# Patient Record
Sex: MALE | Race: WHITE | NOT HISPANIC OR LATINO | Employment: OTHER | URBAN - METROPOLITAN AREA
[De-identification: names, ages, dates, MRNs, and addresses within clinical notes are randomized per-mention and may not be internally consistent; named-entity substitution may affect disease eponyms.]

---

## 2017-02-24 ENCOUNTER — GENERIC CONVERSION - ENCOUNTER (OUTPATIENT)
Dept: OTHER | Facility: OTHER | Age: 32
End: 2017-02-24

## 2017-05-16 ENCOUNTER — GENERIC CONVERSION - ENCOUNTER (OUTPATIENT)
Dept: OTHER | Facility: OTHER | Age: 32
End: 2017-05-16

## 2017-05-24 ENCOUNTER — ALLSCRIPTS OFFICE VISIT (OUTPATIENT)
Dept: OTHER | Facility: OTHER | Age: 32
End: 2017-05-24

## 2017-05-24 ENCOUNTER — GENERIC CONVERSION - ENCOUNTER (OUTPATIENT)
Dept: OTHER | Facility: OTHER | Age: 32
End: 2017-05-24

## 2017-05-26 ENCOUNTER — GENERIC CONVERSION - ENCOUNTER (OUTPATIENT)
Dept: OTHER | Facility: OTHER | Age: 32
End: 2017-05-26

## 2017-05-26 DIAGNOSIS — Z03.89 ENCOUNTER FOR OBSERVATION FOR OTHER SUSPECTED DISEASES AND CONDITIONS RULED OUT: ICD-10-CM

## 2017-06-02 ENCOUNTER — LAB CONVERSION - ENCOUNTER (OUTPATIENT)
Dept: OTHER | Facility: OTHER | Age: 32
End: 2017-06-02

## 2017-06-02 ENCOUNTER — GENERIC CONVERSION - ENCOUNTER (OUTPATIENT)
Dept: OTHER | Facility: OTHER | Age: 32
End: 2017-06-02

## 2017-06-02 LAB
HEPATITIS B SURFACE ANTIGEN (HISTORICAL): NORMAL
HIV AG/AB, 4TH GEN (HISTORICAL): NORMAL
RPR SCREEN (HISTORICAL): NORMAL

## 2017-06-04 ENCOUNTER — LAB CONVERSION - ENCOUNTER (OUTPATIENT)
Dept: OTHER | Facility: OTHER | Age: 32
End: 2017-06-04

## 2017-06-04 LAB
BACTERIA UR QL AUTO: ABNORMAL /HPF
BILIRUB UR QL STRIP: NEGATIVE
COLOR UR: YELLOW
COMMENT (HISTORICAL): CLEAR
CULTURE RESULT (HISTORICAL): ABNORMAL
CULTURE RESULT (HISTORICAL): ABNORMAL
FECAL OCCULT BLOOD DIAGNOSTIC (HISTORICAL): NEGATIVE
GLUCOSE (HISTORICAL): NEGATIVE
HYALINE CASTS #/AREA URNS LPF: ABNORMAL /LPF
KETONES UR STRIP-MCNC: NEGATIVE MG/DL
LEUKOCYTE ESTERASE UR QL STRIP: ABNORMAL
NITRITE UR QL STRIP: POSITIVE
PH UR STRIP.AUTO: 6.5 [PH] (ref 5–8)
PROT UR STRIP-MCNC: NEGATIVE MG/DL
RBC (HISTORICAL): ABNORMAL /HPF
SP GR UR STRIP.AUTO: 1.02 (ref 1–1.03)
SQUAMOUS EPITHELIAL CELLS (HISTORICAL): ABNORMAL /HPF
WBC # BLD AUTO: ABNORMAL /HPF

## 2017-06-05 ENCOUNTER — LAB CONVERSION - ENCOUNTER (OUTPATIENT)
Dept: OTHER | Facility: OTHER | Age: 32
End: 2017-06-05

## 2017-06-05 LAB — CLINICAL COMMENT (HISTORICAL): NORMAL

## 2017-07-31 DIAGNOSIS — S06.890A OTHER SPECIFIED INTRACRANIAL INJURY WITHOUT LOSS OF CONSCIOUSNESS, INITIAL ENCOUNTER (HCC): ICD-10-CM

## 2017-07-31 DIAGNOSIS — G82.50 QUADRIPLEGIA (HCC): ICD-10-CM

## 2017-07-31 DIAGNOSIS — M79.9 SOFT TISSUE DISORDER: ICD-10-CM

## 2017-08-01 ENCOUNTER — LAB CONVERSION - ENCOUNTER (OUTPATIENT)
Dept: OTHER | Facility: OTHER | Age: 32
End: 2017-08-01

## 2017-08-01 LAB
BILIRUB UR QL STRIP: NEGATIVE
COLOR UR: YELLOW
COMMENT (HISTORICAL): CLEAR
FECAL OCCULT BLOOD DIAGNOSTIC (HISTORICAL): NEGATIVE
GLUCOSE (HISTORICAL): NEGATIVE
KETONES UR STRIP-MCNC: NEGATIVE MG/DL
LEUKOCYTE ESTERASE UR QL STRIP: NEGATIVE
NITRITE UR QL STRIP: NEGATIVE
PH UR STRIP.AUTO: 6 [PH] (ref 5–8)
PROT UR STRIP-MCNC: NEGATIVE MG/DL
SP GR UR STRIP.AUTO: 1.01 (ref 1–1.03)

## 2017-08-10 ENCOUNTER — GENERIC CONVERSION - ENCOUNTER (OUTPATIENT)
Dept: OTHER | Facility: OTHER | Age: 32
End: 2017-08-10

## 2017-09-05 ENCOUNTER — ALLSCRIPTS OFFICE VISIT (OUTPATIENT)
Dept: OTHER | Facility: OTHER | Age: 32
End: 2017-09-05

## 2017-09-06 LAB
A/G RATIO (HISTORICAL): 1.1 (CALC) (ref 1–2.5)
ALBUMIN SERPL BCP-MCNC: 4.2 G/DL (ref 3.6–5.1)
ALP SERPL-CCNC: 65 U/L (ref 40–115)
ALT SERPL W P-5'-P-CCNC: 16 U/L (ref 9–46)
AST SERPL W P-5'-P-CCNC: 18 U/L (ref 10–40)
BILIRUB SERPL-MCNC: 0.5 MG/DL (ref 0.2–1.2)
BUN SERPL-MCNC: 14 MG/DL (ref 7–25)
BUN/CREA RATIO (HISTORICAL): ABNORMAL (CALC) (ref 6–22)
CALCIUM SERPL-MCNC: 9.4 MG/DL (ref 8.6–10.3)
CHLORIDE SERPL-SCNC: 103 MMOL/L (ref 98–110)
CO2 SERPL-SCNC: 30 MMOL/L (ref 20–31)
CREAT SERPL-MCNC: 0.86 MG/DL (ref 0.6–1.35)
EGFR AFRICAN AMERICAN (HISTORICAL): 132 ML/MIN/1.73M2
EGFR-AMERICAN CALC (HISTORICAL): 114 ML/MIN/1.73M2
GAMMA GLOBULIN (HISTORICAL): 3.8 G/DL (CALC) (ref 1.9–3.7)
GLUCOSE (HISTORICAL): 85 MG/DL (ref 65–99)
POTASSIUM SERPL-SCNC: 4.8 MMOL/L (ref 3.5–5.3)
SODIUM SERPL-SCNC: 141 MMOL/L (ref 135–146)
TOTAL PROTEIN (HISTORICAL): 8 G/DL (ref 6.1–8.1)

## 2017-09-07 ENCOUNTER — LAB CONVERSION - ENCOUNTER (OUTPATIENT)
Dept: OTHER | Facility: OTHER | Age: 32
End: 2017-09-07

## 2017-09-07 LAB
BASOPHILS # BLD AUTO: 0.6 %
BASOPHILS # BLD AUTO: 31 CELLS/UL (ref 0–200)
DEPRECATED RDW RBC AUTO: 12.5 % (ref 11–15)
EOSINOPHIL # BLD AUTO: 1.6 %
EOSINOPHIL # BLD AUTO: 82 CELLS/UL (ref 15–500)
HCT VFR BLD AUTO: 47.5 % (ref 38.5–50)
HGB BLD-MCNC: 15.8 G/DL (ref 13.2–17.1)
LYMPHOCYTES # BLD AUTO: 1877 CELLS/UL (ref 850–3900)
LYMPHOCYTES # BLD AUTO: 36.8 %
MCH RBC QN AUTO: 31 PG (ref 27–33)
MCHC RBC AUTO-ENTMCNC: 33.3 G/DL (ref 32–36)
MCV RBC AUTO: 93.3 FL (ref 80–100)
MONOCYTES # BLD AUTO: 357 CELLS/UL (ref 200–950)
MONOCYTES (HISTORICAL): 7 %
NEUTROPHILS # BLD AUTO: 2754 CELLS/UL (ref 1500–7800)
NEUTROPHILS # BLD AUTO: 54 %
PLATELET # BLD AUTO: 206 THOUSAND/UL (ref 140–400)
PMV BLD AUTO: 10.8 FL (ref 7.5–12.5)
RBC # BLD AUTO: 5.09 MILLION/UL (ref 4.2–5.8)
T4 FREE SERPL-MCNC: 1 NG/DL (ref 0.8–1.8)
TSH SERPL DL<=0.05 MIU/L-ACNC: 5.62 MIU/L (ref 0.4–4.5)
WBC # BLD AUTO: 5.1 THOUSAND/UL (ref 3.8–10.8)

## 2017-09-15 ENCOUNTER — HOSPITAL ENCOUNTER (OUTPATIENT)
Dept: RADIOLOGY | Age: 32
Discharge: HOME | End: 2017-09-15
Payer: COMMERCIAL

## 2017-09-15 DIAGNOSIS — G82.50 QUADRIPLEGIA (HCC): ICD-10-CM

## 2017-09-15 DIAGNOSIS — M79.9 SOFT TISSUE DISORDER: ICD-10-CM

## 2017-09-15 DIAGNOSIS — S06.890A OTHER SPECIFIED INTRACRANIAL INJURY WITHOUT LOSS OF CONSCIOUSNESS, INITIAL ENCOUNTER (HCC): ICD-10-CM

## 2017-09-15 PROCEDURE — 77080 DXA BONE DENSITY AXIAL: CPT

## 2017-09-23 ENCOUNTER — GENERIC CONVERSION - ENCOUNTER (OUTPATIENT)
Dept: OTHER | Facility: OTHER | Age: 32
End: 2017-09-23

## 2017-10-06 DIAGNOSIS — R93.7 ABNORMAL FINDINGS ON DIAGNOSTIC IMAGING OF OTHER PARTS OF MUSCULOSKELETAL SYSTEM: ICD-10-CM

## 2017-10-06 DIAGNOSIS — R94.6 ABNORMAL RESULTS OF THYROID FUNCTION STUDIES: ICD-10-CM

## 2017-10-20 ENCOUNTER — GENERIC CONVERSION - ENCOUNTER (OUTPATIENT)
Dept: OTHER | Facility: OTHER | Age: 32
End: 2017-10-20

## 2017-11-10 ENCOUNTER — LAB CONVERSION - ENCOUNTER (OUTPATIENT)
Dept: OTHER | Facility: OTHER | Age: 32
End: 2017-11-10

## 2017-11-10 LAB — 25(OH)D3 SERPL-MCNC: 55 NG/ML (ref 30–100)

## 2017-11-11 ENCOUNTER — LAB CONVERSION - ENCOUNTER (OUTPATIENT)
Dept: OTHER | Facility: OTHER | Age: 32
End: 2017-11-11

## 2017-11-11 LAB
T4 FREE SERPL-MCNC: 1.1 NG/DL (ref 0.8–1.8)
TSH SERPL DL<=0.05 MIU/L-ACNC: 5.58 MIU/L (ref 0.4–4.5)

## 2017-11-13 ENCOUNTER — GENERIC CONVERSION - ENCOUNTER (OUTPATIENT)
Dept: OTHER | Facility: OTHER | Age: 32
End: 2017-11-13

## 2017-11-28 ENCOUNTER — GENERIC CONVERSION - ENCOUNTER (OUTPATIENT)
Dept: OTHER | Facility: OTHER | Age: 32
End: 2017-11-28

## 2017-12-04 ENCOUNTER — GENERIC CONVERSION - ENCOUNTER (OUTPATIENT)
Dept: OTHER | Facility: OTHER | Age: 32
End: 2017-12-04

## 2017-12-12 ENCOUNTER — GENERIC CONVERSION - ENCOUNTER (OUTPATIENT)
Dept: OTHER | Facility: OTHER | Age: 32
End: 2017-12-12

## 2018-01-04 ENCOUNTER — GENERIC CONVERSION - ENCOUNTER (OUTPATIENT)
Dept: OTHER | Facility: OTHER | Age: 33
End: 2018-01-04

## 2018-01-09 NOTE — PROGRESS NOTES
Assessment    1  Skin irritation (709 9) (R23 8)   2  Chronic brain injury (854 00) (S06 890A)   3  Verruca warts (infectious) (078 19) (B07 9)   4  Constipation (564 00) (K59 00)    Plan  Skin irritation    · Desitin 40 % External Paste; Apply a small amount to base of penis daily    Discussion/Summary  Impression: health maintenance visit, healthy adult male  Currently, he eats a healthy diet  Madison Pemberton is doing very well! 1  Skin irritation of penis - start desitin daily to base of penis  2  Constipation - recommended high fiber, high fluid diet  3  Chronic brain injury - stable, continue therapy including home physical therapy for muscle spasticity  4  Verruca on right arm - cryotherapy applied today, repeat in one week  5  Health maintenance - up-to-date  Return to office in one week for repeat cryotherapy  Possible side effects of new medications were reviewed with the patient/guardian today  Self Referrals: No      Chief Complaint  Annual physical      History of Present Illness  HM, Adult Male: The patient is being seen for a health maintenance evaluation  The last health maintenance visit was 1 year(s) ago  General Health: The patient's health since the last visit is described as good  He has regular dental visits  Immunizations status: up to date  Lifestyle:  He consumes a diverse and healthy diet  He does not have any weight concerns  He does not use tobacco  He denies alcohol use  He denies drug use  Reproductive health:  the patient is not sexually active  Screening: cancer screening reviewed and current  metabolic screening reviewed and current  risk screening reviewed and current  HPI: 24-year-old male with a history of 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 as well as long QT syndrome presents for physical     Has had a wart on the R hand for several weeks, have been trying compound W with little relief     Requesting new home PT referral for lower and upper extremity muscle spasticity  Has had constipation issues for quite some time, with enemas for many of his BMs  Requesting letter for high fiber diet  Review of Systems    Constitutional: no fever, not feeling poorly, no chills and not feeling tired  Cardiovascular: no chest pain, no palpitations and no extremity edema  Respiratory: no shortness of breath, no cough, no wheezing and no shortness of breath during exertion  Gastrointestinal: constipation, but as noted in HPI, no abdominal pain, no vomiting, no diarrhea and no blood in stools  Genitourinary: incontinence, but no urinary hesitancy  Musculoskeletal: no arthralgias and no myalgias  Integumentary: skin lesion, but as noted in HPI and no rashes  Neurological: no headache, no confusion, no dizziness and no convulsions  Psychiatric: no emotional problems  Active Problems    1  Abdominal pain, epigastric (789 06) (R10 13)   2  Abnormal bone xray (793 7) (R93 7)   3  Abnormal finding on chest xray (793 2) (R93 8)   4  Abnormal laboratory test (796 4) (R89 9)   5  Abnormal weight loss (783 21) (R63 4)   6  Accelerated essential hypertension (401 0) (I10)   7  Acute Hypoxic Encephalopathy (348 1)   8  Acute Hypoxic Encephalopathy (348 1)   9  Allergic rhinitis (477 9) (J30 9)   10  Back pain (724 5) (M54 9)   11  Bright red blood per rectum (569 3) (K62 5)   12  Bruxism (306 8) (F45 8)   13  Cellulitis (682 9) (L03 90)   14  Cellulitis Of The Groin (682 2)   15  Cerumen impaction (380 4) (H61 20)   16  Chronic brain injury (854 00) (S06 890A)   17  Community acquired pneumonia (5) (J18 9)   25  Complication: Anoxic Brain Damage (997 01)   19  Constipation (564 00) (K59 00)   20  Cough (786 2) (R05)   21  Cranial somatic dysfunction (739 0) (M99 00)   22  Curvature of thoracic spine (737 9) (M43 9)   23  Disorder of musculoskeletal system (729 90) (M79 9)   24  Disuse Muscle Atrophy (728 2)   25   Esophageal reflux (530 81) (K21 9) 26  Fatigue (780 79) (R53 83)   27  Groin pain (789 09) (R10 30)   28  Headache (784 0) (R51)   29  Hemorrhoids (455 6) (K64 9)   30  Impaired fasting glucose (790 21) (R73 01)   31  Incoordination (781 3) (R27 9)   32  Influenza (487 1) (J11 1)   33  Lethargy (780 79) (R53 83)   34  Long QT syndrome (426 82) (I45 81)   35  Lower abdominal pain (789 09) (R10 30)   36  Lower limb region somatic dysfunction (739 6) (M99 06)   37  Lumbar region somatic dysfunction (739 3) (M99 03)   38  Neck pain (723 1) (M54 2)   39  Need for prophylactic vaccination and inoculation against influenza (V04 81) (Z23)   40  Pelvic somatic dysfunction (739 5) (M99 05)   41  Poor appetite (783 0) (R63 0)   42  Quadriplegia (344 00) (G82 50)   43  Rib cage region somatic dysfunction (739 8) (M99 08)   44  Rib pain (786 50) (R07 81)   45  Segmental and somatic dysfunction of lower extremity (739 6) (M99 06)   46  Segmental and somatic dysfunction of thoracic region (739 2) (M99 02)   47  Somatic dysfunction of cervical region (739 1) (M99 01)   48  Urinary incontinence (788 30) (R32)   49  Urinary incontinence (788 30) (R32)   50  Urinary tract infection associated with catheterization of urinary tract, initial encounter    (996 64,599 0) (T83 51XA,N39 0)   51  Verruca warts (infectious) (078 19) (B07 9)   52  Visual disturbances (368 9) (H53 9)   53  Vomiting (787 03) (R11 10)    Past Medical History    · History of Community acquired pneumonia (5) (J18 9)   · Need for prophylactic vaccination and inoculation against influenza (V04 81) (Z23)    Family History  Father    · Family history of Mitral valve replaced    Social History    · Never A Smoker    Current Meds   1  Acetaminophen 325 MG Oral Tablet; TAKE 1 TO 2 TABLETS EVERY 6 HOURS AS   NEEDED; Therapy: 50QQM4835 to (Evaluate:26Jun2014)  Requested for: 32ZDJ4306; Last   LM:66UPY9064 Ordered   2  Centrum Silver Ultra Mens Oral Tablet; Take 1 tablet daily;    Therapy: 51IJR7580 to Oumou Ibarra)  Requested for: 94Woy0209; Last   Rx:84Nou1705 Ordered   3  Cerovite Senior Oral Tablet; take 1 tablet daily by mouth; Therapy: 17JEM5494 to (Evaluate:2017)  Requested for: 2016; Last   Rx:2016 Ordered   4  CoQ10 200 MG Oral Capsule; TAKE AS DIRECTED; Therapy: 64Rvn4609 to Recorded   5  Dantrium 50 MG Oral Capsule; Take 1 tablet daily; Therapy: (Kimberlyn Stanton) to Recorded   6  Debrox 6 5 % Otic Solution; put 5 drops in each ear, alternating,  x 10 days; Therapy: 92MQG3551 to (Last Rx:61Cba1331)  Requested for: 63Cxt8591 Ordered   7  Denta 5000 Plus 1 1 % Dental Cream; USE AS DIRECTED; Therapy: 04Bqp1692 to Recorded   8  Depend Underwear Sm/Med Miscellaneous; THREE DAILY AS DIRECTED DX G82 5   R32 S6 890A M99 00; Therapy: 90Gjb8292 to (Evaluate:2017); Last Rx:2016 Ordered   9  Dronabinol 2 5 MG Oral Capsule; TAKE 1 CAPSULE 4 TIMES DAILY; Therapy: 92HGI4194 to (Evaluate:03Ckj7464)  Requested for: 84Irf3905 Recorded   10  Enemeez Plus  MG Rectal Enema; use as directed; Therapy: 2016 to (Evaluate:2017)  Requested for: 2016; Last    Rx:2016 Ordered   11  Enemeez Plus  MG Rectal Enema; Therapy: 97ZHD0967 to Recorded   12  Ginkgo Biloba 120 MG Oral Tablet; TAKE AS DIRECTED; Therapy: 42Xeu3459 to Recorded   13  Hibiclens 4 % External Liquid; USE AS DIRECTED; Therapy: 73Lzy0591 to (Last Rx:31Jvc8223)  Requested for: 16Dal8984 Ordered   14  Huggies Little Movers Size 3 Miscellaneous; CHANGE 8 TO 10 DIAPERS PER DAY AS    DIRECTED; Therapy: 15Hbp0161 to (Last Rx:2016) Ordered   15  Ibuprofen 200 MG Oral Tablet; TAKE 2 TABLET Every 6 hours PRN; Therapy: 82ZXZ5659 to (Evaluate:2016)  Requested for: 39DWQ4078; Last    I14NGA0079 Ordered   16  Ibuprofen 600 MG Oral Tablet; Take 1 tablet twice daily; Therapy: 25Zwt9248 to (Evaluate:82Mil2321)  Requested for: 2015; Last    Rx:88Mvh0763 Ordered   17  Loratadine 10 MG Oral Tablet; take 1 tablet daily as needed; Therapy: 09XMZ5494 to (Evaluate:09Aug2015)  Requested for: 78INQ9073; Last    Rx:10Jun2015 Ordered   18  Magnesium 500 MG Oral Capsule; TAKE AS DIRECTED; Therapy: 37Mjo6457 to Recorded   19  Metoprolol Succinate ER 25 MG Oral Tablet Extended Release 24 Hour; TAKE 1/2    TABLET EVERY MORNING -MAY CAUSE DROWSINESS; Therapy: 14BOV6346 to (Last Rx:09Jun2016)  Requested for: 09LQJ3797 Ordered   20  Omega 3 1200 MG Oral Capsule; TAKE AS DIRECTED; Therapy: 11Zyn8197 to Recorded   21  Pantoprazole Sodium 40 MG Oral Tablet Delayed Release; take 1 tablet daily by mouth; Therapy: 99PSV0217 to (Last Rx:60Inv2900)  Requested for: 44Rbo4246 Ordered   22  Provigil 100 MG Oral Tablet; TAKE 1 TABLET TWICE DAILY; Therapy: (Eluterio Crome) to Recorded   23  Gem Caps 1 MG Oral Capsule; TAKE (1) CAPSULE DAILY; Therapy: 98FQM3550 to (Last Rx:25Mar2016)  Requested for: 25Mar2016 Ordered   24  Sertraline HCl - 100 MG Oral Tablet; Therapy: 80FTY6830 to Recorded   25  Sertraline HCl - 25 MG Oral Tablet; TAKE 2 TABLETS EVERY EVENING; Therapy: (Eluterio Crome) to Recorded   26  Vinyl Gloves Miscellaneous; MEDIUM GLOVES 3BOXES/MONTH   3 348 1; Therapy: 80Rxd6863 to (Evaluate:20Jan2017); Last Rx:26Jan2016 Ordered   27  Vitamin C 100 MG Oral Tablet; TAKE 1 TABLET DAILY AS DIRECTED; Therapy: 07Dcw9231 to Recorded   28  Zinc Picolinate Powder; USE AS DIRECTED; Therapy: 25Urr0631 to Recorded    Allergies    1  No Known Drug Allergies    Vitals   Recorded: 00YKS1724 94:54OY   Systolic 232   Diastolic 78   Heart Rate 72   Respiration 18   Temperature 97 1 F   Height 5 ft 10 in   Weight 132 lb 4 00 oz   BMI Calculated 18 98   BSA Calculated 1 75     Physical Exam    Constitutional   General appearance: No acute distress, well appearing and well nourished      Head and Face   Head and face: Normal     Eyes   Conjunctiva and lids: No erythema, swelling or discharge  Ears, Nose, Mouth, and Throat   External inspection of ears and nose: Normal     Hearing: Normal     Neck   Neck: Supple, symmetric, trachea midline, no masses  Pulmonary   Respiratory effort: No increased work of breathing or signs of respiratory distress  Auscultation of lungs: Clear to auscultation  Cardiovascular   Auscultation of heart: Normal rate and rhythm, normal S1 and S2, no murmurs  Examination of extremities for edema and/or varicosities: Normal     Abdomen   Abdomen: Non-tender, no masses  Lymphatic   Palpation of lymph nodes in neck: No lymphadenopathy  Neurologic baseline  baseline  baseline  baseline  Signatures   Electronically signed by :  Terrell Cheadle, MD; Aug 11 2016 12:17PM EST                       (Author)

## 2018-01-10 NOTE — RESULT NOTES
Verified Results  (Q) TSH, 3RD GENERATION W/REFLEX TO FT4 47AZH3577 10:21AM Celia Jorgensen   REPORT COMMENT:  FASTING:NO     Test Name Result Flag Reference   TSH W/REFLEX TO FT4 5 58 mIU/L H 0 40-4 50   T4, FREE 1 1 ng/dL  0 8-1 8       Signatures   Electronically signed by : CHANTE Cardoso ; Nov 13 2017  1:17PM EST                       (Author)

## 2018-01-11 NOTE — MISCELLANEOUS
To Whom it May Concern,    Lavell Gramajo should be following a high fiber, high fluid diet to assist with constipation  Sincerely,                Electronically signed by: August Johns MD  Aug 11 2016 11:57AM EST Author

## 2018-01-13 VITALS
SYSTOLIC BLOOD PRESSURE: 118 MMHG | DIASTOLIC BLOOD PRESSURE: 66 MMHG | HEART RATE: 80 BPM | WEIGHT: 131.25 LBS | RESPIRATION RATE: 18 BRPM | BODY MASS INDEX: 18.83 KG/M2 | TEMPERATURE: 97.1 F
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
## 2018-01-14 VITALS
DIASTOLIC BLOOD PRESSURE: 68 MMHG | RESPIRATION RATE: 16 BRPM | BODY MASS INDEX: 18.94 KG/M2 | WEIGHT: 132 LBS | TEMPERATURE: 96.4 F | SYSTOLIC BLOOD PRESSURE: 102 MMHG | HEART RATE: 68 BPM

## 2018-01-15 NOTE — MISCELLANEOUS
August 10, 2017      [facility]    To whom it may concern,    Mercy Breaux has been under treatment for anoxic brain injury, muscle atrophy and muscle spasticity  He has limited  motor control  He is chairbound and requires total care with transferring from bed to chair as he is not able to walk or stand unassisted  Currently, James Marie does sleep in a conventional full sized adult bed  He does require a full length adult bedrail  on his bed  This is not a restraint  The purpose is to reduce the risk of falling out of bed, allow free movement while sleeping as well as providing comfort and security to him  The bed rail should be padded in order to prevent risk of strangulation  or asphyxiation  Naman's condition is life long, it is unlikely that there will be any significant change in the future  Kindly direct any questions or concerns to my attention  Sincerely,        Deronda Moritz, M D        Electronically signed June Piedra   Aug 10 2017 10:59AM EST

## 2018-01-17 NOTE — MISCELLANEOUS
May 16, 2017      To whom it may concern,    Virginia Zepeda has medical history of anoxic brain injury, long QT syndrome, lower limb region somatic dysfunction, pelvic somatic dysfunction and spasms  He has been actively  going for aquatic therapy with good results  It is my opinion that he continue this therapy indefinitely  Kindly direct any questions or concerns to my attention  Sincerely,        CHANTE Olmstead        Electronically signed Rose Marks   May 16 2017  3:39PM EST

## 2018-01-18 ENCOUNTER — ALLSCRIPTS OFFICE VISIT (OUTPATIENT)
Dept: OTHER | Facility: OTHER | Age: 33
End: 2018-01-18
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
## 2018-01-22 VITALS
RESPIRATION RATE: 14 BRPM | DIASTOLIC BLOOD PRESSURE: 80 MMHG | HEART RATE: 70 BPM | TEMPERATURE: 98.2 F | SYSTOLIC BLOOD PRESSURE: 116 MMHG

## 2018-01-22 VITALS
SYSTOLIC BLOOD PRESSURE: 110 MMHG | DIASTOLIC BLOOD PRESSURE: 70 MMHG | HEART RATE: 68 BPM | TEMPERATURE: 96.8 F | RESPIRATION RATE: 16 BRPM
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
## 2018-01-23 NOTE — MISCELLANEOUS
Message   Recorded as Task   Date: 11/30/2017 11:08 AM, Created By: Sandra Mancilla   Task Name: Call Back   Assigned To: Celia Jorgensen   Regarding Patient: Yahaira Levy, Status: In Progress   Comment:    Sandra Mancilla - 30 Nov 2017 11:08 AM     TASK CREATED  Caller: Sunil Dukes, Mother; Results Inquiry; [phone]  MOM CONCERNED ABOUT THYROID LEVELS SINCE HE HAS HAD MULTIPLE BLD WK DONE  WANTS RESULTS FROM 11/9   [facility] - 30 Nov 2017 4:19 PM     TASK REASSIGNED: [facility]   Celia Jorgensen - 01 Dec 2017 5:29 PM     TASK IN PROGRESS   Left VM she may call back if concerned but that we have no concern or need for Rx at this time        Signatures   Electronically signed by : CHANTE Ho ; Dec  4 2017  2:58PM EST                       (Author)

## 2018-01-24 VITALS
HEIGHT: 70 IN | SYSTOLIC BLOOD PRESSURE: 120 MMHG | HEART RATE: 78 BPM | TEMPERATURE: 96.8 F | BODY MASS INDEX: 19.04 KG/M2 | DIASTOLIC BLOOD PRESSURE: 80 MMHG | WEIGHT: 133 LBS | RESPIRATION RATE: 16 BRPM

## 2018-01-24 VITALS
RESPIRATION RATE: 14 BRPM | SYSTOLIC BLOOD PRESSURE: 114 MMHG | DIASTOLIC BLOOD PRESSURE: 72 MMHG | HEART RATE: 72 BPM | TEMPERATURE: 96 F

## 2018-01-27 DIAGNOSIS — K21.9 GERD WITHOUT ESOPHAGITIS: Primary | ICD-10-CM

## 2018-01-27 DIAGNOSIS — F32.A DEPRESSION, UNSPECIFIED DEPRESSION TYPE: Primary | ICD-10-CM

## 2018-01-28 RX ORDER — PANTOPRAZOLE SODIUM 40 MG/1
TABLET, DELAYED RELEASE ORAL
Qty: 30 TABLET | Refills: 0 | Status: SHIPPED | OUTPATIENT
Start: 2018-01-28 | End: 2018-01-31 | Stop reason: SDUPTHER

## 2018-01-29 DIAGNOSIS — R52 PAIN: Primary | ICD-10-CM

## 2018-01-29 RX ORDER — SERTRALINE HYDROCHLORIDE 100 MG/1
TABLET, FILM COATED ORAL
Qty: 30 TABLET | Refills: 0 | Status: SHIPPED | OUTPATIENT
Start: 2018-01-29 | End: 2018-01-31 | Stop reason: SDUPTHER

## 2018-01-29 RX ORDER — IBUPROFEN 600 MG/1
600 TABLET ORAL 2 TIMES DAILY
Qty: 30 TABLET | Refills: 0 | Status: SHIPPED | OUTPATIENT
Start: 2018-01-29 | End: 2018-06-28 | Stop reason: SDUPTHER

## 2018-01-31 ENCOUNTER — TELEPHONE (OUTPATIENT)
Dept: FAMILY MEDICINE CLINIC | Facility: CLINIC | Age: 33
End: 2018-01-31

## 2018-01-31 DIAGNOSIS — K21.9 GERD WITHOUT ESOPHAGITIS: ICD-10-CM

## 2018-01-31 DIAGNOSIS — I10 ACCELERATED ESSENTIAL HYPERTENSION: Primary | ICD-10-CM

## 2018-01-31 DIAGNOSIS — F32.A DEPRESSION, UNSPECIFIED DEPRESSION TYPE: ICD-10-CM

## 2018-01-31 PROBLEM — R79.89 ABNORMAL TSH: Status: ACTIVE | Noted: 2017-10-20

## 2018-01-31 PROBLEM — B37.2 CANDIDAL INTERTRIGO: Status: ACTIVE | Noted: 2017-12-12

## 2018-01-31 PROBLEM — R93.7 ABNORMAL BONE DENSITY SCREENING: Status: ACTIVE | Noted: 2017-10-06

## 2018-01-31 PROBLEM — L71.9 ROSACEA: Status: ACTIVE | Noted: 2018-01-18

## 2018-01-31 RX ORDER — SERTRALINE HYDROCHLORIDE 100 MG/1
100 TABLET, FILM COATED ORAL DAILY
Qty: 90 TABLET | Refills: 1 | Status: SHIPPED | OUTPATIENT
Start: 2018-01-31 | End: 2018-07-05 | Stop reason: SDUPTHER

## 2018-01-31 RX ORDER — PANTOPRAZOLE SODIUM 40 MG/1
40 TABLET, DELAYED RELEASE ORAL DAILY
Qty: 90 TABLET | Refills: 1 | Status: SHIPPED | OUTPATIENT
Start: 2018-01-31 | End: 2018-07-17 | Stop reason: ALTCHOICE

## 2018-01-31 NOTE — TELEPHONE ENCOUNTER
Voicemail:    Pharmacy requesting refill     Metoprolol Tartrate 25mg  Protonix  Sertraline 100mg      Please advise  Thank you

## 2018-02-01 ENCOUNTER — OFFICE VISIT (OUTPATIENT)
Dept: FAMILY MEDICINE CLINIC | Facility: CLINIC | Age: 33
End: 2018-02-01
Payer: COMMERCIAL

## 2018-02-01 VITALS
BODY MASS INDEX: 18.48 KG/M2 | WEIGHT: 128.8 LBS | RESPIRATION RATE: 12 BRPM | DIASTOLIC BLOOD PRESSURE: 60 MMHG | HEART RATE: 66 BPM | TEMPERATURE: 96.3 F | SYSTOLIC BLOOD PRESSURE: 108 MMHG

## 2018-02-01 DIAGNOSIS — B37.2 CANDIDAL INTERTRIGO: ICD-10-CM

## 2018-02-01 DIAGNOSIS — L71.9 ROSACEA: ICD-10-CM

## 2018-02-01 DIAGNOSIS — R05.9 COUGH: Primary | ICD-10-CM

## 2018-02-01 PROCEDURE — 99214 OFFICE O/P EST MOD 30 MIN: CPT | Performed by: FAMILY MEDICINE

## 2018-02-01 RX ORDER — FLUCONAZOLE 100 MG/1
100 TABLET ORAL DAILY
Qty: 7 TABLET | Refills: 0 | Status: SHIPPED | OUTPATIENT
Start: 2018-02-01 | End: 2018-02-01 | Stop reason: ALTCHOICE

## 2018-02-01 NOTE — PATIENT INSTRUCTIONS
Discontinue ketoconazole  1 week of fluconazole pills prescribed  CXR ordered for cough  Continune nystatin for 1 more week

## 2018-02-01 NOTE — ASSESSMENT & PLAN NOTE
Unresolved after ketoconazole, prescribed nystatin at last visit  1 week of fluconazole oral prescribed

## 2018-02-01 NOTE — PROGRESS NOTES
Assessment/Plan:       Problem List Items Addressed This Visit        Musculoskeletal and Integument    Candidal intertrigo     Unresolved after ketoconazole, prescribed nystatin at last visit  1 week of fluconazole oral prescribed  Relevant Medications    fluconazole (DIFLUCAN) 100 mg tablet    Rosacea     Prescribed metrogel at last visit  Resolved  Other    Cough - Primary     A/W fatigue, in setting of previous history of atypical pneumonia, will check CXR  Relevant Orders    XR chest pa & lateral        RTC 1 month for re-eval    Subjective:      Patient ID: Josefa Lundy is a 27 y o  male  HPI    47-year-old male seen in follow-up of rosacea and candidal intertrigo  Two weeks ago, the patient was prescribed Metrogel for rosacea and nystatin for Candidal infection  Facial rash has resolved  Intertriginous rash waxes and wanes, with worst current location L armpit  New issue: more fatigue, some increased cough and drooling, no phlegm  Mom concerned with previous hx of "walking pneumonia "    The following portions of the patient's history were reviewed and updated as appropriate: allergies, current medications, past family history, past medical history, past social history, past surgical history and problem list     Review of Systems   Constitutional: Negative for fatigue and fever  HENT: Negative for congestion, rhinorrhea and sore throat  Eyes: Negative for pain  Respiratory: Positive for cough  Negative for shortness of breath  Gastrointestinal: Negative for diarrhea and vomiting  Skin: Positive for rash  Objective:     Physical Exam   Constitutional: He is oriented to person, place, and time  He appears well-developed and well-nourished  No distress  HENT:   Head: Normocephalic and atraumatic  Mouth/Throat: Oropharynx is clear and moist    Eyes: Pupils are equal, round, and reactive to light  Neck: Normal range of motion  Neck supple  Cardiovascular: Normal rate, regular rhythm and normal heart sounds  Exam reveals no gallop and no friction rub  No murmur heard  Pulmonary/Chest: Effort normal and breath sounds normal  No respiratory distress  He has no wheezes  He has no rales  Abdominal: Soft  There is no tenderness  Musculoskeletal: Normal range of motion  Neurological: He is alert and oriented to person, place, and time  Skin: Rash (persistent erythematous change of L axilla) noted  Psychiatric: He has a normal mood and affect   Thought content normal

## 2018-02-02 ENCOUNTER — TRANSCRIBE ORDERS (OUTPATIENT)
Dept: ADMINISTRATIVE | Age: 33
End: 2018-02-02

## 2018-02-02 ENCOUNTER — APPOINTMENT (OUTPATIENT)
Dept: RADIOLOGY | Age: 33
End: 2018-02-02
Payer: COMMERCIAL

## 2018-02-02 DIAGNOSIS — R05.9 COUGH: ICD-10-CM

## 2018-02-02 PROCEDURE — 71046 X-RAY EXAM CHEST 2 VIEWS: CPT

## 2018-02-08 ENCOUNTER — TELEPHONE (OUTPATIENT)
Dept: FAMILY MEDICINE CLINIC | Facility: CLINIC | Age: 33
End: 2018-02-08

## 2018-02-08 ENCOUNTER — DOCUMENTATION (OUTPATIENT)
Dept: FAMILY MEDICINE CLINIC | Facility: CLINIC | Age: 33
End: 2018-02-08

## 2018-02-14 ENCOUNTER — TELEPHONE (OUTPATIENT)
Dept: FAMILY MEDICINE CLINIC | Facility: CLINIC | Age: 33
End: 2018-02-14

## 2018-02-14 NOTE — TELEPHONE ENCOUNTER
Mom concerned about rash Chemungmayr jane Green has under arms, it's spreading to chest  She made an appt with Dr Ashu Swanson for tomorrow 2/15   She asked if Dr Ashu Swanson could call her, she's hoping something might be prescribed today

## 2018-02-14 NOTE — TELEPHONE ENCOUNTER
Spoke to Jose Alfredo Mcneill take a look tomorrow and make management decision at that time  She agrees  Thanks!

## 2018-02-15 ENCOUNTER — OFFICE VISIT (OUTPATIENT)
Dept: FAMILY MEDICINE CLINIC | Facility: CLINIC | Age: 33
End: 2018-02-15
Payer: COMMERCIAL

## 2018-02-15 VITALS
RESPIRATION RATE: 16 BRPM | HEART RATE: 74 BPM | WEIGHT: 133.2 LBS | SYSTOLIC BLOOD PRESSURE: 116 MMHG | DIASTOLIC BLOOD PRESSURE: 68 MMHG | BODY MASS INDEX: 19.11 KG/M2 | TEMPERATURE: 95.9 F

## 2018-02-15 DIAGNOSIS — R62.7 POOR WEIGHT GAIN IN ADULT: ICD-10-CM

## 2018-02-15 DIAGNOSIS — B37.2 CANDIDAL INTERTRIGO: Primary | ICD-10-CM

## 2018-02-15 PROCEDURE — 99213 OFFICE O/P EST LOW 20 MIN: CPT | Performed by: FAMILY MEDICINE

## 2018-02-15 RX ORDER — IBUPROFEN 600 MG/1
TABLET ORAL
COMMUNITY
Start: 2015-08-12 | End: 2018-03-22 | Stop reason: SDUPTHER

## 2018-02-15 RX ORDER — DIAPER,BRIEF,ADULT, DISPOSABLE
EACH MISCELLANEOUS
COMMUNITY
Start: 2014-09-03 | End: 2019-09-25

## 2018-02-15 RX ORDER — TERBINAFINE HYDROCHLORIDE 250 MG/1
250 TABLET ORAL DAILY
Qty: 14 TABLET | Refills: 0 | Status: SHIPPED | OUTPATIENT
Start: 2018-02-15 | End: 2018-02-15 | Stop reason: SDUPTHER

## 2018-02-15 RX ORDER — DIAPER,BRIEF,INFANT-TODD,DISP
EACH MISCELLANEOUS
COMMUNITY
End: 2020-03-05 | Stop reason: CLARIF

## 2018-02-15 RX ORDER — ZINC GLUCONATE 50 MG
TABLET ORAL
COMMUNITY
Start: 2013-03-20 | End: 2018-07-10 | Stop reason: SDUPTHER

## 2018-02-15 RX ORDER — FOLIC ACID 0.8 MG
TABLET ORAL
COMMUNITY
Start: 2014-08-29 | End: 2018-07-10 | Stop reason: SDUPTHER

## 2018-02-15 RX ORDER — ASCORBIC ACID, THIAMINE MONONITRATE,RIBOFLAVIN, NIACINAMIDE, PYRIDOXINE HYDROCHLORIDE, FOLIC ACID, CYANOCOBALAMIN, BIOTIN, CALCIUM PANTOTHENATE, 100; 1.5; 1.7; 20; 10; 1; 6000; 150000; 5 MG/1; MG/1; MG/1; MG/1; MG/1; MG/1; UG/1; UG/1; MG/1
CAPSULE, LIQUID FILLED ORAL
COMMUNITY
Start: 2013-09-27 | End: 2018-03-29 | Stop reason: SDUPTHER

## 2018-02-15 RX ORDER — TERBINAFINE HYDROCHLORIDE 250 MG/1
250 TABLET ORAL DAILY
Qty: 14 TABLET | Refills: 0 | Status: SHIPPED | OUTPATIENT
Start: 2018-02-15 | End: 2018-03-01

## 2018-02-15 RX ORDER — SODIUM FLUORIDE 5 MG/ML
PASTE, DENTIFRICE DENTAL
COMMUNITY
Start: 2014-08-29 | End: 2018-07-17 | Stop reason: SDUPTHER

## 2018-02-15 RX ORDER — GLUCOSAMINE HCL 500 MG
TABLET ORAL
COMMUNITY
Start: 2013-09-27 | End: 2019-07-05

## 2018-02-15 RX ORDER — DRONABINOL 2.5 MG/1
CAPSULE ORAL
COMMUNITY
Start: 2017-05-17 | End: 2018-03-01 | Stop reason: SDUPTHER

## 2018-02-15 RX ORDER — LORATADINE 10 MG/1
10 TABLET ORAL EVERY 24 HOURS
COMMUNITY
Start: 2016-08-31 | End: 2018-06-28 | Stop reason: SDUPTHER

## 2018-02-15 RX ORDER — BACITRACIN 500 [USP'U]/G
OINTMENT TOPICAL
COMMUNITY
Start: 2018-01-02 | End: 2019-04-26 | Stop reason: ALTCHOICE

## 2018-02-15 RX ORDER — MODAFINIL 100 MG/1
TABLET ORAL
COMMUNITY
Start: 2017-05-17 | End: 2018-03-01 | Stop reason: SDUPTHER

## 2018-02-15 RX ORDER — METOPROLOL SUCCINATE 25 MG/1
TABLET, EXTENDED RELEASE ORAL
COMMUNITY
Start: 2012-03-16 | End: 2018-07-17 | Stop reason: SDUPTHER

## 2018-02-15 RX ORDER — GLUCOSAMINE/CHONDR SU A SOD 750-600 MG
TABLET ORAL
COMMUNITY
Start: 2014-08-29 | End: 2018-07-17 | Stop reason: SDUPTHER

## 2018-02-15 NOTE — PROGRESS NOTES
Assessment/Plan:       Problem List Items Addressed This Visit        Musculoskeletal and Integument    Candidal intertrigo - Primary     Persistent  Add terbinafine PO daily for 2 weeks  Relevant Medications    zinc oxide (DESITIN) 40 % PSTE    Witch Hazel (HEMORRHOIDAL) 50 % PADS    terbinafine (LamISIL) 250 mg tablet       Other    Poor weight gain in adult     Add Boost daily PRN for any missed meal              RTC 1 month    Subjective:      Patient ID: Radha Urban is a 27 y o  male  HPI    Seen for rash of axilla and chest only partially responsive to topical antifungals  Also request order for boost to be used once daily while out and about for poor weight gain  The following portions of the patient's history were reviewed and updated as appropriate: allergies, current medications, past family history, past medical history, past social history, past surgical history and problem list     Review of Systems   Constitutional: Negative for fever  Respiratory: Negative for cough and shortness of breath  Skin: Positive for rash  Negative for wound  Objective:    Vitals:    02/15/18 0949   BP: 116/68   Pulse: 74   Resp: 16   Temp: (!) 95 9 °F (35 5 °C)        Physical Exam   Constitutional: He appears well-developed and well-nourished  Musculoskeletal:   Wheelchair bound  Skin: Rash (Persistent L axillary and chest erythema ) noted

## 2018-03-01 DIAGNOSIS — R63.0 ANOREXIA: ICD-10-CM

## 2018-03-01 DIAGNOSIS — S06.9X0D TRAUMATIC BRAIN INJURY, WITHOUT LOSS OF CONSCIOUSNESS, SUBSEQUENT ENCOUNTER: Primary | ICD-10-CM

## 2018-03-01 RX ORDER — DRONABINOL 2.5 MG/1
CAPSULE ORAL
Qty: 150 CAPSULE | Refills: 1 | Status: SHIPPED | OUTPATIENT
Start: 2018-03-01 | End: 2018-04-27 | Stop reason: SDUPTHER

## 2018-03-01 RX ORDER — MODAFINIL 100 MG/1
TABLET ORAL
Qty: 60 TABLET | Refills: 2 | Status: SHIPPED | OUTPATIENT
Start: 2018-03-01 | End: 2018-03-30 | Stop reason: SDUPTHER

## 2018-03-06 ENCOUNTER — TELEPHONE (OUTPATIENT)
Dept: FAMILY MEDICINE CLINIC | Facility: CLINIC | Age: 33
End: 2018-03-06

## 2018-03-06 NOTE — TELEPHONE ENCOUNTER
PT'S MOM CALLING  THE INSUANCE HAS CHANGED TO [facility] MEDICARE AAND ACCESS AND NOW IS MODAFINOL AND ENEMEEZE ARE IN NOT GOING TO BE COVERED OR PAID FOR CAN YOU HELP HER   THE PHARMACY SENT SOMETHING HERE TOM AND JULITA RUNS OUT ON [facility]

## 2018-03-07 ENCOUNTER — TELEPHONE (OUTPATIENT)
Dept: FAMILY MEDICINE CLINIC | Facility: CLINIC | Age: 33
End: 2018-03-07

## 2018-03-07 NOTE — TELEPHONE ENCOUNTER
Pt's Mom calling again regarding prior authorization for Modafinil  Pt will require medication refill on Friday, 3/9, and Mom is worried the authorization will not go through on time  She called yesterday afternoon, but did not hear back  Assured her that someone from the office will call her tomorrow to discuss where we are in the process

## 2018-03-08 DIAGNOSIS — Z00.00 HEALTH MAINTENANCE EXAMINATION: Primary | ICD-10-CM

## 2018-03-08 RX ORDER — MULTIVIT-MIN/FA/LYCOPEN/LUTEIN .4-300-25
TABLET ORAL
Qty: 30 TABLET | Refills: 0 | Status: SHIPPED | OUTPATIENT
Start: 2018-03-08 | End: 2018-04-25 | Stop reason: SDUPTHER

## 2018-03-08 NOTE — TELEPHONE ENCOUNTER
Called [facility] @ [phone] ([id number]) auth [facility] @ [phone] ([id number]), currently at clincal review  Will f/u in 2 hours  Called Nola Lesch with current information

## 2018-03-08 NOTE — TELEPHONE ENCOUNTER
[employer] approved Modafinal, 3/8/18-3/7/2020, Auth #[id number]  Submitted Qing Liang thru paper claim  Called mother with information

## 2018-03-22 ENCOUNTER — OFFICE VISIT (OUTPATIENT)
Dept: FAMILY MEDICINE CLINIC | Facility: CLINIC | Age: 33
End: 2018-03-22
Payer: COMMERCIAL

## 2018-03-22 VITALS
TEMPERATURE: 97.5 F | HEIGHT: 70 IN | WEIGHT: 135 LBS | DIASTOLIC BLOOD PRESSURE: 70 MMHG | HEART RATE: 76 BPM | BODY MASS INDEX: 19.33 KG/M2 | SYSTOLIC BLOOD PRESSURE: 110 MMHG | RESPIRATION RATE: 18 BRPM

## 2018-03-22 DIAGNOSIS — B37.2 CANDIDAL INTERTRIGO: Primary | ICD-10-CM

## 2018-03-22 DIAGNOSIS — Z51.81 THERAPEUTIC DRUG MONITORING: ICD-10-CM

## 2018-03-22 PROCEDURE — 99213 OFFICE O/P EST LOW 20 MIN: CPT | Performed by: FAMILY MEDICINE

## 2018-03-22 RX ORDER — TERBINAFINE HYDROCHLORIDE 250 MG/1
250 TABLET ORAL DAILY
Qty: 14 TABLET | Refills: 0 | Status: SHIPPED | OUTPATIENT
Start: 2018-03-22 | End: 2018-04-05

## 2018-03-22 RX ORDER — NYSTATIN 100000 U/G
CREAM TOPICAL 2 TIMES DAILY
Qty: 30 G | Refills: 0 | Status: SHIPPED | OUTPATIENT
Start: 2018-03-22 | End: 2018-07-17 | Stop reason: ALTCHOICE

## 2018-03-22 RX ORDER — ASCORBIC ACID 500 MG
500 TABLET ORAL DAILY
COMMUNITY
End: 2018-07-05 | Stop reason: SDUPTHER

## 2018-03-22 RX ORDER — RANITIDINE 15 MG/ML
SYRUP ORAL 2 TIMES DAILY
COMMUNITY
End: 2018-07-17 | Stop reason: SDUPTHER

## 2018-03-22 NOTE — PROGRESS NOTES
Assessment/Plan: Kenneth Triplett is a 27 y o  male with:     Problem List Items Addressed This Visit        Musculoskeletal and Integument    Candidal intertrigo - Primary     Caregivers report improvement with oral terbinafine which pt  Completed ~3 weeks ago, rash was nearly resolved  Will consider renewing Rx for two more weeks  Caregivers report new (more frequent) bathing regimen of every other day which they started recently  Rx provided for both Terbinafine and Nystatin topical cream  RTC in 4weeks as scheduled  Relevant Medications    terbinafine (LamISIL) 250 mg tablet    nystatin (MYCOSTATIN) cream          Chief Complaint:  Chief Complaint   Patient presents with    Follow-up     1 month follow up, white on tounge      HPI  Kenneth Triplett is a 27 y o  male who presents for follow up of candida intertrigo of left axilla area  Caregivers report improvement after terbinafine therapy, however missed last appointment and lesions have been unchanged  They are requesting new Rx  They report ~1week of new bathing regimen of qOD  Caregiver deny any systemic signs including fevers, night sweats, changes in activity or energy level  Caregivers also noted white lesion on tongue, however patient had just consumed a protein shake  They can not identify lesion on re-examination and state it was likely due to protein shake  No other acute complaints      History:  Current Outpatient Prescriptions   Medication Sig Dispense Refill    ascorbic acid (VITAMIN C) 500 mg tablet Take 500 mg by mouth daily      b complex-vitamin C-folic acid (RENAL) 1 mg Take one capsule daily @ 2pm by mouth       Benzocaine-Docusate Sodium (ENEMEEZ PLUS)  MG ENEM Insert into the rectum      Coenzyme Q10 100 MG TABS Take 2 capsules daily by mouth @ 9am      Diapers & Supplies (HUGGIES LITTLE MOVERS SIZE 3) MISC by Does not apply route      Disposable Gloves (VINYL GLOVES LARGE) MISC by Does not apply route      dronabinol (MARINOL) 2 5 mg capsule Take one with breakfast, 2 with lunch, one with supper and one before bed 150 capsule 1    Ginkgo Biloba 120 MG TABS Take by mouth      ibuprofen (MOTRIN) 600 mg tablet Take 1 tablet (600 mg total) by mouth 2 (two) times a day 30 tablet 0    Incontinence Supply Disposable (DEPEND UNDERWEAR SM/MED) MISC by Does not apply route      loratadine (CLARITIN) 10 mg tablet Take 10 mg by mouth every 24 hours      Magnesium 500 MG CAPS Take by mouth      metoprolol tartrate (LOPRESSOR) 25 mg tablet Take 0 5 tablets (12 5 mg total) by mouth daily 45 tablet 1    modafinil (PROVIGIL) 100 mg tablet Take 2 daily @ 9:00am 60 tablet 2    Multiple Vitamins-Minerals (CEROVITE SENIOR) TABS take 1 tablet daily by mouth 30 tablet 0    Omega-3 Fatty Acids (FISH OIL PO) Take 1 tablet by mouth once a day      pantoprazole (PROTONIX) 40 mg tablet Take 1 tablet (40 mg total) by mouth daily 90 tablet 1    POLYETHYLENE GLYCOL 3350 PO Take by mouth Take 1/2 capful daily by mouth @@ 9am , may increase to 1 or 2 capfuls as tolerated      ranitidine (ZANTAC) 150 MG/10ML syrup Take by mouth 2 (two) times a day      sertraline (ZOLOFT) 100 mg tablet Take 1 tablet (100 mg total) by mouth daily 90 tablet 1    SODIUM FLUORIDE, DENTAL GEL, (DENTA 5000 PLUS) 1 1 % CREA Denta 5000 Plus 1 1 % Dental Cream; take as needed orally for preventing dental decay; 1; 2; 29-Aug-2014; 29-Aug-2014; Corey Bella; Active      Witch Hazel (HEMORRHOIDAL) 50 % PADS Apply topically      Zinc 50 MG TABS one capsule daily every other month      metoprolol succinate (TOPROL-XL) 25 mg 24 hr tablet Take by mouth      nystatin (MYCOSTATIN) cream Apply topically 2 (two) times a day 30 g 0    terbinafine (LamISIL) 250 mg tablet Take 1 tablet (250 mg total) by mouth daily for 14 days 14 tablet 0    zinc oxide (DESITIN) 40 % PSTE Apply topically       No current facility-administered medications for this visit  Allergies   Allergen Reactions    Other      drugs that prolong the QT interval  Seasonal allergies      Past Medical History:   Diagnosis Date    Community acquired pneumonia     last assessed: 10/9/2014     Social History   Substance Use Topics    Smoking status: Never Smoker    Smokeless tobacco: Never Used    Alcohol use Not on file     Patient Active Problem List   Diagnosis    Abnormal bone density screening    Abnormal TSH    Accelerated essential hypertension    Anoxic brain damage (HCC)    Allergic rhinitis    Candidal intertrigo    Depression    Long Q-T syndrome    Quadriplegia ([address] )    Rosacea    Cough    Poor weight gain in adult       Review of Systems   Constitutional: Negative for appetite change, chills, diaphoresis, fatigue and fever  HENT: Negative for congestion, ear pain, hearing loss, rhinorrhea, sneezing and sore throat  Eyes: Negative for discharge and visual disturbance  Respiratory: Negative for cough, chest tightness and shortness of breath  Gastrointestinal: Negative for constipation, diarrhea, nausea and vomiting  Skin: Positive for rash  Psychiatric/Behavioral: Negative for behavioral problems and sleep disturbance  Otherwise As Per HPI    Physical Exam:  /70   Pulse 76   Temp 97 5 °F (36 4 °C)   Resp 18   Ht 5' 10" (1 778 m)   Wt 61 2 kg (135 lb)   BMI 19 37 kg/m²   Physical Exam   Constitutional: He appears well-developed and well-nourished  No distress  HENT:   Head: Normocephalic and atraumatic  Cardiovascular: Normal rate, regular rhythm and normal heart sounds  No murmur heard  Pulmonary/Chest: Effort normal and breath sounds normal  No respiratory distress  He has no wheezes  Abdominal: Soft  Normal appearance and bowel sounds are normal  There is no tenderness  Neurological: He is alert  Skin: He is not diaphoretic  Dry erythematous purple patch under left axilla    Vitals reviewed        Future Appointments  Date Time Provider Tab Moore   4/26/2018 9:50 AM Leda Emery MD [facility]

## 2018-03-22 NOTE — ASSESSMENT & PLAN NOTE
Caregivers report improvement with oral terbinafine which pt  Completed ~3 weeks ago, rash was nearly resolved  Will consider renewing Rx for two more weeks  Caregivers report new (more frequent) bathing regimen of every other day which they started recently  Rx provided  RTC in 4weeks as scheduled

## 2018-03-23 ENCOUNTER — TELEPHONE (OUTPATIENT)
Dept: FAMILY MEDICINE CLINIC | Facility: CLINIC | Age: 33
End: 2018-03-23

## 2018-03-23 NOTE — TELEPHONE ENCOUNTER
----- Message from Merlin Morel, MD sent at 3/22/2018  9:13 PM EDT -----  Please call caregiver and let them know a CMP has been ordered to monitor patient's liver function, since he's been on oral antifungals for several weeks intermittently  They should obtain the CMP ASAP  Thank you

## 2018-03-29 DIAGNOSIS — K59.00 CONSTIPATED: Primary | ICD-10-CM

## 2018-03-29 LAB
APPEARANCE UR: ABNORMAL
BILIRUB UR QL STRIP: NEGATIVE
COLOR UR: YELLOW
GLUCOSE UR QL STRIP: NEGATIVE
HGB UR QL STRIP: NEGATIVE
KETONES UR QL STRIP: NEGATIVE
LEUKOCYTE ESTERASE UR QL STRIP: NEGATIVE
NITRITE UR QL STRIP: NEGATIVE
PH UR STRIP: 6 [PH] (ref 5–8)
PROT UR QL STRIP: NEGATIVE
SP GR UR STRIP: 1.03 (ref 1–1.03)

## 2018-03-29 RX ORDER — ASCORBIC ACID, THIAMINE MONONITRATE,RIBOFLAVIN, NIACINAMIDE, PYRIDOXINE HYDROCHLORIDE, FOLIC ACID, CYANOCOBALAMIN, BIOTIN, CALCIUM PANTOTHENATE, 100; 1.5; 1.7; 20; 10; 1; 6000; 150000; 5 MG/1; MG/1; MG/1; MG/1; MG/1; MG/1; UG/1; UG/1; MG/1
CAPSULE, LIQUID FILLED ORAL
Qty: 30 CAPSULE | Refills: 0 | Status: SHIPPED | OUTPATIENT
Start: 2018-03-29 | End: 2018-07-05 | Stop reason: SDUPTHER

## 2018-03-29 RX ORDER — DOCUSATE SODIUM AND BENZOCAINE 283; 20 MG/5ML; MG/5ML
LIQUID RECTAL
Qty: 150 ML | Refills: 0 | Status: SHIPPED | OUTPATIENT
Start: 2018-03-29 | End: 2018-04-25 | Stop reason: SDUPTHER

## 2018-03-30 DIAGNOSIS — S06.9X0D TRAUMATIC BRAIN INJURY, WITHOUT LOSS OF CONSCIOUSNESS, SUBSEQUENT ENCOUNTER: ICD-10-CM

## 2018-03-30 LAB
ALBUMIN SERPL-MCNC: 4 G/DL (ref 3.6–5.1)
ALBUMIN/GLOB SERPL: 1.1 (CALC) (ref 1–2.5)
ALP SERPL-CCNC: 65 U/L (ref 40–115)
ALT SERPL-CCNC: 16 U/L (ref 9–46)
AST SERPL-CCNC: 17 U/L (ref 10–40)
BILIRUB SERPL-MCNC: 0.6 MG/DL (ref 0.2–1.2)
BUN SERPL-MCNC: 14 MG/DL (ref 7–25)
BUN/CREAT SERPL: ABNORMAL (CALC) (ref 6–22)
CALCIUM SERPL-MCNC: 9.3 MG/DL (ref 8.6–10.3)
CHLORIDE SERPL-SCNC: 103 MMOL/L (ref 98–110)
CO2 SERPL-SCNC: 32 MMOL/L (ref 20–31)
CREAT SERPL-MCNC: 0.8 MG/DL (ref 0.6–1.35)
GLOBULIN SER CALC-MCNC: 3.8 G/DL (CALC) (ref 1.9–3.7)
GLUCOSE SERPL-MCNC: 95 MG/DL (ref 65–99)
POTASSIUM SERPL-SCNC: 4.5 MMOL/L (ref 3.5–5.3)
PROT SERPL-MCNC: 7.8 G/DL (ref 6.1–8.1)
SL AMB EGFR AFRICAN AMERICAN: 135 ML/MIN/1.73M2
SL AMB EGFR NON AFRICAN AMERICAN: 117 ML/MIN/1.73M2
SODIUM SERPL-SCNC: 141 MMOL/L (ref 135–146)

## 2018-03-30 RX ORDER — MODAFINIL 100 MG/1
TABLET ORAL
Qty: 60 TABLET | Refills: 0 | Status: SHIPPED | OUTPATIENT
Start: 2018-03-30 | End: 2018-06-11 | Stop reason: SDUPTHER

## 2018-04-03 NOTE — TELEPHONE ENCOUNTER
Mother calling to discuss Abnormal lab results/Urine results from [address] [place] [address]

## 2018-04-03 NOTE — TELEPHONE ENCOUNTER
Called mom, reviewed results with her  She really wants you to review and comment on the abnormals  She is aware Dr Dewayne Moon ordered labs, but she has requested that you be contacted

## 2018-04-04 NOTE — TELEPHONE ENCOUNTER
Reviewed results  The mildly low globulin represents the need to continue focusing on his nutrition  No need to change anything at this time

## 2018-04-04 NOTE — TELEPHONE ENCOUNTER
Mother requesting note be drafted in regards to nutrition to submit to agency to focus   Send to mother

## 2018-04-13 ENCOUNTER — TELEPHONE (OUTPATIENT)
Dept: FAMILY MEDICINE CLINIC | Facility: CLINIC | Age: 33
End: 2018-04-13

## 2018-04-13 NOTE — TELEPHONE ENCOUNTER
I guess my first question regarding this is is there any comparable vitamin to Renal caps  If they are unique which I cannot find an exact match then we may be able to auth it with his Ma  I read up on renal caps and they are listed to be know to prevent wasting especially in renal patients   Please review

## 2018-04-13 NOTE — TELEPHONE ENCOUNTER
----- Message from Armida Geller sent at 4/12/2018 11:06 AM EDT -----  Regarding: Prescription Question  Contact: [phone]  Naman Hagen's insurances have recently changed so we're having difficulty getting authorization for certain medications that he has been on for years  Of concern now is his prescription for Renal Caps  Our primary insurance covers it but Naman's secondary coverage through Medicare, [employer], does not  I tried to find out whether his third level of coverage, [employer] [address] [employer], would cover Renal Caps but was told that I needed to contact Naman's physician office, as they have access to the MA preferred medication list  Oh, this contorted system that people with disabilities must abide! Are Renal Caps, a comprehensive B Vitamin, covered through Naman's [address] coverage? If not, is there a substitute that can be prescribed that doesn't subject Naman to any out-of-pocket cost?    Pleases call with any questions    Melba Colon, Naman's mother and [place]  [phone]

## 2018-04-13 NOTE — TELEPHONE ENCOUNTER
Let's see if we can get it authorized by MA  I would pitch it as "he's tolerated this for a long time, so we shouldn't change it up on him now    "

## 2018-04-16 NOTE — TELEPHONE ENCOUNTER
Spoke with Ruth Griffin and I have told her that we did submit a prior auth to Medical Assistance   I am awaiting response back

## 2018-04-24 NOTE — PROGRESS NOTES
Assessment/Plan     Candidal intertrigo  Noted improvement with PO terbinafine, now will use tea tree oil for maintenance  Oropharyngeal dysphagia  Add thick it daily and assess tolerance  RTC 2 months    Subjective     Chief Complaint: f/u rash, dysphagia    HPI:   Rash   This is a chronic problem  The current episode started more than 1 month ago  The problem has been waxing and waning since onset  The affected locations include thechest  He was exposed to nothing  Pertinent negatives include no congestion, cough, diarrhea, fatigue, fever, shortness of breath, sore throat or vomiting  Had swallow eval - noted some aspiration  Having some concern about deep breathing  Requesting thick-it due to dysphagia  The following portions of the patient's history were reviewed and updated as appropriate: allergies, current medications, past family history, past medical history, past social history, past surgical history and problem list     Review of Systems  Review of Systems   Constitutional: Negative for activity change, chills, fatigue and fever  HENT: Negative for congestion, sinus pain, sinus pressure and sore throat  Respiratory: Negative for cough, shortness of breath and wheezing  Cardiovascular: Negative for chest pain, palpitations and leg swelling  Gastrointestinal: Negative for abdominal pain, diarrhea, nausea and vomiting  Genitourinary: Negative for dysuria, frequency and urgency  Musculoskeletal: Negative for arthralgias, myalgias, neck pain and neck stiffness  Skin: Positive for rash  Neurological: Negative for light-headedness and headaches  Objective   Vitals:    04/26/18 1002   BP: 112/68   Pulse: 70   Resp: 14   Temp: (!) 96 7 °F (35 9 °C)       Physical Exam   Constitutional: He appears well-developed and well-nourished  No distress  HENT:   Head: Normocephalic and atraumatic  Eyes: EOM are normal  Pupils are equal, round, and reactive to light     Neck: Normal range of motion  Neck supple  Cardiovascular: Normal rate, regular rhythm and normal heart sounds  Pulmonary/Chest: Effort normal and breath sounds normal  No respiratory distress  Abdominal: Soft  Bowel sounds are normal  There is no tenderness  Musculoskeletal:   Baseline  Neurological: He is alert  Baseline impairments due to anoxic brain injury  Skin: Skin is warm and dry  He is not diaphoretic  Psychiatric: He has a normal mood and affect  His behavior is normal  Judgment and thought content normal        Lab Results: I have reviewed all the lab results

## 2018-04-25 DIAGNOSIS — K59.00 CONSTIPATED: ICD-10-CM

## 2018-04-25 DIAGNOSIS — Z00.00 HEALTH MAINTENANCE EXAMINATION: ICD-10-CM

## 2018-04-25 RX ORDER — MULTIVIT-MIN/FA/LYCOPEN/LUTEIN .4-300-25
TABLET ORAL
Qty: 30 TABLET | Refills: 0 | Status: SHIPPED | OUTPATIENT
Start: 2018-04-25 | End: 2018-07-10 | Stop reason: SDUPTHER

## 2018-04-25 RX ORDER — DOCUSATE SODIUM AND BENZOCAINE 283; 20 MG/5ML; MG/5ML
LIQUID RECTAL
Qty: 150 ML | Refills: 0 | Status: SHIPPED | OUTPATIENT
Start: 2018-04-25 | End: 2019-09-10

## 2018-04-26 ENCOUNTER — OFFICE VISIT (OUTPATIENT)
Dept: FAMILY MEDICINE CLINIC | Facility: CLINIC | Age: 33
End: 2018-04-26
Payer: COMMERCIAL

## 2018-04-26 VITALS
BODY MASS INDEX: 19.21 KG/M2 | HEIGHT: 70 IN | RESPIRATION RATE: 14 BRPM | HEART RATE: 70 BPM | SYSTOLIC BLOOD PRESSURE: 112 MMHG | WEIGHT: 134.2 LBS | DIASTOLIC BLOOD PRESSURE: 68 MMHG | TEMPERATURE: 96.7 F

## 2018-04-26 DIAGNOSIS — T17.908A ASPIRATION INTO RESPIRATORY TRACT, INITIAL ENCOUNTER: Primary | ICD-10-CM

## 2018-04-26 DIAGNOSIS — B37.2 CANDIDAL INTERTRIGO: Primary | ICD-10-CM

## 2018-04-26 PROBLEM — R13.12 OROPHARYNGEAL DYSPHAGIA: Status: ACTIVE | Noted: 2018-04-26

## 2018-04-26 PROCEDURE — 99214 OFFICE O/P EST MOD 30 MIN: CPT | Performed by: FAMILY MEDICINE

## 2018-04-27 ENCOUNTER — TELEPHONE (OUTPATIENT)
Dept: FAMILY MEDICINE CLINIC | Facility: CLINIC | Age: 33
End: 2018-04-27

## 2018-04-27 DIAGNOSIS — R15.9 INCONTINENCE OF FECES, UNSPECIFIED FECAL INCONTINENCE TYPE: ICD-10-CM

## 2018-04-27 DIAGNOSIS — S06.9X0D TRAUMATIC BRAIN INJURY, WITHOUT LOSS OF CONSCIOUSNESS, SUBSEQUENT ENCOUNTER: ICD-10-CM

## 2018-04-27 DIAGNOSIS — R63.0 ANOREXIA: ICD-10-CM

## 2018-04-27 DIAGNOSIS — R32 URINARY INCONTINENCE, UNSPECIFIED TYPE: Primary | ICD-10-CM

## 2018-04-27 NOTE — TELEPHONE ENCOUNTER
I did speak to Osito and discussed the new fax central system which is not yet at full capapcity and there is a known delay in processing the received items by fax will send needed script to the fax number in the message  She was very understanding

## 2018-04-27 NOTE — TELEPHONE ENCOUNTER
Pt's mother needs to speak with nurse regarding medication refill Dr Albert Elliott supposed to fill for appt yesterday   She had sent him a msg on My Chart on 5 medications [facility] needs to get by today to package them by 3P for delivery

## 2018-04-27 NOTE — TELEPHONE ENCOUNTER
----- Message from Darrell Tadeo sent at 4/24/2018  4:51 PM EDT -----  Regarding: Non-Urgent Medical Question  Contact: [phone]  This email was just forwarded me from Naman's provider agency, [facility] [employer]  I have to ask why the requested prescriptions have not been processed  Please let me know as soon as possible  Christianne Jesus  [phone]    From: Vishal Abdi < [email or web address]  Sent: Tuesday, April 24, 2018 2:52:44 PM  To: Yasemin Cooley  Subject: Re: Regarding Leon Obregon,  [facility]/29/18, 4/10/18, 4/16/18, and 4/23/18  I requested  prescriptions for the new products Yessenia Miller will be using  I have also called  The number I faxed to is [phone], and confirmed the number is correct  I will continue to request the prescriptions, but I thought you should be aware of the delay  We will need new prescriptions before Naman's next order  Cuties disposable Diaper size 3 10/day  Prevail Air Medium Disposable Brief 5/day       Vishal Abdi  Customer Service [facility]  [address] [place] [address], [address] [place] [address]  Phone: [phone]  Fax: [phone]  [email or web address]

## 2018-04-29 RX ORDER — DRONABINOL 2.5 MG/1
CAPSULE ORAL
Qty: 150 CAPSULE | Refills: 0 | Status: SHIPPED | OUTPATIENT
Start: 2018-04-29 | End: 2018-07-09 | Stop reason: SDUPTHER

## 2018-04-29 RX ORDER — DIAPER,BRIEF,INFANT-TODD,DISP
EACH MISCELLANEOUS
Qty: 300 EACH | Refills: 11 | Status: SHIPPED | OUTPATIENT
Start: 2018-04-29 | End: 2019-07-05

## 2018-04-30 ENCOUNTER — TELEPHONE (OUTPATIENT)
Dept: FAMILY MEDICINE CLINIC | Facility: CLINIC | Age: 33
End: 2018-04-30

## 2018-04-30 NOTE — TELEPHONE ENCOUNTER
Apparently Caregiver, from [facility] [employer],  in need of a SIGNED signature on med list prior to giving to pharmacy by Dr Tunde Castorena  Please lookout!! Please return call when ready for      [facility]

## 2018-04-30 NOTE — TELEPHONE ENCOUNTER
Paperwork from both arrived, signed by Dr Sahil Bar and faxed   Called Angela Lloyd from [address] [place] [address] to  med list

## 2018-04-30 NOTE — TELEPHONE ENCOUNTER
Just check Fax,  There was a fax that did come over on [employer], possibly to get a new Wheelchair that was placed at nurses station  Also From "[facility] [employer]" that has also been placed at nurses station

## 2018-06-05 ENCOUNTER — OFFICE VISIT (OUTPATIENT)
Dept: FAMILY MEDICINE CLINIC | Facility: CLINIC | Age: 33
End: 2018-06-05
Payer: MEDICARE

## 2018-06-05 VITALS
HEART RATE: 78 BPM | DIASTOLIC BLOOD PRESSURE: 76 MMHG | BODY MASS INDEX: 18.84 KG/M2 | WEIGHT: 131.6 LBS | SYSTOLIC BLOOD PRESSURE: 124 MMHG | TEMPERATURE: 96.5 F | RESPIRATION RATE: 18 BRPM | HEIGHT: 70 IN

## 2018-06-05 DIAGNOSIS — Z00.00 HEALTHCARE MAINTENANCE: Primary | ICD-10-CM

## 2018-06-05 PROCEDURE — 99395 PREV VISIT EST AGE 18-39: CPT | Performed by: FAMILY MEDICINE

## 2018-06-05 NOTE — PROGRESS NOTES
Zuhair Soler 1985 male MRN: [id number]    Health Maintenance Visit    SUBJECTIVE    HPI:  Zuhair Soler is a 27 y o  male who presented for a routine health maintenance visit  No acute concerns today  Has been participating in therapy including with horses, having success and increased strength with that  He has great support system  Family is worried about his weight, however he has been stable with weight and does have Boost regularly  BMI just greater than 18  Discussed to continue high calorie foods but that he will likely remain borderline underweight due to reduced muscle mass 2/2 quadriplegia  Health Maintenance   Topic Date Due    Depression Screening PHQ-9  1985    PNEUMOCOCCAL POLYSACCHARIDE VACCINE AGE 2-64 HIGH RISK  10/25/1985    INFLUENZA VACCINE  09/01/2018    DTaP,Tdap,and Td Vaccines (2 - Td) 04/05/2021     CRC screening: No personal or family history of colon cancer or colon polyps  BrCa screening: There is no personal or family history of breast cancer  She denies finding new breast lumps, breast pain or nipple discharge  CVS screening: Patient denies any exertional chest pain, dyspnea, palpitations, syncope, orthopnea, edema or paroxysmal nocturnal dyspnea  DM screening: No polyuria, polydipsia, blurry vision, chest pain, dyspnea or claudication  No foot burning, numbness or pain  No personal or family history of skin cancers or melanoma  Review of Systems   Constitutional: Negative for activity change, chills and fever  HENT: Negative for congestion, rhinorrhea and sore throat  Eyes: Negative for visual disturbance  Respiratory: Negative for cough, shortness of breath and wheezing  Cardiovascular: Negative for chest pain and palpitations  Gastrointestinal: Negative for abdominal pain, blood in stool, constipation, diarrhea, nausea and vomiting  Genitourinary: Negative for dysuria  Musculoskeletal: Negative for arthralgias and myalgias     Skin: Negative for rash  Neurological: Negative for dizziness, weakness and headaches  All other systems reviewed and are negative  Historical Information   Past Medical History:   Diagnosis Date    Community acquired pneumonia     last assessed: 10/9/2014     History reviewed  No pertinent surgical history  Family History   Problem Relation Age of Onset    Other Father      mitral valve replaced    Diabetes type II Maternal Grandfather     Diabetes type II Maternal Uncle      Social History   History   Alcohol Use No     History   Drug Use No     History   Smoking Status    Never Smoker   Smokeless Tobacco    Never Used       Medications:      Current Outpatient Prescriptions:     ascorbic acid (VITAMIN C) 500 mg tablet, Take 500 mg by mouth daily, Disp: , Rfl:     b complex-vitamin C-folic acid (RENAL) 1 mg, TAKE (1) CAPSULE DAILY  , Disp: 30 capsule, Rfl: 0    Coenzyme Q10 100 MG TABS, Take 2 capsules daily by mouth @ 9am, Disp: , Rfl:     Diapers & Supplies (CUTIES SIZE 3) MISC, Please provide 10 per day, Disp: 300 each, Rfl: 11    Diapers & Supplies (HUGGIES LITTLE MOVERS SIZE 3) MISC, by Does not apply route, Disp: , Rfl:     Disposable Gloves (VINYL GLOVES LARGE) MISC, by Does not apply route, Disp: , Rfl:     dronabinol (MARINOL) 2 5 mg capsule, Take one with breakfast, 2 with lunch, one with supper and one before bed, Disp: 150 capsule, Rfl: 0    ENEMEEZ PLUS  MG ENEM, use as directed, Disp: 150 mL, Rfl: 0    Ginkgo Biloba 120 MG TABS, Take by mouth, Disp: , Rfl:     ibuprofen (MOTRIN) 600 mg tablet, Take 1 tablet (600 mg total) by mouth 2 (two) times a day, Disp: 30 tablet, Rfl: 0    Incontinence Supply Disposable (DEPEND UNDERWEAR SM/MED) MISC, by Does not apply route, Disp: , Rfl:     Incontinence Supply Disposable (PREVAIL AIR BRIEFS) MISC, 1 each by Does not apply route every 4 (four) hours Please provide 5 briefs per day, Disp: 150 each, Rfl: 11    loratadine (CLARITIN) 10 mg tablet, Take 10 mg by mouth every 24 hours, Disp: , Rfl:     Magnesium 500 MG CAPS, Take by mouth, Disp: , Rfl:     metoprolol tartrate (LOPRESSOR) 25 mg tablet, Take 0 5 tablets (12 5 mg total) by mouth daily, Disp: 45 tablet, Rfl: 1    modafinil (PROVIGIL) 100 mg tablet, Take 2 daily @ 9:00am, Disp: 60 tablet, Rfl: 0    Multiple Vitamins-Minerals (CEROVITE SENIOR) TABS, take 1 tablet daily by mouth, Disp: 30 tablet, Rfl: 0    nystatin (MYCOSTATIN) cream, Apply topically 2 (two) times a day, Disp: 30 g, Rfl: 0    Omega-3 Fatty Acids (FISH OIL PO), Take 1 tablet by mouth once a day, Disp: , Rfl:     POLYETHYLENE GLYCOL 3350 PO, Take by mouth Take 1/2 capful daily by mouth @@ 9am , may increase to 1 or 2 capfuls as tolerated, Disp: , Rfl:     ranitidine (ZANTAC) 150 MG/10ML syrup, Take by mouth 2 (two) times a day, Disp: , Rfl:     sertraline (ZOLOFT) 100 mg tablet, Take 1 tablet (100 mg total) by mouth daily, Disp: 90 tablet, Rfl: 1    SODIUM FLUORIDE, DENTAL GEL, (DENTA 5000 PLUS) 1 1 % CREA, Denta 5000 Plus 1 1 % Dental Cream; take as needed orally for preventing dental decay; 1; 2; 29-Aug-2014; 29-Aug-2014; Dayna Schwarz;  Active, Disp: , Rfl:     STARCH-MALTO DEXTRIN (THICK-IT) POWD, Use in one drink daily honey thick consistency, Disp: 1 Can, Rfl: 5    Witch Hazel (HEMORRHOIDAL) 50 % PADS, Apply topically, Disp: , Rfl:     Zinc 50 MG TABS, one capsule daily every other month, Disp: , Rfl:     zinc oxide (DESITIN) 40 % PSTE, Apply topically, Disp: , Rfl:     metoprolol succinate (TOPROL-XL) 25 mg 24 hr tablet, Take by mouth, Disp: , Rfl:     onabotulinumtoxin A (BOTOX) 100 units, inject 500 units into left gastroc soleus and abductor pollicis ankit, Disp: , Rfl:     pantoprazole (PROTONIX) 40 mg tablet, Take 1 tablet (40 mg total) by mouth daily, Disp: 90 tablet, Rfl: 1    Allergies   Allergen Reactions    Other      drugs that prolong the QT interval  Seasonal allergies OBJECTIVE  Vitals:   Vitals:    06/05/18 1346   BP: 124/76   Pulse: 78   Resp: 18   Temp: (!) 96 5 °F (35 8 °C)   Weight: 59 7 kg (131 lb 9 6 oz)   Height: 5' 10" (1 778 m)     Wt Readings from Last 3 Encounters:   06/05/18 59 7 kg (131 lb 9 6 oz)   04/26/18 60 9 kg (134 lb 3 2 oz)   03/22/18 61 2 kg (135 lb)     Body mass index is 18 88 kg/m²  Temp Readings from Last 3 Encounters:   06/05/18 (!) 96 5 °F (35 8 °C)   04/26/18 (!) 96 7 °F (35 9 °C) (Tympanic)   03/22/18 97 5 °F (36 4 °C)     BP Readings from Last 3 Encounters:   06/05/18 124/76   04/26/18 112/68   03/22/18 110/70     Pulse Readings from Last 3 Encounters:   06/05/18 78   04/26/18 70   03/22/18 76     No LMP for male patient  Physical Exam   Constitutional: He appears well-developed and well-nourished  No distress  HENT:   Head: Normocephalic and atraumatic  Mouth/Throat: No oropharyngeal exudate  Eyes: Conjunctivae and EOM are normal  Pupils are equal, round, and reactive to light  Neck: Normal range of motion  Neck supple  No JVD present  No thyromegaly present  Cardiovascular: Normal rate, regular rhythm, normal heart sounds and intact distal pulses  No murmur heard  Pulmonary/Chest: Effort normal and breath sounds normal  No respiratory distress  He has no wheezes  Abdominal: Soft  Bowel sounds are normal  He exhibits no distension  There is no tenderness  Musculoskeletal: He exhibits no edema  Neurological: He is alert  No cranial nerve deficit  Skin: Skin is warm and dry  No rash noted  He is not diaphoretic  Nursing note and vitals reviewed  Lab:  I have personally reviewed all pertinent results  Imaging:  I have personally reviewed all pertinent results  Assessment/Plan     Healthcare maintenance  Patient has excellent emotional, physical support systems in place  Participates in physical therapy and multi discipline therapies to maximize quality of life     He continues to be interactive to the extent that he can be, minimally verbal  He does have the prospect of a computer that can sense his blinking to communicate, he is trying it out now  No indicated screening, bloodwork at this visit  Completed paperwork for his day programs etc     Princess Lee was seen today for physical exam     Diagnoses and all orders for this visit:    Healthcare maintenance        New Medications Ordered This Visit   Medications    onabotulinumtoxin A (BOTOX) 100 units     Sig: inject 500 units into left gastroc soleus and abductor pollicis ankit       In addition to the above, the patient was counseled on general preventative health care subjects, including but not limited to:  - Nutrition, healthy weight, aerobic and weight-bearing exercise  - Mental health, social support, and self care  - Advised of the importance of dental hygiene and routine dental visits   - Patient made aware of  services at the office  Most Recent Immunizations   Administered Date(s) Administered     Influenza (IM) Preservative Free 11/22/2013    H1N1, All Formulations 12/04/2009    Influenza 10/16/2017    Influenza Quadrivalent Preservative Free 3 years and older IM 10/06/2016    Influenza Quadrivalent, 6-35 Months IM 10/20/2017    Influenza TIV (IM) 11/12/2015    Tdap 04/05/2011    Tuberculin Skin Test-PPD Intradermal 09/05/2017       [facility] for [facility]  visit  PCP: Maninder Albrecht MD    No future appointments       _____________________________________________________________________   The attending physician, Dr Dennis Rai, agreed with the plan  Marlon Noel MD, PGY-2  [facility]   6/5/2018     Please be aware that this note contains text that was dictated and there may be errors pertaining to "sound-alike "words during the dictation process

## 2018-06-05 NOTE — PATIENT INSTRUCTIONS

## 2018-06-05 NOTE — ASSESSMENT & PLAN NOTE
Patient has excellent emotional, physical support systems in place  Participates in physical therapy and multi discipline therapies to maximize quality of life  He continues to be interactive to the extent that he can be, minimally verbal  He does have the prospect of a computer that can sense his blinking to communicate, he is trying it out now     No indicated screening, bloodwork at this visit  Completed paperwork for his day programs etc

## 2018-06-11 ENCOUNTER — TELEPHONE (OUTPATIENT)
Dept: FAMILY MEDICINE CLINIC | Facility: CLINIC | Age: 33
End: 2018-06-11

## 2018-06-11 DIAGNOSIS — S06.9X0D TRAUMATIC BRAIN INJURY, WITHOUT LOSS OF CONSCIOUSNESS, SUBSEQUENT ENCOUNTER: ICD-10-CM

## 2018-06-11 RX ORDER — MODAFINIL 100 MG/1
TABLET ORAL
Qty: 60 TABLET | Refills: 0 | Status: SHIPPED | OUTPATIENT
Start: 2018-06-11 | End: 2019-01-17 | Stop reason: SDUPTHER

## 2018-06-11 NOTE — TELEPHONE ENCOUNTER
----- Message from Verner Rosette sent at 6/11/2018 12:24 AM EDT -----  Regarding: Referral Request  Contact: [phone]  Verner Rosette, patient of Dr Trent Clemente, seems to be experiencing significant pain in his left knee  When standing, it is noticeably hyperextended  Since Dr Trent Clemente is very familiar with Tod Trimble and his level of disability, I would like to either set an appointment to evaluate Naman or to refer him to an orthopedic doctor  Note: Trudysunil Atilio was seen by his physiatrist, Dr Fiona Burton, at [facility] last Thursday  She feels the hyperextension is generated from spasticity/contracture in Naman's foot/ankle  She treated the area with Botox injections and has recommended follow-up PT, beginning in a couple of weeks  I am hesitant to proceed with physical therapy until the possibility of a torn ACL or other condition is ruled out  If possible, please have Dr Esme Webb call to discuss options    Thank you,   Anuj Chavez, Naman's mom and POA  [phone]

## 2018-06-12 NOTE — TELEPHONE ENCOUNTER
Spoke with Keri Sanchez, recommended that we wait to see how he responds to the botox and therapy, and if the knee seems impaired or ligamentously lax, we can consider imaging  None needed at this time  Keri Sanchez understands and agrees  Thanks!

## 2018-06-14 ENCOUNTER — TELEPHONE (OUTPATIENT)
Dept: FAMILY MEDICINE CLINIC | Facility: CLINIC | Age: 33
End: 2018-06-14

## 2018-06-15 NOTE — TELEPHONE ENCOUNTER
Patient in need of prior auth for Modafinil 100mg  Called [facility], denied, Tried calling his secondary [employer], phone message reporting technical difficulties  Completed paper form, faxed to [facility]

## 2018-06-26 ENCOUNTER — TELEPHONE (OUTPATIENT)
Dept: FAMILY MEDICINE CLINIC | Facility: CLINIC | Age: 33
End: 2018-06-26
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
## 2018-06-26 DIAGNOSIS — B37.2 CANDIDAL INTERTRIGO: Primary | ICD-10-CM

## 2018-06-26 DIAGNOSIS — R23.8 SKIN IRRITATION: ICD-10-CM

## 2018-06-26 DIAGNOSIS — L21.0 DANDRUFF: Primary | ICD-10-CM

## 2018-06-26 NOTE — TELEPHONE ENCOUNTER
Pt Care director came in and said pt needs PRN supplies for  A&D ointment , Sunscreen specify SPF apply directions, Head and Shoulder Shampoo  Pt needs Scripts for them and directions  Please call when ready  Pt usually sees Dr Shen Rosales but recently seen Dr Tabatha Francisco for HM on 6/5/18    Thanks  also   There was a script given for Tea tree oil need something in writing to clarify if its a PRN or not

## 2018-06-27 RX ORDER — INFANT FORMULA WITH IRON
POWDER (GRAM) ORAL
Qty: 30 G | Refills: 3 | Status: SHIPPED | OUTPATIENT
Start: 2018-06-27 | End: 2019-05-08 | Stop reason: SDUPTHER

## 2018-06-28 ENCOUNTER — OFFICE VISIT (OUTPATIENT)
Dept: FAMILY MEDICINE CLINIC | Facility: CLINIC | Age: 33
End: 2018-06-28
Payer: COMMERCIAL

## 2018-06-28 VITALS
SYSTOLIC BLOOD PRESSURE: 110 MMHG | HEIGHT: 70 IN | HEART RATE: 62 BPM | TEMPERATURE: 96.8 F | DIASTOLIC BLOOD PRESSURE: 60 MMHG | RESPIRATION RATE: 16 BRPM | BODY MASS INDEX: 18.7 KG/M2 | WEIGHT: 130.6 LBS

## 2018-06-28 DIAGNOSIS — B37.0 THRUSH: Primary | ICD-10-CM

## 2018-06-28 DIAGNOSIS — R52 PAIN: ICD-10-CM

## 2018-06-28 PROCEDURE — 99213 OFFICE O/P EST LOW 20 MIN: CPT | Performed by: FAMILY MEDICINE

## 2018-06-28 RX ORDER — LORATADINE 10 MG/1
10 TABLET ORAL EVERY 24 HOURS
Qty: 90 TABLET | Refills: 3 | Status: SHIPPED | OUTPATIENT
Start: 2018-06-28 | End: 2018-08-21 | Stop reason: SDUPTHER

## 2018-06-28 RX ORDER — IBUPROFEN 600 MG/1
600 TABLET ORAL 2 TIMES DAILY
Qty: 30 TABLET | Refills: 3 | Status: SHIPPED | OUTPATIENT
Start: 2018-06-28 | End: 2019-03-25 | Stop reason: SDUPTHER

## 2018-06-28 NOTE — PROGRESS NOTES
Assessment/Plan: Thrush  -     nystatin (MYCOSTATIN) 100,000 units/mL suspension; Apply 5 mL (500,000 Units total) to the mouth or throat 4 (four) times a day for 7 days    Needed refills of motrin and claritin  -     ibuprofen (MOTRIN) 600 mg tablet; Take 1 tablet (600 mg total) by mouth 2 (two) times a day  -     loratadine (CLARITIN) 10 mg tablet; Take 1 tablet (10 mg total) by mouth every 24 hours          Subjective:     Patient ID: Carin Carbajal is a 27 y o  male  Chief Complaint   Patient presents with   [employer] in by two care givers  Stating not quite himself for the past two weeks  Head hanging low, not as interactive  Seems to be avoiding drinking/eating as if it hurts  Caregivers state need refills of ibuprofen and claritin  Also had questions about messages left earlier in the week for OTC medications  Reviewed scripts were sent to pharmacy     Review of Systems   Constitutional: Positive for activity change and appetite change  Negative for chills, diaphoresis, fatigue, fever and unexpected weight change  HENT: Positive for sore throat and trouble swallowing  Negative for congestion, drooling, ear discharge, facial swelling, mouth sores, rhinorrhea and sneezing  Eyes: Negative  Respiratory: Negative  Cardiovascular: Negative  Skin: Negative  Objective:     Physical Exam   Constitutional: He appears well-developed and well-nourished  Examined while in wheelchair   HENT:   Head: Normocephalic and atraumatic  Right Ear: External ear normal    Left Ear: External ear normal    Tongue with white film that is adherent to it, did not scrap off with tongue depressor  Eyes: Conjunctivae and EOM are normal  Pupils are equal, round, and reactive to light  Neck: Neck supple  Cardiovascular: Normal rate, regular rhythm and normal heart sounds  Pulmonary/Chest: Effort normal and breath sounds normal    Abdominal: Soft   Bowel sounds are normal  Neurological: He is alert  Skin: Skin is warm and dry  Vitals reviewed

## 2018-06-29 ENCOUNTER — TELEPHONE (OUTPATIENT)
Dept: FAMILY MEDICINE CLINIC | Facility: CLINIC | Age: 33
End: 2018-06-29

## 2018-06-29 ENCOUNTER — TELEPHONE (OUTPATIENT)
Dept: CARDIOLOGY CLINIC | Facility: CLINIC | Age: 33
End: 2018-06-29

## 2018-06-29 NOTE — TELEPHONE ENCOUNTER
Please let caretakers know that the over the counter medication approval form has been completed and ready for  at

## 2018-06-29 NOTE — TELEPHONE ENCOUNTER
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

## 2018-06-30 NOTE — TELEPHONE ENCOUNTER
[address] [place] [address] left voice message requesting clarification on directions for Tea Tree Oil

## 2018-07-02 ENCOUNTER — TELEPHONE (OUTPATIENT)
Dept: FAMILY MEDICINE CLINIC | Facility: CLINIC | Age: 33
End: 2018-07-02

## 2018-07-02 DIAGNOSIS — B37.9 CANDIDIASIS: Primary | ICD-10-CM

## 2018-07-02 NOTE — TELEPHONE ENCOUNTER
Spoke with mother (guardian) Ana Huddleston, who asked if labs could be ordered to look for reason that Kerrie Chong seems to be having recurrent fungal infections  Previously HIV negative, but I recommended testing B1, B3, B12, CBC and fasting BMP to assess for nutritional deficiencies (utilized recs from [employer])  Mom agrees

## 2018-07-03 ENCOUNTER — TELEPHONE (OUTPATIENT)
Dept: FAMILY MEDICINE CLINIC | Facility: CLINIC | Age: 33
End: 2018-07-03

## 2018-07-03 DIAGNOSIS — G82.50 QUADRIPLEGIA (HCC): Primary | ICD-10-CM

## 2018-07-03 DIAGNOSIS — B37.0 THRUSH: ICD-10-CM

## 2018-07-03 DIAGNOSIS — R32 URINARY INCONTINENCE, UNSPECIFIED TYPE: ICD-10-CM

## 2018-07-03 RX ORDER — DIAPER,BRIEF,ADULT, DISPOSABLE
EACH MISCELLANEOUS
Qty: 22 EACH | Refills: 0 | Status: SHIPPED | OUTPATIENT
Start: 2018-07-03 | End: 2019-04-19 | Stop reason: SDUPTHER

## 2018-07-03 RX ORDER — WHEELCHAIR
EACH MISCELLANEOUS
Qty: 1 EACH | Refills: 0 | Status: SHIPPED | OUTPATIENT
Start: 2018-07-03 | End: 2018-07-17 | Stop reason: SDUPTHER

## 2018-07-03 RX ORDER — WHEELCHAIR
EACH MISCELLANEOUS
Qty: 1 EACH | Refills: 0 | Status: SHIPPED | OUTPATIENT
Start: 2018-07-03

## 2018-07-03 NOTE — LETTER
July 3, 2018      RE: Jie Eagle   :  [birth year]    To Whom It May Concern:    Mr Varghese Sender is a patient of my practice who I see for Long QT Syndrome  At age 13, patient experienced cardiac arrest due to underlying long QT syndrome  Patient is wheelchair bound and requires a licensed service in his home for assistance  Due to patient's diagnosis of long QT syndrome, it is recommended that the mandatory twice yearly fire drills be substituted with silent drills or other alternative  Patients with certain types of long QT syndrome can react adversely to sudden noises especially if this occurs during the nighttime hours  If you have any further questions, please do not hesitate to contact my office at [phone]        Sincerely,      MD John Patel  [facility]  Cardiac Electrophysiology

## 2018-07-03 NOTE — TELEPHONE ENCOUNTER
Spoke with caretaker, was told to use the Nystatin for 7 days  They are using 5ml four times a day, therefore the 60ml will not complete course of treatment, they want to know if you will order an additional 80ml  Please review

## 2018-07-03 NOTE — TELEPHONE ENCOUNTER
Pt director of resources of human development called and said pt received the wrong Quantity for the Nystatin, it should be 140 ml not 60 ml , per Bing Silva  Please send to [facility]   thanks

## 2018-07-05 DIAGNOSIS — K21.9 GASTROESOPHAGEAL REFLUX DISEASE WITHOUT ESOPHAGITIS: Primary | ICD-10-CM

## 2018-07-05 DIAGNOSIS — K59.00 CONSTIPATED: ICD-10-CM

## 2018-07-05 DIAGNOSIS — F32.A DEPRESSION, UNSPECIFIED DEPRESSION TYPE: ICD-10-CM

## 2018-07-05 DIAGNOSIS — I10 ACCELERATED ESSENTIAL HYPERTENSION: ICD-10-CM

## 2018-07-05 RX ORDER — GINKGO BILOBA 40 MG
CAPSULE ORAL
Qty: 90 CAPSULE | Refills: 0 | Status: SHIPPED | OUTPATIENT
Start: 2018-07-05 | End: 2019-01-10 | Stop reason: SDUPTHER

## 2018-07-05 RX ORDER — DIMENHYDRINATE 50 MG
TABLET ORAL
Qty: 60 CAPSULE | Refills: 0 | Status: SHIPPED | OUTPATIENT
Start: 2018-07-05 | End: 2018-07-17 | Stop reason: SDUPTHER

## 2018-07-05 RX ORDER — SERTRALINE HYDROCHLORIDE 100 MG/1
TABLET, FILM COATED ORAL
Qty: 30 TABLET | Refills: 0 | Status: SHIPPED | OUTPATIENT
Start: 2018-07-05 | End: 2018-10-04 | Stop reason: SDUPTHER

## 2018-07-05 RX ORDER — RANITIDINE 150 MG/1
TABLET ORAL
Qty: 15 TABLET | Refills: 0 | Status: SHIPPED | OUTPATIENT
Start: 2018-07-05 | End: 2019-01-10 | Stop reason: SDUPTHER

## 2018-07-05 RX ORDER — CHLORAL HYDRATE 500 MG
CAPSULE ORAL
Qty: 30 CAPSULE | Refills: 0 | Status: SHIPPED | OUTPATIENT
Start: 2018-07-05 | End: 2019-01-10 | Stop reason: SDUPTHER

## 2018-07-05 RX ORDER — ASCORBIC ACID, THIAMINE MONONITRATE,RIBOFLAVIN, NIACINAMIDE, PYRIDOXINE HYDROCHLORIDE, FOLIC ACID, CYANOCOBALAMIN, BIOTIN, CALCIUM PANTOTHENATE, 100; 1.5; 1.7; 20; 10; 1; 6000; 150000; 5 MG/1; MG/1; MG/1; MG/1; MG/1; MG/1; UG/1; UG/1; MG/1
CAPSULE, LIQUID FILLED ORAL
Qty: 30 CAPSULE | Refills: 0 | Status: SHIPPED | OUTPATIENT
Start: 2018-07-05 | End: 2019-01-10 | Stop reason: SDUPTHER

## 2018-07-05 RX ORDER — ASCORBIC ACID 500 MG
TABLET ORAL
Qty: 30 TABLET | Refills: 0 | Status: SHIPPED | OUTPATIENT
Start: 2018-07-05 | End: 2019-01-10 | Stop reason: SDUPTHER

## 2018-07-06 ENCOUNTER — APPOINTMENT (OUTPATIENT)
Dept: RADIOLOGY | Age: 33
End: 2018-07-06
Payer: COMMERCIAL

## 2018-07-06 ENCOUNTER — OFFICE VISIT (OUTPATIENT)
Dept: FAMILY MEDICINE CLINIC | Facility: CLINIC | Age: 33
End: 2018-07-06
Payer: COMMERCIAL

## 2018-07-06 VITALS
TEMPERATURE: 98.2 F | SYSTOLIC BLOOD PRESSURE: 102 MMHG | WEIGHT: 128 LBS | BODY MASS INDEX: 18.37 KG/M2 | DIASTOLIC BLOOD PRESSURE: 76 MMHG | HEART RATE: 78 BPM

## 2018-07-06 DIAGNOSIS — J18.9 PNEUMONIA OF LOWER LOBE DUE TO INFECTIOUS ORGANISM, UNSPECIFIED LATERALITY: ICD-10-CM

## 2018-07-06 DIAGNOSIS — J18.9 PNEUMONIA OF LOWER LOBE DUE TO INFECTIOUS ORGANISM, UNSPECIFIED LATERALITY: Primary | ICD-10-CM

## 2018-07-06 DIAGNOSIS — R13.12 OROPHARYNGEAL DYSPHAGIA: ICD-10-CM

## 2018-07-06 PROCEDURE — 99213 OFFICE O/P EST LOW 20 MIN: CPT | Performed by: FAMILY MEDICINE

## 2018-07-06 PROCEDURE — 71046 X-RAY EXAM CHEST 2 VIEWS: CPT

## 2018-07-06 RX ORDER — AMOXICILLIN AND CLAVULANATE POTASSIUM 875; 125 MG/1; MG/1
1 TABLET, FILM COATED ORAL EVERY 12 HOURS SCHEDULED
Qty: 14 TABLET | Refills: 0 | Status: SHIPPED | OUTPATIENT
Start: 2018-07-06 | End: 2018-07-13

## 2018-07-06 NOTE — PROGRESS NOTES
Nathaly Downing 1985 male MRN: [id number]    Acute Visit        ASSESSMENT/PLAN  Diagnoses and all orders for this visit:    Pneumonia of lower lobe due to infectious organism, unspecified laterality ([facility] [address] )  Comments:  decreased breath sounds in left lower lung, puls ox 85 on arrival   empirically prescribed Augmentin  chest X-ray stat  If lethargy and difficulty breathing worsen, pt instructed to go to ER  Prescription for pureed diet PRN was given to care givers due to chronic oropharyngeal dysphagia leading to possible aspiration pneumonia  Follow up in 2 weeks  Orders:  -     amoxicillin-clavulanate (AUGMENTIN) 875-125 mg per tablet; Take 1 tablet by mouth every 12 (twelve) hours for 7 days  -     XR chest pa & lateral; Future              Future Appointments  Date Time Provider Tab Moore   7/12/2018 10:50 AM Maggie Houston MD S BE  P[facility]   8/7/2018 3:20 PM Frandy Pearson MD [facility] BE [facility]        SUBJECTIVE  CC: No appetite in a few days (pt  came in with o2 stats at 85 oxygen immediately reported to provider )       As per mother, Pt has been having low energy and fatigue but has not been sleeping well for the past couple of weeks  He has been having difficulty swallowing foods and often coughs while eating due to chronic dysphagia  Care givers and mother deny fever, chills, shortness of breath, nausea, vomiting, or diarrhea      Nathaly Downing is a 27 y o  male who presented for an acute visit complaining of  Review of Systems   Constitutional: Positive for activity change and fatigue  Negative for appetite change, chills and fever  HENT: Positive for trouble swallowing  Respiratory: Positive for cough, choking and wheezing  Gastrointestinal: Negative for abdominal pain, blood in stool, constipation, diarrhea, nausea and vomiting  Psychiatric/Behavioral: Positive for sleep disturbance         Historical Information   The patient history was reviewed as follows:  Past Medical History:   Diagnosis Date    Community acquired pneumonia     last assessed: 10/9/2014     No past surgical history on file    Family History   Problem Relation Age of Onset    Other Father         mitral valve replaced    Diabetes type II Maternal Grandfather     Diabetes type II Maternal Uncle       Social History   History   Alcohol Use No     History   Drug Use No     History   Smoking Status    Never Smoker   Smokeless Tobacco    Never Used       Medications:   Meds/Allergies   Current Outpatient Prescriptions   Medication Sig Dispense Refill    b complex-vitamin C-folic acid (RENAL) 1 mg 1 SOFTGEL BY MOUTH DAILY @ 2PM (SUPPLEMENT) *GOODBRED 30 capsule 0    coenzyme Q-10 100 MG capsule 2 SOFTGELS(200MG) BY MOUTH DAILY @ 9AM (SUPPLEMENT) *GOODBRED 60 capsule 0    Coenzyme Q10 100 MG TABS Take 2 capsules daily by mouth @ 9am      Diapers & Supplies (CUTIES SIZE 3) MISC Please provide 10 per day 300 each 11    Diapers & Supplies (HUGGIES LITTLE MOVERS SIZE 3) MISC by Does not apply route      Disposable Gloves (VINYL GLOVES LARGE) MISC by Does not apply route      dronabinol (MARINOL) 2 5 mg capsule Take one with breakfast, 2 with lunch, one with supper and one before bed 150 capsule 0    ENEMEEZ PLUS  MG ENEM use as directed 150 mL 0    Ginkgo Biloba 120 MG TABS Take by mouth      Ginkgo Biloba Extract 40 MG CAPS 3 CAPS(120MG) BY MOUTH DAILY @ 2PM (SUPPLEMENT) *GOODBRED 90 capsule 0    GNP VITAMIN C 500 MG tablet 1 TAB BY MOUTH DAILY @ 2PM (SUPPLEMENT) *GOODBRED 30 tablet 0    ibuprofen (MOTRIN) 600 mg tablet Take 1 tablet (600 mg total) by mouth 2 (two) times a day 30 tablet 3    Incontinence Supply Disposable (DEPEND UNDERWEAR SM/MED) MISC by Does not apply route      Incontinence Supply Disposable (PREVAIL AIR BRIEFS) MISC 1 each by Does not apply route every 4 (four) hours Please provide 5 briefs per day 150 each 11    Incontinence Supply Disposable (SELECT DISPOSABLE UNDERWEAR ) MISC Please provide a 5 day supply DX R32 incontinence 22 each 0    loratadine (CLARITIN) 10 mg tablet Take 1 tablet (10 mg total) by mouth every 24 hours 90 tablet 3    Magnesium 500 MG CAPS Take by mouth      metoprolol succinate (TOPROL-XL) 25 mg 24 hr tablet Take by mouth      metoprolol tartrate (LOPRESSOR) 25 mg tablet 1/2 TAB(12 5MG) BY MOUTH DAILY @ 9AM (HTN) *GODOBRED 15 tablet 0    Misc  Devices [facility]) MISC Please provide pt with a new cord for power wheelchair 1 each 0    [facility]   Devices [facility]) [facility] Please provide power chair back cover 1 each 0    modafinil (PROVIGIL) 100 mg tablet Take 2 daily @ 9:00am 60 tablet 0    Multiple Vitamins-Minerals (CEROVITE SENIOR) TABS take 1 tablet daily by mouth 30 tablet 0    nystatin (MYCOSTATIN) 100,000 units/mL suspension Apply 5 mL (500,000 Units total) to the mouth or throat 4 (four) times a day for 7 days 80 mL 0    nystatin (MYCOSTATIN) cream Apply topically 2 (two) times a day 30 g 0    Omega-3 Fatty Acids (FISH OIL PO) Take 1 tablet by mouth once a day      Omega-3 Fatty Acids (FISH OIL) 1,000 mg 1 CAPSULE BY MOUTH DAILY @ 9AM (SUPPLEMENT) *GOODBRED 30 capsule 0    onabotulinumtoxin A (BOTOX) 100 units inject 500 units into left gastroc soleus and abductor pollicis ankit      pantoprazole (PROTONIX) 40 mg tablet Take 1 tablet (40 mg total) by mouth daily 90 tablet 1    POLYETHYLENE GLYCOL 3350 PO Take by mouth Take 1/2 capful daily by mouth @@ 9am , may increase to 1 or 2 capfuls as tolerated      pyrithione zinc (HEAD AND SHOULDERS) 1 % shampoo Apply topically daily as needed for dandruff 400 mL 5    RA SUNSCREEN SPF50 LOTN Apply sunscreen to exposed skin when outdoors every 2 hours as needed 1 Bottle 5    ranitidine (ZANTAC) 150 mg tablet 1 TAB BY MOUTH EVERY OTHER DAY @ 6PM (ACID REFLUX) *GOODBRED 15 tablet 0    ranitidine (ZANTAC) 150 MG/10ML syrup Take by mouth 2 (two) times a day      sertraline (ZOLOFT) 100 mg tablet 1 TABLET BY MOUTH DAILY @9AM (DEPRESSION) *GOODBRED 30 tablet 0    SODIUM FLUORIDE, DENTAL GEL, (DENTA 5000 PLUS) 1 1 % CREA Denta 5000 Plus 1 1 % Dental Cream; take as needed orally for preventing dental decay; 1; 2; 29-Aug-2014; 29-Aug-2014; Frihilarioshree Ornelas; Active      STARCH-MALTO DEXTRIN (THICK-IT) POWD Use in one drink daily honey thick consistency 1 Can 5    Tea Tree Oil OIL by Does not apply route daily Apply to skin folds  1 Bottle 3    Vitamins A & D (RA VITAMIN A & D) OINT Apply topically to affected area daily as needed 30 g 3    Witch Hazel (HEMORRHOIDAL) 50 % PADS Apply topically      Zinc 50 MG TABS one capsule daily every other month      zinc oxide (DESITIN) 40 % PSTE Apply topically      amoxicillin-clavulanate (AUGMENTIN) 875-125 mg per tablet Take 1 tablet by mouth every 12 (twelve) hours for 7 days 14 tablet 0     No current facility-administered medications for this visit  Allergies   Allergen Reactions    Other      drugs that prolong the QT interval  Seasonal allergies        OBJECTIVE  Vitals:   Vitals:    07/06/18 1519   BP: 102/76   Pulse: 78   Temp: 98 2 °F (36 8 °C)   TempSrc: Tympanic   Weight: 58 1 kg (128 lb)       Invasive Devices          No matching active lines, drains, or airways          Physical Exam   Constitutional: He appears well-developed  No distress  HENT:   Head: Normocephalic and atraumatic  Right Ear: External ear normal    Left Ear: External ear normal    Eyes: Conjunctivae are normal  Pupils are equal, round, and reactive to light  Neck: No thyromegaly present  Cardiovascular: Normal rate, regular rhythm, normal heart sounds and intact distal pulses  Exam reveals no gallop and no friction rub  No murmur heard  Pulmonary/Chest: No respiratory distress  He has no wheezes  He has rales (Adventitious breath sounds in left lower lobe  )  He exhibits no tenderness  Abdominal: Soft   Bowel sounds are normal  He exhibits no distension  There is no tenderness  There is no rebound and no guarding  Musculoskeletal: He exhibits no edema  Lymphadenopathy:     He has no cervical adenopathy  Skin: Skin is warm and dry  He is not diaphoretic  Lab:  I have personally reviewed all pertinent results

## 2018-07-06 NOTE — PATIENT INSTRUCTIONS
Take oral antibiotic for possible pneumonia for 7 day course  Have chest X-rayed as soon as possible  If Davee Osler becomes even more lethargic and has difficulty breathing please go to the emergency room as soon as possible       Will follow up following pneumonia treatment for thrush treatment

## 2018-07-07 ENCOUNTER — TELEPHONE (OUTPATIENT)
Dept: OTHER | Facility: HOSPITAL | Age: 33
End: 2018-07-07

## 2018-07-07 DIAGNOSIS — Z92.29 ANTIBIOTIC TREATMENT WITHIN PAST 2 MONTHS: Primary | ICD-10-CM

## 2018-07-07 RX ORDER — SACCHAROMYCES BOULARDII 250 MG
250 CAPSULE ORAL 2 TIMES DAILY
Qty: 14 CAPSULE | Refills: 0 | Status: SHIPPED | OUTPATIENT
Start: 2018-07-07 | End: 2018-07-14

## 2018-07-07 NOTE — TELEPHONE ENCOUNTER
Dr Lucy Davenport spoke with the patients mother in regards to the normal chest Xray and whether or not the patient still needs antibiotic treatment for suspected pneumonia  We agreed that he should still be treated  The prescription for Augmentin did not go through electronically so it was called in  The mother also requests for an oral probiotic due to antibiotic use which was called in to the pharmacy

## 2018-07-07 NOTE — TELEPHONE ENCOUNTER
Mother of patient called asking about X ray results  She also mentioned that the pharmacy had not received the order for abx  I discussed the Xray results and she questioned whether we should continue with abx if negative  I explained that sometimes PNA is not always present on CXR and based on the the providers not there was a clinical suspicion of pneumonia  She states she is uncomfortable with the way he looks, still looks lethargic with shallow breathing  I encouraged the patient to go to the ED if she felt like pt wasn't improving and needed further evaluation  She requested that I speak with Dr Valerie Jean-Baptiste whom evaluated the pt yesterday  I told her I would speak with him directly and call back if he has any other recommendations

## 2018-07-09 ENCOUNTER — TELEPHONE (OUTPATIENT)
Dept: FAMILY MEDICINE CLINIC | Facility: CLINIC | Age: 33
End: 2018-07-09

## 2018-07-09 DIAGNOSIS — R63.0 ANOREXIA: ICD-10-CM

## 2018-07-09 DIAGNOSIS — S06.9X0D TRAUMATIC BRAIN INJURY, WITHOUT LOSS OF CONSCIOUSNESS, SUBSEQUENT ENCOUNTER: ICD-10-CM

## 2018-07-09 NOTE — TELEPHONE ENCOUNTER
Arturo Jameson called to check on refills for Dronabinol (2 pills left), Adult vitamon, Magnesium and Zinc  He said [facility] left messages on 7/5 and 7/6/18

## 2018-07-10 DIAGNOSIS — E56.9 VITAMIN DEFICIENCY: Primary | ICD-10-CM

## 2018-07-10 DIAGNOSIS — Z00.00 HEALTH MAINTENANCE EXAMINATION: ICD-10-CM

## 2018-07-10 RX ORDER — DRONABINOL 2.5 MG/1
CAPSULE ORAL
Qty: 150 CAPSULE | Refills: 1 | Status: SHIPPED | OUTPATIENT
Start: 2018-07-10 | End: 2019-02-04 | Stop reason: SDUPTHER

## 2018-07-10 NOTE — TELEPHONE ENCOUNTER
Maci Luna again about med refills  It was confirmed to caller on Dronobinol at pharmacy but the others are still not available

## 2018-07-11 LAB
BASOPHILS # BLD AUTO: 22 CELLS/UL (ref 0–200)
BASOPHILS NFR BLD AUTO: 0.5 %
BUN SERPL-MCNC: 17 MG/DL (ref 7–25)
BUN/CREAT SERPL: NORMAL (CALC) (ref 6–22)
CALCIUM SERPL-MCNC: 9.5 MG/DL (ref 8.6–10.3)
CHLORIDE SERPL-SCNC: 104 MMOL/L (ref 98–110)
CO2 SERPL-SCNC: 30 MMOL/L (ref 20–31)
CREAT SERPL-MCNC: 0.88 MG/DL (ref 0.6–1.35)
EOSINOPHIL # BLD AUTO: 90 CELLS/UL (ref 15–500)
EOSINOPHIL NFR BLD AUTO: 2.1 %
ERYTHROCYTE [DISTWIDTH] IN BLOOD BY AUTOMATED COUNT: 12 % (ref 11–15)
GLUCOSE SERPL-MCNC: 87 MG/DL (ref 65–99)
HCT VFR BLD AUTO: 46.4 % (ref 38.5–50)
HGB BLD-MCNC: 16.1 G/DL (ref 13.2–17.1)
LYMPHOCYTES # BLD AUTO: 1901 CELLS/UL (ref 850–3900)
LYMPHOCYTES NFR BLD AUTO: 44.2 %
MCH RBC QN AUTO: 32.1 PG (ref 27–33)
MCHC RBC AUTO-ENTMCNC: 34.7 G/DL (ref 32–36)
MCV RBC AUTO: 92.6 FL (ref 80–100)
MONOCYTES # BLD AUTO: 280 CELLS/UL (ref 200–950)
MONOCYTES NFR BLD AUTO: 6.5 %
NEUTROPHILS # BLD AUTO: 2008 CELLS/UL (ref 1500–7800)
NEUTROPHILS NFR BLD AUTO: 46.7 %
PLATELET # BLD AUTO: 184 THOUSAND/UL (ref 140–400)
PMV BLD REES-ECKER: 10.5 FL (ref 7.5–12.5)
POTASSIUM SERPL-SCNC: 4.9 MMOL/L (ref 3.5–5.3)
RBC # BLD AUTO: 5.01 MILLION/UL (ref 4.2–5.8)
SL AMB EGFR AFRICAN AMERICAN: 130 ML/MIN/1.73M2
SL AMB EGFR NON AFRICAN AMERICAN: 112 ML/MIN/1.73M2
SL AMB NICOTINAMIDE: <20 NG/ML
SL AMB NICOTINIC ACID: <20 NG/ML
SODIUM SERPL-SCNC: 142 MMOL/L (ref 135–146)
VIT B1 BLD-SCNC: 157 NMOL/L (ref 78–185)
VIT B12 SERPL-MCNC: 1072 PG/ML (ref 200–1100)
WBC # BLD AUTO: 4.3 THOUSAND/UL (ref 3.8–10.8)

## 2018-07-11 RX ORDER — MULTIVIT-MIN/FA/LYCOPEN/LUTEIN .4-300-25
1 TABLET ORAL DAILY
Qty: 30 TABLET | Refills: 5 | Status: SHIPPED | OUTPATIENT
Start: 2018-07-11 | End: 2019-01-10 | Stop reason: SDUPTHER

## 2018-07-11 RX ORDER — ZINC GLUCONATE 50 MG
TABLET ORAL
Qty: 30 TABLET | Refills: 5 | Status: SHIPPED | OUTPATIENT
Start: 2018-07-11 | End: 2019-04-26 | Stop reason: SDUPTHER

## 2018-07-11 RX ORDER — FOLIC ACID 0.8 MG
500 TABLET ORAL DAILY
Qty: 30 CAPSULE | Refills: 5 | Status: SHIPPED | OUTPATIENT
Start: 2018-07-11 | End: 2018-11-07 | Stop reason: SDUPTHER

## 2018-07-12 ENCOUNTER — OFFICE VISIT (OUTPATIENT)
Dept: FAMILY MEDICINE CLINIC | Facility: CLINIC | Age: 33
End: 2018-07-12
Payer: COMMERCIAL

## 2018-07-12 ENCOUNTER — TELEPHONE (OUTPATIENT)
Dept: FAMILY MEDICINE CLINIC | Facility: CLINIC | Age: 33
End: 2018-07-12

## 2018-07-12 VITALS
BODY MASS INDEX: 18.8 KG/M2 | DIASTOLIC BLOOD PRESSURE: 74 MMHG | RESPIRATION RATE: 16 BRPM | WEIGHT: 131 LBS | HEART RATE: 84 BPM | TEMPERATURE: 95.1 F | SYSTOLIC BLOOD PRESSURE: 118 MMHG

## 2018-07-12 DIAGNOSIS — I45.81 LONG Q-T SYNDROME: ICD-10-CM

## 2018-07-12 DIAGNOSIS — G82.50 QUADRIPLEGIA (HCC): ICD-10-CM

## 2018-07-12 DIAGNOSIS — R13.12 OROPHARYNGEAL DYSPHAGIA: ICD-10-CM

## 2018-07-12 DIAGNOSIS — J18.9 COMMUNITY ACQUIRED PNEUMONIA OF LEFT LOWER LOBE OF LUNG: Primary | ICD-10-CM

## 2018-07-12 PROBLEM — Z00.00 HEALTHCARE MAINTENANCE: Status: RESOLVED | Noted: 2018-06-05 | Resolved: 2018-07-12

## 2018-07-12 PROCEDURE — 99213 OFFICE O/P EST LOW 20 MIN: CPT | Performed by: FAMILY MEDICINE

## 2018-07-12 NOTE — TELEPHONE ENCOUNTER
Called [facility], [facility] has modified cornstarch, [facility] has modified food starch  Need to f/u with Hood Grey

## 2018-07-12 NOTE — TELEPHONE ENCOUNTER
Naman's mother is interested in changing the type of bulk agent being used  Interested in "Thicken Up Clear"  Www thickenupclear  com    How do we order this? Packet form preferred if an option  Thank you

## 2018-07-12 NOTE — PROGRESS NOTES
Assessment/Plan:     Diagnoses and all orders for this visit:    Community acquired pneumonia of left lower lobe of lung ([address] )  Quadriplegia ([address] )  Long Q-T syndrome  Improving  Continue and complete full course of ABX prescribed  Oropharyngeal dysphagia  Awaiting complete swallow evaluation for recommendations at this time  Will task support staff to see if "Thick Up Clear" can be ordered for the patient per mother request          Subjective:      Patient ID: Al Carballo is a 27 y o  male  Chief Complaint   Patient presents with    Follow-up     HPI  Here for close interval follow up  Diagnosed with PNA on 7/6 by clincal exam and physical findings  CXR was normal  Given course of Augmentin twice daily for 7 days, started on 7/718  Seems to be improving back to baseline  Head droop resolved, no cough  afebrile    Recently also treated for thrush and would like recheck    Has plans to have follow up swallow eval given h/o oropharyngeal dysphagia  Requesting change of thickening agent  Would like to try "Thicken Up Clear "     Need clarification on when to use thickening agents    The following portions of the patient's history were reviewed and updated as appropriate: allergies, current medications, past family history, past medical history, past social history, past surgical history and problem list     Review of Systems   Constitutional: Negative for appetite change, chills, fatigue and fever  Respiratory: Negative  Cardiovascular: Negative  Psychiatric/Behavioral: Negative  Objective:      /74   Pulse 84   Temp (!) 95 1 °F (35 1 °C)   Resp 16   Wt 59 4 kg (131 lb)   BMI 18 80 kg/m²          Physical Exam   Constitutional: He appears well-developed and well-nourished  In wheelchair   HENT:   Head: Normocephalic and atraumatic  Mouth/Throat: Oropharynx is clear and moist    No sign of thrush   Eyes: Conjunctivae and EOM are normal  Pupils are equal, round, and reactive to light  Neck: Normal range of motion  Neck supple  Cardiovascular: Normal rate, regular rhythm and normal heart sounds  Pulmonary/Chest: Effort normal and breath sounds normal    Neurological: He is alert  Skin: Skin is warm and dry  Has some bruising midline lumbar spine   Vitals reviewed

## 2018-07-13 ENCOUNTER — TELEPHONE (OUTPATIENT)
Dept: FAMILY MEDICINE CLINIC | Facility: CLINIC | Age: 33
End: 2018-07-13

## 2018-07-13 NOTE — TELEPHONE ENCOUNTER
Pharmacy called to ask if Baltazar Lerma is to have claritin daily or prn please send back to us in triage so we can let the pharmacy know   Thank you

## 2018-07-17 ENCOUNTER — OFFICE VISIT (OUTPATIENT)
Dept: URGENT CARE | Age: 33
End: 2018-07-17
Payer: COMMERCIAL

## 2018-07-17 ENCOUNTER — HOSPITAL ENCOUNTER (INPATIENT)
Facility: HOSPITAL | Age: 33
LOS: 3 days | Discharge: HOME | DRG: 177 | End: 2018-07-21
Attending: EMERGENCY MEDICINE | Admitting: INTERNAL MEDICINE
Payer: COMMERCIAL

## 2018-07-17 VITALS
TEMPERATURE: 98.2 F | OXYGEN SATURATION: 95 % | SYSTOLIC BLOOD PRESSURE: 94 MMHG | DIASTOLIC BLOOD PRESSURE: 60 MMHG | HEIGHT: 70 IN | HEART RATE: 105 BPM | RESPIRATION RATE: 20 BRPM | BODY MASS INDEX: 18.75 KG/M2 | WEIGHT: 131 LBS

## 2018-07-17 DIAGNOSIS — J18.9 PNEUMONIA: Primary | ICD-10-CM

## 2018-07-17 DIAGNOSIS — R10.32 LEFT LOWER QUADRANT PAIN: Primary | ICD-10-CM

## 2018-07-17 DIAGNOSIS — T17.908A ASPIRATION INTO RESPIRATORY TRACT, INITIAL ENCOUNTER: ICD-10-CM

## 2018-07-17 DIAGNOSIS — I45.81 PROLONGED QT SYNDROME: ICD-10-CM

## 2018-07-17 DIAGNOSIS — R11.10 VOMITING ALONE: ICD-10-CM

## 2018-07-17 DIAGNOSIS — R53.81 MALAISE: ICD-10-CM

## 2018-07-17 DIAGNOSIS — B37.2 CANDIDAL INTERTRIGO: ICD-10-CM

## 2018-07-17 PROCEDURE — 85025 COMPLETE CBC W/AUTO DIFF WBC: CPT | Performed by: EMERGENCY MEDICINE

## 2018-07-17 PROCEDURE — 83690 ASSAY OF LIPASE: CPT | Performed by: EMERGENCY MEDICINE

## 2018-07-17 PROCEDURE — 36415 COLL VENOUS BLD VENIPUNCTURE: CPT | Performed by: EMERGENCY MEDICINE

## 2018-07-17 PROCEDURE — S9083 URGENT CARE CENTER GLOBAL: HCPCS | Performed by: PHYSICIAN ASSISTANT

## 2018-07-17 PROCEDURE — 80053 COMPREHEN METABOLIC PANEL: CPT | Performed by: EMERGENCY MEDICINE

## 2018-07-17 PROCEDURE — G0382 LEV 3 HOSP TYPE B ED VISIT: HCPCS | Performed by: PHYSICIAN ASSISTANT

## 2018-07-17 PROCEDURE — 96361 HYDRATE IV INFUSION ADD-ON: CPT

## 2018-07-17 RX ORDER — KETOROLAC TROMETHAMINE 30 MG/ML
15 INJECTION, SOLUTION INTRAMUSCULAR; INTRAVENOUS ONCE
Status: COMPLETED | OUTPATIENT
Start: 2018-07-17 | End: 2018-07-18

## 2018-07-17 RX ORDER — SODIUM FLUORIDE 5 MG/ML
PASTE, DENTIFRICE DENTAL
COMMUNITY
End: 2018-07-17 | Stop reason: SDUPTHER

## 2018-07-17 RX ADMIN — SODIUM CHLORIDE 1000 ML: 0.9 INJECTION, SOLUTION INTRAVENOUS at 23:59

## 2018-07-18 ENCOUNTER — TELEPHONE (OUTPATIENT)
Dept: FAMILY MEDICINE CLINIC | Facility: CLINIC | Age: 33
End: 2018-07-18

## 2018-07-18 ENCOUNTER — APPOINTMENT (EMERGENCY)
Dept: CT IMAGING | Facility: HOSPITAL | Age: 33
DRG: 177 | End: 2018-07-18
Payer: COMMERCIAL

## 2018-07-18 PROBLEM — J18.9 PNEUMONIA: Status: ACTIVE | Noted: 2018-07-18

## 2018-07-18 PROBLEM — D72.829 LEUKOCYTOSIS: Status: ACTIVE | Noted: 2018-07-18

## 2018-07-18 PROBLEM — K59.00 CONSTIPATION: Status: ACTIVE | Noted: 2018-07-18

## 2018-07-18 LAB
ALBUMIN SERPL BCP-MCNC: 3.5 G/DL (ref 3.5–5)
ALP SERPL-CCNC: 60 U/L (ref 46–116)
ALT SERPL W P-5'-P-CCNC: 28 U/L (ref 12–78)
ANION GAP SERPL CALCULATED.3IONS-SCNC: 3 MMOL/L (ref 4–13)
ANION GAP SERPL CALCULATED.3IONS-SCNC: 5 MMOL/L (ref 4–13)
AST SERPL W P-5'-P-CCNC: 19 U/L (ref 5–45)
BASOPHILS # BLD AUTO: 0.02 THOUSANDS/ΜL (ref 0–0.1)
BASOPHILS NFR BLD AUTO: 0 % (ref 0–1)
BILIRUB SERPL-MCNC: 0.6 MG/DL (ref 0.2–1)
BUN SERPL-MCNC: 12 MG/DL (ref 5–25)
BUN SERPL-MCNC: 14 MG/DL (ref 5–25)
CALCIUM SERPL-MCNC: 7.7 MG/DL (ref 8.3–10.1)
CALCIUM SERPL-MCNC: 9 MG/DL (ref 8.3–10.1)
CHLORIDE SERPL-SCNC: 105 MMOL/L (ref 100–108)
CHLORIDE SERPL-SCNC: 110 MMOL/L (ref 100–108)
CO2 SERPL-SCNC: 25 MMOL/L (ref 21–32)
CO2 SERPL-SCNC: 31 MMOL/L (ref 21–32)
CREAT SERPL-MCNC: 0.85 MG/DL (ref 0.6–1.3)
CREAT SERPL-MCNC: 0.91 MG/DL (ref 0.6–1.3)
EOSINOPHIL # BLD AUTO: 0.01 THOUSAND/ΜL (ref 0–0.61)
EOSINOPHIL NFR BLD AUTO: 0 % (ref 0–6)
ERYTHROCYTE [DISTWIDTH] IN BLOOD BY AUTOMATED COUNT: 12 % (ref 11.6–15.1)
ERYTHROCYTE [DISTWIDTH] IN BLOOD BY AUTOMATED COUNT: 12.2 % (ref 11.6–15.1)
GFR SERPL CREATININE-BSD FRML MDRD: 110 ML/MIN/1.73SQ M
GFR SERPL CREATININE-BSD FRML MDRD: 114 ML/MIN/1.73SQ M
GLUCOSE SERPL-MCNC: 104 MG/DL (ref 65–140)
GLUCOSE SERPL-MCNC: 89 MG/DL (ref 65–140)
HCT VFR BLD AUTO: 40.3 % (ref 36.5–49.3)
HCT VFR BLD AUTO: 42.4 % (ref 36.5–49.3)
HGB BLD-MCNC: 13.8 G/DL (ref 12–17)
HGB BLD-MCNC: 15 G/DL (ref 12–17)
LACTATE SERPL-SCNC: 0.6 MMOL/L (ref 0.5–2)
LIPASE SERPL-CCNC: 72 U/L (ref 73–393)
LYMPHOCYTES # BLD AUTO: 1.15 THOUSANDS/ΜL (ref 0.6–4.47)
LYMPHOCYTES NFR BLD AUTO: 9 % (ref 14–44)
MCH RBC QN AUTO: 31.8 PG (ref 26.8–34.3)
MCH RBC QN AUTO: 31.9 PG (ref 26.8–34.3)
MCHC RBC AUTO-ENTMCNC: 34.2 G/DL (ref 31.4–37.4)
MCHC RBC AUTO-ENTMCNC: 35.4 G/DL (ref 31.4–37.4)
MCV RBC AUTO: 90 FL (ref 82–98)
MCV RBC AUTO: 93 FL (ref 82–98)
MONOCYTES # BLD AUTO: 0.6 THOUSAND/ΜL (ref 0.17–1.22)
MONOCYTES NFR BLD AUTO: 5 % (ref 4–12)
NEUTROPHILS # BLD AUTO: 11.46 THOUSANDS/ΜL (ref 1.85–7.62)
NEUTS SEG NFR BLD AUTO: 87 % (ref 43–75)
PLATELET # BLD AUTO: 144 THOUSANDS/UL (ref 149–390)
PLATELET # BLD AUTO: 152 THOUSANDS/UL (ref 149–390)
PMV BLD AUTO: 10.5 FL (ref 8.9–12.7)
PMV BLD AUTO: 9.9 FL (ref 8.9–12.7)
POTASSIUM SERPL-SCNC: 4 MMOL/L (ref 3.5–5.3)
POTASSIUM SERPL-SCNC: 4.2 MMOL/L (ref 3.5–5.3)
PROCALCITONIN SERPL-MCNC: 4.55 NG/ML
PROT SERPL-MCNC: 8 G/DL (ref 6.4–8.2)
RBC # BLD AUTO: 4.34 MILLION/UL (ref 3.88–5.62)
RBC # BLD AUTO: 4.7 MILLION/UL (ref 3.88–5.62)
SODIUM SERPL-SCNC: 139 MMOL/L (ref 136–145)
SODIUM SERPL-SCNC: 140 MMOL/L (ref 136–145)
WBC # BLD AUTO: 10.41 THOUSAND/UL (ref 4.31–10.16)
WBC # BLD AUTO: 13.24 THOUSAND/UL (ref 4.31–10.16)

## 2018-07-18 PROCEDURE — 36415 COLL VENOUS BLD VENIPUNCTURE: CPT | Performed by: EMERGENCY MEDICINE

## 2018-07-18 PROCEDURE — 96375 TX/PRO/DX INJ NEW DRUG ADDON: CPT

## 2018-07-18 PROCEDURE — 84145 PROCALCITONIN (PCT): CPT | Performed by: PHYSICIAN ASSISTANT

## 2018-07-18 PROCEDURE — 99285 EMERGENCY DEPT VISIT HI MDM: CPT

## 2018-07-18 PROCEDURE — 80048 BASIC METABOLIC PNL TOTAL CA: CPT | Performed by: PHYSICIAN ASSISTANT

## 2018-07-18 PROCEDURE — 70450 CT HEAD/BRAIN W/O DYE: CPT

## 2018-07-18 PROCEDURE — 83605 ASSAY OF LACTIC ACID: CPT | Performed by: EMERGENCY MEDICINE

## 2018-07-18 PROCEDURE — 92610 EVALUATE SWALLOWING FUNCTION: CPT

## 2018-07-18 PROCEDURE — 96374 THER/PROPH/DIAG INJ IV PUSH: CPT

## 2018-07-18 PROCEDURE — 71260 CT THORAX DX C+: CPT

## 2018-07-18 PROCEDURE — 87449 NOS EACH ORGANISM AG IA: CPT | Performed by: PHYSICIAN ASSISTANT

## 2018-07-18 PROCEDURE — 85027 COMPLETE CBC AUTOMATED: CPT | Performed by: PHYSICIAN ASSISTANT

## 2018-07-18 PROCEDURE — 94760 N-INVAS EAR/PLS OXIMETRY 1: CPT

## 2018-07-18 PROCEDURE — 87040 BLOOD CULTURE FOR BACTERIA: CPT | Performed by: EMERGENCY MEDICINE

## 2018-07-18 PROCEDURE — 96361 HYDRATE IV INFUSION ADD-ON: CPT

## 2018-07-18 PROCEDURE — 74177 CT ABD & PELVIS W/CONTRAST: CPT

## 2018-07-18 PROCEDURE — 99223 1ST HOSP IP/OBS HIGH 75: CPT | Performed by: HOSPITALIST

## 2018-07-18 RX ORDER — POLYETHYLENE GLYCOL 3350 17 G/17G
17 POWDER, FOR SOLUTION ORAL DAILY
Status: DISCONTINUED | OUTPATIENT
Start: 2018-07-18 | End: 2018-07-21 | Stop reason: HOSPADM

## 2018-07-18 RX ORDER — ACETAMINOPHEN 325 MG/1
650 TABLET ORAL EVERY 6 HOURS PRN
Status: DISCONTINUED | OUTPATIENT
Start: 2018-07-18 | End: 2018-07-21 | Stop reason: HOSPADM

## 2018-07-18 RX ORDER — MODAFINIL 100 MG/1
200 TABLET ORAL DAILY
Status: DISCONTINUED | OUTPATIENT
Start: 2018-07-18 | End: 2018-07-21 | Stop reason: HOSPADM

## 2018-07-18 RX ORDER — FAMOTIDINE 20 MG/1
20 TABLET, FILM COATED ORAL DAILY
Status: DISCONTINUED | OUTPATIENT
Start: 2018-07-18 | End: 2018-07-21 | Stop reason: HOSPADM

## 2018-07-18 RX ORDER — DRONABINOL 2.5 MG/1
2.5 CAPSULE ORAL
Status: DISCONTINUED | OUTPATIENT
Start: 2018-07-18 | End: 2018-07-21 | Stop reason: HOSPADM

## 2018-07-18 RX ORDER — SODIUM CHLORIDE 9 MG/ML
125 INJECTION, SOLUTION INTRAVENOUS CONTINUOUS
Status: DISCONTINUED | OUTPATIENT
Start: 2018-07-18 | End: 2018-07-21 | Stop reason: HOSPADM

## 2018-07-18 RX ORDER — ONDANSETRON 2 MG/ML
4 INJECTION INTRAMUSCULAR; INTRAVENOUS EVERY 6 HOURS PRN
Status: DISCONTINUED | OUTPATIENT
Start: 2018-07-18 | End: 2018-07-21 | Stop reason: HOSPADM

## 2018-07-18 RX ORDER — CLINDAMYCIN PHOSPHATE 600 MG/50ML
600 INJECTION INTRAVENOUS ONCE
Status: COMPLETED | OUTPATIENT
Start: 2018-07-18 | End: 2018-07-18

## 2018-07-18 RX ORDER — DOCUSATE SODIUM 100 MG/1
100 CAPSULE, LIQUID FILLED ORAL 2 TIMES DAILY
Status: DISCONTINUED | OUTPATIENT
Start: 2018-07-18 | End: 2018-07-21 | Stop reason: HOSPADM

## 2018-07-18 RX ORDER — CHOLECALCIFEROL (VITAMIN D3) 125 MCG
100 CAPSULE ORAL DAILY
Status: DISCONTINUED | OUTPATIENT
Start: 2018-07-18 | End: 2018-07-21 | Stop reason: HOSPADM

## 2018-07-18 RX ORDER — SERTRALINE HYDROCHLORIDE 100 MG/1
100 TABLET, FILM COATED ORAL DAILY
Status: DISCONTINUED | OUTPATIENT
Start: 2018-07-18 | End: 2018-07-21 | Stop reason: HOSPADM

## 2018-07-18 RX ORDER — LORATADINE 10 MG/1
10 TABLET ORAL DAILY PRN
Status: DISCONTINUED | OUTPATIENT
Start: 2018-07-18 | End: 2018-07-21 | Stop reason: HOSPADM

## 2018-07-18 RX ORDER — CLINDAMYCIN PHOSPHATE 600 MG/50ML
600 INJECTION INTRAVENOUS EVERY 8 HOURS
Status: DISCONTINUED | OUTPATIENT
Start: 2018-07-18 | End: 2018-07-20

## 2018-07-18 RX ORDER — CHOLECALCIFEROL (VITAMIN D3) 10 MCG
1 TABLET ORAL
Status: DISCONTINUED | OUTPATIENT
Start: 2018-07-18 | End: 2018-07-21 | Stop reason: HOSPADM

## 2018-07-18 RX ORDER — NYSTATIN 100000 [USP'U]/G
POWDER TOPICAL 2 TIMES DAILY
Status: DISCONTINUED | OUTPATIENT
Start: 2018-07-18 | End: 2018-07-21 | Stop reason: HOSPADM

## 2018-07-18 RX ORDER — CHLORAL HYDRATE 500 MG
1000 CAPSULE ORAL DAILY
Status: DISCONTINUED | OUTPATIENT
Start: 2018-07-18 | End: 2018-07-21 | Stop reason: HOSPADM

## 2018-07-18 RX ADMIN — CLINDAMYCIN PHOSPHATE 600 MG: 12 INJECTION, SOLUTION INTRAMUSCULAR; INTRAVENOUS at 03:34

## 2018-07-18 RX ADMIN — MODAFINIL 200 MG: 100 TABLET ORAL at 09:27

## 2018-07-18 RX ADMIN — SODIUM CHLORIDE 125 ML/HR: 0.9 INJECTION, SOLUTION INTRAVENOUS at 04:34

## 2018-07-18 RX ADMIN — DRONABINOL 2.5 MG: 2.5 CAPSULE ORAL at 12:19

## 2018-07-18 RX ADMIN — POLYETHYLENE GLYCOL 3350 17 G: 17 POWDER, FOR SOLUTION ORAL at 09:30

## 2018-07-18 RX ADMIN — CLINDAMYCIN PHOSPHATE 600 MG: 12 INJECTION, SOLUTION INTRAMUSCULAR; INTRAVENOUS at 12:19

## 2018-07-18 RX ADMIN — NEPHROCAP 1 CAPSULE: 1 CAP ORAL at 17:55

## 2018-07-18 RX ADMIN — DRONABINOL 2.5 MG: 2.5 CAPSULE ORAL at 17:55

## 2018-07-18 RX ADMIN — KETOROLAC TROMETHAMINE 15 MG: 30 INJECTION, SOLUTION INTRAMUSCULAR at 00:05

## 2018-07-18 RX ADMIN — IOHEXOL 100 ML: 350 INJECTION, SOLUTION INTRAVENOUS at 01:40

## 2018-07-18 RX ADMIN — SODIUM CHLORIDE 1000 ML: 0.9 INJECTION, SOLUTION INTRAVENOUS at 03:34

## 2018-07-18 RX ADMIN — Medication 1 TABLET: at 09:28

## 2018-07-18 RX ADMIN — DRONABINOL 2.5 MG: 2.5 CAPSULE ORAL at 09:37

## 2018-07-18 RX ADMIN — DOCUSATE SODIUM 100 MG: 100 CAPSULE, LIQUID FILLED ORAL at 17:56

## 2018-07-18 RX ADMIN — ENOXAPARIN SODIUM 40 MG: 100 INJECTION SUBCUTANEOUS at 09:29

## 2018-07-18 RX ADMIN — CLINDAMYCIN PHOSPHATE 600 MG: 12 INJECTION, SOLUTION INTRAMUSCULAR; INTRAVENOUS at 19:54

## 2018-07-18 RX ADMIN — FAMOTIDINE 20 MG: 20 TABLET ORAL at 09:29

## 2018-07-18 RX ADMIN — SODIUM CHLORIDE 125 ML/HR: 0.9 INJECTION, SOLUTION INTRAVENOUS at 14:48

## 2018-07-18 RX ADMIN — DRONABINOL 2.5 MG: 2.5 CAPSULE ORAL at 22:03

## 2018-07-18 RX ADMIN — DOCUSATE SODIUM 100 MG: 100 CAPSULE, LIQUID FILLED ORAL at 09:27

## 2018-07-18 RX ADMIN — Medication 400 MG: at 09:28

## 2018-07-18 RX ADMIN — NYSTATIN: 100000 POWDER TOPICAL at 17:57

## 2018-07-18 RX ADMIN — SERTRALINE HYDROCHLORIDE 100 MG: 100 TABLET ORAL at 09:29

## 2018-07-18 RX ADMIN — CEFTRIAXONE 1000 MG: 1 INJECTION, POWDER, FOR SOLUTION INTRAMUSCULAR; INTRAVENOUS at 04:34

## 2018-07-18 RX ADMIN — Medication 100 MG: at 09:27

## 2018-07-18 RX ADMIN — NYSTATIN 1 APPLICATION: 100000 POWDER TOPICAL at 09:30

## 2018-07-18 NOTE — PROGRESS NOTES
[address]        NAME: Thong Ramos is a 27 y o  male  : 1985    MRN: [id number]  DATE: 2018  TIME: 9:30 PM    Assessment and Plan   Left lower quadrant pain [R10 32]  1  Left lower quadrant pain           Patient Instructions     Referred to [facility] ED for further evaluation and treatment  Stable to go by car  Chief Complaint     Chief Complaint   Patient presents with    Vomiting     Vomited large amount at 3:30 pm  Has been belching and parents note abdominal distention  Had large soft, formed BM tonight  Has drank 3 oz fluid  c/o HA and pain L ribs  History of Present Illness       68-year-old male with anoxic brain injury from cardiac arrest presents with his parents and caretaker who states that he vomited a large amount today  He had a large bowel movement after vomiting, which was normal in nature  He appears to be in pain  He is able to verbalize that he is having more pain on the left side of abdomen  No fevers  Parents state that he is not acting normally since this happened  Review of Systems   Review of Systems   Constitutional: Positive for activity change  Respiratory: Negative for cough  Gastrointestinal: Positive for abdominal distention, abdominal pain and vomiting  Negative for diarrhea  All other systems reviewed and are negative          Current Medications       Current Outpatient Prescriptions:     b complex-vitamin C-folic acid (RENAL) 1 mg, 1 SOFTGEL BY MOUTH DAILY @ 2PM (SUPPLEMENT) *LATONIA, Disp: 30 capsule, Rfl: 0    Coenzyme Q10 100 MG TABS, Take 2 capsules daily by mouth @ 9am, Disp: , Rfl:     Diapers & Supplies (CUTIES SIZE 3) MISC, Please provide 10 per day, Disp: 300 each, Rfl: 11    Diapers & Supplies (HUGGIES LITTLE MOVERS SIZE 3) MISC, by Does not apply route, Disp: , Rfl:     Disposable Gloves (VINYL GLOVES LARGE) MISC, by Does not apply route, Disp: , Rfl:     dronabinol (MARINOL) 2 5 mg capsule, Take one with breakfast, 2 with lunch, one with supper and one before bed, Disp: 150 capsule, Rfl: 1    ENEMEEZ PLUS  MG ENEM, use as directed, Disp: 150 mL, Rfl: 0    Ginkgo Biloba Extract 40 MG CAPS, 3 CAPS(120MG) BY MOUTH DAILY @ 2PM (SUPPLEMENT) *LATONIA, Disp: 90 capsule, Rfl: 0    GNP VITAMIN C 500 MG tablet, 1 TAB BY MOUTH DAILY @ 2PM (SUPPLEMENT) *LATONIA, Disp: 30 tablet, Rfl: 0    ibuprofen (MOTRIN) 600 mg tablet, Take 1 tablet (600 mg total) by mouth 2 (two) times a day (Patient taking differently: Take 600 mg by mouth  ), Disp: 30 tablet, Rfl: 3    Incontinence Supply Disposable (DEPEND UNDERWEAR SM/MED) MISC, by Does not apply route, Disp: , Rfl:     Incontinence Supply Disposable (PREVAIL AIR BRIEFS) MISC, 1 each by Does not apply route every 4 (four) hours Please provide 5 briefs per day, Disp: 150 each, Rfl: 11    Incontinence Supply Disposable (SELECT DISPOSABLE UNDERWEAR SM) MISC, Please provide a 5 day supply DX R32 incontinence, Disp: 22 each, Rfl: 0    loratadine (CLARITIN) 10 mg tablet, Take 1 tablet (10 mg total) by mouth every 24 hours (Patient taking differently: Take 10 mg by mouth daily as needed  ), Disp: 90 tablet, Rfl: 3    Magnesium 500 MG CAPS, Take 1 capsule (500 mg total) by mouth daily, Disp: 30 capsule, Rfl: 5    metoprolol tartrate (LOPRESSOR) 25 mg tablet, 1/2 TAB(12 5MG) BY MOUTH DAILY @ 9AM (HTN) *HORTENSIA, Disp: 15 tablet, Rfl: 0    Misc   Devices [facility]) MISC, Please provide pt with a new cord for power wheelchair, Disp: 1 each, Rfl: 0    modafinil (PROVIGIL) 100 mg tablet, Take 2 daily @ 9:00am, Disp: 60 tablet, Rfl: 0    Multiple Vitamins-Minerals (CEROVITE SENIOR) TABS, Take 1 tablet by mouth daily, Disp: 30 tablet, Rfl: 5    Omega-3 Fatty Acids (FISH OIL) 1,000 mg, 1 CAPSULE BY MOUTH DAILY @ 9AM (SUPPLEMENT) *LATONIA, Disp: 30 capsule, Rfl: 0    POLYETHYLENE GLYCOL 3350 PO, Take by mouth Take 1/2 capful daily by mouth @@ 9am , may increase to 1 or 2 capfuls as tolerated, Disp: , Rfl:     pyrithione zinc (HEAD AND SHOULDERS) 1 % shampoo, Apply topically daily as needed for dandruff, Disp: 400 mL, Rfl: 5    RA SUNSCREEN SPF50 LOTN, Apply sunscreen to exposed skin when outdoors every 2 hours as needed, Disp: 1 Bottle, Rfl: 5    ranitidine (ZANTAC) 150 mg tablet, 1 TAB BY MOUTH EVERY OTHER DAY @ 6PM (ACID REFLUX) *LATONIA, Disp: 15 tablet, Rfl: 0    sertraline (ZOLOFT) 100 mg tablet, 1 TABLET BY MOUTH DAILY @9AM (DEPRESSION) *LATONIA, Disp: 30 tablet, Rfl: 0    STARCH-MALTO DEXTRIN (THICK-IT) POWD, Use in one drink daily honey thick consistency, Disp: 1 Can, Rfl: 5    Tea Tree Oil OIL, by Does not apply route daily Apply to skin folds  , Disp: 1 Bottle, Rfl: 3    Vitamins A & D (RA VITAMIN A & D) OINT, Apply topically to affected area daily as needed, Disp: 30 g, Rfl: 3    Witch Hazel (HEMORRHOIDAL) 50 % PADS, Apply topically, Disp: , Rfl:     Zinc 50 MG TABS, Take 50mg daily @ 2:00pm every other month   (July, September, November 2018, January 2019, March 2019), Disp: 30 tablet, Rfl: 5    zinc oxide (DESITIN) 40 % PSTE, Apply topically, Disp: , Rfl:     onabotulinumtoxin A (BOTOX) 100 units, inject 500 units into left gastroc soleus and abductor pollicis ankit, Disp: , Rfl:     Current Allergies     Allergies as of 07/17/2018 - Reviewed 07/17/2018   Allergen Reaction Noted    Other  03/25/2016            The following portions of the patient's history were reviewed and updated as appropriate: allergies, current medications, past family history, past medical history, past social history, past surgical history and problem list    Past Medical History:   Diagnosis Date    Allergic rhinitis     Brain anoxia ([facility] [address] )     Cardiac arrest [facility])     age 13 s/p long Q-T syndrome    Community acquired pneumonia     last assessed/resolved:  10/9/2014    Depression     GERD (gastroesophageal reflux disease)     Long Q-T syndrome     Muscular rigidity and spasm, progressive     Osteoporosis     Quadriplegia ([facility] [address] )     Spastic neurogenic bladder     Urinary incontinence      Past Surgical History:   Procedure Laterality Date    APPENDECTOMY      WISDOM TOOTH EXTRACTION             Objective   BP 94/60 (BP Location: Left arm, Patient Position: Sitting, Cuff Size: Standard)   Pulse 105   Temp 98 2 °F (36 8 °C) (Axillary)   Resp 20   Ht 5' 10" (1 778 m)   Wt 59 4 kg (131 lb)   SpO2 95%   BMI 18 80 kg/m²        Physical Exam     Physical Exam   Constitutional: He appears well-developed  No distress  Anoxic brain injury at baseline  Essentially nonverbal, though says yes and no to questions about pain  Pt smells of vomit  Cardiovascular: S1 normal and normal heart sounds  Abdominal: Normal appearance and bowel sounds are normal  There is tenderness in the periumbilical area, left upper quadrant and left lower quadrant  Neurological: He is alert  Psychiatric: He has a normal mood and affect  Nursing note and vitals reviewed

## 2018-07-18 NOTE — TELEPHONE ENCOUNTER
Pt's mother, Josue Dominguez called to report her son being admitted to [facility] on 7/17 with Pneumonia

## 2018-07-18 NOTE — ASSESSMENT & PLAN NOTE
· Cardiac arrest at the age of 13 due to long QT syndrome  · Supportive care  Per patient's mother, patient is able to ambulate with assistance; patient minimally verbal at baseline

## 2018-07-18 NOTE — ED CARE HANDOFF
Emergency Department Sign Out Note        Sign out and transfer of care from Dr Andry South  See Separate Emergency Department note  The patient, Zuhair Soler, was evaluated by the previous provider for vomiting  Workup Completed:  Yes except CTs  ED Course / Workup Pending (followup):  CT Head: COMPARISON:  CT HEAD W/O (ROUTINE) 98307 2012-04-01 11:48  FINDINGS:  Brain: Cerebral volume loss, more than expected for patient age, unchanged from previous study  No hemorrhage  No significant white matter disease  Ventricles: Unremarkable  No ventriculomegaly  Bones/joints: Unremarkable  No acute fracture  Soft tissues: Unremarkable  Sinuses: Unremarkable as visualized  No acute sinusitis  Mastoid air cells: Unremarkable as visualized  No mastoid effusion  IMPRESSION:  1  No acute intracranial pathology  2  Cerebral volume loss, more than expected for patient age, unchanged from previous study  CT chest/Abd/pelvis: COMPARISON:  No relevant prior studies available  FINDINGS:  Limitations: Motion/streak/beam hardening artifact  CHEST:  Lungs: Right middle, lingula and bilateral lower lobe nodular opacities and consolidation likely  representing acute airspace disease/pneumonia  Follow-up to resolution recommended  Pleural space: Unremarkable  No significant effusion  No pneumothorax  Heart: Unremarkable  No cardiomegaly  No significant pericardial effusion  ABDOMEN:  Liver: Unremarkable  No mass  Gallbladder and bile ducts: Unremarkable  No calcified stones  No ductal dilation  Pancreas: Unremarkable  No ductal dilation  No mass  Spleen: Unremarkable  No splenomegaly  Adrenals: Unremarkable  No mass  Kidneys and ureters: Unremarkable  No hydronephrosis  No solid mass  Stomach and bowel: Abundant fecal material in the colon which may represent constipation  No  obstruction  No mucosal thickening  PELVIS:  Appendix: No findings to suggest acute appendicitis    Bladder: Partially decompressed urinary bladder  Reproductive: Unremarkable as visualized  CHEST, ABDOMEN and PELVIS:  Intraperitoneal space: Unremarkable  No significant fluid collection  No free air  Bones/joints: Unremarkable  No acute fracture  No dislocation  Soft tissues: Unremarkable  Vasculature: Scattered atherosclerotic calcifications  No aortic aneurysm  Lymph nodes: No pathologically enlarged lymph nodes  IMPRESSION:  Chest:  1  Right middle, lingula and bilateral lower lobe nodular opacities and consolidation likely  representing acute airspace disease/pneumonia  Follow-up to resolution recommended  Abdomen/Pelvis:  2  Abundant fecal material in the colon which may represent constipation  Thank you for allowing us to participate in the care of your patient  Dictated and Authenticated by: Yari King MD  07/18/2018 2:44 AM [place] Time ([place] [address])      Since patient has been vomiting and was on amoxicillin recently for pneumonia inpatient tx is indicated  Patient has prolonged QT so cannot get abx that prolong QT  Procedures  MDM  CritCare Time      Disposition  Final diagnoses:   Vomiting alone   Malaise   Pneumonia   Prolonged QT syndrome     Time reflects when diagnosis was documented in both MDM as applicable and the Disposition within this note     Time User Action Codes Description Comment    7/18/2018  2:24 AM Seng ADAMS Add [R11 11] Vomiting alone     7/18/2018  2:24 AM Osiel Padilla Add [R53 81] [address]     7/18/2018  3:40 AM Chico Lal Add [J18 9] Pneumonia     7/18/2018  3:43 AM Chico Lal Add [I45 81] Prolonged QT syndrome       ED Disposition     ED Disposition Condition Comment    Admit  Case was discussed with TREY Ye and the patient's admission status was agreed to be Admission Status: inpatient status to the service of Dr Karrie Smith           Follow-up Information     Follow up With Specialties Details Why Contact Info Additional Information Gayle Johnson MD Family Medicine, Obstetrics and Gynecology, Obstetrics, Gynecology   [place] [id number]       [facility] [address] Emergency Department Emergency Medicine  If symptoms worsen [address] [facility] [address] [phone] [facility] ED, [address], [place], [place], [address]        Patient's Medications   Discharge Prescriptions    No medications on file     No discharge procedures on file         ED Provider  Electronically Signed by     Malathi Ely MD  07/18/18 560 Kandy Moon MD  07/18/18 [id number]

## 2018-07-18 NOTE — H&P
H&P- Jacob Monroy 1985, 27 y o  male MRN: [id number]    Unit/Bed#: -01 Encounter: [id number]    Primary Care Provider: Zach Correa MD   Date and time admitted to hospital: 7/17/2018 10:28 PM        * Pneumonia   Assessment & Plan    · CT chest: Right middle, lingula and bilateral lower lobe nodular opacities and consolidation likely representing acute airspace disease/pneumonia  · Patient recently completed a 1 week course of Augmentin for suspected pneumonia  · Patient with history of oropharyngeal dysphagia and presented after an episode of vomiting  Suspect aspiration PNA  · Aspiration precautions  · Speech therapy evaluation  · Patient's mother requesting that patient be able to eat prior to speech eval; will place on dysphagia level 1 pureed diet with honey thick liquids  · Received 1 dose of IV ceftriaxone and clindamycin in ED  · Continue antibiotics with IV ceftriaxone and clindamycin  · Avoid medications that can prolong QT   · Obtain urine legionella and strep pneumoniae antigen  Obtain procalcitonin and sputum culture if able  · Follow-up on results of blood cultures  Leukocytosis   Assessment & Plan    · WBC 13 24 on admission  · Likely due to infectious process/ pneumonia  · Patient mildly tachycardic on presentation, which has since improved; otherwise afebrile, lactic acid WNL  · Repeat CBC in AM    · Follow-up on blood cultures  Oropharyngeal dysphagia   Assessment & Plan    · Obtain speech/ swallow evaluation  Long Q-T syndrome   Assessment & Plan    · Avoid medications which can prolong QT   · Obtain EKG  Anoxic brain damage (HCC)   Assessment & Plan    · Cardiac arrest at the age of 13 due to long QT syndrome  · Supportive care  Per patient's mother, patient is able to ambulate with assistance; patient minimally verbal at baseline           Constipation   Assessment & Plan    · Presented with abdominal pain, possibly due to constipation  · CT abdomen/ pelvis showed abundant fecal material in the colon which may represent constipation  · Patient's mother reports that the patient had a large bowel movement prior to presentation to the hospital   · Continue bowel regimen with Colace BID, Miralax daily  Consideration for enema  Depression   Assessment & Plan    · Continue Zoloft  VTE Prophylaxis: Enoxaparin (Lovenox)  / sequential compression device   Code Status: Level 1- Full Code per discussion with patient's mother/ POA at bedside  POLST: POLST form is not discussed and not completed at this time  Discussion with family: patient's mother/ POA and care giver at bedside    Anticipated Length of Stay:  Patient will be admitted on an Inpatient basis with an anticipated length of stay of  > 2 midnights  Justification for Hospital Stay: pneumonia, need for IV antibiotics  Total Time for Visit, including Counseling / Coordination of Care: 45 minutes  Greater than 50% of this total time spent on direct patient counseling and coordination of care  Chief Complaint:   Vomiting, abdominal pain and headache  History of Present Illness:    Hayder Lozada is a 27 y o  male with a history of anoxic brain injury, cardiac arrest at the age of 13 due to long QT syndrome who presents with vomiting, abdominal pain and headache  History was obtained from the patient's mother at bedside given that the patient is minimally verbal  Patient's mother reports that last evening the patient had 1 large episode of vomiting and was indicating that he was having some left sided abdominal pain  She reports that he was able to have a large bowel movement but his abdominal pain persisted  She notes that he had a poor appetite at dinner time  Patient was also indicating that he had a headache last evening  Patient was diagnosed with pneumonia on 7/6/18 based on his clinical and physical exam findings; CXR was normal at that time   His suspected PNA was treated with a 1 week course of Augmentin, which he completed taking on 7/14/18  Patient has a history of oropharyngeal dysphagia and uses thickener in his liquids but otherwise eats a regular diet  Patient's mother reports that he had a feeding tube initially following his brain injury but that it was removed in 2000  She notes that the patient has had 1 other episode of pneumonia several years ago  CT of the chest, abdomen and pelvis was obtained and revealed right middle, lingula and bilateral lower lobe nodular opacities and consolidation likely representing acute airspace disease/pneumonia as well as abundant fecal material in the colon which may represent constipation  CT head was also obtained given reported headaches and revealed no acute intracranial abnormality  Patient is able to report improvement in his symptoms since coming to the hospital      Review of Systems:    Review of Systems   Gastrointestinal: Positive for abdominal pain, nausea and vomiting  Neurological: Positive for headaches  ROS obtained from patient's mother at bedside as patient is minimally verbal      Past Medical and Surgical History:     Past Medical History:   Diagnosis Date    Allergic rhinitis     [facility] anoxia ([facility] [address] )     Cardiac arrest [facility])     age 13 s/p long Q-T syndrome    Community acquired pneumonia     last assessed/resolved:  10/9/2014    Depression     GERD (gastroesophageal reflux disease)     Long Q-T syndrome     Muscular rigidity and spasm, progressive     Osteopenia     Osteoporosis     Quadriplegia ([facility] [address] )     Spastic neurogenic bladder     Urinary incontinence        Past Surgical History:   Procedure Laterality Date    APPENDECTOMY      WISDOM TOOTH EXTRACTION         Meds/Allergies:    Prior to Admission medications    Medication Sig Start Date End Date Taking?  Authorizing Provider   b complex-vitamin C-folic acid (RENAL) 1 mg 1 SOFTGEL BY MOUTH DAILY @ 2PM (SUPPLEMENT) *LATONIA 7/5/18   Trudi Reilly MD   Coenzyme Q10 100 MG TABS Take 2 capsules daily by mouth @ 9am 9/27/13   Historical Provider, MD Ross ([address] [facility] 3) MISC Please provide 10 per day 4/29/18   Trudi Reilly MD   Diapers & Supplies ([facility] 3) MISC by Does not apply route    Historical MD Ori   Disposable Gloves (VINYL GLOVES LARGE) MISC by Does not apply route 9/18/15   Historical Provider, MD   dronabinol (MARINOL) 2 5 mg capsule Take one with breakfast, 2 with lunch, one with supper and one before bed 7/10/18   Trudi Reilly MD   [facility] PLUS  MG ENEM use as directed 4/25/18   Trudi Reilly MD   Ginkgo Biloba Extract 40 MG CAPS 3 CAPS(120MG) BY MOUTH DAILY @ 2PM (SUPPLEMENT) *LATONIA 7/5/18   Trudi Reilly MD   GNP VITAMIN C 500 MG tablet 1 TAB BY MOUTH DAILY @ 2PM (SUPPLEMENT) *LATONIA 7/5/18   Trudi Reilly MD   ibuprofen (MOTRIN) 600 mg tablet Take 1 tablet (600 mg total) by mouth 2 (two) times a day  Patient taking differently: Take 600 mg by mouth   6/28/18   Isi Place, MD   Incontinence Supply Disposable (DEPEND UNDERWEAR SM/MED) MISC by Does not apply route 9/3/14   Historical Provider, MD   Incontinence Supply Disposable (PREVAIL AIR BRIEFS) MISC 1 each by Does not apply route every 4 (four) hours Please provide 5 briefs per day 4/29/18   Trudi Reilly MD   Incontinence Supply Disposable (SELECT DISPOSABLE UNDERWEAR SM) MISC Please provide a 5 day supply DX R32 incontinence 7/3/18   Isi De Oliveira MD   loratadine (CLARITIN) 10 mg tablet Take 1 tablet (10 mg total) by mouth every 24 hours  Patient taking differently: Take 10 mg by mouth daily as needed   6/28/18   Isi De Oliveira MD   Magnesium 500 MG CAPS Take 1 capsule (500 mg total) by mouth daily 7/11/18   Trudi Reilly MD   metoprolol tartrate (LOPRESSOR) 25 mg tablet 1/2 TAB(12 5MG) BY MOUTH DAILY @ 9AM (HTN) *GODOBRED 7/5/18   Trudi Reilly MD   Misc  Devices [facility]) MISC Please provide pt with a new cord for power wheelchair 7/3/18   Carlitos Vanessa MD   modafinil (PROVIGIL) 100 mg tablet Take 2 daily @ 9:00am 6/11/18   Gayle Johnson MD   Multiple Vitamins-Minerals (CEROVITE [place]) TABS Take 1 tablet by mouth daily 7/11/18   Gayle Johnson MD   Omega-3 Fatty Acids (FISH OIL) 1,000 mg 1 CAPSULE BY MOUTH DAILY @ 9AM (SUPPLEMENT) *LATONIA 7/5/18   Gayle Johnson MD   onabotulinumtoxin A (BOTOX) 100 units inject 500 units into left gastroc soleus and abductor pollicis ankit    Historical Provider, MD   POLYETHYLENE GLYCOL 3350 PO Take by mouth Take 1/2 capful daily by mouth @@ 9am , may increase to 1 or 2 capfuls as tolerated    Historical Provider, MD   pyrithione zinc (HEAD AND SHOULDERS) 1 % shampoo Apply topically daily as needed for dandruff 6/27/18   Gayle Johnson MD RA SUNSCREEN [id number] LOTN Apply sunscreen to exposed skin when outdoors every 2 hours as needed 6/27/18   Gayle Johnson MD   ranitidine (ZANTAC) 150 mg tablet 1 TAB BY MOUTH EVERY OTHER DAY @ 6PM (ACID REFLUX) *LATONIA 7/5/18   Gayle Johnson MD   sertraline (ZOLOFT) 100 mg tablet 1 TABLET BY MOUTH DAILY @9AM (DEPRESSION) *LATONIA 7/5/18   Gayle Johnson MD   STARCH-MALTO DEXTRIN [facility]) POWD Use in one drink daily honey thick consistency 4/29/18   Gayle Johnson MD   Tea Tree Oil OIL by Does not apply route daily Apply to skin folds  6/27/18   Delfina Garnica MD   Vitamins A & D (RA VITAMIN A & D) OINT Apply topically to affected area daily as needed 6/27/18   Rayma Paci, MD   Witch Hazel (HEMORRHOIDAL) 50 % PADS Apply topically 1/2/18   Historical Provider, MD   Zinc 50 MG TABS Take 50mg daily @ 2:00pm every other month   (July, September, November 2018, January 2019, March 2019) 7/11/18   Gayle Johnson MD   zinc oxide (DESITIN) 40 % PSTE Apply topically 8/11/16   Historical Provider, MD     I have reviewed home medications with patient family member  Allergies: Allergies   Allergen Reactions    Other      drugs that prolong the QT interval  drugs that prolong the QT interval  Seasonal allergies        Social History:     Marital Status: Single   Patient Pre-hospital Living Situation: resides at home with caretakers   Patient Pre-hospital Level of Mobility: mostly bed and wheelchair bound but can ambulate with assistance  Patient Pre-hospital Diet Restrictions: thickened liquid  Substance Use History:   History   Alcohol Use No     History   Smoking Status    Never Smoker   Smokeless Tobacco    Never Used     History   Drug Use No       Family History:    Family History   Problem Relation Age of Onset    Other Father         mitral valve replaced    Diabetes type II Maternal Grandfather     Diabetes type II Maternal Uncle        Physical Exam:     Vitals:   Blood Pressure: 99/61 (07/18/18 0536)  Pulse: 65 (07/18/18 0536)  Temperature: 97 6 °F (36 4 °C) (07/18/18 0536)  Temp Source: Oral (07/18/18 0536)  Respirations: 18 (07/18/18 0536)  Height: 5' 10" (177 8 cm) (07/18/18 0536)  Weight - Scale: 59 4 kg (131 lb) (07/18/18 0536)  SpO2: 98 % (07/18/18 0536)    Physical Exam   Constitutional: He appears well-developed  No distress  HENT:   Head: Normocephalic and atraumatic  Eyes: Conjunctivae are normal    Cardiovascular: Normal rate, regular rhythm and normal heart sounds  Pulmonary/Chest: Effort normal and breath sounds normal  No respiratory distress  Abdominal: Soft  Bowel sounds are normal  There is no tenderness  Musculoskeletal: He exhibits no edema  Neurological: He is alert  Skin: Skin is warm and dry  He is not diaphoretic  No erythema  Nursing note and vitals reviewed  Additional Data:     Lab Results: I have personally reviewed pertinent reports          Results from last 7 days  Lab Units 07/17/18  2358   WBC Thousand/uL 13 24*   HEMOGLOBIN g/dL 15 0   HEMATOCRIT % 42 4   PLATELETS Thousands/uL 152   NEUTROS PCT % 87*   LYMPHS PCT % 9*   MONOS PCT % 5   EOS PCT % 0       Results from last 7 days  Lab Units 07/17/18  2358   SODIUM mmol/L 139   POTASSIUM mmol/L 4 2   CHLORIDE mmol/L 105   CO2 mmol/L 31   BUN mg/dL 14   CREATININE mg/dL 0 91   CALCIUM mg/dL 9 0   TOTAL PROTEIN g/dL 8 0   BILIRUBIN TOTAL mg/dL 0 60   ALK PHOS U/L 60   ALT U/L 28   AST U/L 19   GLUCOSE RANDOM mg/dL 104                   Imaging: I have personally reviewed pertinent reports  CT head without contrast   Final Result by Asif Celments DO (07/18 9644)      No acute intracranial abnormality  Workstation performed: [id number]         CT chest abdomen pelvis w contrast    (Results Pending)       Allscripts / Epic Records Reviewed: Yes     ** Please Note: This note has been constructed using a voice recognition system   **

## 2018-07-18 NOTE — RESPIRATORY THERAPY NOTE
RT Protocol Note  Tiffanie Desai 27 y o  male MRN: [id number]  Unit/Bed#: -01 Encounter: [id number]    Assessment    Principal Problem:    Pneumonia  Active Problems:    Anoxic brain damage (HCC)    Depression    Long Q-T syndrome    Oropharyngeal dysphagia    Leukocytosis    Constipation      Home Pulmonary Medications:  None       Past Medical History:   Diagnosis Date    Allergic rhinitis     [facility] anoxia ([facility] [address] )     Cardiac arrest [facility])     age 13 s/p long Q-T syndrome    Community acquired pneumonia     last assessed/resolved:  10/9/2014    Depression     GERD (gastroesophageal reflux disease)     Long Q-T syndrome     Muscular rigidity and spasm, progressive     Osteopenia     Osteoporosis     Quadriplegia ([facility] [address] )     Spastic neurogenic bladder     Urinary incontinence      Social History     Social History    Marital status: Single     Spouse name: N/A    Number of children: N/A    Years of education: N/A     Social History Main Topics    Smoking status: Never Smoker    Smokeless tobacco: Never Used    Alcohol use No    Drug use: No    Sexual activity: Not Asked     Other Topics Concern    None     Social History Narrative    Lives in a group home       Subjective         Objective    Physical Exam:   Assessment Type: (P) Assess only  General Appearance: (P) Alert, Awake  Respiratory Pattern: (P) Normal  Chest Assessment: (P) Chest expansion symmetrical  Bilateral Breath Sounds: (P) Clear  Cough: (P) None  O2 Device: (P) room air    Vitals:  Blood pressure 99/61, pulse (P) 65, temperature 97 6 °F (36 4 °C), temperature source Oral, resp  rate (P) 18, height 5' 10" (1 778 m), weight 59 4 kg (131 lb), SpO2 98 %  Imaging and other studies: I have personally reviewed pertinent reports        O2 Device: (P) room air     Plan    Respiratory Plan: (P) No distress/Pulmonary history        Resp Comments: (P) Patient has no pulmonary home regimen; BS are clear and no distress noted at this time  No treatments ordered at this time; will continue to monitor patient

## 2018-07-18 NOTE — ASSESSMENT & PLAN NOTE
· WBC 13 24 on admission  · Likely due to infectious process/ pneumonia  · Patient mildly tachycardic on presentation, which has since improved; otherwise afebrile, lactic acid WNL  · Repeat CBC in AM    · Follow-up on blood cultures

## 2018-07-18 NOTE — ED PROVIDER NOTES
History  Chief Complaint   Patient presents with    Vomiting     Pt had episodes vomiting this afternoon, pt had "significant bowel movement at 6pm" Pt had anoxic brain injury at age 13 and is nonverbal, pt keeps moving head into shoulder and per family that is his representation of pain  Pt finished antibitoics for pneumonia over the weekend  History provided by:  Patient, relative and caregiver  History limited by:  Patient nonverbal   used: No     68-year-old male with history of anoxic brain injury 20 years ago after cardiac arrest due to long QT syndrome brought for evaluation of vomiting today and indicating abdominal discomfort and headache  Caregiver states that patient was recently treated for pneumonia and had finished antibiotics and seem to be generally improving with increasing appetite but was still not quite back to usual self  He indicates diffuse pain in the head and diffuse pain in the abdomen  Patient is relatively nonverbal but is able to communicate  No fever  No sick contacts  On exam here is vitals are normal and he appears nontoxic  Mucous membranes somewhat dry  His abdomen is soft and not reproducing tenderness  Neck is supple  Heart lung sounds normal   Plan labs, imaging of the head and abdomen to rule out any significant pathology  Prior to Admission Medications   Prescriptions Last Dose Informant Patient Reported? Taking?    Coenzyme Q10 100 MG TABS  Care Giver Yes No   Sig: Take 2 capsules daily by mouth @ 9am   Diapers & Supplies (CUTIES SIZE 3) MISC  Care Giver No No   Sig: Please provide 10 per day   Diapers & Supplies (HUGGIES LITTLE MOVERS SIZE 3) MISC  Care Giver Yes No   Sig: by Does not apply route   Disposable Gloves (VINYL GLOVES LARGE) MISC  Care Giver Yes No   Sig: by Does not apply route   ENEMEEZ PLUS  MG ENEM  Care Giver No No   Sig: use as directed   GNP VITAMIN C 500 MG tablet  Care Giver No No   Si TAB BY MOUTH DAILY @ 2PM (SUPPLEMENT) *GOODBRED   Ginkgo Biloba Extract 40 MG CAPS  Care Giver No No   Sig: 3 CAPS(120MG) BY MOUTH DAILY @ 2PM (SUPPLEMENT) *GOODBRED   Incontinence Supply Disposable (DEPEND UNDERWEAR SM/MED) MISC  Care Giver Yes No   Sig: by Does not apply route   Incontinence Supply Disposable (PREVAIL AIR BRIEFS) MISC  Care Giver No No   Si each by Does not apply route every 4 (four) hours Please provide 5 briefs per day   Incontinence Supply Disposable (SELECT DISPOSABLE UNDERWEAR SM) MISC  Care Giver No No   Sig: Please provide a 5 day supply DX R32 incontinence   Magnesium 500 MG CAPS   No No   Sig: Take 1 capsule (500 mg total) by mouth daily   Misc  Devices [facility]) MISC  Care Giver No No   Sig: Please provide pt with a new cord for power wheelchair   Multiple Vitamins-Minerals (CEROVITE SENIOR) TABS   No No   Sig: Take 1 tablet by mouth daily   Omega-3 Fatty Acids (FISH OIL) 1,000 mg  Care Giver No No   Si CAPSULE BY MOUTH DAILY @ 9AM (SUPPLEMENT) *GOODBRED   POLYETHYLENE GLYCOL 3350 PO  Care Giver Yes No   Sig: Take by mouth Take 1/2 capful daily by mouth @@ 9am , may increase to 1 or 2 capfuls as tolerated   RA SUNSCREEN SPF50 LOTN  Care Giver No No   Sig: Apply sunscreen to exposed skin when outdoors every 2 hours as needed   STARCH-MALTO DEXTRIN (THICK-IT) POWD  Care Giver No No   Sig: Use in one drink daily honey thick consistency   Tea Tree Oil OIL  Care Giver No No   Sig: by Does not apply route daily Apply to skin folds  Vitamins A & D (RA VITAMIN A & D) OINT  Care Giver No No   Sig: Apply topically to affected area daily as needed   Witch Hazel (HEMORRHOIDAL) 50 % PADS  Care Giver Yes No   Sig: Apply topically   Zinc 50 MG TABS   No No   Sig: Take 50mg daily @ 2:00pm every other month   (July, September, 2018, 2019, 2019)   b complex-vitamin C-folic acid (RENAL) 1 mg  Care Giver No No   Si SOFTGEL BY MOUTH DAILY @ 2PM (SUPPLEMENT) *GOODBRED   dronabinol (MARINOL) 2 5 mg capsule   No No   Sig: Take one with breakfast, 2 with lunch, one with supper and one before bed   ibuprofen (MOTRIN) 600 mg tablet  Care Giver No No   Sig: Take 1 tablet (600 mg total) by mouth 2 (two) times a day   Patient taking differently: Take 600 mg by mouth     loratadine (CLARITIN) 10 mg tablet  Care Giver No No   Sig: Take 1 tablet (10 mg total) by mouth every 24 hours   Patient taking differently: Take 10 mg by mouth daily as needed     metoprolol tartrate (LOPRESSOR) 25 mg tablet  Care Giver No No   Si/2 TAB(12 5MG) BY MOUTH DAILY @ 9AM (HTN) *GODOBRED   modafinil (PROVIGIL) 100 mg tablet  Care Giver No No   Sig: Take 2 daily @ 9:00am   onabotulinumtoxin A (BOTOX) 100 units  Care Giver Yes No   Sig: inject 500 units into left gastroc soleus and abductor pollicis ankit   pyrithione zinc (HEAD AND SHOULDERS) 1 % shampoo  Care Giver No No   Sig: Apply topically daily as needed for dandruff   ranitidine (ZANTAC) 150 mg tablet  Care Giver No No   Si TAB BY MOUTH EVERY OTHER DAY @ 6PM (ACID REFLUX) *GOODBRED   sertraline (ZOLOFT) 100 mg tablet  Care Giver No No   Si TABLET BY MOUTH DAILY @9AM (DEPRESSION) *GOODBRED   zinc oxide (DESITIN) 40 % PSTE  Care Giver Yes No   Sig: Apply topically      Facility-Administered Medications: None       Past Medical History:   Diagnosis Date    Allergic rhinitis     Brain anoxia (HCC)     Cardiac arrest [facility])     age 13 s/p long Q-T syndrome    Community acquired pneumonia     last assessed/resolved:  10/9/2014    Depression     GERD (gastroesophageal reflux disease)     Long Q-T syndrome     Muscular rigidity and spasm, progressive     Osteopenia     Osteoporosis     Quadriplegia (HCC)     Spastic neurogenic bladder     Urinary incontinence        Past Surgical History:   Procedure Laterality Date    APPENDECTOMY      WISDOM TOOTH EXTRACTION         Family History   Problem Relation Age of Onset    Other Father         mitral valve replaced    Diabetes type II Maternal Grandfather     Diabetes type II Maternal Uncle      I have reviewed and agree with the history as documented  Social History   Substance Use Topics    Smoking status: Never Smoker    Smokeless tobacco: Never Used    Alcohol use No        Review of Systems   Constitutional: Negative for activity change, appetite change, fatigue and fever  Respiratory: Negative for cough and shortness of breath  Gastrointestinal: Positive for abdominal pain and vomiting  Neurological: Positive for headaches  Negative for weakness  All other systems reviewed and are negative  Physical Exam  Physical Exam   Constitutional: He appears well-developed and well-nourished  No distress  HENT:   Head: Normocephalic and atraumatic  Somewhat dry mucous membranes  Cardiovascular: Normal rate, regular rhythm, normal heart sounds and intact distal pulses  Pulmonary/Chest: Effort normal and breath sounds normal    Abdominal: Soft  He exhibits no distension  There is no tenderness  Musculoskeletal: Normal range of motion  He exhibits no edema  Neurological: He is alert  He exhibits normal muscle tone  Coordination normal    Skin: Skin is warm and dry  Psychiatric: He has a normal mood and affect  His behavior is normal    Nursing note and vitals reviewed        Vital Signs  ED Triage Vitals [07/17/18 2222]   Temperature Pulse Respirations Blood Pressure SpO2   98 2 °F (36 8 °C) 97 18 99/56 92 %      Temp Source Heart Rate Source Patient Position - Orthostatic VS BP Location FiO2 (%)   Axillary Monitor Sitting Left arm --      Pain Score       --           Vitals:    07/17/18 2222   BP: 99/56   Pulse: 97   Patient Position - Orthostatic VS: Sitting       Visual Acuity      ED Medications  Medications   sodium chloride 0 9 % bolus 1,000 mL (0 mL Intravenous Stopped 7/18/18 0145)   ketorolac (TORADOL) injection 15 mg (15 mg Intravenous Given 7/18/18 0005)   iohexol (OMNIPAQUE) 350 MG/ML injection (MULTI-DOSE) 100 mL (100 mL Intravenous Given 7/18/18 0140)       Diagnostic Studies  Results Reviewed     Procedure Component Value Units Date/Time    CMP [[id number]]  (Abnormal) Collected:  07/17/18 2358    Lab Status:  Final result Specimen:  Blood from Arm, Right Updated:  07/18/18 0020     Sodium 139 mmol/L      Potassium 4 2 mmol/L      Chloride 105 mmol/L      CO2 31 mmol/L      Anion Gap 3 (L) mmol/L      BUN 14 mg/dL      Creatinine 0 91 mg/dL      Glucose 104 mg/dL      Calcium 9 0 mg/dL      AST 19 U/L      ALT 28 U/L      Alkaline Phosphatase 60 U/L      Total Protein 8 0 g/dL      Albumin 3 5 g/dL      Total Bilirubin 0 60 mg/dL      eGFR 110 ml/min/1 73sq m     Narrative:         National Kidney Disease Education Program recommendations are as follows:  GFR calculation is accurate only with a steady state creatinine  Chronic Kidney disease less than 60 ml/min/1 73 sq  meters  Kidney failure less than 15 ml/min/1 73 sq  meters      Lipase [[id number]]  (Abnormal) Collected:  07/17/18 2358    Lab Status:  Final result Specimen:  Blood from Arm, Right Updated:  07/18/18 0020     Lipase 72 (L) u/L     CBC and differential [[id number]]  (Abnormal) Collected:  07/17/18 2358    Lab Status:  Final result Specimen:  Blood from Arm, Right Updated:  07/18/18 0005     WBC 13 24 (H) Thousand/uL      RBC 4 70 Million/uL      Hemoglobin 15 0 g/dL      Hematocrit 42 4 %      MCV 90 fL      MCH 31 9 pg      MCHC 35 4 g/dL      RDW 12 0 %      MPV 9 9 fL      Platelets 560 Thousands/uL      Neutrophils Relative 87 (H) %      Lymphocytes Relative 9 (L) %      Monocytes Relative 5 %      Eosinophils Relative 0 %      Basophils Relative 0 %      Neutrophils Absolute 11 46 (H) Thousands/µL      Lymphocytes Absolute 1 15 Thousands/µL      Monocytes Absolute 0 60 Thousand/µL      Eosinophils Absolute 0 01 Thousand/µL      Basophils Absolute 0 02 Thousands/µL                  CT head without contrast Final Result by Merlinda Bank, DO (07/18 0838)      No acute intracranial abnormality  Workstation performed: [id number]         CT chest abdomen pelvis w contrast    (Results Pending)              Procedures  Procedures       Phone Contacts  ED Phone Contact    ED Course                               MDM  Number of Diagnoses or Management Options  Malaise:   Vomiting alone:   Diagnosis management comments: 26-year-old male with history of anoxic brain injury, relatively nonverbal presented complaining of headache, some abdominal pain had 1 episode of vomiting  Well-appearing in the emergency department with normal vitals  No additional episodes of vomiting  Toradol improved symptoms  CT head unremarkable  Pending abdominal CT  If normal stable for discharge home  Possibly viral illness  Can follow up with PCP or return if symptoms worsen         Amount and/or Complexity of Data Reviewed  Clinical lab tests: ordered and reviewed  Tests in the radiology section of CPT®: ordered and reviewed  Obtain history from someone other than the patient: yes  Discuss the patient with other providers: yes    Patient Progress  Patient progress: improved    CritCare Time    Disposition  Final diagnoses:   Vomiting alone   Malaise     Time reflects when diagnosis was documented in both MDM as applicable and the Disposition within this note     Time User Action Codes Description Comment    7/18/2018  2:24 AM Matt ADAMS Add [R11 11] Vomiting alone     7/18/2018  2:24 AM Rocky Esparza Add [R53 81] [address]       ED Disposition     None      Follow-up Information     Follow up With Specialties Details Why Contact Info Additional Information    Sheyla Salcedo MD Family Medicine, Obstetrics and Gynecology, Obstetrics, Gynecology   [place] [phone]       [facility] Emergency Department Emergency Medicine  If symptoms worsen [address] [id number] [facility] [address] [id number]  [phone] [facility] ED, [address], [place], [place], [address]          Patient's Medications   Discharge Prescriptions    No medications on file     No discharge procedures on file      ED Provider  Electronically Signed by           Mary Torres MD  07/18/18 8577 Statement Selected

## 2018-07-18 NOTE — ASSESSMENT & PLAN NOTE
· Presented with abdominal pain, possibly due to constipation  · CT abdomen/ pelvis showed abundant fecal material in the colon which may represent constipation  · Patient's mother reports that the patient had a large bowel movement prior to presentation to the hospital   · Continue bowel regimen with Colace BID, Miralax daily  Consideration for enema

## 2018-07-18 NOTE — CASE MANAGEMENT
Initial Clinical Review    Admission: Date/Time/Statement: 7/18/18 @ 0342     Orders Placed This Encounter   Procedures    Inpatient Admission (expected length of stay for this patient is greater than two midnights)     Standing Status:   Standing     Number of Occurrences:   1     Order Specific Question:   Admitting Physician     Answer:   Katia Deutsch [[id number]]     Order Specific Question:   Level of Care     Answer:   Med Surg [16]     Order Specific Question:   Bed request comments     Answer:   droplet precautions     Order Specific Question:   Estimated length of stay     Answer:   More than 2 Midnights     Order Specific Question:   Certification     Answer:   I certify that inpatient services are medically necessary for this patient for a duration of greater than two midnights  See H&P and MD Progress Notes for additional information about the patient's course of treatment  ED: Date/Time/Mode of Arrival:   ED Arrival Information     Expected Arrival Acuity Means of Arrival Escorted By Service Admission Type    7/17/2018 21:36 7/17/2018 21:47 Urgent Wheelchair Family Member General Medicine Urgent    Arrival Complaint    Left lower quadrant pain          Chief Complaint:   Chief Complaint   Patient presents with    Vomiting     Pt had episodes vomiting this afternoon, pt had "significant bowel movement at 6pm" Pt had anoxic brain injury at age 13 and is nonverbal, pt keeps moving head into shoulder and per family that is his representation of pain  Pt finished antibitoics for pneumonia over the weekend  History of Illness:    30yo male with h/o long QT syndrome, anoxic brain injury s/p cardiac arrest, oropharyngeal dysphagia who p/w vomiting and abdominal pain found to have multilobar pneumonia  Pt had recently completed course of augmentin (7/14) and reportedly felt well after completion of this antibiotic  CT scan also showed large amount of feces in the colon        ED Vital Signs:   ED Triage Vitals   Temperature Pulse Respirations Blood Pressure SpO2   07/17/18 2222 07/17/18 2222 07/17/18 2222 07/17/18 2222 07/17/18 2222   98 2 °F (36 8 °C) 97 18 99/56 92 %      Temp Source Heart Rate Source Patient Position - Orthostatic VS BP Location FiO2 (%)   07/17/18 2222 07/17/18 2222 07/17/18 2222 07/17/18 2222 --   Axillary Monitor Sitting Left arm       Pain Score       07/18/18 0415       No Pain        Wt Readings from Last 1 Encounters:   07/18/18 59 4 kg (131 lb)       Vital Signs (abnormal): none  Exam dry mucous membranes    Abnormal Labs/Diagnostic Test Results:   Anion gap 3  Bun 14  Wbc 13 24  Ct head - no acute intracranial abnormality    Ct chest abdomen - Focal consolidation within the medial right middle lobe and right lower lobe lung base may be due to pneumonia in the appropriate clinical setting   Additional scattered bibasilar opacities may be due to atelectasis versus consolidation  2   No acute inflammatory process identified within the abdomen or pelvis   Mild gaseous and stool distention of the colon  ED Treatment: blood cultures  Medication Administration - No Administrations Displayed (No Start Event Found)     None          Past Medical/Surgical History:    Active Ambulatory Problems     Diagnosis Date Noted    Abnormal bone density screening 10/06/2017    Abnormal TSH 10/20/2017    Accelerated essential hypertension 08/02/2012    Anoxic brain damage (HCC) 09/20/2013    Allergic rhinitis 06/20/2012    Candidal intertrigo 12/12/2017    Depression 08/03/2017    Long Q-T syndrome 05/21/1998    Quadriplegia ([facility] [address] ) 01/22/2015    Rosacea 01/18/2018    Cough 02/01/2018    Poor weight gain in adult 02/15/2018    Oropharyngeal dysphagia 04/26/2018    Spastic hemiplegia of left nondominant side as late effect of cerebral infarction ([facility] [address] ) 10/22/2015    Psychophysiological insomnia 08/31/2016    Candidiasis 07/02/2018     Resolved Ambulatory Problems Diagnosis Date Noted   Shreyas Mckenzie maintenance 06/05/2018     Past Medical History:   Diagnosis Date    Allergic rhinitis     Brain anoxia (HCC)     Cardiac arrest ([facility] [address] )     Community acquired pneumonia     Depression     GERD (gastroesophageal reflux disease)     Long Q-T syndrome     Muscular rigidity and spasm, progressive     Osteopenia     Osteoporosis     Quadriplegia (HCC)     Spastic neurogenic bladder     Urinary incontinence        Admitting Diagnosis: Vomiting alone [R11 11]  Pneumonia [J18 9]  Malaise [R53 81]  Prolonged QT syndrome [I45 81]  Unspecified abdominal pain [R10 9]    Age/Sex: 27 y o  male    Assessment/Plan: 28yo male h/o long QT syndrome, anoxic brain injury following cardiac arrest and orpharyngeal dysphagia p/w probable multilobar aspiration pneumonia after recent vomiting episode  procalcitonin >4  Will continue trending to monitor response  Continue current abx and f/u urinary antigen testing  Speech therapy consult pending  If any additional medications needed, consider QT prolongation effect       Admission Orders:  7/18/2018  0343 INPATIENT   Scheduled Meds:   Current Facility-Administered Medications:  acetaminophen 650 mg Oral Q6H PRN    b complex-vitamin C-folic acid 1 capsule Oral Daily With Dinner    [START ON 7/19/2018] cefTRIAXone 1,000 mg Intravenous Q24H    clindamycin 600 mg Intravenous Q8H Last Rate: 600 mg (07/18/18 1219)   co-enzyme Q-10 100 mg Oral Daily    docusate sodium 100 mg Oral BID    dronabinol 2 5 mg Oral 4x Daily (with meals and at bedtime)    enoxaparin 40 mg Subcutaneous Daily    famotidine 20 mg Oral Daily    fish oil 1,000 mg Oral Daily    loratadine 10 mg Oral Daily PRN    magnesium oxide 400 mg Oral Daily    metoprolol tartrate 12 5 mg Oral Daily    modafinil 200 mg Oral Daily    multivitamin-minerals 1 tablet Oral Daily    nystatin  Topical BID    ondansetron 4 mg Intravenous Q6H PRN    polyethylene glycol 17 g Oral Daily    sertraline 100 mg Oral Daily    sodium chloride 125 mL/hr Intravenous Continuous Last Rate: 125 mL/hr (07/18/18 0434)     Continuous Infusions:   sodium chloride 125 mL/hr Last Rate: 125 mL/hr (07/18/18 0434)     PRN Meds: not used  scds  Aspiration precautions     Respiratory protocol - on room air  FL barium video swallow with speech  PT/OT/speech

## 2018-07-18 NOTE — PLAN OF CARE
Problem: Potential for Falls  Goal: Patient will remain free of falls  INTERVENTIONS:  - Assess patient frequently for physical needs  -  Identify cognitive and physical deficits and behaviors that affect risk of falls  -  Portal fall precautions as indicated by assessment   - Educate patient/family on patient safety including physical limitations  - Instruct patient to call for assistance with activity based on assessment  - Modify environment to reduce risk of injury  - Consider OT/PT consult to assist with strengthening/mobility   Outcome: Progressing      Problem: Prexisting or High Potential for Compromised Skin Integrity  Goal: Skin integrity is maintained or improved  INTERVENTIONS:  - Identify patients at risk for skin breakdown  - Assess and monitor skin integrity  - Assess and monitor nutrition and hydration status  - Monitor labs (i e  albumin)  - Assess for incontinence   - Turn and reposition patient  - Assist with mobility/ambulation  - Relieve pressure over bony prominences  - Avoid friction and shearing  - Provide appropriate hygiene as needed including keeping skin clean and dry  - Evaluate need for skin moisturizer/barrier cream  - Collaborate with interdisciplinary team (i e  Nutrition, Rehabilitation, etc )   - Patient/family teaching   Outcome: Progressing      Problem: Nutrition/Hydration-ADULT  Goal: Nutrient/Hydration intake appropriate for improving, restoring or maintaining nutritional needs  Monitor and assess patient's nutrition/hydration status for malnutrition (ex- brittle hair, bruises, dry skin, pale skin and conjunctiva, muscle wasting, smooth red tongue, and disorientation)  Collaborate with interdisciplinary team and initiate plan and interventions as ordered  Monitor patient's weight and dietary intake as ordered or per policy  Utilize nutrition screening tool and intervene per policy   Determine patient's food preferences and provide high-protein, high-caloric foods as appropriate       INTERVENTIONS:  - Monitor oral intake, urinary output, labs, and treatment plans  - Assess nutrition and hydration status and recommend course of action  - Evaluate amount of meals eaten  - Assist patient with eating if necessary   - Allow adequate time for meals  - Recommend/ encourage appropriate diets, oral nutritional supplements, and vitamin/mineral supplements  - Order, calculate, and assess calorie counts as needed  - Recommend, monitor, and adjust tube feedings and TPN/PPN based on assessed needs  - Assess need for intravenous fluids  - Provide specific nutrition/hydration education as appropriate  - Include patient/family/caregiver in decisions related to nutrition   Outcome: Progressing

## 2018-07-18 NOTE — NUTRITION
07/18/18 1359   Recommendations/Interventions   Interventions Supplement initiate  (Added Ensure pudding supplement BID)   Nutrition Recommendations Other (specify)  (Recommend adjusting diet per speech therapy recommendations (7/18) to dysphagia level 2, nectar-thick liquids)

## 2018-07-18 NOTE — PLAN OF CARE
Problem: SLP ADULT - SWALLOWING, IMPAIRED  Goal: Initial SLP swallow eval performed  Outcome: Completed Date Met: 07/18/18

## 2018-07-18 NOTE — SPEECH THERAPY NOTE
Speech-Language Pathology Bedside Swallow Evaluation      Patient Name: Mercy Breaux    AHIVF'A Date: 7/18/2018     Problem List  Patient Active Problem List   Diagnosis    Abnormal bone density screening    Abnormal TSH    Accelerated essential hypertension    Anoxic brain damage (HCC)    Allergic rhinitis    Candidal intertrigo    Depression    Long Q-T syndrome    Quadriplegia (HCC)    Rosacea    Cough    Poor weight gain in adult    Oropharyngeal dysphagia    Spastic hemiplegia of left nondominant side as late effect of cerebral infarction ([address] )    Psychophysiological insomnia    Candidiasis    Pneumonia    Leukocytosis    Constipation       Past Medical History  Past Medical History:   Diagnosis Date    Allergic rhinitis     Brain anoxia ([address] )     Cardiac arrest [facility])     age 13 s/p long Q-T syndrome    Community acquired pneumonia     last assessed/resolved:  10/9/2014    Depression     GERD (gastroesophageal reflux disease)     Long Q-T syndrome     Muscular rigidity and spasm, progressive     Osteopenia     Osteoporosis     Quadriplegia ([address] )     Spastic neurogenic bladder     Urinary incontinence        Past Surgical History  Past Surgical History:   Procedure Laterality Date    APPENDECTOMY      WISDOM TOOTH EXTRACTION         Summary   Pt presented with s/s suggestive of moderate-severe oral and suspected moderate pharyngeal dysphagia  Symptoms or concerns included decreased bolus propulsion, decreased mastication, decreased bolus formation, suspected decreased control of liquids  and oral residue with pudding and cookie , as well as suspected pharyngeal swallow delay, suspected decreased hyolaryngeal elevation upon palpation and suspected pharyngeal residue  Video swallow study was requested and has been ordered, to be scheduled for 7/19/2018       Risk for Aspiration: Moderate    Recommendations: mechanically altered/level 2 diet and nectar thick liquids, and frequent oral care especially following meals    Recommended Form of Meds: whole with puree per caregiver report  May need to consider crushing meds    Aspiration precautions and compensatory swallowing strategies: upright posture, only feed when fully alert, slow rate of feeding, small bites/sips and alternating bites and sips      Current Medical Status per LOUIE Arreaga's H&P 7/18/18  Verner Rosette is a 27 y o  male with a history of anoxic brain injury, cardiac arrest at the age of 13 due to long QT syndrome who presents with vomiting, abdominal pain and headache  History was obtained from the patient's mother at bedside given that the patient is minimally verbal  Patient's mother reports that last evening the patient had 1 large episode of vomiting and was indicating that he was having some left sided abdominal pain  She reports that he was able to have a large bowel movement but his abdominal pain persisted  She notes that he had a poor appetite at dinner time  Patient was also indicating that he had a headache last evening  Patient was diagnosed with pneumonia on 7/6/18 based on his clinical and physical exam findings; CXR was normal at that time  His suspected PNA was treated with a 1 week course of Augmentin, which he completed taking on 7/14/18  Patient has a history of oropharyngeal dysphagia and uses thickener in his liquids but otherwise eats a regular diet  Patient's mother reports that he had a feeding tube initially following his brain injury but that it was removed in 2000  She notes that the patient has had 1 other episode of pneumonia several years ago       CT of the chest, abdomen and pelvis was obtained and revealed right middle, lingula and bilateral lower lobe nodular opacities and consolidation likely representing acute airspace disease/pneumonia as well as abundant fecal material in the colon which may represent constipation   CT head was also obtained given reported headaches and revealed no acute intracranial abnormality  Patient is able to report improvement in his symptoms since coming to the hospital       Caregivers report pt had a video swallow study done about a month ago through [employer] and had planned for a repeat VFSS, which can now be done during his inpatient stay  They report the VFSS revealed aspiration of thin liquids with delayed cough  Thickened liquids were recommended however pt/mother reportedly did not want full thickened liquids, and PCP ordered thickener PRN or daily as a trial  Caring staff at bedside expressed concern over lack of clarity with ordered diet/liquids, and concern for safety in feeding pt  They provided a dysphagia questionnaire that was given to them previously, and responded "yes" to the following potential risks:  Anterior bolus loss  Bolus holding  Inadequate mastication  Poor positioning for PO intake  Watery eyes/nose during/after meals  Episodes of coughing/choking during/after meals  Weight loss  Also of note, pt had a feeding tube after his aaron injury (approx  20 yrs ago) and caring staff reports he worked very hard to get back on a PO diet  Upon admission, pt was placed on a puree diet with HTL  Bedside swallow eval ordered and completed this date  Past medical history:  Please see H&P for details      Special Studies:  Head CT 7/18/2018 IMPRESSION: No acute intracranial abnormality  Chest/Abdomen CT 7/18/2018 IMPRESSION:  1  Focal consolidation within the medial right middle lobe and right lower lobe lung base may be due to pneumonia in the appropriate clinical setting  Additional scattered bibasilar opacities may be due to atelectasis versus consolidation  2   No acute inflammatory process identified within the abdomen or pelvis  Mild gaseous and stool distention of the colon      Social/Education/Vocational Hx:  Pt lives home with 24/7 caring staff      Swallow Information   Current Risks for Dysphagia & Aspiration: known history of dysphagia and known history of aspiration     Current Symptoms/Concerns: coughing with po and pocketing food    Current Diet: puree/level 1 diet and honey thick liquids      Baseline Diet: regular diet and unclear orders for thin vs  thick liquids      Baseline Assessment   Behavior/Cognition: alert    Speech/Language Status: limited verbal output    Patient Positioning: upright in bed    Pain Status/Interventions/Response to Interventions:  No report of or nonverbal indications of pain  Swallow Mechanism Exam     Facial: symmetrical  Labial: decreased strength  Lingual: bilateral decreased strength and decreased coordination  Velum: symmetrical  Mandible: appearing effortful mastication  Dentition: adequate  Vocal quality:clear/adequate - able to phonate "ah" but minimal speech  Volitional Cough: unable to initiate volitional cough     Consistencies Assessed and Performance   Consistencies Administered: ice chips, nectar thick, puree and hard solids  Specific materials administered included ice, nectar thick apple juice, apple sauce, pudding, and small piece of cathy doone cookie  Thin liquids not assessed at bedside due to report of aspiration on prior VBS and pt inability to produce volitional cough  Oral Stage: Moderate-severe, decreased bolus propulsion, decreased mastication, decreased bolus formation, suspected decreased control of liquids  and oral residue with pudding and cookie  Anterior bolus loss with attempted cup sips and weak labial seal around spoon  Staff reports they will sometimes find food left over in pt's oral cavity even hours after he eats  Suspect inability to clear pocketed material due to lingual weakness  Pharyngeal Stage: Moderate, suspected pharyngeal swallow delay, suspected decreased hyolaryngeal elevation upon palpation and suspected pharyngeal residue  No coughing however pt was unable to produce volitional cough   Pt able to phonate briefly after PO trials and no vocal wetness was detected with nectar thick liquids, puree or solid textures  Esophageal Concerns: none reported - pt did have an episode of vomiting yesterday  Caregivers suspect this was caused by response to something pt ate that did not agree with him  Strategies and Efficacy: At this time recommend continuing with modified diet and thickened liquids (nectar) for maximum safety  Additional recommendations will be made following VBS  Summary and Recommendations (see above)      Results Reviewed with: patient, RN, family and caregivers     Treatment Recommended: ST for dysphagia    Frequency of treatment: 3-5x/week    Dysphagia Goals per SLP: pt will tolerate least restrictive diet with least restrictive liquid consistency without s/s of aspiration x72hrs and pt will participate in VBS    Pt/Family Education: initiated  Pt and caregivers would benefit from/require continued education, especially in reference to appropriate thickening of liquids  Caregivers reported using 1/2 tsp of thickener in 8 oz of water which is not enough to achieve even nectar consistency  Plan to provide additional education following VBS

## 2018-07-18 NOTE — ASSESSMENT & PLAN NOTE
· CT chest: Right middle, lingula and bilateral lower lobe nodular opacities and consolidation likely representing acute airspace disease/pneumonia  · Patient recently completed a 1 week course of Augmentin for suspected pneumonia  · Patient with history of oropharyngeal dysphagia and presented after an episode of vomiting  Suspect aspiration PNA  · Aspiration precautions  · Speech therapy evaluation  · Patient's mother requesting that patient be able to eat prior to speech eval; will place on dysphagia level 1 pureed diet with honey thick liquids  · Received 1 dose of IV ceftriaxone and clindamycin in ED  · Continue antibiotics with IV ceftriaxone and clindamycin  · Avoid medications that can prolong QT   · Obtain urine legionella and strep pneumoniae antigen  Obtain procalcitonin and sputum culture if able  · Follow-up on results of blood cultures

## 2018-07-19 ENCOUNTER — APPOINTMENT (OUTPATIENT)
Dept: RADIOLOGY | Facility: HOSPITAL | Age: 33
DRG: 177 | End: 2018-07-19
Payer: COMMERCIAL

## 2018-07-19 LAB
ANION GAP SERPL CALCULATED.3IONS-SCNC: 4 MMOL/L (ref 4–13)
BASOPHILS # BLD AUTO: 0.01 THOUSANDS/ΜL (ref 0–0.1)
BASOPHILS NFR BLD AUTO: 0 % (ref 0–1)
BUN SERPL-MCNC: 7 MG/DL (ref 5–25)
CALCIUM SERPL-MCNC: 7.8 MG/DL (ref 8.3–10.1)
CHLORIDE SERPL-SCNC: 110 MMOL/L (ref 100–108)
CO2 SERPL-SCNC: 27 MMOL/L (ref 21–32)
CREAT SERPL-MCNC: 0.8 MG/DL (ref 0.6–1.3)
EOSINOPHIL # BLD AUTO: 0.13 THOUSAND/ΜL (ref 0–0.61)
EOSINOPHIL NFR BLD AUTO: 2 % (ref 0–6)
ERYTHROCYTE [DISTWIDTH] IN BLOOD BY AUTOMATED COUNT: 11.9 % (ref 11.6–15.1)
GFR SERPL CREATININE-BSD FRML MDRD: 117 ML/MIN/1.73SQ M
GLUCOSE SERPL-MCNC: 88 MG/DL (ref 65–140)
HCT VFR BLD AUTO: 35.9 % (ref 36.5–49.3)
HGB BLD-MCNC: 12.5 G/DL (ref 12–17)
L PNEUMO1 AG UR QL IA.RAPID: NEGATIVE
LYMPHOCYTES # BLD AUTO: 1.96 THOUSANDS/ΜL (ref 0.6–4.47)
LYMPHOCYTES NFR BLD AUTO: 35 % (ref 14–44)
MCH RBC QN AUTO: 31.6 PG (ref 26.8–34.3)
MCHC RBC AUTO-ENTMCNC: 34.8 G/DL (ref 31.4–37.4)
MCV RBC AUTO: 91 FL (ref 82–98)
MONOCYTES # BLD AUTO: 0.48 THOUSAND/ΜL (ref 0.17–1.22)
MONOCYTES NFR BLD AUTO: 9 % (ref 4–12)
NEUTROPHILS # BLD AUTO: 2.99 THOUSANDS/ΜL (ref 1.85–7.62)
NEUTS SEG NFR BLD AUTO: 54 % (ref 43–75)
PLATELET # BLD AUTO: 131 THOUSANDS/UL (ref 149–390)
PMV BLD AUTO: 9.5 FL (ref 8.9–12.7)
POTASSIUM SERPL-SCNC: 3.8 MMOL/L (ref 3.5–5.3)
RBC # BLD AUTO: 3.96 MILLION/UL (ref 3.88–5.62)
S PNEUM AG UR QL: NEGATIVE
SODIUM SERPL-SCNC: 141 MMOL/L (ref 136–145)
WBC # BLD AUTO: 5.57 THOUSAND/UL (ref 4.31–10.16)

## 2018-07-19 PROCEDURE — 74230 X-RAY XM SWLNG FUNCJ C+: CPT

## 2018-07-19 PROCEDURE — 85025 COMPLETE CBC W/AUTO DIFF WBC: CPT | Performed by: HOSPITALIST

## 2018-07-19 PROCEDURE — 99232 SBSQ HOSP IP/OBS MODERATE 35: CPT | Performed by: HOSPITALIST

## 2018-07-19 PROCEDURE — 92611 MOTION FLUOROSCOPY/SWALLOW: CPT

## 2018-07-19 PROCEDURE — 80048 BASIC METABOLIC PNL TOTAL CA: CPT | Performed by: HOSPITALIST

## 2018-07-19 RX ORDER — LANOLIN ALCOHOL/MO/W.PET/CERES
6 CREAM (GRAM) TOPICAL
Status: DISCONTINUED | OUTPATIENT
Start: 2018-07-19 | End: 2018-07-21 | Stop reason: HOSPADM

## 2018-07-19 RX ORDER — BISACODYL 10 MG
10 SUPPOSITORY, RECTAL RECTAL DAILY PRN
Status: DISCONTINUED | OUTPATIENT
Start: 2018-07-19 | End: 2018-07-21 | Stop reason: HOSPADM

## 2018-07-19 RX ADMIN — SODIUM CHLORIDE 125 ML/HR: 0.9 INJECTION, SOLUTION INTRAVENOUS at 14:10

## 2018-07-19 RX ADMIN — BISACODYL 10 MG: 10 SUPPOSITORY RECTAL at 12:01

## 2018-07-19 RX ADMIN — SODIUM CHLORIDE 125 ML/HR: 0.9 INJECTION, SOLUTION INTRAVENOUS at 00:19

## 2018-07-19 RX ADMIN — CLINDAMYCIN PHOSPHATE 600 MG: 12 INJECTION, SOLUTION INTRAMUSCULAR; INTRAVENOUS at 04:30

## 2018-07-19 RX ADMIN — CLINDAMYCIN PHOSPHATE 600 MG: 12 INJECTION, SOLUTION INTRAMUSCULAR; INTRAVENOUS at 12:46

## 2018-07-19 RX ADMIN — NYSTATIN: 100000 POWDER TOPICAL at 18:36

## 2018-07-19 RX ADMIN — CLINDAMYCIN PHOSPHATE 600 MG: 12 INJECTION, SOLUTION INTRAMUSCULAR; INTRAVENOUS at 18:36

## 2018-07-19 RX ADMIN — DRONABINOL 2.5 MG: 2.5 CAPSULE ORAL at 09:06

## 2018-07-19 RX ADMIN — NYSTATIN: 100000 POWDER TOPICAL at 09:02

## 2018-07-19 RX ADMIN — DOCUSATE SODIUM 100 MG: 100 CAPSULE, LIQUID FILLED ORAL at 18:36

## 2018-07-19 RX ADMIN — DRONABINOL 2.5 MG: 2.5 CAPSULE ORAL at 15:35

## 2018-07-19 RX ADMIN — NEPHROCAP 1 CAPSULE: 1 CAP ORAL at 15:35

## 2018-07-19 RX ADMIN — Medication 400 MG: at 09:00

## 2018-07-19 RX ADMIN — Medication 1 TABLET: at 09:00

## 2018-07-19 RX ADMIN — MELATONIN 3 MG ORAL TABLET 6 MG: 3 TABLET ORAL at 21:25

## 2018-07-19 RX ADMIN — DOCUSATE SODIUM 100 MG: 100 CAPSULE, LIQUID FILLED ORAL at 09:00

## 2018-07-19 RX ADMIN — DRONABINOL 2.5 MG: 2.5 CAPSULE ORAL at 21:24

## 2018-07-19 RX ADMIN — ENOXAPARIN SODIUM 40 MG: 100 INJECTION SUBCUTANEOUS at 09:00

## 2018-07-19 RX ADMIN — DRONABINOL 2.5 MG: 2.5 CAPSULE ORAL at 12:47

## 2018-07-19 RX ADMIN — MODAFINIL 200 MG: 100 TABLET ORAL at 09:01

## 2018-07-19 RX ADMIN — POLYETHYLENE GLYCOL 3350 17 G: 17 POWDER, FOR SOLUTION ORAL at 09:01

## 2018-07-19 RX ADMIN — CEFTRIAXONE 1000 MG: 1 INJECTION, POWDER, FOR SOLUTION INTRAMUSCULAR; INTRAVENOUS at 05:30

## 2018-07-19 RX ADMIN — SERTRALINE HYDROCHLORIDE 100 MG: 100 TABLET ORAL at 09:01

## 2018-07-19 RX ADMIN — Medication 100 MG: at 09:00

## 2018-07-19 RX ADMIN — FAMOTIDINE 20 MG: 20 TABLET ORAL at 09:01

## 2018-07-19 NOTE — PROCEDURES
Video Swallow Study      Patient Name: Mercy Breaux  ZKYWB'V Date: 7/19/2018        Past Medical History  Past Medical History:   Diagnosis Date    Allergic rhinitis     [facility] )     Cardiac arrest [facility])     age 13 s/p long Q-T syndrome    Community acquired pneumonia     last assessed/resolved:  10/9/2014    Depression     GERD (gastroesophageal reflux disease)     Long Q-T syndrome     Muscular rigidity and spasm, progressive     Osteopenia     Osteoporosis     Quadriplegia ([facility] )     Spastic neurogenic bladder     Urinary incontinence         Past Surgical History  Past Surgical History:   Procedure Laterality Date    APPENDECTOMY      WISDOM TOOTH EXTRACTION           General Information:    26 yo gentleman referred for a VBS by Dr Jeremy Silverio for dysphagia to assess for aspiration risk and potential to advance diet  Cognition:  Awake, alert, followed comands intermittently, no verbalizations elicited  Speech/Swallow Mech: Oral motor movements appeared  [facility]; Dentition was natural; Cough was unable to produce vol cough  Respiratory Status: RA;   Current diet: dysphagia 2 nectar thick liquids  Prior VBS recently at [place] [id number], aspiration w/ thin liquids w/ delayed cough response  Pt was seen in radiology for a Video Barium Swallow Study, seated in the upright position and viewed laterally with the following consistencies: puree, soft/solid, HTL by straw and tsp, NTL by straw and tsp, thin liquids by straw, cup and tsp  Results are as follows:     **Images are available for review on PACS          Oral Stage: Moderate-severe   pt w/ some difficulty sucking from straw, only getting small amts  Pt w/ good oral control w/ liquids by tsp  Pt w/ minimal mastication/ oral processing w/ soft/solid, tended to hold in oral cavity  Constant verbal prompts to chew and swallow  Decreased bolus formation, pc meal transfer noted   Pt w/ prolonged hold of barium pill w/ puree  Pharyngeal Stage: Mild-moderate   delayed swallow initiation noted w/ liquids by straw, spill to pyriform sinuses prior to swallow initiation  No pharyngeal retention noted  Inconsistent episodes of silent aspiration noted before the swallow w/ thin liquids  Swallows of NTL by tsp were most timely and complete  Barium pill passed through pharynx w/o difficulty  Esophageal Stage:   briefly assessed  Mild stasis of puree noted in  UES  Otherwise no overt abnormality  Assessment Summary: Moderate-severe oral/ mild-moderate pharyngeal dysphagia as described above w/ episodes of silent aspiration w/ thin liquids  Diagnosis/Prognosis:            Recommendations:   dysphagia 1 puree w/ nectar thick liquids by tsp  meds whole in puree  Aspiration precautions  Alternate food and liquids  Oral care after meals  outpt dysphagia tx to increase oral motor mvts for oral processing of soft/solid foods

## 2018-07-19 NOTE — PROGRESS NOTES
Progress Note - Nathaly Downing 1985, 27 y o  male MRN: [id number]    Unit/Bed#: -01 Encounter: [id number]    Primary Care Provider: Nallely Valerio MD   Date and time admitted to hospital: 7/17/2018 10:28 PM    * Pneumonia   Assessment & Plan    · CT chest: Right middle, lingula and bilateral lower lobe nodular opacities and consolidation likely representing acute airspace disease/pneumonia  · Patient recently completed a 1 week course of Augmentin for suspected pneumonia  · Patient with history of oropharyngeal dysphagia and presented after an episode of vomiting  Suspect aspiration PNA  · Aspiration precautions  · Speech therapy evaluation  · Received 1 dose of IV ceftriaxone and clindamycin in ED  · Continue antibiotics with IV ceftriaxone and clindamycin  · Avoid medications that can prolong QT  · Legionella and strep negative  · PCT elevated to >4   · Follow-up on results of blood cultures  Constipation   Assessment & Plan    · Presented with abdominal pain, possibly due to constipation  · CT abdomen/ pelvis showed abundant fecal material in the colon which may represent constipation  · Patient's mother reports that the patient had a large bowel movement prior to presentation to the hospital   · Continue bowel regimen with Colace BID, Miralax daily  Consideration for enema  Anoxic brain damage (HCC)   Assessment & Plan    · Cardiac arrest at the age of 13 due to long QT syndrome  · Supportive care  Per patient's mother, patient is able to ambulate with assistance; patient minimally verbal at baseline  Long Q-T syndrome   Assessment & Plan    · Avoid medications which can prolong QT  · Consider Telemetry with any additional meds or any risk of QT prolongation         Oropharyngeal dysphagia   Assessment & Plan    · Obtain speech/ swallow evaluation             VTE Pharmacologic Prophylaxis:   Pharmacologic: Enoxaparin (Lovenox)  Mechanical VTE Prophylaxis in Place: Yes    Patient Centered Rounds: I have performed bedside rounds with nursing staff today  Discussions with Specialists or Other Care Team Provider: speech therapy     Education and Discussions with Family / Patient: patient and family at bedside     Time Spent for Care: 30 minutes  More than 50% of total time spent on counseling and coordination of care as described above  Current Length of Stay: 1 day(s)    Current Patient Status: Inpatient   Certification Statement: The patient will continue to require additional inpatient hospital stay due to aspiration pneumonia on IV antibiotics     Discharge Plan / Estimated Discharge Date: TBD based on clinical course    Code Status: Level 1 - Full Code    Subjective:   Pt appears pleasant and cooperative  Denied pain or discomfort  No cough     Objective:     Vitals:   Temp (24hrs), Av 5 °F (36 4 °C), Min:97 5 °F (36 4 °C), Max:97 5 °F (36 4 °C)    HR:  [48-63] 60  Resp:  [16-18] 16  BP: ()/(67-87) 131/69  SpO2:  [95 %-96 %] 96 %  Body mass index is 18 8 kg/m²  Input and Output Summary (last 24 hours): Intake/Output Summary (Last 24 hours) at 18 1516  Last data filed at 18 1200   Gross per 24 hour   Intake              440 ml   Output              400 ml   Net               40 ml       Physical Exam:     Physical Exam   Constitutional: No distress  Looks well, non toxic appearing    HENT:   Head: Normocephalic and atraumatic  Eyes: Conjunctivae are normal  No scleral icterus  Cardiovascular: Normal rate and regular rhythm  Exam reveals no friction rub  No murmur heard  Pulmonary/Chest: Effort normal and breath sounds normal  No respiratory distress  He has no wheezes  Abdominal: Soft  Bowel sounds are normal  He exhibits no distension  There is no tenderness  Neurological: He is alert  Skin: Skin is warm and dry  He is not diaphoretic  Psychiatric: He has a normal mood and affect   His behavior is normal  Nursing note and vitals reviewed  Additional Data:     Labs:      Results from last 7 days  Lab Units 07/19/18  0437   WBC Thousand/uL 5 57   HEMOGLOBIN g/dL 12 5   HEMATOCRIT % 35 9*   PLATELETS Thousands/uL 131*   NEUTROS PCT % 54   LYMPHS PCT % 35   MONOS PCT % 9   EOS PCT % 2       Results from last 7 days  Lab Units 07/19/18  0437  07/17/18  2358   SODIUM mmol/L 141  < > 139   POTASSIUM mmol/L 3 8  < > 4 2   CHLORIDE mmol/L 110*  < > 105   CO2 mmol/L 27  < > 31   BUN mg/dL 7  < > 14   CREATININE mg/dL 0 80  < > 0 91   CALCIUM mg/dL 7 8*  < > 9 0   TOTAL PROTEIN g/dL  --   --  8 0   BILIRUBIN TOTAL mg/dL  --   --  0 60   ALK PHOS U/L  --   --  60   ALT U/L  --   --  28   AST U/L  --   --  19   GLUCOSE RANDOM mg/dL 88  < > 104   < > = values in this interval not displayed  * I Have Reviewed All Lab Data Listed Above  * Additional Pertinent Lab Tests Reviewed: All Labs Within Last 24 Hours Reviewed    Imaging:    Imaging Reports Reviewed Today Include:   Imaging Personally Reviewed by Myself Includes:      Recent Cultures (last 7 days):       Results from last 7 days  Lab Units 07/18/18  0605 07/18/18  0326   BLOOD CULTURE   --  No Growth at 24 hrs  No Growth at 24 hrs     LEGIONELLA URINARY ANTIGEN  Negative  --        Last 24 Hours Medication List:     Current Facility-Administered Medications:  acetaminophen 650 mg Oral Q6H PRN Bandar Molina PA-C    b complex-vitamin C-folic acid 1 capsule Oral Daily With Dinner Bandar Molina PA-C    bisacodyl 10 mg Rectal Daily PRN Ayesha Davis MD    cefTRIAXone 1,000 mg Intravenous Q24H Bandar Molina PA-C Last Rate: 1,000 mg (07/19/18 0530)   clindamycin 600 mg Intravenous Q8H Mita Arreaga PA-C Last Rate: 600 mg (07/19/18 1246)   co-enzyme Q-10 100 mg Oral Daily Mita Arreaga PA-C    docusate sodium 100 mg Oral BID Bandar Molina PA-C    dronabinol 2 5 mg Oral 4x Daily (with meals and at bedtime) Bandar Molina PA-C    enoxaparin 40 mg Subcutaneous Daily Cleve Brook, PA-C    famotidine 20 mg Oral Daily Cleve Brook, PA-C    fish oil 1,000 mg Oral Daily Cleve Brook, PA-C    loratadine 10 mg Oral Daily PRN Cleve Brook, PA-C    magnesium oxide 400 mg Oral Daily Cleve Brook, PA-C    metoprolol tartrate 12 5 mg Oral Daily Cleve Brook, PA-C    modafinil 200 mg Oral Daily Cleve Brook, PA-C    multivitamin-minerals 1 tablet Oral Daily Mita Arreaga, PA-C    nystatin  Topical BID Cleve Brook, PA-C    ondansetron 4 mg Intravenous Q6H PRN Cleve Brook, PA-C    polyethylene glycol 17 g Oral Daily Cleve Brook, PA-C    sertraline 100 mg Oral Daily Cleve Brook, PA-C    sodium chloride 125 mL/hr Intravenous Continuous Audrey Da Silva MD Last Rate: 125 mL/hr (07/19/18 1410)        Today, Patient Was Seen By: Nara Perkins MD    ** Please Note: This note has been constructed using a voice recognition system   **

## 2018-07-19 NOTE — ASSESSMENT & PLAN NOTE
· CT chest: Right middle, lingula and bilateral lower lobe nodular opacities and consolidation likely representing acute airspace disease/pneumonia  · Patient recently completed a 1 week course of Augmentin for suspected pneumonia  · Patient with history of oropharyngeal dysphagia and presented after an episode of vomiting  Suspect aspiration PNA  · Aspiration precautions  · Speech therapy evaluation  · Received 1 dose of IV ceftriaxone and clindamycin in ED  · Continue antibiotics with IV ceftriaxone and clindamycin  · Avoid medications that can prolong QT  · Legionella and strep negative  · PCT elevated to >4   · Follow-up on results of blood cultures

## 2018-07-19 NOTE — ASSESSMENT & PLAN NOTE
· Avoid medications which can prolong QT    · Consider Telemetry with any additional meds or any risk of QT prolongation

## 2018-07-19 NOTE — ASSESSMENT & PLAN NOTE
· CT chest: Right middle, lingula and bilateral lower lobe nodular opacities and consolidation likely representing acute airspace disease/pneumonia  · Patient recently completed a 1 week course of Augmentin for suspected pneumonia  · Patient with history of oropharyngeal dysphagia and presented after an episode of vomiting  Suspect aspiration PNA  · Aspiration precautions  · Speech therapy evaluation  · Received 1 dose of IV ceftriaxone and clindamycin in ED  · Continue antibiotics with IV ceftriaxone and clindamycin  · Avoid medications that can prolong QT  · Legionella and strep negative  · PCT elevated to >4; repeat level pending today   · Follow-up on results of blood cultures

## 2018-07-20 LAB
ANION GAP SERPL CALCULATED.3IONS-SCNC: 7 MMOL/L (ref 4–13)
BASOPHILS # BLD AUTO: 0.01 THOUSANDS/ΜL (ref 0–0.1)
BASOPHILS NFR BLD AUTO: 0 % (ref 0–1)
BUN SERPL-MCNC: 8 MG/DL (ref 5–25)
CALCIUM SERPL-MCNC: 7.9 MG/DL (ref 8.3–10.1)
CHLORIDE SERPL-SCNC: 110 MMOL/L (ref 100–108)
CO2 SERPL-SCNC: 26 MMOL/L (ref 21–32)
CREAT SERPL-MCNC: 0.78 MG/DL (ref 0.6–1.3)
EOSINOPHIL # BLD AUTO: 0.14 THOUSAND/ΜL (ref 0–0.61)
EOSINOPHIL NFR BLD AUTO: 3 % (ref 0–6)
ERYTHROCYTE [DISTWIDTH] IN BLOOD BY AUTOMATED COUNT: 11.9 % (ref 11.6–15.1)
GFR SERPL CREATININE-BSD FRML MDRD: 118 ML/MIN/1.73SQ M
GLUCOSE SERPL-MCNC: 95 MG/DL (ref 65–140)
HCT VFR BLD AUTO: 38.4 % (ref 36.5–49.3)
HGB BLD-MCNC: 13.6 G/DL (ref 12–17)
LYMPHOCYTES # BLD AUTO: 2.1 THOUSANDS/ΜL (ref 0.6–4.47)
LYMPHOCYTES NFR BLD AUTO: 43 % (ref 14–44)
MCH RBC QN AUTO: 31.5 PG (ref 26.8–34.3)
MCHC RBC AUTO-ENTMCNC: 35.4 G/DL (ref 31.4–37.4)
MCV RBC AUTO: 89 FL (ref 82–98)
MONOCYTES # BLD AUTO: 0.44 THOUSAND/ΜL (ref 0.17–1.22)
MONOCYTES NFR BLD AUTO: 9 % (ref 4–12)
NEUTROPHILS # BLD AUTO: 2.25 THOUSANDS/ΜL (ref 1.85–7.62)
NEUTS SEG NFR BLD AUTO: 46 % (ref 43–75)
PLATELET # BLD AUTO: 168 THOUSANDS/UL (ref 149–390)
PMV BLD AUTO: 10 FL (ref 8.9–12.7)
POTASSIUM SERPL-SCNC: 3.5 MMOL/L (ref 3.5–5.3)
PROCALCITONIN SERPL-MCNC: 1.81 NG/ML
RBC # BLD AUTO: 4.32 MILLION/UL (ref 3.88–5.62)
SODIUM SERPL-SCNC: 143 MMOL/L (ref 136–145)
WBC # BLD AUTO: 4.94 THOUSAND/UL (ref 4.31–10.16)

## 2018-07-20 PROCEDURE — 92526 ORAL FUNCTION THERAPY: CPT

## 2018-07-20 PROCEDURE — 84145 PROCALCITONIN (PCT): CPT | Performed by: HOSPITALIST

## 2018-07-20 PROCEDURE — 85025 COMPLETE CBC W/AUTO DIFF WBC: CPT | Performed by: HOSPITALIST

## 2018-07-20 PROCEDURE — 80048 BASIC METABOLIC PNL TOTAL CA: CPT | Performed by: HOSPITALIST

## 2018-07-20 PROCEDURE — 99232 SBSQ HOSP IP/OBS MODERATE 35: CPT | Performed by: HOSPITALIST

## 2018-07-20 RX ORDER — CEFDINIR 300 MG/1
300 CAPSULE ORAL EVERY 12 HOURS SCHEDULED
Status: DISCONTINUED | OUTPATIENT
Start: 2018-07-21 | End: 2018-07-21 | Stop reason: HOSPADM

## 2018-07-20 RX ORDER — CLINDAMYCIN HYDROCHLORIDE 150 MG/1
450 CAPSULE ORAL EVERY 8 HOURS SCHEDULED
Status: DISCONTINUED | OUTPATIENT
Start: 2018-07-20 | End: 2018-07-21 | Stop reason: HOSPADM

## 2018-07-20 RX ADMIN — MELATONIN 3 MG ORAL TABLET 6 MG: 3 TABLET ORAL at 21:32

## 2018-07-20 RX ADMIN — CLINDAMYCIN HYDROCHLORIDE 450 MG: 150 CAPSULE ORAL at 13:35

## 2018-07-20 RX ADMIN — DOCUSATE SODIUM 100 MG: 100 CAPSULE, LIQUID FILLED ORAL at 17:06

## 2018-07-20 RX ADMIN — CLINDAMYCIN HYDROCHLORIDE 450 MG: 150 CAPSULE ORAL at 21:32

## 2018-07-20 RX ADMIN — Medication 100 MG: at 09:51

## 2018-07-20 RX ADMIN — Medication 1 TABLET: at 09:51

## 2018-07-20 RX ADMIN — POLYETHYLENE GLYCOL 3350 17 G: 17 POWDER, FOR SOLUTION ORAL at 09:52

## 2018-07-20 RX ADMIN — NEPHROCAP 1 CAPSULE: 1 CAP ORAL at 17:05

## 2018-07-20 RX ADMIN — NYSTATIN: 100000 POWDER TOPICAL at 18:26

## 2018-07-20 RX ADMIN — MODAFINIL 200 MG: 100 TABLET ORAL at 09:53

## 2018-07-20 RX ADMIN — SERTRALINE HYDROCHLORIDE 100 MG: 100 TABLET ORAL at 09:50

## 2018-07-20 RX ADMIN — FAMOTIDINE 20 MG: 20 TABLET ORAL at 09:51

## 2018-07-20 RX ADMIN — DRONABINOL 2.5 MG: 2.5 CAPSULE ORAL at 13:35

## 2018-07-20 RX ADMIN — Medication 1000 MG: at 09:51

## 2018-07-20 RX ADMIN — DOCUSATE SODIUM 100 MG: 100 CAPSULE, LIQUID FILLED ORAL at 09:52

## 2018-07-20 RX ADMIN — SODIUM CHLORIDE 125 ML/HR: 0.9 INJECTION, SOLUTION INTRAVENOUS at 00:52

## 2018-07-20 RX ADMIN — CLINDAMYCIN PHOSPHATE 600 MG: 12 INJECTION, SOLUTION INTRAMUSCULAR; INTRAVENOUS at 04:17

## 2018-07-20 RX ADMIN — ENOXAPARIN SODIUM 40 MG: 100 INJECTION SUBCUTANEOUS at 09:51

## 2018-07-20 RX ADMIN — NYSTATIN: 100000 POWDER TOPICAL at 09:54

## 2018-07-20 RX ADMIN — DRONABINOL 2.5 MG: 2.5 CAPSULE ORAL at 21:32

## 2018-07-20 RX ADMIN — DRONABINOL 2.5 MG: 2.5 CAPSULE ORAL at 09:51

## 2018-07-20 RX ADMIN — CEFTRIAXONE 1000 MG: 1 INJECTION, POWDER, FOR SOLUTION INTRAMUSCULAR; INTRAVENOUS at 05:42

## 2018-07-20 RX ADMIN — SODIUM CHLORIDE 125 ML/HR: 0.9 INJECTION, SOLUTION INTRAVENOUS at 18:27

## 2018-07-20 RX ADMIN — Medication 400 MG: at 09:51

## 2018-07-20 RX ADMIN — DRONABINOL 2.5 MG: 2.5 CAPSULE ORAL at 17:06

## 2018-07-20 NOTE — SPEECH THERAPY NOTE
Speech Language/Pathology    Speech/Language Pathology Progress Note    Patient Name: Neo Espinal  BMMMP'D Date: 7/20/2018       Subjective:  Pt seen for dysphagia tx at lunch  Mom asking about trial w/ sippy cup, but she did not bring one and none provided by hospital  Discussed can be trialed w/ outpt speech tx  Info provided for [facility] on [address]  Objective:dypshagia 1 puree w/ ntl  Mom reports po intake has been good, except he's not a big breakfast eater  Pt  Sitting in chair  Needs to be fed  Pt ate tsps of puree, initially holding puree bolus in oral cavity, slow transfer  Manipulation/transfers improved throughout session  Tsps of NTL also given to facilitate transfer/ oral clearance of puree  Swallows delayed and decreased, but no overt s/s aspiration noted  Assessment:  Pt appeared to tolerate puree w/ NTL by tsp w/ good po intake    Plan/Recommendations:   Will cont to follow   outpt dysphagia tx

## 2018-07-20 NOTE — SOCIAL WORK
CM met with patient at bedside  Also present was patient's mother and one of his care takers  Patient lives in a two story home with ramped entrances  Patient resides on the first floor only  He is supported through [facility] where he has 24 hour care givers  Patient's home is handicap accessible and has a [place] [facility] for transport as well  CM discussed with family if any additional services would be requested at discharge and was told that they have everything that they need in place  Staff will transport patient home tomorrow when discharged  Patient's mother would like to be present at discharge and would only need 15 minutes to get here  CM reviewed discharge planning process including the following: identifying caregivers at home, preference for d/c planning needs, [facility] Meds to Bed program, availability of treatment team to discuss questions or concerns patient and/or family may have regarding diagnosis, plan of care, old or new medications and discharge planning  CM name and role reviewed  CM will continue to follow for care coordination and update assessment as necessary

## 2018-07-20 NOTE — PROGRESS NOTES
Progress Note - Chavez Coburn 1985, 27 y o  male MRN: [id number]    Unit/Bed#: -01 Encounter: [id number]    Primary Care Provider: Nico Fair MD   Date and time admitted to hospital: 7/17/2018 10:28 PM      * Pneumonia   Assessment & Plan    · CT chest: Right middle, lingula and bilateral lower lobe nodular opacities and consolidation likely representing acute airspace disease/pneumonia  · Patient recently completed a 1 week course of Augmentin for suspected pneumonia  · Patient with history of oropharyngeal dysphagia and presented after an episode of vomiting  Suspect aspiration PNA  · Aspiration precautions  · Speech therapy evaluation  · Received 1 dose of IV ceftriaxone and clindamycin in ED  · Continue antibiotics with IV ceftriaxone and clindamycin  · Avoid medications that can prolong QT  · Legionella and strep negative  · PCT elevated to >4; repeat level pending today   · Follow-up on results of blood cultures  Constipation   Assessment & Plan    · Presented with abdominal pain, possibly due to constipation  · CT abdomen/ pelvis showed abundant fecal material in the colon which may represent constipation  · Patient's mother reports that the patient had a large bowel movement prior to presentation to the hospital   · Continue bowel regimen with Colace BID, Miralax daily  Consideration for enema  Anoxic brain damage (HCC)   Assessment & Plan    · Cardiac arrest at the age of 13 due to long QT syndrome  · Supportive care  Per patient's mother, patient is able to ambulate with assistance; patient minimally verbal at baseline  Long Q-T syndrome   Assessment & Plan    · Avoid medications which can prolong QT    · Consider Telemetry with any additional meds or any risk of QT prolongation         Oropharyngeal dysphagia   Assessment & Plan    · SLP consult; pureed and NTL             VTE Pharmacologic Prophylaxis:   Pharmacologic: Enoxaparin (Lovenox)  Mechanical VTE Prophylaxis in Place: Yes    Patient Centered Rounds: I have performed bedside rounds with nursing staff today  Discussions with Specialists or Other Care Team Provider: case management     Education and Discussions with Family / Patient: patient and family     Time Spent for Care: 30 minutes  More than 50% of total time spent on counseling and coordination of care as described above  Current Length of Stay: 2 day(s)    Current Patient Status: Inpatient   Certification Statement: The patient will continue to require additional inpatient hospital stay due to aspiration pneumonia  Discharge Plan / Estimated Discharge Date: TBD based on clinical course    Code Status: Level 1 - Full Code    Subjective:   Pt denied pain or discomfort   Objective:     Vitals:   Temp (24hrs), Av 6 °F (37 °C), Min:97 5 °F (36 4 °C), Max:99 3 °F (37 4 °C)    HR:  [56-84] 60  Resp:  [16-18] 16  BP: (105-110)/(52-77) 107/71  SpO2:  [93 %-94 %] 94 %  Body mass index is 18 8 kg/m²  Input and Output Summary (last 24 hours): Intake/Output Summary (Last 24 hours) at 18 0846  Last data filed at 18 1200   Gross per 24 hour   Intake              240 ml   Output                0 ml   Net              240 ml       Physical Exam:     Physical Exam   Constitutional: No distress  HENT:   Head: Normocephalic and atraumatic  Cardiovascular: Normal rate and regular rhythm  No murmur heard  Pulmonary/Chest: Effort normal and breath sounds normal  No respiratory distress  He has no wheezes  He has no rales  Abdominal: Soft  Bowel sounds are normal  He exhibits no distension  There is no tenderness  There is no rebound  Neurological: He is alert  Skin: Skin is warm and dry  No rash noted  He is not diaphoretic  No erythema  Nursing note and vitals reviewed          Additional Data:     Labs:      Results from last 7 days  Lab Units 18  0546   WBC Thousand/uL 4 94   HEMOGLOBIN g/dL 13 6   HEMATOCRIT % 38 4   PLATELETS Thousands/uL 168   NEUTROS PCT % 46   LYMPHS PCT % 43   MONOS PCT % 9   EOS PCT % 3       Results from last 7 days  Lab Units 07/20/18  0546  07/17/18  2358   SODIUM mmol/L 143  < > 139   POTASSIUM mmol/L 3 5  < > 4 2   CHLORIDE mmol/L 110*  < > 105   CO2 mmol/L 26  < > 31   BUN mg/dL 8  < > 14   CREATININE mg/dL 0 78  < > 0 91   CALCIUM mg/dL 7 9*  < > 9 0   TOTAL PROTEIN g/dL  --   --  8 0   BILIRUBIN TOTAL mg/dL  --   --  0 60   ALK PHOS U/L  --   --  60   ALT U/L  --   --  28   AST U/L  --   --  19   GLUCOSE RANDOM mg/dL 95  < > 104   < > = values in this interval not displayed  * I Have Reviewed All Lab Data Listed Above  * Additional Pertinent Lab Tests Reviewed: All Labs Within Last 24 Hours Reviewed    Imaging:    Imaging Reports Reviewed Today Include:   Imaging Personally Reviewed by Myself Includes:      Recent Cultures (last 7 days):       Results from last 7 days  Lab Units 07/18/18  0605 07/18/18  0326   BLOOD CULTURE   --  No Growth at 48 hrs  No Growth at 48 hrs     LEGIONELLA URINARY ANTIGEN  Negative  --        Last 24 Hours Medication List:     Current Facility-Administered Medications:  acetaminophen 650 mg Oral Q6H PRN Jimmy Morse PA-C    b complex-vitamin C-folic acid 1 capsule Oral Daily With Dinner Jimmy Morse PA-C    bisacodyl 10 mg Rectal Daily PRN Carol Fernandez MD    cefTRIAXone 1,000 mg Intravenous Q24H Jimmy Morse PA-C Last Rate: 1,000 mg (07/20/18 0542)   clindamycin 600 mg Intravenous Q8H Mita Arreaga PA-C Last Rate: 600 mg (07/20/18 0417)   co-enzyme Q-10 100 mg Oral Daily Mita Arreaga PA-C    docusate sodium 100 mg Oral BID Jimmy Morse PA-C    dronabinol 2 5 mg Oral 4x Daily (with meals and at bedtime) Jimmy Morse PA-C    enoxaparin 40 mg Subcutaneous Daily Jimmy Morse PA-C    famotidine 20 mg Oral Daily Jimmy Morse PA-C    fish oil 1,000 mg Oral Daily Mita Arreaga PA-C    loratadine 10 mg Oral Daily PRN Guilherme Sites, PA-C    magnesium oxide 400 mg Oral Daily Guilherme Sites, PA-C    melatonin 6 mg Oral HS Hamida Caldera, PA-C    metoprolol tartrate 12 5 mg Oral Daily Guilherme Sites, PA-C    modafinil 200 mg Oral Daily Guilherme Sites, PA-C    multivitamin-minerals 1 tablet Oral Daily Mita Arreaga, PA-C    nystatin  Topical BID Guilherme Sites, PA-C    ondansetron 4 mg Intravenous Q6H PRN Guilherme Sites, PA-C    polyethylene glycol 17 g Oral Daily Guilherme Sites, PA-C    sertraline 100 mg Oral Daily Guilherme Sites, PA-C    sodium chloride 125 mL/hr Intravenous Continuous Bhavesh Elmore MD Last Rate: 125 mL/hr (07/20/18 0052)        Today, Patient Was Seen By: Mat Reynaga MD    ** Please Note: This note has been constructed using a voice recognition system   **

## 2018-07-20 NOTE — PLAN OF CARE
Problem: DISCHARGE PLANNING - CARE MANAGEMENT  Goal: Discharge to post-acute care or home with appropriate resources  INTERVENTIONS:  - Conduct assessment to determine patient/family and health care team treatment goals, and need for post-acute services based on payer coverage, community resources, and patient preferences, and barriers to discharge  - Address psychosocial, clinical, and financial barriers to discharge as identified in assessment in conjunction with the patient/family and health care team  - Arrange appropriate level of post-acute services according to patients   needs and preference and payer coverage in collaboration with the physician and health care team  - Communicate with and update the patient/family, physician, and health care team regarding progress on the discharge plan  - Arrange appropriate transportation to post-acute venues  Outcome: Progressing  CM met with patient at bedside  Also present was patient's mother and one of his care takers  Patient lives in a two story home with ramped entrances  Patient resides on the first floor only  He is supported through D where he has 24 hour care givers  Patient's home is handicap accessible and has a Lexie El for transport as well  CM discussed with family if any additional services would be requested at discharge and was told that they have everything that they need in place  Staff will transport patient home tomorrow when discharged  Patient's mother would like to be present at discharge and would only need 15 minutes to get here  CM reviewed discharge planning process including the following: identifying caregivers at home, preference for d/c planning needs, [facility] Meds to Bed program, availability of treatment team to discuss questions or concerns patient and/or family may have regarding diagnosis, plan of care, old or new medications and discharge planning  CM name and role reviewed    CM will continue to follow for care coordination and update assessment as necessary

## 2018-07-20 NOTE — NURSING NOTE
Patient resting in bed with no signs of complaint or discomfort, FLACC 0  Patient's caregiver at bedside  Call bell within reach

## 2018-07-20 NOTE — PHYSICAL THERAPY NOTE
Physical Therapy Screen    Patient Name: Jojo CLOUD Date: 7/20/2018   Problem List  Patient Active Problem List   Diagnosis    Abnormal bone density screening    Abnormal TSH    Accelerated essential hypertension    Anoxic brain damage (HCC)    Allergic rhinitis    Candidal intertrigo    Depression    Long Q-T syndrome    Quadriplegia (HCC)    Rosacea    Cough    Poor weight gain in adult    Oropharyngeal dysphagia    Spastic hemiplegia of left nondominant side as late effect of cerebral infarction ([address] )    Psychophysiological insomnia    Candidiasis    Pneumonia    Leukocytosis    Constipation        Past Medical History  Past Medical History:   Diagnosis Date    Allergic rhinitis     [facility] anoxia [facility] [address] [facility]     Cardiac arrest [facility])     age 13 s/p long Q-T syndrome    Community acquired pneumonia     last assessed/resolved:  10/9/2014    Depression     GERD (gastroesophageal reflux disease)     Long Q-T syndrome     Muscular rigidity and spasm, progressive     Osteopenia     Osteoporosis     Quadriplegia ([address] )     Spastic neurogenic bladder     Urinary incontinence    Past Surgical History  Past Surgical History:   Procedure Laterality Date    APPENDECTOMY      WISDOM TOOTH EXTRACTION     Spoke to Rosa Sarabia from case management who knows pt and mom  Pt has caregivers at group home and Rosa Sarabia reports that pt's mom does not want additional services in place (ie therapy at home)  No PT consult indicated for auth for DC back to facility  Will screen pt from IPPT services here as it will not impact DC disposition   Elizabeth Oscar, PT

## 2018-07-21 VITALS
DIASTOLIC BLOOD PRESSURE: 77 MMHG | SYSTOLIC BLOOD PRESSURE: 123 MMHG | TEMPERATURE: 97.5 F | HEART RATE: 47 BPM | RESPIRATION RATE: 18 BRPM | HEIGHT: 70 IN | WEIGHT: 131 LBS | BODY MASS INDEX: 18.75 KG/M2 | OXYGEN SATURATION: 97 %

## 2018-07-21 LAB
ANION GAP SERPL CALCULATED.3IONS-SCNC: 6 MMOL/L (ref 4–13)
BASOPHILS # BLD AUTO: 0.01 THOUSANDS/ΜL (ref 0–0.1)
BASOPHILS NFR BLD AUTO: 0 % (ref 0–1)
BUN SERPL-MCNC: 7 MG/DL (ref 5–25)
CALCIUM SERPL-MCNC: 8.4 MG/DL (ref 8.3–10.1)
CHLORIDE SERPL-SCNC: 107 MMOL/L (ref 100–108)
CO2 SERPL-SCNC: 27 MMOL/L (ref 21–32)
CREAT SERPL-MCNC: 0.8 MG/DL (ref 0.6–1.3)
EOSINOPHIL # BLD AUTO: 0.16 THOUSAND/ΜL (ref 0–0.61)
EOSINOPHIL NFR BLD AUTO: 3 % (ref 0–6)
ERYTHROCYTE [DISTWIDTH] IN BLOOD BY AUTOMATED COUNT: 11.9 % (ref 11.6–15.1)
GFR SERPL CREATININE-BSD FRML MDRD: 117 ML/MIN/1.73SQ M
GLUCOSE SERPL-MCNC: 81 MG/DL (ref 65–140)
HCT VFR BLD AUTO: 40.4 % (ref 36.5–49.3)
HGB BLD-MCNC: 14.3 G/DL (ref 12–17)
LYMPHOCYTES # BLD AUTO: 2.82 THOUSANDS/ΜL (ref 0.6–4.47)
LYMPHOCYTES NFR BLD AUTO: 50 % (ref 14–44)
MCH RBC QN AUTO: 31.4 PG (ref 26.8–34.3)
MCHC RBC AUTO-ENTMCNC: 35.4 G/DL (ref 31.4–37.4)
MCV RBC AUTO: 89 FL (ref 82–98)
MONOCYTES # BLD AUTO: 0.43 THOUSAND/ΜL (ref 0.17–1.22)
MONOCYTES NFR BLD AUTO: 8 % (ref 4–12)
NEUTROPHILS # BLD AUTO: 2.28 THOUSANDS/ΜL (ref 1.85–7.62)
NEUTS SEG NFR BLD AUTO: 40 % (ref 43–75)
PLATELET # BLD AUTO: 184 THOUSANDS/UL (ref 149–390)
PMV BLD AUTO: 10.2 FL (ref 8.9–12.7)
POTASSIUM SERPL-SCNC: 3.9 MMOL/L (ref 3.5–5.3)
RBC # BLD AUTO: 4.56 MILLION/UL (ref 3.88–5.62)
SODIUM SERPL-SCNC: 140 MMOL/L (ref 136–145)
WBC # BLD AUTO: 5.7 THOUSAND/UL (ref 4.31–10.16)

## 2018-07-21 PROCEDURE — 99239 HOSP IP/OBS DSCHRG MGMT >30: CPT | Performed by: HOSPITALIST

## 2018-07-21 PROCEDURE — 85025 COMPLETE CBC W/AUTO DIFF WBC: CPT | Performed by: HOSPITALIST

## 2018-07-21 PROCEDURE — 80048 BASIC METABOLIC PNL TOTAL CA: CPT | Performed by: HOSPITALIST

## 2018-07-21 RX ORDER — CEFDINIR 300 MG/1
300 CAPSULE ORAL EVERY 12 HOURS SCHEDULED
Qty: 8 CAPSULE | Refills: 0 | Status: SHIPPED | OUTPATIENT
Start: 2018-07-21 | End: 2018-07-25

## 2018-07-21 RX ORDER — CLINDAMYCIN HYDROCHLORIDE 150 MG/1
450 CAPSULE ORAL EVERY 8 HOURS SCHEDULED
Qty: 36 CAPSULE | Refills: 0 | Status: SHIPPED | OUTPATIENT
Start: 2018-07-21 | End: 2018-07-21

## 2018-07-21 RX ORDER — CEFDINIR 300 MG/1
300 CAPSULE ORAL EVERY 12 HOURS SCHEDULED
Qty: 8 CAPSULE | Refills: 0 | Status: SHIPPED | OUTPATIENT
Start: 2018-07-21 | End: 2018-07-21

## 2018-07-21 RX ORDER — CLINDAMYCIN HYDROCHLORIDE 150 MG/1
450 CAPSULE ORAL EVERY 8 HOURS SCHEDULED
Qty: 36 CAPSULE | Refills: 0 | Status: SHIPPED | OUTPATIENT
Start: 2018-07-21 | End: 2018-07-25

## 2018-07-21 RX ORDER — NYSTATIN 100000 [USP'U]/G
POWDER TOPICAL 2 TIMES DAILY
Qty: 15 G | Refills: 0 | Status: SHIPPED | OUTPATIENT
Start: 2018-07-21 | End: 2018-07-23 | Stop reason: SDUPTHER

## 2018-07-21 RX ADMIN — Medication 400 MG: at 09:14

## 2018-07-21 RX ADMIN — CEFDINIR 300 MG: 300 CAPSULE ORAL at 09:13

## 2018-07-21 RX ADMIN — Medication 100 MG: at 09:13

## 2018-07-21 RX ADMIN — FAMOTIDINE 20 MG: 20 TABLET ORAL at 09:13

## 2018-07-21 RX ADMIN — MODAFINIL 200 MG: 100 TABLET ORAL at 09:17

## 2018-07-21 RX ADMIN — Medication 1 TABLET: at 09:13

## 2018-07-21 RX ADMIN — DRONABINOL 2.5 MG: 2.5 CAPSULE ORAL at 09:13

## 2018-07-21 RX ADMIN — POLYETHYLENE GLYCOL 3350 17 G: 17 POWDER, FOR SOLUTION ORAL at 09:14

## 2018-07-21 RX ADMIN — NYSTATIN 1 APPLICATION: 100000 POWDER TOPICAL at 09:52

## 2018-07-21 RX ADMIN — CLINDAMYCIN HYDROCHLORIDE 450 MG: 150 CAPSULE ORAL at 05:41

## 2018-07-21 RX ADMIN — Medication 1000 MG: at 09:14

## 2018-07-21 RX ADMIN — SERTRALINE HYDROCHLORIDE 100 MG: 100 TABLET ORAL at 09:13

## 2018-07-21 RX ADMIN — DOCUSATE SODIUM 100 MG: 100 CAPSULE, LIQUID FILLED ORAL at 09:14

## 2018-07-21 RX ADMIN — SODIUM CHLORIDE 125 ML/HR: 0.9 INJECTION, SOLUTION INTRAVENOUS at 06:45

## 2018-07-21 NOTE — DISCHARGE INSTRUCTIONS
Aspiration Pneumonia   WHAT YOU NEED TO KNOW:   Aspiration pneumonia is a lung infection that develops after you aspirate (inhale) food, liquid, or vomit into your lungs  You can also aspirate food or liquid from your stomach that backs up into your esophagus  If you are not able to cough up the aspirated material, bacteria can grow in your lungs and cause an infection  Your risk is highest if you are older than 75 or live in a nursing home or long-term care center  You may be less active, bedridden, or not able to swallow or cough well  The muscles that help you swallow can become weakened by age, illness, or disease  Your risk also increases if you have a weak immune system  DISCHARGE INSTRUCTIONS:   Seek care immediately if:   · You have chest pain  · You are confused or cannot think clearly  · You have more trouble breathing or your breathing seems faster than normal   Contact your healthcare provider if:   · You have a fever  · Your symptoms are not better after 2 or 3 days of treatment  · You have questions or concerns about your condition or care  Medicines: You may need any of the following:  · Antibiotics  treat pneumonia caused by bacteria  · Steroids  may help to open your air passages so you can breathe easier  Do not stop taking this medicine without asking your healthcare provider  Stopping on your own can cause problems  · Take your medicine as directed  Contact your healthcare provider if you think your medicine is not helping or if you have side effects  Tell him or her if you are allergic to any medicine  Keep a list of the medicines, vitamins, and herbs you take  Include the amounts, and when and why you take them  Bring the list or the pill bottles to follow-up visits  Carry your medicine list with you in case of an emergency  Follow up with your healthcare provider as directed: You may need another chest x-ray in 6 to 8 weeks   Follow up with your speech-language pathologist, dietitian, or occupational therapist as directed  Write down your questions so you remember to ask them during your follow-up visits  Prevent or manage aspiration pneumonia:   · Go to speech therapy as directed  A speech therapist can teach you exercises to strengthen the muscles you use to swallow  · Sit up while you eat  If you are bedridden, keep the head of your bed slightly up (at about a 30° to 45° angle) while you eat  Take small bites, eat slowly, and swallow with your chin down  · Eat soft foods and drink thickened liquids  A dietitian can teach you how to thicken your liquids so you have less trouble swallowing  Drink liquids through a straw or sip them from a spoon  Ask your dietitian what kinds of foods you should eat  He or she may suggest soft foods such as cooked cereal, pasta, well-cooked fruits and vegetables, and scrambled eggs  Your dietitian may also suggest moist, tender meats that are cut into small pieces  · Care for your teeth and mouth  Mouth care can help kill harmful bacteria in your mouth so you do not aspirate them  While you are sitting up, brush your teeth for 2 minutes daily after breakfast and again after dinner  Also brush your tongue  If you do not have teeth, gently brush your gums with a soft toothbrush  Dentures should be removed and cleaned with an electric toothbrush and water after breakfast and dinner  Soak dentures overnight in a cleaning solution  Visit a dentist regularly to have your teeth and gums cleaned  · Limit or avoid taking sedatives  These medicines increase the risk of aspiration because they dry out your mouth and make you drowsy  If possible, avoid taking antihistamine medicines because they also make your mouth dry  · Do not smoke  Nicotine and other chemicals in cigarettes and cigars can cause lung damage  Ask your healthcare provider for information if you currently smoke and need help to quit  E-cigarettes or smokeless tobacco still contain nicotine  Talk to your healthcare provider before you use these products  © 2017 [address] [employer] Information is for End User's use only and may not be sold, redistributed or otherwise used for commercial purposes  All illustrations and images included in CareNotes® are the copyrighted property of [employer]  or [employer]  The above information is an  only  It is not intended as medical advice for individual conditions or treatments  Talk to your doctor, nurse or pharmacist before following any medical regimen to see if it is safe and effective for you

## 2018-07-21 NOTE — DISCHARGE SUMMARY
Discharge Summary - [address] [facility]    Patient Information: Aubrey Hodge 27 y o  male MRN: [id number]  Unit/Bed#: -01 Encounter: [id number]    Discharging Physician / Practitioner: Sally Bowen MD  PCP: Luis Pandya MD  Admission Date: 7/17/2018  Discharge Date: 07/21/18    Disposition:     Home    Reason for Admission: SOB and cough     Discharge Diagnoses:     Principal Problem:    Pneumonia  Active Problems:    Constipation    Anoxic brain damage (HCC)    Long Q-T syndrome    Oropharyngeal dysphagia    Depression    Leukocytosis  Resolved Problems:    * No resolved hospital problems  *    Consultations During Hospital Stay:  · Speech therapy     Procedures Performed:     · VBS:     Significant Findings / Test Results:     · Procalcitonin: ~4-->1 8    Incidental Findings:   · none     Test Results Pending at Discharge (will require follow up):   · none     Outpatient Tests Requested:  · F/u PCP     Complications:  None     Hospital Course:     Aubrey Hodge is a 27 y o  male patient h/o long QT syndrome, anoxic brain injury who originally presented to the hospital on 7/17/2018 due to SOB and cough, likely due to aspiration pneumonia  Pt was started on appropriate abx therapy and had improvement in his symptoms  No fevers while admitted  Procalcitonin was elevated >4, but improved to <2 on recheck  Pt and family instructed to completed 4 additional days of abx (7 days total) for treatment  Was seen and evaluated by SLP and underwent VBS  They recommended pureed diet with nectar thick liquids  Condition at Discharge: fair     Discharge Day Visit / Exam:     Subjective:  Feels well  Afebrile     Vitals: Blood Pressure: 123/77 (07/21/18 0749)  Pulse: (!) 47 (07/21/18 0749)  Temperature: 97 5 °F (36 4 °C) (07/21/18 0749)  Temp Source: Axillary (07/21/18 0749)  Respirations: 18 (07/21/18 0749)  Height: 5' 10" (177 8 cm) (07/18/18 0536)  Weight - Scale: 59 4 kg (131 lb) (07/18/18 0536)  SpO2: 97 % (07/21/18 6298)  Exam:   Physical Exam   Constitutional: No distress  HENT:   Head: Normocephalic and atraumatic  Eyes: Conjunctivae are normal  No scleral icterus  Cardiovascular: Normal rate and regular rhythm  Pulmonary/Chest: Effort normal  No respiratory distress  He has no wheezes  Musculoskeletal: Normal range of motion  He exhibits no edema  Neurological: He is alert  Skin: Skin is warm and dry  No rash noted  He is not diaphoretic  No erythema  Psychiatric: He has a normal mood and affect  His behavior is normal    Nursing note and vitals reviewed  Discharge instructions/Information to patient and family:   See after visit summary for information provided to patient and family  Provisions for Follow-Up Care:  See after visit summary for information related to follow-up care and any pertinent home health orders  Planned Readmission:      Discharge Statement:  I spent 45 minutes discharging the patient  This time was spent on the day of discharge  I had direct contact with the patient on the day of discharge  Greater than 50% of the total time was spent examining patient, answering all patient questions, arranging and discussing plan of care with patient as well as directly providing post-discharge instructions  Additional time then spent on discharge activities  Discharge Medications:  See after visit summary for reconciled discharge medications provided to patient and family        ** Please Note: This note has been constructed using a voice recognition system **

## 2018-07-23 ENCOUNTER — TRANSITIONAL CARE MANAGEMENT (OUTPATIENT)
Dept: FAMILY MEDICINE CLINIC | Facility: CLINIC | Age: 33
End: 2018-07-23

## 2018-07-23 ENCOUNTER — TELEPHONE (OUTPATIENT)
Dept: FAMILY MEDICINE CLINIC | Facility: CLINIC | Age: 33
End: 2018-07-23

## 2018-07-23 DIAGNOSIS — B37.2 CANDIDAL INTERTRIGO: ICD-10-CM

## 2018-07-23 LAB
BACTERIA BLD CULT: NORMAL
BACTERIA BLD CULT: NORMAL

## 2018-07-23 NOTE — TELEPHONE ENCOUNTER
Pt caregiver called and we went over pt discharge meds  Some assistance needed to continue claritin as a daily prn med not always daily, also ibuprofen they would like to keep on a prn basis not bid  daily , pt sertraline was chagned to daily at 9am but he always took it a 9 pm can they return to that schedule, lastly the nystatin powder to be sent to the pharmacy was printed can we send in for the pt    I put the order in as ordered upon discharge from the hospital  Thank you

## 2018-07-23 NOTE — TELEPHONE ENCOUNTER
Pt mom Dollyshahnaz Rojas called and said she was returning Dr Diya Mcdonald call regarding pt,  She can be reached at [phone]  Thanks :)

## 2018-07-23 NOTE — PROGRESS NOTES
Speech-Language Pathology Initial Evaluation    Today's date: 2018  Patients name: Edith Santiago  : 1985  MRN: [id number]  Safety measures: Aspiration risk, Long Q-T syndrome, depression  Referring provider: Susanna Ballard MD    Medical history significant for:   Past Medical History:   Diagnosis Date    Allergic rhinitis     Brain anoxia ([facility] [address] )     Cardiac arrest [facility])     age 13 s/p long Q-T syndrome    Community acquired pneumonia     last assessed/resolved:  10/9/2014    Depression     GERD (gastroesophageal reflux disease)     Long Q-T syndrome     Muscular rigidity and spasm, progressive     Osteopenia     Osteoporosis     Quadriplegia ([facility] [address] )     Spastic neurogenic bladder     Urinary incontinence        Medication list:   Current Outpatient Prescriptions   Medication Sig Dispense Refill    b complex-vitamin C-folic acid (RENAL) 1 mg 1 SOFTGEL BY MOUTH DAILY @ 2PM (SUPPLEMENT) *GOODBRED 30 capsule 0    cefdinir (OMNICEF) 300 mg capsule Take 1 capsule (300 mg total) by mouth every 12 (twelve) hours for 4 days 8 capsule 0    clindamycin (CLEOCIN) 150 mg capsule Take 3 capsules (450 mg total) by mouth every 8 (eight) hours for 4 days 36 capsule 0    Coenzyme Q10 100 MG TABS Take 2 capsules daily by mouth @ 9am      Diapers & Supplies (CUTIES SIZE 3) MISC Please provide 10 per day 300 each 11    Diapers & Supplies (HUGGIES LITTLE MOVERS SIZE 3) MISC by Does not apply route      Disposable Gloves (VINYL GLOVES LARGE) MISC by Does not apply route      dronabinol (MARINOL) 2 5 mg capsule Take one with breakfast, 2 with lunch, one with supper and one before bed 150 capsule 1    ENEMEEZ PLUS  MG ENEM use as directed 150 mL 0    Ginkgo Biloba Extract 40 MG CAPS 3 CAPS(120MG) BY MOUTH DAILY @ 2PM (SUPPLEMENT) *GOODBRED 90 capsule 0    GNP VITAMIN C 500 MG tablet 1 TAB BY MOUTH DAILY @ 2PM (SUPPLEMENT) *GOODBRED 30 tablet 0    ibuprofen (MOTRIN) 600 mg tablet Take 1 tablet (600 mg total) by mouth 2 (two) times a day (Patient taking differently: Take 600 mg by mouth  ) 30 tablet 3    Incontinence Supply Disposable (DEPEND UNDERWEAR SM/MED) MISC by Does not apply route      Incontinence Supply Disposable (PREVAIL AIR BRIEFS) MISC 1 each by Does not apply route every 4 (four) hours Please provide 5 briefs per day 150 each 11    Incontinence Supply Disposable (SELECT DISPOSABLE UNDERWEAR SM) MISC Please provide a 5 day supply DX R32 incontinence 22 each 0    loratadine (CLARITIN) 10 mg tablet Take 1 tablet (10 mg total) by mouth every 24 hours (Patient taking differently: Take 10 mg by mouth daily as needed  ) 90 tablet 3    Magnesium 500 MG CAPS Take 1 capsule (500 mg total) by mouth daily 30 capsule 5    metoprolol tartrate (LOPRESSOR) 25 mg tablet 1/2 TAB(12 5MG) BY MOUTH DAILY @ 9AM (HTN) *GODOBRED 15 tablet 0    Misc   Devices [facility]) MISC Please provide pt with a new cord for power wheelchair 1 each 0    modafinil (PROVIGIL) 100 mg tablet Take 2 daily @ 9:00am 60 tablet 0    Multiple Vitamins-Minerals (CEROVITE SENIOR) TABS Take 1 tablet by mouth daily 30 tablet 5    nystatin (MYCOSTATIN) powder Apply topically 2 (two) times a day 15 g 0    Omega-3 Fatty Acids (FISH OIL) 1,000 mg 1 CAPSULE BY MOUTH DAILY @ 9AM (SUPPLEMENT) *GOODBRED 30 capsule 0    onabotulinumtoxin A (BOTOX) 100 units inject 500 units into left gastroc soleus and abductor pollicis ankit      POLYETHYLENE GLYCOL 3350 PO Take by mouth Take 1/2 capful daily by mouth @@ 9am , may increase to 1 or 2 capfuls as tolerated      pyrithione zinc (HEAD AND SHOULDERS) 1 % shampoo Apply topically daily as needed for dandruff 400 mL 5    RA SUNSCREEN SPF50 LOTN Apply sunscreen to exposed skin when outdoors every 2 hours as needed 1 Bottle 5    ranitidine (ZANTAC) 150 mg tablet 1 TAB BY MOUTH EVERY OTHER DAY @ 6PM (ACID REFLUX) *GOODBRED 15 tablet 0    sertraline (ZOLOFT) 100 mg tablet 1 TABLET BY MOUTH DAILY @9AM (DEPRESSION) *GOODBRED 30 tablet 0    STARCH-MALTO DEXTRIN (THICK-IT) POWD Use in one drink daily honey thick consistency 1 Can 0    Tea Tree Oil OIL by Does not apply route daily Apply to skin folds  1 Bottle 3    Vitamins A & D (RA VITAMIN A & D) OINT Apply topically to affected area daily as needed 30 g 3    Witch Hazel (HEMORRHOIDAL) 50 % PADS Apply topically      Zinc 50 MG TABS Take 50mg daily @ 2:00pm every other month  (July, September, November 2018, January 2019, March 2019) 30 tablet 5    zinc oxide (DESITIN) 40 % PSTE Apply topically       No current facility-administered medications for this visit  Allergies: Allergies   Allergen Reactions    Other      drugs that prolong the QT interval  drugs that prolong the QT interval  Seasonal allergies          Subjective comments: Patient arrived to evaluation today with his mother and two caretakers  Patient is mostly non-verbal, so information was primarily obtained from patient's mother and caretakers  Patient's mother reported that patient is still recovering physically from his recent pneumonia/hospitalization  Patient's mother reported that they have had difficulty with consistent implementation of tx recommendations, particularly with changes in caretakers  Patient's mother also reported that patient has had an increase in HAs the past several months  Patient's mother reported, "We want to make eating out fun again   It's hard with eating restrictions "    Patient's goal(s): "To eat and drink what I used to " Patient reportedly "was eating everything "    Reason for referral: Difficulty swallowing solids or liquids and Diffiiculty feeding  Prior functional status: Requires caregiver assistance for daily function  Clinically complex situations: Discharge from SNF or Hospital in the last 30 days and Previous therapy to address similar deficits    History: Patient is a 28-yr-old male referred to  for evaluation and treatment of oropharyngeal dysphagia  Patient has a history of an anoxic brain injury that occurred 20 years ago after cardiac arrest due to Long Q-T syndrome and has since been living with quadriplegia  Patient recently presented to the hospital on 7/17/2018 with vomiting and found to have aspiration pneumonia  While in the hospital, patient received VBS, which showed moderate-severe oral dysphagia and mild-moderate pharyngeal dysphagia  Patient was recommended a puree and nectar thick liquid diet following VBS  His premorbid diet was thin liquids and regular foods  Hearing: WFL  Vision: WFL    Home environment/lifestyle: Lives alone (no longer with parents); has a team of caretakers 24/7    Mental status: Alert  Behavior status: Cooperative  Communication modalities: Non-verbal  Rehabilitation prognosis: Good rehab potential to reach the established goals      Assessments    DYSPHAGIA EVALUATION:    -Reason for referral: Weight loss, Signs/symptoms of dysphagia, History of aspiration pneumonia and Change in health status    -Subjective report of swallowing difficulty: Poor secretion management  and Coughing    -Difficulty swallowing: Solids, Liquids and Pills    -Current diet (solids): Pureed  -Current diet (liquids): Nectar Thick  -Alternative Feeding Method?: N/A    -Swallowing History: Patient's recent VBS showed moderate-severe oral stage dysphagia characterized by minimal mastication/oral processing with soft/solid, decreased bolus formation, prolonged hold of pill in mouth with puree, and requiring constant prompts to chew and swallow  It also showed mild-moderate pharyngeal stage dysphagia characterized by delay swallow initiation with liquids with straw intake and silent aspiration with thin liquids  Following patient's cardiac arrest and brain injury, patient was reportedly on feeding tube from approx May 1998 until June 2000   Patient's mother reported that they gradually weaned patient back into eating and that his eating had been going well since then  Patient reportedly did not consume anything PO when on feeding tube  Patient has also reportedly worked on oral-motor strength exercises in past and reportedly benefited from doing them  Patient's mother reported that patient lost routine of doing oral-motor exercises before every meal approx when patient went to live on his own in 2005  Patient reportedly can consume liquid PO through a straw, but he has reported difficulty with a 'sippy cup' they've tried because of patient's difficulty tilting his head back  Patient's mother reported that patient was recommended tx with Vital Stim, but that they never started it  Patient's caretaker reported that meals often "take a while" and that patient is typically a "slow eater" and often holds food in his mouth for a prolonged time and has pocketing of food in mouth  Patient has experienced some reflux issues in the past (primarily with tube feeding) and was placed on medication for it  Patient's mother reported that patient's appetite decreased when he had pneumonia, but that he has not experienced issues with his appetite aside from that  Patient reportedly has good oral care  Patient's caretaker reported that patient swishes reg thin water and his teeth are brushed and flossed after meals  Patient currently does not use mouth wash or tongue scraper for oral care  -Testing Variables: Patient presented with poor posture (i e , slumped with head consistently tilted downward)  Patient benefited from constant cuing to "sit up straight "      -Facial appearance Symmetrical   -Mandible function Decreased strength   -Dentition Adequate   -Labial function Decreased strength (bilateral)   -Lingual function Decreased strength (bilateral), Decreased strength (protrusion) and Decreased coordination   -Velar function Unable to visualize   -Oral Apraxia?  Absent   -Vocal Quality Clear/adequate   -Volitional Cough did not assess on this date -Respiration WFL   -Drooling? Yes   -Tremor/Involuntary Movement? Not present   -Tracheostomy Present? No       LIQUID CONSISTENCY TESTIN  [place] Thick Liquid Consistency   Administered by: Straw and Fed by caretaker   Strategies, attempts, and responses: None    CLINICAL FINDINGS:   Oral phase impairments: Lip closure, Anterior-posterior transit and Bolus formation/control, intra-oral pressure to take liquid through straw   Pharyngeal Phase Impairments: Swallow initiation Mild delay      *Behavioral response/consistency: Patient presented with loss of liquid from mouth and delay in anterior-posterior transit with PO intake  *Laryngeal excursion upon palpation: Appeared WFL  *Liquid swallowing comments: Patient appeared to be able to intake good amount of nectar thick liquid with straw  However, patient presented with loss of liquid from mouth and delay in anterior-posterior transit of the liquid  SOLID CONSISTENCY TESTIN  Puree Consistency (Vanilla pudding)   Administered by: Evelyn Tomlinosn and Fed by caretaker   Strategies, attempts, and responses: None    CLINICAL FINDINGS:   Oral phase impairments: Lip closure, Anterior-posterior transit, Bolus formation/control and Stasis/coating/ pocketing   Pharyngeal Phase Impairments: Swallow initiation Moderate delay      *Behavioral response/consistency: Patient presented with some loss of puree from mouth, significant delay in anterior-posterior transit, and coating and pocketing of puree in mouth with PO intake  *Laryngeal excursion upon palpation: Appeared [facility]  *Solid swallowing comments: Patient appeared able to masticate and swallow puree independently (i e , without cuing), but presented with some loss of puree from mouth, significant delay in anterior-posterior transit, and coating and pocketing of puree in mouth with PO intake        SWALLOWING DIAGNOSTIC IMPRESSION:  -Swallowing diagnosis/severity: Moderate-severe oropharyngeal phase dysphagia    -Factors affecting performance: Mental Status, Following directions and Endurance    -Safety concerns: Risk for aspiration, Risk for choking and Risk for inadequate nutrition/ hydration    -Risk factors: Impaired cognitive status/ dementia, Complex medical history, Dependent for feeding, History of aspiration pneumonia and Other Reflux    SAFETY PRECAUTIONS:  -Supervision: Feed by Caregiver    -Strategies: Small sips and bites when eating, Slow rate, swallow between bites, Alternate liquids and solids and Clear pocketing     -Positioning: Upright position at least 30 minutes after meal and Upright position during meals    -Recommend (solids): Pureed, Pills with puree    -Recommend (liquids): Nectar Thick, straws OK    **Cassie Protocol** - patient's caregiver and mother were educated on this protocol and given information to start to administer  Instructed caregiver to perform oral care prior to offering sips of thin water  Also restricted patient to 2 oz of thin water at a time in between meals  No straws with thin water  REFERRALS: None      Goals  Short-term goals:    1  Patient will complete daily oral motor exercise to increase labial and lingual range of motion, strength, and coordination with min cues to 90% effectiveness to strengthen oral musculature and anterior-posterior bolus transfer ability and prevent food or liquid spillage from the oral cavity, to be achieved in 4-6 weeks      2  Patient will complete swallowing maneuver (e g , supraglottic swallow, Mendelsohn maneuver, effortful swallow, etc ) with 80% accuracy to facilitate improved oral motor strengthening, tongue base retraction, HLE, airway protection and/or clearance of the bolus through the pharynx, to be achieved in 4-6 weeks      3  Patient's caregiver/staff will consistently perform informated compensatory strategies (e g , upright positioning, small bites/sips, slow rate, alternation of consistencies, multiple swallows, effortful swallow, chin tuck, head turn, etc ) with 80% accuracy to eliminate overt s/sx penetration/aspiration of least restrictive food/liquid consistencies, to be achieved in 4-6 weeks      4  Patient will tolerate NMES (i e , VitalStim) in conjunction with HLE exercises without overt s/sx of penetration or aspiration, to be achieved in 4-6 weeks      5  Patient's caregiver/staff will demonstrate utilization of recommended safe swallowing strategies during a clinician assessed meal across three sessions, to be achieved in 4-6 weeks  Long-term goals:    1  Patient will maintain adequate hydration and nutrition with optimum safety and efficacy of swallowing function on P O  intake without overt s/sx of penetration or aspiration by discharge       2  Patient's caregivers/staff will utilize compensatory strategies with optimum safety and efficacy of swallowing function on P O  intake without overt s/sx of penetration or aspiration by discharge  Functional Limitations Reporting (G-codes):   Flowsheet Rows      Most Recent Value   SLP G-Codes   FOTO information reviewed  N/A   Assessment Type  Evaluation   Functional Limitations  Swallowing   Swallow Current Status ()  CM   Swallow Goal Status ()  CK          Impressions/Recommendations    Impressions: Patient presents with moderate-severe oral/mild-moderate pharyngeal dysphagia, characterized by reduced lip closure, decreased mastication/oral processing, slowed anterior-posterior oral transit, and delayed initiation of swallow  During PO intake, patient displayed ability to intake adequate amount of NT liquid through straw and puree through spoon feeding  Patient presented with poor posture, with his head consistently tilted downward   Through evaluation, patient required frequent cuing to "sit up straight and tall " Patient's mother reported that they are interested in beginning ST to address patient's dysphagia through practice of oral-motor exercises and Vital Stim (with biofeedback)  Patient's mother also expressed interest in having patient evaluated by OT and PT at this location as well  Patient's mother and caretaker were provided with information regarding puree and nectar thick liquid diet, reflux precautions, and enactment of Refund Exchange Protocol, to be implemented if desired  Reciprocal comprehension verbally expressed  Patient's mother was also provided with options of different thickening agents, as requested (i e , simply thick)  Recommendations:  -Patient would benefit from outpatient skilled Speech Therapy services : Dysphagia therapy  -Recommending Physical Therapy and Occupational Therapy evaluation and treatment      -Frequency: 2x weekly  -Duration: 4-6 weeks    -Intervention certification from: 5/72/0266  -Intervention certification to: 0/54/8137     Visit Tracking:  -Referring provider: Epic  -Billing guidelines: CMS  -Visit #1/10   [employer]  -RE due 8/23/2018  Patient was evaluated by Dashawn Lin, graduate SLP student, and was under the direct supervision of CHANTE Gutiérrez , CCC-SLP

## 2018-07-24 ENCOUNTER — TELEPHONE (OUTPATIENT)
Dept: FAMILY MEDICINE CLINIC | Facility: CLINIC | Age: 33
End: 2018-07-24

## 2018-07-24 ENCOUNTER — EVALUATION (OUTPATIENT)
Dept: SPEECH THERAPY | Facility: CLINIC | Age: 33
End: 2018-07-24
Payer: COMMERCIAL

## 2018-07-24 DIAGNOSIS — R13.12 OROPHARYNGEAL DYSPHAGIA: Primary | ICD-10-CM

## 2018-07-24 DIAGNOSIS — G93.1 ANOXIC BRAIN DAMAGE (HCC): ICD-10-CM

## 2018-07-24 DIAGNOSIS — I45.81 LONG Q-T SYNDROME: ICD-10-CM

## 2018-07-24 DIAGNOSIS — T17.908A ASPIRATION INTO AIRWAY, INITIAL ENCOUNTER: Primary | ICD-10-CM

## 2018-07-24 PROCEDURE — 92610 EVALUATE SWALLOWING FUNCTION: CPT

## 2018-07-24 PROCEDURE — G8996 SWALLOW CURRENT STATUS: HCPCS

## 2018-07-24 PROCEDURE — G8997 SWALLOW GOAL STATUS: HCPCS

## 2018-07-24 RX ORDER — NYSTATIN 100000 [USP'U]/G
POWDER TOPICAL 2 TIMES DAILY
Qty: 15 G | Refills: 0 | Status: SHIPPED | OUTPATIENT
Start: 2018-07-24 | End: 2019-09-25

## 2018-07-24 NOTE — TELEPHONE ENCOUNTER
Calling from pt's residential home, nain was recently released from the hospital for pnuemonia and upon release was told to go to speech therapy but was not given a script , so they are calling to see if dr Artie South can give him one for [facility] for Fredo Hebert   Please call [phone] with any questions

## 2018-07-25 ENCOUNTER — OFFICE VISIT (OUTPATIENT)
Dept: FAMILY MEDICINE CLINIC | Facility: CLINIC | Age: 33
End: 2018-07-25
Payer: COMMERCIAL

## 2018-07-25 VITALS
HEART RATE: 84 BPM | RESPIRATION RATE: 16 BRPM | BODY MASS INDEX: 18.88 KG/M2 | DIASTOLIC BLOOD PRESSURE: 70 MMHG | TEMPERATURE: 96.3 F | WEIGHT: 131.6 LBS | SYSTOLIC BLOOD PRESSURE: 114 MMHG

## 2018-07-25 DIAGNOSIS — I10 ACCELERATED ESSENTIAL HYPERTENSION: ICD-10-CM

## 2018-07-25 DIAGNOSIS — R13.12 OROPHARYNGEAL DYSPHAGIA: Primary | ICD-10-CM

## 2018-07-25 DIAGNOSIS — J69.0 ASPIRATION PNEUMONIA, UNSPECIFIED ASPIRATION PNEUMONIA TYPE, UNSPECIFIED LATERALITY, UNSPECIFIED PART OF LUNG (HCC): ICD-10-CM

## 2018-07-25 DIAGNOSIS — R53.81 PHYSICAL DECONDITIONING: ICD-10-CM

## 2018-07-25 PROCEDURE — 99213 OFFICE O/P EST LOW 20 MIN: CPT | Performed by: FAMILY MEDICINE

## 2018-07-25 PROCEDURE — 3074F SYST BP LT 130 MM HG: CPT | Performed by: FAMILY MEDICINE

## 2018-07-25 PROCEDURE — 3078F DIAST BP <80 MM HG: CPT | Performed by: FAMILY MEDICINE

## 2018-07-25 NOTE — ASSESSMENT & PLAN NOTE
Pt was referred to speech pathologist due to:  - Aspiration Pneumonia  - Dysphagia  - Weight loss    VBS study shows:  Oral Stage: Moderate-severe  Pharyngeal Stage: Mild-moderate  Esophageal Stage: no overt abnormality  As per speech pathologist:  - Moderate-severe oral phase dysphagia   - Impression:  Risk for aspiration, choking, inadequate nutrition/hydration    Pt & family advised to:   - supervise feeding/caregiver feed   - give Rx w/ puree    - liquids: Nectar thick, straws ok

## 2018-07-25 NOTE — PROGRESS NOTES
Assessment/Plan:     Oropharyngeal dysphagia  Pt was referred to speech pathologist due to:  - Aspiration Pneumonia  - Dysphagia  - Weight loss    VBS study shows:  Oral Stage: Moderate-severe  Pharyngeal Stage: Mild-moderate  Esophageal Stage: no overt abnormality  As per speech pathologist:  - Moderate-severe oral phase dysphagia   - Impression:  Risk for aspiration, choking, inadequate nutrition/hydration    Pt & family advised to:   - supervise feeding/caregiver feed   - give Rx w/ puree    - liquids: Nectar thick, straws ok  Aspiration pneumonia ([address] )  Seen in ED on 7/17 CT showed: focal consolidation within the medial right middle lobe and right lower lobe lung base      Brusing on back, OMT referral to Dr Tamze,      Problem List Items Addressed This Visit     Accelerated essential hypertension   - Pt is compliant w/ current regimen and caregivers have requested refills to ensure compliance  Oropharyngeal dysphagia - Primary     Pt was referred to speech pathologist due to:  - Aspiration Pneumonia  - Dysphagia  - Weight loss    VBS study shows:  Oral Stage: Moderate-severe  Pharyngeal Stage: Mild-moderate  Esophageal Stage: no overt abnormality  As per speech pathologist:  - Moderate-severe oral phase dysphagia   - Impression:  Risk for aspiration, choking, inadequate nutrition/hydration    Pt & family advised to:   - supervise feeding/caregiver feed   - give Rx w/ puree    - liquids: Nectar thick, straws ok  - Advised pt & family to  Continue w/ speech pathologist  Next appt tomorrow  Aspiration pneumonia ([address] )     Seen in ED on 7/17 CT showed: focal consolidation within the medial right middle lobe and right lower lobe lung base    - Last dose of tx will be completed tonight  - Pt improving, not 100% at his baseline  - No crackles, decreased breath sounds, wheezing on PE  -Overall impression: Pt's aspiration pneumonia is most likely resolved             Other Visit Diagnoses Physical Deconditioning  - Caregivers say pt appears weaker than baseline  - Concerns about spine, there is some faint bruising present over lower spine most probably from leaning on hard surfaces (sitting back in chair all day) and the pt has also been leaning over much more due to deconditioning, especially after this last bout of pneumonia  - Referred to Dr Tamez for OMT     Advised to f/u as needed, & continue f/u w/ specialist  Pt will be following OMT w/ Dr Tamez who will be able to assess f/u as well  Caregivers requested 3 month supply for all meds, I will call the pharmacy, and if insurance allows pt may have the 3 month supply  Subjective:     Patient ID: Florian Barnard is a 27 y o  male  HPI   Pt w h/o prolonged QT syndrome, anoxic brain damage s/p cardiac arrest, Thaddeus Novak  is here for follow up post ED visit regarding aspiration pneumonia  Limited communication due to pt's chronic condition  Through communication w/mother and caregivers:   (-) SOB, pain  (+) weakness, deconditioning     ED visit: on 7/17: cc: SOB, vomiting abdominal pain --> Dx: Aspiration pnuemonia 1 dose of IV Ceftriaxone, Clindamycin, also given outpt Abx  Pt seen in clinic on 7/6 for pnuemonia, tx:Augmentin BID x 7 days    Review of Systems   Constitutional: Positive for activity change  Respiratory: Negative for shortness of breath  As per caregivers   Genitourinary:        Rash in groin area, as per caregivers   Musculoskeletal:        Bruising on lower spine         Objective:     Physical Exam   Constitutional: No distress  HENT:   Head: Normocephalic and atraumatic  Eyes: EOM are normal    Cardiovascular: Normal rate, regular rhythm and normal heart sounds  Pulmonary/Chest: Effort normal and breath sounds normal  He has no wheezes  Genitourinary:   Genitourinary Comments: Unable to assess rash in groin area; caregiver thinks rash is healing well and PE would cause more distress than benefit  Neurological: He is alert  Skin: Skin is warm and dry  Psychiatric: He has a normal mood and affect  Vitals:    07/25/18 1605   BP: 114/70   Pulse: 84   Resp: 16   Temp: (!) 96 3 °F (35 7 °C)   Weight: 59 7 kg (131 lb 9 6 oz)       Transitional Care Management Review:  Cassia Muñoz is a 27 y o  male here for TCM follow up       During the TCM phone call patient stated:    Date and time hospital follow up call was made:  7/23/2018  2:30 PM  Hospital care reviewed:  Records reviewed  Patient [facility] at:  [facility]  Date of admission:  7/17/18  Date of discharge:  7/21/18  Diagnosis:  aspiration pneumonia  Disposition:  Home  Were the patients medicaitons reviewed and updated:  Yes  Current symptoms:  None  Post hospital issues:  None  Should patient be enrolled in anticoag monitoring?:  No  Scheduled for follow up?:  Yes  Did you obtain your prescribed medications:  Yes  Do you need help managing your perscriptions or medications:  No (Comment: pt has cargiver who manages)  Is transportation to your appointments needed:  No  I have advised the patient to call PCP with any new or worsening symptoms (please type in name along with any credentials):  Jason Ngo MD PGY1

## 2018-07-25 NOTE — ASSESSMENT & PLAN NOTE
Seen in ED on 7/17 CT showed: focal consolidation within the medial right middle lobe and right lower lobe lung base

## 2018-07-26 ENCOUNTER — OFFICE VISIT (OUTPATIENT)
Dept: SPEECH THERAPY | Facility: CLINIC | Age: 33
End: 2018-07-26
Payer: COMMERCIAL

## 2018-07-26 ENCOUNTER — TRANSITIONAL CARE MANAGEMENT (OUTPATIENT)
Dept: FAMILY MEDICINE CLINIC | Facility: CLINIC | Age: 33
End: 2018-07-26

## 2018-07-26 DIAGNOSIS — R13.12 OROPHARYNGEAL DYSPHAGIA: Primary | ICD-10-CM

## 2018-07-26 DIAGNOSIS — I45.81 LONG Q-T SYNDROME: ICD-10-CM

## 2018-07-26 DIAGNOSIS — G93.1 ANOXIC BRAIN DAMAGE (HCC): ICD-10-CM

## 2018-07-26 PROCEDURE — 92526 ORAL FUNCTION THERAPY: CPT

## 2018-07-26 NOTE — PROGRESS NOTES
Daily Speech Treatment Note    Today's date: 2018  Patients name: Corey Salcedo  : 1985  MRN: [id number]  Safety measures:  Aspiration risk, Long Q-T syndrome, depression  Referring provider: Steven Horta MD    Primary Diagnosis/Billing code: R13 12  Secondary Diagnosis/ Billing code: G93 1, I45 81    Visit Tracking:  -Referring provider: Epic  -Billing guidelines: CMS   -Visit #2/10    [facility]  Medicare  -RE due 2018  Subjective/Behavioral:  -Patient arrived to session today with his mother, Shelley Olvera and Agusto tellez      Caregiver reporting patient is tolerating the xiao qu wu you Protocol about once a day, and thinks are going well  Reporting mild coughing with NTL using straw  Instructed caregiver to have 1:1 supervision with liquids intake to ensure he is not taking too much up the straw  Objective/Assessment:  -Patient's family member/caregiver was present during today's session  Short-term goals:  1  Patient will complete daily oral motor exercise to increase labial and lingual range of motion, strength, and coordination with min cues to 90% effectiveness to strengthen oral musculature and anterior-posterior bolus transfer ability and prevent food or liquid spillage from the oral cavity, to be achieved in 4-6 weeks  Oral Motor Exercises:  Labial: Patient completed labial exercises x 3 (5 reps each) with max cues and mirror utilized  Lingual: Patient completed lingual exercises (with and without resistance) x 8 (5 reps each) with max cues and mirror utilized       Provided caregiver with pictures and description of exercise program and instructed to complete prior to every meal      2  Patient will complete swallowing maneuver (e g , supraglottic swallow, Mendelsohn maneuver, effortful swallow, etc ) with 80% accuracy to facilitate improved oral motor strengthening, tongue base retraction, HLE, airway protection and/or clearance of the bolus through the pharynx, to be achieved in 4-6 weeks      3  Patient's caregiver/staff will consistently perform informated compensatory strategies (e g , upright positioning, small bites/sips, slow rate, alternation of consistencies, multiple swallows, effortful swallow, chin tuck, head turn, etc ) with 80% accuracy to eliminate overt s/sx penetration/aspiration of least restrictive food/liquid consistencies, to be achieved in 4-6 weeks      4  Patient will tolerate NMES (i e , VitalStim) in conjunction with HLE exercises without overt s/sx of penetration or aspiration, to be achieved in 4-6 weeks  sEMG    Average Threshold: 56 2   Average Work: 58 9   Exercise: Effortful swallow   Sets x Reps: 1 x 10   __________________________________________________________________________  Patient tolerated NMES (min threshold: 4 0 mA & max threshold: 6 0 mA) in conjunction with PO intake and exercises  Traditional VitalStim    Channel(s) used: 1,2   Placement: 3b   Treatment Duration: 10 minutes     Patient consumed the following during todays session: frozen strawberry puree    Clinician educated patient and caregiver on the following safe swallow strategies: Small sips and bites when eating, Slow rate, swallow between bites and Upright position during meals    Reviewed safe swallow strategies and discussed carryover at home  No overt distress or s/s of aspiration or dysphagia noted during todays therapy session         5  Patient's caregiver/staff will demonstrate utilization of recommended safe swallowing strategies during a clinician assessed meal across three sessions, to be achieved in 4-6 weeks  Plan:  -Patient was provided with home exercises/activities to target goals in plan of care at the end of today's session   -Continue with current plan of care

## 2018-07-31 ENCOUNTER — OFFICE VISIT (OUTPATIENT)
Dept: SPEECH THERAPY | Facility: CLINIC | Age: 33
End: 2018-07-31
Payer: COMMERCIAL

## 2018-07-31 DIAGNOSIS — I45.81 LONG Q-T SYNDROME: ICD-10-CM

## 2018-07-31 DIAGNOSIS — R13.12 OROPHARYNGEAL DYSPHAGIA: Primary | ICD-10-CM

## 2018-07-31 DIAGNOSIS — G93.1 ANOXIC BRAIN DAMAGE (HCC): ICD-10-CM

## 2018-07-31 PROCEDURE — 92526 ORAL FUNCTION THERAPY: CPT

## 2018-07-31 NOTE — PROGRESS NOTES
Daily Speech Treatment Note    Today's date: 2018  Patients name: Chavez Coburn  : 1985  MRN: [id number]  Safety measures:  Aspiration risk, Long Q-T syndrome, depression  Referring provider: Len Bardales MD    Primary Diagnosis/Billing code: R13 12  Secondary Diagnosis/ Billing code: G93 1, I45 81    Visit Tracking:  -Referring provider: Epic  -Billing guidelines: CMS   -Visit #3/10  [place]  Medicare  -RE due 2018  Subjective/Behavioral:  -Patient arrived to session today with his two aides  -Reporting more coughing at home, mostly after PO of liquids  Changed diet recommendations back to NO STRAWS, and tsp presentation only  Objective/Assessment:  -Patient's family member/caregiver was present during today's session  Short-term goals:  1  Patient will complete daily oral motor exercise to increase labial and lingual range of motion, strength, and coordination with min cues to 90% effectiveness to strengthen oral musculature and anterior-posterior bolus transfer ability and prevent food or liquid spillage from the oral cavity, to be achieved in 4-6 weeks  Oral Motor Exercises: Aides report they completed prior to coming to therapy today  2  Patient will complete swallowing maneuver (e g , supraglottic swallow, Mendelsohn maneuver, effortful swallow, etc ) with 80% accuracy to facilitate improved oral motor strengthening, tongue base retraction, HLE, airway protection and/or clearance of the bolus through the pharynx, to be achieved in 4-6 weeks      3   Patient's caregiver/staff will consistently perform informated compensatory strategies (e g , upright positioning, small bites/sips, slow rate, alternation of consistencies, multiple swallows, effortful swallow, chin tuck, head turn, etc ) with 80% accuracy to eliminate overt s/sx penetration/aspiration of least restrictive food/liquid consistencies, to be achieved in 4-6 weeks      4  Patient will tolerate NMES (i e , VitalStim) in conjunction with HLE exercises without overt s/sx of penetration or aspiration, to be achieved in 4-6 weeks  sEMG    Average Threshold: 19 6   Average Work: 23 7   Exercise: Effortful swallow   Sets x Reps: 2 x 10   ________________________________________________________________________________________________________________  Patient tolerated NMES (min threshold: 6 5 mA & max threshold: 7 5 mA) in conjunction with PO intake and exercises  Traditional VitalStim    Channel(s) used: 1,2   Placement: 3b   Treatment Duration: 20 minutes (10 min break in between)     Patient consumed the following during todays session: vanilla pudding, pureed peaches, and NTL lemonade  Anterior loss noted throughout meal  Patient used towel himself to wipe his mouth  No overt distress or s/s of aspiration or dysphagia noted during todays therapy session         5  Patient's caregiver/staff will demonstrate utilization of recommended safe swallowing strategies during a clinician assessed meal across three sessions, to be achieved in 4-6 weeks  Plan:  -Patient was provided with home exercises/activities to target goals in plan of care at the end of today's session   -Continue with current plan of care

## 2018-08-03 ENCOUNTER — OFFICE VISIT (OUTPATIENT)
Dept: SPEECH THERAPY | Facility: CLINIC | Age: 33
End: 2018-08-03
Payer: COMMERCIAL

## 2018-08-03 DIAGNOSIS — I45.81 LONG Q-T SYNDROME: ICD-10-CM

## 2018-08-03 DIAGNOSIS — G93.1 ANOXIC BRAIN DAMAGE (HCC): ICD-10-CM

## 2018-08-03 DIAGNOSIS — R13.12 OROPHARYNGEAL DYSPHAGIA: Primary | ICD-10-CM

## 2018-08-03 PROCEDURE — 92526 ORAL FUNCTION THERAPY: CPT

## 2018-08-03 NOTE — PROGRESS NOTES
Daily Speech Treatment Note    Today's date: 8/3/2018  Patients name: Lora Griffin  : 1985  MRN: [id number]  Safety measures:  Aspiration risk, Long Q-T syndrome, depression  Referring provider: Iain Ramey MD    Primary Diagnosis/Billing code: R13 12  Secondary Diagnosis/ Billing code: G93 1, I45 81    Visit Tracking:  -Referring provider: Epic  -Billing guidelines: CMS   -Visit #4/10   [place]  Medicare  -RE due 2018  Subjective/Behavioral:  -Patient arrived to session today with mom, and caregiver Hitesh Alcala)     Objective/Assessment:  -Patient's family member/caregiver was present during today's session  Short-term goals:  1  Patient will complete daily oral motor exercise to increase labial and lingual range of motion, strength, and coordination with min cues to 90% effectiveness to strengthen oral musculature and anterior-posterior bolus transfer ability and prevent food or liquid spillage from the oral cavity, to be achieved in 4-6 weeks  Oral Motor Exercises: completed during session with mod-max verbal and tactile cues  Utilization of "nuk" brush for labial tactile stimulation to improve labial seal      2  Patient will complete swallowing maneuver (e g , supraglottic swallow, Mendelsohn maneuver, effortful swallow, etc ) with 80% accuracy to facilitate improved oral motor strengthening, tongue base retraction, HLE, airway protection and/or clearance of the bolus through the pharynx, to be achieved in 4-6 weeks      3   Patient's caregiver/staff will consistently perform informated compensatory strategies (e g , upright positioning, small bites/sips, slow rate, alternation of consistencies, multiple swallows, effortful swallow, chin tuck, head turn, etc ) with 80% accuracy to eliminate overt s/sx penetration/aspiration of least restrictive food/liquid consistencies, to be achieved in 4-6 weeks      4  Patient will tolerate NMES (i e , VitalStim) in conjunction with HLE exercises without overt s/sx of penetration or aspiration, to be achieved in 4-6 weeks  sEMG    Average Threshold: 16 6   Average Work: 25 1   Exercise: Effortful swallow   Sets x Reps: 2 x 10   ________________________________________________________________________________________________________________  Patient tolerated NMES (min threshold: 7 5 mA & max threshold: 10 5 mA) in conjunction with PO intake and exercises  Traditional VitalStim    Channel(s) used: 1   Placement: 3b   Treatment Duration: 15     Patient consumed the following during todays session: vanilla pudding and NTL water via spoon (fed by clinician)  Anterior loss noted throughout meal  Patient used towel himself to wipe his mouth  Mod-max cues provide for effortful swallows  No overt distress or s/s of aspiration or dysphagia noted during todays therapy session  5  Patient's caregiver/staff will demonstrate utilization of recommended safe swallowing strategies during a clinician assessed meal across three sessions, to be achieved in 4-6 weeks  Plan:  -Patient was provided with home exercises/activities to target goals in plan of care at the end of today's session   -Continue with current plan of care

## 2018-08-07 ENCOUNTER — OFFICE VISIT (OUTPATIENT)
Dept: CARDIOLOGY CLINIC | Facility: CLINIC | Age: 33
End: 2018-08-07
Payer: COMMERCIAL

## 2018-08-07 ENCOUNTER — APPOINTMENT (OUTPATIENT)
Dept: OCCUPATIONAL THERAPY | Facility: CLINIC | Age: 33
End: 2018-08-07
Payer: COMMERCIAL

## 2018-08-07 ENCOUNTER — EVALUATION (OUTPATIENT)
Dept: PHYSICAL THERAPY | Facility: CLINIC | Age: 33
End: 2018-08-07
Payer: COMMERCIAL

## 2018-08-07 VITALS
DIASTOLIC BLOOD PRESSURE: 62 MMHG | WEIGHT: 131 LBS | SYSTOLIC BLOOD PRESSURE: 90 MMHG | BODY MASS INDEX: 18.75 KG/M2 | HEIGHT: 70 IN | RESPIRATION RATE: 69 BRPM

## 2018-08-07 DIAGNOSIS — I69.354 SPASTIC HEMIPLEGIA OF LEFT NONDOMINANT SIDE AS LATE EFFECT OF CEREBRAL INFARCTION (HCC): Primary | ICD-10-CM

## 2018-08-07 DIAGNOSIS — I45.81 LONG Q-T SYNDROME: Primary | ICD-10-CM

## 2018-08-07 DIAGNOSIS — G25.82 MUSCULAR RIGIDITY AND SPASM, PROGRESSIVE: ICD-10-CM

## 2018-08-07 DIAGNOSIS — G93.1 ANOXIC BRAIN DAMAGE (HCC): ICD-10-CM

## 2018-08-07 DIAGNOSIS — R53.81 PHYSICAL DECONDITIONING: ICD-10-CM

## 2018-08-07 PROCEDURE — G8979 MOBILITY GOAL STATUS: HCPCS | Performed by: PHYSICAL THERAPIST

## 2018-08-07 PROCEDURE — G8978 MOBILITY CURRENT STATUS: HCPCS | Performed by: PHYSICAL THERAPIST

## 2018-08-07 PROCEDURE — 93000 ELECTROCARDIOGRAM COMPLETE: CPT | Performed by: INTERNAL MEDICINE

## 2018-08-07 PROCEDURE — 97162 PT EVAL MOD COMPLEX 30 MIN: CPT | Performed by: PHYSICAL THERAPIST

## 2018-08-07 PROCEDURE — 99214 OFFICE O/P EST MOD 30 MIN: CPT | Performed by: INTERNAL MEDICINE

## 2018-08-07 NOTE — LETTER
August 7, 2018     Gayle Johnson MD  [address] [facility] [address]    Patient: Florian Barnard   YOB: 1985   Date of Visit: 8/7/2018       Dear Dr Jumana Landeros: Thank you for referring Florian Barnard to me for evaluation  Below are my notes for this consultation  If you have questions, please do not hesitate to call me  I look forward to following your patient along with you  Sincerely,        Kathleen Polk MD        CC: Sanjana Victoria MD  8/7/2018 10:39 PM  Sign at close encounter                                             Cardiology Follow Up    Florian Barnard  1985  [id number]  [address]  [address] [facility] [address] [address]    1  Long Q-T syndrome  POCT ECG   2  Anoxic brain damage (HCC)         Interval History:     Patient has a slow down hill course  He is still active - horse back riding, painting, guitar    The patient Florian Barnard is a very pleasant 25 yr old young man  he has a history of cardiac arrest when he was young  This has been extensively worked up  Finally at the end in [facility] the patient was confirmed to have prolonged QT syndrome  Since that origin event he has not had any cardiac arrest      The patient is accompanied by one of his friends and his care taker and his mother  He is currently in a wheelchair  The patient does communicate with his  mother  The patient has a relatively active lifestyle  He is active into all kinds of art  His mother shared with me one of his art collection that was shown at [facility] a year back     He is also active in [facility] exercises        Patient Active Problem List   Diagnosis    Abnormal bone density screening    Abnormal TSH    Accelerated essential hypertension    Anoxic brain damage (HCC)    Allergic rhinitis    Candidal intertrigo    Depression    Long Q-T syndrome    Quadriplegia ([facility] )    Rosacea    Cough    Poor weight gain in adult    Oropharyngeal dysphagia    Spastic hemiplegia of left nondominant side as late effect of cerebral infarction (HCC)    Psychophysiological insomnia    Candidiasis    Aspiration pneumonia (HCC)    Leukocytosis    Constipation    Physical deconditioning    Muscular rigidity and spasm, progressive     Past Medical History:   Diagnosis Date    Allergic rhinitis     Brain anoxia ([facility] )     Cardiac arrest [facility])     age 13 s/p long Q-T syndrome    Community acquired pneumonia     last assessed/resolved:  10/9/2014    Depression     GERD (gastroesophageal reflux disease)     Long Q-T syndrome     Muscular rigidity and spasm, progressive     Osteopenia     Osteoporosis     Quadriplegia ([facility] )     Spastic neurogenic bladder     Urinary incontinence      Social History     Social History    Marital status: Single     Spouse name: N/A    Number of children: N/A    Years of education: N/A     Occupational History    Not on file       Social History Main Topics    Smoking status: Never Smoker    Smokeless tobacco: Never Used    Alcohol use No    Drug use: No    Sexual activity: Not on file     Other Topics Concern    Not on file     Social History Narrative    Lives in a group home      Family History   Problem Relation Age of Onset    Other Father         mitral valve replaced    Diabetes type II Maternal Grandfather     Diabetes type II Maternal Uncle      Past Surgical History:   Procedure Laterality Date    APPENDECTOMY      WISDOM TOOTH EXTRACTION         Current Outpatient Prescriptions:     b complex-vitamin C-folic acid (RENAL) 1 mg, 1 SOFTGEL BY MOUTH DAILY @ 2PM (SUPPLEMENT) *LATONIA, Disp: 30 capsule, Rfl: 0    Coenzyme Q10 100 MG TABS, Take 2 capsules daily by mouth @ 9am, Disp: , Rfl:     Diapers & Supplies (CUTIES SIZE 3) MISC, Please provide 10 per day, Disp: 300 each, Rfl: 11    Diapers & Supplies (HUGGIES LITTLE MOVERS SIZE 3) MISC, by Does not apply route, Disp: , Rfl:     Disposable Gloves (VINYL GLOVES LARGE) MISC, by Does not apply route, Disp: , Rfl:     dronabinol (MARINOL) 2 5 mg capsule, Take one with breakfast, 2 with lunch, one with supper and one before bed, Disp: 150 capsule, Rfl: 1    ENEMEEZ PLUS  MG ENEM, use as directed, Disp: 150 mL, Rfl: 0    Ginkgo Biloba Extract 40 MG CAPS, 3 CAPS(120MG) BY MOUTH DAILY @ 2PM (SUPPLEMENT) *LATONIA, Disp: 90 capsule, Rfl: 0    GNP VITAMIN C 500 MG tablet, 1 TAB BY MOUTH DAILY @ 2PM (SUPPLEMENT) *LATONIA, Disp: 30 tablet, Rfl: 0    ibuprofen (MOTRIN) 600 mg tablet, Take 1 tablet (600 mg total) by mouth 2 (two) times a day (Patient taking differently: Take 600 mg by mouth as needed  ), Disp: 30 tablet, Rfl: 3    Incontinence Supply Disposable (DEPEND UNDERWEAR SM/MED) MISC, by Does not apply route, Disp: , Rfl:     Incontinence Supply Disposable (PREVAIL AIR BRIEFS) MISC, 1 each by Does not apply route every 4 (four) hours Please provide 5 briefs per day, Disp: 150 each, Rfl: 11    Incontinence Supply Disposable (SELECT DISPOSABLE UNDERWEAR SM) MISC, Please provide a 5 day supply DX R32 incontinence, Disp: 22 each, Rfl: 0    loratadine (CLARITIN) 10 mg tablet, Take 1 tablet (10 mg total) by mouth every 24 hours (Patient taking differently: Take 10 mg by mouth as needed  ), Disp: 90 tablet, Rfl: 3    Magnesium 500 MG CAPS, Take 1 capsule (500 mg total) by mouth daily, Disp: 30 capsule, Rfl: 5    metoprolol tartrate (LOPRESSOR) 25 mg tablet, 1/2 TAB(12 5MG) BY MOUTH DAILY @ 9AM (HTN) *HORTENSIA, Disp: 15 tablet, Rfl: 0    Misc   Devices [facility]) MISC, Please provide pt with a new cord for power wheelchair, Disp: 1 each, Rfl: 0    modafinil (PROVIGIL) 100 mg tablet, Take 2 daily @ 9:00am, Disp: 60 tablet, Rfl: 0    Multiple Vitamins-Minerals (CEROVITE SENIOR) TABS, Take 1 tablet by mouth daily, Disp: 30 tablet, Rfl: 5    nystatin (MYCOSTATIN) powder, Apply topically 2 (two) times a day, Disp: 15 g, Rfl: 0    Omega-3 Fatty Acids (FISH OIL) 1,000 mg, 1 CAPSULE BY MOUTH DAILY @ 9AM (SUPPLEMENT) *LATONIA, Disp: 30 capsule, Rfl: 0    onabotulinumtoxin A (BOTOX) 100 units, inject 500 units into left gastroc soleus and abductor pollicis ankit, Disp: , Rfl:     POLYETHYLENE GLYCOL 3350 PO, Take by mouth Take 1/2 capful daily by mouth @@ 9am , may increase to 1 or 2 capfuls as tolerated, Disp: , Rfl:     pyrithione zinc (HEAD AND SHOULDERS) 1 % shampoo, Apply topically daily as needed for dandruff, Disp: 400 mL, Rfl: 5    RA SUNSCREEN SPF50 LOTN, Apply sunscreen to exposed skin when outdoors every 2 hours as needed, Disp: 1 Bottle, Rfl: 5    ranitidine (ZANTAC) 150 mg tablet, 1 TAB BY MOUTH EVERY OTHER DAY @ 6PM (ACID REFLUX) *LATONIA, Disp: 15 tablet, Rfl: 0    sertraline (ZOLOFT) 100 mg tablet, 1 TABLET BY MOUTH DAILY @9AM (DEPRESSION) *LATONIA (Patient taking differently: 1 TABLET BY MOUTH DAILY @9PM (DEPRESSION) *LATONIA), Disp: 30 tablet, Rfl: 0    STARCH-MALTO DEXTRIN (THICK-IT) POWD, Use in one drink daily honey thick consistency, Disp: 1 Can, Rfl: 0    Tea Tree Oil OIL, by Does not apply route daily Apply to skin folds  (Patient taking differently: by Does not apply route as needed Apply to skin folds  ), Disp: 1 Bottle, Rfl: 3    Vitamins A & D (RA VITAMIN A & D) OINT, Apply topically to affected area daily as needed, Disp: 30 g, Rfl: 3    Witch Hazel (HEMORRHOIDAL) 50 % PADS, Apply topically, Disp: , Rfl:     Zinc 50 MG TABS, Take 50mg daily @ 2:00pm every other month   (July, September, November 2018, January 2019, March 2019), Disp: 30 tablet, Rfl: 5    zinc oxide (DESITIN) 40 % PSTE, Apply topically, Disp: , Rfl:   Allergies   Allergen Reactions    Other      drugs that prolong the QT interval  drugs that prolong the QT interval  Seasonal allergies        Labs:  Lab Results   Component Value Date     07/21/2018  09/06/2017    K 3 9 07/21/2018    K 4 8 09/06/2017     07/21/2018     09/06/2017    CO2 27 07/21/2018    CO2 30 09/06/2017    BUN 7 07/21/2018    BUN 17 07/05/2018    CREATININE 0 80 07/21/2018    CREATININE 0 86 09/06/2017    GLUCOSE 81 07/21/2018    CALCIUM 8 4 07/21/2018    CALCIUM 9 5 07/05/2018     No results found for: CKTOTAL, CKMB, CKMBINDEX, TROPONINI  Lab Results   Component Value Date    WBC 5 70 07/21/2018    WBC 5 1 09/06/2017    HGB 14 3 07/21/2018    HGB 15 8 09/06/2017    HCT 40 4 07/21/2018    HCT 47 5 09/06/2017    MCV 89 07/21/2018    MCV 93 3 09/06/2017     07/21/2018     09/06/2017     No results found for: CHOL, TRIG, HDL, LDLDIRECT  Imaging:   Review of Systems:  Review of Systems   All other systems reviewed and are negative  as described in my history of present illness      Physical Exam:  Physical Exam   Constitutional:   Anoxic brain injury post cardiac arrest  Spasticity of all limbs     HENT:   Head: Normocephalic and atraumatic  Mouth/Throat: Uvula is midline and mucous membranes are normal    Posterior pharynx is crowded   Eyes: Conjunctivae and lids are normal    no pallor  No cyanosis  No icterus   Neck: Trachea normal and normal range of motion  No JVD present  Carotid bruit is not present  No thyromegaly present  No jugular lymphadenopathy  Some flexor spasm of neck   Cardiovascular: Normal rate, regular rhythm, S1 normal, S2 normal, intact distal pulses and normal pulses  PMI is not displaced  Exam reveals no gallop, no S3, no S4 and no friction rub  No murmur heard  Pulmonary/Chest: Effort normal  No accessory muscle usage  No respiratory distress  He has decreased breath sounds in the right lower field and the left lower field  He has no wheezes  He has no rhonchi  He has no rales  He exhibits no tenderness  Flexion deformity of spine  Pectus deformity of chest   Abdominal: Soft   Normal appearance and bowel sounds are normal  He exhibits no distension and no mass  There is no splenomegaly or hepatomegaly  There is no tenderness  Musculoskeletal: Normal range of motion  He exhibits no edema, tenderness or deformity  Lymphadenopathy:     He has no cervical adenopathy  Neurological:   Patient does have slow writhing movement of arms  Flexion deformity present   Wasting of lower limbs   Skin: Skin is intact  No abrasion, no lesion and no rash noted  No erythema  Nails show no clubbing  Psychiatric: He has a normal mood and affect  His speech is normal and behavior is normal  Thought content normal        Discussion/Summary:    1  History of long QT syndrome with cardiac arrest during exercise  The patient had sustained significant injury but is currently stable  He is well rehabilitated into his usual daily course  He needs help for and 24-hour care  His family is extremely well involved in his care  The current EKG shows normal lites QTC  The family is aware to look at all medications which are potential for QTC prolongation  The highest risk once her antihistamine next and antibiotics which are commonly prescribed   they are also aware that if patient were to develop diarrhea or vomiting the resulting hypokalemia can prolong QTC  The patient has not had any event since the original event that caused cardiac arrest    He does carry a defibrillator with him  He is to continue his beta blocker  His EKGs were reviewed and my suspicion for a Brugada syndrome is extremely low       Patient is under supervision home care  We need to make sure if he has LQT 2 or 3 where loud noise ( smoke alarm) can trigger an event - drills are held once monthly   The same test is affordable now and we are sending out the same

## 2018-08-07 NOTE — PROGRESS NOTES
PT Evaluation     Today's date: 2018  Patient name: Thong Ramos  : 1985  MRN: [id number]  Referring provider: Ivelisse Canada MD  Dx: No diagnosis found  Assessment    Assessment details: Patient presents to physical therapy s/p hospitalization for pneumonia  At this time patient has decreased lower extremity strength, increased difficulty with transfers diminished endurance, decreased standing balance with greater assistance needed, poor seated posture, and significant LE tone and lack of coordination  Understanding of Dx/Px/POC: good   Prognosis: fair    Goals  STG:  Pt and caregivers will be ambulating on  Daily basis for atleast 50 ft within 4 weeks  Pt will require modified independent assistance for sit to stand transfers within 4 weeks  Pt will improve LE strength to 3/5 grossly in 4 weeks    LTG:  Pt will return to previous level of walking endurance within 8 weeks  Pt will require no more than min assistance for any transfers or walking within 8 weeks    Plan  Planned therapy interventions: aquatic therapy, neuromuscular re-education, gait training, graded exercise, home exercise program and therapeutic exercise  Frequency: 2x week  Duration in weeks: 8  Plan of Care beginning date: 2018  Plan of Care expiration date: 2018  Treatment plan discussed with: patient, family and caregiver        Subjective Evaluation    History of Present Illness  Mechanism of injury: Pt had a cardiac arrest at age 13 and had a brain injury  Since then he has been active  He had aspiration pneumonia about three weeks ago and spent time inactive int [facility]  In the past he has had yearly physical therapy for an update  He has 24 hr a day staff helping with therapy  Family wants him to not go backwards from the progress that has been made in his life  He has a weekly exercise checklist in [facility] he completes exercise twice a day   He was able to walk a half a block with a rolling walker but now has difficulty walking similar distance  Pt lives in his own house very close to his parents and has staff to assist him for any needs   Has had botox in his left hand and left calf   Pain  Current pain ratin  At best pain ratin  At worst pain ratin    Social Support  Steps to enter house: no  Stairs in house: no   Lives in: one-story house    Treatments  Previous treatment: physical therapy, speech therapy and occupational therapy  Patient Goals  Patient goals for therapy: increased strength  Patient goal: endurance, cardiac health         Objective       Balance testing: Plan to complete testing at next session    Walking - 50 ft in 1 minute then seated rest      MCTSIB Number of Seconds   Feet Together, Eyes Open Assist required  To maintian standing   Feet Together, Eyes Closed    Feet Together, Eyes Open Foam    Feet Together, Eyes Closed Foam                Coordination Left Right   Heel To [place] dysmetric dysmetric       Sensation Left Right   Kinesthesia WNL WNL   Light Touch WNL WNL       Muscle Tone Left Right   Modified Marco Scale     Hamstring 1+ 1   Gastroc 1 0   Adductor 2 1       Manual Muscle Testing - Hip Left Right   Flexion 2+ 2+   Extension 3 3   Abduction 4 4   External Rotation 2+ 2+     Manual Muscle Testing - Knee Left Right   Flexion 3 3   Extension 2+ 2+     Manual Muscle Testing - Ankle Left Right   Doriflexion 3 3   Plantarflexion 4 4        Transfers    Sit To Stand Min Assist   W/C To Bed Min Assist   Sit To Supine Min Assist   Roll Min Assist         Gait Assessment:knee, hip, lumbar extension throughout gait cycle with RW and mod assist for balance      Precautions: fall risk, CP    Daily Treatment Diary     Manual                                                                                   Exercise Diary Modalities

## 2018-08-07 NOTE — LETTER
2018      RE: Alka Carrillo   : [birth year]    To Whom It May Concern:    Mr  Mercy Breaux is under my professional care for his cardiology management of possible Long QT Syndrome  He was seen in my office on 18 and genetic testing has been initiated in this regard  This testing may take 6-8 weeks to be fully completed  Once these results are obtained, we will be able to further comment on his clinical condition and risk associated with loud noises  Further documentation with follow receipt of patient's genetic testing results        Sincerely,      Chai Ahn MD  [place] [id number] [facility]  Cardiac Electrophysiology

## 2018-08-08 ENCOUNTER — OFFICE VISIT (OUTPATIENT)
Dept: FAMILY MEDICINE CLINIC | Facility: CLINIC | Age: 33
End: 2018-08-08
Payer: COMMERCIAL

## 2018-08-08 VITALS
WEIGHT: 130.6 LBS | OXYGEN SATURATION: 97 % | BODY MASS INDEX: 18.74 KG/M2 | TEMPERATURE: 97.3 F | RESPIRATION RATE: 16 BRPM | HEART RATE: 84 BPM | DIASTOLIC BLOOD PRESSURE: 70 MMHG | SYSTOLIC BLOOD PRESSURE: 106 MMHG

## 2018-08-08 DIAGNOSIS — J69.0 ASPIRATION PNEUMONIA, UNSPECIFIED ASPIRATION PNEUMONIA TYPE, UNSPECIFIED LATERALITY, UNSPECIFIED PART OF LUNG (HCC): Primary | ICD-10-CM

## 2018-08-08 DIAGNOSIS — E56.9 VITAMIN DEFICIENCY: ICD-10-CM

## 2018-08-08 PROCEDURE — 99213 OFFICE O/P EST LOW 20 MIN: CPT | Performed by: FAMILY MEDICINE

## 2018-08-08 NOTE — PROGRESS NOTES
Assessment/Plan:    Aspiration pneumonia ([address] )  Pt here for  Follow-up regarding aspiration pneumonia  Patient  And the family and the character and care team reassured her regarding patient's status  Vitals reviewed  Patient has not been febrile since hospital discharge  His post ox today was 97%  Respiratory rate 16  Normotensive  no follow-up labs or images at this time  will consider if follow-up imaging after he returns from his vacation  lungs are clear to auscultation on physical exam  - reassurance and follow-up  A roughly 1 months was primary medicine team           Subjective:      Patient ID: Wale Benavides is a 27 y o  male  HPI  Kizzy Smiley is a 38-year-old male here with his caretaker team and his mom  There is limited communication Naman  Due to his history of hypoxic brain injury  Most of the history is provided by the mother and the caretaker came  Patient was recently admitted to the hospital for aspiration pneumonia on July 17th and discharged on the 24th  On the CT scan from the 18th  It showed right middle lobe and right lower lobe  Consolidation likely due to pneumonia  And additional scattered bibasilar opacities  There is also evidence of mild gaseous and stool distention of the colon  patient is going to be only lost 3 day vacation with family and the mother would like reassurance regarding patient's health condition before embarking on the vacation  Patient looks well today  Mother was concerned about follow-up tests,  Inform her that the chest x-ray is not appropriate at this time  Mom is concerned that Kizzy Smiley  Does not seem to be back at his baseline,  His voice is not as loud  And he is more tired than usual   I reassured family and the care team that Naman's  Vitals are appropriate  And he has not had a fever since discharge from the hospital   He has decreased energy level is most likely due to recent hospital stay    Patient's abdominal discomfort likely due to change in diet to pureed food  Vacation planned for 3 days at the [facility]  Will unlikely be any problem  Review of Systems   Constitutional: Positive for activity change  Negative for appetite change, chills, fever and unexpected weight change  Decreased energy level since discharge, improving    HENT: Positive for trouble swallowing and voice change  Negative for rhinorrhea and sore throat  Not as loud   Respiratory: Negative for shortness of breath  Gastrointestinal: Negative for constipation, diarrhea and vomiting  Endocrine: Negative for polyuria  Genitourinary: Negative for decreased urine volume and hematuria  Musculoskeletal: Positive for back pain  Back pain per patient   Skin: Negative for rash and wound  Allergic/Immunologic: Positive for environmental allergies  Negative for food allergies  Seasonal allergies    Neurological: Positive for facial asymmetry  Negative for tremors and seizures  Baseline facial asymmetry   Psychiatric/Behavioral: Negative for agitation and confusion  ROS with mother and care team, with some input from pt    Objective:    /70   Pulse 84   Temp (!) 97 3 °F (36 3 °C)   Resp 16   Wt 59 2 kg (130 lb 9 6 oz)   SpO2 97%   BMI 18 74 kg/m²        Physical Exam   Constitutional: He appears well-developed and well-nourished  No distress  Kyphotic    HENT:   Head: Normocephalic and atraumatic  Right Ear: External ear normal    Left Ear: External ear normal    Nose: Nose normal    Mouth/Throat: No oropharyngeal exudate  Eyes: Conjunctivae and EOM are normal  Right eye exhibits no discharge  Left eye exhibits no discharge  No scleral icterus  Neck: Normal range of motion  No tracheal deviation present  Cardiovascular: Normal rate, regular rhythm and intact distal pulses  Exam reveals no gallop and no friction rub  No murmur heard    Distant heart sound   Pulmonary/Chest: Effort normal and breath sounds normal  No respiratory distress  He has no wheezes  He has no rales  He exhibits no tenderness  Abdominal: Soft  Bowel sounds are normal  He exhibits no distension  There is tenderness  There is no rebound and no guarding  Mild tenderness on the right lower abdomen  Pt states its because he needs to go to the bathroom  Musculoskeletal: He exhibits no edema  Neurological: He is alert  Skin: Skin is warm and dry  No rash noted  He is not diaphoretic  No erythema  No pallor  Psychiatric: He has a normal mood and affect  Vitals reviewed  CHANTE Whyte    Family Medicine, PGY-1

## 2018-08-08 NOTE — PROGRESS NOTES
Cardiology Follow Up    Al Carballo  1985  [id number]  [address]  [address] [facility] [address] [place] [address] Road    1  Long Q-T syndrome  POCT ECG   2  Anoxic brain damage (HCC)         Interval History:     Patient has a slow down hill course  He is still active - horse back riding, painting, guitar    The patient lA Carballo is a very pleasant 25 yr old young man  he has a history of cardiac arrest when he was young  This has been extensively worked up  Finally at the end in [facility] the patient was confirmed to have prolonged QT syndrome  Since that origin event he has not had any cardiac arrest      The patient is accompanied by one of his friends and his care taker and his mother  He is currently in a wheelchair  The patient does communicate with his  mother  The patient has a relatively active lifestyle  He is active into all kinds of art  His mother shared with me one of his art collection that was shown at [facility] a year back     He is also active in [facility] exercises        Patient Active Problem List   Diagnosis    Abnormal bone density screening    Abnormal TSH    Accelerated essential hypertension    Anoxic brain damage (HCC)    Allergic rhinitis    Candidal intertrigo    Depression    Long Q-T syndrome    Quadriplegia (HCC)    Rosacea    Cough    Poor weight gain in adult    Oropharyngeal dysphagia    Spastic hemiplegia of left nondominant side as late effect of cerebral infarction (HCC)    Psychophysiological insomnia    Candidiasis    Aspiration pneumonia (HCC)    Leukocytosis    Constipation    Physical deconditioning    Muscular rigidity and spasm, progressive     Past Medical History:   Diagnosis Date    Allergic rhinitis     Brain anoxia ([address] )     Cardiac arrest [facility])     age 13 s/p long Q-T syndrome    [facility] acquired pneumonia     last assessed/resolved:  10/9/2014    Depression     GERD (gastroesophageal reflux disease)     Long Q-T syndrome     Muscular rigidity and spasm, progressive     Osteopenia     Osteoporosis     Quadriplegia ([address] )     Spastic neurogenic bladder     Urinary incontinence      Social History     Social History    Marital status: Single     Spouse name: N/A    Number of children: N/A    Years of education: N/A     Occupational History    Not on file       Social History Main Topics    Smoking status: Never Smoker    Smokeless tobacco: Never Used    Alcohol use No    Drug use: No    Sexual activity: Not on file     Other Topics Concern    Not on file     Social History Narrative    Lives in a group home      Family History   Problem Relation Age of Onset    Other Father         mitral valve replaced    Diabetes type II Maternal Grandfather     Diabetes type II Maternal Uncle      Past Surgical History:   Procedure Laterality Date    APPENDECTOMY      WISDOM TOOTH EXTRACTION         Current Outpatient Prescriptions:     b complex-vitamin C-folic acid (RENAL) 1 mg, 1 SOFTGEL BY MOUTH DAILY @ 2PM (SUPPLEMENT) *LATONIA, Disp: 30 capsule, Rfl: 0    Coenzyme Q10 100 MG TABS, Take 2 capsules daily by mouth @ 9am, Disp: , Rfl:     Diapers & Supplies (CUTIES SIZE 3) MISC, Please provide 10 per day, Disp: 300 each, Rfl: 11    Diapers & Supplies (HUGGIES LITTLE MOVERS SIZE 3) MISC, by Does not apply route, Disp: , Rfl:     Disposable Gloves (VINYL GLOVES LARGE) MISC, by Does not apply route, Disp: , Rfl:     dronabinol (MARINOL) 2 5 mg capsule, Take one with breakfast, 2 with lunch, one with supper and one before bed, Disp: 150 capsule, Rfl: 1    ENEMEEZ PLUS  MG ENEM, use as directed, Disp: 150 mL, Rfl: 0    Ginkgo Biloba Extract 40 MG CAPS, 3 CAPS(120MG) BY MOUTH DAILY @ 2PM (SUPPLEMENT) *LATONIA, Disp: 90 capsule, Rfl: 0    GNP VITAMIN C 500 MG tablet, 1 TAB BY MOUTH DAILY @ 2PM (SUPPLEMENT) *LATONIA, Disp: 30 tablet, Rfl: 0    ibuprofen (MOTRIN) 600 mg tablet, Take 1 tablet (600 mg total) by mouth 2 (two) times a day (Patient taking differently: Take 600 mg by mouth as needed  ), Disp: 30 tablet, Rfl: 3    Incontinence Supply Disposable (DEPEND UNDERWEAR SM/MED) MISC, by Does not apply route, Disp: , Rfl:     Incontinence Supply Disposable (PREVAIL AIR BRIEFS) MISC, 1 each by Does not apply route every 4 (four) hours Please provide 5 briefs per day, Disp: 150 each, Rfl: 11    Incontinence Supply Disposable (SELECT DISPOSABLE UNDERWEAR SM) MISC, Please provide a 5 day supply DX R32 incontinence, Disp: 22 each, Rfl: 0    loratadine (CLARITIN) 10 mg tablet, Take 1 tablet (10 mg total) by mouth every 24 hours (Patient taking differently: Take 10 mg by mouth as needed  ), Disp: 90 tablet, Rfl: 3    Magnesium 500 MG CAPS, Take 1 capsule (500 mg total) by mouth daily, Disp: 30 capsule, Rfl: 5    metoprolol tartrate (LOPRESSOR) 25 mg tablet, 1/2 TAB(12 5MG) BY MOUTH DAILY @ 9AM (HTN) *HORTENSIA, Disp: 15 tablet, Rfl: 0    Misc   Devices [facility]) MISC, Please provide pt with a new cord for power wheelchair, Disp: 1 each, Rfl: 0    modafinil (PROVIGIL) 100 mg tablet, Take 2 daily @ 9:00am, Disp: 60 tablet, Rfl: 0    Multiple Vitamins-Minerals (CEROVITE SENIOR) TABS, Take 1 tablet by mouth daily, Disp: 30 tablet, Rfl: 5    nystatin (MYCOSTATIN) powder, Apply topically 2 (two) times a day, Disp: 15 g, Rfl: 0    Omega-3 Fatty Acids (FISH OIL) 1,000 mg, 1 CAPSULE BY MOUTH DAILY @ 9AM (SUPPLEMENT) *LATONIA, Disp: 30 capsule, Rfl: 0    onabotulinumtoxin A (BOTOX) 100 units, inject 500 units into left gastroc soleus and abductor pollicis ankit, Disp: , Rfl:     POLYETHYLENE GLYCOL 3350 PO, Take by mouth Take 1/2 capful daily by mouth @@ 9am , may increase to 1 or 2 capfuls as tolerated, Disp: , Rfl:     pyrithione zinc (HEAD AND SHOULDERS) 1 % shampoo, Apply topically daily as needed for dandruff, Disp: 400 mL, Rfl: 5    RA SUNSCREEN SPF50 LOTN, Apply sunscreen to exposed skin when outdoors every 2 hours as needed, Disp: 1 Bottle, Rfl: 5    ranitidine (ZANTAC) 150 mg tablet, 1 TAB BY MOUTH EVERY OTHER DAY @ 6PM (ACID REFLUX) *GOODBRED, Disp: 15 tablet, Rfl: 0    sertraline (ZOLOFT) 100 mg tablet, 1 TABLET BY MOUTH DAILY @9AM (DEPRESSION) *GOODBRED (Patient taking differently: 1 TABLET BY MOUTH DAILY @9PM (DEPRESSION) *GOODBRED), Disp: 30 tablet, Rfl: 0    STARCH-MALTO DEXTRIN (THICK-IT) POWD, Use in one drink daily honey thick consistency, Disp: 1 Can, Rfl: 0    Tea Tree Oil OIL, by Does not apply route daily Apply to skin folds  (Patient taking differently: by Does not apply route as needed Apply to skin folds  ), Disp: 1 Bottle, Rfl: 3    Vitamins A & D (RA VITAMIN A & D) OINT, Apply topically to affected area daily as needed, Disp: 30 g, Rfl: 3    Witch Hazel (HEMORRHOIDAL) 50 % PADS, Apply topically, Disp: , Rfl:     Zinc 50 MG TABS, Take 50mg daily @ 2:00pm every other month   (July, September, November 2018, January 2019, March 2019), Disp: 30 tablet, Rfl: 5    zinc oxide (DESITIN) 40 % PSTE, Apply topically, Disp: , Rfl:   Allergies   Allergen Reactions    Other      drugs that prolong the QT interval  drugs that prolong the QT interval  Seasonal allergies        Labs:  Lab Results   Component Value Date     07/21/2018     09/06/2017    K 3 9 07/21/2018    K 4 8 09/06/2017     07/21/2018     09/06/2017    CO2 27 07/21/2018    CO2 30 09/06/2017    BUN 7 07/21/2018    BUN 17 07/05/2018    CREATININE 0 80 07/21/2018    CREATININE 0 86 09/06/2017    GLUCOSE 81 07/21/2018    CALCIUM 8 4 07/21/2018    CALCIUM 9 5 07/05/2018     No results found for: CKTOTAL, CKMB, CKMBINDEX, TROPONINI  Lab Results   Component Value Date    WBC 5 70 07/21/2018    WBC 5 1 09/06/2017    HGB 14 3 07/21/2018    HGB 15 8 09/06/2017    HCT 40 4 07/21/2018    HCT 47 5 09/06/2017    MCV 89 07/21/2018    MCV 93 3 09/06/2017     07/21/2018     09/06/2017     No results found for: CHOL, TRIG, HDL, LDLDIRECT  Imaging:   Review of Systems:  Review of Systems   All other systems reviewed and are negative  as described in my history of present illness      Physical Exam:  Physical Exam   Constitutional:   Anoxic brain injury post cardiac arrest  Spasticity of all limbs     HENT:   Head: Normocephalic and atraumatic  Mouth/Throat: Uvula is midline and mucous membranes are normal    Posterior pharynx is crowded   Eyes: Conjunctivae and lids are normal    no pallor  No cyanosis  No icterus   Neck: Trachea normal and normal range of motion  No JVD present  Carotid bruit is not present  No thyromegaly present  No jugular lymphadenopathy  Some flexor spasm of neck   Cardiovascular: Normal rate, regular rhythm, S1 normal, S2 normal, intact distal pulses and normal pulses  PMI is not displaced  Exam reveals no gallop, no S3, no S4 and no friction rub  No murmur heard  Pulmonary/Chest: Effort normal  No accessory muscle usage  No respiratory distress  He has decreased breath sounds in the right lower field and the left lower field  He has no wheezes  He has no rhonchi  He has no rales  He exhibits no tenderness  Flexion deformity of spine  Pectus deformity of chest   Abdominal: Soft  Normal appearance and bowel sounds are normal  He exhibits no distension and no mass  There is no splenomegaly or hepatomegaly  There is no tenderness  Musculoskeletal: Normal range of motion  He exhibits no edema, tenderness or deformity  Lymphadenopathy:     He has no cervical adenopathy  Neurological:   Patient does have slow writhing movement of arms  Flexion deformity present   Wasting of lower limbs   Skin: Skin is intact  No abrasion, no lesion and no rash noted  No erythema  Nails show no clubbing  Psychiatric: He has a normal mood and affect   His speech is normal and behavior is normal  Thought content normal        Discussion/Summary:    1  History of long QT syndrome with cardiac arrest during exercise  The patient had sustained significant injury but is currently stable  He is well rehabilitated into his usual daily course  He needs help for and 24-hour care  His family is extremely well involved in his care  The current EKG shows normal lites QTC  The family is aware to look at all medications which are potential for QTC prolongation  The highest risk once her antihistamine next and antibiotics which are commonly prescribed   they are also aware that if patient were to develop diarrhea or vomiting the resulting hypokalemia can prolong QTC  The patient has not had any event since the original event that caused cardiac arrest    He does carry a defibrillator with him  He is to continue his beta blocker  His EKGs were reviewed and my suspicion for a Brugada syndrome is extremely low       Patient is under supervision home care  We need to make sure if he has LQT 2 or 3 where loud noise ( smoke alarm) can trigger an event - drills are held once monthly   The same test is affordable now and we are sending out the same

## 2018-08-08 NOTE — PATIENT INSTRUCTIONS
Aspiration Pneumonia   AMBULATORY CARE:   Aspiration pneumonia  is a lung infection that develops after you aspirate (inhale) food, liquid, or vomit into your lungs  You can also aspirate food or liquid from your stomach that backs up into your esophagus  If you are not able to cough up the aspirated material, bacteria can grow in your lungs and cause an infection  Common symptoms include the following:   · Fever     · Cough, which may or may not bring up mucus     · Sputum (spit) that is pink or frothy    · Bluish skin around your mouth or your fingertips    · Trouble swallowing     · Shortness of breath, rapid breathing, or noisy breathing     · Chest pain or a rapid heartbeat    · Confusion, fatigue, or changes in alertness     · Voice changes such as gurgling and hoarseness     · Loss of appetite or weight loss  Seek care immediately if:   · You have chest pain  · You are confused or cannot think clearly  · You have more trouble breathing or your breathing seems faster than normal   Contact your healthcare provider if:   · You have a fever  · Your symptoms are not better after 2 or 3 days of treatment  · You have questions or concerns about your condition or care  Treatment for aspiration pneumonia  may include any of the following:  · Antibiotics  treat pneumonia caused by bacteria  · Steroids  may help to open your air passages so you can breathe easier  Do not stop taking this medicine without asking your healthcare provider  Stopping on your own can cause problems  · Take your medicine as directed  Contact your healthcare provider if you think your medicine is not helping or if you have side effects  Tell him or her if you are allergic to any medicine  Keep a list of the medicines, vitamins, and herbs you take  Include the amounts, and when and why you take them  Bring the list or the pill bottles to follow-up visits   Carry your medicine list with you in case of an emergency  Prevent or manage aspiration pneumonia:   · Go to speech therapy as directed  A speech therapist can teach you exercises to strengthen the muscles you use to swallow  · Sit up while you eat  If you are bedridden, keep the head of your bed slightly up (at about a 30° to 45° angle) while you eat  Take small bites, eat slowly, and swallow with your chin down  · Eat soft foods and drink thickened liquids  A dietitian can teach you how to thicken your liquids so you have less trouble swallowing  Drink liquids through a straw or sip them from a spoon  Ask your dietitian what kinds of foods you should eat  He or she may suggest soft foods such as cooked cereal, pasta, well-cooked fruits and vegetables, and scrambled eggs  Your dietitian may also suggest moist, tender meats that are cut into small pieces  · Care for your teeth and mouth  Mouth care can help kill harmful bacteria in your mouth so you do not aspirate them  While you are sitting up, brush your teeth for 2 minutes daily after breakfast and again after dinner  Also brush your tongue  If you do not have teeth, gently brush your gums with a soft toothbrush  Dentures should be removed and cleaned with an electric toothbrush and water after breakfast and dinner  Soak dentures overnight in a cleaning solution  Visit a dentist regularly to have your teeth and gums cleaned  · Limit or avoid taking sedatives  These medicines increase the risk of aspiration because they dry out your mouth and make you drowsy  Use fewer antihistamine medicines because they also make your mouth dry  · Do not smoke  Nicotine and other chemicals in cigarettes and cigars can cause lung damage  Ask your healthcare provider for information if you currently smoke and need help to quit  E-cigarettes or smokeless tobacco still contain nicotine  Talk to your healthcare provider before you use these products    Follow up with your healthcare provider as directed: Write down your questions so you remember to ask them during your visits  © 2017 [address] [employer] Information is for End User's use only and may not be sold, redistributed or otherwise used for commercial purposes  All illustrations and images included in CareNotes® are the copyrighted property of A D A M [employer] Inc  or [employer]  The above information is an  only  It is not intended as medical advice for individual conditions or treatments  Talk to your doctor, nurse or pharmacist before following any medical regimen to see if it is safe and effective for you

## 2018-08-09 ENCOUNTER — OFFICE VISIT (OUTPATIENT)
Dept: SPEECH THERAPY | Facility: CLINIC | Age: 33
End: 2018-08-09
Payer: COMMERCIAL

## 2018-08-09 ENCOUNTER — OFFICE VISIT (OUTPATIENT)
Dept: PHYSICAL THERAPY | Facility: CLINIC | Age: 33
End: 2018-08-09
Payer: COMMERCIAL

## 2018-08-09 DIAGNOSIS — I69.354 SPASTIC HEMIPLEGIA OF LEFT NONDOMINANT SIDE AS LATE EFFECT OF CEREBRAL INFARCTION (HCC): Primary | ICD-10-CM

## 2018-08-09 DIAGNOSIS — R53.81 PHYSICAL DECONDITIONING: ICD-10-CM

## 2018-08-09 DIAGNOSIS — R13.12 OROPHARYNGEAL DYSPHAGIA: Primary | ICD-10-CM

## 2018-08-09 DIAGNOSIS — G93.1 ANOXIC BRAIN DAMAGE (HCC): ICD-10-CM

## 2018-08-09 DIAGNOSIS — G25.82 MUSCULAR RIGIDITY AND SPASM, PROGRESSIVE: ICD-10-CM

## 2018-08-09 DIAGNOSIS — I45.81 LONG Q-T SYNDROME: ICD-10-CM

## 2018-08-09 PROCEDURE — 92526 ORAL FUNCTION THERAPY: CPT

## 2018-08-09 PROCEDURE — 97112 NEUROMUSCULAR REEDUCATION: CPT | Performed by: PHYSICAL THERAPIST

## 2018-08-09 PROCEDURE — 97116 GAIT TRAINING THERAPY: CPT | Performed by: PHYSICAL THERAPIST

## 2018-08-09 NOTE — PROGRESS NOTES
Daily Note     Today's date: 2018  Patient name: rBeonna Hilliard  : 1985  MRN: [id number]  Referring provider: Elsie Swanson MD  Dx:   Encounter Diagnosis     ICD-10-CM    1  Spastic hemiplegia of left nondominant side as late effect of cerebral infarction [facility]) I69 354    2  Physical deconditioning R53 81    3  Muscular rigidity and spasm, progressive G25 82                   Subjective: No new changes since evaluation, feeling fine today      Objective: See treatment diary below      Assessment: Trialed heel lift this session to weight shift patient onto toes to improve balance during ambulation and sit to stands and standing balance  Pt was able to indpeendnetly maintain balance holding RW and for brief instances without UE  Pt also improved balance during walking when       Plan: Continue per plan of care       Precautions CP, fall risk    Specialty Daily Treatment Diary     Manual                                                     Exercise Diary         ambulation Solo step mod assist for balance using RW 10 ft x 5       Standing balance with reaching At RW 4 min x 3       Sit to stands  10x                                                                                                                                                    Modalities

## 2018-08-09 NOTE — PROGRESS NOTES
Daily Speech Treatment Note    Today's date: 2018  Patients name: Aubrey Hodge  : 1985  MRN: [id number]  Safety measures:  Aspiration risk, Long Q-T syndrome, depression  Referring provider: Rani Moreira MD    Primary Diagnosis/Billing code: R13 12  Secondary Diagnosis/ Billing code: G93 1, I45 81    Visit Tracking:  -Referring provider: Epic  -Billing guidelines: CMS   -Visit #5/10   [place]  Medicare  -RE due 2018  Subjective/Behavioral:  -Patient arrived to session today with mom, and caregiver (Agusto and Maren Collier)  Agusto reports he is coughing much less since last session  Objective/Assessment:  -Patient's family member/caregiver was present during today's session  Short-term goals:  1  Patient will complete daily oral motor exercise to increase labial and lingual range of motion, strength, and coordination with min cues to 90% effectiveness to strengthen oral musculature and anterior-posterior bolus transfer ability and prevent food or liquid spillage from the oral cavity, to be achieved in 4-6 weeks  2  Patient will complete swallowing maneuver (e g , supraglottic swallow, Mendelsohn maneuver, effortful swallow, etc ) with 80% accuracy to facilitate improved oral motor strengthening, tongue base retraction, HLE, airway protection and/or clearance of the bolus through the pharynx, to be achieved in 4-6 weeks      3  Patient's caregiver/staff will consistently perform informated compensatory strategies (e g , upright positioning, small bites/sips, slow rate, alternation of consistencies, multiple swallows, effortful swallow, chin tuck, head turn, etc ) with 80% accuracy to eliminate overt s/sx penetration/aspiration of least restrictive food/liquid consistencies, to be achieved in 4-6 weeks      4  Patient will tolerate NMES (i e , VitalStim) in conjunction with HLE exercises without overt s/sx of penetration or aspiration, to be achieved in 4-6 weeks       sEMG    Average Threshold: 23   Average Work: 31 5   Exercise: Effortful swallow   Sets x Reps: 2 x 10   ________________________________________________________________________________________________________________  Patient tolerated NMES (min threshold: 5 0 mA & max threshold: 9 0 mA) in conjunction with PO intake and exercises  Traditional VitalStim    Channel(s) used: 1,2   Placement: 3b   Treatment Duration: 32 minutes     Patient consumed the following during todays session: mashed potatoes, pureed peas and ground meatloaf and NTL juice via spoon (fed by clinician)  Anterior loss noted throughout meal  Patient used towel himself to wipe his mouth  Mod-max cues provide for effortful swallows  No overt distress or s/s of aspiration or dysphagia noted during todays therapy session  5  Patient's caregiver/staff will demonstrate utilization of recommended safe swallowing strategies during a clinician assessed meal across three sessions, to be achieved in 4-6 weeks  Plan:  -Patient was provided with home exercises/activities to target goals in plan of care at the end of today's session   -Continue with current plan of care

## 2018-08-14 ENCOUNTER — OFFICE VISIT (OUTPATIENT)
Dept: PHYSICAL THERAPY | Facility: CLINIC | Age: 33
End: 2018-08-14
Payer: COMMERCIAL

## 2018-08-14 ENCOUNTER — EVALUATION (OUTPATIENT)
Dept: OCCUPATIONAL THERAPY | Facility: CLINIC | Age: 33
End: 2018-08-14
Payer: COMMERCIAL

## 2018-08-14 ENCOUNTER — APPOINTMENT (OUTPATIENT)
Dept: SPEECH THERAPY | Facility: CLINIC | Age: 33
End: 2018-08-14
Payer: COMMERCIAL

## 2018-08-14 DIAGNOSIS — G25.82 MUSCULAR RIGIDITY AND SPASM, PROGRESSIVE: ICD-10-CM

## 2018-08-14 DIAGNOSIS — I69.354 SPASTIC HEMIPLEGIA OF LEFT NONDOMINANT SIDE AS LATE EFFECT OF CEREBRAL INFARCTION (HCC): Primary | ICD-10-CM

## 2018-08-14 DIAGNOSIS — R53.81 PHYSICAL DECONDITIONING: ICD-10-CM

## 2018-08-14 DIAGNOSIS — G93.1 ANOXIC BRAIN DAMAGE (HCC): Primary | ICD-10-CM

## 2018-08-14 PROCEDURE — 97112 NEUROMUSCULAR REEDUCATION: CPT | Performed by: PHYSICAL THERAPIST

## 2018-08-14 PROCEDURE — 97110 THERAPEUTIC EXERCISES: CPT | Performed by: PHYSICAL THERAPIST

## 2018-08-14 PROCEDURE — G8985 CARRY GOAL STATUS: HCPCS

## 2018-08-14 PROCEDURE — 97167 OT EVAL HIGH COMPLEX 60 MIN: CPT

## 2018-08-14 PROCEDURE — 97116 GAIT TRAINING THERAPY: CPT | Performed by: PHYSICAL THERAPIST

## 2018-08-14 PROCEDURE — G8984 CARRY CURRENT STATUS: HCPCS

## 2018-08-14 NOTE — PROGRESS NOTES
Occupational Therapy Neuro Evaluation:    Today's Date: 2018  Patient Name: Rachell Richard  : 1985  MRN: [id number]  Referring Provider: Bertram Arnold MD  Dx: Anoxic brain damage [facility]) [G93 1]    Active Problem List:   Patient Active Problem List   Diagnosis    Abnormal bone density screening    Abnormal TSH    Accelerated essential hypertension    Anoxic brain damage (HCC)    Allergic rhinitis    Candidal intertrigo    Depression    Long Q-T syndrome    Quadriplegia ([address] )    Rosacea    Cough    Poor weight gain in adult    Oropharyngeal dysphagia    Spastic hemiplegia of left nondominant side as late effect of cerebral infarction ([address] )    Psychophysiological insomnia    Candidiasis    Aspiration pneumonia (HCC)    Leukocytosis    Constipation    Physical deconditioning    Muscular rigidity and spasm, progressive     Past Medical Hx:   Past Medical History:   Diagnosis Date    Allergic rhinitis     [facility]     Cardiac arrest ([facility] )     age 13 s/p long Q-T syndrome    Community acquired pneumonia     last assessed/resolved:  10/9/2014    Depression     GERD (gastroesophageal reflux disease)     Long Q-T syndrome     Muscular rigidity and spasm, progressive     Osteopenia     Osteoporosis     Quadriplegia ([address] )     Spastic neurogenic bladder     Urinary incontinence      Past Surgical Hx:   Past Surgical History:   Procedure Laterality Date    APPENDECTOMY      WISDOM TOOTH EXTRACTION        Pain Levels:   Restin    With Activity:  0    Subjective/Patient Goal: "Yes"    History of Present Illness:  Pt is a pleasant, active, disabled 27 y o  male seen for OT eval [facility] - w/ SOB, cough, ultimately dx'd w/ PNA, constipation, long QT syndrome, oropharyngeal dysphagia, depression, leukocytosis, prior anoxic brain injury, evaluated by SLP for dysphagia recommended pureed and nectar thick liquids, referred for outpt SLP, recommended additional PT/OT services for "tune up" re: prior CHINYERE as patient has a history of an anoxic brain injury that occurred 20 years ago after cardiac arrest due to Long Q-T syndrome and has since been living with quadriplegia since, comorbidities as listed above  Lifestyle Performance Model:  Autonomy: Pt lives alone in a home two blocks away from mother Tila's home, has 24 hr care per "support staff" from [employer] from [facility] (goals for [place] connection and normalization of independence in a community supported setting)  Pt able to initiate dressing, total A for bathing, incontinent requires bowel/bladder management and brief, does not drive, max A for w/c propulsion, and required use of manual w/c, electric w/c, ramp, RW w/ full assist, minimally verbal, communicates via Y/N trialed [employer] eye gaze trials for communication board w/ [facility] in the past, per mother @ bedside pt is pending reception of equipment  Mother also reports h/o AE though requests "age appropriate items"  Reports h/o outpt OT/Pt/SLP @ [facility], has resting hand splint for pm use only, to bring next session  Reciprocal Relationships: Supportive mother Malgorzata Ye lives separately 2 blocks from pt's home, parents are , father uninvolved, no siblings, has 24 hr care support staff  Service to Others: Pt is employed few hours a week at [employer], collates paperwork  Intrinsic Gratification: Enjoys going horseback riding at [employer] [facility] [employer], music therapy and art therapy    Home Setup: Pt lives off [employer] in [facility] w/ first floor bedroom/bathroom w/c w/ 0 HINA w/ ramp    Prior OT/PT/SLP at [facility], [address] in ICU, acute rehab, inpatient rehab @ [facility] w/ Dr Tess Otoole PMR, prior vision therapy w/ [employer],     Objective  Impairments Section:   UE Strength:   YOCASTA: RUE: 30/200 LUE: 10/200   PINCER: 3 point pinch: RUE unable, LUE: unable   2 point pinch: RUE: 8, LUE: 5   Lateral pincher: RUE: unable, LUE: unable    Coordination:   9 HOLE PEG TEST:     RUE: unable seconds, LUE: unable Seconds    Range of Motion:  AROM/PROM: RUE ataxia spasticity apraxia dysdiadochokinesia dystonia, LUE spasticity w/ dystonia w/ flexor synergy w/ tactile hypersensitivity apraxia ataxia dysmetria dyskinesias    Visual Perceptual and Functional Cognition:  1  Functional [place] Measure:  7 Complete [place] (Timely, Safely)  6 Modified [place] (Device)  5 Supervision (Subject = 100%+)  4 Minimal Assist (Subject = 75%+)  3 Moderate Assist (Subject = 50%+)  2 Maximal Assist (Subject = 25%+)  1 Total Assist (Subject = less than 25%)    Self-Care  A  Eatin  B  Groomin  C  Bathin  D  Dressing - Upper Body: 3  E  Dressing - Lower Body: 2  F  Toiletin  Sphincter Control  G  Bladder Management: 2  H  Bowel Management: 2  Transfers  I  Bed, Chair, Wheelchair: 2  J  Toilet: 2  K  Tub, Shower: 2  Locomotion  L  Walk/Wheelchair: 1  M  Stairs: 1   Total Motor Ratin  Communication  N  Comprehension: 4  O  Expression: 4  Social Cognition  P  Social Interaction: 4  Q  Problem Solvin  R  Memory: 4   Total Cognitive Ratin  TOTAL FIM Score: 49    Assessment/Plan  Occupational Therapy Skilled Analysis Assessment and Plan of Care:  Pt requires overall mod I for ADLs/self care and mod I for fx'l mobility w/ w/c   Pt is currently demonstrating the following occupational deficits: limited 2* chronic decreased safety insight judgment and awareness into deficits, RUE ataxia spasticity apraxia dysdiadochokinesia dystonia, LUE spasticity w/ dystonia w/ flexor synergy w/ tactile hypersensitivity apraxia ataxia dysmetria dyskinesias, decreased endurance/activity tolerance, generalized weakness, deconditioning, SOB, RUBIO, fatigue, fall risk, impaired balance, minimally verbal, follows ~50% simple one step verbal concrete commands w/ mod cues for carryover, impaired FMC/FMS/GMC/GMS, impaired prehension patterns, R hand dominant, scoliotic posture w/ poor trunk control and rigidity, anterior forward flexed posture w/ forward base of support  The following Occupational Performance Areas to address include: eating, grooming, bathing/shower, toilet hygiene, dressing, medication management, socialization, health maintenance, functional mobility, community mobility, clothing management, cleaning, meal prep, money management, household maintenance, care of children, care of pets, job performance/volunteering and social participation  Based on the aforementioned OT evaluation, functional performance deficits, and assessments, pt has been identified as a max complexity evaluation  Pt to continue to benefit from outpatient skilled OT services to address the following goals 2x/wk to  w/in 4 weeks with special focus on self-care management, pt education,  and VM training as well as motor training to improve above defiicits and enhance overall QOL/function      Goals:  Short Term Goals=Long Term Goals:  1) Pt will complete ADLs/self care w/ min A  2) Pt will improve fx'l tfers on/off all surfaces and fx'l mobility t/o I/ADL/leisure tasks using dME PRN w/ G balance/safety w/ min A  3) Pt will engage in ongoing cognitive assessment w/ G participation w/ min A to A w/ safe d/c planning/recommendations  4) Pt will demonstrate G carryover of pt/caregiver education and training as appropriate w/ mod I w/o cues w/ G tolerance  5) Pt will tolerate bed mobility and EOB seated tasks w/ min A for min 30 mins to engage in fx'l I/ADL/leisure tasks w/ min A w/ min cues  5) Pt will follow 75% simple one step verbal commands and be A/Ox4 consistently t/o use of external environmental cues w/ mod I  6) Pt will demonstrate 100% carryover of LHAE/AE w/ min A t/o fx'l I/ADL/ leisure tasks w/o cues s/p skilled education  7) Pt blake tolerate sensory stim w/ G participation/tolerance for min 30 mins  8) Pt will increase B/L UE GMS/FMS to WFL/WNL & strength to ~4+/5 w/o droppage for tabletop tasks for improved functional performance of life roles and salient tasks  9) Pt will demo with G carryover of Home Exercise Program to improve functional progression towards goals in Plan of care and for improved functional use of  B/L 4 weeks  10) Pt will demo with decreased B/L  hypertonicity for improved grasp release, termination of flexors on command  50% of time 4 weeks  11) Pt will demo good carryover of clinic and home tone reduction strategies for improved AROM initiation with functional reach, dressing, hygiene 4 weeks  12) Pt will tolerate BIONESS for improved motor and sensory performance for overall improved hand to target with 80% accuracy 4 weeks    INTERVENTION COMMENTS:  Diagnosis: CHINYERE  Precautions: fall risk, dystonia, aspiration  FOTO: 49 with 51% limitation  Insurance: [address] [place] [address] *  1 of 30 visits, PN due 9/14/2018    Thank you for the consult!   Please call if you have any questions: [phone]  Karen Link, CHAR, OTR/L, C-GCCHANTE, CSRS  Director of Outpatient Neuro Occupational Therapy

## 2018-08-16 ENCOUNTER — OFFICE VISIT (OUTPATIENT)
Dept: SPEECH THERAPY | Facility: CLINIC | Age: 33
End: 2018-08-16
Payer: COMMERCIAL

## 2018-08-16 ENCOUNTER — OFFICE VISIT (OUTPATIENT)
Dept: OCCUPATIONAL THERAPY | Facility: CLINIC | Age: 33
End: 2018-08-16
Payer: COMMERCIAL

## 2018-08-16 DIAGNOSIS — R13.12 OROPHARYNGEAL DYSPHAGIA: Primary | ICD-10-CM

## 2018-08-16 DIAGNOSIS — G93.1 ANOXIC BRAIN DAMAGE (HCC): ICD-10-CM

## 2018-08-16 DIAGNOSIS — G93.1 ANOXIC BRAIN DAMAGE (HCC): Primary | ICD-10-CM

## 2018-08-16 PROCEDURE — 97112 NEUROMUSCULAR REEDUCATION: CPT

## 2018-08-16 PROCEDURE — 97140 MANUAL THERAPY 1/> REGIONS: CPT

## 2018-08-16 PROCEDURE — 92526 ORAL FUNCTION THERAPY: CPT

## 2018-08-16 NOTE — PROGRESS NOTES
Daily Speech Treatment Note    Today's date: 2018  Patients name: Jaky Daily  : 1985  MRN: [id number]  Safety measures:  Aspiration risk, Long Q-T syndrome, depression  Referring provider: Destiny Thompson MD    Primary Diagnosis/Billing code: R13 12  Secondary Diagnosis/ Billing code: G93 1, I45 81    Visit Tracking:  -Referring provider: Epic  -Billing guidelines: CMS   -Visit #6/10   [place]  Medicare  -RE due 2018  Subjective/Behavioral:  -Patient arrived to session today with mom, and caregiver Barbara Matta) - staff did not bring food to today's session  They were reminded to always bring a meal  We also discussed the importance of shaving his neck to aide with electrode placement and comfort  Objective/Assessment:  -Patient's family member/caregiver was present during today's session  Short-term goals:  1  Patient will complete daily oral motor exercise to increase labial and lingual range of motion, strength, and coordination with min cues to 90% effectiveness to strengthen oral musculature and anterior-posterior bolus transfer ability and prevent food or liquid spillage from the oral cavity, to be achieved in 4-6 weeks  2  Patient will complete swallowing maneuver (e g , supraglottic swallow, Mendelsohn maneuver, effortful swallow, etc ) with 80% accuracy to facilitate improved oral motor strengthening, tongue base retraction, HLE, airway protection and/or clearance of the bolus through the pharynx, to be achieved in 4-6 weeks      3   Patient's caregiver/staff will consistently perform informated compensatory strategies (e g , upright positioning, small bites/sips, slow rate, alternation of consistencies, multiple swallows, effortful swallow, chin tuck, head turn, etc ) with 80% accuracy to eliminate overt s/sx penetration/aspiration of least restrictive food/liquid consistencies, to be achieved in 4-6 weeks      4  Patient will tolerate NMES (i e , VitalStim) in conjunction with HLE exercises without overt s/sx of penetration or aspiration, to be achieved in 4-6 weeks  sEMG    Average Threshold: 18 0   Average Work: 28 0   Exercise: Effortful swallow   Sets x Reps: 2 x 10   ________________________________________________________________________________________________________________  Patient tolerated NMES (min threshold: 6 5 mA & max threshold: 10 0 mA) in conjunction with PO intake and exercises  Traditional VitalStim    Channel(s) used: 1,2   Placement: 3a   Treatment Duration: 30 minutes     Patient consumed the following during todays session: pudding and NTL water via spoon (fed by clinician)  Minimal anterior loss noted today (improved from last session)  VQ checks clear  No overt distress or s/s of aspiration or dysphagia noted during todays therapy session  5  Patient's caregiver/staff will demonstrate utilization of recommended safe swallowing strategies during a clinician assessed meal across three sessions, to be achieved in 4-6 weeks  Plan:  -Patient was provided with home exercises/activities to target goals in plan of care at the end of today's session   -Continue with current plan of care

## 2018-08-16 NOTE — PROGRESS NOTES
Daily Note     Today's date: 2018  Patient name: Jacob Monroy  : 1985  MRN: [id number]  Referring provider: Trinidad Mann MD  Dx:   Encounter Diagnosis   Name Primary?  Anoxic brain damage (HCC) Yes                  Subjective: "Pain "      Objective: See treatment diary below      Assessment: Tolerated treatment well  Patients mother and caregiver present during session and were provided education throughout session regarding modalities to be used and treatment for tone reduction  Completed IASTM questionnaire and patient is cleared to receive  BIONESS fittings completed to BUE with panels A/A and may be trialed with other panels as needed due to fluctuating tone  MH to RUE followed by IASTM to BUE  Slight redness noted but resolved within a few minutes  Patient noted with slight pain in hands during IASTM due to increased flexion in digits causing minor tightening in tendons  Patients mother states that she will bring in splint for next session to assess  Education on AE for self feeding to follow in future sessions  Plan: Continued skilled OT per POC with focus on tone reduction and sensory re-ed       INTERVENTION COMMENTS:  Diagnosis: Anoxic brain damage (HCC) [G93 1]  Precautions: fall risk, dystonia, aspiration  FOTO:  2 of 30 visits, PN due

## 2018-08-16 NOTE — PROGRESS NOTES
Daily Note     Today's date: 2018  Patient name: Karishma Nixon  : 1985  MRN: [id number]  Referring provider: Kadi Hernandez MD  Dx:   Encounter Diagnosis     ICD-10-CM    1  Spastic hemiplegia of left nondominant side as late effect of cerebral infarction [facility]) I69 354    2  Physical deconditioning R53 81    3  Muscular rigidity and spasm, progressive G25 82                   Subjective: Mother is worried that there is pain in his hip      Objective: See treatment diary below      Assessment: Per caregiver request patient was issued more exercises for HEP this session for seated strengthening  Trialed knee brace this session with minimal success  Pt had report of pain after finishing walking  Caregiver was concerned that weight bearing exercise may increase pain  Plan: Continue per plan of care       Precautions CP, fall risk    Specialty Daily Treatment Diary     Manual                                                     Exercise Diary        ambulation Solo step mod assist for balance using RW 10 ft x 5 10 ft x 2 with RW mod assist      Standing balance with reaching At RW 4 min x 3 15 min with reaching mod assist to min assist with balance      Sit to stands  10x  10x max assist      Seated LAQ  2# 10xea with hold      Seated hamstring curls  gtb 10xea                                                                                                                                  Modalities

## 2018-08-17 ENCOUNTER — OFFICE VISIT (OUTPATIENT)
Dept: PHYSICAL THERAPY | Facility: CLINIC | Age: 33
End: 2018-08-17
Payer: COMMERCIAL

## 2018-08-17 ENCOUNTER — OFFICE VISIT (OUTPATIENT)
Dept: SPEECH THERAPY | Facility: CLINIC | Age: 33
End: 2018-08-17
Payer: COMMERCIAL

## 2018-08-17 ENCOUNTER — APPOINTMENT (OUTPATIENT)
Dept: OCCUPATIONAL THERAPY | Facility: CLINIC | Age: 33
End: 2018-08-17
Payer: COMMERCIAL

## 2018-08-17 DIAGNOSIS — R53.81 PHYSICAL DECONDITIONING: ICD-10-CM

## 2018-08-17 DIAGNOSIS — I69.354 SPASTIC HEMIPLEGIA OF LEFT NONDOMINANT SIDE AS LATE EFFECT OF CEREBRAL INFARCTION (HCC): Primary | ICD-10-CM

## 2018-08-17 DIAGNOSIS — R13.12 OROPHARYNGEAL DYSPHAGIA: Primary | ICD-10-CM

## 2018-08-17 DIAGNOSIS — I45.81 LONG Q-T SYNDROME: ICD-10-CM

## 2018-08-17 DIAGNOSIS — G25.82 MUSCULAR RIGIDITY AND SPASM, PROGRESSIVE: ICD-10-CM

## 2018-08-17 DIAGNOSIS — G93.1 ANOXIC BRAIN DAMAGE (HCC): ICD-10-CM

## 2018-08-17 PROCEDURE — 97116 GAIT TRAINING THERAPY: CPT | Performed by: PHYSICAL THERAPIST

## 2018-08-17 PROCEDURE — 97112 NEUROMUSCULAR REEDUCATION: CPT | Performed by: PHYSICAL THERAPIST

## 2018-08-17 PROCEDURE — 92526 ORAL FUNCTION THERAPY: CPT

## 2018-08-17 NOTE — PROGRESS NOTES
Daily Speech Treatment Note    Today's date: 2018  Patients name: Rachell Richard  : 1985  MRN: [id number]  Safety measures:  Aspiration risk, Long Q-T syndrome, depression  Referring provider: Bertram Arnold MD    Primary Diagnosis/Billing code: R13 12  Secondary Diagnosis/ Billing code: G93 1, I45 81    Visit Tracking:  -Referring provider: Epic  -Billing guidelines: CMS   -Visit #7/10    [place]  Medicare  -RE due 2018  Subjective/Behavioral:  -Patient arrived to session today with mom, and caregiver Ty Crooked)     Objective/Assessment:  -Patient's family member/caregiver was present during today's session  Short-term goals:  1  Patient will complete daily oral motor exercise to increase labial and lingual range of motion, strength, and coordination with min cues to 90% effectiveness to strengthen oral musculature and anterior-posterior bolus transfer ability and prevent food or liquid spillage from the oral cavity, to be achieved in 4-6 weeks  2  Patient will complete swallowing maneuver (e g , supraglottic swallow, Mendelsohn maneuver, effortful swallow, etc ) with 80% accuracy to facilitate improved oral motor strengthening, tongue base retraction, HLE, airway protection and/or clearance of the bolus through the pharynx, to be achieved in 4-6 weeks      3  Patient's caregiver/staff will consistently perform informated compensatory strategies (e g , upright positioning, small bites/sips, slow rate, alternation of consistencies, multiple swallows, effortful swallow, chin tuck, head turn, etc ) with 80% accuracy to eliminate overt s/sx penetration/aspiration of least restrictive food/liquid consistencies, to be achieved in 4-6 weeks      4  Patient will tolerate NMES (i e , VitalStim) in conjunction with HLE exercises without overt s/sx of penetration or aspiration, to be achieved in 4-6 weeks   __________________________________________________________________  Patient tolerated NMES (min threshold: 4 5 mA & max threshold: 6 0 mA) in conjunction with PO intake and exercises  Traditional VitalStim    Channel(s) used: 1,2   Placement: 3a   Treatment Duration: 30 minutes     Patient consumed the following during todays session: pureed rice and cheese, peas and roast with NTL water via spoon (fed by clinician)  Minimal anterior loss noted today (improved from last session)  VQ checks clear  No overt distress or s/s of aspiration or dysphagia noted during todays therapy session  5  Patient's caregiver/staff will demonstrate utilization of recommended safe swallowing strategies during a clinician assessed meal across three sessions, to be achieved in 4-6 weeks  Plan:  -Patient was provided with home exercises/activities to target goals in plan of care at the end of today's session   -Continue with current plan of care  Recommending a repeat VBSS due to improvements in outpatient therapy for possible diet upgrade

## 2018-08-20 NOTE — PROGRESS NOTES
Occupational Therapy Daily Note:    Today's date: 2018  Patient name: Cassia Muñoz  : 1985  MRN: [id number]  Referring provider: Kay Ariza MD  Dx:   Encounter Diagnosis   Name Primary?  Anoxic brain damage (HCC) Yes     Subjective: "Good"  Objective: See treatment diary below  Assessment: Pt seen for OT treatment session focusing on supine B/L UE distal MH, B/L UE neuro modulation neuro motor/sensory re-education via Bioness x10 minutes, followed by subsequent B/L UE IASTM for tone reduction w/ G tolerance  Pt continues to demonstrate LUE spasticity w/ flexor hypersensitivity though improved tolerance noted this am per mother's report  PTA Support staff also present for OT session  Educated pt, mother, and support staff re: resting hand splint for pm use, soft resting hand splint versus spasticity ball splint, mother and support staff interested in spasticity ball splint, this OTD agreeable to promote LUE MCP extension for tone reduction, will contact PCP for script and call [facility] orthotics to schedule measurements  Introduced mother and pt to AE for self-feeding to maximize functional independence seated @ edge of mat w/ G trunk control, benefits from visual motor cues for thoracic/lumbar and cervical extension  Recommend weighted utensils as pt is R handed though RUE ataxia/dysmetria/dystonia w/ dysdiadochokinesia, benefit from RUE weighted utensils to minimize droppage, mother to trial weighted cuffs w/ universal cuff at home to trial benefit  Handouts provided re: scoop dish, weighted utensils, and nose cut out cup  Recommend continuation of practice trials w/ various AE and educate pt/mother on options      Pt is currently demonstrating the following occupational deficits: limited 2* chronic decreased safety insight judgment and awareness into deficits, RUE ataxia spasticity apraxia dysdiadochokinesia dystonia, LUE spasticity w/ dystonia w/ flexor synergy w/ tactile hypersensitivity apraxia ataxia dysmetria dyskinesias, decreased endurance/activity tolerance, generalized weakness, deconditioning, SOB, RUBIO, fatigue, fall risk, impaired balance, minimally verbal, follows ~50% simple one step verbal concrete commands w/ mod cues for carryover, impaired FMC/FMS/GMC/GMS, impaired prehension patterns, R hand dominant, scoliotic posture w/ poor trunk control and rigidity, anterior forward flexed posture w/ forward base of support  Tolerated treatment well  Patient would benefit from continued skilled OT  Plan: Continued skilled OT per POC with focus on B/L UE tone reduction, strengthening, FMC/FMs/GMs/GMC, fx'l B/L integration t/o I/ADL/leisure tasks  INTERVENTION COMMENTS:  Diagnosis: CHINYERE  Precautions: fall risk, dystonia, aspiration  FOTO: 49 with 51% limitation  Insurance: [address] [place] [address] *  3 of 30 visits, PN due 9/14/2018     Thank you for the consult!   Please call if you have any questions: [phone]  CHAR Comer, OTR/L, C-GCCHANTE, CSRS  Director of Outpatient Neuro Occupational Therapy

## 2018-08-20 NOTE — PROGRESS NOTES
Daily Note     Today's date: 2018  Patient name: Breonna Hilliard  : 1985  MRN: [id number]  Referring provider: Elsie Swanson MD  Dx:   Encounter Diagnosis     ICD-10-CM    1  Spastic hemiplegia of left nondominant side as late effect of cerebral infarction [facility]) I69 354    2  Physical deconditioning R53 81    3  Muscular rigidity and spasm, progressive G25 82                   Subjective: No new changes since last session      Objective: See treatment diary below      Assessment: Pt given supine exercises this session  Pt did have some difficulty following direction to complete exercises  He demonstrated improved stability with ambulation this session and was able to walk longer distance  Plan: Continue per plan of care       Precautions CP, fall risk    Specialty Daily Treatment Diary     Manual                                                     Exercise Diary       ambulation Solo step mod assist for balance using RW 10 ft x 5 10 ft x 2 with RW mod assist 50 ft x 3 with mod asssit for balance with wedges on shoes     Standing balance with reaching At RW 4 min x 3 15 min with reaching mod assist to min assist with balance      Sit to stands  10x  10x max assist      Seated LAQ  2# 10xea with hold      Seated hamstring curls  gtb 10xea      SLR   2x10 ea     bridges   2x10     clamshells   10x gtb                                                                                                         Modalities

## 2018-08-21 ENCOUNTER — OFFICE VISIT (OUTPATIENT)
Dept: PHYSICAL THERAPY | Facility: CLINIC | Age: 33
End: 2018-08-21
Payer: COMMERCIAL

## 2018-08-21 ENCOUNTER — OFFICE VISIT (OUTPATIENT)
Dept: OCCUPATIONAL THERAPY | Facility: CLINIC | Age: 33
End: 2018-08-21
Payer: COMMERCIAL

## 2018-08-21 ENCOUNTER — OFFICE VISIT (OUTPATIENT)
Dept: FAMILY MEDICINE CLINIC | Facility: CLINIC | Age: 33
End: 2018-08-21

## 2018-08-21 VITALS — WEIGHT: 127.2 LBS | BODY MASS INDEX: 18.25 KG/M2

## 2018-08-21 DIAGNOSIS — R52 PAIN: ICD-10-CM

## 2018-08-21 DIAGNOSIS — G93.1 ANOXIC BRAIN DAMAGE (HCC): Primary | ICD-10-CM

## 2018-08-21 DIAGNOSIS — I69.354 SPASTIC HEMIPLEGIA OF LEFT NONDOMINANT SIDE AS LATE EFFECT OF CEREBRAL INFARCTION (HCC): Primary | ICD-10-CM

## 2018-08-21 DIAGNOSIS — R53.81 PHYSICAL DECONDITIONING: ICD-10-CM

## 2018-08-21 DIAGNOSIS — M43.6 CONTRACTURE OF STERNOCLEIDOMASTOID MUSCLE: ICD-10-CM

## 2018-08-21 DIAGNOSIS — M54.6 ACUTE MIDLINE THORACIC BACK PAIN: Primary | ICD-10-CM

## 2018-08-21 DIAGNOSIS — M99.01 SOMATIC DYSFUNCTION OF CERVICAL REGION: ICD-10-CM

## 2018-08-21 DIAGNOSIS — G25.82 MUSCULAR RIGIDITY AND SPASM, PROGRESSIVE: ICD-10-CM

## 2018-08-21 PROCEDURE — 97140 MANUAL THERAPY 1/> REGIONS: CPT

## 2018-08-21 PROCEDURE — 97140 MANUAL THERAPY 1/> REGIONS: CPT | Performed by: PHYSICAL THERAPIST

## 2018-08-21 PROCEDURE — 97116 GAIT TRAINING THERAPY: CPT | Performed by: PHYSICAL THERAPIST

## 2018-08-21 PROCEDURE — 97530 THERAPEUTIC ACTIVITIES: CPT

## 2018-08-21 RX ORDER — LORATADINE 10 MG/1
10 TABLET ORAL EVERY 24 HOURS
Qty: 90 TABLET | Refills: 0 | Status: SHIPPED | OUTPATIENT
Start: 2018-08-21 | End: 2019-03-25 | Stop reason: SDUPTHER

## 2018-08-21 NOTE — PROGRESS NOTES
Daily Note     Today's date: 2018  Patient name: Tiffanie Desai  : 1985  MRN: [id number]  Referring provider: Alexa Patterson MD  Dx:   Encounter Diagnosis     ICD-10-CM    1  Spastic hemiplegia of left nondominant side as late effect of cerebral infarction [facility]) I69 354    2  Physical deconditioning R53 81    3  Muscular rigidity and spasm, progressive G25 82                   Subjective:Has been doing new exercises at home  Objective: See treatment diary below      Assessment: Pt completed more gait training and standing balance this session  Attempted caregiver training on ambulation for home but pt was fatigued and struggled  Therapist attempted for patient to improve forward weight shift with sit to stand but was unsuccessful with continued backwards push against wheelchair tipping it over with sit to stands  Plan: Continue per plan of care       Precautions CP, fall risk    Specialty Daily Treatment Diary     Manual                                                     Exercise Diary      ambulation Solo step mod assist for balance using RW 10 ft x 5 10 ft x 2 with RW mod assist 50 ft x 3 with mod asssit for balance with wedges on shoes 75 ft x 1; 40 ft x 2 mod assist for balance with wedges    Standing balance with reaching At RW 4 min x 3 15 min with reaching mod assist to min assist with balance  15 min with reaching    Sit to stands  10x  10x max assist  2x10 with pball for forward weight shift roll out    Seated LAQ  2# 10xea with hold      Seated hamstring curls  gtb 10xea      SLR   2x10 ea     bridges   2x10     clamshells   10x gtb                                                                                                         Modalities

## 2018-08-21 NOTE — PROGRESS NOTES
Assessment/Plan     This is a 27 y o  male who presents for OMT follow-up for:  1  Acute midline thoracic back pain     2  Contracture of sternocleidomastoid muscle     3  Somatic dysfunction of cervical region         Plan:   1  Patient tolerated OMT well for the above problems,  advised patient to drink fluids and can use NSAID for soreness after treatment     2  OMT Follow up in 2 weeks  Subjective     Fransico Henderson is a 27 y o  male with history of quadriplegia from anoxic brain injury 2/2 cardiac arrest at age 13 and is here for a OMT follow up  The patient is here with caregiver and mother, he previous received OMT two years ago with Dr Nasreen Thomas  He recently had aspiration PNA which he was admitted to the hospital for  He is complaining of neck and upper back pain per mother  He is bent to the right side and mother reports some skin breakdown over prominent john process on low thoracic spine  Is the patient taking Pain medication? no  Has the patient completed physical therapy for this condition? yes      The following portions of the patient's history were reviewed and updated as appropriate: allergies, current medications, past family history, past medical history, past social history, past surgical history and problem list     Review of Systems  Do you have pain that bothers you in your daily life? yes  Review of Systems   Constitutional: Negative for fatigue and fever  HENT: Negative for congestion  Respiratory: Negative for cough and shortness of breath  Cardiovascular: Negative for chest pain  Gastrointestinal: Negative for abdominal pain  Musculoskeletal: Positive for back pain  Wheelchair bound with numerological spasticity    Neurological: Negative for headaches         Objective     OMT Exam   Cervical:   Somatic Dysfunction:  Tissue Texture Changes, Asymmetry  and Tenderness  Severity :  3  Osteopathic Findings: right SCM hypertonic with resting rotation to the right, unable to fully rotate to the left   Treatment Method: ME and ST  Response: improved  Thoracic T1-4:   Somatic Dysfunction:  Tissue Texture Changes  Severity :  2  Osteopathic Findings: L >R hpertonicity of mid trapezius muscle   Treatment Method: ST  Response: improved  Thoracic T5-9:   Somatic Dysfunction:  Tissue Texture Changes  Severity :  2  Osteopathic Findings: thoracic kyphosis, bilateral paraspinal hypertonicity   Treatment Method: ST  Response: improved

## 2018-08-22 ENCOUNTER — TELEPHONE (OUTPATIENT)
Dept: FAMILY MEDICINE CLINIC | Facility: CLINIC | Age: 33
End: 2018-08-22

## 2018-08-22 NOTE — TELEPHONE ENCOUNTER
Pt was seen yesterday by Dr Morales Memory they forgot to bring the medical appt   Form,  The form was dropped off today so I will be placing it in Dr Morales Memory folder, please call when ready to

## 2018-08-23 ENCOUNTER — OFFICE VISIT (OUTPATIENT)
Dept: PHYSICAL THERAPY | Facility: CLINIC | Age: 33
End: 2018-08-23
Payer: COMMERCIAL

## 2018-08-23 ENCOUNTER — OFFICE VISIT (OUTPATIENT)
Dept: SPEECH THERAPY | Facility: CLINIC | Age: 33
End: 2018-08-23
Payer: COMMERCIAL

## 2018-08-23 DIAGNOSIS — R53.81 PHYSICAL DECONDITIONING: ICD-10-CM

## 2018-08-23 DIAGNOSIS — G93.1 ANOXIC BRAIN DAMAGE (HCC): ICD-10-CM

## 2018-08-23 DIAGNOSIS — G25.82 MUSCULAR RIGIDITY AND SPASM, PROGRESSIVE: ICD-10-CM

## 2018-08-23 DIAGNOSIS — I69.354 SPASTIC HEMIPLEGIA OF LEFT NONDOMINANT SIDE AS LATE EFFECT OF CEREBRAL INFARCTION (HCC): Primary | ICD-10-CM

## 2018-08-23 DIAGNOSIS — R13.12 OROPHARYNGEAL DYSPHAGIA: Primary | ICD-10-CM

## 2018-08-23 DIAGNOSIS — I45.81 LONG Q-T SYNDROME: ICD-10-CM

## 2018-08-23 PROCEDURE — 97112 NEUROMUSCULAR REEDUCATION: CPT | Performed by: PHYSICAL THERAPIST

## 2018-08-23 PROCEDURE — 92526 ORAL FUNCTION THERAPY: CPT

## 2018-08-23 PROCEDURE — 97110 THERAPEUTIC EXERCISES: CPT | Performed by: PHYSICAL THERAPIST

## 2018-08-23 NOTE — PROGRESS NOTES
Daily Speech Treatment Note    Today's date: 2018  Patients name: Izabella Sommers  : 1985  MRN: [id number]  Safety measures:  Aspiration risk, Long Q-T syndrome, depression  Referring provider: Bridgett Guerra MD    Primary Diagnosis/Billing code: R13 12  Secondary Diagnosis/ Billing code: G93 1, I45 81    Visit Tracking:  -Referring provider: Epic  -Billing guidelines: CMS   -Visit #8/10    [place]  [facility]ic[facility]  -RE due 2018  Subjective/Behavioral:  -Patient arrived to session today with mom, and caregiver Erwin Chan)  -Provided mom with script and instructions to schedule f/u VBSS, since he hs been doing so well in therapy  Objective/Assessment:  -Patient's family member/caregiver was present during today's session  Short-term goals:  1  Patient will complete daily oral motor exercise to increase labial and lingual range of motion, strength, and coordination with min cues to 90% effectiveness to strengthen oral musculature and anterior-posterior bolus transfer ability and prevent food or liquid spillage from the oral cavity, to be achieved in 4-6 weeks  2  Patient will complete swallowing maneuver (e g , supraglottic swallow, Mendelsohn maneuver, effortful swallow, etc ) with 80% accuracy to facilitate improved oral motor strengthening, tongue base retraction, HLE, airway protection and/or clearance of the bolus through the pharynx, to be achieved in 4-6 weeks      3   Patient's caregiver/staff will consistently perform informated compensatory strategies (e g , upright positioning, small bites/sips, slow rate, alternation of consistencies, multiple swallows, effortful swallow, chin tuck, head turn, etc ) with 80% accuracy to eliminate overt s/sx penetration/aspiration of least restrictive food/liquid consistencies, to be achieved in 4-6 weeks      4  Patient will tolerate NMES (i e , VitalStim) in conjunction with HLE exercises without overt s/sx of penetration or aspiration, to be achieved in 4-6 weeks  Patient tolerated NMES (min threshold: 4 0 mA & max threshold: 9 0 mA) in conjunction with PO intake and exercises  Traditional VitalStim    Channel(s) used: 1,2   Placement: 3a   Treatment Duration: 36 minutes     Patient consumed the following during todays session: pureed mac and cheese, peas and pork with NTL apple juice spoon fed by clinician  Minimal anterior loss noted today (improved from last session)  VQ checks clear  No overt distress or s/s of aspiration or dysphagia noted during todays therapy session  5  Patient's caregiver/staff will demonstrate utilization of recommended safe swallowing strategies during a clinician assessed meal across three sessions, to be achieved in 4-6 weeks  Plan:  -Patient was provided with home exercises/activities to target goals in plan of care at the end of today's session   -Continue with current plan of care  Recommending a repeat VBSS due to improvements in outpatient therapy for possible diet upgrade

## 2018-08-23 NOTE — PROGRESS NOTES
Daily Note     Today's date: 2018  Patient name: Verner Rosette  : 1985  MRN: [id number]  Referring provider: Krystyna Gambino MD  Dx:   Encounter Diagnosis     ICD-10-CM    1  Spastic hemiplegia of left nondominant side as late effect of cerebral infarction [facility]) I69 354    2  Physical deconditioning R53 81    3  Muscular rigidity and spasm, progressive G25 82                    Subjective: Pt reports his legs hurt  Per pt's mom, pt may have been tired from Tuesday and had a lot of pain last night so he still may have pain today  Pt's mom requested to do less walking today       Objective: See treatment diary below    Precautions CP, fall risk     Specialty Daily Treatment Diary      Manual                                                                                          Exercise Diary     ambulation Solo step mod assist for balance using RW 10 ft x 5 10 ft x 2 with RW mod assist 50 ft x 3 with mod asssit for balance with wedges on shoes 75 ft x 1; 40 ft x 2 mod assist for balance with wedges 10 ft w/ RW  mod assist for balance with wedges  (solo)   Standing balance with reaching At RW 4 min x 3 15 min with reaching mod assist to min assist with balance   15 min with reaching 10 min with reaching   Sit to stands  10x  10x max assist   2x10 with pball for forward weight shift roll out  2 x 10, verbal cues to use UE for push off/control   Seated LAQ   2# 10xea with hold      2 5# 2 x 10    Seated hamstring curls   gtb 10xea      gtb 10 x ea   SLR     2x10 ea       bridges     2x10       clamshells     10x gtb   Seated, gtb 10x    Seated marches         2 x 10                                                                                                                                                                    Modalities                                                              Assessment: Due to c/o bilateral LE fatigue and pain, performed more seated therex today  Pt also demo increased distractibility today, had difficulty focusing on task and required frequent redirections and performed exercises facing away from busy clinic  Continued to demo excessive posterior trunk lean and genu recurvatum during STS, requiring mod A from therapist for anterior weight shift  Provided constant cueing for pt to push off from [facility] armrests and control descent with UE during STS instead of holding onto RW  Patient would benefit from continued PT      Plan: Progress treatment as tolerated    Resume gait training and balance training per POC of primary therapist

## 2018-08-24 ENCOUNTER — APPOINTMENT (OUTPATIENT)
Dept: PHYSICAL THERAPY | Facility: CLINIC | Age: 33
End: 2018-08-24
Payer: COMMERCIAL

## 2018-08-24 ENCOUNTER — TELEPHONE (OUTPATIENT)
Dept: FAMILY MEDICINE CLINIC | Facility: CLINIC | Age: 33
End: 2018-08-24

## 2018-08-24 ENCOUNTER — OFFICE VISIT (OUTPATIENT)
Dept: OCCUPATIONAL THERAPY | Facility: CLINIC | Age: 33
End: 2018-08-24
Payer: COMMERCIAL

## 2018-08-24 ENCOUNTER — EVALUATION (OUTPATIENT)
Dept: SPEECH THERAPY | Facility: CLINIC | Age: 33
End: 2018-08-24
Payer: COMMERCIAL

## 2018-08-24 DIAGNOSIS — I45.81 LONG Q-T SYNDROME: ICD-10-CM

## 2018-08-24 DIAGNOSIS — K59.00 CONSTIPATION, UNSPECIFIED CONSTIPATION TYPE: Primary | ICD-10-CM

## 2018-08-24 DIAGNOSIS — G93.1 ANOXIC BRAIN DAMAGE (HCC): Primary | ICD-10-CM

## 2018-08-24 DIAGNOSIS — M62.89 DECREASED MUSCLE TONE: Primary | ICD-10-CM

## 2018-08-24 DIAGNOSIS — R13.12 OROPHARYNGEAL DYSPHAGIA: Primary | ICD-10-CM

## 2018-08-24 DIAGNOSIS — G93.1 ANOXIC BRAIN DAMAGE (HCC): ICD-10-CM

## 2018-08-24 PROCEDURE — 92526 ORAL FUNCTION THERAPY: CPT

## 2018-08-24 PROCEDURE — 97140 MANUAL THERAPY 1/> REGIONS: CPT

## 2018-08-24 PROCEDURE — 97530 THERAPEUTIC ACTIVITIES: CPT

## 2018-08-24 PROCEDURE — 97112 NEUROMUSCULAR REEDUCATION: CPT

## 2018-08-24 PROCEDURE — G8996 SWALLOW CURRENT STATUS: HCPCS

## 2018-08-24 PROCEDURE — G8997 SWALLOW GOAL STATUS: HCPCS

## 2018-08-24 RX ORDER — POLYETHYLENE GLYCOL 3350 17 G/17G
17 POWDER, FOR SOLUTION ORAL DAILY
Qty: 850 G | Refills: 5 | Status: SHIPPED | OUTPATIENT
Start: 2018-08-24 | End: 2018-11-08 | Stop reason: SDUPTHER

## 2018-08-24 NOTE — PROGRESS NOTES
Speech Therapy Progress Update  Today's date: 2018  Patients name: Breonna Hilliard  : 1985  MRN: [id number]  Safety measures:  Aspiration risk, Long Q-T syndrome, depression  Referring provider: Elsie Swanson MD    Primary Diagnosis/Billing code: R13 12  Secondary Diagnosis/ Billing code: G93 1, I45 81    Visit Tracking:  -Referring provider: Epic  -Billing guidelines: CMS   -Visit #1/10  (9 total)  [place]  [facility]  -RE due 2018  Subjective/Behavioral:  -Patient arrived to session today with caregiver Kate Malave)     Objective/Assessment:  DYSPHAGIA UPDATE:  -Current diet (solids): Pureed  -Current diet (liquids): Nectar Thick    -Facial appearance Symmetrical   -Dentition Adequate   -Labial function Decreased strength (bilateral)   -Lingual function Decreased strength (bilateral), Decreased strength (protrusion) and Decreased coordination   -Velar function Symmetrical       LIQUID CONSISTENCY UPDATE:   1  Liquid Consistency (Thin and Nectar-thick)   Administered by: Vic Spotted and Fed by examiner   Strategies, attempts, and responses: None    CLINICAL FINDINGS:   Oral phase impairments: WFL   Pharyngeal Phase Impairments: Swallow initiation Mild delay    *Laryngeal excursion upon palpation: Appeared WFL      SOLID CONSISTENCY UPDATE:  1   Solid Consistency (Mechanical Soft and Puree)   Administered by: Vic Spotted and Fed by examiner   Strategies, attempts, and responses: None    CLINICAL FINDINGS:   Oral phase impairments: Lip closure and Bolus formation/control   Pharyngeal Phase Impairments: Swallow initiation Mild delay and Cough    *Laryngeal excursion upon palpation: Appeared WFL      SWALLOWING DIAGNOSTIC IMPRESSION:  -Swallowing diagnosis/severity: Mild-moderate oropharyngeal phase dysphagia    -Factors affecting performance: Endurance    -Safety concerns: Risk for aspiration and Risk for inadequate nutrition/ hydration    -Risk factors: Complex medical history, Dependent for feeding and History of aspiration pneumonia    SAFETY PRECAUTIONS:  -Supervision: 1:1    -Strategies: Small sips and bites when eating, Slow rate, swallow between bites and Clear pocketing   -Positioning: Upright position at least 30 minutes after meal and Upright position during meals  -Compensatory strategies: Effortful swallow  -Recommend (solids): Puree  -Recommend (liquids): Nectar-thick  -Recommend (medications): whole in puree    REFERRALS: Videofluroscopic Swallow Study      Short-term goals:  1  Patient will complete daily oral motor exercise to increase labial and lingual range of motion, strength, and coordination with min cues to 90% effectiveness to strengthen oral musculature and anterior-posterior bolus transfer ability and prevent food or liquid spillage from the oral cavity, to be achieved in 4-6 weeks  PARTIALLY MET    2  Patient will complete swallowing maneuver (e g , supraglottic swallow, Mendelsohn maneuver, effortful swallow, etc ) with 80% accuracy to facilitate improved oral motor strengthening, tongue base retraction, HLE, airway protection and/or clearance of the bolus through the pharynx, to be achieved in 4-6 weeks  PARTIALLY MET     3  Patient's caregiver/staff will consistently perform informated compensatory strategies (e g , upright positioning, small bites/sips, slow rate, alternation of consistencies, multiple swallows, effortful swallow, chin tuck, head turn, etc ) with 80% accuracy to eliminate overt s/sx penetration/aspiration of least restrictive food/liquid consistencies, to be achieved in 4-6 weeks  PARTIALLY MET     4  Patient will tolerate NMES (i e , VitalStim) in conjunction with HLE exercises without overt s/sx of penetration or aspiration, to be achieved in 4-6 weeks  PARTIALLY MET    5  Patient's caregiver/staff will demonstrate utilization of recommended safe swallowing strategies during a clinician assessed meal across three sessions, to be achieved in 4-6 weeks  MET       Long-term goals:     1  Patient will maintain adequate hydration and nutrition with optimum safety and efficacy of swallowing function on P O  intake without overt s/sx of penetration or aspiration by discharge  PARTIALLY MET     2  Patient's caregivers/staff will utilize compensatory strategies with optimum safety and efficacy of swallowing function on P O  intake without overt s/sx of penetration or aspiration by discharge  MET    3  (NEW GOAL) Patient will return to a regular diet with thin liquids as safest PO intake  Functional Limitations Reporting (G-codes):   Flowsheet Rows      Most Recent Value   SLP G-Codes   FOTO information reviewed  N/A   Assessment Type  Re-evaluation   Functional Limitations  Swallowing   Swallow Current Status ()  CL   Swallow Goal Status ()  CJ            Impressions/Recommendations     Impressions: Patient presents with improving oropharyngeal dysphagia since IE  He continues with mild-moderate deficits with reduced lip closure dueing PO, decreased mastication/oral processing, slowed anterior-posterior oral transit, and delayed initiation of swallow  During PO intake today, only 1 episode of coughing on mech soft (cupcake)  Pt oral residue improving with liquid wash and lingual sweeps       Recommendations:  -Patient would benefit from outpatient skilled Speech Therapy services : Dysphagia therapy     -Frequency: 2x weekly  -Duration: 4-6 weeks     -Intervention certification from: 6/18/8949  -Intervention certification to: 2/86/0849

## 2018-08-24 NOTE — PROGRESS NOTES
Daily Note     Today's date: 2018  Patient name: Lora Griffin  : 1985  MRN: [id number]  Referring provider: Iain Ramey MD  Dx:   Encounter Diagnosis   Name Primary?  Anoxic brain damage (HCC) Yes                  Subjective: Caregiver reported pt fatigued this session due to not sleeping well night before  Objective: See treatment diary below      Assessment: Tolerated treatment well  Patient would benefit from continued OT   Supine elevated on edge pt tolerated BIONESS to right hand w/MH to traps followed by IASTM to right hand, Left side of neck and traps  Sidelying pt retrieved BB from various plains focusing on LUE extension, scap retraction and open hand w/grasp/release  Pt with mod difficulty to open hand, pt frequently grasp BB utilizing finger tips with lumbrical movement, decrease of automaticity for release of BB to toss  K Tape applied to scap region and below B/L scaps to facilitate retraction and postural control  Pts mother notified to verify allergies to adhesive, stating there were none, written instructions provided to caregivers  Follow up with caregiver for feedback on Ktape        Plan: Continued skilled OT per POC    INTERVENTION COMMENTS:  Diagnosis: CHINYERE  Precautions: fall risk, dystonia, aspiration  FOTO: 49 with 51% limitation  Insurance: [address] [place] [address] *  4 of 30 visits, PN due 2018

## 2018-08-24 NOTE — PROGRESS NOTES
Patient is w/c bound  Fall Risk questions not appropriate at this time for ST  He only ambulates with therapists and caregivers with assistance

## 2018-08-24 NOTE — TELEPHONE ENCOUNTER
Received a call from pt's caregiver to order Miralax from [facility]  Instructions should read: 1/2 capful in 8oz liquid and drink by mouth at 8AM  He said it was what he was getting in [place]

## 2018-08-28 ENCOUNTER — OFFICE VISIT (OUTPATIENT)
Dept: OCCUPATIONAL THERAPY | Facility: CLINIC | Age: 33
End: 2018-08-28
Payer: COMMERCIAL

## 2018-08-28 ENCOUNTER — APPOINTMENT (OUTPATIENT)
Dept: SPEECH THERAPY | Facility: CLINIC | Age: 33
End: 2018-08-28
Payer: COMMERCIAL

## 2018-08-28 ENCOUNTER — OFFICE VISIT (OUTPATIENT)
Dept: PHYSICAL THERAPY | Facility: CLINIC | Age: 33
End: 2018-08-28
Payer: COMMERCIAL

## 2018-08-28 DIAGNOSIS — G25.82 MUSCULAR RIGIDITY AND SPASM, PROGRESSIVE: ICD-10-CM

## 2018-08-28 DIAGNOSIS — R53.81 PHYSICAL DECONDITIONING: ICD-10-CM

## 2018-08-28 DIAGNOSIS — G93.1 ANOXIC BRAIN DAMAGE (HCC): Primary | ICD-10-CM

## 2018-08-28 DIAGNOSIS — I69.354 SPASTIC HEMIPLEGIA OF LEFT NONDOMINANT SIDE AS LATE EFFECT OF CEREBRAL INFARCTION (HCC): Primary | ICD-10-CM

## 2018-08-28 PROCEDURE — 97140 MANUAL THERAPY 1/> REGIONS: CPT

## 2018-08-28 PROCEDURE — 97112 NEUROMUSCULAR REEDUCATION: CPT | Performed by: PHYSICAL THERAPIST

## 2018-08-28 PROCEDURE — 97116 GAIT TRAINING THERAPY: CPT | Performed by: PHYSICAL THERAPIST

## 2018-08-28 PROCEDURE — 97535 SELF CARE MNGMENT TRAINING: CPT

## 2018-08-28 NOTE — TELEPHONE ENCOUNTER
Called John Jackson to let her know paper ready  She said to mail it to [place]    Letter put in [address] @ 3:30P

## 2018-08-28 NOTE — PROGRESS NOTES
Daily Note     Today's date: 2018  Patient name: Hayder Lozada  : 1985  MRN: [id number]  Referring provider: Antione Gordon MD  Dx:   Encounter Diagnosis   Name Primary?  Anoxic brain damage (HCC) Yes                  Subjective: "No "      Objective: See treatment diary below      Assessment: Tolerated treatment well  Patient arrived to session 10 minutes late due to transportation  Caregivers reported that patient had reaction to [address] observed redness on patients back  Discontinue any future use of Ktape  Bioness to LUE with MH to RUE  IASTM to BUE for tone reduction  Seated activity with focus on pincer grasp as well as hand to target  Dynamic sitting balance task reaching for BB and attempting release to targets on floor  Reponds well to verbal cuing when needing redirection back to tasks  Plan: Continued skilled OT per POC      INTERVENTION COMMENTS:  Diagnosis: Anoxic brain damage (HCC) [G93 1]  Precautions: fall risk, dystonia, aspiration, NO KTAPE  FOTO:  5 of 30 visits, PN due

## 2018-08-29 ENCOUNTER — HOSPITAL ENCOUNTER (OUTPATIENT)
Dept: RADIOLOGY | Facility: HOSPITAL | Age: 33
Discharge: HOME | End: 2018-08-29
Attending: FAMILY MEDICINE
Payer: COMMERCIAL

## 2018-08-29 DIAGNOSIS — R13.12 OROPHARYNGEAL DYSPHAGIA: ICD-10-CM

## 2018-08-29 DIAGNOSIS — G93.1 ANOXIC BRAIN DAMAGE (HCC): ICD-10-CM

## 2018-08-29 DIAGNOSIS — I45.81 LONG Q-T SYNDROME: ICD-10-CM

## 2018-08-29 PROCEDURE — G8998 SWALLOW D/C STATUS: HCPCS

## 2018-08-29 PROCEDURE — G8997 SWALLOW GOAL STATUS: HCPCS

## 2018-08-29 PROCEDURE — G8996 SWALLOW CURRENT STATUS: HCPCS

## 2018-08-29 PROCEDURE — 74230 X-RAY XM SWLNG FUNCJ C+: CPT

## 2018-08-29 PROCEDURE — 92611 MOTION FLUOROSCOPY/SWALLOW: CPT

## 2018-08-29 NOTE — PROGRESS NOTES
Daily Note     Today's date: 2018  Patient name: Yarely Aiken  : 1985  MRN: [id number]  Referring provider: Denia Larios MD  Dx:   Encounter Diagnosis     ICD-10-CM    1  Spastic hemiplegia of left nondominant side as late effect of cerebral infarction [facility]) I69 354    2  Physical deconditioning R53 81    3  Muscular rigidity and spasm, progressive G25 82                    Subjective: Pt reports his legs hurt  Per pt's mom, pt may have been tired from Tuesday and had a lot of pain last night so he still may have pain today  Pt's mom requested to do less walking today  Objective: See treatment diary below    Precautions CP, fall risk     Specialty Daily Treatment Diary      Manual                                                                                          Exercise Diary         ambulation 2x 75 ft with RW mod assist       Standing balance with reaching        Sit to stands         Seated LAQ 2x10       Seated hamstring curls 2x10       SLR        bridges        clamshells         Seated marches 2x10                                                                                                                                                                       Modalities                                                              Assessment: Patient was able to walk down the hallway x2 this session  Caregiver voiced concern with continuing with therapy at this time as patient does not want to be in therapy and has had several episodes of care throughout the last year and he believes he is at baseline  The mother was not present for this conversation but has voiced differences in opinion  Plan: Progress treatment as tolerated    Resume gait training and balance training per POC of primary therapist

## 2018-08-29 NOTE — PROCEDURES
Video Swallow Study      Patient Name: Soni Wagner  IZCWD'B Date: 8/29/2018        Past Medical History  Past Medical History:   Diagnosis Date    Allergic rhinitis     Brain anoxia ([facility] [address] )     Cardiac arrest [facility])     age 13 s/p long Q-T syndrome    Community acquired pneumonia     last assessed/resolved:  10/9/2014    Depression     GERD (gastroesophageal reflux disease)     Long Q-T syndrome     Muscular rigidity and spasm, progressive     Osteopenia     Osteoporosis     Quadriplegia ([address] )     Spastic neurogenic bladder     Urinary incontinence         Past Surgical History  Past Surgical History:   Procedure Laterality Date    APPENDECTOMY  1991    WISDOM TOOTH EXTRACTION           General Information:    26 yo gentleman referred to [facility]  for a VBS by Dr Ness Rose for dysphagia to assess for possible diet upgrade  Pt has been receiving dysphagia tx at [facility] since end of July 2018 after hospitalization  Cognition:  Awake, alert; mostly nonverbal      Speech/Swallow Mech: Oral motor movements appeared  Decreased strength; Dentition was  Natural;  Respiratory Status: WFL on RA;   Current diet: puree w/ NTL by tsp  Prior VBS 7/19/18 showed Moderate-severe oral/mild-moderate pharyngeal dysphagia characterized by minimal mastication/ oral processing w/ soft/solid, tended to hold in oral cavity  Constant verbal prompts to chew and swallow  Decreased bolus formation, pc meal transfer noted  Inconsistent episodes of silent aspiration noted before the swallow w/ thin liquids  Pt was seen in radiology for a Video Barium Swallow Study, seated in the upright position and viewed laterally with the following consistencies: puree, soft/solid, hard solid, HTL by tsp, NTL by tsp, cup and straw, thin liquids by tsp, cup and straw  Barium pill whole in puree         Results are as follows:     **Images are available for review on PACS          Oral Stage:   pt had some difficulty sucking from straw due to reduced lip seal, also only took small amts from cup  Pt was able to retrieve food and liquid bolus from spoon  Mastication was slow but functional for soft/solids, decreased mastication w/ solids  Saliva w/ soft/solid residue noted to spill to vallecula before bolus transfer  Delayed, spontaneous secondary transfer & swallows noted  Pharyngeal Stage:   Reduced bolus control noted w/ thin liquids by cup, spoon, and straw w/ episodes of SILENT aspiration  Otherwise swallow initiation was timely for food and thick liquids  Transient penetration was noted w/ soup spoon size of NTL, no penetration w/ tsp amt, small cup and straw sips of NTL  No vallecular or pyriform sinus retention noted  Barium pill in tsp of puree passed through pharynx w/o difficulty  Esophageal Stage:    briefly assessed, no overt abnormality  All materials passed through esophagus w/o difficulty  Assessment Summary:   Improvement noted in swallowing, specifically w/ oral stage- now w/ Moderate oral/mild-moderate pharyngeal dysphagia   pt continues w/ SILENT aspiration of thin liquids due to reduced bolus control administered by cup, tsp, and straw       Diagnosis/Prognosis:            Recommendations:   cont trials of [facility] soft foods w/ speech tx prior to advancing diet  Cont NTL by tsp, cup, or straw- however pt takes smaller amts via cup and straw than tsp  Aspiration precautions  meds whole in puree

## 2018-08-30 ENCOUNTER — OFFICE VISIT (OUTPATIENT)
Dept: SPEECH THERAPY | Facility: CLINIC | Age: 33
End: 2018-08-30
Payer: COMMERCIAL

## 2018-08-30 ENCOUNTER — OFFICE VISIT (OUTPATIENT)
Dept: PHYSICAL THERAPY | Facility: CLINIC | Age: 33
End: 2018-08-30
Payer: COMMERCIAL

## 2018-08-30 DIAGNOSIS — R53.81 PHYSICAL DECONDITIONING: ICD-10-CM

## 2018-08-30 DIAGNOSIS — I69.354 SPASTIC HEMIPLEGIA OF LEFT NONDOMINANT SIDE AS LATE EFFECT OF CEREBRAL INFARCTION (HCC): Primary | ICD-10-CM

## 2018-08-30 DIAGNOSIS — G25.82 MUSCULAR RIGIDITY AND SPASM, PROGRESSIVE: ICD-10-CM

## 2018-08-30 DIAGNOSIS — R13.12 OROPHARYNGEAL DYSPHAGIA: ICD-10-CM

## 2018-08-30 DIAGNOSIS — I45.81 LONG Q-T SYNDROME: ICD-10-CM

## 2018-08-30 DIAGNOSIS — G93.1 ANOXIC BRAIN DAMAGE (HCC): Primary | ICD-10-CM

## 2018-08-30 PROCEDURE — 97530 THERAPEUTIC ACTIVITIES: CPT | Performed by: PHYSICAL THERAPIST

## 2018-08-30 PROCEDURE — 97116 GAIT TRAINING THERAPY: CPT | Performed by: PHYSICAL THERAPIST

## 2018-08-30 PROCEDURE — 92526 ORAL FUNCTION THERAPY: CPT

## 2018-08-30 NOTE — PROGRESS NOTES
Daily Speech Treatment Note    Today's date: 2018  Patients name: Verner Rosette  : 1985  MRN: [id number]  Safety measures:  Aspiration risk, Long Q-T syndrome, depression  Referring provider: Krystyna Gambino MD    Primary Diagnosis/Billing code: R13 12  Secondary Diagnosis/ Billing code: G93 1, I45 81    Visit Tracking:  -Referring provider: Epic  -Billing guidelines: CMS   -Visit #2/10  (10 total)  -[facility]  Medicare  -RE due 2018  Subjective/Behavioral:  "hi"     Objective/Assessment:  -Patient's family member/caregiver was present during today's session  RESULTS OF VBSS REVIEWED:  Improvement noted in swallowing, specifically w/ oral stage- now w/ Moderate oral/mild-moderate pharyngeal dysphagia  Pt continues w/ SILENT aspiration of thin liquids due to reduced bolus control administered by cup, tsp, and straw  Recommendations: cont trials of mech soft foods w/ speech tx prior to advancing diet  Cont NTL by tsp, cup, or straw- however pt takes smaller amts via cup and straw than tsp  meds whole in puree    NOTED: Patient diet at home is NOT changing at this time  He is to continue on puree and NTL by tsp  Mech soft trials only during ST  Short-term goals:  1  Patient will complete daily oral motor exercise to increase labial and lingual range of motion, strength, and coordination with min cues to 90% effectiveness to strengthen oral musculature and anterior-posterior bolus transfer ability and prevent food or liquid spillage from the oral cavity, to be achieved in 4-6 weeks  2  Patient will complete swallowing maneuver (e g , supraglottic swallow, Mendelsohn maneuver, effortful swallow, etc ) with 80% accuracy to facilitate improved oral motor strengthening, tongue base retraction, HLE, airway protection and/or clearance of the bolus through the pharynx, to be achieved in 4-6 weeks      3   Patient's caregiver/staff will consistently perform informated compensatory strategies (e g , upright positioning, small bites/sips, slow rate, alternation of consistencies, multiple swallows, effortful swallow, chin tuck, head turn, etc ) with 80% accuracy to eliminate overt s/sx penetration/aspiration of least restrictive food/liquid consistencies, to be achieved in 4-6 weeks      4  Patient will tolerate NMES (i e , VitalStim) in conjunction with HLE exercises without overt s/sx of penetration or aspiration, to be achieved in 4-6 weeks  Patient tolerated NMES (min threshold: 4 0 mA & max threshold: 8 0 mA) in conjunction with PO intake and exercises  Traditional VitalStim    Channel(s) used: 1,2   Placement: 3a   Treatment Duration: 30 minutes     Patient consumed the following during todays session: pureed carrots, pureed sweet potatoes, NTL water via tsp, and small bites of cake  Trials of mech soft improved with smaller bites and liquid wash in between to clear residue  Patient was noted to have increased saliva, resulting in some anterior loss after mech soft presentation  Cues to swallow and correct forward posture throughout meal      No overt distress or s/s of aspiration or dysphagia noted during todays therapy session  5  Patient's caregiver/staff will demonstrate utilization of recommended safe swallowing strategies during a clinician assessed meal across three sessions, to be achieved in 4-6 weeks  Plan:  -Patient was provided with home exercises/activities to target goals in plan of care at the end of today's session   -Continue with current plan of care

## 2018-08-30 NOTE — PROGRESS NOTES
Daily Note     Today's date: 2018  Patient name: Thong Ramos  : 1985  MRN: [id number]  Referring provider: Ivelisse Canada MD  Dx:   Encounter Diagnosis     ICD-10-CM    1  Spastic hemiplegia of left nondominant side as late effect of cerebral infarction [facility]) I69 354    2  Physical deconditioning R53 81    3  Muscular rigidity and spasm, progressive G25 82                      Subjective: No new report  Had c/o L knee pain  Objective: See treatment diary below    Precautions CP, fall risk     Specialty Daily Treatment Diary      Manual                                                                                          Exercise Diary           ambulation 2x 75 ft with RW mod assist  2 x 30 ft w/ RW mod A          Standing balance with reaching             Sit to stands    4x         Seated LAQ 2x10           Seated hamstring curls 2x10           SLR             bridges             clamshells              Seated marches 2x10                                                                                                                                                                           Modalities                                                                 Assessment: Session limited due to pt arriving 15 min late  Pt donned wedges to facilitate anterior weight shift and decrease genu recurvatum  During ambulation with wedges, pt continued to demo significant L genu recurvatum despite heel lift and had c/o pain  Provided pt with increased rest breaks this session and pt was only able to ambulate shorter distances today  Overall demo improved anterior weightshift and decreased posterior lean during STS this session  Patient would benefit from continued PT      Plan: Progress treatment as tolerated

## 2018-08-31 ENCOUNTER — OFFICE VISIT (OUTPATIENT)
Dept: SPEECH THERAPY | Facility: CLINIC | Age: 33
End: 2018-08-31
Payer: COMMERCIAL

## 2018-08-31 ENCOUNTER — OFFICE VISIT (OUTPATIENT)
Dept: OCCUPATIONAL THERAPY | Facility: CLINIC | Age: 33
End: 2018-08-31
Payer: COMMERCIAL

## 2018-08-31 ENCOUNTER — APPOINTMENT (OUTPATIENT)
Dept: PHYSICAL THERAPY | Facility: CLINIC | Age: 33
End: 2018-08-31
Payer: COMMERCIAL

## 2018-08-31 DIAGNOSIS — R13.12 OROPHARYNGEAL DYSPHAGIA: Primary | ICD-10-CM

## 2018-08-31 DIAGNOSIS — I45.81 LONG Q-T SYNDROME: ICD-10-CM

## 2018-08-31 DIAGNOSIS — G93.1 ANOXIC BRAIN DAMAGE (HCC): Primary | ICD-10-CM

## 2018-08-31 DIAGNOSIS — G93.1 ANOXIC BRAIN DAMAGE (HCC): ICD-10-CM

## 2018-08-31 PROCEDURE — 97530 THERAPEUTIC ACTIVITIES: CPT

## 2018-08-31 PROCEDURE — 92526 ORAL FUNCTION THERAPY: CPT

## 2018-08-31 PROCEDURE — 97112 NEUROMUSCULAR REEDUCATION: CPT

## 2018-08-31 NOTE — PROGRESS NOTES
Daily Note     Today's date: 2018  Patient name: Opal Zavala  : 1985  MRN: [id number]  Referring provider: Omar Livingston MD  Dx:   Encounter Diagnosis   Name Primary?  Anoxic brain damage (HCC) Yes                  Subjective:       Objective: See treatment diary below      Assessment: Tolerated treatment fair  Patient would benefit from continued OT   Pt tolerated BIONESS to LUE w/MH to RUE for tone reduction followed by IASTM  OH reach grasping onto yellow pball with max difficulty coordinating movments to grasp ball and extend BLUE  Grasp/release using blocks with hand to target task w/abilitites to identify colors, letters and pictures         Plan: Continued skilled OT per POC    INTERVENTION COMMENTS:  Diagnosis: Anoxic brain damage (HCC) [G93 1]  Precautions: fall risk, dystonia, aspiration, NO KTAPE  FOTO:  6 of 30 visits, PN due

## 2018-08-31 NOTE — PROGRESS NOTES
Daily Speech Treatment Note    Today's date: 2018  Patients name: Corey Salcedo  : 1985  MRN: [id number]  Safety measures:  Aspiration risk, Long Q-T syndrome, depression  Referring provider: Steven Horta MD    Primary Diagnosis/Billing code: R13 12  Secondary Diagnosis/ Billing code: G93 1, I45 81    Visit Tracking:  -Referring provider: Epic  -Billing guidelines: CMS   -Visit #3/10  (11 total)   -[facility]  Medicare  -RE due 2018  Subjective/Behavioral:  "Hi"     Objective/Assessment:  -Patient's family member/caregiver was present during today's session  Continue on puree and NTL by [facility] soft trials only during ST  Short-term goals:  1  Patient will complete daily oral motor exercise to increase labial and lingual range of motion, strength, and coordination with min cues to 90% effectiveness to strengthen oral musculature and anterior-posterior bolus transfer ability and prevent food or liquid spillage from the oral cavity, to be achieved in 4-6 weeks  2  Patient will complete swallowing maneuver (e g , supraglottic swallow, Mendelsohn maneuver, effortful swallow, etc ) with 80% accuracy to facilitate improved oral motor strengthening, tongue base retraction, HLE, airway protection and/or clearance of the bolus through the pharynx, to be achieved in 4-6 weeks      3  Patient's caregiver/staff will consistently perform informated compensatory strategies (e g , upright positioning, small bites/sips, slow rate, alternation of consistencies, multiple swallows, effortful swallow, chin tuck, head turn, etc ) with 80% accuracy to eliminate overt s/sx penetration/aspiration of least restrictive food/liquid consistencies, to be achieved in 4-6 weeks      4  Patient will tolerate NMES (i e , VitalStim) in conjunction with HLE exercises without overt s/sx of penetration or aspiration, to be achieved in 4-6 weeks    Patient tolerated NMES (min threshold: 4 0 mA & max threshold: 6 0 mA) in conjunction with PO intake and exercises  Traditional VitalStim    Channel(s) used: 1,2   Placement: 3a   Treatment Duration: 30 minutes     Patient consumed the following during todays session: ground turkey, mashed potatoes and NTL by tsp      Trials of mech soft improved with pureed mixed in (meat/potatoes) as well as smaller bites and liquid wash in between to clear residue  Patient was noted to have increased saliva, resulting in some anterior loss after mech soft presentation  Cues to swallow and correct forward posture throughout meal  Patient had episodes of coughing at end of meal with meat presentation without potatoes mixed in  Patient unable to provide a volitional cough/throat clear  NTL used to help clear residue  Patient ended session today with pudding, and tolerated without coughing  No other overt distress or s/s of aspiration or dysphagia noted during todays therapy session  5  Patient's caregiver/staff will demonstrate utilization of recommended safe swallowing strategies during a clinician assessed meal across three sessions, to be achieved in 4-6 weeks  Plan:  -Patient was provided with home exercises/activities to target goals in plan of care at the end of today's session   -Continue with current plan of care

## 2018-09-04 ENCOUNTER — OFFICE VISIT (OUTPATIENT)
Dept: PHYSICAL THERAPY | Facility: CLINIC | Age: 33
End: 2018-09-04
Payer: COMMERCIAL

## 2018-09-04 ENCOUNTER — OFFICE VISIT (OUTPATIENT)
Dept: SPEECH THERAPY | Facility: CLINIC | Age: 33
End: 2018-09-04
Payer: COMMERCIAL

## 2018-09-04 ENCOUNTER — APPOINTMENT (OUTPATIENT)
Dept: OCCUPATIONAL THERAPY | Facility: CLINIC | Age: 33
End: 2018-09-04
Payer: COMMERCIAL

## 2018-09-04 ENCOUNTER — APPOINTMENT (OUTPATIENT)
Dept: SPEECH THERAPY | Facility: CLINIC | Age: 33
End: 2018-09-04
Payer: COMMERCIAL

## 2018-09-04 DIAGNOSIS — R53.81 PHYSICAL DECONDITIONING: ICD-10-CM

## 2018-09-04 DIAGNOSIS — I69.354 SPASTIC HEMIPLEGIA OF LEFT NONDOMINANT SIDE AS LATE EFFECT OF CEREBRAL INFARCTION (HCC): Primary | ICD-10-CM

## 2018-09-04 DIAGNOSIS — I45.81 LONG Q-T SYNDROME: ICD-10-CM

## 2018-09-04 DIAGNOSIS — G93.1 ANOXIC BRAIN DAMAGE (HCC): ICD-10-CM

## 2018-09-04 DIAGNOSIS — G25.82 MUSCULAR RIGIDITY AND SPASM, PROGRESSIVE: ICD-10-CM

## 2018-09-04 DIAGNOSIS — R13.12 OROPHARYNGEAL DYSPHAGIA: Primary | ICD-10-CM

## 2018-09-04 PROCEDURE — 97116 GAIT TRAINING THERAPY: CPT | Performed by: PHYSICAL THERAPIST

## 2018-09-04 PROCEDURE — 92526 ORAL FUNCTION THERAPY: CPT | Performed by: SPEECH-LANGUAGE PATHOLOGIST

## 2018-09-04 PROCEDURE — 97112 NEUROMUSCULAR REEDUCATION: CPT | Performed by: PHYSICAL THERAPIST

## 2018-09-04 NOTE — PROGRESS NOTES
Daily Speech Treatment Note    Today's date: 2018  Patients name: Thong Ramos  : 1985  MRN: [id number]  Safety measures:  Aspiration risk, Long Q-T syndrome, depression  Referring provider: Ivelisse Canada MD    Primary Diagnosis/Billing code: R13 12  Secondary Diagnosis/ Billing code: G93 1, I45 81    Visit Tracking:  -Referring provider: Epic  -Billing guidelines: CMS   -Visit #4/10  (12 total) (**KX modifier**)  -[facility] Medicare  -RE due 2018  Subjective/Behavioral:  -"Hi "    Objective/Assessment:  -Patient's family member/caregiver was present during today's session  Continue on puree and NTL by [facility] soft trials only during   Short-term goals:  1  Patient will complete daily oral motor exercise to increase labial and lingual range of motion, strength, and coordination with min cues to 90% effectiveness to strengthen oral musculature and anterior-posterior bolus transfer ability and prevent food or liquid spillage from the oral cavity, to be achieved in 4-6 weeks  2  Patient will complete swallowing maneuver (e g , supraglottic swallow, Mendelsohn maneuver, effortful swallow, etc ) with 80% accuracy to facilitate improved oral motor strengthening, tongue base retraction, HLE, airway protection and/or clearance of the bolus through the pharynx, to be achieved in 4-6 weeks      3  Patient's caregiver/staff will consistently perform informated compensatory strategies (e g , upright positioning, small bites/sips, slow rate, alternation of consistencies, multiple swallows, effortful swallow, chin tuck, head turn, etc ) with 80% accuracy to eliminate overt s/sx penetration/aspiration of least restrictive food/liquid consistencies, to be achieved in 4-6 weeks      4  Patient will tolerate NMES (i e , VitalStim) in conjunction with HLE exercises without overt s/sx of penetration or aspiration, to be achieved in 4-6 weeks    Patient tolerated NMES (min threshold: 4 5 mA & max threshold: 8 0 mA) in conjunction with PO intake and exercises  Traditional VitalStim    Channel(s) used: 1,2   Placement: 3a   Treatment Duration: 48minutes     Patient consumed the following during todays session: pureed strawberry & prunes with yogurt, mechanical soft turkey burger & pork tenderloin & banana, and NTL via teaspoon  Patient was fed by clinician; however, it is recommended that patient's caregivers feed patient in subsequent sessions to assess caregiver carryover of safe swallowing strategies  Patient's mother questioned if pureed strawberry & prunes with yogurt were "true" purees--it was noted that very small seeds from strawberries and prunes were not entirely pureed; however, patient was able to tolerate during session without overt distress or s/s of aspiration or dysphagia noted during todays therapy session  Patient was cleared to consume these two items pureed outside of therapy setting  It was also recommended that family look into purchasing items pre-blended to provide to patient as well  Trials of mech soft completed today  Reduced lip closure during PO intake--increased saliva production resulting in some anterior loss with food/liquid presentations  Decreased mastication/oral processing with slowed AP transit  Delayed initiation of swallow  During PO intake today, only 1 episode of coughing on NTL via teaspoon  Oral residue noted with meat consistencies, which improved with liquid washes and verbal cues to implement lingual sweeps  Vocal checks completed intermittently throughout PO intake  No other overt distress or s/s of aspiration or dysphagia noted during todays therapy session  5  Patient's caregiver/staff will demonstrate utilization of recommended safe swallowing strategies during a clinician assessed meal across three sessions, to be achieved in 4-6 weeks      Plan:  -Patient was provided with home exercises/activities to target goals in plan of care at the end of today's session   -Continue with current plan of care

## 2018-09-05 NOTE — PROGRESS NOTES
Daily Note     Today's date: 2018  Patient name: Jacob Monroy  : 1985  MRN: [id number]  Referring provider: Trinidad Mann MD  Dx:   Encounter Diagnosis     ICD-10-CM    1  Spastic hemiplegia of left nondominant side as late effect of cerebral infarction [facility]) I69 354    2  Physical deconditioning R53 81    3  Muscular rigidity and spasm, progressive G25 82                      Subjective: No new report  Had c/o L knee pain  Objective: See treatment diary below    Precautions CP, fall risk     Specialty Daily Treatment Diary      Manual                                                                                          Exercise Diary         ambulation 2x 75 ft with RW mod assist  2 x 30 ft w/ RW mod A   30 ft x 3 with RW and mod a       Standing balance with reaching      10x       Sit to stands    4x  5x       Seated LAQ 2x10           Seated hamstring curls 2x10           SLR             bridges             clamshells              Seated marches 2x10                                                                                                                                                                           Modalities                                                                 Assessment: Discussed with patient, mother and  plan of care  They are planning to get shoes cobbled with heel rise on outside in order to prevent knee hyper extension and shift weight to toes  Mother feels patient is not yet at baseline and would like pt to be able to walk for longer distances with all caregivers at home and walking around the block   feels patient is at baseline, does not need more therapy, and that caregivers will be unable to continue to walk with patient at home  Therapist suggested placing patient on hold until receiving updated shoes   Therapist feels that if patient has not been walking long distances in 10 years it is not realistic that he willreturn to walking long distances at this time  Mother feels patient is in "excrutiating" pain as well  When asking patient it is 50/50 if he answers that he is in pain or not and always labels a different body part when asked where  At this time patient has been issued specific HEP with written pictures and directions  He will be placed on hold until he receives new shoes  Plan: Progress treatment as tolerated

## 2018-09-06 ENCOUNTER — APPOINTMENT (OUTPATIENT)
Dept: PHYSICAL THERAPY | Facility: CLINIC | Age: 33
End: 2018-09-06
Payer: COMMERCIAL

## 2018-09-06 ENCOUNTER — OFFICE VISIT (OUTPATIENT)
Dept: OCCUPATIONAL THERAPY | Facility: CLINIC | Age: 33
End: 2018-09-06
Payer: COMMERCIAL

## 2018-09-06 ENCOUNTER — OFFICE VISIT (OUTPATIENT)
Dept: SPEECH THERAPY | Facility: CLINIC | Age: 33
End: 2018-09-06
Payer: COMMERCIAL

## 2018-09-06 DIAGNOSIS — R13.12 OROPHARYNGEAL DYSPHAGIA: Primary | ICD-10-CM

## 2018-09-06 DIAGNOSIS — G93.1 ANOXIC BRAIN DAMAGE (HCC): ICD-10-CM

## 2018-09-06 DIAGNOSIS — I45.81 LONG Q-T SYNDROME: ICD-10-CM

## 2018-09-06 DIAGNOSIS — G93.1 ANOXIC BRAIN DAMAGE (HCC): Primary | ICD-10-CM

## 2018-09-06 PROCEDURE — 97112 NEUROMUSCULAR REEDUCATION: CPT

## 2018-09-06 PROCEDURE — 92526 ORAL FUNCTION THERAPY: CPT | Performed by: SPEECH-LANGUAGE PATHOLOGIST

## 2018-09-06 PROCEDURE — 97535 SELF CARE MNGMENT TRAINING: CPT

## 2018-09-06 PROCEDURE — 97140 MANUAL THERAPY 1/> REGIONS: CPT

## 2018-09-06 NOTE — PROGRESS NOTES
Daily Speech Treatment Note    Today's date: 2018  Patients name: Jaky Daily  : 1985  MRN: [id number]  Safety measures:  Aspiration risk, Long Q-T syndrome, depression  Referring provider: Destiny Thompson MD    Primary Diagnosis/Billing code: R13 12  Secondary Diagnosis/ Billing code: G93 1, I45 81    Visit Tracking:  -Referring provider: Epic  -Billing guidelines: CMS   -Visit #5/10  (13 total)  -[facility]  Medicare  -RE due 2018  Subjective/Behavioral:  -"Hi "    Objective/Assessment:  -Patient's family member/caregiver was present during today's session  Continue on puree and NTL by [facility] soft trials only during   Short-term goals:  1  Patient will complete daily oral motor exercise to increase labial and lingual range of motion, strength, and coordination with min cues to 90% effectiveness to strengthen oral musculature and anterior-posterior bolus transfer ability and prevent food or liquid spillage from the oral cavity, to be achieved in 4-6 weeks  2  Patient will complete swallowing maneuver (e g , supraglottic swallow, Mendelsohn maneuver, effortful swallow, etc ) with 80% accuracy to facilitate improved oral motor strengthening, tongue base retraction, HLE, airway protection and/or clearance of the bolus through the pharynx, to be achieved in 4-6 weeks      3  Patient's caregiver/staff will consistently perform informated compensatory strategies (e g , upright positioning, small bites/sips, slow rate, alternation of consistencies, multiple swallows, effortful swallow, chin tuck, head turn, etc ) with 80% accuracy to eliminate overt s/sx penetration/aspiration of least restrictive food/liquid consistencies, to be achieved in 4-6 weeks      4  Patient will tolerate NMES (i e , VitalStim) in conjunction with HLE exercises without overt s/sx of penetration or aspiration, to be achieved in 4-6 weeks    Patient tolerated NMES (min threshold: 4 0 mA & max threshold: 7 5 mA) in conjunction with PO intake and exercises  Traditional VitalStim    Channel(s) used: 1,2   Placement: 3a   Treatment Duration: 41 minutes     Patient consumed the following during todays session: pureed puree chicken, peas, and mashed potatoes with NTL via teaspoon  Patient was fed by clinician on this date of service  Reduced lip closure during PO intake--anterior loss with food/liquid presentations noted; however, not as much saliva production noted on this date of service  Patient benefited from verbal cues to implement proper body positioning  Decreased mastication/oral processing with slowed AP transit  Delayed initiation of swallow  Oral residue noted, which improved with liquid washes and verbal cues to implement lingual sweeps  Vocal checks completed intermittently throughout PO intake  No other overt distress or s/s of aspiration or dysphagia noted during todays therapy session  5  Patient's caregiver/staff will demonstrate utilization of recommended safe swallowing strategies during a clinician assessed meal across three sessions, to be achieved in 4-6 weeks  Plan:  -Patient was provided with home exercises/activities to target goals in plan of care at the end of today's session   -Continue with current plan of care

## 2018-09-06 NOTE — PROGRESS NOTES
Daily Note     Today's date: 2018  Patient name: Zachary Shanks  : 1985  MRN: [id number]  Referring provider: Tito Serna MD  Dx:   Encounter Diagnosis   Name Primary?  Anoxic brain damage (HCC) Yes                  Subjective: "No "      Objective: See treatment diary below      Assessment: Tolerated treatment well  MH and NMES to B/L UE followed by IASTM to hand and forearm to reduce tone  Engaged patient in [facility] task seated at edge of mat and focused on sorting colored cubes and pincer grasp  Encouraged patient to utilize both hands and observed with an ulnar grasp on left hand  Required frequent verbal cues to redirect back to task  Educated patients caregivers on [facility] activities for home and increasing patients engagement in functional tasks at home  Caregivers reported that patient is unable to feed himself due to only being allowed teaspoon feeding and unable to use a cup or straw  Caregivers also reported that patient has a splint at home and SEYMOUR requested for splint to be brought to next session         Plan: Continued skilled OT per POC    INTERVENTION COMMENTS:  Diagnosis: Anoxic brain damage (HCC) [G93 1]  Precautions: fall risk, dystonia, aspiration, NO KTAPE  FOTO:  7 of 30 visits, PN due

## 2018-09-07 ENCOUNTER — APPOINTMENT (OUTPATIENT)
Dept: PHYSICAL THERAPY | Facility: CLINIC | Age: 33
End: 2018-09-07
Payer: COMMERCIAL

## 2018-09-07 ENCOUNTER — APPOINTMENT (OUTPATIENT)
Dept: RADIOLOGY | Age: 33
End: 2018-09-07
Payer: COMMERCIAL

## 2018-09-07 ENCOUNTER — APPOINTMENT (OUTPATIENT)
Dept: SPEECH THERAPY | Facility: CLINIC | Age: 33
End: 2018-09-07
Payer: COMMERCIAL

## 2018-09-07 ENCOUNTER — OFFICE VISIT (OUTPATIENT)
Dept: OCCUPATIONAL THERAPY | Facility: CLINIC | Age: 33
End: 2018-09-07
Payer: COMMERCIAL

## 2018-09-07 DIAGNOSIS — J69.0 ASPIRATION PNEUMONIA, UNSPECIFIED ASPIRATION PNEUMONIA TYPE, UNSPECIFIED LATERALITY, UNSPECIFIED PART OF LUNG (HCC): ICD-10-CM

## 2018-09-07 DIAGNOSIS — G93.1 ANOXIC BRAIN DAMAGE (HCC): Primary | ICD-10-CM

## 2018-09-07 PROCEDURE — 97112 NEUROMUSCULAR REEDUCATION: CPT

## 2018-09-07 PROCEDURE — 97140 MANUAL THERAPY 1/> REGIONS: CPT

## 2018-09-07 PROCEDURE — 71046 X-RAY EXAM CHEST 2 VIEWS: CPT

## 2018-09-07 NOTE — PROGRESS NOTES
Daily Note     Today's date: 2018  Patient name: Al Carballo  : 1985  MRN: [id number]  Referring provider: Renea Victoria MD  Dx:   Encounter Diagnosis   Name Primary?  Anoxic brain damage (HCC) Yes                  Subjective: Splint sent with pt this session  Objective: See treatment diary below        Assessment: Tolerated treatment fair  Patient would benefit from continued OT   Pt tolerated BIONESS followed by IASTM to BLUE for tone reduction  Pt engaged in ball toss, and block sort , easily distracted  Trialed splint for fitting, discussed functionality and effectiveness with care givers as pt does not not maintain splint positioning  Caregivers provided example of hand exercises mom would like OT to recommend for carryover at home        Plan: Continued skilled POC    INTERVENTION COMMENTS:  Diagnosis: Anoxic brain damage (HCC) [G93 1]  Precautions: fall risk, dystonia, aspiration, NO KTAPE  FOTO:  8 of 30 visits, PN due

## 2018-09-08 NOTE — PROGRESS NOTES
Daily Speech Treatment Note    Today's date: 2018  Patients name: Walter Roth  : 1985  MRN: [id number]  Safety measures:  Aspiration risk, Long Q-T syndrome, depression  Referring provider: Isabella Ornelas MD    Primary Diagnosis/Billing code: R13 12  Secondary Diagnosis/ Billing code: G93 1, I45 81    Visit Tracking:  -Referring provider: Epic  -Billing guidelines: CMS   -Visit #6/10  (14 total) (**KX modifier**)  -[facility]  Medicare  -RE due 2018  Subjective/Behavioral:  -"Hi "    -Patient's mother reported that patient's recent chest x-ray was WNL  Objective/Assessment:  -Patient's family member/caregiver was present during today's session  Continue on puree and NTL by [facility] soft trials only during ST  Short-term goals:  1  Patient will complete daily oral motor exercise to increase labial and lingual range of motion, strength, and coordination with min cues to 90% effectiveness to strengthen oral musculature and anterior-posterior bolus transfer ability and prevent food or liquid spillage from the oral cavity, to be achieved in 4-6 weeks  2  Patient will complete swallowing maneuver (e g , supraglottic swallow, Mendelsohn maneuver, effortful swallow, etc ) with 80% accuracy to facilitate improved oral motor strengthening, tongue base retraction, HLE, airway protection and/or clearance of the bolus through the pharynx, to be achieved in 4-6 weeks      3   Patient's caregiver/staff will consistently perform informated compensatory strategies (e g , upright positioning, small bites/sips, slow rate, alternation of consistencies, multiple swallows, effortful swallow, chin tuck, head turn, etc ) with 80% accuracy to eliminate overt s/sx penetration/aspiration of least restrictive food/liquid consistencies, to be achieved in 4-6 weeks      4  Patient will tolerate NMES (i e , VitalStim) in conjunction with HLE exercises without overt s/sx of penetration or aspiration, to be achieved in 4-6 weeks  Patient tolerated NMES (min threshold: 4 0 mA & max threshold: 8 0 mA) in conjunction with PO intake and exercises  Traditional VitalStim    Channel(s) used: 1,2   Placement: 3a   Treatment Duration: 42 minutes     Patient consumed the following during todays session: Pureed peas with ricotta, pureed sweet potatoes, and NTL (juice & water) via teaspoon  Patient was fed by caregiver Laura Juárezison) on this date of service  Clinician reviewed safe swallow strategies with caregiver, who demonstrated good carryover with feeding patient with occasional min verbal reminders  Reduced lip closure during PO intake--anterior loss with food/liquid presentations noted  Patient benefited from verbal cues to implement proper body positioning  Decreased mastication/oral processing with slowed AP transit  Delayed initiation of swallow  Oral residue noted, which improved with liquid washes and verbal cues to implement lingual sweeps  Vocal checks completed intermittently throughout PO intake  Cough x 1 noted at end of session  No other overt distress or s/s of aspiration or dysphagia noted during todays therapy session  5  Patient's caregiver/staff will demonstrate utilization of recommended safe swallowing strategies during a clinician assessed meal across three sessions, to be achieved in 4-6 weeks  Plan:  -Patient was provided with home exercises/activities to target goals in plan of care at the end of today's session   -Continue with current plan of care

## 2018-09-11 ENCOUNTER — OFFICE VISIT (OUTPATIENT)
Dept: FAMILY MEDICINE CLINIC | Facility: CLINIC | Age: 33
End: 2018-09-11

## 2018-09-11 ENCOUNTER — APPOINTMENT (OUTPATIENT)
Dept: PHYSICAL THERAPY | Facility: CLINIC | Age: 33
End: 2018-09-11
Payer: COMMERCIAL

## 2018-09-11 ENCOUNTER — OFFICE VISIT (OUTPATIENT)
Dept: SPEECH THERAPY | Facility: CLINIC | Age: 33
End: 2018-09-11
Payer: COMMERCIAL

## 2018-09-11 ENCOUNTER — APPOINTMENT (OUTPATIENT)
Dept: OCCUPATIONAL THERAPY | Facility: CLINIC | Age: 33
End: 2018-09-11
Payer: COMMERCIAL

## 2018-09-11 VITALS — WEIGHT: 127.6 LBS | BODY MASS INDEX: 18.27 KG/M2 | HEIGHT: 70 IN

## 2018-09-11 DIAGNOSIS — K59.00 CONSTIPATION, UNSPECIFIED CONSTIPATION TYPE: Primary | ICD-10-CM

## 2018-09-11 DIAGNOSIS — G93.1 ANOXIC BRAIN DAMAGE (HCC): ICD-10-CM

## 2018-09-11 DIAGNOSIS — M43.6 CONTRACTURE OF STERNOCLEIDOMASTOID MUSCLE: ICD-10-CM

## 2018-09-11 DIAGNOSIS — R13.12 OROPHARYNGEAL DYSPHAGIA: Primary | ICD-10-CM

## 2018-09-11 DIAGNOSIS — I45.81 LONG Q-T SYNDROME: ICD-10-CM

## 2018-09-11 DIAGNOSIS — G25.82 MUSCULAR RIGIDITY AND SPASM, PROGRESSIVE: ICD-10-CM

## 2018-09-11 DIAGNOSIS — I69.354 SPASTIC HEMIPLEGIA OF LEFT NONDOMINANT SIDE AS LATE EFFECT OF CEREBRAL INFARCTION (HCC): ICD-10-CM

## 2018-09-11 PROCEDURE — 92526 ORAL FUNCTION THERAPY: CPT | Performed by: SPEECH-LANGUAGE PATHOLOGIST

## 2018-09-11 NOTE — PROGRESS NOTES
Assessment/Plan     This is a 27 y o  male who presents for OMT follow-up for:  1  Constipation, unspecified constipation type     2  Spastic hemiplegia of left nondominant side as late effect of cerebral infarction ([facility] [address] )     3  Muscular rigidity and spasm, progressive     4  Contracture of sternocleidomastoid muscle         Plan:   1  Patient tolerated OMT well for the above problems,  advised patient to drink fluids and can use NSAID for soreness after treatment     2  OMT Follow up in 4 weeks  Subjective     Wale Benavides is a 27 y o  male and is here for a OMT follow up  The Mother reports he is doing well  Has occasional constipation which he takes miralax for  He is doing well since his last visit with some improvement of ROM of his neck  Is the patient taking Pain medication? no  Has the patient completed physical therapy for this condition? no  Did Patient symptoms improve from last OMT appointment? yes    The following portions of the patient's history were reviewed and updated as appropriate: allergies, current medications, past family history, past medical history, past social history, past surgical history and problem list     Review of Systems  Do you have pain that bothers you in your daily life? unable to assess  Review of Systems   Constitutional: Positive for unexpected weight change  HENT: Negative for congestion  Respiratory: Negative for cough  Gastrointestinal: Positive for constipation  Musculoskeletal: Positive for back pain, neck pain and neck stiffness  Neurological: Negative for headaches         Objective     OMT Exam   Cervical:   Somatic Dysfunction:  Restriction  Severity :  2  Osteopathic Findings: R paraspinal hypertonciity and R SCM hypertonicity   Treatment Method: ME and ST  Response: improved  Thoracic T1-4:   Somatic Dysfunction:  Tissue Texture Changes  Severity :  2  Osteopathic Findings: R > L mid trapezius hypertonicity   Treatment Method: ST  Response: improved  Lumbar:  Somatic Dysfunction:  Tissue Texture Changes  Severity :  3  Osteopathic Findings: bilateral paraspinal hypertonicity   Treatment Method: ST  Response: improved  Other:  Somatic Dysfunction:  Tissue Texture Changes  Severity :  2  Osteopathic Findings: constipation, abnormal inferior mesenteric ganglion   Treatment Method: MFR  Response: improved

## 2018-09-13 ENCOUNTER — OFFICE VISIT (OUTPATIENT)
Dept: OCCUPATIONAL THERAPY | Facility: CLINIC | Age: 33
End: 2018-09-13
Payer: COMMERCIAL

## 2018-09-13 ENCOUNTER — APPOINTMENT (OUTPATIENT)
Dept: PHYSICAL THERAPY | Facility: CLINIC | Age: 33
End: 2018-09-13
Payer: COMMERCIAL

## 2018-09-13 ENCOUNTER — OFFICE VISIT (OUTPATIENT)
Dept: SPEECH THERAPY | Facility: CLINIC | Age: 33
End: 2018-09-13
Payer: COMMERCIAL

## 2018-09-13 DIAGNOSIS — I45.81 LONG Q-T SYNDROME: ICD-10-CM

## 2018-09-13 DIAGNOSIS — R13.12 OROPHARYNGEAL DYSPHAGIA: Primary | ICD-10-CM

## 2018-09-13 DIAGNOSIS — G93.1 ANOXIC BRAIN DAMAGE (HCC): ICD-10-CM

## 2018-09-13 DIAGNOSIS — G93.1 ANOXIC BRAIN DAMAGE (HCC): Primary | ICD-10-CM

## 2018-09-13 PROCEDURE — 97140 MANUAL THERAPY 1/> REGIONS: CPT

## 2018-09-13 PROCEDURE — 97112 NEUROMUSCULAR REEDUCATION: CPT

## 2018-09-13 PROCEDURE — 92526 ORAL FUNCTION THERAPY: CPT | Performed by: SPEECH-LANGUAGE PATHOLOGIST

## 2018-09-13 NOTE — PROGRESS NOTES
Daily Note     Today's date: 2018  Patient name: Zachary Shanks  : 1985  MRN: [id number]  Referring provider: Tito Serna MD  Dx:   Encounter Diagnosis   Name Primary?  Anoxic brain damage (HCC) Yes                  Subjective: "No "      Objective: See treatment below  Patient received BIONESS and IASTM to BUE for tone reduction and neuro re-ed  Seated with large bolster for postural support and completed midline crossing and focus on trunk control  Educated patients mother and caregiver again on functional tasks at home rather than performing specific exercises  Assessment: Tolerated treatment fair  Frequently distracted and required verbal cues to attend back to task         Plan: Continued skilled OT per POC    INTERVENTION COMMENTS:  Diagnosis: Anoxic brain damage (HCC) [G93 1]  Precautions: fall risk, dystonia, aspiration, NO KTAPE  FOTO:  9 of 30 visits, PN scheduled

## 2018-09-13 NOTE — PROGRESS NOTES
Occupational Therapy Neuro Progress Note/Status Update:     Today's Date: 2018  Patient Name: Rachell Richard  : 1985  MRN: [id number]  Referring Provider: Bertram Arnold MD  Dx: Anoxic brain damage [facility]) [G93 1]    Active Problem List:   Patient Active Problem List   Diagnosis    Abnormal bone density screening    Abnormal TSH    Accelerated essential hypertension    Anoxic brain damage (HCC)    Allergic rhinitis    Candidal intertrigo    Depression    Long Q-T syndrome    Quadriplegia ([address] )    Rosacea    Cough    Poor weight gain in adult    Oropharyngeal dysphagia    Spastic hemiplegia of left nondominant side as late effect of cerebral infarction ([address] )    Psychophysiological insomnia    Candidiasis    Aspiration pneumonia (HCC)    Leukocytosis    Constipation    Physical deconditioning    Muscular rigidity and spasm, progressive    Decreased muscle tone     Past Medical Hx:   Past Medical History:   Diagnosis Date    Allergic rhinitis     [facility] anoxia ([facility] )     Cardiac arrest [facility])     age 13 s/p long Q-T syndrome    Community acquired pneumonia     last assessed/resolved:  10/9/2014    Depression     GERD (gastroesophageal reflux disease)     Long Q-T syndrome     Muscular rigidity and spasm, progressive     Osteopenia     Osteoporosis     Quadriplegia ([address] )     Spastic neurogenic bladder     Urinary incontinence      Past Surgical Hx:   Past Surgical History:   Procedure Laterality Date    APPENDECTOMY      WISDOM TOOTH EXTRACTION        Pain Levels:   Restin    With Activity:  0    Subjective/Patient Goal: "Good"    History of Present Illness:  Pt is a pleasant, active, disabled 27 y o  male seen for OT eval [facility] - w/ SOB, cough, ultimately dx'd w/ PNA, constipation, long QT syndrome, oropharyngeal dysphagia, depression, leukocytosis, prior anoxic brain injury, evaluated by SLP for dysphagia recommended pureed and nectar thick liquids, referred for outpt SLP, recommended additional PT/OT services for "tune up" re: prior CHINYERE as patient has a history of an anoxic brain injury that occurred 20 years ago after cardiac arrest due to Long Q-T syndrome and has since been living with quadriplegia since, comorbidities as listed above      Lifestyle Performance Model:  Autonomy: Pt lives alone in a home two blocks away from mother Tila's home, has 24 hr care per "support staff" from [employer] from [facility] (goals for [place] connection and normalization of independence in a community supported setting)  Pt able to initiate dressing, total A for bathing, incontinent requires bowel/bladder management and brief, does not drive, max A for w/c propulsion, and required use of manual w/c, electric w/c, ramp, RW w/ full assist, minimally verbal, communicates via Y/N trialed [employer] eye gaze trials for communication board w/ [place] [id number] in the past, per mother @ bedside pt is pending reception of equipment  Mother also reports h/o AE though requests "age appropriate items"  Reports h/o outpt OT/Pt/SLP @ [place] [id number], has resting hand splint for pm use only, to bring next session  Reciprocal Relationships: Supportive mother Charis Pardo lives separately 2 blocks from pt's home, parents are , father uninvolved, no siblings, has 24 hr care support staff  Service to Others: Pt is employed few hours a week at [employer], collates paperwork  Intrinsic Gratification: Enjoys going horseback riding at [facility], music therapy and art therapy  Home Setup: Pt lives off [address] in [facility] w/ first floor bedroom/bathroom w/c w/ 0 HINA w/ ramp     Prior OT/PT/SLP at [facility], [address] [place] in ICU, acute rehab, inpatient rehab @ [place] [address] w/ Dr Nisha Martinez PMR, prior vision therapy w/ prisms      Objective  Impairments Section:   UE Strength:   YOCASTA: RUE: 60/200 LUE: 25/200   PINCER: 3 point pinch: RUE 10, LUE: 5   2 point pinch: RUE: 8, LUE: 5   Lateral pincher: RUE: unable, LUE: unable    Coordination:   9 HOLE PEG TEST:     RUE:  seconds, LUE:  Seconds    Range of Motion:  AROM/PROM: RUE ataxia spasticity apraxia dysdiadochokinesia dystonia, LUE spasticity w/ dystonia w/ flexor synergy w/ tactile hypersensitivity apraxia ataxia dysmetria dyskinesias    Visual Perceptual and Functional Cognition:  Functional Starke Measure:  7 Complete Starke (Timely, Safely)  6 Modified Starke (Device)  5 Supervision (Subject = 100%+)  4 Minimal Assist (Subject = 75%+)  3 Moderate Assist (Subject = 50%+)  2 Maximal Assist (Subject = 25%+)  1 Total Assist (Subject = less than 25%)    Self-Care  A  Eatin  B  Groomin  C  Bathing: 3  D  Dressing - Upper Body: 3  E  Dressing - Lower Body: 2  F  Toiletin  Sphincter Control  G  Bladder Management: 1  H  Bowel Management: 1  Transfers  I  Bed, Chair, Wheelchair: 3  J  Toilet: 3  K  Tub, Shower: 3  Locomotion  L  Walk/Wheelchair: 3  M  Stairs: 1   Total Motor Ratin  Communication  N  Comprehension: 4  O  Expression: 4  Social Cognition  P  Social Interaction: 4  Q  Problem Solvin  R  Memory: 4   Total Cognitive Ratin  TOTAL FIM Score: 54    Assessment/Plan  Occupational Therapy Skilled Analysis Assessment and Plan of Care:  Pt requires overall mod I for ADLs/self care and mod I for fx'l mobility w/ w/c   Pt is currently demonstrating the following occupational deficits: limited 2* chronic decreased safety insight judgment and awareness into deficits, RUE ataxia spasticity apraxia dysdiadochokinesia dystonia, LUE spasticity w/ dystonia w/ flexor synergy w/ tactile hypersensitivity apraxia ataxia dysmetria dyskinesias, decreased endurance/activity tolerance, generalized weakness, deconditioning, SOB, RUBIO, fatigue, fall risk, impaired balance, minimally verbal, follows ~50% simple one step verbal concrete commands w/ mod cues for carryover, impaired FMC/FMS/GMC/GMS, impaired prehension patterns, R hand dominant, scoliotic posture w/ poor trunk control and rigidity, anterior forward flexed posture w/ forward base of support  The following Occupational Performance Areas to address include: eating, grooming, bathing/shower, toilet hygiene, dressing, medication management, socialization, health maintenance, functional mobility, community mobility, clothing management, cleaning, meal prep, money management, household maintenance, care of children, care of pets, job performance/volunteering and social participation  Based on the aforementioned OT evaluation, functional performance deficits, and assessments, pt has been identified as a max complexity evaluation  Pt to continue to benefit from outpatient skilled OT services to address the following goals 2x/wk to  w/in 4 weeks with special focus on self-care management, pt education,  and VM training as well as motor training to improve above defiicits and enhance overall QOL/function  Pt seen for OT re-eval/progress note/status update  Pt continues to demonstrate the following occupational deficits: limited 2* chronic decreased safety insight judgment and awareness into deficits, RUE ataxia spasticity apraxia dysdiadochokinesia dystonia, LUE spasticity w/ dystonia w/ flexor synergy w/ tactile hypersensitivity apraxia ataxia dysmetria dyskinesias, decreased endurance/activity tolerance, generalized weakness, deconditioning, SOB, RUBIO, fatigue, fall risk, impaired balance, minimally verbal, follows ~50% simple one step verbal concrete commands w/ mod cues for carryover, impaired FMC/FMS/GMC/GMS, impaired prehension patterns, R hand dominant, scoliotic posture w/ poor trunk control and rigidity, anterior forward flexed posture w/ forward base of support   The following Occupational Performance Areas to address include: eating, grooming, bathing/shower, toilet hygiene, dressing, medication management, socialization, health maintenance, functional mobility, community mobility, clothing management, cleaning, meal prep, money management, household maintenance, care of children, care of pets, job performance/volunteering and social participation  Discussed at [facility] w/ mother and support staff at this point, from an outpt OT standpoint, pt is functioning near baseline and has reached max potential  Discussed recommendation for soft anti-spasticity ball splint (see photos below as options) spoke w/ orthotics [employer], and [employer] and orthotists do not fabricate this type of splint, therefore mother and staff to seek options via internet online  Also made recommendations for home OT safety assessment for home modifications and safe functional access from an OT perspective to afford pt optional environmental functional independence  Also discussed option for long term use ownership of home Bion[employer] unit for neuro modulation/neuro stimulation, mother accepting of information provided  Encourage Chuy to continue to participate in HEP I e  Strengthening, stretching of B/L UE's 3x/day 10 repetitions per exercise for supination/pronation, MCP/DIP/PIP flexion/extension, shoulder and elbow flexion/extension as tolerated in tandem with functional tasks at home to promote engagement in meaningful occupations I e  Cooking, cleaning, laundry, dressing, grooming, (when cleared per SLP) self-feeding, and leisure task engagement  Pt and mother also aware of option for botox w/   Tele/physiatry team w/ neuro in the future  At this point, will D/C from outpt OT services  Pt and mother agreeable       Goals:  Short Term Goals=Long Term Goals:  1) Pt will complete ADLs/self care w/ min A-PARTIALLY MET  2) Pt will improve fx'l tfers on/off all surfaces and fx'l mobility t/o I/ADL/leisure tasks using dME PRN w/ G balance/safety w/ min A-PARTIALLY MET  3) Pt will engage in ongoing cognitive assessment w/ G participation w/ min A to A w/ safe d/c planning/recommendations-PARTIALLY MET  4) Pt will demonstrate G carryover of pt/caregiver education and training as appropriate w/ mod I w/o cues w/ G tolerance-PARTIALLY MET  5) Pt will tolerate bed mobility and EOB seated tasks w/ min A for min 30 mins to engage in fx'l I/ADL/leisure tasks w/ min A w/ min cues-PARTIALLY MET  5) Pt will follow 75% simple one step verbal commands and be A/Ox4 consistently t/o use of external environmental cues w/ mod I-PARTIALLY MET  6) Pt will demonstrate 100% carryover of LHAE/AE w/ min A t/o fx'l I/ADL/ leisure tasks w/o cues s/p skilled education-PARTIALLY MET  7) Pt blake tolerate sensory stim w/ G participation/tolerance for min 30 mins-PARTIALLY MET  8) Pt will increase B/L  UE GMS/FMS to WFL/WNL & strength to ~4+/5 w/o droppage for tabletop tasks for improved functional performance of life roles and salient tasks-PARTIALLY MET  9) Pt will demo with G carryover of Home Exercise Program to improve functional progression towards goals in Plan of care and for improved functional use of  B/L 4 weeks-PARTIALLY MET  10) Pt will demo with decreased B/L  hypertonicity for improved grasp release, termination of flexors on command  50% of time 4 weeks-PARTIALLY MET  11) Pt will demo good carryover of clinic and home tone reduction strategies for improved AROM initiation with functional reach, dressing, hygiene 4 weeks-PARTIALLY MET  12) Pt will tolerate BIONESS/IASTM for improved motor and sensory performance for overall improved hand to target with 80% accuracy 4 weeks-PARTIALLY MET     INTERVENTION COMMENTS:  Diagnosis: CHINYERE  Precautions: fall risk, dystonia, aspiration  FOTO: 32 with 68% limithation  Insurance: [employer] [facility] *  10 of 30 visits, PN due 10/14/2018     Thank you for the consult!   Please call if you have any questions: [phone]  Merry Yang, OTD, OTR/L, C-GCM, CSRS  Director of Outpatient Neuro Occupational Therapy

## 2018-09-13 NOTE — PROGRESS NOTES
Daily Speech Treatment Note    Today's date: 2018  Patients name: Rachell Richard  : 1985  MRN: [id number]  Safety measures:  Aspiration risk, Long Q-T syndrome, depression  Referring provider: Bertram Arnold MD    Primary Diagnosis/Billing code: R13 12  Secondary Diagnosis/ Billing code: G93 1, I45 81    Visit Tracking:  -Referring provider: Epic  -Billing guidelines: CMS   -Visit #7/10  (15 total) (**KX modifier**)   -[facility]  Medicare  -RE due 2018  Subjective/Behavioral:  -"Hi "    Objective/Assessment:  -Patient's family member/caregiver was present during today's session  Continue on puree and NTL by [facility] soft trials only during   Short-term goals:  1  Patient will complete daily oral motor exercise to increase labial and lingual range of motion, strength, and coordination with min cues to 90% effectiveness to strengthen oral musculature and anterior-posterior bolus transfer ability and prevent food or liquid spillage from the oral cavity, to be achieved in 4-6 weeks  2  Patient will complete swallowing maneuver (e g , supraglottic swallow, Mendelsohn maneuver, effortful swallow, etc ) with 80% accuracy to facilitate improved oral motor strengthening, tongue base retraction, HLE, airway protection and/or clearance of the bolus through the pharynx, to be achieved in 4-6 weeks      3  Patient's caregiver/staff will consistently perform informated compensatory strategies (e g , upright positioning, small bites/sips, slow rate, alternation of consistencies, multiple swallows, effortful swallow, chin tuck, head turn, etc ) with 80% accuracy to eliminate overt s/sx penetration/aspiration of least restrictive food/liquid consistencies, to be achieved in 4-6 weeks      4  Patient will tolerate NMES (i e , VitalStim) in conjunction with HLE exercises without overt s/sx of penetration or aspiration, to be achieved in 4-6 weeks    Patient tolerated NMES (min threshold: 4 0 mA & max threshold: 9 0 mA) in conjunction with PO intake and exercises  Traditional VitalStim    Channel(s) used: 1,2   Placement: 3a   Treatment Duration: 40 minutes     Patient consumed the following during todays session: scrambled eggs, potatoes with skins (cut small), pasta/shrimp/chesse with cream put through food chopped, fruit tart with whole pieces of blueberries & strawberries (cut small) and NTL (fruit nectar juice) via teaspoon  Patient was fed by caregiver Wero Robin) on this date of service  Clinician reviewed safe swallow strategies with caregiver, who demonstrated good carryover with feeding patient with min-mod verbal cues  Educated both patient's mother and caregiver Wero Robin) on fruit nectar juice brought in from home  While this was thicker than a "thin" consistency, it was not of true "nectar" consistency  Educated them that patient can safely consume at home with powdered thickener added to make it a true "nectar-thick" liquid  Reciprocal comprehension verbally expressed  Mechanical soft food trials: Reduced lip closure during PO intake--increased saliva production resulting in some anterior loss with food/liquid presentations  Patient benefited from verbal cues to implement proper body positioning  Decreased mastication/oral processing with slowed AP transit  Delayed initiation of swallow  Oral residue noted, which improved with liquid washes and verbal cues to implement lingual sweeps  Vocal checks completed intermittently throughout PO intake  No other overt distress or s/s of aspiration or dysphagia noted during todays therapy session  5  Patient's caregiver/staff will demonstrate utilization of recommended safe swallowing strategies during a clinician assessed meal across three sessions, to be achieved in 4-6 weeks      Plan:  -Patient was provided with home exercises/activities to target goals in plan of care at the end of today's session   -Continue with current plan of care

## 2018-09-14 ENCOUNTER — EVALUATION (OUTPATIENT)
Dept: OCCUPATIONAL THERAPY | Facility: CLINIC | Age: 33
End: 2018-09-14
Payer: COMMERCIAL

## 2018-09-14 ENCOUNTER — APPOINTMENT (OUTPATIENT)
Dept: PHYSICAL THERAPY | Facility: CLINIC | Age: 33
End: 2018-09-14
Payer: COMMERCIAL

## 2018-09-14 ENCOUNTER — APPOINTMENT (OUTPATIENT)
Dept: SPEECH THERAPY | Facility: CLINIC | Age: 33
End: 2018-09-14
Payer: COMMERCIAL

## 2018-09-14 DIAGNOSIS — G93.1 ANOXIC BRAIN DAMAGE (HCC): Primary | ICD-10-CM

## 2018-09-14 PROCEDURE — 97530 THERAPEUTIC ACTIVITIES: CPT

## 2018-09-14 PROCEDURE — G8986 CARRY D/C STATUS: HCPCS

## 2018-09-14 PROCEDURE — G8985 CARRY GOAL STATUS: HCPCS

## 2018-09-17 ENCOUNTER — TELEPHONE (OUTPATIENT)
Dept: FAMILY MEDICINE CLINIC | Facility: CLINIC | Age: 33
End: 2018-09-17

## 2018-09-17 NOTE — TELEPHONE ENCOUNTER
called to request refill on Nystatin powder, he is out   If  feels it can be discontinued because  his rash has cleared up, they will need a discontinue order

## 2018-09-18 ENCOUNTER — APPOINTMENT (OUTPATIENT)
Dept: PHYSICAL THERAPY | Facility: CLINIC | Age: 33
End: 2018-09-18
Payer: COMMERCIAL

## 2018-09-18 ENCOUNTER — APPOINTMENT (OUTPATIENT)
Dept: OCCUPATIONAL THERAPY | Facility: CLINIC | Age: 33
End: 2018-09-18
Payer: COMMERCIAL

## 2018-09-18 ENCOUNTER — OFFICE VISIT (OUTPATIENT)
Dept: SPEECH THERAPY | Facility: CLINIC | Age: 33
End: 2018-09-18
Payer: COMMERCIAL

## 2018-09-18 DIAGNOSIS — R13.12 OROPHARYNGEAL DYSPHAGIA: Primary | ICD-10-CM

## 2018-09-18 DIAGNOSIS — I45.81 LONG Q-T SYNDROME: ICD-10-CM

## 2018-09-18 DIAGNOSIS — G93.1 ANOXIC BRAIN DAMAGE (HCC): ICD-10-CM

## 2018-09-18 PROCEDURE — 92526 ORAL FUNCTION THERAPY: CPT

## 2018-09-18 NOTE — PROGRESS NOTES
Daily Speech Treatment Note    Today's date: 2018  Patients name: Carin Carbajal  : 1985  MRN: [id number]  Safety measures:  Aspiration risk, Long Q-T syndrome, depression  Referring provider: Oren Castaneda MD    Primary Diagnosis/Billing code: R13 12  Secondary Diagnosis/ Billing code: G93 1, I45 81    Visit Tracking:  -Referring provider: Epic  -Billing guidelines: CMS   -Visit #8/10  (16 total) (**KX modifier**)   -[facility]  Medicare  -RE due 2018  Subjective/Behavioral:  "hi"    Staff (Tk Arroyo) - reported some coughing at end of meal times  Discussed possible fatigue factor  They report it takes approx 1 hr  To get through a meal  He is also having 2 snacks/day  He also completes his OME 3x/day  Recommended decreasing OME to 2x/day, 30 min after snack  Reciprocal comprehension verbally expressed  Objective/Assessment:  Continue on puree and NTL by tsp at home  Mech soft trials only during ST  Short-term goals:  1  Patient will complete daily oral motor exercise to increase labial and lingual range of motion, strength, and coordination with min cues to 90% effectiveness to strengthen oral musculature and anterior-posterior bolus transfer ability and prevent food or liquid spillage from the oral cavity, to be achieved in 4-6 weeks  2  Patient will complete swallowing maneuver (e g , supraglottic swallow, Mendelsohn maneuver, effortful swallow, etc ) with 80% accuracy to facilitate improved oral motor strengthening, tongue base retraction, HLE, airway protection and/or clearance of the bolus through the pharynx, to be achieved in 4-6 weeks      3   Patient's caregiver/staff will consistently perform informated compensatory strategies (e g , upright positioning, small bites/sips, slow rate, alternation of consistencies, multiple swallows, effortful swallow, chin tuck, head turn, etc ) with 80% accuracy to eliminate overt s/sx penetration/aspiration of least restrictive food/liquid consistencies, to be achieved in 4-6 weeks      4  Patient will tolerate NMES (i e , VitalStim) in conjunction with HLE exercises without overt s/sx of penetration or aspiration, to be achieved in 4-6 weeks  Patient tolerated NMES (min threshold: 5 0 mA & max threshold: 9 0 mA) in conjunction with PO intake and exercises  Traditional VitalStim    Channel(s) used: 1,2   Placement: 3a   Treatment Duration: 40 minutes     Patient consumed the following during todays session: mech soft lasagna and NTL (apple juice) via teaspoon  Patient was fed by caregiver Celestina Holbrook) on this date of service  Clinician reviewed safe swallow strategies with caregiver, who demonstrated good carryover with feeding patient with min-mod verbal cues  Mechanical soft food trials: Reduced lip closure during PO intake--increased saliva production resulting in some anterior loss with food/liquid presentations  Patient benefited from verbal cues to implement proper body positioning  Decreased mastication/oral processing with slowed AP transit  Delayed initiation of swallow  Oral residue noted, which improved with liquid washes and verbal cues to implement lingual sweeps  Vocal checks completed intermittently throughout PO intake  Cough x 3 was noted in last 20 min of session (? Fatigue?) No other overt distress or s/s of aspiration or dysphagia noted during todays therapy session  5  Patient's caregiver/staff will demonstrate utilization of recommended safe swallowing strategies during a clinician assessed meal across three sessions, to be achieved in 4-6 weeks  Plan:  -Patient was provided with home exercises/activities to target goals in plan of care at the end of today's session   -Continue with current plan of care

## 2018-09-19 ENCOUNTER — TELEPHONE (OUTPATIENT)
Dept: FAMILY MEDICINE CLINIC | Facility: CLINIC | Age: 33
End: 2018-09-19

## 2018-09-19 NOTE — PROGRESS NOTES
Daily Speech Treatment Note    Today's date: 2018   Patients name: Verner Rosette  : 1985  MRN: [id number]  Safety measures:  Aspiration risk, Long Q-T syndrome, depression  Referring provider: Krystyna Gambino MD    Primary Diagnosis/Billing code: R13 12  Secondary Diagnosis/ Billing code: G93 1, I45 81    Visit Tracking:  -Referring provider: Epic  -Billing guidelines: CMS   -Visit #10  (16 total) (**KX modifier**)   -[facility]  Medicare  -RE due 2018  Subjective/Behavioral:  "Good"    Objective/Assessment:  Continue on puree and NTL by tsp at home  [facility] soft trials only during ST  Short-term goals:  1  Patient will complete daily oral motor exercise to increase labial and lingual range of motion, strength, and coordination with min cues to 90% effectiveness to strengthen oral musculature and anterior-posterior bolus transfer ability and prevent food or liquid spillage from the oral cavity, to be achieved in 4-6 weeks  2  Patient will complete swallowing maneuver (e g , supraglottic swallow, Mendelsohn maneuver, effortful swallow, etc ) with 80% accuracy to facilitate improved oral motor strengthening, tongue base retraction, HLE, airway protection and/or clearance of the bolus through the pharynx, to be achieved in 4-6 weeks      3  Patient's caregiver/staff will consistently perform informated compensatory strategies (e g , upright positioning, small bites/sips, slow rate, alternation of consistencies, multiple swallows, effortful swallow, chin tuck, head turn, etc ) with 80% accuracy to eliminate overt s/sx penetration/aspiration of least restrictive food/liquid consistencies, to be achieved in 4-6 weeks      4  Patient will tolerate NMES (i e , VitalStim) in conjunction with HLE exercises without overt s/sx of penetration or aspiration, to be achieved in 4-6 weeks    Patient tolerated NMES (min threshold: 5 0 mA & max threshold: 7 5 mA) in conjunction with PO intake and exercises  Traditional VitalStim    Channel(s) used: 1,2   Placement: 3a   Treatment Duration: 40 minutes     Patient consumed the following during todays session: ground turkey, egg salad, and NTL (apple juice), all presented by tsp by caregiver  Patient was noted with increased oral residue throughout meal, needing frequent liquid washes  Mechanical soft food trials: Reduced lip closure during PO intake--increased saliva production resulting in some anterior loss with food/liquid presentations  Patient benefited from verbal cues to implement proper body positioning  Decreased mastication/oral processing with slowed AP transit  Delayed initiation of swallow  Oral residue noted, which improved with liquid washes and verbal cues to implement lingual sweeps  Vocal checks completed intermittently throughout PO intake  Cough x 3 was noted in last 20 min of session (? Fatigue?) No other overt distress or s/s of aspiration or dysphagia noted during todays therapy session  5  Patient's caregiver/staff will demonstrate utilization of recommended safe swallowing strategies during a clinician assessed meal across three sessions, to be achieved in 4-6 weeks  Plan:  -Patient was provided with home exercises/activities to target goals in plan of care at the end of today's session   -Continue with current plan of care

## 2018-09-20 ENCOUNTER — OFFICE VISIT (OUTPATIENT)
Dept: SPEECH THERAPY | Facility: CLINIC | Age: 33
End: 2018-09-20
Payer: COMMERCIAL

## 2018-09-20 ENCOUNTER — APPOINTMENT (OUTPATIENT)
Dept: PHYSICAL THERAPY | Facility: CLINIC | Age: 33
End: 2018-09-20
Payer: COMMERCIAL

## 2018-09-20 ENCOUNTER — APPOINTMENT (OUTPATIENT)
Dept: OCCUPATIONAL THERAPY | Facility: CLINIC | Age: 33
End: 2018-09-20
Payer: COMMERCIAL

## 2018-09-20 DIAGNOSIS — R13.12 OROPHARYNGEAL DYSPHAGIA: Primary | ICD-10-CM

## 2018-09-20 DIAGNOSIS — I45.81 LONG Q-T SYNDROME: ICD-10-CM

## 2018-09-20 DIAGNOSIS — G93.1 ANOXIC BRAIN DAMAGE (HCC): ICD-10-CM

## 2018-09-20 PROCEDURE — 92526 ORAL FUNCTION THERAPY: CPT

## 2018-09-21 ENCOUNTER — APPOINTMENT (OUTPATIENT)
Dept: PHYSICAL THERAPY | Facility: CLINIC | Age: 33
End: 2018-09-21
Payer: COMMERCIAL

## 2018-09-21 ENCOUNTER — APPOINTMENT (OUTPATIENT)
Dept: SPEECH THERAPY | Facility: CLINIC | Age: 33
End: 2018-09-21
Payer: COMMERCIAL

## 2018-09-21 ENCOUNTER — APPOINTMENT (OUTPATIENT)
Dept: OCCUPATIONAL THERAPY | Facility: CLINIC | Age: 33
End: 2018-09-21
Payer: COMMERCIAL

## 2018-09-25 ENCOUNTER — APPOINTMENT (OUTPATIENT)
Dept: OCCUPATIONAL THERAPY | Facility: CLINIC | Age: 33
End: 2018-09-25
Payer: COMMERCIAL

## 2018-09-25 ENCOUNTER — EVALUATION (OUTPATIENT)
Dept: SPEECH THERAPY | Facility: CLINIC | Age: 33
End: 2018-09-25
Payer: COMMERCIAL

## 2018-09-25 ENCOUNTER — APPOINTMENT (OUTPATIENT)
Dept: PHYSICAL THERAPY | Facility: CLINIC | Age: 33
End: 2018-09-25
Payer: COMMERCIAL

## 2018-09-25 ENCOUNTER — OFFICE VISIT (OUTPATIENT)
Dept: FAMILY MEDICINE CLINIC | Facility: CLINIC | Age: 33
End: 2018-09-25

## 2018-09-25 VITALS — BODY MASS INDEX: 18.18 KG/M2 | HEIGHT: 70 IN | WEIGHT: 127 LBS

## 2018-09-25 DIAGNOSIS — G93.1 ANOXIC BRAIN DAMAGE (HCC): ICD-10-CM

## 2018-09-25 DIAGNOSIS — M99.01 SOMATIC DYSFUNCTION OF CERVICAL REGION: ICD-10-CM

## 2018-09-25 DIAGNOSIS — R13.12 OROPHARYNGEAL DYSPHAGIA: Primary | ICD-10-CM

## 2018-09-25 DIAGNOSIS — M62.838 MUSCLE SPASMS OF NECK: Primary | ICD-10-CM

## 2018-09-25 DIAGNOSIS — I45.81 LONG Q-T SYNDROME: ICD-10-CM

## 2018-09-25 PROCEDURE — G8996 SWALLOW CURRENT STATUS: HCPCS

## 2018-09-25 PROCEDURE — 92526 ORAL FUNCTION THERAPY: CPT

## 2018-09-25 PROCEDURE — G8997 SWALLOW GOAL STATUS: HCPCS

## 2018-09-25 NOTE — PROGRESS NOTES
Assessment/Plan     This is a 27 y o  male who presents for OMT follow-up for:  1  Muscle spasms of neck     2  Somatic dysfunction of cervical region         Plan:   1  Patient tolerated OMT well for the above problems,  advised patient to drink fluids and can use NSAID for soreness after treatment     2  OMT Follow up in 4 weeks  Subjective     Neo Espinal is a 27 y o  male and is here for a OMT follow up  The caregiver reports improvement of constipation and neck pain since last visit  He also does massage therapy  Is the patient taking Pain medication? no  Has the patient completed physical therapy for this condition? no  Did Patient symptoms improve from last OMT appointment? yes    The following portions of the patient's history were reviewed and updated as appropriate: allergies, current medications, past family history, past medical history, past social history, past surgical history and problem list     Review of Systems  Do you have pain that bothers you in your daily life? no  Review of Systems   Constitutional: Negative for fatigue and fever  Respiratory: Negative for cough  Cardiovascular: Negative for chest pain  Gastrointestinal: Negative for constipation  Neurological: Negative for headaches         Objective     OMT Exam   Cervical:   Somatic Dysfunction:  Tissue Texture Changes and Restriction  Severity :  2  Osteopathic Findings: R > L SCM hypertonicity   Treatment Method: ME and ST  Response: improved  Thoracic T1-4:   Somatic Dysfunction:  Tissue Texture Changes  Severity :  2  Osteopathic Findings: R mid trapezius hypertonicity    Treatment Method: ME and ST  Response: improved  Thoracic T5-9:   Somatic Dysfunction:  Tissue Texture Changes  Severity :  1  Osteopathic Findings: L paraspinal hypertonicity   Treatment Method: ST  Response: improved  Lumbar:  Somatic Dysfunction:  Tissue Texture Changes  Severity :  1  Osteopathic Findings: R paraspinal hypertonicity and tenderness   Treatment Method: ME  Response: improved

## 2018-09-25 NOTE — PROGRESS NOTES
Speech Therapy Re-Evaluation  Today's date: 2018  Patients name: Jonathan Lozada  : 1985  MRN: [id number]  Safety measures:  Aspiration risk, Long Q-T syndrome, depression  Referring provider: Alfa Cui MD    Primary Diagnosis/Billing code: R13 12  Secondary Diagnosis/ Billing code: G93 1, I45 81    Visit Tracking:  -Referring provider: Epic  -Billing guidelines: CMS   -Visit #1/10  (17 total) (**KX modifier**)   -[facility] Medicare  -RE due 10/25/2018  Subjective/Behavioral:  -Patient arrived to session today with caregivers (x3)    Staff reporting increased coughing at meals recently  They were unable to tell if it was from the liquids or puree  Most reports were towards the end of the meal       Objective/Assessment:  DYSPHAGIA UPDATE:  -Current diet (solids): Pureed  -Current diet (liquids): Nectar Thick    -Facial appearance Symmetrical   -Dentition Adequate   -Labial function Strength improved to [facility]   -Lingual function Decreased coordination, strength improved to [facility]   -Velar function Symmetrical       LIQUID CONSISTENCY UPDATE:   1  Liquid Consistency (Nectar-thick and Honey-thick)   Administered by: Sallyanne Escort and Fed by examiner   Strategies, attempts, and responses: None    CLINICAL FINDINGS:   Oral phase impairments: WFL   Pharyngeal Phase Impairments: WFL    *Laryngeal excursion upon palpation: Appeared WFL      SOLID CONSISTENCY UPDATE:  1   Solid Consistency (Mechanical Soft and Puree)   Administered by: Sallyanne Escort and Fed by examiner   Strategies, attempts, and responses: None    CLINICAL FINDINGS:   Oral phase impairments: Bolus formation/control and Stasis/coating/ pocketing   Pharyngeal Phase Impairments: Swallow initiation Mild delay and Fatigue over time    *Laryngeal excursion upon palpation: Appeared WFL      SWALLOWING DIAGNOSTIC IMPRESSION:  -Swallowing diagnosis/severity: Mild-moderate oropharyngeal phase dysphagia    -Factors affecting performance: Endurance    -Safety concerns: Risk for aspiration and Risk for inadequate nutrition/ hydration    -Risk factors: Complex medical history, Dependent for feeding and History of aspiration pneumonia    SAFETY PRECAUTIONS:  -Supervision: 1:1    -Strategies: Small sips and bites when eating, Slow rate, swallow between bites and Clear pocketing   -Positioning: Upright position at least 30 minutes after meal and Upright position during meals  -Compensatory strategies: Patient unable to follow or complete volitional strategies  -Recommend (solids): Puree  -Recommend (liquids): Nectar-thick  -Recommend (medications): whole in puree      Short-term goals:  1  Patient will complete daily oral motor exercise to increase labial and lingual range of motion, strength, and coordination with min cues to 90% effectiveness to strengthen oral musculature and anterior-posterior bolus transfer ability and prevent food or liquid spillage from the oral cavity, to be achieved in 4-6 weeks  MET    2  Patient will complete swallowing maneuver (e g , supraglottic swallow, Mendelsohn maneuver, effortful swallow, etc ) with 80% accuracy to facilitate improved oral motor strengthening, tongue base retraction, HLE, airway protection and/or clearance of the bolus through the pharynx, to be achieved in 4-6 weeks  DC GOAL - patient unable to coordinate/complete volitional task     3  Patient's caregiver/staff will consistently perform informated compensatory strategies (e g , upright positioning, small bites/sips, slow rate, alternation of consistencies, multiple swallows, effortful swallow, chin tuck, head turn, etc ) with 80% accuracy to eliminate overt s/sx penetration/aspiration of least restrictive food/liquid consistencies, to be achieved in 4-6 weeks  MET     4  Patient will tolerate NMES (i e , VitalStim) in conjunction with HLE exercises without overt s/sx of penetration or aspiration, to be achieved in 4-6 weeks  PARTIALLY MET    5  Patient's caregiver/staff will demonstrate utilization of recommended safe swallowing strategies during a clinician assessed meal across three sessions, to be achieved in 4-6 weeks  MET       Long-term goals:     1  Patient will maintain adequate hydration and nutrition with optimum safety and efficacy of swallowing function on P O  intake without overt s/sx of penetration or aspiration by discharge  PARTIALLY MET     2  Patient's caregivers/staff will utilize compensatory strategies with optimum safety and efficacy of swallowing function on P O  intake without overt s/sx of penetration or aspiration by discharge  MET    3  Patient will return to a regular diet with thin liquids as safest PO intake  NOT MET AT THIS TIME    Functional Limitations Reporting (G-codes):   Flowsheet Rows      Most Recent Value   SLP G-Codes   FOTO information reviewed  N/A   Assessment Type  Re-evaluation   Functional Limitations  Swallowing   Swallow Current Status ()  CK   Swallow Goal Status ()  CJ            Impressions/Recommendations     Impressions: Patient presents with improving oropharyngeal dysphagia since IE  He continues with mild-moderate deficits with mastication/oral processing and delayed initiation of swallow  Pt oral residue improving with liquid wash and lingual sweeps  Patient has been tolerating mech soft trials in therapy well, however, he continues with significant oral residue and coughing at end of meals  Recommending continuing on puree/NTL at this time  Recommendations:  -Patient would benefit from outpatient skilled Speech Therapy services : Dysphagia therapy    Decreased oral motor ROM exercises to once day (from 3x/day since IE), due to improved oral functioning  Patient continues with pharyngeal weakness, which can not be targeted with exercises at this time secondary to patient's poor coordination and inability to complete volitional command (i e  Effortful swallow)        -Frequency: 2x weekly  -Duration: 4-6 weeks     -Intervention certification from:  9/25/18  -Intervention certification to: 04/10/01

## 2018-09-27 ENCOUNTER — APPOINTMENT (OUTPATIENT)
Dept: OCCUPATIONAL THERAPY | Facility: CLINIC | Age: 33
End: 2018-09-27
Payer: COMMERCIAL

## 2018-09-27 ENCOUNTER — TELEPHONE (OUTPATIENT)
Dept: FAMILY MEDICINE CLINIC | Facility: CLINIC | Age: 33
End: 2018-09-27

## 2018-09-27 ENCOUNTER — OFFICE VISIT (OUTPATIENT)
Dept: SPEECH THERAPY | Facility: CLINIC | Age: 33
End: 2018-09-27
Payer: COMMERCIAL

## 2018-09-27 DIAGNOSIS — I45.81 LONG Q-T SYNDROME: ICD-10-CM

## 2018-09-27 DIAGNOSIS — G93.1 ANOXIC BRAIN DAMAGE (HCC): ICD-10-CM

## 2018-09-27 DIAGNOSIS — R13.12 OROPHARYNGEAL DYSPHAGIA: Primary | ICD-10-CM

## 2018-09-27 PROCEDURE — 92526 ORAL FUNCTION THERAPY: CPT

## 2018-09-27 NOTE — PROGRESS NOTES
Daily Speech Treatment Note    Today's date: 2018   Patients name: Halima Sender  : 1985  MRN: [id number]  Safety measures:  Aspiration risk, Long Q-T syndrome, depression  Referring provider: Christine Dennis MD    Primary Diagnosis/Billing code: R13 12  Secondary Diagnosis/ Billing code: G93 1, I45 81    Visit Tracking:  -Referring provider: Epic  -Billing guidelines: CMS   -Visit #2/10  (18 total) (**KX modifier**)   -[facility]  Medicare  -RE due 10/25/18    Subjective/Behavioral:  "hi"     Objective/Assessment:  Continue on puree and NTL by tsp at home  [facility] soft trials only during ST  Short-term goals:  1  Patient will complete daily oral motor exercise to increase labial and lingual range of motion, strength, and coordination with min cues to 90% effectiveness to strengthen oral musculature and anterior-posterior bolus transfer ability and prevent food or liquid spillage from the oral cavity, to be achieved in 4-6 weeks  2  Patient will complete swallowing maneuver (e g , supraglottic swallow, Mendelsohn maneuver, effortful swallow, etc ) with 80% accuracy to facilitate improved oral motor strengthening, tongue base retraction, HLE, airway protection and/or clearance of the bolus through the pharynx, to be achieved in 4-6 weeks      3  Patient's caregiver/staff will consistently perform informated compensatory strategies (e g , upright positioning, small bites/sips, slow rate, alternation of consistencies, multiple swallows, effortful swallow, chin tuck, head turn, etc ) with 80% accuracy to eliminate overt s/sx penetration/aspiration of least restrictive food/liquid consistencies, to be achieved in 4-6 weeks      4  Patient will tolerate NMES (i e , VitalStim) in conjunction with HLE exercises without overt s/sx of penetration or aspiration, to be achieved in 4-6 weeks    Patient tolerated NMES (min threshold: 5 0 mA & max threshold: 6 0 mA) in conjunction with PO intake and exercises  Traditional VitalStim    Channel(s) used: 1,2   Placement: 3a   Treatment Duration: 35 minutes     Patient consumed the following during todays session: chopped bbq chicken, roasted vegetables (soft/mushy) and NTL (cranberry juice), all presented by tsp by caregiver  Patient was noted with increased oral residue throughout meal, needing frequent liquid washes  Residue noted on posterior tongue  Mechanical soft food trials: Reduced lip closure during PO intake--increased saliva production resulting in some anterior loss with food/liquid presentations  Patient benefited from verbal cues to implement proper body positioning  Decreased mastication/oral processing with slowed AP transit  Delayed initiation of swallow  Oral residue noted, which improved with liquid washes and verbal cues to implement lingual sweeps  Vocal checks completed intermittently throughout PO intake  NO coughing today  No other overt distress or s/s of aspiration or dysphagia noted during todays therapy session  5  Patient's caregiver/staff will demonstrate utilization of recommended safe swallowing strategies during a clinician assessed meal across three sessions, to be achieved in 4-6 weeks  Plan:  -Patient was provided with home exercises/activities to target goals in plan of care at the end of today's session   -Continue with current plan of care

## 2018-09-27 NOTE — TELEPHONE ENCOUNTER
Aliza Stein came in and dropped off a medlist form to be signed by Dr Delaney Rosado, but I've noticed pt seen Dr Weston Swain for a HM in 6/2018, but has been seen recently with Stephanie Underwood for OMT  I will put form in Dr Weston Swain folder  Please call Chance pt Sight Supervisor when ready to be picked up  He can be reached at [phone]

## 2018-09-28 ENCOUNTER — APPOINTMENT (OUTPATIENT)
Dept: OCCUPATIONAL THERAPY | Facility: CLINIC | Age: 33
End: 2018-09-28
Payer: COMMERCIAL

## 2018-09-28 ENCOUNTER — APPOINTMENT (OUTPATIENT)
Dept: SPEECH THERAPY | Facility: CLINIC | Age: 33
End: 2018-09-28
Payer: COMMERCIAL

## 2018-09-28 ENCOUNTER — APPOINTMENT (OUTPATIENT)
Dept: PHYSICAL THERAPY | Facility: CLINIC | Age: 33
End: 2018-09-28
Payer: COMMERCIAL

## 2018-10-01 DIAGNOSIS — T17.908A ASPIRATION INTO RESPIRATORY TRACT, INITIAL ENCOUNTER: ICD-10-CM

## 2018-10-02 ENCOUNTER — OFFICE VISIT (OUTPATIENT)
Dept: SPEECH THERAPY | Facility: CLINIC | Age: 33
End: 2018-10-02
Payer: COMMERCIAL

## 2018-10-02 DIAGNOSIS — G93.1 ANOXIC BRAIN DAMAGE (HCC): ICD-10-CM

## 2018-10-02 DIAGNOSIS — R13.12 OROPHARYNGEAL DYSPHAGIA: Primary | ICD-10-CM

## 2018-10-02 DIAGNOSIS — I45.81 LONG Q-T SYNDROME: ICD-10-CM

## 2018-10-02 PROCEDURE — 92526 ORAL FUNCTION THERAPY: CPT

## 2018-10-02 NOTE — PROGRESS NOTES
Daily Speech Treatment Note    Today's date: 10/2/2018   Patients name: Kenneth Triplett  : 1985  MRN: [id number]  Safety measures:  Aspiration risk, Long Q-T syndrome, depression  Referring provider: Richard Wiggins MD    Primary Diagnosis/Billing code: R13 12  Secondary Diagnosis/ Billing code: G93 1, I45 81    Visit Tracking:  -Referring provider: Epic  -Billing guidelines: CMS   -Visit #3/10  (19 total) (**KX modifier**)   -[facility] Medicare  -RE due 10/25/18    Subjective/Behavioral:  Mom Odette Grey), Staff supervisor Marilyn Reyna), and staff Mc Arzola) during today's session  Objective/Assessment:  Continue on puree and NTL by tsp at home  [facility] soft trials only during ST  Short-term goals:  1  Patient will complete daily oral motor exercise to increase labial and lingual range of motion, strength, and coordination with min cues to 90% effectiveness to strengthen oral musculature and anterior-posterior bolus transfer ability and prevent food or liquid spillage from the oral cavity, to be achieved in 4-6 weeks  2  Patient will complete swallowing maneuver (e g , supraglottic swallow, Mendelsohn maneuver, effortful swallow, etc ) with 80% accuracy to facilitate improved oral motor strengthening, tongue base retraction, HLE, airway protection and/or clearance of the bolus through the pharynx, to be achieved in 4-6 weeks      3  Patient's caregiver/staff will consistently perform informated compensatory strategies (e g , upright positioning, small bites/sips, slow rate, alternation of consistencies, multiple swallows, effortful swallow, chin tuck, head turn, etc ) with 80% accuracy to eliminate overt s/sx penetration/aspiration of least restrictive food/liquid consistencies, to be achieved in 4-6 weeks      4  Patient will tolerate NMES (i e , VitalStim) in conjunction with HLE exercises without overt s/sx of penetration or aspiration, to be achieved in 4-6 weeks    Patient tolerated NMES (min threshold: 5 0 mA & max threshold: 7 0 mA) in conjunction with PO intake and exercises  Traditional VitalStim    Channel(s) used: 1,2   Placement: 3a   Treatment Duration: 35 minutes     Patient consumed the following during todays session: ground cauliflour, chopped rice/beans/chicken and NTL (cranberry juice), all presented by tsp by caregiver  Patient was noted with increased oral residue throughout meal, needing frequent liquid washes  Residue noted on posterior tongue  Mechanical soft food trials: Reduced lip closure during PO intake--increased saliva production resulting in some anterior loss with food/liquid presentations  Patient benefited from verbal cues to implement proper body positioning  Decreased mastication/oral processing with slowed AP transit  Delayed initiation of swallow  Oral residue noted, which improved with liquid washes and verbal cues to implement lingual sweeps  Vocal checks completed intermittently throughout PO intake  NO coughing today  No other overt distress or s/s of aspiration or dysphagia noted during todays therapy session  5  Patient's caregiver/staff will demonstrate utilization of recommended safe swallowing strategies during a clinician assessed meal across three sessions, to be achieved in 4-6 weeks  Plan:  -Patient was provided with home exercises/activities to target goals in plan of care at the end of today's session   -Continue with current plan of care

## 2018-10-04 ENCOUNTER — TELEPHONE (OUTPATIENT)
Dept: FAMILY MEDICINE CLINIC | Facility: CLINIC | Age: 33
End: 2018-10-04

## 2018-10-04 ENCOUNTER — OFFICE VISIT (OUTPATIENT)
Dept: SPEECH THERAPY | Facility: CLINIC | Age: 33
End: 2018-10-04
Payer: COMMERCIAL

## 2018-10-04 DIAGNOSIS — R13.12 OROPHARYNGEAL DYSPHAGIA: Primary | ICD-10-CM

## 2018-10-04 DIAGNOSIS — I45.81 LONG Q-T SYNDROME: ICD-10-CM

## 2018-10-04 DIAGNOSIS — G93.1 ANOXIC BRAIN DAMAGE (HCC): ICD-10-CM

## 2018-10-04 DIAGNOSIS — R29.898 SEVERE MUSCLE DECONDITIONING: ICD-10-CM

## 2018-10-04 DIAGNOSIS — G82.50 QUADRIPLEGIA (HCC): Primary | ICD-10-CM

## 2018-10-04 DIAGNOSIS — F32.A DEPRESSION, UNSPECIFIED DEPRESSION TYPE: ICD-10-CM

## 2018-10-04 PROCEDURE — 92526 ORAL FUNCTION THERAPY: CPT | Performed by: SPEECH-LANGUAGE PATHOLOGIST

## 2018-10-04 NOTE — PROGRESS NOTES
Daily Speech Treatment Note    Today's date: 10/4/2018   Patients name: Niles Part  : 1985  MRN: [id number]  Safety measures:  Aspiration risk, Long Q-T syndrome, depression  Referring provider: Pavan Milan MD    Primary Diagnosis/Billing code: R13 12  Secondary Diagnosis/ Billing code: G93 1, I45 81    Visit Tracking:  -Referring provider: Epic  -Billing guidelines: CMS   -Visit #4/10  (20 total) (**KX modifier**)  -[facility] Medicare  -RE due 10/25/18    Subjective/Behavioral:   -Staff supervisor Tatianna) and staff Luz Carrasco) during today's session    -Per staff, patient's mother contacted [address] [place] [address] for a trial/sample of gel thickener  Objective/Assessment:  Continue on puree and NTL by tsp at home  [facility] soft trials only during ST  Short-term goals:  1  Patient will complete daily oral motor exercise to increase labial and lingual range of motion, strength, and coordination with min cues to 90% effectiveness to strengthen oral musculature and anterior-posterior bolus transfer ability and prevent food or liquid spillage from the oral cavity, to be achieved in 4-6 weeks  2  Patient will complete swallowing maneuver (e g , supraglottic swallow, Mendelsohn maneuver, effortful swallow, etc ) with 80% accuracy to facilitate improved oral motor strengthening, tongue base retraction, HLE, airway protection and/or clearance of the bolus through the pharynx, to be achieved in 4-6 weeks      3   Patient's caregiver/staff will consistently perform informated compensatory strategies (e g , upright positioning, small bites/sips, slow rate, alternation of consistencies, multiple swallows, effortful swallow, chin tuck, head turn, etc ) with 80% accuracy to eliminate overt s/sx penetration/aspiration of least restrictive food/liquid consistencies, to be achieved in 4-6 weeks      4  Patient will tolerate NMES (i e , VitalStim) in conjunction with HLE exercises without overt s/sx of penetration or aspiration, to be achieved in 4-6 weeks  Patient tolerated NMES (min threshold: 4 0 mA & max threshold: 8 0 mA) in conjunction with PO intake and exercises  Traditional VitalStim    Channel(s) used: 1,2   Placement: 3a   Treatment Duration: 45 minutes     Patient consumed the following during todays session: chicken, rice, beans over rice califlower and NTL (cranberry juice), all presented by tsp by caregiver  Patient was noted with increased oral residue throughout meal, needing frequent liquid washes  Mechanical soft food trials: Reduced lip closure during PO intake--increased saliva production resulting in anterior loss with food/liquid presentations  Patient benefited from mod verbal cues to implement proper body positioning  Decreased mastication/oral processing with slowed AP transit  Delayed initiation of swallow  Oral residue noted, which improved with liquid washes and verbal cues to implement lingual sweeps  Vocal checks completed intermittently throughout PO intake  NO coughing today  No other overt distress or s/s of aspiration or dysphagia noted during todays therapy session  5  Patient's caregiver/staff will demonstrate utilization of recommended safe swallowing strategies during a clinician assessed meal across three sessions, to be achieved in 4-6 weeks  Plan:  -Patient was provided with home exercises/activities to target goals in plan of care at the end of today's session   -Continue with current plan of care

## 2018-10-04 NOTE — TELEPHONE ENCOUNTER
Speech therapist Karen trujillo called - needed order for pt to see [phone] [facility] for shoe inserts   Faxed 10/4/18 to [phone]

## 2018-10-05 RX ORDER — SERTRALINE HYDROCHLORIDE 100 MG/1
100 TABLET, FILM COATED ORAL DAILY
Qty: 30 TABLET | Refills: 5 | Status: SHIPPED | OUTPATIENT
Start: 2018-10-05 | End: 2019-03-25 | Stop reason: SDUPTHER

## 2018-10-07 NOTE — PROGRESS NOTES
Daily Speech Treatment Note    Today's date: 10/9/2018   Patients name: Raoul Clifford  : 1985  MRN: [id number]  Safety measures:  Aspiration risk, Long Q-T syndrome, depression  Referring provider: Erin Claire MD    Primary Diagnosis/Billing code: R13 12  Secondary Diagnosis/ Billing code: G93 1, I45 81    Visit Tracking:  -Referring provider: Epic  -Billing guidelines: CMS   -Visit #5/10  (21 total) (**KX modifier**)  -[facility] Medicare  -RE due 10/25/18    Subjective/Behavioral:  -Staff supervisor Tatianna) and staff Gerald Ryder during today's session    -Staff reported that patient had vomiting episode 30 minutes following dinner last evening and following breakfast this morning  Clinician reviewed reflux precautions and safe swallowing strategies  Continue to monitor  Objective/Assessment:  Continue on puree and NTL by tsp at home  [facility] soft trials only during ST  Short-term goals:  1  Patient will complete daily oral motor exercise to increase labial and lingual range of motion, strength, and coordination with min cues to 90% effectiveness to strengthen oral musculature and anterior-posterior bolus transfer ability and prevent food or liquid spillage from the oral cavity, to be achieved in 4-6 weeks  2  Patient will complete swallowing maneuver (e g , supraglottic swallow, Mendelsohn maneuver, effortful swallow, etc ) with 80% accuracy to facilitate improved oral motor strengthening, tongue base retraction, HLE, airway protection and/or clearance of the bolus through the pharynx, to be achieved in 4-6 weeks      3   Patient's caregiver/staff will consistently perform informated compensatory strategies (e g , upright positioning, small bites/sips, slow rate, alternation of consistencies, multiple swallows, effortful swallow, chin tuck, head turn, etc ) with 80% accuracy to eliminate overt s/sx penetration/aspiration of least restrictive food/liquid consistencies, to be achieved in 4-6 weeks      4  Patient will tolerate NMES (i e , VitalStim) in conjunction with HLE exercises without overt s/sx of penetration or aspiration, to be achieved in 4-6 weeks  Patient tolerated NMES (min threshold: 4 0 mA & max threshold: 7 5 mA) in conjunction with PO intake and exercises  Traditional VitalStim    Channel(s) used: 1,2   Placement: 3a   Treatment Duration: 46 minutes     Patient consumed the following during todays session: mechanical soft cod and potatoes over casava,  and NTL water, all presented by tsp by caregiver  Patient's mother sent along Easy Mix Simply Thick Instant Food Thickener (nectar consistency)  Trails completed with water (NTL--4 oz) on this date of service  Patient's caregivers were asked to bring along carbonate beverage(s) (non-alcoholic) to next session to trial with this gel-based thickener  Mechanical soft food trials: Reduced lip closure during PO intake--increased saliva production resulting in anterior loss with food/liquid presentations  Patient benefited from mod verbal cues from caregiver and clinician to implement proper body positioning  Decreased mastication/oral processing with slowed AP transit  Delayed initiation of swallow  Oral residue noted, which improved with liquid washes and verbal cues to implement lingual sweeps  Vocal checks completed intermittently throughout PO intake  No coughing today  Patient tolerated 2 whole medications in puree (administered by staff) without difficulty  No other overt distress or s/s of aspiration or dysphagia noted during todays therapy session  5  Patient's caregiver/staff will demonstrate utilization of recommended safe swallowing strategies during a clinician assessed meal across three sessions, to be achieved in 4-6 weeks  Plan:  -Patient was provided with home exercises/activities to target goals in plan of care at the end of today's session   -Continue with current plan of care

## 2018-10-09 ENCOUNTER — OFFICE VISIT (OUTPATIENT)
Dept: SPEECH THERAPY | Facility: CLINIC | Age: 33
End: 2018-10-09
Payer: COMMERCIAL

## 2018-10-09 ENCOUNTER — OFFICE VISIT (OUTPATIENT)
Dept: FAMILY MEDICINE CLINIC | Facility: CLINIC | Age: 33
End: 2018-10-09
Payer: MEDICARE

## 2018-10-09 VITALS — BODY MASS INDEX: 18.24 KG/M2 | HEIGHT: 70 IN | WEIGHT: 127.4 LBS

## 2018-10-09 DIAGNOSIS — K59.00 CONSTIPATION, UNSPECIFIED CONSTIPATION TYPE: Primary | ICD-10-CM

## 2018-10-09 DIAGNOSIS — R13.12 OROPHARYNGEAL DYSPHAGIA: Primary | ICD-10-CM

## 2018-10-09 DIAGNOSIS — G24.3 TORTICOLLIS, SPASMODIC: ICD-10-CM

## 2018-10-09 DIAGNOSIS — G93.1 ANOXIC BRAIN DAMAGE (HCC): ICD-10-CM

## 2018-10-09 DIAGNOSIS — Z23 NEED FOR IMMUNIZATION AGAINST INFLUENZA: ICD-10-CM

## 2018-10-09 DIAGNOSIS — I45.81 LONG Q-T SYNDROME: ICD-10-CM

## 2018-10-09 DIAGNOSIS — M99.01 SOMATIC DYSFUNCTION OF CERVICAL REGION: ICD-10-CM

## 2018-10-09 PROCEDURE — G0008 ADMIN INFLUENZA VIRUS VAC: HCPCS | Performed by: FAMILY MEDICINE

## 2018-10-09 PROCEDURE — 90686 IIV4 VACC NO PRSV 0.5 ML IM: CPT | Performed by: FAMILY MEDICINE

## 2018-10-09 PROCEDURE — 92526 ORAL FUNCTION THERAPY: CPT | Performed by: SPEECH-LANGUAGE PATHOLOGIST

## 2018-10-09 NOTE — PROGRESS NOTES
Assessment/Plan     This is a 27 y o  male who presents for OMT follow-up for:  1  Constipation, unspecified constipation type     2  Somatic dysfunction of cervical region     3  Torticollis, spasmodic         Plan:   1  Patient tolerated OMT well for the above problems,  advised patient to drink fluids and can use NSAID for soreness after treatment     2  OMT Follow up in 2 weeks  Subjective     Jacobo Cabral is a 27 y o  male and is here for a OMT follow up  The caregivers reports he has his head side bent to the right  He has a bowel movement every other day and at times needs an enema  He had two episodes of vomiting since last night  Is the patient taking Pain medication? no  Did Patient symptoms improve from last OMT appointment? yes    The following portions of the patient's history were reviewed and updated as appropriate: allergies, current medications, past family history, past medical history, past social history, past surgical history and problem list     Review of Systems  Do you have pain that bothers you in your daily life? yes  Review of Systems   Constitutional: Negative for fatigue and fever  Respiratory: Negative for cough and shortness of breath  Cardiovascular: Negative for chest pain  Gastrointestinal: Positive for constipation and vomiting  Genitourinary: Negative for difficulty urinating  Neurological: Negative for dizziness and headaches         Objective     OMT Exam   Cervical:   Somatic Dysfunction:  Restriction  Severity :  2  Osteopathic Findings: R SCM spascticity and contracture   Treatment Method: ME and ST  Response: improved  Thoracic T1-4:   Somatic Dysfunction:  Tissue Texture Changes  Severity :  2  Osteopathic Findings: R >L paraspinal hypertonicity   Treatment Method: ST  Response: improved  Thoracic T10-12:  Somatic Dysfunction:  Tissue Texture Changes  Severity :  2  Osteopathic Findings: R paraspinal hypertonicity   Treatment Method: ST  Response: improved  Sacrum:  Somatic Dysfunction:  Restriction  Severity :  2  Osteopathic Findings: Bilateral flexed sacrum   Treatment Method: sacral inhibition   Response: improved

## 2018-10-11 ENCOUNTER — OFFICE VISIT (OUTPATIENT)
Dept: SPEECH THERAPY | Facility: CLINIC | Age: 33
End: 2018-10-11
Payer: COMMERCIAL

## 2018-10-11 ENCOUNTER — TELEPHONE (OUTPATIENT)
Dept: CARDIOLOGY CLINIC | Facility: CLINIC | Age: 33
End: 2018-10-11

## 2018-10-11 DIAGNOSIS — I45.81 LONG Q-T SYNDROME: ICD-10-CM

## 2018-10-11 DIAGNOSIS — R13.12 OROPHARYNGEAL DYSPHAGIA: Primary | ICD-10-CM

## 2018-10-11 DIAGNOSIS — G93.1 ANOXIC BRAIN DAMAGE (HCC): ICD-10-CM

## 2018-10-11 PROCEDURE — 92526 ORAL FUNCTION THERAPY: CPT

## 2018-10-11 NOTE — PROGRESS NOTES
Daily Speech Treatment Note    Today's date: 10/11/2018   Patients name: Melani Garcia  : 1985  MRN: [id number]  Safety measures:  Aspiration risk, Long Q-T syndrome, depression  Referring provider: Richie Goltz, MD    Primary Diagnosis/Billing code: R13 12  Secondary Diagnosis/ Billing code: G93 1, I45 81    Visit Tracking:  -Referring provider: Epic  -Billing guidelines: CMS   -Visit #6/10  (22 total) (**KX modifier**)   -[facility]  Medicare  -RE due 10/25/18    Subjective/Behavioral:  Patient arrived with Massimo Shane and his mom, [place]  Mom arrived with Simply Thick packets to try and use with carbonated beverages  Mom inquiring about using them with beer when he is on vacation in a couple weeks  Objective/Assessment:  Continue on puree and NTL by tsp at home  [facility] soft trials only during ST  Short-term goals:  1  Patient will complete daily oral motor exercise to increase labial and lingual range of motion, strength, and coordination with min cues to 90% effectiveness to strengthen oral musculature and anterior-posterior bolus transfer ability and prevent food or liquid spillage from the oral cavity, to be achieved in 4-6 weeks  2  Patient will complete swallowing maneuver (e g , supraglottic swallow, Mendelsohn maneuver, effortful swallow, etc ) with 80% accuracy to facilitate improved oral motor strengthening, tongue base retraction, HLE, airway protection and/or clearance of the bolus through the pharynx, to be achieved in 4-6 weeks      3   Patient's caregiver/staff will consistently perform informated compensatory strategies (e g , upright positioning, small bites/sips, slow rate, alternation of consistencies, multiple swallows, effortful swallow, chin tuck, head turn, etc ) with 80% accuracy to eliminate overt s/sx penetration/aspiration of least restrictive food/liquid consistencies, to be achieved in 4-6 weeks      4  Patient will tolerate NMES (i e , VitalStim) in conjunction with HLE exercises without overt s/sx of penetration or aspiration, to be achieved in 4-6 weeks  Patient tolerated NMES (min threshold: 4 0 mA & max threshold: 9 0 mA) in conjunction with PO intake and exercises  Traditional VitalStim    Channel(s) used: 1,2   Placement: 3a   Treatment Duration: 46 minutes     Patient consumed the following during todays session: mech soft lasagna and ricotta with spinach and NTL juice, all presented by tsp by caregiver  Patient's mother arrived with Easy Mix Simply Thick Instant Food Thickener (nectar consistency)  Trails completed with [employer] (NTL--4 oz) on this date of service  Mechanical soft food trials: Reduced lip closure during PO intake--increased saliva production resulting in anterior loss with food/liquid presentations  Patient benefited from mod verbal cues from caregiver and clinician to implement proper body positioning  Decreased mastication/oral processing with slowed AP transit  Delayed initiation of swallow  Oral residue noted, which improved with liquid washes and verbal cues to implement lingual sweeps  Vocal checks completed intermittently throughout PO intake  No coughing today  Patient tolerated 2 whole medications in puree (administered by staff) without difficulty  No other overt distress or s/s of aspiration or dysphagia noted during todays therapy session  5  Patient's caregiver/staff will demonstrate utilization of recommended safe swallowing strategies during a clinician assessed meal across three sessions, to be achieved in 4-6 weeks  Plan:  -Patient was provided with home exercises/activities to target goals in plan of care at the end of today's session   -Continue with current plan of care

## 2018-10-11 NOTE — TELEPHONE ENCOUNTER
P/C from pt's mother, requesting his genetic testing, if we rec'd the results yet  I will look for these

## 2018-10-14 NOTE — PROGRESS NOTES
Daily Speech Treatment Note    Today's date: 10/16/2018   Patients name: Raoul Clifford  : 1985  MRN: [id number]  Safety measures:  Aspiration risk, Long Q-T syndrome, depression  Referring provider: Erin Claire MD    Primary Diagnosis/Billing code: R13 12  Secondary Diagnosis/ Billing code: G93 1, I45 81    Visit Tracking:  -Referring provider: Epic  -Billing guidelines: CMS   -Visit #7/10  (23 total) (**KX modifier**)   -[facility]  Medicare  -RE due 10/25/18    Subjective/Behavioral:  Patient arrived with caregivers ([employer])  Objective/Assessment:  -Continue on puree and NTL by tsp at home  [facility] soft trials only during ST    -Easy Mix Simply Thick Instant Food Thickener (nectar consistency)    Short-term goals:  1  Patient will complete daily oral motor exercise to increase labial and lingual range of motion, strength, and coordination with min cues to 90% effectiveness to strengthen oral musculature and anterior-posterior bolus transfer ability and prevent food or liquid spillage from the oral cavity, to be achieved in 4-6 weeks  2  Patient will complete swallowing maneuver (e g , supraglottic swallow, Mendelsohn maneuver, effortful swallow, etc ) with 80% accuracy to facilitate improved oral motor strengthening, tongue base retraction, HLE, airway protection and/or clearance of the bolus through the pharynx, to be achieved in 4-6 weeks      3   Patient's caregiver/staff will consistently perform informated compensatory strategies (e g , upright positioning, small bites/sips, slow rate, alternation of consistencies, multiple swallows, effortful swallow, chin tuck, head turn, etc ) with 80% accuracy to eliminate overt s/sx penetration/aspiration of least restrictive food/liquid consistencies, to be achieved in 4-6 weeks      4  Patient will tolerate NMES (i e , VitalStim) in conjunction with HLE exercises without overt s/sx of penetration or aspiration, to be achieved in 4-6 weeks  Patient tolerated NMES (min threshold: 4 0 mA & max threshold: 9 5 mA) in conjunction with PO intake and exercises  Traditional VitalStim    Channel(s) used: 1,2   Placement: 3a   Treatment Duration: 51 minutes     Patient consumed the following during todays session: mechanical soft pork, string beans, and macaroni & cheese with NTL apple juice  All trials were presented via tsp by caregiver Hyun Jensen)  Mechanical soft food trials: Reduced lip closure during PO intake--increased saliva production resulting in anterior loss with food/liquid presentations  Patient with better upright body positioning today  Decreased mastication/oral processing with slowed AP transit  Patient benefited from verbal cues to implement mastication and lingual sweeps as needed  Delayed initiation of swallow  Oral residue noted, which improved with liquid washes and verbal cues to implement lingual sweeps  Vocal checks completed intermittently throughout PO intake  No coughing today  No other overt distress or s/s of aspiration or dysphagia noted during todays therapy session  5  Patient's caregiver/staff will demonstrate utilization of recommended safe swallowing strategies during a clinician assessed meal across three sessions, to be achieved in 4-6 weeks  Plan:  -Patient was provided with home exercises/activities to target goals in plan of care at the end of today's session   -Continue with current plan of care

## 2018-10-15 NOTE — TELEPHONE ENCOUNTER
Called [facility]   [phone]  The results are not signed, but we should have the results Tuesday AM    I will look then  Testing rec'd this evening  I called pt's mother-LMOM that I would pass on to Dr Thalia Rivera for review on Thursday when he is in the office

## 2018-10-16 ENCOUNTER — OFFICE VISIT (OUTPATIENT)
Dept: SPEECH THERAPY | Facility: CLINIC | Age: 33
End: 2018-10-16
Payer: COMMERCIAL

## 2018-10-16 DIAGNOSIS — R13.12 OROPHARYNGEAL DYSPHAGIA: ICD-10-CM

## 2018-10-16 DIAGNOSIS — I45.81 LONG Q-T SYNDROME: ICD-10-CM

## 2018-10-16 DIAGNOSIS — G93.1 ANOXIC BRAIN DAMAGE (HCC): ICD-10-CM

## 2018-10-16 DIAGNOSIS — R13.12 OROPHARYNGEAL DYSPHAGIA: Primary | ICD-10-CM

## 2018-10-16 PROCEDURE — 92526 ORAL FUNCTION THERAPY: CPT | Performed by: SPEECH-LANGUAGE PATHOLOGIST

## 2018-10-16 NOTE — TELEPHONE ENCOUNTER
CALLING FROM [facility] LOOK[facility] TO GET AN ORDER FROM THE DOCTOR FOR AN ASSISTANCE WITH SETTING UP HIS SPEECH DEVICE AT HIS VISIT ON 10/19 PLEASE FAX [phone] PHONE [phone]

## 2018-10-17 DIAGNOSIS — R47.89 OTHER SPEECH DISTURBANCE: Primary | ICD-10-CM

## 2018-10-17 DIAGNOSIS — G93.1 ANOXIC BRAIN DAMAGE (HCC): ICD-10-CM

## 2018-10-18 ENCOUNTER — OFFICE VISIT (OUTPATIENT)
Dept: SPEECH THERAPY | Facility: CLINIC | Age: 33
End: 2018-10-18
Payer: COMMERCIAL

## 2018-10-18 DIAGNOSIS — G93.1 ANOXIC BRAIN DAMAGE (HCC): ICD-10-CM

## 2018-10-18 DIAGNOSIS — I45.81 LONG Q-T SYNDROME: ICD-10-CM

## 2018-10-18 DIAGNOSIS — R13.12 OROPHARYNGEAL DYSPHAGIA: Primary | ICD-10-CM

## 2018-10-18 PROCEDURE — 92526 ORAL FUNCTION THERAPY: CPT | Performed by: SPEECH-LANGUAGE PATHOLOGIST

## 2018-10-18 NOTE — PROGRESS NOTES
Daily Speech Treatment Note    Today's date: 10/18/2018   Patients name: Marjorie Givens  : 1985  MRN: [id number]  Safety measures:  Aspiration risk, Long Q-T syndrome, depression  Referring provider: Elizabeth Lay MD    Primary Diagnosis/Billing code: R13 12  Secondary Diagnosis/ Billing code: G93 1, I45 81    Visit Tracking:  -Referring provider: Epic  -Billing guidelines: CMS   -Visit #8/10  (24 total) (**KX modifier**)   -[facility]  Medicare  -RE due 10/25/18    Subjective/Behavioral:  -Patient arrived with caregiver Vaishali Hernandez)  Objective/Assessment:  -Continue on puree and NTL by tsp at home  [facility] soft trials only during ST    -Easy Mix Simply Thick Instant Food Thickener (nectar consistency)    Short-term goals:  1  Patient will complete daily oral motor exercise to increase labial and lingual range of motion, strength, and coordination with min cues to 90% effectiveness to strengthen oral musculature and anterior-posterior bolus transfer ability and prevent food or liquid spillage from the oral cavity, to be achieved in 4-6 weeks  2  Patient will complete swallowing maneuver (e g , supraglottic swallow, Mendelsohn maneuver, effortful swallow, etc ) with 80% accuracy to facilitate improved oral motor strengthening, tongue base retraction, HLE, airway protection and/or clearance of the bolus through the pharynx, to be achieved in 4-6 weeks      3   Patient's caregiver/staff will consistently perform informated compensatory strategies (e g , upright positioning, small bites/sips, slow rate, alternation of consistencies, multiple swallows, effortful swallow, chin tuck, head turn, etc ) with 80% accuracy to eliminate overt s/sx penetration/aspiration of least restrictive food/liquid consistencies, to be achieved in 4-6 weeks      4  Patient will tolerate NMES (i e , VitalStim) in conjunction with HLE exercises without overt s/sx of penetration or aspiration, to be achieved in 4-6 weeks  Patient tolerated NMES (min threshold: 4 0 mA & max threshold: 5 0 mA) in conjunction with PO intake and exercises  Traditional VitalStim    Channel(s) used: 1,2   Placement: 3a   Treatment Duration: 45 minutes     Patient consumed the following during todays session: mechanical soft turkey croquet with NTL cranberry juice  All trials were presented via tsp by caregiver Geneelvira Hernandez)  Mechanical soft food trials: Reduced lip closure during PO intake--increased saliva production resulting in anterior loss with food/liquid presentations  Decreased mastication/oral processing with slowed AP transit  Patient benefited from mod verbal cues to implement mastication and lingual sweeps as needed throughout PO intake  Liquid washes also helped to clear  Mech soft turkey had moisture; however, it appeared to still be less than what patient required  Difficulty with cohesive bolus formation  Delayed initiation of swallow  Vocal checks completed intermittently throughout PO intake  Increased coughing noted on this date of service as session progressed (both immediately after swallow and delayed)  Due to drier quality of turkey, trials were stopped  During continued trials with NTL via teaspoon, patient continued with coughing immediately after the swallow  Drink was then made to HTL  Patient continued with coughing on trials of HTL with teaspoon  Due to increased coughing episodes on this date of service, PO intake was stopped for last 10 minutes  OMEs were completed (lingual ROM and strengthening)  Wet vocal quality at end of session  Patient was provided with vocal checks and max verbal/visual cues to implement a strong cough and/or throat clear--patient with difficulty with completing volitionally  Educated caregiver to continue to encourage patient to implement coughing/throat clear to assist with clearance of suspected retention, as well as keeping patient in upright position for at least 45 minutes        5  Patient's caregiver/staff will demonstrate utilization of recommended safe swallowing strategies during a clinician assessed meal across three sessions, to be achieved in 4-6 weeks  Plan:  -Patient was provided with home exercises/activities to target goals in plan of care at the end of today's session   -Continue with current plan of care

## 2018-10-23 ENCOUNTER — APPOINTMENT (OUTPATIENT)
Dept: SPEECH THERAPY | Facility: CLINIC | Age: 33
End: 2018-10-23
Payer: COMMERCIAL

## 2018-10-25 ENCOUNTER — APPOINTMENT (OUTPATIENT)
Dept: SPEECH THERAPY | Facility: CLINIC | Age: 33
End: 2018-10-25
Payer: COMMERCIAL

## 2018-10-30 ENCOUNTER — OFFICE VISIT (OUTPATIENT)
Dept: SPEECH THERAPY | Facility: CLINIC | Age: 33
End: 2018-10-30
Payer: COMMERCIAL

## 2018-10-30 ENCOUNTER — OFFICE VISIT (OUTPATIENT)
Dept: FAMILY MEDICINE CLINIC | Facility: CLINIC | Age: 33
End: 2018-10-30

## 2018-10-30 VITALS — WEIGHT: 126 LBS | BODY MASS INDEX: 18.08 KG/M2

## 2018-10-30 DIAGNOSIS — M43.6 TORTICOLLIS: Primary | ICD-10-CM

## 2018-10-30 DIAGNOSIS — I45.81 LONG Q-T SYNDROME: ICD-10-CM

## 2018-10-30 DIAGNOSIS — G93.1 ANOXIC BRAIN DAMAGE (HCC): ICD-10-CM

## 2018-10-30 DIAGNOSIS — R13.12 OROPHARYNGEAL DYSPHAGIA: Primary | ICD-10-CM

## 2018-10-30 DIAGNOSIS — R32 URINARY INCONTINENCE, UNSPECIFIED TYPE: Primary | ICD-10-CM

## 2018-10-30 PROCEDURE — G8996 SWALLOW CURRENT STATUS: HCPCS

## 2018-10-30 PROCEDURE — G8997 SWALLOW GOAL STATUS: HCPCS

## 2018-10-30 PROCEDURE — 92526 ORAL FUNCTION THERAPY: CPT

## 2018-10-30 NOTE — PROGRESS NOTES
Speech Therapy Re-Evaluation  Today's date: 10/30/2018  Patients name: Jose Carbone  : 1985  MRN: [id number]  Safety measures:  Aspiration risk, Long Q-T syndrome, depression  Referring provider: Misbah Olvera MD    Primary Diagnosis/Billing code: R13 12  Secondary Diagnosis/ Billing code: G93 1, I45 81    Visit Tracking:  -Referring provider: Epic  -Billing guidelines: CMS   -Visit #1/10  (25 total) (**KX modifier**)   -[facility]  Medicare  -RE due 2018    Subjective/Behavioral:  Patient's mom, and caregiver were present today  Patient just returned from [facility] to [place]  Objective/Assessment:  DYSPHAGIA UPDATE:  -Current diet (solids): Pureed - trials of mech soft ONLY in therapy sessions  -Current diet (liquids): Nectar Thick    -Facial appearance Symmetrical   -Dentition Adequate   -Labial function Strength improved to [facility]   -Lingual function Decreased coordination, strength improved to [facility]   -Velar function Symmetrical       LIQUID CONSISTENCY UPDATE:   1  Liquid Consistency (Nectar-thick)   Administered by: Cathalene Lr and Fed by examiner   Strategies, attempts, and responses: None    CLINICAL FINDINGS:   Oral phase impairments: WFL   Pharyngeal Phase Impairments: WFL    *Laryngeal excursion upon palpation: Appeared WFL      SOLID CONSISTENCY UPDATE:  1   Solid Consistency (Mechanical Soft and Puree)   Administered by: Cathalene Lr and Fed by examiner   Strategies, attempts, and responses: None    CLINICAL FINDINGS:   Oral phase impairments: Bolus formation/control and Stasis/coating/ pocketing   Pharyngeal Phase Impairments: Swallow initiation Mild delay and Fatigue over time    *Laryngeal excursion upon palpation: Appeared WFL      SWALLOWING DIAGNOSTIC IMPRESSION:  -Swallowing diagnosis/severity: Mild-moderate oropharyngeal phase dysphagia    -Factors affecting performance: Endurance    -Safety concerns: Risk for aspiration and Risk for inadequate nutrition/ hydration    -Risk factors: Complex medical history, Dependent for feeding and History of aspiration pneumonia    SAFETY PRECAUTIONS:  -Supervision: 1:1    -Strategies: Small sips and bites when eating, Slow rate, swallow between bites and Clear pocketing   -Positioning: Upright position at least 30 minutes after meal and Upright position during meals  -Compensatory strategies: Patient unable to follow or complete volitional strategies  -Recommend (solids): Puree  -Recommend (liquids): Nectar-thick  -Recommend (medications): whole in puree    REFERRALS: Referring patient for f/u VBSS (last completed on 8/29/18)  Any diet upgrades to be determined based on results of VBSS and at the recommendation of the treating therapist      Short-term goals:  1  Patient will complete daily oral motor exercise to increase labial and lingual range of motion, strength, and coordination with min cues to 90% effectiveness to strengthen oral musculature and anterior-posterior bolus transfer ability and prevent food or liquid spillage from the oral cavity, to be achieved in 4-6 weeks  MET    2  Patient will complete swallowing maneuver (e g , supraglottic swallow, Mendelsohn maneuver, effortful swallow, etc ) with 80% accuracy to facilitate improved oral motor strengthening, tongue base retraction, HLE, airway protection and/or clearance of the bolus through the pharynx, to be achieved in 4-6 weeks  DC GOAL - patient unable to coordinate/complete volitional task      3  Patient's caregiver/staff will consistently perform informated compensatory strategies (e g , upright positioning, small bites/sips, slow rate, alternation of consistencies, multiple swallows, effortful swallow, chin tuck, head turn, etc ) with 80% accuracy to eliminate overt s/sx penetration/aspiration of least restrictive food/liquid consistencies, to be achieved in 4-6 weeks   MET     4  Patient will tolerate NMES (i e , VitalStim) in conjunction with HLE exercises without overt s/sx of penetration or aspiration, to be achieved in 4-6 weeks  PARTIALLY MET    5  Patient's caregiver/staff will demonstrate utilization of recommended safe swallowing strategies during a clinician assessed meal across three sessions, to be achieved in 4-6 weeks  MET       Long-term goals:     1  Patient will maintain adequate hydration and nutrition with optimum safety and efficacy of swallowing function on P O  intake without overt s/sx of penetration or aspiration by discharge  PARTIALLY MET     2  Patient's caregivers/staff will utilize compensatory strategies with optimum safety and efficacy of swallowing function on P O  intake without overt s/sx of penetration or aspiration by discharge  MET    3  Patient will return to a regular diet with thin liquids as safest PO intake  NOT MET AT THIS TIME    Functional Limitations Reporting (G-codes):   Flowsheet Rows      Most Recent Value   SLP G-Codes   FOTO information reviewed  N/A   Assessment Type  Re-evaluation   Functional Limitations  Swallowing   Swallow Current Status ()  CK   Swallow Goal Status ()  CJ                Impressions/Recommendations     Impressions: Patient presents with improving oropharyngeal dysphagia since IE  He continues with mild-moderate deficits with mastication/oral processing and delayed initiation of swallow  Pt oral residue improving with liquid wash and lingual sweeps  Patient has been tolerating mech soft trials in therapy well, decreasing amounts of coughing  Recommending a repeat VBSS to determine if patient is ready to upgrade to mechanical soft       Recommendations:  -Patient would benefit from outpatient skilled Speech Therapy services : Dysphagia therapy     -Frequency: 2x weekly  -Duration: 4-6 weeks     -Intervention certification from:  10/30/2018  -Intervention certification to: 42/10/0555

## 2018-10-30 NOTE — PROGRESS NOTES
Assessment/Plan     This is a 26 y o  male who presents for OMT follow-up for:  1  Torticollis         Plan:   1  Patient tolerated OMT well for the above problems,  advised patient to drink fluids and can use NSAID for soreness after treatment     2  OMT Follow up in 4 weeks  Subjective     Gideon Yeager is a 26 y o  male and is here for a OMT follow up  The patient reports doing well since last session  Still getting massage therapy every other week  He has some nasal drainage and cough over the last two weeks  Has the patient completed physical therapy for this condition? no  Did Patient symptoms improve from last OMT appointment?  yes    The following portions of the patient's history were reviewed and updated as appropriate: allergies, current medications, past family history, past medical history, past social history, past surgical history and problem list     Review of Systems  Do you have pain that bothers you in your daily life? yes  Review of Systems    Objective     OMT Exam   Head:   Somatic Dysfunction:  Asymmetry   Severity :  2  Osteopathic Findings: L SBS torsion   Treatment Method: CV4 and V spread and OA release   Response: improved  Cervical:   Somatic Dysfunction:  Asymmetry   Severity :  2  Osteopathic Findings: R SCM hypertonicity, unable to sidebend to left   Treatment Method: ME and ST  Response: stayed the same  Thoracic T5-9:   Somatic Dysfunction:  Tissue Texture Changes  Severity :  2  Osteopathic Findings: L paraspinal hypertonicity   Treatment Method: ST  Response: improved

## 2018-10-31 NOTE — PROGRESS NOTES
Daily Speech Treatment Note    Today's date: 2018   Patients name: Taj Gallagher  : 1985  MRN: [id number]  Safety measures:  Aspiration risk, Long Q-T syndrome, depression  Referring provider: Tori Lim MD    Primary Diagnosis/Billing code: R13 12  Secondary Diagnosis/ Billing code: G93 1, I45 81    Visit Tracking:  -Referring provider: Epic  -Billing guidelines: CMS   -Visit #2/10  (26 total) (**KX modifier**)   -[facility]  -RE due 2018    Subjective/Behavioral:  Patient arrived today with Amy Lazaro (director), Barbara (Supervisor), and another support staff  Objective/Assessment:  -Continue on puree and NTL by tsp at home  [facility] soft trials only during ST    -Easy Mix Simply Thick Instant Food Thickener (nectar consistency)    Short-term goals:  1  Patient will complete daily oral motor exercise to increase labial and lingual range of motion, strength, and coordination with min cues to 90% effectiveness to strengthen oral musculature and anterior-posterior bolus transfer ability and prevent food or liquid spillage from the oral cavity, to be achieved in 4-6 weeks  2  Patient will complete swallowing maneuver (e g , supraglottic swallow, Mendelsohn maneuver, effortful swallow, etc ) with 80% accuracy to facilitate improved oral motor strengthening, tongue base retraction, HLE, airway protection and/or clearance of the bolus through the pharynx, to be achieved in 4-6 weeks      3   Patient's caregiver/staff will consistently perform informated compensatory strategies (e g , upright positioning, small bites/sips, slow rate, alternation of consistencies, multiple swallows, effortful swallow, chin tuck, head turn, etc ) with 80% accuracy to eliminate overt s/sx penetration/aspiration of least restrictive food/liquid consistencies, to be achieved in 4-6 weeks      4  Patient will tolerate NMES (i e , VitalStim) in conjunction with HLE exercises without overt s/sx of penetration or aspiration, to be achieved in 4-6 weeks  Patient tolerated NMES (min threshold: 4 0 mA & max threshold: 8 0 mA) in conjunction with PO intake and exercises  Traditional VitalStim    Channel(s) used: 1,2   Placement: 3a   Treatment Duration: 40 minutes     Patient consumed the following during todays session: puree asparagus, and mech soft tortellini  Mechanical soft food trials: Reduced lip closure during PO intake--increased saliva production resulting in anterior loss with food/liquid presentations  Decreased mastication/oral processing with slowed AP transit  Patient benefited from mod verbal cues to implement mastication and lingual sweeps as needed throughout PO intake  Liquid washes also helped to clear  Delayed initiation of swallow noted towards end of meal  Vocal checks completed intermittently throughout PO intake  No coughing at all today  5  Patient's caregiver/staff will demonstrate utilization of recommended safe swallowing strategies during a clinician assessed meal across three sessions, to be achieved in 4-6 weeks  Plan:  -Patient was provided with home exercises/activities to target goals in plan of care at the end of today's session   -Continue with current plan of care

## 2018-11-01 ENCOUNTER — OFFICE VISIT (OUTPATIENT)
Dept: SPEECH THERAPY | Facility: CLINIC | Age: 33
End: 2018-11-01
Payer: COMMERCIAL

## 2018-11-01 DIAGNOSIS — G93.1 ANOXIC BRAIN DAMAGE (HCC): ICD-10-CM

## 2018-11-01 DIAGNOSIS — I45.81 LONG Q-T SYNDROME: ICD-10-CM

## 2018-11-01 DIAGNOSIS — R13.12 OROPHARYNGEAL DYSPHAGIA: Primary | ICD-10-CM

## 2018-11-01 PROCEDURE — 92526 ORAL FUNCTION THERAPY: CPT

## 2018-11-02 ENCOUNTER — TELEPHONE (OUTPATIENT)
Dept: FAMILY MEDICINE CLINIC | Facility: CLINIC | Age: 33
End: 2018-11-02

## 2018-11-02 DIAGNOSIS — R13.10 DYSPHAGIA, UNSPECIFIED TYPE: Primary | ICD-10-CM

## 2018-11-02 NOTE — PROGRESS NOTES
Daily Speech Treatment Note    Today's date: 2018   Patients name: Melani Garcia  : 1985  MRN: [id number]  Safety measures:  Aspiration risk, Long Q-T syndrome, depression  Referring provider: Richie Goltz, MD    Primary Diagnosis/Billing code: R13 12  Secondary Diagnosis/ Billing code: G93 1, I45 81    Visit Tracking:  -Referring provider: Epic  -Billing guidelines: CMS   -Visit #3/10  (27 total) (**KX modifier**)   -[facility] Medicare  -RE due 2018    Subjective/Behavioral:  -Patient was accompanied by his mother and support staff (Kristin Parra) today  Objective/Assessment:  -Continue on puree and NTL by tsp at home  [facility] soft trials only during ST    -Easy Mix Simply Thick Instant Food Thickener (nectar consistency)    Short-term goals:  1  Patient will complete daily oral motor exercise to increase labial and lingual range of motion, strength, and coordination with min cues to 90% effectiveness to strengthen oral musculature and anterior-posterior bolus transfer ability and prevent food or liquid spillage from the oral cavity, to be achieved in 4-6 weeks  2  Patient will complete swallowing maneuver (e g , supraglottic swallow, Mendelsohn maneuver, effortful swallow, etc ) with 80% accuracy to facilitate improved oral motor strengthening, tongue base retraction, HLE, airway protection and/or clearance of the bolus through the pharynx, to be achieved in 4-6 weeks      3   Patient's caregiver/staff will consistently perform informated compensatory strategies (e g , upright positioning, small bites/sips, slow rate, alternation of consistencies, multiple swallows, effortful swallow, chin tuck, head turn, etc ) with 80% accuracy to eliminate overt s/sx penetration/aspiration of least restrictive food/liquid consistencies, to be achieved in 4-6 weeks      4  Patient will tolerate NMES (i e , VitalStim) in conjunction with HLE exercises without overt s/sx of penetration or aspiration, to be achieved in 4-6 weeks  Patient tolerated NMES (min threshold: 4 0 mA & max threshold: 8 0 mA) in conjunction with PO intake and exercises  Traditional VitalStim    Channel(s) used: 1,2   Placement: 3a   Treatment Duration: 50 minutes     Patient consumed the following during todays session: ground chicken and ground chili with NTL via teaspoon  Mechanical soft food trials: Patient with reduced lip closure during PO intake--increased saliva production resulting in anterior loss with food/liquid presentations  Decreased mastication/oral processing with slowed AP transit  Patient benefited from mod verbal cues to implement mastication and lingual sweeps as needed throughout PO intake  Liquid washes also helped to clear  Vocal checks completed intermittently throughout PO intake  Coughing episode when ground chili was introduced  No further trials were completed with this consistency--no other coughing episodes occurred  No other overt s/sx of penetration and/or aspiration noted during today's session  Clinician provided patient's mother and support staff with education on HEP, including OMEs, thermal/tactile stimulation, falsetto exercises, and shaker exercises  Patient's mother inquired about shaker exercises in past treatment sessions--education was provided to caregivers on shaker exercises; however, it was strongly recommended that they f/u with OMT and massage therapist prior to completion with patient for clearance for any contraindications  Reciprocal comprehension verbally expressed  5  Patient's caregiver/staff will demonstrate utilization of recommended safe swallowing strategies during a clinician assessed meal across three sessions, to be achieved in 4-6 weeks  Plan:  -Patient was provided with home exercises/activities to target goals in plan of care at the end of today's session   -Continue with current plan of care

## 2018-11-05 ENCOUNTER — OFFICE VISIT (OUTPATIENT)
Dept: SPEECH THERAPY | Facility: CLINIC | Age: 33
End: 2018-11-05
Payer: COMMERCIAL

## 2018-11-05 DIAGNOSIS — R13.12 OROPHARYNGEAL DYSPHAGIA: Primary | ICD-10-CM

## 2018-11-05 DIAGNOSIS — G93.1 ANOXIC BRAIN DAMAGE (HCC): ICD-10-CM

## 2018-11-05 DIAGNOSIS — I45.81 LONG Q-T SYNDROME: ICD-10-CM

## 2018-11-05 PROCEDURE — 92526 ORAL FUNCTION THERAPY: CPT | Performed by: SPEECH-LANGUAGE PATHOLOGIST

## 2018-11-07 DIAGNOSIS — E56.9 VITAMIN DEFICIENCY: ICD-10-CM

## 2018-11-07 RX ORDER — FOLIC ACID 0.8 MG
500 TABLET ORAL DAILY
Qty: 30 CAPSULE | Refills: 5 | Status: SHIPPED | OUTPATIENT
Start: 2018-11-07 | End: 2019-09-25

## 2018-11-07 NOTE — PROGRESS NOTES
Speech-Language Pathology Discharge    Today's date: 2018  Patients name: Katherine Jackson  : 1985  MRN: [id number]  Safety measures: Aspiration risk, Long Q-T syndrome, depression  Referring provider: Kaila Galindo MD    Subjective comments: Patient was accompanied to today's session by his mother Maksim Blair) and support staff (Rene Richard)  Patient's goal(s): "To eat and drink what I used to " Patient reportedly "was eating everything "    Visit Tracking:   -Referring provider: Epic  -Billing guidelines: CMS   -Visit #4/10  (28 total) (**KX modifier**)  -[facility] Medicare  Assessments    Patient received a repeat videofluoroscopic swallow study (VFSS) at [facility] on 2018  The following results were gathered from that test:   Pt presents with mild to moderate oropharyngeal dysphagia characterized by decreased timing of the swallow, which often results in premature loss of the bolus to the vallecula and sometimes A/E folds or pyriform sinuses (PFS)  Additionally, pt demonstrates evidence of reduced posterior pharyngeal wall constriction and base of tongue strength resulting in mild valleculae or PFS residue  Occasionally, pts timing of swallow resulted in transient penetration of thin liquids by straw and nectar thick liquids by spoon and cup; however, this penetration was less consistent with NTL trials  Given extra time to swallow following solid trials, pt [facility] for all solid consistencies  Pt would benefit from an exercise-based dysphagia treatment plan to improve timing of swallow, pharyngeal constriction, and base of tongue strength  Pts mother reporting that pt is currently receiving NMES for swallowing function in OP SLP treatment; however, pt has not responded positively to this approach and would like to continue with a traditional dysphagia treatment plan   Rx the following swallow exercises: base of tongue exercises (i e , tongue press with IOPI feedback, resistive sucking), pharyngeal constriction exercises (i e , effortful swallow, Tiffanie), and exercises for improving timing of swallow (i e , Mendelson, trials with oral prep set/pacing)  Rx pt consume a regular solid diet with thin liquids by cup or spoon, NO STRAWS (if pt prefers a straw, Rx NTL by straw) with extra time after solid bites to swallow 2x  SLP to discuss findings of study with pts mother and treatment recommendations based on mothers current concerns with pts AAC needs and swallowing needs  At this time; however, pt plans to continue therapy through [facility]      From the VFSS on 11/06/2018, the following recommendations were made:  Recommended swallowing treatment: Yes  Diet Consistency Recommendation: Regular  Liquid Viscosisty Recommendation: Thin by Teaspoon, Thin by Cup  Medications Swallow Recommendation: As tolerated  Comment: No straws with thin liquid, nectar thick liquid by straw is fine if desired"      Patient's mother requested that HEP be reviewed by SLPs prior to discharge today  HEP program consists of thermal/tactile stimulation, shaker exercises (reportedly cleared by OMT), pitch glides, and oral motor exercises to target lingual ROM, strength, & coordination  All staff/family present has been trained in completion of these exercises  Recommendations were made to patient's mother and staff with clarification notes added to paper  Reciprocal comprehension verbally expressed  Although VFSS report from [facility] on 11/06/2018 noted that patient is safe to consume regular foods with thin liquids via teaspoon or cup, patient would benefit from additional skilled dysphagia intervention to assess food/liquid tolerance before this recommended upgrade  Discussed our recommendation that patient should remain on puree foods with nectar-thick liquids due to patient's h/o aspiration until additional skilled dysphagia treatments are conducted   Reciprocal comprehension verbally expressed by mother and support staff  Goals    Short-term goals:  1  Patient will complete daily oral motor exercise to increase labial and lingual range of motion, strength, and coordination with min cues to 90% effectiveness to strengthen oral musculature and anterior-posterior bolus transfer ability and prevent food or liquid spillage from the oral cavity, to be achieved in 4-6 weeks  MET    2  Patient will complete swallowing maneuver (e g , supraglottic swallow, Mendelsohn maneuver, effortful swallow, etc ) with 80% accuracy to facilitate improved oral motor strengthening, tongue base retraction, HLE, airway protection and/or clearance of the bolus through the pharynx, to be achieved in 4-6 weeks  DC GOAL - patient unable to coordinate/complete volitional task      3  Patient's caregiver/staff will consistently perform informated compensatory strategies (e g , upright positioning, small bites/sips, slow rate, alternation of consistencies, multiple swallows, effortful swallow, chin tuck, head turn, etc ) with 80% accuracy to eliminate overt s/sx penetration/aspiration of least restrictive food/liquid consistencies, to be achieved in 4-6 weeks  MET     4  Patient will tolerate NMES (i e , VitalStim) in conjunction with HLE exercises without overt s/sx of penetration or aspiration, to be achieved in 4-6 weeks  PARTIALLY MET    5  Patient's caregiver/staff will demonstrate utilization of recommended safe swallowing strategies during a clinician assessed meal across three sessions, to be achieved in 4-6 weeks  MET    Long-term goals:  1  Patient will maintain adequate hydration and nutrition with optimum safety and efficacy of swallowing function on P O  intake without overt s/sx of penetration or aspiration by discharge    PARTIALLY MET     2  Patient's caregivers/staff will utilize compensatory strategies with optimum safety and efficacy of swallowing function on P O  intake without overt s/sx of penetration or aspiration by discharge  MET    3  Patient will return to a regular diet with thin liquids as safest PO intake  NOT MET AT THIS TIME    Functional Limitations Reporting (G-codes):   Flowsheet Rows      Most Recent Value   SLP G-Codes   FOTO information reviewed  N/A   Assessment Type  Discharge   Functional Limitations  Swallowing   Swallow Goal Status ()  CJ   Swallow Discharge Status ()  CK          Impressions/Recommendations    Impressions: Patient presents with moderate oropharyngeal dysphagia c/b decreased mastication patterns/oral processing with slowed AP transfer  Reduced lip closure during P O  intake--increased saliva production resulting in anterior loss with food/liquid presentations  Reduced initiation of swallow noted  Oral cavity residue improves with verbal cues to implement lingual sweeps, as well as liquid washes and double swallows  Fatigue noted over time during trials of P O  Intake--coughing has been noted on mechanical soft consistencies, as well as nectar thick liquids  We have educated staff on using extra thickener as needed when patient presents with coughing on NTL  Staff has been compliant  Recommendations:  -Patient to be discharged from outpatient skilled Speech Therapy services at this clinic: Per patient's mother's request, patient to be transferred to outpatient skilled Speech Therapy services for dysphagia [facility]  Patient's mother also expressed that patient will be completing communication trials with [employer] at that site  SLP CHANTE Giron , CCC-SLP) will be calling the SLP at [facility] to discuss patient's status for assistance with transition of care (consent to release PHI has been signed) per recommendation of patient's mother  SAFETY PRECAUTIONS:   -Recommend (solids): Puree  -Recommend (liquids): Nectar-thick   -Recommend (medications):  Whole in puree  -Safety concerns: Risk for aspiration and Risk for inadequate nutrition/ hydration  -Supervision: 1:1  -Strategies: Small sips and bites when eating, Slow rate, swallow between bites and Clear pocketing   -Positioning: Upright position at least 30 minutes after meal and Upright position during meals

## 2018-11-08 ENCOUNTER — TELEPHONE (OUTPATIENT)
Dept: CARDIOLOGY CLINIC | Facility: CLINIC | Age: 33
End: 2018-11-08

## 2018-11-08 ENCOUNTER — OFFICE VISIT (OUTPATIENT)
Dept: SPEECH THERAPY | Facility: CLINIC | Age: 33
End: 2018-11-08
Payer: COMMERCIAL

## 2018-11-08 ENCOUNTER — TELEPHONE (OUTPATIENT)
Dept: FAMILY MEDICINE CLINIC | Facility: CLINIC | Age: 33
End: 2018-11-08

## 2018-11-08 DIAGNOSIS — I69.354 SPASTIC HEMIPLEGIA OF LEFT NONDOMINANT SIDE AS LATE EFFECT OF CEREBRAL INFARCTION (HCC): Primary | ICD-10-CM

## 2018-11-08 DIAGNOSIS — R13.12 OROPHARYNGEAL DYSPHAGIA: Primary | ICD-10-CM

## 2018-11-08 DIAGNOSIS — K59.00 CONSTIPATION, UNSPECIFIED CONSTIPATION TYPE: ICD-10-CM

## 2018-11-08 DIAGNOSIS — G93.1 ANOXIC BRAIN DAMAGE (HCC): ICD-10-CM

## 2018-11-08 DIAGNOSIS — I45.81 LONG Q-T SYNDROME: ICD-10-CM

## 2018-11-08 PROCEDURE — 92526 ORAL FUNCTION THERAPY: CPT | Performed by: SPEECH-LANGUAGE PATHOLOGIST

## 2018-11-08 PROCEDURE — G8997 SWALLOW GOAL STATUS: HCPCS

## 2018-11-08 PROCEDURE — G8998 SWALLOW D/C STATUS: HCPCS

## 2018-11-08 RX ORDER — POLYETHYLENE GLYCOL 3350 17 G/17G
17 POWDER, FOR SOLUTION ORAL EVERY OTHER DAY
Qty: 850 G | Refills: 5 | Status: SHIPPED | OUTPATIENT
Start: 2018-11-08 | End: 2019-03-25 | Stop reason: SDUPTHER

## 2018-11-08 NOTE — TELEPHONE ENCOUNTER
Naman's caregiver asked if Dr Mahin Kaminski could write new order to take miralax every other day as stated in email to MotherOliverio   Please fax to [facility]

## 2018-11-08 NOTE — TELEPHONE ENCOUNTER
Update on genetic testing results:   Patient's genetic testing for Long QT syndrome did not show positive for this gene  However, findings of another gene, RYR2, was noted  Per [facility], this gene is not related to Long QT and in essence is nonspecific  Discussed results with patient's mother, Sara Hooker, as well as option for further genetic counseling  Mother expressed interested in the genetic counseling and this will be initiated through the Gene Team who partners with [facility]  I will initiate application  Also received a call from [employer], requesting a letter stating that patient does not have Long QT syndrome by genetic testing  This will allow the state to perform middle of the night mandatory fire drills  Mother expressed possibly a different method of doing these drills, however, Annika Silva stated they have to follow specific guidelines  Per Dr José Miguel Bearden, letter to be written in reference to genetic testing results and faxed to Annika Silva at [phone]  Will make mother aware of letter being sent

## 2018-11-09 ENCOUNTER — TELEPHONE (OUTPATIENT)
Dept: FAMILY MEDICINE CLINIC | Facility: CLINIC | Age: 33
End: 2018-11-09

## 2018-11-14 ENCOUNTER — TELEPHONE (OUTPATIENT)
Dept: FAMILY MEDICINE CLINIC | Facility: CLINIC | Age: 33
End: 2018-11-14

## 2018-11-14 DIAGNOSIS — G93.1 ANOXIC BRAIN DAMAGE (HCC): ICD-10-CM

## 2018-11-14 DIAGNOSIS — R13.12 OROPHARYNGEAL DYSPHAGIA: Primary | ICD-10-CM

## 2018-11-14 NOTE — TELEPHONE ENCOUNTER
Pt needs a referral for Sppech Therapy so he can start going to [facility]  Mom wants to D/C Zinc Gluconate, sign off the over the counter med sheet so pt can start taking Zinc Picolinate instead  Form will be in Doctor's bin

## 2018-11-20 NOTE — PROGRESS NOTES
DISCHARGE:  Pt did not attend any further physical therapy at this location  He will be d/c at this time

## 2018-11-27 ENCOUNTER — OFFICE VISIT (OUTPATIENT)
Dept: FAMILY MEDICINE CLINIC | Facility: CLINIC | Age: 33
End: 2018-11-27

## 2018-11-27 VITALS — WEIGHT: 126 LBS | HEIGHT: 70 IN | BODY MASS INDEX: 18.04 KG/M2

## 2018-11-27 DIAGNOSIS — K59.00 CONSTIPATION, UNSPECIFIED CONSTIPATION TYPE: ICD-10-CM

## 2018-11-27 DIAGNOSIS — M43.6 TORTICOLLIS: Primary | ICD-10-CM

## 2018-11-27 NOTE — PROGRESS NOTES
Assessment/Plan     This is a 26 y o  male who presents for OMT follow-up for:  1  Torticollis     2  Constipation, unspecified constipation type         Plan:   1  Patient tolerated OMT well for the above problems, advised patient to drink fluids and can use NSAID for soreness after treatment  2  OMT Follow up in 4 weeks  Subjective     Katherine Jackson is a 26 y o  male and is here for a OMT follow up  The caregiver reports he is doing well, having BM every other day  He is doing massage therapy every other week  Is the patient taking Pain medication? no  Has the patient completed physical therapy for this condition? no  Did Patient symptoms improve from last OMT appointment? no    The following portions of the patient's history were reviewed and updated as appropriate: allergies, current medications, past family history, past medical history, past social history, past surgical history and problem list     Review of Systems  Do you have pain that bothers you in your daily life? 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

## 2018-11-28 ENCOUNTER — TELEPHONE (OUTPATIENT)
Dept: FAMILY MEDICINE CLINIC | Facility: CLINIC | Age: 33
End: 2018-11-28

## 2018-11-28 NOTE — TELEPHONE ENCOUNTER
Caller requesting, an update of 3 med changes from yesterdays visit with Dr Kenyon Durham  Please forwar this information to [facility]    Please fax to following:     Dianna Griffin     [phone] (o)     [phone] (f)    1) Claritin to prn  2) D/C Zinc  3) Tea Tree Oil to prn

## 2018-11-28 NOTE — TELEPHONE ENCOUNTER
I do not see anything noted in chart about med changes, please confirm  If so, I will draft note for you to sign

## 2018-11-29 NOTE — TELEPHONE ENCOUNTER
I placed the written changes in Dr David carr as an [facility]   Thank you Dr Alesha Viramnotes

## 2018-11-30 ENCOUNTER — HOSPITAL ENCOUNTER (EMERGENCY)
Facility: HOSPITAL | Age: 33
Discharge: HOME | End: 2018-11-30
Attending: EMERGENCY MEDICINE | Admitting: EMERGENCY MEDICINE
Payer: COMMERCIAL

## 2018-11-30 ENCOUNTER — APPOINTMENT (EMERGENCY)
Dept: RADIOLOGY | Facility: HOSPITAL | Age: 33
End: 2018-11-30
Payer: COMMERCIAL

## 2018-11-30 VITALS
OXYGEN SATURATION: 98 % | TEMPERATURE: 97.9 F | BODY MASS INDEX: 17.79 KG/M2 | DIASTOLIC BLOOD PRESSURE: 59 MMHG | HEART RATE: 73 BPM | SYSTOLIC BLOOD PRESSURE: 104 MMHG | WEIGHT: 124 LBS | RESPIRATION RATE: 18 BRPM

## 2018-11-30 DIAGNOSIS — J02.9 SORE THROAT: Primary | ICD-10-CM

## 2018-11-30 LAB — S PYO AG THROAT QL: NEGATIVE

## 2018-11-30 PROCEDURE — 87430 STREP A AG IA: CPT | Performed by: EMERGENCY MEDICINE

## 2018-11-30 PROCEDURE — 99283 EMERGENCY DEPT VISIT LOW MDM: CPT

## 2018-11-30 PROCEDURE — 71045 X-RAY EXAM CHEST 1 VIEW: CPT

## 2018-11-30 NOTE — DISCHARGE INSTRUCTIONS

## 2018-11-30 NOTE — ED PROVIDER NOTES
History  Chief Complaint   Patient presents with    Sore Throat     Pt presents to ED home home due to c/o sore throat x a few days (week max) per caregiver at bedside  Neg fevers at home  Patient is a 33-year-old male that is hemiparetic the presents with caretaker who states he has a sore throat and cough since this morning  Patient is completely nonverbal   Caretaker states that the nose this morning that his throat was red and slightly swollen and that he was having a persistent cough  Patient eats pureed food due to hemiparesis  Patient does have history of bilateral pneumonias  Caretaker denies nausea, vomiting, fever, headache, abdominal pain, diarrhea, blood in stool blood in urine  Sore Throat       Prior to Admission Medications   Prescriptions Last Dose Informant Patient Reported? Taking?    Coenzyme Q10 100 MG TABS  Care Giver Yes No   Sig: Take 2 capsules daily by mouth @ 9am   Diapers & Supplies (CUTIES SIZE 3) MISC  Care Giver No No   Sig: Please provide 10 per day   Diapers & Supplies (HUGGIES LITTLE MOVERS SIZE 3) MISC  Care Giver Yes No   Sig: by Does not apply route   Disposable Gloves (VINYL GLOVES MEDIUM) MISC   No No   Sig: by Does not apply route 4 (four) times a day Dispense 3 boxes monthly; Diagnosis R32   ENEMEEZ PLUS  MG ENEM  Care Giver No No   Sig: use as directed   GNP VITAMIN C 500 MG tablet  Care Giver No No   Si TAB BY MOUTH DAILY @ 2PM (SUPPLEMENT) *GOODBRED   Ginkgo Biloba Extract 40 MG CAPS  Care Giver No No   Sig: 3 CAPS(120MG) BY MOUTH DAILY @ 2PM (SUPPLEMENT) *GOODBRED   Incontinence Supply Disposable (DEPEND UNDERWEAR SM/MED) MISC  Care Giver Yes No   Sig: by Does not apply route   Incontinence Supply Disposable (PREVAIL AIR BRIEFS) MISC  Care Giver No No   Si each by Does not apply route every 4 (four) hours Please provide 5 briefs per day   Incontinence Supply Disposable (SELECT DISPOSABLE UNDERWEAR SM) MISC  Care Giver No No   Sig: Please provide a 5 day supply DX R32 incontinence   Magnesium 500 MG CAPS   No No   Sig: Take 1 capsule (500 mg total) by mouth daily   Misc  Devices [facility]) MISC  Care Giver No No   Sig: Please provide pt with a new cord for power wheelchair   Multiple Vitamins-Minerals (CEROVITE SENIOR) TABS  Care Giver No No   Sig: Take 1 tablet by mouth daily   Omega-3 Fatty Acids (FISH OIL) 1,000 mg  Care Giver No No   Si CAPSULE BY MOUTH DAILY @ 9AM (SUPPLEMENT) *GOODBRED   RA SUNSCREEN SPF50 LOTN  Care Giver No No   Sig: Apply sunscreen to exposed skin when outdoors every 2 hours as needed   Respiratory Therapy Supplies (SPIROMETER) KIT  Care Giver No No   Sig: by Does not apply route 6 (six) times a day   Starch, Thickening, LIQD   No No   Sig: Nectar thick liquids up to honey thick if coughing occurs as needed  May useThick-It, Thick-It Clear/ Simply Thick  Tea Tree Oil OIL  Care Giver No No   Sig: by Does not apply route daily Apply to skin folds  Patient taking differently: by Does not apply route as needed Apply to skin folds  Vitamins A & D (RA VITAMIN A & D) OINT  Care Giver No No   Sig: Apply topically to affected area daily as needed   Witch Hazel (HEMORRHOIDAL) 50 % PADS  Care Giver Yes No   Sig: Apply topically   Zinc 50 MG TABS  Care Giver No No   Sig: Take 50mg daily @ 2:00pm every other month   (July, September, 2018, 2019, 2019)   b complex-vitamin C-folic acid (RENAL) 1 mg  Care Giver No No   Si SOFTGEL BY MOUTH DAILY @ 2PM (SUPPLEMENT) *GOODBRED   dronabinol (MARINOL) 2 5 mg capsule  Care Giver No No   Sig: Take one with breakfast, 2 with lunch, one with supper and one before bed   ibuprofen (MOTRIN) 600 mg tablet  Care Giver No No   Sig: Take 1 tablet (600 mg total) by mouth 2 (two) times a day   Patient taking differently: Take 600 mg by mouth as needed     loratadine (CLARITIN) 10 mg tablet  Care Giver No No   Sig: Take 1 tablet (10 mg total) by mouth every 24 hours metoprolol tartrate (LOPRESSOR) 25 mg tablet  Care Giver No No   Si/2 TAB(12 5MG) BY MOUTH DAILY @ 9AM (HTN) *GODOBRED   modafinil (PROVIGIL) 100 mg tablet  Care Giver No No   Sig: Take 2 daily @ 9:00am   nystatin (MYCOSTATIN) powder  Care Giver No No   Sig: Apply topically 2 (two) times a day   onabotulinumtoxin A (BOTOX) 100 units  Care Giver Yes No   Sig: inject 500 units into left gastroc soleus and abductor pollicis ankit   polyethylene glycol (GLYCOLAX) powder   No No   Sig: Take 17 g by mouth every other day Take 1/2 capful every other day by mouth In 8 oz liquid @ 8:00am   pyrithione zinc (HEAD AND SHOULDERS) 1 % shampoo  Care Giver No No   Sig: Apply topically daily as needed for dandruff   ranitidine (ZANTAC) 150 mg tablet  Care Giver No No   Si TAB BY MOUTH EVERY OTHER DAY @ 6PM (ACID REFLUX) *GOODBRED   sertraline (ZOLOFT) 100 mg tablet   No No   Sig: Take 1 tablet (100 mg total) by mouth daily Take one tablet daily @ 9:00am   zinc oxide (DESITIN) 40 % PSTE  Care Giver Yes No   Sig: Apply topically      Facility-Administered Medications: None       Past Medical History:   Diagnosis Date    Allergic rhinitis     Brain anoxia (HCC)     Cardiac arrest [facility])     age 13 s/p long Q-T syndrome    Community acquired pneumonia     last assessed/resolved:  10/9/2014    Depression     GERD (gastroesophageal reflux disease)     Long Q-T syndrome     Muscular rigidity and spasm, progressive     Osteopenia     Osteoporosis     Quadriplegia (HCC)     Spastic neurogenic bladder     Urinary incontinence        Past Surgical History:   Procedure Laterality Date    APPENDECTOMY      WISDOM TOOTH EXTRACTION         Family History   Problem Relation Age of Onset    Other Father         mitral valve replaced    Hypertension Father     Diabetes type II Maternal Grandfather     Diabetes type II Maternal Uncle      I have reviewed and agree with the history as documented      Social History Substance Use Topics    Smoking status: Never Smoker    Smokeless tobacco: Never Used    Alcohol use No        Review of Systems   Unable to perform ROS: Patient nonverbal   HENT: Positive for sore throat  Physical Exam  Physical Exam   Constitutional:   Spastic muscles and paralysis left side   Eyes: Pupils are equal, round, and reactive to light  EOM are normal    Cardiovascular: Normal rate and regular rhythm  Pulmonary/Chest: Effort normal and breath sounds normal  No respiratory distress  He has no wheezes  He exhibits no tenderness  Abdominal: Soft  Bowel sounds are normal    Musculoskeletal:   Spastic muscle disease and paralysis   Neurological: He is alert  Skin: Skin is warm and dry  Nursing note and vitals reviewed  Vital Signs  ED Triage Vitals [11/30/18 1716]   Temperature Pulse Respirations Blood Pressure SpO2   97 9 °F (36 6 °C) 73 18 104/59 98 %      Temp Source Heart Rate Source Patient Position - Orthostatic VS BP Location FiO2 (%)   Axillary Monitor Sitting Left arm --      Pain Score       --           Vitals:    11/30/18 1716   BP: 104/59   Pulse: 73   Patient Position - Orthostatic VS: Sitting       Visual Acuity      ED Medications  Medications - No data to display    Diagnostic Studies  Results Reviewed     Procedure Component Value Units Date/Time    Rapid Strep A Screen Only, Adults [[id number]] Collected:  11/30/18 1833    Lab Status:   In process Specimen:  Throat from Throat Updated:  11/30/18 1841                 XR chest 1 view portable   ED Interpretation by Oneal Hercules PA-C (11/30 1834)   No acute cardiopulmonary disease or aspiration                 Procedures  Procedures       Phone Contacts  ED Phone Contact    ED Course                               MDM  Number of Diagnoses or Management Options  Sore throat: new and requires workup     Amount and/or Complexity of Data Reviewed  Clinical lab tests: ordered and reviewed  Tests in the radiology section of CPT®: ordered and reviewed  Tests in the medicine section of CPT®: ordered and reviewed  Discussion of test results with the performing providers: yes  Decide to obtain previous medical records or to obtain history from someone other than the patient: yes  Obtain history from someone other than the patient: yes  Review and summarize past medical records: yes  Discuss the patient with other providers: yes  Independent visualization of images, tracings, or specimens: yes    Risk of Complications, Morbidity, and/or Mortality  Presenting problems: low  Diagnostic procedures: low  Management options: low    Patient Progress  Patient progress: stable    CritCare Time    Disposition  Final diagnoses:   Sore throat     Time reflects when diagnosis was documented in both MDM as applicable and the Disposition within this note     Time User Action Codes Description Comment    11/30/2018  6:31 PM Maria Fernanda JACOBS Add [J02 9] Sore throat       ED Disposition     ED Disposition Condition Comment    Discharge  Naman Viscardi discharge to home/self care  Condition at discharge: Stable        Follow-up Information     Follow up With Specialties Details Why Contact Info Additional [address] MD Family Medicine, Obstetrics and Gynecology, Obstetrics, Gynecology  As needed [place] [phone]       [facility] Emergency Department Emergency Medicine  If symptoms worsen [address] [address]  [phone] [facility] ED,  [address], [place], [place], [address]          Patient's Medications   Discharge Prescriptions    No medications on file     No discharge procedures on file      ED Provider  Electronically Signed by           Tremayne Hernandes PA-C  11/30/18 6954

## 2018-12-03 ENCOUNTER — TELEPHONE (OUTPATIENT)
Dept: FAMILY MEDICINE CLINIC | Facility: CLINIC | Age: 33
End: 2018-12-03

## 2018-12-03 DIAGNOSIS — R26.89 IMPAIRED WEIGHT BEARING: Primary | ICD-10-CM

## 2018-12-03 DIAGNOSIS — S89.80XA KNEE HYPEREXTENSION INJURY, INITIAL ENCOUNTER: ICD-10-CM

## 2018-12-03 NOTE — ED ATTENDING ATTESTATION
Luisa Javier DO, saw and evaluated the patient  I have discussed the patient with the resident/non-physician practitioner and agree with the resident's/non-physician practitioner's findings, Plan of Care, and MDM as documented in the resident's/non-physician practitioner's note, except where noted  All available labs and Radiology studies were reviewed  At this point I agree with the current assessment done in the Emergency Department  I have conducted an independent evaluation of this patient a history and physical is as follows:  Patient is seated in his wheelchair in no acute distress  He is handling his secretions without difficulty  Posterior oropharynx is mildly erythematous  There is no tonsillar hypertrophy or exudate present  There is no stridor  Lungs are clear        Critical Care Time  CritCare Time    Procedures

## 2018-12-04 ENCOUNTER — OFFICE VISIT (OUTPATIENT)
Dept: FAMILY MEDICINE CLINIC | Facility: CLINIC | Age: 33
End: 2018-12-04
Payer: COMMERCIAL

## 2018-12-04 VITALS
TEMPERATURE: 98.2 F | BODY MASS INDEX: 18.09 KG/M2 | HEART RATE: 70 BPM | SYSTOLIC BLOOD PRESSURE: 100 MMHG | RESPIRATION RATE: 14 BRPM | HEIGHT: 70 IN | DIASTOLIC BLOOD PRESSURE: 60 MMHG | WEIGHT: 126.4 LBS

## 2018-12-04 DIAGNOSIS — R09.82 POSTNASAL DRIP: Primary | ICD-10-CM

## 2018-12-04 PROCEDURE — 99213 OFFICE O/P EST LOW 20 MIN: CPT | Performed by: FAMILY MEDICINE

## 2018-12-04 NOTE — ASSESSMENT & PLAN NOTE
- likely viral URI, had negative rapid strep test in ED, no signs of bacterial infection  - supportive care with increased hydration and nasal saline   - follow up as needed, return precautions given if develops second sickening or fever

## 2018-12-04 NOTE — PROGRESS NOTES
Assessment/Plan     Postnasal drip  - likely viral URI, had negative rapid strep test in ED, no signs of bacterial infection  - supportive care with increased hydration and nasal saline   - follow up as needed, return precautions given if develops second sickening or fever      Diagnoses and all orders for this visit:    Postnasal drip  -     sodium chloride (OCEAN) 0 65 % nasal spray; 2 sprays into each nostril as needed for congestion         Subjective     Chief Complaint: ER follow up     HPI:   This is a 27 yo M who presents for ER follow up for sore throat  The history is provided from the caregivers who reports the patient was taken to ER for sore throat given mother concern that she say a lesion on the back of the throat  They deny cough, congestion or fever  Per chart review in ED he had chest XRAY that was negative as well as rapid strep test that was also negative on 11/30  The following portions of the patient's history were reviewed and updated as appropriate: allergies, current medications, past family history, past medical history, past social history, past surgical history and problem list     Review of Systems  Review of Systems   Unable to perform ROS: Patient nonverbal     Objective   Vitals:    12/04/18 1114   BP: 100/60   Pulse: 70   Resp: 14   Temp: 98 2 °F (36 8 °C)       Physical Exam   Constitutional: He appears well-developed and well-nourished  HENT:   Mild erythema of oropharynx and clear postnasal drip, no exudate from tonsils    Eyes: Conjunctivae are normal    Neck: Neck supple  Cardiovascular: Normal rate and regular rhythm  No murmur heard  Pulmonary/Chest: Effort normal and breath sounds normal  No respiratory distress  He has no wheezes  Abdominal: Soft  Bowel sounds are normal  There is no tenderness  Neurological: He is alert  Skin: Skin is dry  Lab Results: I have reviewed all the lab results

## 2018-12-11 ENCOUNTER — TELEPHONE (OUTPATIENT)
Dept: NEUROLOGY | Facility: CLINIC | Age: 33
End: 2018-12-11

## 2018-12-12 ENCOUNTER — TELEPHONE (OUTPATIENT)
Dept: FAMILY MEDICINE CLINIC | Facility: CLINIC | Age: 33
End: 2018-12-12

## 2018-12-19 DIAGNOSIS — R63.4 WEIGHT LOSS: Primary | ICD-10-CM

## 2018-12-20 ENCOUNTER — TELEPHONE (OUTPATIENT)
Dept: FAMILY MEDICINE CLINIC | Facility: CLINIC | Age: 33
End: 2018-12-20

## 2018-12-20 ENCOUNTER — OFFICE VISIT (OUTPATIENT)
Dept: FAMILY MEDICINE CLINIC | Facility: CLINIC | Age: 33
End: 2018-12-20

## 2018-12-20 VITALS — WEIGHT: 125 LBS | BODY MASS INDEX: 17.94 KG/M2

## 2018-12-20 DIAGNOSIS — M43.6 TORTICOLLIS: Primary | ICD-10-CM

## 2018-12-20 DIAGNOSIS — M99.01 SOMATIC DYSFUNCTION OF CERVICAL REGION: ICD-10-CM

## 2018-12-20 NOTE — PROGRESS NOTES
Assessment/Plan     This is a 26 y o  male who presents for OMT follow-up for:  1  Torticollis     2  Somatic dysfunction of cervical region         Plan:   1  Patient tolerated OMT well for the above problems,  advised patient to drink fluids and can use NSAID for soreness after treatment     2  OMT Follow up in 4 weeks  Subjective     Radha Urban is a 26 y o  male and is here for a OMT follow up  The caregiver reports no issues since last visit, his previous viral URI symptoms resolved  He is nonverbal      Is the patient taking Pain medication? no  Has the patient completed physical therapy for this condition?  no      The following portions of the patient's history were reviewed and updated as appropriate: allergies, current medications, past family history, past medical history, past social history, past surgical history and problem list     Review of Systems    Review of Systems   Unable to perform ROS: Patient nonverbal       Objective     OMT Exam   Cervical:   Somatic Dysfunction:  Restriction  Severity :  2  Osteopathic Findings: Right SCM hypertonicity   Treatment Method: ME and ST  Response: improved  Thoracic T1-4:   Somatic Dysfunction:  Tissue Texture Changes  Severity :  2  Osteopathic Findings: L > R mid trapezius hypertonicity   Treatment Method: ST  Response: improved  Lumbar:  Somatic Dysfunction:  Tissue Texture Changes  Severity :  2  Osteopathic Findings: right paraspinal hypertonicity   Treatment Method: ST  Response: improved  Pelvis:  Somatic Dysfunction:  Asymmetry   Severity :  2  Osteopathic Findings: + ASIS compression test on left, left posterior innominate   Treatment Method: HVLA  Response: improved

## 2018-12-21 NOTE — TELEPHONE ENCOUNTER
----- Message from Josefa Lundy sent at 12/18/2018 10:21 PM EST -----  Regarding: Non-Urgent Medical Question  Contact: [phone]  Naman received a notice from John Rizo to make an appointment for his Medicare Annual Wellness visit  Since he had his annual required physical for Medical Assistance benefits on June 5, 2018, does he need another annual exam for Medicare or can the necessary paperwork be submitted based on records in his file?     Thanks,   Vern Steward

## 2019-01-02 ENCOUNTER — TELEPHONE (OUTPATIENT)
Dept: FAMILY MEDICINE CLINIC | Facility: CLINIC | Age: 34
End: 2019-01-02

## 2019-01-02 DIAGNOSIS — G93.1 ANOXIC BRAIN DAMAGE (HCC): Primary | ICD-10-CM

## 2019-01-02 NOTE — TELEPHONE ENCOUNTER
Call from [facility] requesting an order for Modified Barium Swallow Study with Dx of Anoxic Brain damage  He has appt there on 1/10/19  Please fax to [phone]

## 2019-01-03 ENCOUNTER — TELEPHONE (OUTPATIENT)
Dept: FAMILY MEDICINE CLINIC | Facility: CLINIC | Age: 34
End: 2019-01-03

## 2019-01-03 DIAGNOSIS — G93.1 ANOXIC BRAIN DAMAGE (HCC): Primary | ICD-10-CM

## 2019-01-03 DIAGNOSIS — R26.2 AMBULATORY DYSFUNCTION: ICD-10-CM

## 2019-01-03 NOTE — TELEPHONE ENCOUNTER
[facility] [employer] called today stating they need a DME order sent to them so patient can have his manual wheelchair repaired  [employer] C/B # [id number] Ext [phone]    Fax # [phone]        Thanks

## 2019-01-04 ENCOUNTER — CLINICAL SUPPORT (OUTPATIENT)
Dept: NUTRITION | Facility: HOSPITAL | Age: 34
End: 2019-01-04
Attending: FAMILY MEDICINE
Payer: COMMERCIAL

## 2019-01-04 VITALS — WEIGHT: 125 LBS | BODY MASS INDEX: 17.94 KG/M2

## 2019-01-04 DIAGNOSIS — R63.4 WEIGHT LOSS: ICD-10-CM

## 2019-01-04 PROCEDURE — 97802 MEDICAL NUTRITION INDIV IN: CPT

## 2019-01-04 NOTE — PROGRESS NOTES
Initial Nutrition Assessment Form    Patient Name: Raoul Clifford    YOB: 1985    Sex:  Male     Assessment Date: 1/4/2019  Start Time: 100 Stop Time: 200 Total Minutes: 61     Data:  Present at session: self and mother   Parent Concerns: wt   Medical Dx/Reason for Referral: wt   Past Medical History:   Diagnosis Date    Allergic rhinitis     Brain anoxia ([facility] [address] )     Cardiac arrest [facility])     age 13 s/p long Q-T syndrome    Community acquired pneumonia     last assessed/resolved:  10/9/2014    Depression     GERD (gastroesophageal reflux disease)     Long Q-T syndrome     Muscular rigidity and spasm, progressive     Osteopenia     Osteoporosis     Quadriplegia ([facility] [address] )     Spastic neurogenic bladder     Urinary incontinence     Cardiac arrest at age 13, asp PNA last year, pureed diet, now MS diet now   Current Outpatient Prescriptions   Medication Sig Dispense Refill    b complex-vitamin C-folic acid (RENAL) 1 mg 1 SOFTGEL BY MOUTH DAILY @ 2PM (SUPPLEMENT) *GOODBRED 30 capsule 0    Coenzyme Q10 100 MG TABS Take 2 capsules daily by mouth @ 9am      Diapers & Supplies (CUTIES SIZE 3) MISC Please provide 10 per day 300 each 11    Diapers & Supplies (HUGGIES LITTLE MOVERS SIZE 3) MISC by Does not apply route      Disposable Gloves (VINYL GLOVES MEDIUM) MISC by Does not apply route 4 (four) times a day Dispense 3 boxes monthly; Diagnosis R32 3 each 5    dronabinol (MARINOL) 2 5 mg capsule Take one with breakfast, 2 with lunch, one with supper and one before bed 150 capsule 1    ENEMEEZ PLUS  MG ENEM use as directed 150 mL 0    Ginkgo Biloba Extract 40 MG CAPS 3 CAPS(120MG) BY MOUTH DAILY @ 2PM (SUPPLEMENT) *GOODBRED 90 capsule 0    GNP VITAMIN C 500 MG tablet 1 TAB BY MOUTH DAILY @ 2PM (SUPPLEMENT) *GOODBRED 30 tablet 0    ibuprofen (MOTRIN) 600 mg tablet Take 1 tablet (600 mg total) by mouth 2 (two) times a day (Patient taking differently: Take 600 mg by mouth as needed  ) 30 tablet 3    Incontinence Supply Disposable (DEPEND UNDERWEAR SM/MED) MISC by Does not apply route      Incontinence Supply Disposable (PREVAIL AIR BRIEFS) MISC 1 each by Does not apply route every 4 (four) hours Please provide 5 briefs per day 150 each 11    Incontinence Supply Disposable (SELECT DISPOSABLE UNDERWEAR SM) MISC Please provide a 5 day supply DX R32 incontinence 22 each 0    loratadine (CLARITIN) 10 mg tablet Take 1 tablet (10 mg total) by mouth every 24 hours 90 tablet 0    Magnesium 500 MG CAPS Take 1 capsule (500 mg total) by mouth daily 30 capsule 5    metoprolol tartrate (LOPRESSOR) 25 mg tablet 1/2 TAB(12 5MG) BY MOUTH DAILY @ 9AM (HTN) *GODOBRED 15 tablet 0    [facility]   Devices [facility]) MISC Please provide pt with a new cord for power wheelchair 1 each 0    modafinil (PROVIGIL) 100 mg tablet Take 2 daily @ 9:00am 60 tablet 0    Multiple Vitamins-Minerals (CEROVITE SENIOR) TABS Take 1 tablet by mouth daily 30 tablet 5    nystatin (MYCOSTATIN) powder Apply topically 2 (two) times a day 15 g 0    Omega-3 Fatty Acids (FISH OIL) 1,000 mg 1 CAPSULE BY MOUTH DAILY @ 9AM (SUPPLEMENT) *GOODBRED 30 capsule 0    onabotulinumtoxin A (BOTOX) 100 units inject 500 units into left gastroc soleus and abductor pollicis ankit      polyethylene glycol (GLYCOLAX) powder Take 17 g by mouth every other day Take 1/2 capful every other day by mouth In 8 oz liquid @ 8:00am 850 g 5    pyrithione zinc (HEAD AND SHOULDERS) 1 % shampoo Apply topically daily as needed for dandruff 400 mL 5    RA SUNSCREEN SPF50 LOTN Apply sunscreen to exposed skin when outdoors every 2 hours as needed 1 Bottle 5    ranitidine (ZANTAC) 150 mg tablet 1 TAB BY MOUTH EVERY OTHER DAY @ 6PM (ACID REFLUX) *GOODBRED 15 tablet 0    Respiratory Therapy Supplies (SPIROMETER) KIT by Does not apply route 6 (six) times a day 1 kit 0    sertraline (ZOLOFT) 100 mg tablet Take 1 tablet (100 mg total) by mouth daily Take one tablet daily @ 9:00am 30 tablet 5    sodium chloride (OCEAN) 0 65 % nasal spray 2 sprays into each nostril as needed for congestion 15 mL 0    Starch, Thickening, LIQD Nectar thick liquids up to honey thick if coughing occurs as needed  May useThick-It, Thick-It Clear/ Simply Thick  237 mL 5    Tea Tree Oil OIL by Does not apply route daily Apply to skin folds  (Patient taking differently: by Does not apply route as needed Apply to skin folds  ) 1 Bottle 3    Vitamins A & D (RA VITAMIN A & D) OINT Apply topically to affected area daily as needed 30 g 3    Witch Hazel (HEMORRHOIDAL) 50 % PADS Apply topically      Zinc 50 MG TABS Take 50mg daily @ 2:00pm every other month  (July, September, November 2018, January 2019, March 2019) 30 tablet 5    zinc oxide (DESITIN) 40 % PSTE Apply topically       No current facility-administered medications for this visit  Additional Meds/Supplements: NKFA   Special Learning Needs: n/a   Height:   HC Readings from Last 3 Encounters:   No data found for [facility]       Weight:  -145# Wt Readings from Last 12 Encounters:   01/04/19 56 7 kg (125 lb)   12/20/18 56 7 kg (125 lb)   12/04/18 57 3 kg (126 lb 6 4 oz)   11/30/18 56 2 kg (124 lb)   11/27/18 57 2 kg (126 lb)   10/30/18 57 2 kg (126 lb)   10/09/18 57 8 kg (127 lb 6 4 oz)   09/25/18 57 6 kg (127 lb)   09/11/18 57 9 kg (127 lb 9 6 oz)   08/21/18 57 7 kg (127 lb 3 2 oz)   08/08/18 59 2 kg (130 lb 9 6 oz)   08/07/18 59 4 kg (131 lb)     Estimated body mass index is 17 94 kg/m² as calculated from the following:    Height as of 12/4/18: 5' 10" (1 778 m)  Weight as of this encounter: 56 7 kg (125 lb)    Usual Weight: #  Ideal Body Weight: #   Recent Weight Change: []Yes     [x]No  Amount:       Energy Needs: No calculation needed   Allergies   Allergen Reactions    Other      drugs that prolong the QT interval  drugs that prolong the QT interval  Seasonal allergies     NKFA   History   Alcohol Use No    n/a   History   Smoking Status    Never Smoker   Smokeless Tobacco    Never Used    n/a   Who shops? lives in group home   Who cooks? licensed facility; everyone cooks   Exercise: n/a   Prior Counseling? []Yes     [x]No  When:    Why:         Diet Hx:  Fed by staff, 20mins per 8oz glass  Breakfast: whole milk yogurt w/prune, 8 oz pomegranate juice whole milk, almond and coconut milk; 8 oz miralax/water; joe carnation shake   830-9-10  a m  Lunch:   2   p m  Dinner:   6   p m  Snacks: AM - 4 oz pudding  PM -   HS - 9pm applesauce pudding        Nutrition Diagnosis:   Involuntary weight loss  related to Lack of or limited access to food (i e  economic constraints, restricting food given to the elderly and/or children as  evidenced by Normal or usual estimated intake in face of illness       Medical Nutrition Therapy Intervention:  [x]Individualized Meal Plan []Understanding Lab Values   []Basic Pathophysiology of Disease []Food/Medication Interactions   [x]Food Diary []Exercise   [x]Lifestyle/Behavior Modification Techniques []Medication, Mechanism of Action   []Label Reading []Self Blood Glucose Monitoring   [x]Weight/BMI Goals []Other -    Other Notes: Lives by himself       Comprehension: []Excellent  [x]Very Good  []Good  []Fair   []Poor    Receptivity: []Excellent  [x]Very Good  []Good  []Fair   []Poor    Expected Compliance: []Excellent  [x]Very Good  []Good  []Fair   []Poor        Goals:  1 rec 25-35gms fiber per day   2   rec drinks with calories for all meals   3   rec 3 meals and 3 snacks  4  Plate 9-4BF protein; 1c starch 1c veggies 1/2c veggies increase as desired  5  CIB for B ensure for L and and milkshake for D       No Follow-up on file    Labs:  CMP  Lab Results   Component Value Date     09/06/2017    K 3 9 07/21/2018     07/21/2018    CO2 27 07/21/2018    BUN 7 07/21/2018    CREATININE 0 80 07/21/2018    CALCIUM 8 4 07/21/2018    AST 19 07/17/2018    ALT 28 07/17/2018    ALKPHOS 60 07/17/2018    PROT 8 0 09/06/2017    BILITOT 0 5 09/06/2017    EGFR 117 07/21/2018       BMP  Lab Results   Component Value Date    CALCIUM 8 4 07/21/2018     09/06/2017    K 3 9 07/21/2018    CO2 27 07/21/2018     07/21/2018    BUN 7 07/21/2018    CREATININE 0 80 07/21/2018       Lipids  No results found for: CHOL  No results found for: HDL  No results found for: LDLCALC  No results found for: TRIG  No results found for: CHOLHDL    Hemoglobin A1C  Lab Results   Component Value Date    HGBA1C 5 3 09/05/2014       Fasting Glucose  No results found for: GLUF    Insulin     Thyroid  No results found for: TSH, P1GDUNY, M7WJSMV, THYROIDAB    Hepatic Function Panel  Lab Results   Component Value Date    ALT 28 07/17/2018    AST 19 07/17/2018    ALKPHOS 60 07/17/2018    BILITOT 0 5 09/06/2017       Celiac Disease Antibody Panel  No results found for: ENDOMYSIAL IGA, GLIADIN IGA, GLIADIN IGG, IGA, TISSUE TRANSGLUT AB, TTG IGA   Iron  No results found for: IRON, TIBC, FERRITIN    Vitamins  No results found for: VITAMIN B2   Nicotinamide   Date Value Ref Range Status   07/05/2018 <20 ng/mL Final     Comment:      Nicotinamide is a metabolite of nicotinic acid  Due to the large  variability in the metabolism of nicotinic acid, plasma   concentrations of this metabolite are variable  In one study,   fasting plasma concentrations were reported to be approximately   40 ng/mL  In another study it was reported that the   administration of a single 1000 mg of extended-release tablet of   nicotinic acid resulted in a mean peak nicotinamide concentration  of 400 ng/mL between 5 and 10 hours post dose, decreasing to   about 100 ng/mL by 16 hours post dose  This test was developed and its analytical performance   characteristics have been determined by [facility]  It has not been cleared or approved by the [employer] and Drug Administration   This assay has been validated   pursuant to the [employer] and is used for clinical   purposes  Nicotinic Acid   Date Value Ref Range Status   07/05/2018 <20 ng/mL Final     Comment:      Due to the large variability in the metabolism of nicotinic   acid, the dosing preparation used (immediate-release vs  extended  release), and the mg doses used, the serum concentrations may   range from less than 20 ng/mL to about 30,000 ng/mL  After oral   administration of an immediate-release tablet, peak plasma   concentrations occur in 4 to 5 hours  The plasma half-life of   nicotinic acid is about one hour  In one study, fasting plasma   concentrations were reported to be less than 20 ng/mL  In another  study, it was reported that the administration of a single 1000   mg extended-release tablet resulted in mean nicotinic acid   concentrations of less than 50 ng/mL          No results found for: Kellie Crawford  Lab Results   Component Value Date    Nan Chow 1,072 07/05/2018     No results found for: VITB5  No results found for: X4ULPCPR  No results found for: THYROGLB  No results found for: VITAMIN K   No results found for: 25-HYDROXY VIT D   No components found for: Lana Uribe MS RD N  [address]  [phone]  [id number] [facility]

## 2019-01-10 DIAGNOSIS — F32.A DEPRESSION, UNSPECIFIED DEPRESSION TYPE: ICD-10-CM

## 2019-01-10 DIAGNOSIS — Z00.00 HEALTH MAINTENANCE EXAMINATION: ICD-10-CM

## 2019-01-10 DIAGNOSIS — K59.00 CONSTIPATED: ICD-10-CM

## 2019-01-10 DIAGNOSIS — K21.9 GASTROESOPHAGEAL REFLUX DISEASE WITHOUT ESOPHAGITIS: ICD-10-CM

## 2019-01-10 DIAGNOSIS — I10 ACCELERATED ESSENTIAL HYPERTENSION: ICD-10-CM

## 2019-01-10 RX ORDER — DIMENHYDRINATE 50 MG
TABLET ORAL
Qty: 62 CAPSULE | Refills: 0 | Status: SHIPPED | OUTPATIENT
Start: 2019-01-10 | End: 2019-02-07 | Stop reason: SDUPTHER

## 2019-01-10 RX ORDER — GINKGO BILOBA 40 MG
CAPSULE ORAL
Qty: 93 CAPSULE | Refills: 0 | Status: SHIPPED | OUTPATIENT
Start: 2019-01-10 | End: 2019-02-07 | Stop reason: SDUPTHER

## 2019-01-10 RX ORDER — MULTIVIT-MIN/FA/LYCOPEN/LUTEIN .4-300-25
TABLET ORAL
Qty: 90 TABLET | Refills: 0 | Status: SHIPPED | OUTPATIENT
Start: 2019-01-10 | End: 2019-03-25 | Stop reason: SDUPTHER

## 2019-01-10 RX ORDER — RENO CAPS 100; 1.5; 1.7; 20; 10; 1; 150; 5; 6 MG/1; MG/1; MG/1; MG/1; MG/1; MG/1; UG/1; MG/1; UG/1
CAPSULE ORAL
Qty: 31 CAPSULE | Refills: 0 | Status: SHIPPED | OUTPATIENT
Start: 2019-01-10 | End: 2019-01-24 | Stop reason: SDUPTHER

## 2019-01-10 RX ORDER — RANITIDINE 150 MG/1
TABLET ORAL
Qty: 16 TABLET | Refills: 0 | Status: SHIPPED | OUTPATIENT
Start: 2019-01-10 | End: 2019-01-24 | Stop reason: SDUPTHER

## 2019-01-10 RX ORDER — CHLORAL HYDRATE 500 MG
CAPSULE ORAL
Qty: 31 CAPSULE | Refills: 0 | Status: SHIPPED | OUTPATIENT
Start: 2019-01-10 | End: 2019-02-07 | Stop reason: SDUPTHER

## 2019-01-10 RX ORDER — ASCORBIC ACID 500 MG
TABLET ORAL
Qty: 31 TABLET | Refills: 0 | Status: SHIPPED | OUTPATIENT
Start: 2019-01-10 | End: 2019-02-07 | Stop reason: SDUPTHER

## 2019-01-17 DIAGNOSIS — S06.9X0D TRAUMATIC BRAIN INJURY, WITHOUT LOSS OF CONSCIOUSNESS, SUBSEQUENT ENCOUNTER: ICD-10-CM

## 2019-01-17 RX ORDER — MODAFINIL 100 MG/1
TABLET ORAL
Qty: 60 TABLET | Refills: 4 | Status: CANCELLED | OUTPATIENT
Start: 2019-01-17

## 2019-01-17 RX ORDER — MODAFINIL 100 MG/1
TABLET ORAL
Qty: 60 TABLET | Refills: 5 | Status: SHIPPED | OUTPATIENT
Start: 2019-01-17 | End: 2019-03-25 | Stop reason: SDUPTHER

## 2019-01-17 RX ORDER — MODAFINIL 100 MG/1
TABLET ORAL
Qty: 60 TABLET | Refills: 4 | OUTPATIENT
Start: 2019-01-17

## 2019-01-22 ENCOUNTER — OFFICE VISIT (OUTPATIENT)
Dept: FAMILY MEDICINE CLINIC | Facility: CLINIC | Age: 34
End: 2019-01-22

## 2019-01-22 VITALS — WEIGHT: 125.2 LBS | HEIGHT: 70 IN | BODY MASS INDEX: 17.92 KG/M2

## 2019-01-22 DIAGNOSIS — M99.02 SOMATIC DYSFUNCTION OF THORACIC REGION: ICD-10-CM

## 2019-01-22 DIAGNOSIS — M43.6 TORTICOLLIS: Primary | ICD-10-CM

## 2019-01-22 DIAGNOSIS — M99.05 SOMATIC DYSFUNCTION OF PELVIS REGION: ICD-10-CM

## 2019-01-22 NOTE — PROGRESS NOTES
Assessment/Plan     This is a 26 y o  male who presents for OMT follow-up for:  1  Torticollis     2  Somatic dysfunction of pelvis region     3  Somatic dysfunction of thoracic region         Plan:   1  Patient tolerated OMT well for the above problems,  advised patient to drink fluids and can use NSAID for soreness after treatment     2  OMT Follow up in 4 weeks  Subjective     Marcia Ba is a 26 y o  male and is here for a OMT follow up  The caregiver reports he had a tough PT visit yesterday and appears in pain today  Is the patient taking Pain medication? no  Has the patient completed physical therapy for this condition? yes  Did Patient symptoms improve from last OMT appointment?  yes    The following portions of the patient's history were reviewed and updated as appropriate: allergies, current medications, past family history, past medical history, past social history, past surgical history and problem list     Review of Systems  Do you have pain that bothers you in your daily life? yes  Review of Systems    Objective     OMT Exam   Cervical:   Somatic Dysfunction:  Tissue Texture Changes and Restriction  Severity :  2  Osteopathic Findings: R SCM hypertonicity, ROM in all planes  Treatment Method: ME and ST  Response: improved  Thoracic T5-9:   Somatic Dysfunction:  Tissue Texture Changes  Severity :  2  Osteopathic Findings: left paraspinal hypertonicity   Treatment Method: ME and ST  Response: improved  Pelvis:  Somatic Dysfunction:  Asymmetry   Severity :  2  Osteopathic Findings: left posterior innominate   Treatment Method: HVLA  Response: improved

## 2019-01-24 DIAGNOSIS — E56.9 VITAMIN DEFICIENCY, UNSPECIFIED: Primary | ICD-10-CM

## 2019-01-24 DIAGNOSIS — K21.9 GASTROESOPHAGEAL REFLUX DISEASE WITHOUT ESOPHAGITIS: ICD-10-CM

## 2019-01-25 RX ORDER — RANITIDINE 150 MG/1
150 TABLET ORAL DAILY
Qty: 30 TABLET | Refills: 5 | Status: SHIPPED | OUTPATIENT
Start: 2019-01-25 | End: 2019-09-25

## 2019-01-25 RX ORDER — RENO CAPS 100; 1.5; 1.7; 20; 10; 1; 150; 5; 6 MG/1; MG/1; MG/1; MG/1; MG/1; MG/1; UG/1; MG/1; UG/1
1 CAPSULE ORAL DAILY
Qty: 30 CAPSULE | Refills: 5 | Status: SHIPPED | OUTPATIENT
Start: 2019-01-25 | End: 2019-03-25 | Stop reason: SDUPTHER

## 2019-02-04 DIAGNOSIS — R63.0 ANOREXIA: ICD-10-CM

## 2019-02-04 DIAGNOSIS — S06.9X0D TRAUMATIC BRAIN INJURY, WITHOUT LOSS OF CONSCIOUSNESS, SUBSEQUENT ENCOUNTER: ICD-10-CM

## 2019-02-04 RX ORDER — DRONABINOL 2.5 MG/1
CAPSULE ORAL
Qty: 150 CAPSULE | Refills: 2 | Status: SHIPPED | OUTPATIENT
Start: 2019-02-04 | End: 2019-03-25 | Stop reason: SDUPTHER

## 2019-02-07 DIAGNOSIS — F32.A DEPRESSION, UNSPECIFIED DEPRESSION TYPE: ICD-10-CM

## 2019-02-07 DIAGNOSIS — I10 ACCELERATED ESSENTIAL HYPERTENSION: ICD-10-CM

## 2019-02-07 RX ORDER — GINKGO BILOBA 40 MG
CAPSULE ORAL
Qty: 84 CAPSULE | Refills: 0 | Status: SHIPPED | OUTPATIENT
Start: 2019-02-07 | End: 2019-03-08 | Stop reason: SDUPTHER

## 2019-02-07 RX ORDER — DIMENHYDRINATE 50 MG
TABLET ORAL
Qty: 56 CAPSULE | Refills: 0 | Status: SHIPPED | OUTPATIENT
Start: 2019-02-07 | End: 2019-03-08 | Stop reason: SDUPTHER

## 2019-02-07 RX ORDER — CHLORAL HYDRATE 500 MG
CAPSULE ORAL
Qty: 28 CAPSULE | Refills: 0 | Status: SHIPPED | OUTPATIENT
Start: 2019-02-07 | End: 2019-03-08 | Stop reason: SDUPTHER

## 2019-02-07 RX ORDER — ASCORBIC ACID 500 MG
TABLET ORAL
Qty: 28 TABLET | Refills: 0 | Status: SHIPPED | OUTPATIENT
Start: 2019-02-07 | End: 2019-03-08 | Stop reason: SDUPTHER

## 2019-02-20 ENCOUNTER — TELEPHONE (OUTPATIENT)
Dept: FAMILY MEDICINE CLINIC | Facility: CLINIC | Age: 34
End: 2019-02-20

## 2019-02-20 NOTE — TELEPHONE ENCOUNTER
Mother calling to schedule anitial appt with Dr Cheryl Mukherjee, for 10lb weight loss and monitoring and additional one monthly appt there after  Nurse agreed to first appt with doctor until ordered  Please advise

## 2019-02-22 ENCOUNTER — TELEPHONE (OUTPATIENT)
Dept: FAMILY MEDICINE CLINIC | Facility: CLINIC | Age: 34
End: 2019-02-22

## 2019-02-22 NOTE — TELEPHONE ENCOUNTER
Patients caregiver jazmine called and stated the patient was sick and vomiting and was wonder if Dr Wooten would give an order to stop supplements  Jorje SIEGEL/RICK # [phone]    Thanks

## 2019-02-22 NOTE — TELEPHONE ENCOUNTER
Spoke with Theron Olguin, patient had one episode of vomiting today  They held his 2:00Pm supplements   Drafted note, signed by Dr Arnold Currie, faxed to [phone]

## 2019-03-07 ENCOUNTER — TELEPHONE (OUTPATIENT)
Dept: FAMILY MEDICINE CLINIC | Facility: CLINIC | Age: 34
End: 2019-03-07

## 2019-03-08 DIAGNOSIS — I10 ACCELERATED ESSENTIAL HYPERTENSION: ICD-10-CM

## 2019-03-08 DIAGNOSIS — F32.A DEPRESSION, UNSPECIFIED DEPRESSION TYPE: ICD-10-CM

## 2019-03-11 ENCOUNTER — TELEPHONE (OUTPATIENT)
Dept: FAMILY MEDICINE CLINIC | Facility: CLINIC | Age: 34
End: 2019-03-11

## 2019-03-11 RX ORDER — GINKGO BILOBA 40 MG
CAPSULE ORAL
Qty: 93 CAPSULE | Refills: 0 | Status: SHIPPED | OUTPATIENT
Start: 2019-03-11 | End: 2019-03-25 | Stop reason: SDUPTHER

## 2019-03-11 RX ORDER — ASCORBIC ACID 500 MG
TABLET ORAL
Qty: 31 TABLET | Refills: 0 | Status: SHIPPED | OUTPATIENT
Start: 2019-03-11 | End: 2019-03-25 | Stop reason: SDUPTHER

## 2019-03-11 RX ORDER — DIMENHYDRINATE 50 MG
TABLET ORAL
Qty: 62 CAPSULE | Refills: 0 | Status: SHIPPED | OUTPATIENT
Start: 2019-03-11 | End: 2019-03-25 | Stop reason: SDUPTHER

## 2019-03-11 RX ORDER — CHLORAL HYDRATE 500 MG
CAPSULE ORAL
Qty: 31 CAPSULE | Refills: 0 | Status: SHIPPED | OUTPATIENT
Start: 2019-03-11 | End: 2019-03-25 | Stop reason: SDUPTHER

## 2019-03-11 NOTE — TELEPHONE ENCOUNTER
Father dropped off paperwork for"written deposition for physician" for Dr El Orta to fill out, put in his bin in triage   Can call to

## 2019-03-12 ENCOUNTER — OFFICE VISIT (OUTPATIENT)
Dept: FAMILY MEDICINE CLINIC | Facility: CLINIC | Age: 34
End: 2019-03-12

## 2019-03-12 VITALS
WEIGHT: 122.2 LBS | HEART RATE: 78 BPM | TEMPERATURE: 97 F | SYSTOLIC BLOOD PRESSURE: 120 MMHG | DIASTOLIC BLOOD PRESSURE: 70 MMHG | BODY MASS INDEX: 17.53 KG/M2

## 2019-03-12 DIAGNOSIS — J02.9 SORE THROAT: Primary | ICD-10-CM

## 2019-03-12 PROCEDURE — 99213 OFFICE O/P EST LOW 20 MIN: CPT | Performed by: FAMILY MEDICINE

## 2019-03-12 NOTE — TELEPHONE ENCOUNTER
Mother wants appt with Dr Lisa Acosta first a due to 10 lb weight loss and then Monthly with any other pcp afterwards for monitoring future weight loss

## 2019-03-12 NOTE — TELEPHONE ENCOUNTER
Patient not able to take appt 03/21 because of other appt's scheduled  However she would like if nurse spoke to Dr Alaina Hunter to confirm if such an appt is necessary  She stated, last communication with Dr Alaina Hunter he stated there was no need to see him  He will be in for an appt today and hoping a response by then

## 2019-03-12 NOTE — PROGRESS NOTES
Janessa Mustache Viscardi 1985 male MRN: [id number]    Acute Visit        ASSESSMENT/PLAN  Problem List Items Addressed This Visit        Other    Sore throat - Primary     · Complaint of sore throat 2 days ago  · One episode of emesis, weaning off of ranitidine  · Pharynx clear, non erythematous, and no signs of plaques  · Patient afebrile, infectious etiology unlikely  · Referral for ENT to consider sent reflux as a cause for emesis and sore throat  · Follow-up for next scheduled appointment  Relevant Orders    Ambulatory Referral to Otolaryngology                No future appointments  SUBJECTIVE  CC: Vomiting and Follow-up (white spot on back of throat )       Patients mother has concerns about increasing patients dronabinol back to original dose  One of patient's nursing aides noticed a white lesion on his left tonsil yesterday  Patient does have a history of oral thrush  No recent antibiotic use  Patient's mother and caregivers deny recent fevers, chills, diaphoresis, stuffy nose, runny nose, or other signs of infectious etiologies  Patient has been eating and drinking normally  Moving bowels and urinating without difficulty  There are no changes with his activity level  It is noted that patient had 1 episode of emesis that was nonbloody and nonbilious this past Sunday  Patient was using a [employer] computer and spelled out that he has a sore throat  No sick contacts      Review of Systems   Constitutional: Negative for activity change, chills, fatigue and fever  HENT: Positive for sore throat  Negative for congestion, hearing loss, rhinorrhea, sinus pressure, sinus pain and sneezing  Eyes: Negative for visual disturbance  Respiratory: Negative for cough, chest tightness, shortness of breath and wheezing  Cardiovascular: Negative for chest pain, palpitations and leg swelling  Gastrointestinal: Positive for vomiting   Negative for abdominal pain, blood in stool, constipation, diarrhea and nausea  Genitourinary: Negative for difficulty urinating, dysuria, flank pain, frequency and hematuria  Musculoskeletal: Negative for back pain and neck pain  Skin: Negative for color change and pallor  Neurological: Negative for dizziness, syncope, light-headedness, numbness and headaches         Historical Information   The patient history was reviewed as follows:  Past Medical History:   Diagnosis Date    Allergic rhinitis     [facility]oxia ([facility] [address] )     Cardiac arrest [facility])     age 13 s/p long Q-T syndrome    Community acquired pneumonia     last assessed/resolved:  10/9/2014    Depression     GERD (gastroesophageal reflux disease)     Long Q-T syndrome     Muscular rigidity and spasm, progressive     Osteopenia     Osteoporosis     Quadriplegia ([facility] [address] [facility]     Spastic neurogenic bladder     Urinary incontinence      Past Surgical History:   Procedure Laterality Date    APPENDECTOMY  1991    WISDOM TOOTH EXTRACTION       Family History   Problem Relation Age of Onset    Other Father         mitral valve replaced    Hypertension Father     Diabetes type II Maternal Grandfather     Diabetes type II Maternal Uncle       Social History   Social History     Substance and Sexual Activity   Alcohol Use No     Social History     Substance and Sexual Activity   Drug Use No     Social History     Tobacco Use   Smoking Status Never Smoker   Smokeless Tobacco Never Used       Medications:   Meds/Allergies   Current Outpatient Medications   Medication Sig Dispense Refill    B Complex-C-Folic Acid (AMY CAPS) 1 MG CAPS Take 1 capsule by mouth daily 30 capsule 5    coenzyme Q-10 100 MG capsule 2 SOFTGELS(200MG) BY MOUTH DAILY @ 9AM (SUPPLEMENT) *GOODBRED 62 capsule 0    Coenzyme Q10 100 MG TABS Take 2 capsules daily by mouth @ 9am      Diapers & Supplies (CUTIES SIZE 3) MISC Please provide 10 per day 300 each 11    Diapers & Supplies (HUGGIES LITTLE MOVERS SIZE 3) MISC by Does not apply route  Disposable Gloves (VINYL GLOVES MEDIUM) MISC by Does not apply route 4 (four) times a day Dispense 3 boxes monthly; Diagnosis R32 3 each 5    dronabinol (MARINOL) 2 5 mg capsule Take one with breakfast, 2 with lunch, one with supper and one before bed 150 capsule 2    ENEMEEZ PLUS  MG ENEM use as directed 150 mL 0    Ginkgo Biloba Extract 40 MG CAPS 3 CAPS(120MG) BY MOUTH DAILY @ 2PM (SUPPLEMENT) *GOODBRED 93 capsule 0    GNP VITAMIN C 500 MG tablet 1 TAB BY MOUTH DAILY @ 2PM (SUPPLEMENT) *GOODBRED 31 tablet 0    ibuprofen (MOTRIN) 600 mg tablet Take 1 tablet (600 mg total) by mouth 2 (two) times a day (Patient taking differently: Take 600 mg by mouth as needed  ) 30 tablet 3    Incontinence Supply Disposable (DEPEND UNDERWEAR SM/MED) MISC by Does not apply route      Incontinence Supply Disposable (PREVAIL AIR BRIEFS) MISC 1 each by Does not apply route every 4 (four) hours Please provide 5 briefs per day 150 each 11    Incontinence Supply Disposable (SELECT DISPOSABLE UNDERWEAR SM) MISC Please provide a 5 day supply DX R32 incontinence 22 each 0    loratadine (CLARITIN) 10 mg tablet Take 1 tablet (10 mg total) by mouth every 24 hours 90 tablet 0    Magnesium 500 MG CAPS Take 1 capsule (500 mg total) by mouth daily 30 capsule 5    metoprolol tartrate (LOPRESSOR) 25 mg tablet 1/2 TAB(12 5MG) BY MOUTH DAILY @ 9AM (HTN) *GOODBRED 16 tablet 0    Misc   Devices [facility]) MISC Please provide pt with a new cord for power wheelchair 1 each 0    modafinil (PROVIGIL) 100 mg tablet Take 2 daily @ 9:00am 60 tablet 5    Multiple Vitamins-Minerals (CEROVITE SENIOR) TABS 1 TABLET BY MOUTH DAILY @ 9AM (SUPPLEMENT) 90 tablet 0    nystatin (MYCOSTATIN) powder Apply topically 2 (two) times a day 15 g 0    Omega-3 Fatty Acids (FISH OIL) 1,000 mg 1 CAPSULE BY MOUTH DAILY @ 9AM (SUPPLEMENT) *GOODBRED 31 capsule 0    onabotulinumtoxin A (BOTOX) 100 units inject 500 units into left gastroc soleus and abductor pollcandidas ankit      polyethylene glycol (GLYCOLAX) powder Take 17 g by mouth every other day Take 1/2 capful every other day by mouth In 8 oz liquid @ 8:00am 850 g 5    pyrithione zinc (HEAD AND SHOULDERS) 1 % shampoo Apply topically daily as needed for dandruff 400 mL 5    RA SUNSCREEN SPF50 LOTN Apply sunscreen to exposed skin when outdoors every 2 hours as needed 1 Bottle 5    ranitidine (ZANTAC) 150 mg tablet Take 1 tablet (150 mg total) by mouth daily 30 tablet 5    Respiratory Therapy Supplies (SPIROMETER) KIT by Does not apply route 6 (six) times a day 1 kit 0    sertraline (ZOLOFT) 100 mg tablet Take 1 tablet (100 mg total) by mouth daily Take one tablet daily @ 9:00am 30 tablet 5    sodium chloride (OCEAN) 0 65 % nasal spray 2 sprays into each nostril as needed for congestion 15 mL 0    Starch, Thickening, LIQD Nectar thick liquids up to honey thick if coughing occurs as needed  May useThick-It, Thick-It Clear/ Simply Thick  237 mL 5    Tea Tree Oil OIL by Does not apply route daily Apply to skin folds  (Patient taking differently: by Does not apply route as needed Apply to skin folds  ) 1 Bottle 3    Vitamins A & D (RA VITAMIN A & D) OINT Apply topically to affected area daily as needed 30 g 3    Witch Hazel (HEMORRHOIDAL) 50 % PADS Apply topically      Zinc 50 MG TABS Take 50mg daily @ 2:00pm every other month  (July, September, November 2018, January 2019, March 2019) 30 tablet 5    zinc oxide (DESITIN) 40 % PSTE Apply topically       No current facility-administered medications for this visit  Allergies   Allergen Reactions    Other      drugs that prolong the QT interval  drugs that prolong the QT interval  Seasonal allergies        OBJECTIVE  Vitals:   Vitals:    03/12/19 1325   BP: 120/70   Pulse: 78   Temp: (!) 97 °F (36 1 °C)   Weight: 55 4 kg (122 lb 3 2 oz)       Invasive Devices          None          Physical Exam   Constitutional: He is oriented to person, place, and time  He appears well-developed and well-nourished  No distress  HENT:   Head: Normocephalic and atraumatic  Right Ear: External ear normal    Left Ear: External ear normal    Mouth/Throat: Oropharynx is clear and moist and mucous membranes are normal  No oral lesions  No trismus in the jaw  Normal dentition  No dental abscesses, uvula swelling, lacerations or dental caries  No oropharyngeal exudate, posterior oropharyngeal edema, posterior oropharyngeal erythema or tonsillar abscesses  Tonsils are 1+ on the right  Tonsils are 1+ on the left  No tonsillar exudate  Eyes: Pupils are equal, round, and reactive to light  EOM are normal  No scleral icterus  Neck: Normal range of motion  Neck supple  No JVD present  No tracheal deviation present  No thyromegaly present  Cardiovascular: Normal rate, regular rhythm, normal heart sounds and intact distal pulses  Exam reveals no friction rub  No murmur heard  Pulmonary/Chest: Effort normal and breath sounds normal  No respiratory distress  He has no wheezes  He has no rales  He exhibits no tenderness  Abdominal: Soft  Bowel sounds are normal  He exhibits no distension  There is no tenderness  There is no rebound and no guarding  Musculoskeletal: Normal range of motion  He exhibits no edema or tenderness  Neurological: He is alert and oriented to person, place, and time  He has normal reflexes  No cranial nerve deficit  Coordination normal    Skin: Skin is warm and dry  He is not diaphoretic  Psychiatric: He has a normal mood and affect  Vitals reviewed  Lab:  I have personally reviewed all pertinent results

## 2019-03-12 NOTE — ASSESSMENT & PLAN NOTE
· Complaint of sore throat 2 days ago  · One episode of emesis, weaning off of ranitidine  · Pharynx clear, non erythematous, and no signs of plaques  · Patient afebrile, infectious etiology unlikely  · Referral for ENT to consider sent reflux as a cause for emesis and sore throat  · Follow-up for next scheduled appointment

## 2019-03-12 NOTE — TELEPHONE ENCOUNTER
Ananda Tran requested the opportunity to bring Tere Ruiz in periodically without a visit to use our scale for consistency  I spoke with Lauri to make sure this wasn't against any sort of policy, and we agreed that they can bring him in for weight checks without a visit as long as they let us know when he's going to be coming in  If it's just for checking his weight, there's no need for a visit

## 2019-03-12 NOTE — PATIENT INSTRUCTIONS
Patient does not have signs of oral thrush or pharyngitis  I recommend patient see ENT to rule out silent reflux due to patient being nonverbal  I recommend Dr Mahmood Co adjust patients dronabinol  Follow up as needed  There are no medication adjustments at this appointment

## 2019-03-14 ENCOUNTER — TELEPHONE (OUTPATIENT)
Dept: FAMILY MEDICINE CLINIC | Facility: CLINIC | Age: 34
End: 2019-03-14

## 2019-03-14 NOTE — TELEPHONE ENCOUNTER
Mother called to schedule appt tomorrow with Dr Richelle Patel at 3:00pm 03/15/19, for weight loss concerns during Dr Merline Gross attending during this time

## 2019-03-15 ENCOUNTER — OFFICE VISIT (OUTPATIENT)
Dept: FAMILY MEDICINE CLINIC | Facility: CLINIC | Age: 34
End: 2019-03-15

## 2019-03-15 VITALS
BODY MASS INDEX: 17.84 KG/M2 | HEART RATE: 68 BPM | HEIGHT: 70 IN | DIASTOLIC BLOOD PRESSURE: 68 MMHG | SYSTOLIC BLOOD PRESSURE: 118 MMHG | TEMPERATURE: 97.5 F | WEIGHT: 124.6 LBS | RESPIRATION RATE: 16 BRPM

## 2019-03-15 DIAGNOSIS — R63.4 ABNORMAL WEIGHT LOSS: Primary | ICD-10-CM

## 2019-03-15 PROCEDURE — 99213 OFFICE O/P EST LOW 20 MIN: CPT | Performed by: FAMILY MEDICINE

## 2019-03-15 RX ORDER — ZINC OXIDE 216 MG/ML
LOTION TOPICAL
Qty: 120 CAN | Refills: 3 | Status: SHIPPED | OUTPATIENT
Start: 2019-03-15 | End: 2019-04-26 | Stop reason: ALTCHOICE
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
## 2019-03-15 NOTE — PATIENT INSTRUCTIONS
Try the ensure clear, or boost, ensure, carnation, anything he will take! One with each meal and in between meals, which will give an additional 6366-5695 calories per day  Continue adding peanut butter, avocado, etc to his meals, and continue to exercise  Please follow up with nutrition  Please return in 1 month  Weight Gain Tips for Athletes   WHAT YOU NEED TO KNOW:   Why do some athletes need to gain weight? Some athletes need more calories to gain weight or maintain their weight  For example, you may need to maintain your weight for endurance sports because of the large amount of calories you burn  Endurance sports include running, swimming, and biking over long time periods or distances  Weight gain may also help to build muscle  This may help you if you participate in contact sports, such as football and hockey  What is a healthy weight gain goal?  A healthy weight gain goal is about ½ to 1 pound each week  Gain weight slowly to avoid gaining too much body fat  An exercise program that includes strength training will help you gain muscle weight  Ask your dietitian how much weight gain is right for you  What is a healthy meal plan for an athlete? Eat a variety of healthy foods during regular meals and snacks  The following are suggested amounts of fat, carbohydrate, and protein you may need each day  Your dietitian can tell you how many calories you need each day to gain weight  · Fat  is important because it provides energy and vitamins  You need 20% to 35% of your total daily calories to come from fat  For example, a man who needs about 2900 calories per day would need 725 fat calories each day  There are both healthy fats and unhealthy fats in foods  Ask your healthcare provider for more information about different types of fat and the total amount of fat you should have  · Carbohydrate  is the main source of energy your body uses during exercise   The amount you need depends on your daily calorie needs and the sport that you do  It also depends on whether you are male or female  Athletes need 6 to 10 grams of carbohydrates for each kilogram of body weight  To find your weight in kilograms, divide your weight in pounds by 2 2  Then multiply this number by your carbohydrate needs  For example, if you weigh 70 kilograms, you would need 420 to 700 grams of carbohydrate each day  · Protein  helps to build and repair muscle, produce hormones, boost your immune system, and replace blood cells  The amount of protein you need is only slightly higher than the amount suggested for people who do not exercise  Endurance athletes need 1 2 to 1 4 grams for each kilogram of body weight per day  Athletes who do strength training (such as lifting weights) need 1 2 to 1 7 grams for each kilogram of body weight per day  People can usually meet their needs for protein by following a balanced meal plan  Good sources of protein are lean meats, poultry, eggs, milk, cheese, peanut butter, and beans  Protein or amino acid supplements are not needed for weight gain if you follow a healthy and balanced meal plan  How do I increase calories? You need to eat or drink 500 to 1000 extra calories each day to gain about ½ to 1 pound each week  Your dietitian can tell you how many calories you need each day to gain weight  The following are some ways to add extra calories to your diet:  · Eat every 2 to 3 hours and 30 minutes after you exercise  · Include whole-grain carbohydrates and a lean protein food in each of your meals and snacks  Examples of whole-grain carbohydrates include whole-wheat bread, rolls, and bagels  Examples of lean protein include chicken and turkey  · Add high-calorie foods to your meals  Examples include cheese, peanut butter, avocado, nuts, and granola  · Carry healthy snacks with you   Examples of snacks that provide about 500 calories are:    ¨ 8 saltine crackers, 1 ounce of cheese, and 1 cup of ice cream    ¨ 1 cup of dry cereal with 1 cup of whole milk, 1 banana, and 1 slice of toast with 1 tablespoon of peanut butter    ¨ 6 oc cracker squares with 2 tablespoons of peanut butter, 2 tablespoons of raisins, and 1 cup of orange juice  What are some healthy foods that are higher in calories? · Choose whole-grain breads, such as honey bran, rye, and pumpernickel instead of white bread  Add peanut butter, margarine, jam, or honey for extra calories  · Eat high-calorie cereals, such as granola and cereals that contain nuts  These are healthy choices and have more calories per serving than puffed rice or corn flakes  The serving size of a cereal is listed on the food label  You can also add more calories to cereals by adding nuts, raisins, and other fruits  · Bananas, pineapple, mangos, raisins, dates, and dried fruit have more calories per serving than watery fruits  Some examples of watery fruits are watermelon, grapefruit, apples, and peaches  Trail mix is a good choice because it contains dried fruits and nuts  · Add margarine, almonds, and cheese to vegetables for extra calories  Stir-frying vegetables with canola or olive oil will also add extra calories  · Cook chicken or fish in a small amount of canola or olive oil  Red meats, such as beef, pork, and lamb, have more calories, but they also have more saturated fat  Saturated fat is an unhealthy type of fat because it may increase blood cholesterol  When you eat red meats, choose leaner cuts  Some examples of lean cuts of red meat are round or sirloin steak, ground round, fresh or boiled ham, or center loin chop  What liquids should I drink? · You can add calories to your diet by drinking juice, milk, milkshakes, and instant breakfast drinks  Drink plenty of water to prevent dehydration  Dehydration can cause serious health problems  Athletes have higher liquid needs because they lose water through sweat       · Always carry water with you during long exercise sessions  You can wear a special bag or belt made to carry water on your back or around your waist  Drink sports drinks during exercise sessions that last longer than 1 hour  The best way to check if you are drinking enough liquids is to check the color of your urine  Urine should be clear or very light yellow, with little or no smell  If your urine is dark or smells strong, you may not be drinking enough  CARE AGREEMENT:   You have the right to help plan your care  Discuss treatment options with your caregivers to decide what care you want to receive  You always have the right to refuse treatment  The above information is an  only  It is not intended as medical advice for individual conditions or treatments  Talk to your doctor, nurse or pharmacist before following any medical regimen to see if it is safe and effective for you  © 2017 [employer] Information is for End User's use only and may not be sold, redistributed or otherwise used for commercial purposes  All illustrations and images included in CareNotes® are the copyrighted property of A [employer]  or [employer]
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
## 2019-03-21 LAB
ALBUMIN SERPL-MCNC: 4.4 G/DL (ref 3.6–5.1)
ALBUMIN/GLOB SERPL: 1.1 (CALC) (ref 1–2.5)
ALP SERPL-CCNC: 62 U/L (ref 40–115)
ALT SERPL-CCNC: 16 U/L (ref 9–46)
AST SERPL-CCNC: 16 U/L (ref 10–40)
BASOPHILS # BLD AUTO: 22 CELLS/UL (ref 0–200)
BASOPHILS NFR BLD AUTO: 0.3 %
BILIRUB SERPL-MCNC: 0.6 MG/DL (ref 0.2–1.2)
BUN SERPL-MCNC: 16 MG/DL (ref 7–25)
BUN/CREAT SERPL: ABNORMAL (CALC) (ref 6–22)
CALCIUM SERPL-MCNC: 9.7 MG/DL (ref 8.6–10.3)
CHLORIDE SERPL-SCNC: 102 MMOL/L (ref 98–110)
CHOLEST SERPL-MCNC: 151 MG/DL
CHOLEST/HDLC SERPL: 3.8 (CALC)
CO2 SERPL-SCNC: 31 MMOL/L (ref 20–32)
CREAT SERPL-MCNC: 0.83 MG/DL (ref 0.6–1.35)
EOSINOPHIL # BLD AUTO: 101 CELLS/UL (ref 15–500)
EOSINOPHIL NFR BLD AUTO: 1.4 %
ERYTHROCYTE [DISTWIDTH] IN BLOOD BY AUTOMATED COUNT: 11.9 % (ref 11–15)
GLOBULIN SER CALC-MCNC: 4.1 G/DL (CALC) (ref 1.9–3.7)
GLUCOSE SERPL-MCNC: 85 MG/DL (ref 65–99)
HCT VFR BLD AUTO: 47.3 % (ref 38.5–50)
HDLC SERPL-MCNC: 40 MG/DL
HGB BLD-MCNC: 16.7 G/DL (ref 13.2–17.1)
HIV 1+2 AB+HIV1 P24 AG SERPL QL IA: NORMAL
LDLC SERPL CALC-MCNC: 88 MG/DL (CALC)
LYMPHOCYTES # BLD AUTO: 2167 CELLS/UL (ref 850–3900)
LYMPHOCYTES NFR BLD AUTO: 30.1 %
MCH RBC QN AUTO: 32.4 PG (ref 27–33)
MCHC RBC AUTO-ENTMCNC: 35.3 G/DL (ref 32–36)
MCV RBC AUTO: 91.7 FL (ref 80–100)
MONOCYTES # BLD AUTO: 425 CELLS/UL (ref 200–950)
MONOCYTES NFR BLD AUTO: 5.9 %
NEUTROPHILS # BLD AUTO: 4486 CELLS/UL (ref 1500–7800)
NEUTROPHILS NFR BLD AUTO: 62.3 %
NONHDLC SERPL-MCNC: 111 MG/DL (CALC)
PLATELET # BLD AUTO: 224 THOUSAND/UL (ref 140–400)
PMV BLD REES-ECKER: 10.3 FL (ref 7.5–12.5)
POTASSIUM SERPL-SCNC: 5 MMOL/L (ref 3.5–5.3)
PROT SERPL-MCNC: 8.5 G/DL (ref 6.1–8.1)
RBC # BLD AUTO: 5.16 MILLION/UL (ref 4.2–5.8)
SL AMB EGFR AFRICAN AMERICAN: 132 ML/MIN/1.73M2
SL AMB EGFR NON AFRICAN AMERICAN: 114 ML/MIN/1.73M2
SODIUM SERPL-SCNC: 140 MMOL/L (ref 135–146)
TRIGL SERPL-MCNC: 129 MG/DL
TSH SERPL-ACNC: 3.52 MIU/L (ref 0.4–4.5)
WBC # BLD AUTO: 7.2 THOUSAND/UL (ref 3.8–10.8)

## 2019-03-25 ENCOUNTER — TELEPHONE (OUTPATIENT)
Dept: FAMILY MEDICINE CLINIC | Facility: CLINIC | Age: 34
End: 2019-03-25

## 2019-03-25 DIAGNOSIS — R63.0 ANOREXIA: ICD-10-CM

## 2019-03-25 DIAGNOSIS — L21.0 DANDRUFF: ICD-10-CM

## 2019-03-25 DIAGNOSIS — E56.9 VITAMIN DEFICIENCY, UNSPECIFIED: ICD-10-CM

## 2019-03-25 DIAGNOSIS — K59.03 DRUG-INDUCED CONSTIPATION: Primary | ICD-10-CM

## 2019-03-25 DIAGNOSIS — F32.A DEPRESSION, UNSPECIFIED DEPRESSION TYPE: ICD-10-CM

## 2019-03-25 DIAGNOSIS — K59.00 CONSTIPATION, UNSPECIFIED CONSTIPATION TYPE: ICD-10-CM

## 2019-03-25 DIAGNOSIS — R52 PAIN: ICD-10-CM

## 2019-03-25 DIAGNOSIS — Z00.00 HEALTH MAINTENANCE EXAMINATION: ICD-10-CM

## 2019-03-25 DIAGNOSIS — I10 ACCELERATED ESSENTIAL HYPERTENSION: ICD-10-CM

## 2019-03-25 DIAGNOSIS — S06.9X0D TRAUMATIC BRAIN INJURY, WITHOUT LOSS OF CONSCIOUSNESS, SUBSEQUENT ENCOUNTER: ICD-10-CM

## 2019-03-25 DIAGNOSIS — K64.9 HEMORRHOIDS, UNSPECIFIED HEMORRHOID TYPE: ICD-10-CM

## 2019-03-25 RX ORDER — POLYETHYLENE GLYCOL 3350 17 G/17G
POWDER, FOR SOLUTION ORAL
Qty: 255 G | Refills: 11 | Status: SHIPPED | OUTPATIENT
Start: 2019-03-25 | End: 2019-04-26 | Stop reason: SDUPTHER

## 2019-03-25 RX ORDER — DRONABINOL 2.5 MG/1
CAPSULE ORAL
Qty: 120 CAPSULE | Refills: 5 | Status: SHIPPED | OUTPATIENT
Start: 2019-03-25 | End: 2019-08-13

## 2019-03-25 RX ORDER — DOCUSATE SODIUM 283 MG/5ML
LIQUID RECTAL
Qty: 5 EACH | Refills: 11 | Status: SHIPPED | OUTPATIENT
Start: 2019-03-25 | End: 2019-09-11 | Stop reason: SDUPTHER

## 2019-03-25 RX ORDER — CHLORAL HYDRATE 500 MG
CAPSULE ORAL
Qty: 31 EACH | Refills: 11 | Status: SHIPPED | OUTPATIENT
Start: 2019-03-25 | End: 2019-04-04 | Stop reason: SDUPTHER

## 2019-03-25 RX ORDER — SERTRALINE HYDROCHLORIDE 100 MG/1
TABLET, FILM COATED ORAL
Qty: 31 TABLET | Refills: 11 | Status: SHIPPED | OUTPATIENT
Start: 2019-03-25 | End: 2019-11-14 | Stop reason: SDUPTHER

## 2019-03-25 RX ORDER — PHENYLEPHRINE HYDROCHLORIDE AND FAT, HARD .00525; 1.86 G/1; G/1
SUPPOSITORY RECTAL
Qty: 12 SUPPOSITORY | Refills: 11 | Status: SHIPPED | OUTPATIENT
Start: 2019-03-25

## 2019-03-25 RX ORDER — LORATADINE 10 MG/1
TABLET ORAL
Qty: 30 TABLET | Refills: 11 | Status: SHIPPED | OUTPATIENT
Start: 2019-03-25 | End: 2019-08-13 | Stop reason: SDUPTHER

## 2019-03-25 RX ORDER — ASCORBIC ACID, THIAMINE MONONITRATE,RIBOFLAVIN, NIACINAMIDE, PYRIDOXINE HYDROCHLORIDE, FOLIC ACID, CYANOCOBALAMIN, BIOTIN, CALCIUM PANTOTHENATE, 100; 1.5; 1.7; 20; 10; 1; 6000; 150000; 5 MG/1; MG/1; MG/1; MG/1; MG/1; MG/1; UG/1; UG/1; MG/1
CAPSULE, LIQUID FILLED ORAL
Qty: 31 EACH | Refills: 11 | Status: SHIPPED | OUTPATIENT
Start: 2019-03-25 | End: 2019-08-13 | Stop reason: SDUPTHER

## 2019-03-25 RX ORDER — MODAFINIL 100 MG/1
TABLET ORAL
Qty: 62 TABLET | Refills: 5 | Status: SHIPPED | OUTPATIENT
Start: 2019-03-25 | End: 2020-02-10 | Stop reason: SDUPTHER

## 2019-03-25 RX ORDER — DIMENHYDRINATE 50 MG
TABLET ORAL
Qty: 62 CAPSULE | Refills: 11 | Status: SHIPPED | OUTPATIENT
Start: 2019-03-25 | End: 2019-04-04 | Stop reason: SDUPTHER

## 2019-03-25 RX ORDER — MODAFINIL 100 MG/1
TABLET ORAL
Qty: 62 TABLET | Refills: 5 | Status: SHIPPED | OUTPATIENT
Start: 2019-03-25 | End: 2019-03-25 | Stop reason: SDUPTHER

## 2019-03-25 RX ORDER — ASCORBIC ACID 500 MG
TABLET ORAL
Qty: 31 TABLET | Refills: 11 | Status: SHIPPED | OUTPATIENT
Start: 2019-03-25 | End: 2019-04-04 | Stop reason: SDUPTHER

## 2019-03-25 RX ORDER — MULTIVIT-MIN/FA/LYCOPEN/LUTEIN .4-300-25
TABLET ORAL
Qty: 31 TABLET | Refills: 11 | Status: SHIPPED | OUTPATIENT
Start: 2019-03-25 | End: 2019-09-30

## 2019-03-25 RX ORDER — TEA TREE OIL 100 %
OIL (ML) TOPICAL
Qty: 60 ML | Refills: 11 | Status: SHIPPED | OUTPATIENT
Start: 2019-03-25 | End: 2019-11-14 | Stop reason: SDUPTHER

## 2019-03-25 RX ORDER — BACLOFEN 20 MG
TABLET ORAL
Qty: 31 EACH | Refills: 11 | Status: SHIPPED | OUTPATIENT
Start: 2019-03-25 | End: 2019-08-13 | Stop reason: SDUPTHER

## 2019-03-25 RX ORDER — GINKGO BILOBA 40 MG
CAPSULE ORAL
Qty: 93 EACH | Refills: 11 | Status: SHIPPED | OUTPATIENT
Start: 2019-03-25 | End: 2019-04-04 | Stop reason: SDUPTHER

## 2019-03-25 RX ORDER — IBUPROFEN 600 MG/1
TABLET ORAL
Qty: 60 TABLET | Refills: 11 | Status: SHIPPED | OUTPATIENT
Start: 2019-03-25 | End: 2019-05-20 | Stop reason: SDUPTHER

## 2019-03-25 NOTE — TELEPHONE ENCOUNTER
Pt's mother dropped off barium swallow test results for Dr Anitha Jaramillo or Dr Kristen Arita to review, put in Dr Oseas carr in triage

## 2019-03-26 DIAGNOSIS — R62.7 POOR WEIGHT GAIN IN ADULT: Primary | ICD-10-CM

## 2019-03-26 NOTE — TELEPHONE ENCOUNTER
Reviewed results with Tiffany Mcneill on the phone  Prepared GI referral for pickup, and reviewed reassuring lab results

## 2019-03-27 ENCOUNTER — TRANSCRIBE ORDERS (OUTPATIENT)
Dept: GASTROENTEROLOGY | Facility: MEDICAL CENTER | Age: 34
End: 2019-03-27

## 2019-03-28 ENCOUNTER — CONSULT (OUTPATIENT)
Dept: GASTROENTEROLOGY | Facility: MEDICAL CENTER | Age: 34
End: 2019-03-28
Payer: COMMERCIAL

## 2019-03-28 VITALS
WEIGHT: 122 LBS | HEART RATE: 48 BPM | HEIGHT: 70 IN | DIASTOLIC BLOOD PRESSURE: 68 MMHG | SYSTOLIC BLOOD PRESSURE: 106 MMHG | TEMPERATURE: 95.8 F | BODY MASS INDEX: 17.47 KG/M2

## 2019-03-28 DIAGNOSIS — R63.4 ABNORMAL WEIGHT LOSS: Primary | ICD-10-CM

## 2019-03-28 DIAGNOSIS — K62.5 RECTAL BLEEDING: ICD-10-CM

## 2019-03-28 DIAGNOSIS — R13.12 OROPHARYNGEAL DYSPHAGIA: ICD-10-CM

## 2019-03-28 DIAGNOSIS — R62.7 POOR WEIGHT GAIN IN ADULT: ICD-10-CM

## 2019-03-28 DIAGNOSIS — K59.00 CONSTIPATION, UNSPECIFIED CONSTIPATION TYPE: ICD-10-CM

## 2019-03-28 PROCEDURE — 99244 OFF/OP CNSLTJ NEW/EST MOD 40: CPT | Performed by: INTERNAL MEDICINE

## 2019-03-28 RX ORDER — OMEPRAZOLE 20 MG/1
20 CAPSULE, DELAYED RELEASE ORAL DAILY
Qty: 30 CAPSULE | Refills: 5 | Status: SHIPPED | OUTPATIENT
Start: 2019-03-28 | End: 2019-03-28 | Stop reason: SDUPTHER

## 2019-03-28 RX ORDER — DOCUSATE SODIUM 100 MG/1
100 CAPSULE, LIQUID FILLED ORAL 2 TIMES DAILY
Qty: 10 CAPSULE | Refills: 5 | Status: SHIPPED | OUTPATIENT
Start: 2019-03-28 | End: 2019-05-20 | Stop reason: SDUPTHER

## 2019-03-28 RX ORDER — OMEPRAZOLE 20 MG/1
20 CAPSULE, DELAYED RELEASE ORAL DAILY
Qty: 30 CAPSULE | Refills: 5 | Status: SHIPPED | OUTPATIENT
Start: 2019-03-28 | End: 2019-05-20 | Stop reason: SDUPTHER

## 2019-03-28 RX ORDER — DOCUSATE SODIUM 100 MG/1
100 CAPSULE, LIQUID FILLED ORAL 2 TIMES DAILY
Qty: 10 CAPSULE | Refills: 5 | Status: SHIPPED | OUTPATIENT
Start: 2019-03-28 | End: 2019-03-28 | Stop reason: SDUPTHER

## 2019-03-28 NOTE — PROGRESS NOTES
[facility] - Outpatient Consultation  Kehinde Hagen 26 y o  male MRN: [id number]  Encounter: [id number]          ASSESSMENT AND PLAN:      1  Abnormal weight loss  2  Poor weight gain in adult  3  Constipation, unspecified constipation type  I suspect his weight loss is due to a combination of his constipation, reflux, and a change in his diet because of his dysphagia  I will give him a trial of omeprazole daily and Colace twice a day to see if this improves his symptoms and leads to a better appetite  I also suggest his supplements are saved for the end of the meal so that his appetite can direct his oral intake and then he can receive the supplement at the end to boost his nutritional intake  If he receives the supplement the beginning it may reduce his appetite and lead to less food intake  If his symptoms persist, we will schedule him for an upper endoscopy and colonoscopy and if this is negative, a CT scan would be the next step  - omeprazole (PriLOSEC) 20 mg delayed release capsule; Take 1 capsule (20 mg total) by mouth daily  Dispense: 30 capsule; Refill: 5  - docusate sodium (COLACE) 100 mg capsule; Take 1 capsule (100 mg total) by mouth 2 (two) times a day  Dispense: 10 capsule; Refill: 5    4  Oropharyngeal dysphagia  Fortunately his diet is slowly being advanced  We will monitor his oral intake as his diet is liberalized  5  Rectal bleeding  His rectal bleeding is likely due to hemorrhoids as he sometimes passes large bowel movements  Hopefully this will improve with stool softeners  I will check his stool for occult blood and if it is positive or if his symptoms persist, we will schedule him for colonoscopy as the next step  - Occult Blood, Fecal Immunochemical; Future    ______________________________________________________________________    HPI:  He presents for evaluation because of multiple gastrointestinal complaints    The main concern is approximately 15 lb weight loss over the past year  His mother and caretaker are with him today for the visit and are answered all of her questions  They report that his weight loss is despite what appears to be good oral intake  He had been on a pureed diet because of oropharyngeal dysphagia and possible aspiration but his diet has been liberalized recently  He was on MiraLax and Protonix in the past but those were stopped a few years ago  He has been receiving supplements such as [place] instant breakfast and he has been having large formed bowel movements but no diarrhea or constipation  He has been receiving small enemas to facilitate bowel movements  He has no prior history of an upper endoscopy or colonoscopy  He has had a few episodes of rectal bleeding but this has not happened recently  REVIEW OF SYSTEMS (via mother and caretaker):    CONSTITUTIONAL: Denies any fever, chills, rigors, and weight loss, but is quadriplegic  HEENT: No earache or tinnitus  Denies hearing loss or visual disturbances  CARDIOVASCULAR: No chest pain or palpitations  RESPIRATORY: Denies any cough, hemoptysis, shortness of breath or dyspnea on exertion  GASTROINTESTINAL: As noted in the History of Present Illness  GENITOURINARY: No problems with urination  Denies any hematuria or dysuria  NEUROLOGIC: No dizziness or vertigo, denies headaches  MUSCULOSKELETAL: Denies any muscle or joint pain, but is quadriplegic  SKIN: Denies skin rashes or itching  ENDOCRINE: Denies excessive thirst  Denies intolerance to heat or cold  PSYCHOSOCIAL: Denies depression or anxiety  Denies any recent memory loss         Historical Information   Past Medical History:   Diagnosis Date    Allergic rhinitis     Brain anoxia ([facility] [address] )     Cardiac arrest [facility])     age 13 s/p long Q-T syndrome    Community acquired pneumonia     last assessed/resolved:  10/9/2014    Depression     GERD (gastroesophageal reflux disease)     Long Q-T syndrome     Muscular rigidity and spasm, progressive     Osteopenia     Osteoporosis     Quadriplegia (HCC)     Spastic neurogenic bladder     Urinary incontinence      Past Surgical History:   Procedure Laterality Date    APPENDECTOMY  1991    WISDOM TOOTH EXTRACTION       Social History   Social History     Substance and Sexual Activity   Alcohol Use No     Social History     Substance and Sexual Activity   Drug Use No     Social History     Tobacco Use   Smoking Status Never Smoker   Smokeless Tobacco Never Used     Family History   Problem Relation Age of Onset    Other Father         mitral valve replaced    Hypertension Father     Diabetes type II Maternal Grandfather     Diabetes type II Maternal Uncle        Meds/Allergies       Current Outpatient Medications:     ascorbic acid (VITAMIN C) 500 mg tablet    b complex-vitamin C-folic acid (RENAL) 1 mg    coenzyme Q-10 100 MG capsule    Coenzyme Q10 100 MG TABS    Diapers & Supplies (CUTIES SIZE 3) MISC    Diapers & Supplies (HUGGIES LITTLE MOVERS SIZE 3) MISC    Disposable Gloves (VINYL GLOVES MEDIUM) MISC    dronabinol (MARINOL) 2 5 mg capsule    ENEMEEZ MINI 283 MG enema    ENEMEEZ PLUS  MG ENEM    Ginkgo Biloba Extract 40 MG CAPS    HEMORRHOIDAL 0 25 % suppository    ibuprofen (MOTRIN) 600 mg tablet    Incontinence Supply Disposable (DEPEND UNDERWEAR SM/MED) MISC    Incontinence Supply Disposable (PREVAIL AIR BRIEFS) MISC    Incontinence Supply Disposable (SELECT DISPOSABLE UNDERWEAR SM) MISC    loratadine (CLARITIN) 10 mg tablet    Magnesium 500 MG CAPS    Magnesium Oxide 500 MG TABS    Multiple Vitamins-Minerals (CEROVITE SENIOR) TABS    NUTRITIONAL SUPPLEMENT LIQD    nystatin (MYCOSTATIN) powder    Omega-3 Fatty Acids (FISH OIL) 1,000 mg    onabotulinumtoxin A (BOTOX) 100 units    polyethylene glycol (GLYCOLAX) powder    PREPARATION H 1-0 25-14 4-15 % rectal cream    RA SUNSCREEN SPF50 LOTN   Respiratory Therapy Supplies (SPIROMETER) KIT    sertraline (ZOLOFT) 100 mg tablet    SM DANDRUFF 2 IN 1 1 % shampoo    sodium chloride (OCEAN) 0 65 % nasal spray    Starch, Thickening, LIQD    Tea Tree 100 % OIL    Tea Tree Oil OIL    Vitamins A & D (RA VITAMIN A & D) OINT    Witch Hazel (HEMORRHOIDAL) 50 % PADS    Zinc 50 MG TABS    zinc oxide (DESITIN) 40 % PSTE    docusate sodium (COLACE) 100 mg capsule    metoprolol tartrate (LOPRESSOR) 25 mg tablet    Misc  Devices (WHEELCHAIR) MISC    modafinil (PROVIGIL) 100 mg tablet    omeprazole (PriLOSEC) 20 mg delayed release capsule    ranitidine (ZANTAC) 150 mg tablet    Allergies   Allergen Reactions    Other      drugs that prolong the QT interval  drugs that prolong the QT interval  Seasonal allergies            Objective     Blood pressure 106/68, pulse (!) 48, temperature (!) 95 8 °F (35 4 °C), temperature source Tympanic, height 5' 10" (1 778 m), weight 55 3 kg (122 lb)  Body mass index is 17 51 kg/m²  PHYSICAL EXAM:      General Appearance:   Alert, cooperative, no distress, quadriplegic sitting in wheelchair   HEENT:   Normocephalic, atraumatic, anicteric      Neck:  Supple, symmetrical, trachea midline   Lungs:   Clear to auscultation bilaterally; no rales, rhonchi or wheezing; respirations unlabored    Heart[de-identified]   Regular rate and rhythm; no murmur, rub, or gallop  Abdomen:   Soft, non-tender, non-distended; normal bowel sounds; no masses, no organomegaly    Genitalia:   Deferred    Rectal:   Deferred    Extremities:  No cyanosis, clubbing or edema    Pulses:  2+ and symmetric    Skin:  No jaundice, rashes, or lesions    Lymph nodes:  No palpable cervical lymphadenopathy        Lab Results:   No visits with results within 1 Day(s) from this visit     Latest known visit with results is:   Orders Only on 03/20/2019   Component Date Value    Total Cholesterol 03/20/2019 151     HDL 03/20/2019 40*    Triglycerides 03/20/2019 129     LDL Direct 03/20/2019 88     Chol HDLC Ratio 03/20/2019 3 8     Non-HDL Cholesterol 03/20/2019 111     Glucose, Random 03/20/2019 85     BUN 03/20/2019 16     Creatinine 03/20/2019 0 83     eGFR Non African American 03/20/2019 114     eGFR  03/20/2019 132     SL AMB BUN/CREATININE RA* 83/43/7978 NOT APPLICABLE     Sodium 99/66/5934 140     Potassium 03/20/2019 5 0     Chloride 03/20/2019 102     CO2 03/20/2019 31     SL AMB CALCIUM 03/20/2019 9 7     Protein, Total 03/20/2019 8 5*    Albumin 03/20/2019 4 4     Globulin 03/20/2019 4 1*    Albumin/Globulin Ratio 03/20/2019 1 1     TOTAL BILIRUBIN 03/20/2019 0 6     Alkaline Phosphatase 03/20/2019 62     AST 03/20/2019 16     ALT 03/20/2019 16     White Blood Cell Count 03/20/2019 7 2     Red Blood Cell Count 03/20/2019 5 16     Hemoglobin 03/20/2019 16 7     HCT 03/20/2019 47 3     MCV 03/20/2019 91 7     MCH 03/20/2019 32 4     MCHC 03/20/2019 35 3     RDW 03/20/2019 11 9     Platelet Count 12/40/4162 224     SL AMB MPV 03/20/2019 10 3     Neutrophils (Absolute) 03/20/2019 4,486     Lymphocytes (Absolute) 03/20/2019 2,167     Monocytes (Absolute) 03/20/2019 425     Eosinophils (Absolute) 03/20/2019 101     Basophils ABS 03/20/2019 22     Neutrophils 03/20/2019 62 3     Lymphocytes 03/20/2019 30 1     Monocytes 03/20/2019 5 9     Eosinophils 03/20/2019 1 4     Basophils PCT 03/20/2019 0 3     HIV AG/AB, 4th Gen 03/20/2019 NON-REACTIVE     TSH 03/20/2019 3 52          Radiology Results:   No results found  Answers for HPI/ROS submitted by the patient on 3/27/2019   Abdominal pain  belching: Yes  weight loss:  Yes  Aggravated by: bowel movement  Relieved by: bowel movements

## 2019-04-04 DIAGNOSIS — F32.A DEPRESSION, UNSPECIFIED DEPRESSION TYPE: ICD-10-CM

## 2019-04-04 DIAGNOSIS — I10 ACCELERATED ESSENTIAL HYPERTENSION: ICD-10-CM

## 2019-04-04 RX ORDER — ASCORBIC ACID 500 MG
TABLET ORAL
Qty: 30 TABLET | Refills: 0 | Status: SHIPPED | OUTPATIENT
Start: 2019-04-04 | End: 2019-08-13 | Stop reason: SDUPTHER

## 2019-04-04 RX ORDER — DIMENHYDRINATE 50 MG
TABLET ORAL
Qty: 60 CAPSULE | Refills: 0 | Status: SHIPPED | OUTPATIENT
Start: 2019-04-04 | End: 2019-09-27 | Stop reason: SDUPTHER

## 2019-04-04 RX ORDER — GINKGO BILOBA 40 MG
CAPSULE ORAL
Qty: 90 CAPSULE | Refills: 0 | Status: SHIPPED | OUTPATIENT
Start: 2019-04-04 | End: 2019-09-30

## 2019-04-04 RX ORDER — CHLORAL HYDRATE 500 MG
CAPSULE ORAL
Qty: 30 CAPSULE | Refills: 0 | Status: SHIPPED | OUTPATIENT
Start: 2019-04-04 | End: 2019-09-30

## 2019-04-05 ENCOUNTER — TELEPHONE (OUTPATIENT)
Dept: FAMILY MEDICINE CLINIC | Facility: CLINIC | Age: 34
End: 2019-04-05

## 2019-04-08 ENCOUNTER — TELEPHONE (OUTPATIENT)
Dept: FAMILY MEDICINE CLINIC | Facility: CLINIC | Age: 34
End: 2019-04-08

## 2019-04-08 ENCOUNTER — OFFICE VISIT (OUTPATIENT)
Dept: FAMILY MEDICINE CLINIC | Facility: CLINIC | Age: 34
End: 2019-04-08

## 2019-04-08 ENCOUNTER — TELEPHONE (OUTPATIENT)
Dept: GASTROENTEROLOGY | Facility: CLINIC | Age: 34
End: 2019-04-08

## 2019-04-08 VITALS
RESPIRATION RATE: 16 BRPM | BODY MASS INDEX: 16.78 KG/M2 | HEART RATE: 72 BPM | WEIGHT: 117.2 LBS | SYSTOLIC BLOOD PRESSURE: 120 MMHG | DIASTOLIC BLOOD PRESSURE: 68 MMHG | TEMPERATURE: 98.1 F | HEIGHT: 70 IN

## 2019-04-08 DIAGNOSIS — R34 DECREASED URINE OUTPUT: ICD-10-CM

## 2019-04-08 DIAGNOSIS — Z91.89 AT RISK FOR ASPIRATION: ICD-10-CM

## 2019-04-08 DIAGNOSIS — R63.4 ABNORMAL WEIGHT LOSS: Primary | ICD-10-CM

## 2019-04-08 DIAGNOSIS — R53.83 LETHARGY: ICD-10-CM

## 2019-04-08 PROCEDURE — 99213 OFFICE O/P EST LOW 20 MIN: CPT | Performed by: FAMILY MEDICINE

## 2019-04-09 ENCOUNTER — APPOINTMENT (OUTPATIENT)
Dept: RADIOLOGY | Age: 34
End: 2019-04-09
Payer: COMMERCIAL

## 2019-04-09 DIAGNOSIS — Z91.89 AT RISK FOR ASPIRATION: ICD-10-CM

## 2019-04-09 DIAGNOSIS — R53.83 LETHARGY: ICD-10-CM

## 2019-04-09 PROCEDURE — 71046 X-RAY EXAM CHEST 2 VIEWS: CPT

## 2019-04-12 ENCOUNTER — TELEPHONE (OUTPATIENT)
Dept: FAMILY MEDICINE CLINIC | Facility: CLINIC | Age: 34
End: 2019-04-12

## 2019-04-13 LAB
APPEARANCE UR: ABNORMAL
BACTERIA UR QL AUTO: ABNORMAL /HPF
BILIRUB UR QL STRIP: NEGATIVE
COLOR UR: ABNORMAL
GLUCOSE UR QL STRIP: NEGATIVE
HGB UR QL STRIP: NEGATIVE
HYALINE CASTS #/AREA URNS LPF: ABNORMAL /LPF
KETONES UR QL STRIP: NEGATIVE
LEUKOCYTE ESTERASE UR QL STRIP: ABNORMAL
NITRITE UR QL STRIP: POSITIVE
PH UR STRIP: 7.5 [PH] (ref 5–8)
PROT UR QL STRIP: NEGATIVE
RBC #/AREA URNS HPF: ABNORMAL /HPF
SERVICE CMNT-IMP: ABNORMAL
SP GR UR STRIP: 1.02 (ref 1–1.03)
SQUAMOUS #/AREA URNS HPF: ABNORMAL /HPF
WBC #/AREA URNS HPF: ABNORMAL /HPF

## 2019-04-15 ENCOUNTER — DOCUMENTATION (OUTPATIENT)
Dept: FAMILY MEDICINE CLINIC | Facility: CLINIC | Age: 34
End: 2019-04-15

## 2019-04-15 DIAGNOSIS — N30.00 ACUTE CYSTITIS WITHOUT HEMATURIA: Primary | ICD-10-CM

## 2019-04-15 RX ORDER — SULFAMETHOXAZOLE AND TRIMETHOPRIM 800; 160 MG/1; MG/1
1 TABLET ORAL EVERY 12 HOURS SCHEDULED
Qty: 6 TABLET | Refills: 0 | Status: SHIPPED | OUTPATIENT
Start: 2019-04-15 | End: 2019-04-18

## 2019-04-19 DIAGNOSIS — G82.50 QUADRIPLEGIA (HCC): ICD-10-CM

## 2019-04-19 DIAGNOSIS — R32 URINARY INCONTINENCE, UNSPECIFIED TYPE: ICD-10-CM

## 2019-04-19 RX ORDER — DIAPER,BRIEF,ADULT, DISPOSABLE
EACH MISCELLANEOUS
Qty: 22 EACH | Refills: 0 | Status: SHIPPED | OUTPATIENT
Start: 2019-04-19 | End: 2019-05-03 | Stop reason: SDUPTHER

## 2019-04-19 NOTE — TELEPHONE ENCOUNTER
Completed and placed in fax basket 
Forms in triage from [facility] and [employer] to be signed  Thank you 
19-Apr-2019

## 2019-04-23 DIAGNOSIS — F51.04 PSYCHOPHYSIOLOGICAL INSOMNIA: Primary | ICD-10-CM

## 2019-04-23 RX ORDER — CHOLECALCIFEROL (VITAMIN D3) 125 MCG
CAPSULE ORAL
Qty: 31 EACH | Refills: 11 | Status: SHIPPED | OUTPATIENT
Start: 2019-04-23 | End: 2019-11-14 | Stop reason: SDUPTHER

## 2019-04-26 ENCOUNTER — OFFICE VISIT (OUTPATIENT)
Dept: FAMILY MEDICINE CLINIC | Facility: CLINIC | Age: 34
End: 2019-04-26

## 2019-04-26 VITALS
SYSTOLIC BLOOD PRESSURE: 98 MMHG | HEART RATE: 68 BPM | BODY MASS INDEX: 18.19 KG/M2 | TEMPERATURE: 97.9 F | WEIGHT: 126.8 LBS | DIASTOLIC BLOOD PRESSURE: 68 MMHG | RESPIRATION RATE: 16 BRPM

## 2019-04-26 DIAGNOSIS — E56.9 VITAMIN DEFICIENCY: ICD-10-CM

## 2019-04-26 DIAGNOSIS — R62.7 POOR WEIGHT GAIN IN ADULT: ICD-10-CM

## 2019-04-26 DIAGNOSIS — R13.12 OROPHARYNGEAL DYSPHAGIA: Primary | ICD-10-CM

## 2019-04-26 DIAGNOSIS — N39.0 URINARY TRACT INFECTION WITHOUT HEMATURIA, SITE UNSPECIFIED: ICD-10-CM

## 2019-04-26 DIAGNOSIS — K59.00 CONSTIPATION, UNSPECIFIED CONSTIPATION TYPE: ICD-10-CM

## 2019-04-26 PROCEDURE — 99213 OFFICE O/P EST LOW 20 MIN: CPT | Performed by: FAMILY MEDICINE

## 2019-04-26 RX ORDER — ZINC GLUCONATE 50 MG
TABLET ORAL
Qty: 30 TABLET | Refills: 5 | Status: SHIPPED | OUTPATIENT
Start: 2019-04-26 | End: 2019-08-13 | Stop reason: SDUPTHER

## 2019-04-26 RX ORDER — POLYETHYLENE GLYCOL 3350 17 G/17G
POWDER, FOR SOLUTION ORAL
Qty: 255 G | Refills: 11 | Status: SHIPPED | OUTPATIENT
Start: 2019-04-26 | End: 2019-09-25

## 2019-04-26 RX ORDER — ACETAMINOPHEN/DIPHENHYDRAMINE 500MG-25MG
1 TABLET ORAL 2 TIMES DAILY PRN
Qty: 24 BOTTLE | Refills: 6 | Status: SHIPPED | OUTPATIENT
Start: 2019-04-26 | End: 2019-11-14 | Stop reason: SDUPTHER

## 2019-04-27 PROBLEM — N39.0 URINARY TRACT INFECTION WITHOUT HEMATURIA: Status: ACTIVE | Noted: 2019-04-27

## 2019-04-30 ENCOUNTER — TELEPHONE (OUTPATIENT)
Dept: FAMILY MEDICINE CLINIC | Facility: CLINIC | Age: 34
End: 2019-04-30

## 2019-04-30 DIAGNOSIS — G82.50 QUADRIPLEGIA (HCC): ICD-10-CM

## 2019-04-30 DIAGNOSIS — G93.1 ANOXIC BRAIN DAMAGE (HCC): Primary | ICD-10-CM

## 2019-05-01 DIAGNOSIS — I45.81 LONG Q-T SYNDROME: Primary | ICD-10-CM

## 2019-05-02 RX ORDER — ICOSAPENT ETHYL 1000 MG/1
CAPSULE ORAL
Qty: 31 CAPSULE | Refills: 11 | Status: SHIPPED | OUTPATIENT
Start: 2019-05-02 | End: 2019-11-14 | Stop reason: SDUPTHER

## 2019-05-03 DIAGNOSIS — G82.50 QUADRIPLEGIA (HCC): ICD-10-CM

## 2019-05-03 DIAGNOSIS — R32 URINARY INCONTINENCE, UNSPECIFIED TYPE: ICD-10-CM

## 2019-05-03 RX ORDER — DIAPER,BRIEF,ADULT, DISPOSABLE
EACH MISCELLANEOUS
Qty: 22 EACH | Refills: 6 | Status: SHIPPED | OUTPATIENT
Start: 2019-05-03 | End: 2019-11-22 | Stop reason: SDUPTHER

## 2019-05-08 DIAGNOSIS — R09.82 POSTNASAL DRIP: ICD-10-CM

## 2019-05-08 DIAGNOSIS — R23.8 SKIN IRRITATION: ICD-10-CM

## 2019-05-08 RX ORDER — PETROLATUM,WHITE/LANOLIN
OINTMENT (GRAM) TOPICAL
Qty: 454 G | Refills: 11 | Status: SHIPPED | OUTPATIENT
Start: 2019-05-08 | End: 2019-11-14 | Stop reason: SDUPTHER

## 2019-05-08 RX ORDER — SODIUM CHLORIDE 0.65 %
AEROSOL, SPRAY (ML) NASAL
Qty: 44 ML | Refills: 11 | Status: SHIPPED | OUTPATIENT
Start: 2019-05-08 | End: 2019-12-05 | Stop reason: SDUPTHER

## 2019-05-09 ENCOUNTER — TELEPHONE (OUTPATIENT)
Dept: GASTROENTEROLOGY | Facility: CLINIC | Age: 34
End: 2019-05-09

## 2019-05-13 ENCOUNTER — TRANSCRIBE ORDERS (OUTPATIENT)
Dept: GASTROENTEROLOGY | Facility: CLINIC | Age: 34
End: 2019-05-13

## 2019-05-13 ENCOUNTER — OFFICE VISIT (OUTPATIENT)
Dept: GASTROENTEROLOGY | Facility: CLINIC | Age: 34
End: 2019-05-13
Payer: COMMERCIAL

## 2019-05-13 ENCOUNTER — TELEPHONE (OUTPATIENT)
Dept: GASTROENTEROLOGY | Facility: AMBULARY SURGERY CENTER | Age: 34
End: 2019-05-13

## 2019-05-13 VITALS — TEMPERATURE: 95.7 F | HEART RATE: 71 BPM | DIASTOLIC BLOOD PRESSURE: 62 MMHG | SYSTOLIC BLOOD PRESSURE: 97 MMHG

## 2019-05-13 DIAGNOSIS — K21.9 GASTROESOPHAGEAL REFLUX DISEASE, ESOPHAGITIS PRESENCE NOT SPECIFIED: ICD-10-CM

## 2019-05-13 DIAGNOSIS — K59.00 CONSTIPATION, UNSPECIFIED CONSTIPATION TYPE: Primary | ICD-10-CM

## 2019-05-13 DIAGNOSIS — R10.13 EPIGASTRIC PAIN: ICD-10-CM

## 2019-05-13 DIAGNOSIS — K62.5 RECTAL BLEEDING: ICD-10-CM

## 2019-05-13 DIAGNOSIS — K59.01 SLOW TRANSIT CONSTIPATION: ICD-10-CM

## 2019-05-13 DIAGNOSIS — R63.4 ABNORMAL WEIGHT LOSS: Primary | ICD-10-CM

## 2019-05-13 DIAGNOSIS — R13.12 OROPHARYNGEAL DYSPHAGIA: ICD-10-CM

## 2019-05-13 PROCEDURE — 99215 OFFICE O/P EST HI 40 MIN: CPT | Performed by: PHYSICIAN ASSISTANT

## 2019-05-13 RX ORDER — WHEAT DEXTRIN/ASPARTAME 3 G/6 G
1 POWDER IN PACKET (EA) ORAL DAILY
Qty: 30 EACH | Refills: 3 | Status: SHIPPED | OUTPATIENT
Start: 2019-05-13 | End: 2019-11-14 | Stop reason: SDUPTHER

## 2019-05-16 LAB
APPEARANCE UR: CLEAR
BACTERIA UR QL AUTO: ABNORMAL /HPF
BILIRUB UR QL STRIP: NEGATIVE
COLOR UR: YELLOW
GLUCOSE UR QL STRIP: NEGATIVE
HGB UR QL STRIP: NEGATIVE
HYALINE CASTS #/AREA URNS LPF: ABNORMAL /LPF
KETONES UR QL STRIP: NEGATIVE
LEUKOCYTE ESTERASE UR QL STRIP: ABNORMAL
NITRITE UR QL STRIP: POSITIVE
PH UR STRIP: ABNORMAL [PH] (ref 5–8)
PROT UR QL STRIP: NEGATIVE
RBC #/AREA URNS HPF: ABNORMAL /HPF
SP GR UR STRIP: 1.01 (ref 1–1.03)
SQUAMOUS #/AREA URNS HPF: ABNORMAL /HPF
WBC #/AREA URNS HPF: ABNORMAL /HPF

## 2019-05-20 ENCOUNTER — OFFICE VISIT (OUTPATIENT)
Dept: FAMILY MEDICINE CLINIC | Facility: CLINIC | Age: 34
End: 2019-05-20

## 2019-05-20 ENCOUNTER — TELEPHONE (OUTPATIENT)
Dept: FAMILY MEDICINE CLINIC | Facility: CLINIC | Age: 34
End: 2019-05-20

## 2019-05-20 VITALS
WEIGHT: 129 LBS | SYSTOLIC BLOOD PRESSURE: 100 MMHG | DIASTOLIC BLOOD PRESSURE: 60 MMHG | HEART RATE: 80 BPM | BODY MASS INDEX: 18.51 KG/M2 | RESPIRATION RATE: 14 BRPM | TEMPERATURE: 97.8 F

## 2019-05-20 DIAGNOSIS — R62.7 POOR WEIGHT GAIN IN ADULT: ICD-10-CM

## 2019-05-20 DIAGNOSIS — R13.12 OROPHARYNGEAL DYSPHAGIA: ICD-10-CM

## 2019-05-20 DIAGNOSIS — K59.00 CONSTIPATION, UNSPECIFIED CONSTIPATION TYPE: ICD-10-CM

## 2019-05-20 DIAGNOSIS — R52 PAIN: ICD-10-CM

## 2019-05-20 DIAGNOSIS — R13.12 OROPHARYNGEAL DYSPHAGIA: Primary | ICD-10-CM

## 2019-05-20 DIAGNOSIS — R63.4 ABNORMAL WEIGHT LOSS: ICD-10-CM

## 2019-05-20 DIAGNOSIS — N39.0 URINARY TRACT INFECTION WITHOUT HEMATURIA, SITE UNSPECIFIED: Primary | ICD-10-CM

## 2019-05-20 PROCEDURE — 99214 OFFICE O/P EST MOD 30 MIN: CPT | Performed by: FAMILY MEDICINE

## 2019-05-20 RX ORDER — AMOXICILLIN 500 MG/1
1000 CAPSULE ORAL EVERY 8 HOURS SCHEDULED
Qty: 30 CAPSULE | Refills: 0 | Status: SHIPPED | OUTPATIENT
Start: 2019-05-20 | End: 2019-05-25 | Stop reason: HOSPADM

## 2019-05-20 RX ORDER — IBUPROFEN 200 MG
TABLET ORAL
Qty: 200 TABLET | Refills: 2 | Status: SHIPPED | OUTPATIENT
Start: 2019-05-20 | End: 2019-11-14 | Stop reason: SDUPTHER

## 2019-05-20 RX ORDER — OMEPRAZOLE 20 MG/1
CAPSULE, DELAYED RELEASE ORAL
Qty: 30 CAPSULE | Refills: 5 | Status: SHIPPED | OUTPATIENT
Start: 2019-05-20 | End: 2019-11-14 | Stop reason: SDUPTHER

## 2019-05-20 RX ORDER — DOCUSATE SODIUM 100 MG/1
CAPSULE, LIQUID FILLED ORAL
Qty: 10 CAPSULE | Refills: 5 | Status: SHIPPED | OUTPATIENT
Start: 2019-05-20 | End: 2019-11-14 | Stop reason: SDUPTHER

## 2019-05-23 DIAGNOSIS — R30.0 DYSURIA: Primary | ICD-10-CM

## 2019-05-23 RX ORDER — PHENAZOPYRIDINE HYDROCHLORIDE 200 MG/1
200 TABLET, FILM COATED ORAL EVERY 8 HOURS PRN
Qty: 3 TABLET | Refills: 0 | Status: SHIPPED | OUTPATIENT
Start: 2019-05-23 | End: 2019-09-30

## 2019-05-23 RX ORDER — PHENAZOPYRIDINE HYDROCHLORIDE 200 MG/1
200 TABLET, FILM COATED ORAL 3 TIMES DAILY PRN
Qty: 3 TABLET | Refills: 0 | Status: SHIPPED | OUTPATIENT
Start: 2019-05-23 | End: 2019-05-23 | Stop reason: SDUPTHER

## 2019-05-24 ENCOUNTER — LAB (OUTPATIENT)
Dept: LAB | Age: 34
End: 2019-05-24
Payer: COMMERCIAL

## 2019-05-24 ENCOUNTER — OFFICE VISIT (OUTPATIENT)
Dept: FAMILY MEDICINE CLINIC | Facility: CLINIC | Age: 34
End: 2019-05-24

## 2019-05-24 ENCOUNTER — HOSPITAL ENCOUNTER (OUTPATIENT)
Facility: HOSPITAL | Age: 34
Setting detail: OBSERVATION
Discharge: HOME | End: 2019-05-25
Attending: EMERGENCY MEDICINE | Admitting: FAMILY MEDICINE
Payer: COMMERCIAL

## 2019-05-24 ENCOUNTER — APPOINTMENT (EMERGENCY)
Dept: RADIOLOGY | Facility: HOSPITAL | Age: 34
End: 2019-05-24
Payer: COMMERCIAL

## 2019-05-24 VITALS
HEART RATE: 60 BPM | SYSTOLIC BLOOD PRESSURE: 118 MMHG | TEMPERATURE: 98 F | DIASTOLIC BLOOD PRESSURE: 68 MMHG | BODY MASS INDEX: 18.37 KG/M2 | WEIGHT: 128 LBS | RESPIRATION RATE: 14 BRPM

## 2019-05-24 DIAGNOSIS — K59.00 CONSTIPATION, UNSPECIFIED CONSTIPATION TYPE: ICD-10-CM

## 2019-05-24 DIAGNOSIS — N39.0 UTI (URINARY TRACT INFECTION): Primary | ICD-10-CM

## 2019-05-24 DIAGNOSIS — R10.9 FLANK PAIN: Primary | ICD-10-CM

## 2019-05-24 DIAGNOSIS — J18.9 PNEUMONIA: ICD-10-CM

## 2019-05-24 DIAGNOSIS — R30.0 DYSURIA: ICD-10-CM

## 2019-05-24 LAB
ANION GAP SERPL CALCULATED.3IONS-SCNC: 4 MMOL/L (ref 4–13)
BACTERIA UR QL AUTO: ABNORMAL /HPF
BASOPHILS # BLD AUTO: 0.02 THOUSANDS/ΜL (ref 0–0.1)
BASOPHILS NFR BLD AUTO: 0 % (ref 0–1)
BILIRUB UR QL STRIP: NEGATIVE
BUN SERPL-MCNC: 18 MG/DL (ref 5–25)
CALCIUM SERPL-MCNC: 8.4 MG/DL (ref 8.3–10.1)
CHLORIDE SERPL-SCNC: 107 MMOL/L (ref 100–108)
CLARITY UR: ABNORMAL
CO2 SERPL-SCNC: 29 MMOL/L (ref 21–32)
COLOR UR: ABNORMAL
CREAT SERPL-MCNC: 0.74 MG/DL (ref 0.6–1.3)
EOSINOPHIL # BLD AUTO: 0.14 THOUSAND/ΜL (ref 0–0.61)
EOSINOPHIL NFR BLD AUTO: 2 % (ref 0–6)
ERYTHROCYTE [DISTWIDTH] IN BLOOD BY AUTOMATED COUNT: 12 % (ref 11.6–15.1)
GFR SERPL CREATININE-BSD FRML MDRD: 120 ML/MIN/1.73SQ M
GLUCOSE SERPL-MCNC: 95 MG/DL (ref 65–140)
GLUCOSE UR STRIP-MCNC: ABNORMAL MG/DL
HCT VFR BLD AUTO: 44 % (ref 36.5–49.3)
HGB BLD-MCNC: 15 G/DL (ref 12–17)
HGB UR QL STRIP.AUTO: NEGATIVE
IMM GRANULOCYTES # BLD AUTO: 0.01 THOUSAND/UL (ref 0–0.2)
IMM GRANULOCYTES NFR BLD AUTO: 0 % (ref 0–2)
KETONES UR STRIP-MCNC: ABNORMAL MG/DL
LEUKOCYTE ESTERASE UR QL STRIP: ABNORMAL
LYMPHOCYTES # BLD AUTO: 1.94 THOUSANDS/ΜL (ref 0.6–4.47)
LYMPHOCYTES NFR BLD AUTO: 34 % (ref 14–44)
MCH RBC QN AUTO: 31.7 PG (ref 26.8–34.3)
MCHC RBC AUTO-ENTMCNC: 34.1 G/DL (ref 31.4–37.4)
MCV RBC AUTO: 93 FL (ref 82–98)
MONOCYTES # BLD AUTO: 0.46 THOUSAND/ΜL (ref 0.17–1.22)
MONOCYTES NFR BLD AUTO: 8 % (ref 4–12)
NEUTROPHILS # BLD AUTO: 3.19 THOUSANDS/ΜL (ref 1.85–7.62)
NEUTS SEG NFR BLD AUTO: 56 % (ref 43–75)
NITRITE UR QL STRIP: POSITIVE
NON-SQ EPI CELLS URNS QL MICRO: ABNORMAL /HPF
NRBC BLD AUTO-RTO: 0 /100 WBCS
PH UR STRIP.AUTO: 7 [PH] (ref 4.5–8)
PLATELET # BLD AUTO: 198 THOUSANDS/UL (ref 149–390)
PMV BLD AUTO: 9.6 FL (ref 8.9–12.7)
POTASSIUM SERPL-SCNC: 4.4 MMOL/L (ref 3.5–5.3)
PROT UR STRIP-MCNC: ABNORMAL MG/DL
RBC # BLD AUTO: 4.73 MILLION/UL (ref 3.88–5.62)
RBC #/AREA URNS AUTO: ABNORMAL /HPF
SODIUM SERPL-SCNC: 140 MMOL/L (ref 136–145)
SP GR UR STRIP.AUTO: 1.01 (ref 1–1.03)
UROBILINOGEN UR QL STRIP.AUTO: 2 E.U./DL
WBC # BLD AUTO: 5.76 THOUSAND/UL (ref 4.31–10.16)
WBC #/AREA URNS AUTO: ABNORMAL /HPF

## 2019-05-24 PROCEDURE — 1111F DSCHRG MED/CURRENT MED MERGE: CPT | Performed by: FAMILY MEDICINE

## 2019-05-24 PROCEDURE — 74177 CT ABD & PELVIS W/CONTRAST: CPT

## 2019-05-24 PROCEDURE — NC001 PR NO CHARGE: Performed by: FAMILY MEDICINE

## 2019-05-24 PROCEDURE — 80048 BASIC METABOLIC PNL TOTAL CA: CPT | Performed by: EMERGENCY MEDICINE

## 2019-05-24 PROCEDURE — 99218 PR INITIAL OBSERVATION CARE/DAY 30 MINUTES: CPT | Performed by: FAMILY MEDICINE

## 2019-05-24 PROCEDURE — 36415 COLL VENOUS BLD VENIPUNCTURE: CPT | Performed by: EMERGENCY MEDICINE

## 2019-05-24 PROCEDURE — 87086 URINE CULTURE/COLONY COUNT: CPT

## 2019-05-24 PROCEDURE — 85025 COMPLETE CBC W/AUTO DIFF WBC: CPT | Performed by: EMERGENCY MEDICINE

## 2019-05-24 PROCEDURE — 93005 ELECTROCARDIOGRAM TRACING: CPT

## 2019-05-24 PROCEDURE — 99285 EMERGENCY DEPT VISIT HI MDM: CPT

## 2019-05-24 PROCEDURE — 99285 EMERGENCY DEPT VISIT HI MDM: CPT | Performed by: EMERGENCY MEDICINE

## 2019-05-24 PROCEDURE — 99213 OFFICE O/P EST LOW 20 MIN: CPT | Performed by: FAMILY MEDICINE

## 2019-05-24 PROCEDURE — 81001 URINALYSIS AUTO W/SCOPE: CPT

## 2019-05-24 RX ORDER — ACETAMINOPHEN/DIPHENHYDRAMINE 500MG-25MG
1 TABLET ORAL 2 TIMES DAILY PRN
Status: DISCONTINUED | OUTPATIENT
Start: 2019-05-24 | End: 2019-05-24 | Stop reason: SDUPTHER

## 2019-05-24 RX ORDER — DOCUSATE SODIUM 100 MG/1
100 CAPSULE, LIQUID FILLED ORAL 2 TIMES DAILY
Status: DISCONTINUED | OUTPATIENT
Start: 2019-05-24 | End: 2019-05-25 | Stop reason: HOSPADM

## 2019-05-24 RX ORDER — UREA 10 %
500 LOTION (ML) TOPICAL
Status: DISCONTINUED | OUTPATIENT
Start: 2019-05-25 | End: 2019-05-25 | Stop reason: HOSPADM

## 2019-05-24 RX ORDER — MODAFINIL 100 MG/1
200 TABLET ORAL DAILY
Status: DISCONTINUED | OUTPATIENT
Start: 2019-05-25 | End: 2019-05-25 | Stop reason: HOSPADM

## 2019-05-24 RX ORDER — CHLORAL HYDRATE 500 MG
1000 CAPSULE ORAL DAILY
Status: DISCONTINUED | OUTPATIENT
Start: 2019-05-25 | End: 2019-05-25 | Stop reason: HOSPADM

## 2019-05-24 RX ORDER — CHOLECALCIFEROL (VITAMIN D3) 10 MCG
1 TABLET ORAL
Status: DISCONTINUED | OUTPATIENT
Start: 2019-05-25 | End: 2019-05-25 | Stop reason: HOSPADM

## 2019-05-24 RX ORDER — CHOLECALCIFEROL (VITAMIN D3) 125 MCG
100 CAPSULE ORAL DAILY
Status: DISCONTINUED | OUTPATIENT
Start: 2019-05-25 | End: 2019-05-25 | Stop reason: HOSPADM

## 2019-05-24 RX ORDER — ASCORBIC ACID 500 MG
500 TABLET ORAL
Status: DISCONTINUED | OUTPATIENT
Start: 2019-05-25 | End: 2019-05-25 | Stop reason: HOSPADM

## 2019-05-24 RX ORDER — WHEAT DEXTRIN/ASPARTAME 3 G/6 G
1 POWDER IN PACKET (EA) ORAL DAILY
Status: DISCONTINUED | OUTPATIENT
Start: 2019-05-25 | End: 2019-05-25 | Stop reason: HOSPADM

## 2019-05-24 RX ORDER — DRONABINOL 2.5 MG/1
2.5 CAPSULE ORAL 4 TIMES DAILY
Status: DISCONTINUED | OUTPATIENT
Start: 2019-05-24 | End: 2019-05-25 | Stop reason: HOSPADM

## 2019-05-24 RX ORDER — LANOLIN ALCOHOL/MO/W.PET/CERES
3 CREAM (GRAM) TOPICAL
Status: DISCONTINUED | OUTPATIENT
Start: 2019-05-24 | End: 2019-05-25 | Stop reason: HOSPADM

## 2019-05-24 RX ORDER — ZINC GLUCONATE 50 MG
50 TABLET ORAL
Status: DISCONTINUED | OUTPATIENT
Start: 2019-05-25 | End: 2019-05-25 | Stop reason: HOSPADM

## 2019-05-24 RX ADMIN — DOCUSATE SODIUM 100 MG: 100 CAPSULE, LIQUID FILLED ORAL at 21:48

## 2019-05-24 RX ADMIN — CEFEPIME HYDROCHLORIDE 1000 MG: 1 INJECTION, POWDER, FOR SOLUTION INTRAMUSCULAR; INTRAVENOUS at 19:23

## 2019-05-24 RX ADMIN — IOHEXOL 85 ML: 350 INJECTION, SOLUTION INTRAVENOUS at 17:53

## 2019-05-24 RX ADMIN — DRONABINOL 2.5 MG: 2.5 CAPSULE ORAL at 21:48

## 2019-05-24 RX ADMIN — MELATONIN 3 MG: at 21:48

## 2019-05-25 VITALS
TEMPERATURE: 98.9 F | BODY MASS INDEX: 19.28 KG/M2 | HEIGHT: 70 IN | DIASTOLIC BLOOD PRESSURE: 55 MMHG | WEIGHT: 134.7 LBS | HEART RATE: 70 BPM | SYSTOLIC BLOOD PRESSURE: 102 MMHG | RESPIRATION RATE: 18 BRPM | OXYGEN SATURATION: 95 %

## 2019-05-25 PROBLEM — R13.12 OROPHARYNGEAL DYSPHAGIA: Status: RESOLVED | Noted: 2018-04-26 | Resolved: 2019-05-25

## 2019-05-25 LAB
ANION GAP SERPL CALCULATED.3IONS-SCNC: 4 MMOL/L (ref 4–13)
ATRIAL RATE: 80 BPM
BACTERIA UR CULT: NORMAL
BASOPHILS # BLD AUTO: 0.02 THOUSANDS/ΜL (ref 0–0.1)
BASOPHILS NFR BLD AUTO: 0 % (ref 0–1)
BUN SERPL-MCNC: 15 MG/DL (ref 5–25)
CALCIUM SERPL-MCNC: 8.3 MG/DL (ref 8.3–10.1)
CHLORIDE SERPL-SCNC: 109 MMOL/L (ref 100–108)
CO2 SERPL-SCNC: 28 MMOL/L (ref 21–32)
CREAT SERPL-MCNC: 0.76 MG/DL (ref 0.6–1.3)
EOSINOPHIL # BLD AUTO: 0.18 THOUSAND/ΜL (ref 0–0.61)
EOSINOPHIL NFR BLD AUTO: 4 % (ref 0–6)
ERYTHROCYTE [DISTWIDTH] IN BLOOD BY AUTOMATED COUNT: 11.9 % (ref 11.6–15.1)
GFR SERPL CREATININE-BSD FRML MDRD: 118 ML/MIN/1.73SQ M
GLUCOSE SERPL-MCNC: 90 MG/DL (ref 65–140)
HCT VFR BLD AUTO: 44.5 % (ref 36.5–49.3)
HGB BLD-MCNC: 14.7 G/DL (ref 12–17)
IMM GRANULOCYTES # BLD AUTO: 0.02 THOUSAND/UL (ref 0–0.2)
IMM GRANULOCYTES NFR BLD AUTO: 0 % (ref 0–2)
LYMPHOCYTES # BLD AUTO: 2.04 THOUSANDS/ΜL (ref 0.6–4.47)
LYMPHOCYTES NFR BLD AUTO: 40 % (ref 14–44)
MCH RBC QN AUTO: 30.9 PG (ref 26.8–34.3)
MCHC RBC AUTO-ENTMCNC: 33 G/DL (ref 31.4–37.4)
MCV RBC AUTO: 94 FL (ref 82–98)
MONOCYTES # BLD AUTO: 0.52 THOUSAND/ΜL (ref 0.17–1.22)
MONOCYTES NFR BLD AUTO: 10 % (ref 4–12)
NEUTROPHILS # BLD AUTO: 2.37 THOUSANDS/ΜL (ref 1.85–7.62)
NEUTS SEG NFR BLD AUTO: 46 % (ref 43–75)
NRBC BLD AUTO-RTO: 0 /100 WBCS
P AXIS: 58 DEGREES
PLATELET # BLD AUTO: 193 THOUSANDS/UL (ref 149–390)
PLATELET # BLD AUTO: 213 THOUSANDS/UL (ref 149–390)
PMV BLD AUTO: 9.5 FL (ref 8.9–12.7)
PMV BLD AUTO: 9.9 FL (ref 8.9–12.7)
POTASSIUM SERPL-SCNC: 4.5 MMOL/L (ref 3.5–5.3)
PR INTERVAL: 160 MS
QRS AXIS: -41 DEGREES
QRSD INTERVAL: 92 MS
QT INTERVAL: 394 MS
QTC INTERVAL: 454 MS
RBC # BLD AUTO: 4.76 MILLION/UL (ref 3.88–5.62)
SODIUM SERPL-SCNC: 141 MMOL/L (ref 136–145)
T WAVE AXIS: 40 DEGREES
VENTRICULAR RATE: 80 BPM
WBC # BLD AUTO: 5.15 THOUSAND/UL (ref 4.31–10.16)

## 2019-05-25 PROCEDURE — 86592 SYPHILIS TEST NON-TREP QUAL: CPT | Performed by: FAMILY MEDICINE

## 2019-05-25 PROCEDURE — 87491 CHLMYD TRACH DNA AMP PROBE: CPT | Performed by: FAMILY MEDICINE

## 2019-05-25 PROCEDURE — 87591 N.GONORRHOEAE DNA AMP PROB: CPT | Performed by: FAMILY MEDICINE

## 2019-05-25 PROCEDURE — 85025 COMPLETE CBC W/AUTO DIFF WBC: CPT | Performed by: FAMILY MEDICINE

## 2019-05-25 PROCEDURE — 85049 AUTOMATED PLATELET COUNT: CPT | Performed by: FAMILY MEDICINE

## 2019-05-25 PROCEDURE — 93005 ELECTROCARDIOGRAM TRACING: CPT

## 2019-05-25 PROCEDURE — 93010 ELECTROCARDIOGRAM REPORT: CPT | Performed by: INTERNAL MEDICINE

## 2019-05-25 PROCEDURE — NC001 PR NO CHARGE: Performed by: FAMILY MEDICINE

## 2019-05-25 PROCEDURE — 80048 BASIC METABOLIC PNL TOTAL CA: CPT | Performed by: FAMILY MEDICINE

## 2019-05-25 RX ORDER — POLYETHYLENE GLYCOL 3350 17 G/17G
17 POWDER, FOR SOLUTION ORAL EVERY OTHER DAY
Qty: 14 EACH | Refills: 0 | Status: SHIPPED | OUTPATIENT
Start: 2019-05-25 | End: 2019-08-13 | Stop reason: SDUPTHER

## 2019-05-25 RX ADMIN — Medication 1000 MG: at 09:29

## 2019-05-25 RX ADMIN — SERTRALINE HYDROCHLORIDE 50 MG: 50 TABLET ORAL at 09:30

## 2019-05-25 RX ADMIN — Medication 500 MG: at 14:53

## 2019-05-25 RX ADMIN — DRONABINOL 2.5 MG: 2.5 CAPSULE ORAL at 12:23

## 2019-05-25 RX ADMIN — Medication 1 CAPSULE: at 14:53

## 2019-05-25 RX ADMIN — DRONABINOL 2.5 MG: 2.5 CAPSULE ORAL at 09:30

## 2019-05-25 RX ADMIN — DOCUSATE SODIUM 100 MG: 100 CAPSULE, LIQUID FILLED ORAL at 09:30

## 2019-05-25 RX ADMIN — OXYCODONE HYDROCHLORIDE AND ACETAMINOPHEN 500 MG: 500 TABLET ORAL at 14:53

## 2019-05-25 RX ADMIN — MODAFINIL 200 MG: 100 TABLET ORAL at 09:31

## 2019-05-25 RX ADMIN — Medication 100 MG: at 09:29

## 2019-05-25 RX ADMIN — CEFEPIME HYDROCHLORIDE 1000 MG: 1 INJECTION, POWDER, FOR SOLUTION INTRAMUSCULAR; INTRAVENOUS at 09:29

## 2019-05-25 RX ADMIN — Medication 50 MG: at 14:54

## 2019-05-25 RX ADMIN — ENOXAPARIN SODIUM 40 MG: 40 INJECTION SUBCUTANEOUS at 09:29

## 2019-05-28 ENCOUNTER — TELEPHONE (OUTPATIENT)
Dept: FAMILY MEDICINE CLINIC | Facility: CLINIC | Age: 34
End: 2019-05-28

## 2019-05-28 ENCOUNTER — TRANSITIONAL CARE MANAGEMENT (OUTPATIENT)
Dept: FAMILY MEDICINE CLINIC | Facility: CLINIC | Age: 34
End: 2019-05-28

## 2019-05-28 LAB
ATRIAL RATE: 56 BPM
C TRACH DNA SPEC QL NAA+PROBE: NEGATIVE
N GONORRHOEA DNA SPEC QL NAA+PROBE: NEGATIVE
P AXIS: 68 DEGREES
PR INTERVAL: 164 MS
QRS AXIS: -24 DEGREES
QRSD INTERVAL: 96 MS
QT INTERVAL: 426 MS
QTC INTERVAL: 411 MS
RPR SER QL: NORMAL
T WAVE AXIS: 42 DEGREES
VENTRICULAR RATE: 56 BPM

## 2019-05-28 PROCEDURE — 93010 ELECTROCARDIOGRAM REPORT: CPT | Performed by: INTERNAL MEDICINE

## 2019-05-29 ENCOUNTER — TELEPHONE (OUTPATIENT)
Dept: FAMILY MEDICINE CLINIC | Facility: CLINIC | Age: 34
End: 2019-05-29

## 2019-05-31 ENCOUNTER — OFFICE VISIT (OUTPATIENT)
Dept: FAMILY MEDICINE CLINIC | Facility: CLINIC | Age: 34
End: 2019-05-31

## 2019-05-31 VITALS
WEIGHT: 129.6 LBS | TEMPERATURE: 97.4 F | HEIGHT: 70 IN | SYSTOLIC BLOOD PRESSURE: 110 MMHG | RESPIRATION RATE: 16 BRPM | BODY MASS INDEX: 18.56 KG/M2 | HEART RATE: 84 BPM | DIASTOLIC BLOOD PRESSURE: 70 MMHG

## 2019-05-31 DIAGNOSIS — R30.0 DYSURIA: Primary | ICD-10-CM

## 2019-05-31 DIAGNOSIS — J98.11 ATELECTASIS: ICD-10-CM

## 2019-05-31 DIAGNOSIS — Z09 HOSPITAL DISCHARGE FOLLOW-UP: ICD-10-CM

## 2019-05-31 PROCEDURE — 99495 TRANSJ CARE MGMT MOD F2F 14D: CPT | Performed by: FAMILY MEDICINE

## 2019-05-31 RX ORDER — AMOXICILLIN 500 MG/1
500 CAPSULE ORAL EVERY 8 HOURS SCHEDULED
Qty: 30 CAPSULE | Refills: 0 | Status: SHIPPED | OUTPATIENT
Start: 2019-05-31 | End: 2019-06-10

## 2019-06-11 ENCOUNTER — OFFICE VISIT (OUTPATIENT)
Dept: FAMILY MEDICINE CLINIC | Facility: CLINIC | Age: 34
End: 2019-06-11

## 2019-06-11 VITALS
DIASTOLIC BLOOD PRESSURE: 68 MMHG | WEIGHT: 129.2 LBS | BODY MASS INDEX: 18.5 KG/M2 | HEART RATE: 78 BPM | HEIGHT: 70 IN | SYSTOLIC BLOOD PRESSURE: 120 MMHG | RESPIRATION RATE: 16 BRPM | TEMPERATURE: 99.1 F

## 2019-06-11 DIAGNOSIS — H61.21 IMPACTED CERUMEN OF RIGHT EAR: Primary | ICD-10-CM

## 2019-06-11 PROCEDURE — 69209 REMOVE IMPACTED EAR WAX UNI: CPT | Performed by: FAMILY MEDICINE

## 2019-06-11 PROCEDURE — 69210 REMOVE IMPACTED EAR WAX UNI: CPT | Performed by: FAMILY MEDICINE

## 2019-06-11 RX ORDER — RANITIDINE 150 MG/1
TABLET ORAL
COMMUNITY
End: 2021-06-16 | Stop reason: ALTCHOICE

## 2019-06-13 ENCOUNTER — OFFICE VISIT (OUTPATIENT)
Dept: FAMILY MEDICINE CLINIC | Facility: CLINIC | Age: 34
End: 2019-06-13

## 2019-06-13 VITALS
BODY MASS INDEX: 18.54 KG/M2 | SYSTOLIC BLOOD PRESSURE: 110 MMHG | TEMPERATURE: 96.9 F | DIASTOLIC BLOOD PRESSURE: 80 MMHG | RESPIRATION RATE: 16 BRPM | HEIGHT: 70 IN | HEART RATE: 78 BPM

## 2019-06-13 DIAGNOSIS — H61.21 IMPACTED CERUMEN OF RIGHT EAR: Primary | ICD-10-CM

## 2019-06-13 PROCEDURE — 69209 REMOVE IMPACTED EAR WAX UNI: CPT | Performed by: FAMILY MEDICINE

## 2019-06-28 ENCOUNTER — HOSPITAL ENCOUNTER (OUTPATIENT)
Facility: HOSPITAL | Age: 34
Setting detail: OBSERVATION
Discharge: HOME HEALTH | End: 2019-06-30
Attending: EMERGENCY MEDICINE | Admitting: FAMILY MEDICINE
Payer: COMMERCIAL

## 2019-06-28 ENCOUNTER — APPOINTMENT (EMERGENCY)
Dept: RADIOLOGY | Facility: HOSPITAL | Age: 34
End: 2019-06-28
Payer: COMMERCIAL

## 2019-06-28 ENCOUNTER — TELEPHONE (OUTPATIENT)
Dept: FAMILY MEDICINE CLINIC | Facility: CLINIC | Age: 34
End: 2019-06-28

## 2019-06-28 DIAGNOSIS — R91.8 RIGHT LOWER LOBE PULMONARY INFILTRATE: ICD-10-CM

## 2019-06-28 DIAGNOSIS — R41.82 ALTERED MENTAL STATUS: Primary | ICD-10-CM

## 2019-06-28 LAB
ALBUMIN SERPL BCP-MCNC: 3.5 G/DL (ref 3.5–5)
ALP SERPL-CCNC: 59 U/L (ref 46–116)
ALT SERPL W P-5'-P-CCNC: 21 U/L (ref 12–78)
AMORPH PHOS CRY URNS QL MICRO: ABNORMAL /HPF
ANION GAP SERPL CALCULATED.3IONS-SCNC: 3 MMOL/L (ref 4–13)
APTT PPP: 34 SECONDS (ref 23–37)
AST SERPL W P-5'-P-CCNC: 12 U/L (ref 5–45)
ATRIAL RATE: 55 BPM
BACTERIA UR QL AUTO: ABNORMAL /HPF
BASOPHILS # BLD AUTO: 0.03 THOUSANDS/ΜL (ref 0–0.1)
BASOPHILS NFR BLD AUTO: 1 % (ref 0–1)
BILIRUB SERPL-MCNC: 0.57 MG/DL (ref 0.2–1)
BILIRUB UR QL STRIP: NEGATIVE
BUN SERPL-MCNC: 14 MG/DL (ref 5–25)
CALCIUM SERPL-MCNC: 8.8 MG/DL (ref 8.3–10.1)
CHLORIDE SERPL-SCNC: 106 MMOL/L (ref 100–108)
CLARITY UR: ABNORMAL
CO2 SERPL-SCNC: 30 MMOL/L (ref 21–32)
COLOR UR: YELLOW
CREAT SERPL-MCNC: 0.76 MG/DL (ref 0.6–1.3)
EOSINOPHIL # BLD AUTO: 0.14 THOUSAND/ΜL (ref 0–0.61)
EOSINOPHIL NFR BLD AUTO: 3 % (ref 0–6)
ERYTHROCYTE [DISTWIDTH] IN BLOOD BY AUTOMATED COUNT: 11.9 % (ref 11.6–15.1)
GFR SERPL CREATININE-BSD FRML MDRD: 118 ML/MIN/1.73SQ M
GLUCOSE SERPL-MCNC: 85 MG/DL (ref 65–140)
GLUCOSE UR STRIP-MCNC: NEGATIVE MG/DL
HCT VFR BLD AUTO: 44.8 % (ref 36.5–49.3)
HGB BLD-MCNC: 15.1 G/DL (ref 12–17)
HGB UR QL STRIP.AUTO: ABNORMAL
IMM GRANULOCYTES # BLD AUTO: 0.01 THOUSAND/UL (ref 0–0.2)
IMM GRANULOCYTES NFR BLD AUTO: 0 % (ref 0–2)
INR PPP: 1.26 (ref 0.84–1.19)
KETONES UR STRIP-MCNC: NEGATIVE MG/DL
LACTATE SERPL-SCNC: 1.2 MMOL/L (ref 0.5–2)
LACTATE SERPL-SCNC: 1.9 MMOL/L (ref 0.5–2)
LEUKOCYTE ESTERASE UR QL STRIP: ABNORMAL
LYMPHOCYTES # BLD AUTO: 1.63 THOUSANDS/ΜL (ref 0.6–4.47)
LYMPHOCYTES NFR BLD AUTO: 37 % (ref 14–44)
MCH RBC QN AUTO: 31.1 PG (ref 26.8–34.3)
MCHC RBC AUTO-ENTMCNC: 33.7 G/DL (ref 31.4–37.4)
MCV RBC AUTO: 92 FL (ref 82–98)
MONOCYTES # BLD AUTO: 0.4 THOUSAND/ΜL (ref 0.17–1.22)
MONOCYTES NFR BLD AUTO: 9 % (ref 4–12)
NEUTROPHILS # BLD AUTO: 2.23 THOUSANDS/ΜL (ref 1.85–7.62)
NEUTS SEG NFR BLD AUTO: 50 % (ref 43–75)
NITRITE UR QL STRIP: NEGATIVE
NON-SQ EPI CELLS URNS QL MICRO: ABNORMAL /HPF
NRBC BLD AUTO-RTO: 0 /100 WBCS
P AXIS: 73 DEGREES
PH UR STRIP.AUTO: 8.5 [PH] (ref 4.5–8)
PLATELET # BLD AUTO: 158 THOUSANDS/UL (ref 149–390)
PMV BLD AUTO: 9.5 FL (ref 8.9–12.7)
POTASSIUM SERPL-SCNC: 4.4 MMOL/L (ref 3.5–5.3)
PR INTERVAL: 166 MS
PROCALCITONIN SERPL-MCNC: 0.81 NG/ML
PROCALCITONIN SERPL-MCNC: 0.86 NG/ML
PROT SERPL-MCNC: 7.9 G/DL (ref 6.4–8.2)
PROT UR STRIP-MCNC: ABNORMAL MG/DL
PROTHROMBIN TIME: 15.4 SECONDS (ref 11.6–14.5)
QRS AXIS: -44 DEGREES
QRSD INTERVAL: 98 MS
QT INTERVAL: 430 MS
QTC INTERVAL: 411 MS
RBC # BLD AUTO: 4.86 MILLION/UL (ref 3.88–5.62)
RBC #/AREA URNS AUTO: ABNORMAL /HPF
SODIUM SERPL-SCNC: 139 MMOL/L (ref 136–145)
SP GR UR STRIP.AUTO: 1.02 (ref 1–1.03)
T WAVE AXIS: 60 DEGREES
UROBILINOGEN UR QL STRIP.AUTO: 0.2 E.U./DL
VENTRICULAR RATE: 55 BPM
WBC # BLD AUTO: 4.44 THOUSAND/UL (ref 4.31–10.16)
WBC #/AREA URNS AUTO: ABNORMAL /HPF

## 2019-06-28 PROCEDURE — 87077 CULTURE AEROBIC IDENTIFY: CPT

## 2019-06-28 PROCEDURE — 87147 CULTURE TYPE IMMUNOLOGIC: CPT

## 2019-06-28 PROCEDURE — 87086 URINE CULTURE/COLONY COUNT: CPT

## 2019-06-28 PROCEDURE — 85025 COMPLETE CBC W/AUTO DIFF WBC: CPT | Performed by: EMERGENCY MEDICINE

## 2019-06-28 PROCEDURE — 81001 URINALYSIS AUTO W/SCOPE: CPT

## 2019-06-28 PROCEDURE — 71046 X-RAY EXAM CHEST 2 VIEWS: CPT

## 2019-06-28 PROCEDURE — 93005 ELECTROCARDIOGRAM TRACING: CPT

## 2019-06-28 PROCEDURE — 93010 ELECTROCARDIOGRAM REPORT: CPT | Performed by: INTERNAL MEDICINE

## 2019-06-28 PROCEDURE — 36415 COLL VENOUS BLD VENIPUNCTURE: CPT | Performed by: EMERGENCY MEDICINE

## 2019-06-28 PROCEDURE — 84145 PROCALCITONIN (PCT): CPT | Performed by: EMERGENCY MEDICINE

## 2019-06-28 PROCEDURE — 96374 THER/PROPH/DIAG INJ IV PUSH: CPT

## 2019-06-28 PROCEDURE — 87040 BLOOD CULTURE FOR BACTERIA: CPT | Performed by: EMERGENCY MEDICINE

## 2019-06-28 PROCEDURE — 87186 SC STD MICRODIL/AGAR DIL: CPT

## 2019-06-28 PROCEDURE — 99285 EMERGENCY DEPT VISIT HI MDM: CPT

## 2019-06-28 PROCEDURE — 85730 THROMBOPLASTIN TIME PARTIAL: CPT | Performed by: EMERGENCY MEDICINE

## 2019-06-28 PROCEDURE — 99284 EMERGENCY DEPT VISIT MOD MDM: CPT | Performed by: EMERGENCY MEDICINE

## 2019-06-28 PROCEDURE — 85610 PROTHROMBIN TIME: CPT | Performed by: EMERGENCY MEDICINE

## 2019-06-28 PROCEDURE — 83605 ASSAY OF LACTIC ACID: CPT | Performed by: FAMILY MEDICINE

## 2019-06-28 PROCEDURE — 80053 COMPREHEN METABOLIC PANEL: CPT | Performed by: EMERGENCY MEDICINE

## 2019-06-28 PROCEDURE — 84145 PROCALCITONIN (PCT): CPT | Performed by: FAMILY MEDICINE

## 2019-06-28 PROCEDURE — 83605 ASSAY OF LACTIC ACID: CPT | Performed by: EMERGENCY MEDICINE

## 2019-06-28 RX ORDER — 0.9 % SODIUM CHLORIDE 0.9 %
3 VIAL (ML) INJECTION AS NEEDED
Status: DISCONTINUED | OUTPATIENT
Start: 2019-06-28 | End: 2019-06-28

## 2019-06-28 RX ORDER — ALBUTEROL SULFATE 2.5 MG/3ML
2.5 SOLUTION RESPIRATORY (INHALATION) EVERY 4 HOURS PRN
Status: DISCONTINUED | OUTPATIENT
Start: 2019-06-28 | End: 2019-06-30 | Stop reason: HOSPADM

## 2019-06-28 RX ORDER — SERTRALINE HYDROCHLORIDE 100 MG/1
100 TABLET, FILM COATED ORAL DAILY
Status: DISCONTINUED | OUTPATIENT
Start: 2019-06-29 | End: 2019-06-30 | Stop reason: HOSPADM

## 2019-06-28 RX ORDER — CHLORAL HYDRATE 500 MG
1000 CAPSULE ORAL DAILY
Status: DISCONTINUED | OUTPATIENT
Start: 2019-06-29 | End: 2019-06-30 | Stop reason: HOSPADM

## 2019-06-28 RX ORDER — ACETAMINOPHEN/DIPHENHYDRAMINE 500MG-25MG
1 TABLET ORAL 2 TIMES DAILY PRN
Status: DISCONTINUED | OUTPATIENT
Start: 2019-06-28 | End: 2019-06-28

## 2019-06-28 RX ORDER — LANOLIN ALCOHOL/MO/W.PET/CERES
5 CREAM (GRAM) TOPICAL
Status: DISCONTINUED | OUTPATIENT
Start: 2019-06-28 | End: 2019-06-30 | Stop reason: HOSPADM

## 2019-06-28 RX ORDER — POLYETHYLENE GLYCOL 3350 17 G/17G
17 POWDER, FOR SOLUTION ORAL EVERY OTHER DAY
Status: DISCONTINUED | OUTPATIENT
Start: 2019-06-28 | End: 2019-06-30 | Stop reason: HOSPADM

## 2019-06-28 RX ORDER — DOCUSATE SODIUM 100 MG/1
100 CAPSULE, LIQUID FILLED ORAL 2 TIMES DAILY
Status: DISCONTINUED | OUTPATIENT
Start: 2019-06-28 | End: 2019-06-30 | Stop reason: HOSPADM

## 2019-06-28 RX ORDER — FAMOTIDINE 20 MG/1
20 TABLET, FILM COATED ORAL 2 TIMES DAILY
Status: DISCONTINUED | OUTPATIENT
Start: 2019-06-28 | End: 2019-06-28

## 2019-06-28 RX ORDER — MODAFINIL 100 MG/1
200 TABLET ORAL DAILY
Status: DISCONTINUED | OUTPATIENT
Start: 2019-06-29 | End: 2019-06-30 | Stop reason: HOSPADM

## 2019-06-28 RX ORDER — PHENAZOPYRIDINE HYDROCHLORIDE 100 MG/1
200 TABLET, FILM COATED ORAL EVERY 8 HOURS PRN
Status: DISCONTINUED | OUTPATIENT
Start: 2019-06-28 | End: 2019-06-29

## 2019-06-28 RX ORDER — PANTOPRAZOLE SODIUM 40 MG/1
40 TABLET, DELAYED RELEASE ORAL
Status: DISCONTINUED | OUTPATIENT
Start: 2019-06-29 | End: 2019-06-30 | Stop reason: HOSPADM

## 2019-06-28 RX ORDER — AZITHROMYCIN 250 MG/1
250 TABLET, FILM COATED ORAL EVERY 24 HOURS
Status: DISCONTINUED | OUTPATIENT
Start: 2019-06-29 | End: 2019-06-28

## 2019-06-28 RX ADMIN — CEFTRIAXONE SODIUM 1000 MG: 10 INJECTION, POWDER, FOR SOLUTION INTRAVENOUS at 13:26

## 2019-06-28 RX ADMIN — AZITHROMYCIN MONOHYDRATE 500 MG: 500 INJECTION, POWDER, LYOPHILIZED, FOR SOLUTION INTRAVENOUS at 14:17

## 2019-06-28 RX ADMIN — MAGNESIUM OXIDE TAB 400 MG (241.3 MG ELEMENTAL MG) 400 MG: 400 (241.3 MG) TAB at 17:58

## 2019-06-28 RX ADMIN — DOCUSATE SODIUM 100 MG: 100 CAPSULE, LIQUID FILLED ORAL at 17:58

## 2019-06-28 RX ADMIN — MELATONIN 4.5 MG: 3 TAB ORAL at 22:31

## 2019-06-28 RX ADMIN — POLYETHYLENE GLYCOL 3350 17 G: 17 POWDER, FOR SOLUTION ORAL at 17:58

## 2019-06-28 NOTE — ASSESSMENT & PLAN NOTE
Spastic quadriparesis secondary to anoxic brain injury from cardiac arrest at age 13 years  Patient back to baseline per caregiver

## 2019-06-28 NOTE — PROGRESS NOTES
SENIOR History and Physical Note - Aparicio Mater Viscardi 26 y o  male MRN: [id number]    Unit/Bed#: [facility] 807-01 Encounter: [id number]      HPI  Ricardo Cortez is a 26 y o  male with a significant past medical history of quadriplegia secondary to anoxic brain injury s/p cardiac arrest secondary to prolonged QT syndrome, oropharyngeal dysphagia, who presented to [facility] for evaluation of altered mental status  Per patient's mom, patient just returned from [facility] yesterday, and caregiver in the morning noticed that he was more tired and not his usual self, and when they attempted to wake him up, he rolled his eyes to the back  Mom denies any history of fever, chills, vomiting  Per mom they did use condom catheter during the trip, but otherwise patient uses diaper  Blood work, imaging, physical exam  Review of blood work, imaging and physical examination revealed WBC count of 4 44, hemoglobin 15 1, procalcitonin was elevated to 0 86, microscopic examination of urine showed 10-20 WBC  Chest x-ray was concerning for right lower lobe infiltrate  Physical Exam   Constitutional: No distress  Patient is nonverbal at baseline     HENT:   Head: Normocephalic and atraumatic  Eyes: Conjunctivae are normal    Cardiovascular: Normal rate, regular rhythm and normal heart sounds  Pulmonary/Chest: Effort normal and breath sounds normal    Abdominal: Soft  Bowel sounds are normal  He exhibits no distension  There is no tenderness  Musculoskeletal: He exhibits no edema  Spastic extremities   Neurological: He is alert  Skin: Skin is warm  In ER patient was given IV ceftriaxone and IV azithromycin  Assessment and Plan  I agree with observing admission for evaluation and management    1  Community-acquired pneumonia  With suspected aspiration, given change in mental status  Will continue with IV ceftriaxone, will not give any QT prolonging antibiotics given history of congenital prolonged QT syndrome   Will trend fever curve, monitor WBC, follow up on blood culture and urine culture  Will also consult speech to evaluate  Anticipate less than two midnight stay  I have personally seen and examined patient  I agree with the intern's documentation in the history and physical  Please contact [facility] at [phone] with any questions  Discussed above with MD Puneet Belcher U  2  PGY2  6/28/2019  4:34 PM

## 2019-06-28 NOTE — ASSESSMENT & PLAN NOTE
History of multiple UTI in the past  Recently admitted in May with UTI secondary to E coli  UA on admission showed WBC of 10 to 20  Will follow urine culture  Will follow blood cultures  Continue IV ceftriaxone

## 2019-06-28 NOTE — H&P
H&P- Jr Hagen 1985, 26 y o  male MRN: [id number]    Unit/Bed#: [facility] 807-01 Encounter: [id number]    Primary Care Provider: Shannon Camara MD   Date and time admitted to hospital: 6/28/2019 10:08 AM        * Pneumonia  Assessment & Plan  Patient presents with change in baseline mental status  Vitals within normal limits   Procal elevated at 0 86  Lactic acid normal at 1 2  Chest x-ray concerning for right lower lobe infiltrate and LD pneumonia  Patient at risk of aspiration pneumonia  Status post ceftriaxone x1 and azithromycin x1 at the ED  Will discontinue IV ceftriaxone due to history of prolonged QT syndrome  Will order a m  EKG for QTc  Will trend WBC/temp curve  Follow-up blood culture  Repeat procalcitonin  F/u urine strep pneumo and Legionella            Urinary tract infection without hematuria  Assessment & Plan  History of multiple UTI in the past  Recently admitted in May with UTI secondary to E coli  UA on admission showed WBC of 10 to 20  Will follow urine culture  Will follow blood cultures  Continue IV ceftriaxone    At risk for aspiration  Assessment & Plan  Patient has spastic quadriparesis secondary to anoxic brain injury status post cardiac arrest at 13years of age  History of dysphagia July 2018  Recently started on thin liquids 3 months ago  Aspiration precautions  Bedside swallow eval    Spastic hemiplegia of left nondominant side as late effect of cerebral infarction [facility])  Assessment & Plan  Stable at baseline    Long Q-T syndrome  Assessment & Plan  History of Congenital long QT syndrome  QTC on admission 411  A m   EKG  Avoid medications that prolong QT  Continue metoprolol 12 5 mg daily    Anoxic brain damage (HCC)  Assessment & Plan  Spastic quadriparesis secondary to anoxic brain injury from cardiac arrest at age 13 years  Patient back to baseline per caregiver      PPX: Lovenox  Diet:Regular with thick liquid  Code Status: DNI/DNR  Dispo: Obs    Discussed Plan with [facility] and FP team who agree with the assessment and plan  History of Present Illness       HPI:  Lisset Taylor is a 26 y o  male with past medical history of spastic quadriparesis secondary to anoxic brain injury from cardiac arrest at age 13, long QT syndrome, GERD, urinary incontinence and history of multiple UTIs who presents with change in mental status  History from mother who reports patient returned from a trip to [facility] yesterday  Per mother,  Patient's caregiver called EMS because patient had change in baseline mental status given by eyes rolling up and looking somewhat lethargic  On arrival at the emergency room, vitals were noted to be within normal limits and blood work was concerning for increased procalcitonin of 0 86  However lactic acid was normal at 1 2, CBC and BMP were all within normal limits  Chest x-ray was concerning for right lower lobe infiltrate likely due to early pneumonia with possibility of aspiration pneumonia  Of note, patient was diagnosed with dysphagia in July 2018 and was recently cleared for  thin liquids within the past 3 months  ED Management:  Ceftriaxone 1 g IV x1, azithromycin 500 mg x1, IV fluids    Review of Systems  Pt is nonverbal at baseline, ROS as per caregiver    Review of Systems   Constitutional: Positive for activity change  Negative for appetite change, chills and fever  Eyes: Negative for visual disturbance  Respiratory: Negative for cough, shortness of breath and wheezing  Cardiovascular: Negative for chest pain and palpitations  Gastrointestinal: Negative for abdominal pain, constipation and vomiting  Genitourinary: Negative for dysuria  Musculoskeletal: Negative for arthralgias           Historical Information   Past Medical History:   Diagnosis Date    Allergic rhinitis     Brain anoxia ([facility] [address] )     Cardiac arrest [facility])     age 13 s/p long Q-T syndrome    Community acquired pneumonia     last assessed/resolved: 10/9/2014    Depression     GERD (gastroesophageal reflux disease)     Long Q-T syndrome     Muscular rigidity and spasm, progressive     Osteopenia     Osteoporosis     Quadriplegia (HCC)     Spastic neurogenic bladder     Urinary incontinence     Urinary tract infection without hematuria 2019     Past Surgical History:   Procedure Laterality Date    APPENDECTOMY  1991    WISDOM TOOTH EXTRACTION       Social History   Social History     Substance and Sexual Activity   Alcohol Use Not Currently    Frequency: Never    Binge frequency: Never     Social History     Substance and Sexual Activity   Drug Use Not Currently     Social History     Tobacco Use   Smoking Status Never Smoker   Smokeless Tobacco Never Used     Family History: non-contributory    Meds/Allergies   PTA meds:   Prior to Admission Medications   Prescriptions Last Dose Informant Patient Reported? Taking?    Coenzyme Q10 100 MG TABS  Care Giver Yes No   Sig: Take 2 capsules daily by mouth @ 9am   DEEP SEA NASAL SPRAY 0 65 % nasal spray Past Week at Unknown time Care Giver No Yes   Sig: - INHALE 2 SPRAYS IN EACH NOSTRIL AS NEEDED FOR CONGESTION   Diapers & Supplies (CUTIES SIZE 3) MISC 2019 at Unknown time Care Giver No Yes   Sig: Please provide 10 per day   Diapers & Supplies (HUGGIES LITTLE MOVERS SIZE 3) MISC 2019 at Unknown time Care Giver Yes Yes   Sig: by Does not apply route   Disposable Gloves (VINYL GLOVES MEDIUM) MISC 2019 at Unknown time Care Giver No Yes   Sig: by Does not apply route 4 (four) times a day Dispense 3 boxes monthly; Diagnosis R32   ENEMEEZ MINI 283 MG enema Unknown at Unknown time Care Giver No No   Sig: - INSERT 5ML PER RECTUM AS NEEDED FOR CONSTIPATION   ENEMEEZ PLUS  MG ENEM Unknown at Unknown time Care Giver No No   Sig: use as directed   GNP VITAMIN C 500 MG tablet 2019 at Unknown time Care Giver No Yes   Si TAB BY MOUTH DAILY @ 2PM (SUPPLEMENT) *GOODBRED   Ginkgo Biloba Extract 40 MG CAPS 2019 at Unknown time Care Giver No Yes   Sig: 3 CAPS(120MG) BY MOUTH DAILY @ 2PM (SUPPLEMENT) *GOODBRED   HEMORRHOIDAL 0 25 % suppository Unknown at Unknown time Care Giver No No   Sig: - UNWRAP AND INSERT 1 SUPPOSITORY PER RECTUM AS NEEDED FOR HEMORRHOID PAIN RELIEF   Incontinence Supply Disposable (DEPEND UNDERWEAR SM/MED) MISC  Care Giver Yes No   Sig: by Does not apply route   Incontinence Supply Disposable (PREVAIL AIR BRIEFS) MISC 2019 at Unknown time Care Giver No Yes   Si each by Does not apply route every 4 (four) hours Please provide 5 briefs per day   Incontinence Supply Disposable (SELECT DISPOSABLE UNDERWEAR SM) MISC 2019 at Unknown time Care Giver No Yes   Sig: Please provide a 30 day supply 10 per day DX R32 incontinence   Magnesium 500 MG CAPS  Care Giver No No   Sig: Take 1 capsule (500 mg total) by mouth daily   Magnesium Oxide 500 MG TABS 2019 at Unknown time Care Giver No Yes   Sig: - TAKE 1 TABLET BY MOUTH ONCE EVERY DAY AT 2PM FOR HEALTH MAINTENANCE   Melatonin 5 MG TABS 2019 at Unknown time Care Giver No Yes   Sig: - TAKE 1 TABLET BY MOUTH EVERY NIGHT AT BEDTIME AT 9PM   [facility]   [facility] [address] [facility] 2019 at Unknown time Care Giver No Yes   Sig: Please provide pt with a new cord for power wheelchair   Multiple Vitamins-Minerals (CEROVITE SENIOR) TABS 2019 at Unknown time Care Giver No Yes   Sig: - TAKE 1 TABLET BY MOUTH ONCE EVERY DAY AT [facility]   Nutritional Supplements (NUTRITIONAL SHAKE) [facility] 2019 at Unknown time Care Giver No Yes   Sig: Take 1 Can by mouth 2 (two) times a day as needed (nutritional supplementation, weight gain) Please provide Boost   Omega-3 Fatty Acids (FISH OIL) 1,000 mg 2019 at Unknown time Care Giver No Yes   Si CAPSULE BY MOUTH DAILY @ 9AM (SUPPLEMENT) *GOODBRED   PREPARATION H 1-0 25-14 4-15 % rectal cream Past Month at Unknown time Care Giver No Yes   Sig: - APPLY RECTALLY AS NEEDED FOR HEMORRHOID PAIN RELIEF   Respiratory Therapy Supplies (SPIROMETER) KIT Past Week at Unknown time Care Giver No Yes   Sig: by Does not apply route 6 (six) times a day   Respiratory Therapy Supplies (SPIROMETER) KIT Past Week at Unknown time Care Giver No Yes   Sig: by Does not apply route 3 (three) times a day Do spirometry 3 times daily at 9am, 2pm and 9pm    Sodium Fluoride (PREVIDENT 5000 PLUS DT) 2019 at Unknown time Care Giver Yes Yes   Sig: Apply to teeth   Starch, Thickening, LIQD Past Week at Unknown time Care Giver No Yes   Sig: Nectar thick liquids up to honey thick if coughing occurs as needed, please use Simply Thick liquid only  Discontinue Thick-It powder  Tea Tree 100 % OIL More than a month at Unknown time Care Giver No No   Sig: - APPLY TO SKIN FOLDS ONCE EVERY DAY AS NEEDED FOR RASH   Tea Tree Oil OIL  Care Giver No No   Sig: by Does not apply route daily Apply to skin folds  Patient taking differently: by Does not apply route as needed Apply to skin folds  VASCEPA 1 g CAPS 2019 at Unknown time Care Giver No Yes   Sig: - TAKE 1 CAPSULE BY MOUTH ONCE EVERY DAY AT 9AM FOR HEALTH MAINTENANCE   Vitamins A & D (VITAMIN A & D) ointment Past Month at Unknown time Care Giver No Yes   Sig: - APPLY TO AFFECTED AREA (BUTTOCKS/ANAL) AS NEEDED   Zinc 50 MG TABS 2019 at Unknown time Care Giver No Yes   Sig: Take 50mg daily @ 2:00pm every other month   (May, , July,  August, September, 2019)   b complex-vitamin C-folic acid (RENAL) 1 mg 2019 at Unknown time Care Giver No Yes   Sig: - TAKE 1 CAPSULE BY MOUTH ONCE EVERY DAY AT AT 2PM FOR HEALTH MAINTENANCE   coenzyme Q-10 100 MG capsule 2019 at Unknown time Care Giver No Yes   Si SOFTGELS(200MG) BY MOUTH DAILY @ 9AM (SUPPLEMENT) *GOODBRED   docusate sodium (COLACE) 100 mg capsule 2019 at Unknown time Care Giver No Yes   Si mg 9am and 9pm   dronabinol (MARINOL) 2 5 mg capsule 2019 at Unknown time Care Giver No Yes   Sig: - *FRIG-SHELF TAKE 1 CAPSULE BY MOUTH FOUR TIMES DAILY (9AM,2PM,6PM,9PM) FOR POOR APPETITE   ibuprofen (MOTRIN) 200 mg tablet Past Week at Unknown time Care Giver No Yes   Sig: Take 2 tabs q6h PRN for pain not to exceed 2000mg per day   loratadine (CLARITIN) 10 mg tablet Past Month at Unknown time Care Giver No Yes   Sig: - TAKE 1 TABLET BY MOUTH ONCE EVERY DAY AS NEEDED FOR ALLERGIES   metoprolol tartrate (LOPRESSOR) 25 mg tablet 2019 at Unknown time Care Giver No Yes   Si/2 TAB(12 5MG) BY MOUTH DAILY @ 9AM (HTN) *GOODBRED   modafinil (PROVIGIL) 100 mg tablet 2019 at Unknown time Care Giver No Yes   Sig: - TAKE 2 TABLETS (200MG)  BY MOUTH ONCE EVERY DAY AT 9AM FOR HEALTH FOR BRAIN INJURY   nystatin (MYCOSTATIN) powder  Care Giver No No   Sig: Apply topically 2 (two) times a day   omeprazole (PriLOSEC) 20 mg delayed release capsule 2019 at Unknown time Care Giver No Yes   Sig: Take 20mg at 9am every day   onabotulinumtoxin A (BOTOX) 100 units Unknown at Unknown time Care Giver Yes No   Sig: inject 500 units into left gastroc soleus and abductor pollicis ankit   phenazopyridine (PYRIDIUM) 200 mg tablet More than a month at Unknown time Care Giver No No   Sig: Take 1 tablet (200 mg total) by mouth every 8 (eight) hours as needed for bladder spasms (burning with urination)   polyethylene glycol (GLYCOLAX) powder  Care Giver No No   Sig: Mix 17 g (1 capful) in 8 oz of water and take PO QOD PRN if no bowel movement in 3 days   polyethylene glycol (MIRALAX) 17 g packet Past Week at Unknown time Care Giver No Yes   Sig: Take 17 g by mouth every other day Take Monday, Wednesday, Friday at 0800   ranitidine (ZANTAC) 150 mg tablet  Care Giver No No   Sig: Take 1 tablet (150 mg total) by mouth daily   ranitidine (ZANTAC) 150 mg tablet 2019 at Unknown time  Yes Yes   Sig: ranitidine 150 mg tablet   sertraline (ZOLOFT) 100 mg tablet 2019 at Unknown time Care Giver No Yes Sig: - TAKE 1 TABLET BY MOUTH ONCE EVERY DAY AT 9PM FOR DEPRESSION   wheat dextrin (Tiffany Jaclyn) 6/27/2019 at Unknown time Care Giver No Yes   Sig: Take 1 packet by mouth daily   zinc oxide (DESITIN) 40 % PSTE Past Month at Unknown time Care Giver Yes Yes   Sig: Apply topically      Facility-Administered Medications: None     Allergies   Allergen Reactions    Other      drugs that prolong the QT interval  drugs that prolong the QT interval  Seasonal allergies        Objective   Vitals: Blood pressure 95/72, pulse 64, temperature (!) 97 3 °F (36 3 °C), temperature source Axillary, resp  rate 18, height 5' 10" (1 778 m), weight 58 6 kg (129 lb 3 oz), SpO2 99 %  Intake/Output Summary (Last 24 hours) at 6/28/2019 1656  Last data filed at 6/28/2019 1517  Gross per 24 hour   Intake 350 ml   Output 10 ml   Net 340 ml       Invasive Devices     Peripheral Intravenous Line            Peripheral IV 06/28/19 Left Forearm less than 1 day                Physical Exam:  Physical Exam   Constitutional: He appears well-developed and well-nourished  HENT:   Head: Normocephalic and atraumatic  Eyes: Pupils are equal, round, and reactive to light  Conjunctivae are normal    Neck: Normal range of motion  Cardiovascular: Normal rate, regular rhythm, normal heart sounds and intact distal pulses  Pulmonary/Chest: Effort normal and breath sounds normal    Abdominal: Soft  Bowel sounds are normal  There is no tenderness  Musculoskeletal:   Spastic quadriparesis   Skin: Skin is warm and dry  Vitals reviewed  Lab Results: I have personally reviewed pertinent reports       Pertinent Lab Results:  · Procalcitonin 0 86  · Lactic acid 1 2  · CBC within normal limits  · BMP within normal limits  · UA WBC 10-20    Imaging:   Chest x-ray:  Right lower lobe infiltrate concerning for early pneumonia, with the possibility of aspiration pneumonia      EKG-       Code Status: Level 3 - DNAR and DNI      Manual MD Patti  [address] Tab Waters PGY1  6/28/2019  4:56 PM

## 2019-06-28 NOTE — ED PROVIDER NOTES
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 when he had a cardiac arrest when he was 12 presenting to the emergency department for evaluation of altered mental status  The patient is generally quite well, functional, and just in fact returned from a trip to [facility] with his family yesterday evening  He lives at home with [facility] care and his caregiver this morning noticed that he seemed more tired and lethargic and not himself  They did not notice that he had any fevers or chills  He has not had any abdominal or urinary discomfort, he denies any cough or shortness of breath  They were concerned that he was not seeming himself, and so came to the emergency department for evaluation  Has history of atelectasis as well as recent frequent urinary tract infections  Prior to Admission Medications   Prescriptions Last Dose Informant Patient Reported? Taking?    Coenzyme Q10 100 MG TABS  Care Giver Yes No   Sig: Take 2 capsules daily by mouth @ 9am   DEEP SEA NASAL SPRAY 0 65 % nasal spray  Care Giver No Yes   Sig: - INHALE 2 SPRAYS IN EACH NOSTRIL AS NEEDED FOR CONGESTION   Diapers & Supplies (CUTIES SIZE 3) MISC  Care Giver No No   Sig: Please provide 10 per day   Diapers & Supplies (HUGGIES LITTLE MOVERS SIZE 3) MISC  Care Giver Yes No   Sig: by Does not apply route   Disposable Gloves (VINYL GLOVES MEDIUM) MISC  Care Giver No No   Sig: by Does not apply route 4 (four) times a day Dispense 3 boxes monthly; Diagnosis R32   ENEMEEZ MINI 283 MG enema  Care Giver No No   Sig: - INSERT 5ML PER RECTUM AS NEEDED FOR CONSTIPATION   ENEMEEZ PLUS  MG ENEM  Care Giver No Yes   Sig: use as directed   GNP VITAMIN C 500 MG tablet  Care Giver No Yes   Si TAB BY MOUTH DAILY @ 2PM (SUPPLEMENT) *GOODBRED   Ginkgo Biloba Extract 40 MG CAPS  Care Giver No Yes   Sig: 3 CAPS(120MG) BY MOUTH DAILY @ 2PM (SUPPLEMENT) *GOODBRED   HEMORRHOIDAL 0 25 % suppository  Care Giver No Yes   Sig: - UNWRAP AND INSERT 1 SUPPOSITORY PER RECTUM AS NEEDED FOR HEMORRHOID PAIN RELIEF   Incontinence Supply Disposable (DEPEND UNDERWEAR SM/MED) MISC  Care Giver Yes No   Sig: by Does not apply route   Incontinence Supply Disposable (PREVAIL AIR BRIEFS) MISC  Care Giver No No   Si each by Does not apply route every 4 (four) hours Please provide 5 briefs per day   Incontinence Supply Disposable (SELECT DISPOSABLE UNDERWEAR SM) MISC  Care Giver No No   Sig: Please provide a 30 day supply 10 per day DX R32 incontinence   Magnesium 500 MG CAPS  Care Giver No No   Sig: Take 1 capsule (500 mg total) by mouth daily   Magnesium Oxide 500 MG TABS  Care Giver No Yes   Sig: - TAKE 1 TABLET BY MOUTH ONCE EVERY DAY AT 2PM FOR HEALTH MAINTENANCE   Melatonin 5 MG TABS  Care Giver No Yes   Sig: - TAKE 1 TABLET BY MOUTH EVERY NIGHT AT BEDTIME AT 9PM   [facility] [place]   [facility] [address] No No   Sig: Please provide pt with a new cord for power wheelchair   Multiple Vitamins-Minerals (CEROVITE SENIOR) TABS  Care Giver No Yes   Sig: - TAKE 1 TABLET BY MOUTH ONCE EVERY DAY AT 9AM FOR HEALTH MAINTENANCE   Nutritional Supplements (NUTRITIONAL SHAKE) LIQD  Care Giver No Yes   Sig: Take 1 Can by mouth 2 (two) times a day as needed (nutritional supplementation, weight gain) Please provide Boost   Omega-3 Fatty Acids (FISH OIL) 1,000 mg  Care Giver No Yes   Si CAPSULE BY MOUTH DAILY @ 9AM (SUPPLEMENT) *GOODBRED   PREPARATION H 1-0 25-14 4-15 % rectal cream  Care Giver No Yes   Sig: - APPLY RECTALLY AS NEEDED FOR HEMORRHOID PAIN RELIEF   Respiratory Therapy Supplies (SPIROMETER) KIT  Care Giver No No   Sig: by Does not apply route 6 (six) times a day   Respiratory Therapy Supplies (SPIROMETER) KIT  Care Giver No No   Sig: by Does not apply route 3 (three) times a day Do spirometry 3 times daily at 9am, 2pm and 9pm    Sodium Fluoride (PREVIDENT 5000 PLUS DT)  Care Giver Yes Yes   Sig: Apply to teeth   Starch, Thickening, LIQD  Care Giver No Yes   Sig: Nectar thick liquids up to honey thick if coughing occurs as needed, please use Simply Thick liquid only  Discontinue Thick-It powder  Tea Tree 100 % OIL  Care Giver No Yes   Sig: - APPLY TO SKIN FOLDS ONCE EVERY DAY AS NEEDED FOR RASH   Tea Tree Oil OIL  Care Giver No No   Sig: by Does not apply route daily Apply to skin folds  Patient taking differently: by Does not apply route as needed Apply to skin folds  VASCEPA 1 g CAPS  Care Giver No Yes   Sig: - TAKE 1 CAPSULE BY MOUTH ONCE EVERY DAY AT 9AM FOR HEALTH MAINTENANCE   Vitamins A & D (VITAMIN A & D) ointment  Care Giver No Yes   Sig: - APPLY TO AFFECTED AREA (BUTTOCKS/ANAL) AS NEEDED   Zinc 50 MG TABS  Care Giver No Yes   Sig: Take 50mg daily @ 2:00pm every other month   (May, , July,  August, September, 2019)   b complex-vitamin C-folic acid (RENAL) 1 mg  Care Giver No Yes   Sig: - TAKE 1 CAPSULE BY MOUTH ONCE EVERY DAY AT AT [place] [address] MAINTENANCE   coenzyme Q-10 100 MG capsule  Care Giver No Yes   Si SOFTGELS(200MG) BY MOUTH DAILY @ 9AM (SUPPLEMENT) *GOODBRED   docusate sodium (COLACE) 100 mg capsule  Care Giver No Yes   Si mg 9am and 9pm   dronabinol (MARINOL) 2 5 mg capsule  Care Giver No Yes   Sig: - *FRIG-SHELF TAKE 1 CAPSULE BY MOUTH FOUR TIMES DAILY (9AM,2PM,6PM,9PM) FOR POOR APPETITE   ibuprofen (MOTRIN) 200 mg tablet  Care Giver No Yes   Sig: Take 2 tabs q6h PRN for pain not to exceed 2000mg per day   loratadine (CLARITIN) 10 mg tablet  Care Giver No Yes   Sig: - TAKE 1 TABLET BY MOUTH ONCE EVERY DAY AS NEEDED FOR ALLERGIES   metoprolol tartrate (LOPRESSOR) 25 mg tablet  Care Giver No Yes   Si/2 TAB(12 5MG) BY MOUTH DAILY @ 9AM (HTN) *GOODBRED modafinil (PROVIGIL) 100 mg tablet  Care Giver No Yes   Sig: - TAKE 2 TABLETS (200MG)  BY MOUTH ONCE EVERY DAY AT 9AM FOR HEALTH FOR BRAIN INJURY   nystatin (MYCOSTATIN) powder  Care Giver No No   Sig: Apply topically 2 (two) times a day   omeprazole (PriLOSEC) 20 mg delayed release capsule  Care Giver No Yes   Sig: Take 20mg at 9am every day   onabotulinumtoxin A (BOTOX) 100 units  Care Giver Yes Yes   Sig: inject 500 units into left gastroc soleus and abductor pollicis ankit   phenazopyridine (PYRIDIUM) 200 mg tablet  Care Giver No Yes   Sig: Take 1 tablet (200 mg total) by mouth every 8 (eight) hours as needed for bladder spasms (burning with urination)   polyethylene glycol (GLYCOLAX) powder  Care Giver No No   Sig: Mix 17 g (1 capful) in 8 oz of water and take PO QOD PRN if no bowel movement in 3 days   polyethylene glycol (MIRALAX) 17 g packet  Care Giver No Yes   Sig: Take 17 g by mouth every other day Take Monday, Wednesday, Friday at 0800   ranitidine (ZANTAC) 150 mg tablet  Care Giver No No   Sig: Take 1 tablet (150 mg total) by mouth daily   ranitidine (ZANTAC) 150 mg tablet   Yes Yes   Sig: ranitidine 150 mg tablet   sertraline (ZOLOFT) 100 mg tablet  Care Giver No Yes   Sig: - TAKE 1 TABLET BY MOUTH ONCE EVERY DAY AT 9PM FOR DEPRESSION   wheat dextrin (BENEFIBER)  Care Giver No Yes   Sig: Take 1 packet by mouth daily   zinc oxide (DESITIN) 40 % PSTE  Care Giver Yes Yes   Sig: Apply topically      Facility-Administered Medications: None       Past Medical History:   Diagnosis Date    Allergic rhinitis     Brain anoxia (HCC)     Cardiac arrest [facility])     age 13 s/p long Q-T syndrome    Community acquired pneumonia     last assessed/resolved:  10/9/2014    Depression     GERD (gastroesophageal reflux disease)     Long Q-T syndrome     Muscular rigidity and spasm, progressive     Osteopenia     Osteoporosis     Quadriplegia ([address] )     Spastic neurogenic bladder     Urinary incontinence     Urinary tract infection without hematuria 4/27/2019       Past Surgical History:   Procedure Laterality Date    APPENDECTOMY  1991    WISDOM TOOTH EXTRACTION         Family History   Problem Relation Age of Onset    Other Father         mitral valve replaced    Hypertension Father     Diabetes type II Maternal Grandfather     Diabetes type II Maternal Uncle      I have reviewed and agree with the history as documented  Social History     Tobacco Use    Smoking status: Never Smoker    Smokeless tobacco: Never Used   Substance Use Topics    Alcohol use: Not Currently     Frequency: Never     Binge frequency: Never    Drug use: Not Currently        Review of Systems   Constitutional: Positive for fatigue  Negative for appetite change, chills and fever  HENT: Negative for sneezing and sore throat  Eyes: Negative for visual disturbance  Respiratory: Negative for cough, choking, chest tightness, shortness of breath and wheezing  Cardiovascular: Negative for chest pain and palpitations  Gastrointestinal: Negative for abdominal pain, constipation, diarrhea, nausea and vomiting  Genitourinary: Negative for difficulty urinating and dysuria  Neurological: Negative for dizziness, weakness, light-headedness, numbness and headaches  Psychiatric/Behavioral: Positive for confusion  All other systems reviewed and are negative        Physical Exam  ED Triage Vitals   Temperature Pulse Respirations Blood Pressure SpO2   06/28/19 1012 06/28/19 1012 06/28/19 1012 06/28/19 1234 06/28/19 1012   (!) 97 4 °F (36 3 °C) 64 18 112/70 100 %      Temp Source Heart Rate Source Patient Position - Orthostatic VS BP Location FiO2 (%)   06/28/19 1012 06/28/19 1012 06/28/19 1012 06/28/19 1234 --   Oral Monitor Lying Right arm       Pain Score       06/28/19 1012       No Pain             Orthostatic Vital Signs  Vitals:    06/28/19 1012 06/28/19 1234 06/28/19 1500 06/28/19 1546   BP:  112/70 105/77 95/72   Pulse: 64 68 74 64   Patient Position - Orthostatic VS: Lying Lying Sitting Lying       Physical Exam   Constitutional: He is oriented to person, place, and time  He appears well-developed and well-nourished  No distress  HENT:   Head: Normocephalic and atraumatic  Mouth/Throat: Oropharynx is clear and moist    Eyes: Pupils are equal, round, and reactive to light  EOM are normal    Neck: No JVD present  No tracheal deviation present  Cardiovascular: Normal rate, regular rhythm, normal heart sounds and intact distal pulses  Exam reveals no gallop and no friction rub  No murmur heard  Pulmonary/Chest: Effort normal and breath sounds normal  No respiratory distress  He has no wheezes  He has no rales  Abdominal: Soft  Bowel sounds are normal  He exhibits no distension  There is no tenderness  There is no rebound and no guarding  Mild general abdominal tenderness is noted   Musculoskeletal:   Spastic paresis and chronic atrophy noted in all extremities   Neurological: He is alert and oriented to person, place, and time  No cranial nerve deficit  He exhibits normal muscle tone  Skin: Skin is warm and dry  He is not diaphoretic  No pallor  Psychiatric: He has a normal mood and affect  His behavior is normal    Nursing note and vitals reviewed        ED Medications  Medications   polyethylene glycol (MIRALAX) packet 17 g (has no administration in time range)   psyllium (METAMUCIL) 1 packet (has no administration in time range)   phenazopyridine (PYRIDIUM) tablet 200 mg (has no administration in time range)   metoprolol tartrate (LOPRESSOR) partial tablet 12 5 mg (has no administration in time range)   modafinil (PROVIGIL) tablet 200 mg (has no administration in time range)   multivitamin-minerals (CENTRUM) tablet 1 tablet (has no administration in time range)   fish oil capsule 1,000 mg (has no administration in time range)   pantoprazole (PROTONIX) EC tablet 40 mg (has no administration in time range)   sertraline (ZOLOFT) tablet 100 mg (has no administration in time range)   cefTRIAXone (ROCEPHIN) 1,000 mg in dextrose 5 % 50 mL IVPB (has no administration in time range)     And   azithromycin (ZITHROMAX) tablet 250 mg (has no administration in time range)   ceftriaxone (ROCEPHIN) 1 g/50 mL in dextrose IVPB (0 mg Intravenous Stopped 6/28/19 1417)   azithromycin (ZITHROMAX) 500 mg in sodium chloride 0 9% 250mL IVPB 500 mg (0 mg Intravenous Stopped 6/28/19 1517)       Diagnostic Studies  Results Reviewed     Procedure Component Value Units Date/Time    Procalcitonin [[id number]]     Lab Status:  No result Specimen:  Blood     Strep Pneumoniae, Urine [[id number]]     Lab Status:  No result Specimen:  Urine     Legionella antigen, urine [[id number]]     Lab Status:  No result Specimen:  Urine     Urine Microscopic [[id number]]  (Abnormal) Collected:  06/28/19 1144    Lab Status:  Final result Specimen:  Urine, Other Updated:  06/28/19 1218     RBC, UA 4-10 /hpf      WBC, UA 10-20 /hpf      Epithelial Cells Occasional /hpf      Bacteria, UA Occasional /hpf      AMORPH PHOSPATES Occasional /hpf     Urine culture [[id number]] Collected:  06/28/19 1144    Lab Status:   In process Specimen:  Urine, Other Updated:  06/28/19 1218    Procalcitonin [[id number]]  (Abnormal) Collected:  06/28/19 1058    Lab Status:  Final result Specimen:  Blood from Arm, Left Updated:  06/28/19 1141     Procalcitonin 0 86 ng/ml     ED Urine Macroscopic [[id number]]  (Abnormal) Collected:  06/28/19 1144    Lab Status:  Final result Specimen:  Urine Updated:  06/28/19 1141     Color, UA Yellow     Clarity, UA Cloudy     pH, UA 8 5     Leukocytes, UA Large     Nitrite, UA Negative     Protein, UA Trace mg/dl      Glucose, UA Negative mg/dl      Ketones, UA Negative mg/dl      Urobilinogen, UA 0 2 E U /dl      Bilirubin, UA Negative     Blood, UA Moderate     Specific [place], UA 1 020    Narrative:       CLINITEK RESULT    Comprehensive metabolic panel [[id number]] (Abnormal) Collected:  06/28/19 1058    Lab Status:  Final result Specimen:  Blood from Arm, Left Updated:  06/28/19 1137     Sodium 139 mmol/L      Potassium 4 4 mmol/L      Chloride 106 mmol/L      CO2 30 mmol/L      ANION GAP 3 mmol/L      BUN 14 mg/dL      Creatinine 0 76 mg/dL      Glucose 85 mg/dL      Calcium 8 8 mg/dL      AST 12 U/L      ALT 21 U/L      Alkaline Phosphatase 59 U/L      Total Protein 7 9 g/dL      Albumin 3 5 g/dL      Total Bilirubin 0 57 mg/dL      eGFR 118 ml/min/1 73sq m     Narrative:       [place] guidelines for Chronic Kidney Disease (CKD):     Stage 1 with normal or high GFR (GFR > 90 mL/min/1 73 square meters)    Stage 2 Mild CKD (GFR = 60-89 mL/min/1 73 square meters)    Stage 3A Moderate CKD (GFR = 45-59 mL/min/1 73 square meters)    Stage 3B Moderate CKD (GFR = 30-44 mL/min/1 73 square meters)    Stage 4 Severe CKD (GFR = 15-29 mL/min/1 73 square meters)    Stage 5 End Stage CKD (GFR <15 mL/min/1 73 square meters)  Note: GFR calculation is accurate only with a steady state creatinine    Protime-INR [[id number]]  (Abnormal) Collected:  06/28/19 1058    Lab Status:  Final result Specimen:  Blood from Arm, Left Updated:  06/28/19 1136     Protime 15 4 seconds      INR 1 26    APTT [[id number]]  (Normal) Collected:  06/28/19 1058    Lab Status:  Final result Specimen:  Blood from Arm, Left Updated:  06/28/19 1136     PTT 34 seconds     Lactic Acid x2 [[id number]]  (Normal) Collected:  06/28/19 1058    Lab Status:  Final result Specimen:  Blood from Arm, Left Updated:  06/28/19 1127     LACTIC ACID 1 2 mmol/L     Narrative:       Result may be elevated if tourniquet was used during collection      CBC and differential [[id number]] Collected:  06/28/19 1058    Lab Status:  Final result Specimen:  Blood from Arm, Left Updated:  06/28/19 1110     WBC 4 44 Thousand/uL      RBC 4 86 Million/uL      Hemoglobin 15 1 g/dL      Hematocrit 44 8 %      MCV 92 fL MCH 31 1 pg      MCHC 33 7 g/dL      RDW 11 9 %      MPV 9 5 fL      Platelets 353 Thousands/uL      nRBC 0 /100 WBCs      Neutrophils Relative 50 %      Immat GRANS % 0 %      Lymphocytes Relative 37 %      Monocytes Relative 9 %      Eosinophils Relative 3 %      Basophils Relative 1 %      Neutrophils Absolute 2 23 Thousands/µL      Immature Grans Absolute 0 01 Thousand/uL      Lymphocytes Absolute 1 63 Thousands/µL      Monocytes Absolute 0 40 Thousand/µL      Eosinophils Absolute 0 14 Thousand/µL      Basophils Absolute 0 03 Thousands/µL     Blood culture #2 [[id number]] Collected:  06/28/19 1058    Lab Status: In process Specimen:  Blood from Arm, Left Updated:  06/28/19 1104    Blood culture #1 [[id number]] Collected:  06/28/19 1058    Lab Status: In process Specimen:  Blood from Arm, Right Updated:  06/28/19 1104    Lactic Acid x2 [[id number]]     Lab Status:  No result Specimen:  Blood                  XR chest 2 views   Final Result by Markus Harkins DO (06/28 1144)   Right lower lobe infiltrate  Correlate for early pneumonia  Given the history of altered mental status, correlate for aspiration related  The study was marked in [facility] for immediate notification  Workstation performed: [id number]               Procedures  Procedures        ED Course                               MDM  Number of Diagnoses or Management Options  Altered mental status:   Diagnosis management comments: 18-year-old male with altered mental status, differential is broad but includes fatigue from travel, infection, electrolyte abnormalities  Will check EKG, labs, urine, chest x-ray, discuss admission versus outpatient treatment depending on findings here today  Disposition  Final diagnoses:    Altered mental status     Time reflects when diagnosis was documented in both MDM as applicable and the Disposition within this note     Time User Action Codes Description Comment    6/28/2019  1:27 PM Khoa, [address] [place] [address] [R41 [id number]] Altered mental status       ED Disposition     ED Disposition Condition Date/Time Comment    Admit Stable Fri Jun 28, 2019  1:27 PM Case was discussed with FM and the patient's admission status was agreed to be Admission Status: observation status to the service of Dr Markell Whyte   Follow-up Information    None         Current Discharge Medication List      CONTINUE these medications which have NOT CHANGED    Details   b complex-vitamin C-folic acid (RENAL) 1 mg - TAKE 1 CAPSULE BY MOUTH ONCE EVERY DAY AT AT 2PM FOR HEALTH MAINTENANCE  Qty: 31 each, Refills: 11    Comments: Hello! [address] is the new pharmacy provider for individuals with [facility]  We are needing new prescriptions so we can begin filling medications  Please advise, thank you! Associated Diagnoses: Vitamin deficiency, unspecified      coenzyme Q-10 100 MG capsule 2 SOFTGELS(200MG) BY MOUTH DAILY @ 9AM (SUPPLEMENT) *GOODBRED  Qty: 60 capsule, Refills: 0    Associated Diagnoses: Depression, unspecified depression type      DEEP SEA NASAL SPRAY 0 65 % nasal spray - INHALE 2 SPRAYS IN EACH NOSTRIL AS NEEDED FOR CONGESTION  Qty: 44 mL, Refills: 11    Comments: [facility] is the new pharmacy provider for individuals with [facility] effective 4/1/19  We need a new prescription so we can fill the medication  Please advise, thank you! Associated Diagnoses: Postnasal drip      docusate sodium (COLACE) 100 mg capsule 100 mg 9am and 9pm  Qty: 10 capsule, Refills: 5    Associated Diagnoses: Constipation, unspecified constipation type      dronabinol (MARINOL) 2 5 mg capsule - *FRIG-SHELF TAKE 1 CAPSULE BY MOUTH FOUR TIMES DAILY (9AM,2PM,6PM,9PM) FOR POOR APPETITE  Qty: 120 capsule, Refills: 5    Comments: Hello! [address] is the new pharmacy provider for individuals with [facility]  We are needing new prescriptions so we can begin filling medications    Please advise, thank you!  Associated Diagnoses: Traumatic brain injury, without loss of consciousness, subsequent encounter; Anorexia      ENEMEEZ PLUS  MG ENEM use as directed  Qty: 150 mL, Refills: 0    Associated Diagnoses: Constipated      Ginkgo Biloba Extract 40 MG CAPS 3 CAPS(120MG) BY MOUTH DAILY @ 2PM (SUPPLEMENT) *GOODBRED  Qty: 90 capsule, Refills: 0    Associated Diagnoses: Depression, unspecified depression type      GNP VITAMIN C 500 MG tablet 1 TAB BY MOUTH DAILY @ 2PM (SUPPLEMENT) *GOODBRED  Qty: 30 tablet, Refills: 0    Associated Diagnoses: Depression, unspecified depression type      HEMORRHOIDAL 0 25 % suppository - UNWRAP AND INSERT 1 SUPPOSITORY PER RECTUM AS NEEDED FOR HEMORRHOID PAIN RELIEF  Qty: 12 suppository, Refills: 11    Comments: Hello! [address] [place] [address] is the new pharmacy provider for individuals with [facility]  We are needing new prescriptions so we can begin filling medications  Please advise, thank you! Associated Diagnoses: Hemorrhoids, unspecified hemorrhoid type      ibuprofen (MOTRIN) 200 mg tablet Take 2 tabs q6h PRN for pain not to exceed 2000mg per day  Qty: 200 tablet, Refills: 2    Comments: [facility] with [facility]  We are needing new prescriptions so we can begin filling medications  Please advise, thank you! Associated Diagnoses: Pain      loratadine (CLARITIN) 10 mg tablet - TAKE 1 TABLET BY MOUTH ONCE EVERY DAY AS NEEDED FOR ALLERGIES  Qty: 30 tablet, Refills: 11    Comments: Hello! [address] [place] [address] is the new pharmacy provider for individuals with [facility]  We are needing new prescriptions so we can begin filling medications  Please advise, thank you! Associated Diagnoses: Pain      Magnesium Oxide 500 MG TABS - TAKE 1 TABLET BY MOUTH ONCE EVERY DAY AT 2PM FOR HEALTH MAINTENANCE  Qty: 31 each, Refills: 11    Comments: Hello!   [address] [place] [address] is the new pharmacy provider for individuals with [facility]  We are needing new prescriptions so we can begin filling medications  Please advise, thank you! Associated Diagnoses: Constipation, unspecified constipation type      Melatonin 5 MG TABS - TAKE 1 TABLET BY MOUTH EVERY NIGHT AT BEDTIME AT 9PM  Qty: 31 each, Refills: 11    Comments: [facility] is the new pharmacy for individuals with [facility] effective 4/1/19  We need a new prescription so we can fill the medication  Per his nurse, he has 1 dose left for tonight  Thank you! Associated Diagnoses: Psychophysiological insomnia      metoprolol tartrate (LOPRESSOR) 25 mg tablet 1/2 TAB(12 5MG) BY MOUTH DAILY @ 9AM (HTN) *GOODBRED  Qty: 15 tablet, Refills: 0    Associated Diagnoses: Accelerated essential hypertension      modafinil (PROVIGIL) 100 mg tablet - TAKE 2 TABLETS (200MG)  BY MOUTH ONCE EVERY DAY AT 9AM FOR HEALTH FOR BRAIN INJURY  Qty: 62 tablet, Refills: 5    Comments: Hello! [facility] is the new pharmacy provider for individuals with [facility]  We are needing new prescriptions so we can begin filling medications  Please advise, thank you! Associated Diagnoses: Traumatic brain injury, without loss of consciousness, subsequent encounter      Multiple Vitamins-Minerals (CEROVITE SENIOR) TABS - TAKE 1 TABLET BY MOUTH ONCE EVERY DAY AT 9AM FOR HEALTH MAINTENANCE  Qty: 31 tablet, Refills: 11    Comments: Hello! [address] [place] [address] is the new pharmacy provider for individuals with [facility]  We are needing new prescriptions so we can begin filling medications  Please advise, thank you! Associated Diagnoses: Health maintenance examination      Nutritional Supplements (NUTRITIONAL SHAKE) LIQD Take 1 Can by mouth 2 (two) times a day as needed (nutritional supplementation, weight gain) Please provide Boost  Qty: 24 Bottle, Refills: 6    Associated Diagnoses: Oropharyngeal dysphagia;  Poor weight gain in adult      Omega-3 Fatty Acids (FISH OIL) 1,000 mg 1 CAPSULE BY MOUTH DAILY @ 9AM (SUPPLEMENT) *GOODBRED  Qty: 30 capsule, Refills: 0    Associated Diagnoses: Depression, unspecified depression type      omeprazole (PriLOSEC) 20 mg delayed release capsule Take 20mg at 9am every day  Qty: 30 capsule, Refills: 5    Associated Diagnoses: Abnormal weight loss; Poor weight gain in adult      onabotulinumtoxin A (BOTOX) 100 units inject 500 units into left gastroc soleus and abductor pollicis ankit      phenazopyridine (PYRIDIUM) 200 mg tablet Take 1 tablet (200 mg total) by mouth every 8 (eight) hours as needed for bladder spasms (burning with urination)  Qty: 3 tablet, Refills: 0    Associated Diagnoses: Dysuria      polyethylene glycol (MIRALAX) 17 g packet Take 17 g by mouth every other day Take Monday, Wednesday, Friday at 0800  Qty: 14 each, Refills: 0    Associated Diagnoses: Constipation, unspecified constipation type      PREPARATION H 1-0 25-14 4-15 % rectal cream - APPLY RECTALLY AS NEEDED FOR HEMORRHOID PAIN RELIEF  Qty: 51 g, Refills: 11    Comments: Hello! [address] is the new pharmacy provider for individuals with [employer]  We are needing new prescriptions so we can begin filling medications  Please advise, thank you! Associated Diagnoses: Hemorrhoids, unspecified hemorrhoid type      !! ranitidine (ZANTAC) 150 mg tablet ranitidine 150 mg tablet      sertraline (ZOLOFT) 100 mg tablet - TAKE 1 TABLET BY MOUTH ONCE EVERY DAY AT 9PM FOR DEPRESSION  Qty: 31 tablet, Refills: 11    Comments: Hello! [address] is the new pharmacy provider for individuals with [employer]  We are needing new prescriptions so we can begin filling medications  Please advise, thank you!   Associated Diagnoses: Depression, unspecified depression type      Sodium Fluoride (PREVIDENT 5000 PLUS DT) Apply to teeth      Starch, Thickening, LIQD Nectar thick liquids up to honey thick if coughing occurs as needed, please use Simply Thick liquid only  Discontinue Thick-It powder  Qty: 237 mL, Refills: 5    Associated Diagnoses: Oropharyngeal dysphagia      Tea Tree 100 % OIL - APPLY TO SKIN FOLDS ONCE EVERY DAY AS NEEDED FOR RASH  Qty: 60 mL, Refills: 11    Comments: Hello! [facility] is the new pharmacy provider for individuals with [employer]  We are needing new prescriptions so we can begin filling medications  Please advise, thank you! Associated Diagnoses: Hemorrhoids, unspecified hemorrhoid type      VASCEPA 1 g CAPS - TAKE 1 CAPSULE BY MOUTH ONCE EVERY DAY AT 9AM FOR HEALTH MAINTENANCE  Qty: 31 capsule, Refills: 11    Comments: Hello! Fish Oil/Omega 3 1000mg is not covered by this mutual patient's insurance  We are requesting a prescription to change it to the formulary alternative Vascepa 1gm  Please advise, thank you! Associated Diagnoses: Long Q-T syndrome      Vitamins A & D (VITAMIN A & D) ointment - APPLY TO AFFECTED AREA (BUTTOCKS/ANAL) AS NEEDED  Qty: 454 g, Refills: 11    Comments: Claudia! [facility] is the new pharmacy provider for individuals with [employer] effective 4/1/19  We need a new prescription so we can fill the medication  Please advise, thank you! Associated Diagnoses: Skin irritation      wheat dextrin (BENEFIBER) Take 1 packet by mouth daily  Qty: 30 each, Refills: 3    Associated Diagnoses: Constipation, unspecified constipation type      Zinc 50 MG TABS Take 50mg daily @ 2:00pm every other month   (May, June, July,  August, September, October 2019)  Qty: 30 tablet, Refills: 5    Associated Diagnoses: Vitamin deficiency      zinc oxide (DESITIN) 40 % PSTE Apply topically      Coenzyme Q10 100 MG TABS Take 2 capsules daily by mouth @ 9am      !! Diapers & Supplies (CUTIES SIZE 3) MISC Please provide 10 per day  Qty: 300 each, Refills: 11    Associated Diagnoses: Urinary incontinence, unspecified type      !! Diapers & Supplies (HUGGIES LITTLE MOVERS SIZE 3) MISC by Does not apply route      Disposable Gloves (VINYL GLOVES MEDIUM) MISC by Does not apply route 4 (four) times a day Dispense 3 boxes monthly; Diagnosis R32  Qty: 3 each, Refills: 5    Associated Diagnoses: Urinary incontinence, unspecified type      ENEMEEZ MINI 283 MG enema - INSERT 5ML PER RECTUM AS NEEDED FOR CONSTIPATION  Qty: 5 each, Refills: 11    Comments: Hello! [facility] is the new pharmacy provider for individuals with [employer]  We are needing new prescriptions so we can begin filling medications  Please advise, thank you! Associated Diagnoses: Drug-induced constipation      !! Incontinence Supply Disposable (DEPEND UNDERWEAR SM/MED) MISC by Does not apply route      !! Incontinence Supply Disposable (PREVAIL AIR BRIEFS) MISC 1 each by Does not apply route every 4 (four) hours Please provide 5 briefs per day  Qty: 150 each, Refills: 11    Associated Diagnoses: Urinary incontinence, unspecified type; Incontinence of feces, unspecified fecal incontinence type      !! Incontinence Supply Disposable (SELECT DISPOSABLE UNDERWEAR SM) MISC Please provide a 30 day supply 10 per day DX R32 incontinence  Qty: 22 each, Refills: 6    Associated Diagnoses: Quadriplegia ([address] ); Urinary incontinence, unspecified type      Magnesium 500 MG CAPS Take 1 capsule (500 mg total) by mouth daily  Qty: 30 capsule, Refills: 5    Associated Diagnoses: Vitamin deficiency      Misc  Devices [facility]) MISC Please provide pt with a new cord for power wheelchair  Qty: 1 each, Refills: 0    Associated Diagnoses: Quadriplegia ([address] )      nystatin (MYCOSTATIN) powder Apply topically 2 (two) times a day  Qty: 15 g, Refills: 0    Associated Diagnoses: Candidal intertrigo      polyethylene glycol (GLYCOLAX) powder Mix 17 g (1 capful) in 8 oz of water and take PO QOD PRN if no bowel movement in 3 days  Qty: 255 g, Refills: 11    Comments: Hello!   [employer] [address] is the new pharmacy provider for individuals with [employer]  We are needing new prescriptions so we can begin filling medications  Please advise, thank you! Associated Diagnoses: Constipation, unspecified constipation type      !! ranitidine (ZANTAC) 150 mg tablet Take 1 tablet (150 mg total) by mouth daily  Qty: 30 tablet, Refills: 5    Associated Diagnoses: Gastroesophageal reflux disease without esophagitis      !! Respiratory Therapy Supplies (SPIROMETER) KIT by Does not apply route 6 (six) times a day  Qty: 1 kit, Refills: 0    Associated Diagnoses: Aspiration pneumonia, unspecified aspiration pneumonia type, unspecified laterality, unspecified part of lung ([address] )      ! ! Respiratory Therapy Supplies (SPIROMETER) KIT by Does not apply route 3 (three) times a day Do spirometry 3 times daily at 9am, 2pm and 9pm   Qty: 1 kit, Refills: 0    Associated Diagnoses: Atelectasis      Tea Tree Oil OIL by Does not apply route daily Apply to skin folds  Qty: 1 Bottle, Refills: 3    Associated Diagnoses: Candidal intertrigo       !! - Potential duplicate medications found  Please discuss with provider  No discharge procedures on file  ED Provider  Attending physically available and evaluated Sherley Feliciano  ZIGGY managed the patient along with the ED Attending      Electronically Signed by         Fausto Dietz MD  06/28/19 5206

## 2019-06-28 NOTE — ASSESSMENT & PLAN NOTE
Patient has spastic quadriparesis secondary to anoxic brain injury status post cardiac arrest at 13years of age  History of dysphagia July 2018  Recently started on thin liquids 3 months ago  Aspiration precautions  Bedside swallow eval

## 2019-06-28 NOTE — ASSESSMENT & PLAN NOTE
History of long QT syndrome  QTC on admission 411  A m   EKG  Avoid medications that prolong QT  Continue metoprolol 12 5 mg daily

## 2019-06-28 NOTE — ASSESSMENT & PLAN NOTE
Patient presents with change in baseline mental status  Vitals within normal limits   Procal elevated at 0 86  Lactic acid normal at 1 2  Chest x-ray concerning for right lower lobe infiltrate and LD pneumonia  Patient at risk of aspiration pneumonia  Status post ceftriaxone x1 and azithromycin x1 at the ED  Will continue IV ceftriaxone  Caution with azithromycin due to history of prolonged QT syndrome  Will order a m   EKG for QTc  Follow-up blood culture  Repeat procalcitonin  F/u urine strep pneumo and Legionella

## 2019-06-28 NOTE — PLAN OF CARE
Problem: Potential for Falls  Goal: Patient will remain free of falls  Description  INTERVENTIONS:  - Assess patient frequently for physical needs  -  Identify cognitive and physical deficits and behaviors that affect risk of falls    -  [place] fall precautions as indicated by assessment   - Educate patient/family on patient safety including physical limitations  - Instruct patient to call for assistance with activity based on assessment  - Modify environment to reduce risk of injury  - Consider OT/PT consult to assist with strengthening/mobility  Outcome: Progressing     Problem: PAIN - ADULT  Goal: Verbalizes/displays adequate comfort level or baseline comfort level  Description  Interventions:  - Encourage patient to monitor pain and request assistance  - Assess pain using appropriate pain scale  - Administer analgesics based on type and severity of pain and evaluate response  - Implement non-pharmacological measures as appropriate and evaluate response  - Consider cultural and social influences on pain and pain management  - Notify physician/advanced practitioner if interventions unsuccessful or patient reports new pain  Outcome: Progressing     Problem: INFECTION - ADULT  Goal: Absence or prevention of progression during hospitalization  Description  INTERVENTIONS:  - Assess and monitor for signs and symptoms of infection  - Monitor lab/diagnostic results  - Monitor all insertion sites, i e  indwelling lines, tubes, and drains  - Monitor endotracheal (as able) and nasal secretions for changes in amount and color  - [place] appropriate cooling/warming therapies per order  - Administer medications as ordered  - Instruct and encourage patient and family to use good hand hygiene technique  - Identify and instruct in appropriate isolation precautions for identified infection/condition  Outcome: Progressing  Goal: Absence of fever/infection during neutropenic period  Description  INTERVENTIONS:  - Monitor WBC  - Implement neutropenic guidelines  Outcome: Progressing     Problem: SAFETY ADULT  Goal: Patient will remain free of falls  Description  INTERVENTIONS:  - Assess patient frequently for physical needs  -  Identify cognitive and physical deficits and behaviors that affect risk of falls    -  [place] fall precautions as indicated by assessment   - Educate patient/family on patient safety including physical limitations  - Instruct patient to call for assistance with activity based on assessment  - Modify environment to reduce risk of injury  - Consider OT/PT consult to assist with strengthening/mobility  Outcome: Progressing  Goal: Maintain or return to baseline ADL function  Description  INTERVENTIONS:  -  Assess patient's ability to carry out ADLs; assess patient's baseline for ADL function and identify physical deficits which impact ability to perform ADLs (bathing, care of mouth/teeth, toileting, grooming, dressing, etc )  - Assess/evaluate cause of self-care deficits   - Assess range of motion  - Assess patient's mobility; develop plan if impaired  - Assess patient's need for assistive devices and provide as appropriate  - Encourage maximum independence but intervene and supervise when necessary  ¯ Involve family in performance of ADLs  ¯ Assess for home care needs following discharge   ¯ Request OT consult to assist with ADL evaluation and planning for discharge  ¯ Provide patient education as appropriate  Outcome: Progressing  Goal: Maintain or return mobility status to optimal level  Description  INTERVENTIONS:  - Assess patient's baseline mobility status (ambulation, transfers, stairs, etc )    - Identify cognitive and physical deficits and behaviors that affect mobility  - Identify mobility aids required to assist with transfers and/or ambulation (gait belt, sit-to-stand, lift, walker, cane, etc )  - [place] fall precautions as indicated by assessment  - Record patient progress and toleration of activity level on Mobility SBAR; progress patient to next Phase/Stage  - Instruct patient to call for assistance with activity based on assessment  - Request Rehabilitation consult to assist with strengthening/weightbearing, etc   Outcome: Progressing     Problem: DISCHARGE PLANNING  Goal: Discharge to home or other facility with appropriate resources  Description  INTERVENTIONS:  - Identify barriers to discharge w/patient and caregiver  - Arrange for needed discharge resources and transportation as appropriate  - Identify discharge learning needs (meds, wound care, etc )  - Arrange for interpretive services to assist at discharge as needed  - Refer to Case Management Department for coordinating discharge planning if the patient needs post-hospital services based on physician/advanced practitioner order or complex needs related to functional status, cognitive ability, or social support system  Outcome: Progressing     Problem: Knowledge Deficit  Goal: Patient/family/caregiver demonstrates understanding of disease process, treatment plan, medications, and discharge instructions  Description  Complete learning assessment and assess knowledge base    Interventions:  - Provide teaching at level of understanding  - Provide teaching via preferred learning methods  Outcome: Progressing

## 2019-06-29 PROBLEM — R82.90 ABNORMAL URINALYSIS: Status: ACTIVE | Noted: 2019-04-27

## 2019-06-29 PROBLEM — R91.8 RIGHT LOWER LOBE PULMONARY INFILTRATE: Status: ACTIVE | Noted: 2019-05-24

## 2019-06-29 LAB
ANION GAP SERPL CALCULATED.3IONS-SCNC: 2 MMOL/L (ref 4–13)
ATRIAL RATE: 59 BPM
BASOPHILS # BLD AUTO: 0.03 THOUSANDS/ΜL (ref 0–0.1)
BASOPHILS NFR BLD AUTO: 1 % (ref 0–1)
BUN SERPL-MCNC: 13 MG/DL (ref 5–25)
CALCIUM SERPL-MCNC: 8.8 MG/DL (ref 8.3–10.1)
CHLORIDE SERPL-SCNC: 107 MMOL/L (ref 100–108)
CO2 SERPL-SCNC: 31 MMOL/L (ref 21–32)
CREAT SERPL-MCNC: 0.8 MG/DL (ref 0.6–1.3)
EOSINOPHIL # BLD AUTO: 0.14 THOUSAND/ΜL (ref 0–0.61)
EOSINOPHIL NFR BLD AUTO: 3 % (ref 0–6)
ERYTHROCYTE [DISTWIDTH] IN BLOOD BY AUTOMATED COUNT: 11.9 % (ref 11.6–15.1)
GFR SERPL CREATININE-BSD FRML MDRD: 116 ML/MIN/1.73SQ M
GLUCOSE P FAST SERPL-MCNC: 85 MG/DL (ref 65–99)
GLUCOSE SERPL-MCNC: 85 MG/DL (ref 65–140)
HCT VFR BLD AUTO: 46.1 % (ref 36.5–49.3)
HGB BLD-MCNC: 15.8 G/DL (ref 12–17)
IMM GRANULOCYTES # BLD AUTO: 0 THOUSAND/UL (ref 0–0.2)
IMM GRANULOCYTES NFR BLD AUTO: 0 % (ref 0–2)
L PNEUMO1 AG UR QL IA.RAPID: NEGATIVE
LYMPHOCYTES # BLD AUTO: 1.74 THOUSANDS/ΜL (ref 0.6–4.47)
LYMPHOCYTES NFR BLD AUTO: 41 % (ref 14–44)
MCH RBC QN AUTO: 31.4 PG (ref 26.8–34.3)
MCHC RBC AUTO-ENTMCNC: 34.3 G/DL (ref 31.4–37.4)
MCV RBC AUTO: 92 FL (ref 82–98)
MONOCYTES # BLD AUTO: 0.43 THOUSAND/ΜL (ref 0.17–1.22)
MONOCYTES NFR BLD AUTO: 10 % (ref 4–12)
NEUTROPHILS # BLD AUTO: 1.88 THOUSANDS/ΜL (ref 1.85–7.62)
NEUTS SEG NFR BLD AUTO: 45 % (ref 43–75)
NRBC BLD AUTO-RTO: 0 /100 WBCS
P AXIS: 52 DEGREES
PLATELET # BLD AUTO: 184 THOUSANDS/UL (ref 149–390)
PMV BLD AUTO: 9.5 FL (ref 8.9–12.7)
POTASSIUM SERPL-SCNC: 4.2 MMOL/L (ref 3.5–5.3)
PR INTERVAL: 164 MS
PROCALCITONIN SERPL-MCNC: 0.91 NG/ML
QRS AXIS: -41 DEGREES
QRSD INTERVAL: 88 MS
QT INTERVAL: 438 MS
QTC INTERVAL: 433 MS
RBC # BLD AUTO: 5.03 MILLION/UL (ref 3.88–5.62)
S PNEUM AG UR QL: NEGATIVE
SODIUM SERPL-SCNC: 140 MMOL/L (ref 136–145)
T WAVE AXIS: 11 DEGREES
VENTRICULAR RATE: 59 BPM
WBC # BLD AUTO: 4.22 THOUSAND/UL (ref 4.31–10.16)

## 2019-06-29 PROCEDURE — 85025 COMPLETE CBC W/AUTO DIFF WBC: CPT | Performed by: FAMILY MEDICINE

## 2019-06-29 PROCEDURE — 80048 BASIC METABOLIC PNL TOTAL CA: CPT | Performed by: FAMILY MEDICINE

## 2019-06-29 PROCEDURE — 84145 PROCALCITONIN (PCT): CPT | Performed by: FAMILY MEDICINE

## 2019-06-29 PROCEDURE — 87449 NOS EACH ORGANISM AG IA: CPT | Performed by: FAMILY MEDICINE

## 2019-06-29 PROCEDURE — 93010 ELECTROCARDIOGRAM REPORT: CPT | Performed by: INTERNAL MEDICINE

## 2019-06-29 PROCEDURE — 93005 ELECTROCARDIOGRAM TRACING: CPT

## 2019-06-29 RX ORDER — ACETAMINOPHEN 325 MG/1
650 TABLET ORAL EVERY 6 HOURS PRN
Status: DISCONTINUED | OUTPATIENT
Start: 2019-06-29 | End: 2019-06-30 | Stop reason: HOSPADM

## 2019-06-29 RX ORDER — PHENAZOPYRIDINE HYDROCHLORIDE 100 MG/1
100 TABLET, FILM COATED ORAL EVERY 8 HOURS PRN
Status: DISCONTINUED | OUTPATIENT
Start: 2019-06-29 | End: 2019-06-30 | Stop reason: HOSPADM

## 2019-06-29 RX ORDER — AMOXICILLIN AND CLAVULANATE POTASSIUM 875; 125 MG/1; MG/1
1 TABLET, FILM COATED ORAL EVERY 12 HOURS SCHEDULED
Status: DISCONTINUED | OUTPATIENT
Start: 2019-06-29 | End: 2019-06-30 | Stop reason: HOSPADM

## 2019-06-29 RX ADMIN — PANTOPRAZOLE SODIUM 40 MG: 40 TABLET, DELAYED RELEASE ORAL at 06:42

## 2019-06-29 RX ADMIN — CEFTRIAXONE SODIUM 1000 MG: 10 INJECTION, POWDER, FOR SOLUTION INTRAVENOUS at 13:12

## 2019-06-29 RX ADMIN — PSYLLIUM HUSK 1 PACKET: 6 GRANULE ORAL at 08:54

## 2019-06-29 RX ADMIN — MELATONIN 4.5 MG: 3 TAB ORAL at 20:49

## 2019-06-29 RX ADMIN — ENOXAPARIN SODIUM 40 MG: 40 INJECTION SUBCUTANEOUS at 17:01

## 2019-06-29 RX ADMIN — AMOXICILLIN AND CLAVULANATE POTASSIUM 1 TABLET: 875; 125 TABLET, FILM COATED ORAL at 20:50

## 2019-06-29 RX ADMIN — SERTRALINE HYDROCHLORIDE 100 MG: 100 TABLET ORAL at 08:53

## 2019-06-29 RX ADMIN — Medication 1 TABLET: at 08:52

## 2019-06-29 RX ADMIN — ACETAMINOPHEN 650 MG: 325 TABLET ORAL at 20:49

## 2019-06-29 RX ADMIN — Medication 1000 MG: at 08:53

## 2019-06-29 RX ADMIN — DOCUSATE SODIUM 100 MG: 100 CAPSULE, LIQUID FILLED ORAL at 08:53

## 2019-06-29 RX ADMIN — SODIUM CHLORIDE 1000 ML: 0.9 INJECTION, SOLUTION INTRAVENOUS at 21:17

## 2019-06-29 RX ADMIN — DOCUSATE SODIUM 100 MG: 100 CAPSULE, LIQUID FILLED ORAL at 17:01

## 2019-06-29 RX ADMIN — MODAFINIL 200 MG: 100 TABLET ORAL at 08:57

## 2019-06-29 NOTE — SPEECH THERAPY NOTE
Speech Language/Pathology    Speech consult received this morning  However, later this morning order was discontinued by MD  SLP spoke with Dr John Rios with family medicine to discuss plan  Doctor stated that family does not wish to continue with speech evaluation at an inpatient level as they are following closely with speech as an outpatient with [facility]  They stated they did not want an evaluation by our department here as it may change the recommendations for their home health professionals  Family is aware that this admission may be related to aspiration however they still wish to only follow up with outpatient therapists  MD was informed to please re-consult if family changes their mind or if there is a change in status as this is likely an appropriate referral for swallowing evaluation and/or VBS

## 2019-06-29 NOTE — ED ATTENDING ATTESTATION
Emergency Department Note- Clyde Hagen 26 y o  male MRN: [id number]    [facility] Encounter: [id number]    Elizabeth Jones is a 26 y o  male who presents with   Chief Complaint   Patient presents with    Altered Mental Status     Patient comes to the E R  with report from family of a change in mental status  Patient just returned from [facility] into [place], [place] at 14:30 yesterday afternoon  History of Present Illness   HPI:  Elizabeth Jones is a 26 y o  male who presents with altered mental status  Patient has a history of anoxic brain injury secondary to a prolonged QT interval cardiac arrest at age 13  Patient recently turned on a trip for to [facility] with his father  His caregivers state he has not been quite himself  Denies fever chills but states he is not as responsive and interactive as he usually is  Denies any abdominal pain, shortness of breath, headaches, , nausea or vomiting  He has had a history of urinary tract infections previously  Will continue with septic evaluation, chest x-ray  ROS is otherwise unremarkable      Historical Information   Past Medical History:   Diagnosis Date    Allergic rhinitis     [facility]     Cardiac arrest [facility])     age 13 s/p long Q-T syndrome    Community acquired pneumonia     last assessed/resolved:  10/9/2014    Depression     GERD (gastroesophageal reflux disease)     Long Q-T syndrome     Muscular rigidity and spasm, progressive     Osteopenia     Osteoporosis     [facility]ri[facility]gia [facility] )     Spastic neurogenic bladder     Urinary incontinence     Urinary tract infection without hematuria 4/27/2019     Past Surgical History:   Procedure Laterality Date    APPENDECTOMY  1991    WISDOM TOOTH EXTRACTION       Social History   Social History     Substance and Sexual Activity   Alcohol Use Not Currently    Frequency: Never    Binge frequency: Never     Social History     Substance and Sexual Activity   Drug Use Not Currently     Social History     Tobacco Use   Smoking Status Never Smoker   Smokeless Tobacco Never Used       Family History:   Meds/Allergies     Allergies   Allergen Reactions    Other      drugs that prolong the QT interval  drugs that prolong the QT interval  Seasonal allergies        Objective   First Vitals:   Blood Pressure: 112/70 (19 1234)  Pulse: 64 (19 1012)  Temperature: (!) 97 4 °F (36 3 °C) (19 1012)  Temp Source: Oral (19 1012)  Respirations: 18 (19 1012)  Height: 5' 10" (177 8 cm) (19 1012)  Weight - Scale: 58 6 kg (129 lb 3 oz) (19 1012)  SpO2: 100 % (19 101)    Current Vitals:   Blood Pressure: 92/63 (19 0724)  Pulse: 58 (19 0724)  Temperature: 97 8 °F (36 6 °C) (19 0724)  Temp Source: Axillary (19 1546)  Respirations: 16 (19 0724)  Height: 5' 10" (177 8 cm) (19 1546)  Weight - Scale: 58 6 kg (129 lb 3 oz) (19 1546)  SpO2: 99 % (19 0724)      Intake/Output Summary (Last 24 hours) at 2019 0904  Last data filed at 2019 2141  Gross per 24 hour   Intake 470 ml   Output 200 ml   Net 270 ml       Invasive Devices     Peripheral Intravenous Line            Peripheral IV 19 Right;Ventral (anterior) Forearm less than 1 day                      Medical Decision Makin       Recent Results (from the past 36 hour(s))   CBC and differential    Collection Time: 19 10:58 AM   Result Value Ref Range    WBC 4 44 4 31 - 10 16 Thousand/uL    RBC 4 86 3 88 - 5 62 Million/uL    Hemoglobin 15 1 12 0 - 17 0 g/dL    Hematocrit 44 8 36 5 - 49 3 %    MCV 92 82 - 98 fL    MCH 31 1 26 8 - 34 3 pg    MCHC 33 7 31 4 - 37 4 g/dL    RDW 11 9 11 6 - 15 1 %    MPV 9 5 8 9 - 12 7 fL    Platelets 819 192 - 385 Thousands/uL    nRBC 0 /100 WBCs    Neutrophils Relative 50 43 - 75 %    Immat GRANS % 0 0 - 2 %    Lymphocytes Relative 37 14 - 44 %    Monocytes Relative 9 4 - 12 %    Eosinophils Relative 3 0 - 6 %    Basophils Relative 1 0 - 1 %    Neutrophils Absolute 2 23 1 85 - 7 62 Thousands/µL    Immature Grans Absolute 0 01 0 00 - 0 20 Thousand/uL    Lymphocytes Absolute 1 63 0 60 - 4 47 Thousands/µL    Monocytes Absolute 0 40 0 17 - 1 22 Thousand/µL    Eosinophils Absolute 0 14 0 00 - 0 61 Thousand/µL    Basophils Absolute 0 03 0 00 - 0 10 Thousands/µL   Comprehensive metabolic panel    Collection Time: 06/28/19 10:58 AM   Result Value Ref Range    Sodium 139 136 - 145 mmol/L    Potassium 4 4 3 5 - 5 3 mmol/L    Chloride 106 100 - 108 mmol/L    CO2 30 21 - 32 mmol/L    ANION GAP 3 (L) 4 - 13 mmol/L    BUN 14 5 - 25 mg/dL    Creatinine 0 76 0 60 - 1 30 mg/dL    Glucose 85 65 - 140 mg/dL    Calcium 8 8 8 3 - 10 1 mg/dL    AST 12 5 - 45 U/L    ALT 21 12 - 78 U/L    Alkaline Phosphatase 59 46 - 116 U/L    Total Protein 7 9 6 4 - 8 2 g/dL    Albumin 3 5 3 5 - 5 0 g/dL    Total Bilirubin 0 57 0 20 - 1 00 mg/dL    eGFR 118 ml/min/1 73sq m   Lactic Acid x2    Collection Time: 06/28/19 10:58 AM   Result Value Ref Range    LACTIC ACID 1 2 0 5 - 2 0 mmol/L   Protime-INR    Collection Time: 06/28/19 10:58 AM   Result Value Ref Range    Protime 15 4 (H) 11 6 - 14 5 seconds    INR 1 26 (H) 0 84 - 1 19   APTT    Collection Time: 06/28/19 10:58 AM   Result Value Ref Range    PTT 34 23 - 37 seconds   Procalcitonin    Collection Time: 06/28/19 10:58 AM   Result Value Ref Range    Procalcitonin 0 86 (H) <=0 25 ng/ml   ECG 12 lead    Collection Time: 06/28/19 11:04 AM   Result Value Ref Range    Ventricular Rate 55 BPM    Atrial Rate 55 BPM    PA Interval 166 ms    QRSD Interval 98 ms    QT Interval 430 ms    QTC Interval 411 ms    P [place] 73 degrees    QRS Axis -44 degrees    T Wave Axis 60 degrees   ED Urine Macroscopic    Collection Time: 06/28/19 11:44 AM   Result Value Ref Range    Color, UA Yellow     Clarity, UA Cloudy     pH, UA 8 5 (H) 4 5 - 8 0    Leukocytes, UA Large (A) Negative    Nitrite, UA Negative Negative Protein, UA Trace (A) Negative mg/dl    Glucose, UA Negative Negative mg/dl    Ketones, UA Negative Negative mg/dl    Urobilinogen, UA 0 2 0 2, 1 0 E U /dl E U /dl    Bilirubin, UA Negative Negative    Blood, UA Moderate (A) Negative    Specific Gravity, UA 1 020 1 003 - 1 030   Urine Microscopic    Collection Time: 06/28/19 11:44 AM   Result Value Ref Range    RBC, UA 4-10 (A) None Seen, 0-5 /hpf    WBC, UA 10-20 (A) None Seen, 0-5, 5-55, 5-65 /hpf    Epithelial Cells Occasional None Seen, Occasional /hpf    Bacteria, UA Occasional None Seen, Occasional /hpf    AMORPH PHOSPATES Occasional /hpf   Procalcitonin    Collection Time: 06/28/19  4:56 PM   Result Value Ref Range    Procalcitonin 0 81 (H) <=0 25 ng/ml   Lactic Acid x2    Collection Time: 06/28/19  5:47 PM   Result Value Ref Range    LACTIC ACID 1 9 0 5 - 2 0 mmol/L   Procalcitonin AM Draw    Collection Time: 06/29/19  6:42 AM   Result Value Ref Range    Procalcitonin 0 91 (H) <=0 25 ng/ml   Basic metabolic panel    Collection Time: 06/29/19  6:42 AM   Result Value Ref Range    Sodium 140 136 - 145 mmol/L    Potassium 4 2 3 5 - 5 3 mmol/L    Chloride 107 100 - 108 mmol/L    CO2 31 21 - 32 mmol/L    ANION GAP 2 (L) 4 - 13 mmol/L    BUN 13 5 - 25 mg/dL    Creatinine 0 80 0 60 - 1 30 mg/dL    Glucose 85 65 - 140 mg/dL    Glucose, Fasting 85 65 - 99 mg/dL    Calcium 8 8 8 3 - 10 1 mg/dL    eGFR 116 ml/min/1 73sq m   CBC and differential    Collection Time: 06/29/19  6:42 AM   Result Value Ref Range    WBC 4 22 (L) 4 31 - 10 16 Thousand/uL    RBC 5 03 3 88 - 5 62 Million/uL    Hemoglobin 15 8 12 0 - 17 0 g/dL    Hematocrit 46 1 36 5 - 49 3 %    MCV 92 82 - 98 fL    MCH 31 4 26 8 - 34 3 pg    MCHC 34 3 31 4 - 37 4 g/dL    RDW 11 9 11 6 - 15 1 %    MPV 9 5 8 9 - 12 7 fL    Platelets 049 072 - 108 Thousands/uL    nRBC 0 /100 WBCs    Neutrophils Relative 45 43 - 75 %    Immat GRANS % 0 0 - 2 %    Lymphocytes Relative 41 14 - 44 %    Monocytes Relative 10 4 - 12 % Eosinophils Relative 3 0 - 6 %    Basophils Relative 1 0 - 1 %    Neutrophils Absolute 1 88 1 85 - 7 62 Thousands/µL    Immature Grans Absolute 0 00 0 00 - 0 20 Thousand/uL    Lymphocytes Absolute 1 74 0 60 - 4 47 Thousands/µL    Monocytes Absolute 0 43 0 17 - 1 22 Thousand/µL    Eosinophils Absolute 0 14 0 00 - 0 61 Thousand/µL    Basophils Absolute 0 03 0 00 - 0 10 Thousands/µL     XR chest 2 views   Final Result   Right lower lobe infiltrate  Correlate for early pneumonia  Given the history of altered mental status, correlate for aspiration related  The study was marked in [facility] for immediate notification  Workstation performed: [id number]               Portions of the record may have been created with voice recognition software  Occasional wrong word or "sound a like" substitutions may have occurred due to the inherent limitations of voice recognition software  Emergency Department Note- Alanna Hagen 26 y o  male MRN: [id number]    Unit/Bed#: [facility] 807-01 Encounter: [id number]    Ben Valdez is a 26 y o  male who presents with   Chief Complaint   Patient presents with    Altered Mental Status     Patient comes to the E R  with report from family of a change in mental status  Patient just returned from [facility] into [place], [place] at 14:30 yesterday afternoon  History of Present Illness   HPI:  Ben Valdez is a 26 y o  male who presents with     ROS is otherwise unremarkable      Historical Information   Past Medical History:   Diagnosis Date    Allergic rhinitis     [facility] ([facility] )     Cardiac arrest [facility])     age 13 s/p long Q-T syndrome    Community acquired pneumonia     last assessed/resolved:  10/9/2014    Depression     GERD (gastroesophageal reflux disease)     Long Q-T syndrome     Muscular rigidity and spasm, progressive     Osteopenia     Osteoporosis     Quadriplegia (HCC)     Spastic neurogenic bladder     Urinary incontinence     Urinary tract infection without hematuria 2019     Past Surgical History:   Procedure Laterality Date    APPENDECTOMY  1991    WISDOM TOOTH EXTRACTION       Social History   Social History     Substance and Sexual Activity   Alcohol Use Not Currently    Frequency: Never    Binge frequency: Never     Social History     Substance and Sexual Activity   Drug Use Not Currently     Social History     Tobacco Use   Smoking Status Never Smoker   Smokeless Tobacco Never Used       Family History:   Meds/Allergies     Allergies   Allergen Reactions    Other      drugs that prolong the QT interval  drugs that prolong the QT interval  Seasonal allergies        Objective   First Vitals:   Blood Pressure: 112/70 (19 1234)  Pulse: 64 (19 1012)  Temperature: (!) 97 4 °F (36 3 °C) (19 1012)  Temp Source: Oral (19 1012)  Respirations: 18 (19 1012)  Height: 5' 10" (177 8 cm) (19 1012)  Weight - Scale: 58 6 kg (129 lb 3 oz) (19 1012)  SpO2: 100 % (19 1012)    Current Vitals:   Blood Pressure: 92/63 (19 0724)  Pulse: 58 (19 0724)  Temperature: 97 8 °F (36 6 °C) (19 0724)  Temp Source: Axillary (19 1546)  Respirations: 16 (19 0724)  Height: 5' 10" (177 8 cm) (19 1546)  Weight - Scale: 58 6 kg (129 lb 3 oz) (19 1546)  SpO2: 99 % (19 0724)      Intake/Output Summary (Last 24 hours) at 2019 0904  Last data filed at 2019 2141  Gross per 24 hour   Intake 470 ml   Output 200 ml   Net 270 ml       Invasive Devices     Peripheral Intravenous Line            Peripheral IV 19 Right;Ventral (anterior) Forearm less than 1 day                      Medical Decision Makin  Patient with infiltrate on chest x-ray, evidence for urinary tract infection  Will administer antibiotics, admit  Family updated      Recent Results (from the past 36 hour(s))   CBC and differential    Collection Time: 19 10:58 AM   Result Value Ref Range WBC 4 44 4 31 - 10 16 Thousand/uL    RBC 4 86 3 88 - 5 62 Million/uL    Hemoglobin 15 1 12 0 - 17 0 g/dL    Hematocrit 44 8 36 5 - 49 3 %    MCV 92 82 - 98 fL    MCH 31 1 26 8 - 34 3 pg    MCHC 33 7 31 4 - 37 4 g/dL    RDW 11 9 11 6 - 15 1 %    MPV 9 5 8 9 - 12 7 fL    Platelets 653 913 - 381 Thousands/uL    nRBC 0 /100 WBCs    Neutrophils Relative 50 43 - 75 %    Immat GRANS % 0 0 - 2 %    Lymphocytes Relative 37 14 - 44 %    Monocytes Relative 9 4 - 12 %    Eosinophils Relative 3 0 - 6 %    Basophils Relative 1 0 - 1 %    Neutrophils Absolute 2 23 1 85 - 7 62 Thousands/µL    Immature Grans Absolute 0 01 0 00 - 0 20 Thousand/uL    Lymphocytes Absolute 1 63 0 60 - 4 47 Thousands/µL    Monocytes Absolute 0 40 0 17 - 1 22 Thousand/µL    Eosinophils Absolute 0 14 0 00 - 0 61 Thousand/µL    Basophils Absolute 0 03 0 00 - 0 10 Thousands/µL   Comprehensive metabolic panel    Collection Time: 06/28/19 10:58 AM   Result Value Ref Range    Sodium 139 136 - 145 mmol/L    Potassium 4 4 3 5 - 5 3 mmol/L    Chloride 106 100 - 108 mmol/L    CO2 30 21 - 32 mmol/L    ANION GAP 3 (L) 4 - 13 mmol/L    BUN 14 5 - 25 mg/dL    Creatinine 0 76 0 60 - 1 30 mg/dL    Glucose 85 65 - 140 mg/dL    Calcium 8 8 8 3 - 10 1 mg/dL    AST 12 5 - 45 U/L    ALT 21 12 - 78 U/L    Alkaline Phosphatase 59 46 - 116 U/L    Total Protein 7 9 6 4 - 8 2 g/dL    Albumin 3 5 3 5 - 5 0 g/dL    Total Bilirubin 0 57 0 20 - 1 00 mg/dL    eGFR 118 ml/min/1 73sq m   Lactic Acid x2    Collection Time: 06/28/19 10:58 AM   Result Value Ref Range    LACTIC ACID 1 2 0 5 - 2 0 mmol/L   Protime-INR    Collection Time: 06/28/19 10:58 AM   Result Value Ref Range    Protime 15 4 (H) 11 6 - 14 5 seconds    INR 1 26 (H) 0 84 - 1 19   APTT    Collection Time: 06/28/19 10:58 AM   Result Value Ref Range    PTT 34 23 - 37 seconds   Procalcitonin    Collection Time: 06/28/19 10:58 AM   Result Value Ref Range    Procalcitonin 0 86 (H) <=0 25 ng/ml   ECG 12 lead    Collection Time: 06/28/19 11:04 AM   Result Value Ref Range    Ventricular Rate 55 BPM    Atrial Rate 55 BPM    VA Interval 166 ms    QRSD Interval 98 ms    QT Interval 430 ms    QTC Interval 411 ms    P [place] 73 degrees    QRS Axis -44 degrees    T Wave Axis 60 degrees   ED Urine Macroscopic    Collection Time: 06/28/19 11:44 AM   Result Value Ref Range    Color, UA Yellow     Clarity, UA Cloudy     pH, UA 8 5 (H) 4 5 - 8 0    Leukocytes, UA Large (A) Negative    Nitrite, UA Negative Negative    Protein, UA Trace (A) Negative mg/dl    Glucose, UA Negative Negative mg/dl    Ketones, UA Negative Negative mg/dl    Urobilinogen, UA 0 2 0 2, 1 0 E U /dl E U /dl    Bilirubin, UA Negative Negative    Blood, UA Moderate (A) Negative    Specific Gravity, UA 1 020 1 003 - 1 030   Urine Microscopic    Collection Time: 06/28/19 11:44 AM   Result Value Ref Range    RBC, UA 4-10 (A) None Seen, 0-5 /hpf    WBC, UA 10-20 (A) None Seen, 0-5, 5-55, 5-65 /hpf    Epithelial Cells Occasional None Seen, Occasional /hpf    Bacteria, UA Occasional None Seen, Occasional /hpf    AMORPH PHOSPATES Occasional /hpf   Procalcitonin    Collection Time: 06/28/19  4:56 PM   Result Value Ref Range    Procalcitonin 0 81 (H) <=0 25 ng/ml   Lactic Acid x2    Collection Time: 06/28/19  5:47 PM   Result Value Ref Range    LACTIC ACID 1 9 0 5 - 2 0 mmol/L   Procalcitonin AM Draw    Collection Time: 06/29/19  6:42 AM   Result Value Ref Range    Procalcitonin 0 91 (H) <=0 25 ng/ml   Basic metabolic panel    Collection Time: 06/29/19  6:42 AM   Result Value Ref Range    Sodium 140 136 - 145 mmol/L    Potassium 4 2 3 5 - 5 3 mmol/L    Chloride 107 100 - 108 mmol/L    CO2 31 21 - 32 mmol/L    ANION GAP 2 (L) 4 - 13 mmol/L    BUN 13 5 - 25 mg/dL    Creatinine 0 80 0 60 - 1 30 mg/dL    Glucose 85 65 - 140 mg/dL    Glucose, Fasting 85 65 - 99 mg/dL    Calcium 8 8 8 3 - 10 1 mg/dL    eGFR 116 ml/min/1 73sq m   CBC and differential    Collection Time: 06/29/19  6:42 AM   Result Value Ref Range    WBC 4 22 (L) 4 31 - 10 16 Thousand/uL    RBC 5 03 3 88 - 5 62 Million/uL    Hemoglobin 15 8 12 0 - 17 0 g/dL    Hematocrit 46 1 36 5 - 49 3 %    MCV 92 82 - 98 fL    MCH 31 4 26 8 - 34 3 pg    MCHC 34 3 31 4 - 37 4 g/dL    RDW 11 9 11 6 - 15 1 %    MPV 9 5 8 9 - 12 7 fL    Platelets 325 932 - 304 Thousands/uL    nRBC 0 /100 WBCs    Neutrophils Relative 45 43 - 75 %    Immat GRANS % 0 0 - 2 %    Lymphocytes Relative 41 14 - 44 %    Monocytes Relative 10 4 - 12 %    Eosinophils Relative 3 0 - 6 %    Basophils Relative 1 0 - 1 %    Neutrophils Absolute 1 88 1 85 - 7 62 Thousands/µL    Immature Grans Absolute 0 00 0 00 - 0 20 Thousand/uL    Lymphocytes Absolute 1 74 0 60 - 4 47 Thousands/µL    Monocytes Absolute 0 43 0 17 - 1 22 Thousand/µL    Eosinophils Absolute 0 14 0 00 - 0 61 Thousand/µL    Basophils Absolute 0 03 0 00 - 0 10 Thousands/µL     XR chest 2 views   Final Result   Right lower lobe infiltrate  Correlate for early pneumonia  Given the history of altered mental status, correlate for aspiration related  The study was marked in [facility] for immediate notification  Workstation performed: [id number]               Portions of the record may have been created with voice recognition software  Occasional wrong word or "sound a like" substitutions may have occurred due to the inherent limitations of voice recognition software  IEdwina Speaker, DO, saw and evaluated the patient  I have discussed the patient with the resident/non-physician practitioner and agree with the resident's/non-physician practitioner's findings, Plan of Care, and MDM as documented in the resident's/non-physician practitioner's note, except where noted  All available labs and Radiology studies were reviewed  I was present for key portions of any procedure(s) performed by the resident/non-physician practitioner and I was immediately available to provide assistance         At this point I agree with the current assessment done in the Emergency Department    I have conducted an independent evaluation of this patient a history and physical is as follows:      Critical Care Time  Procedures

## 2019-06-29 NOTE — UTILIZATION REVIEW
Initial Clinical Review    Admission: Date/Time/Statement: 6/28/19 @ 1329 -- OBS    Orders Placed This Encounter   Procedures    Place in Observation     Standing Status:   Standing     Number of Occurrences:   1     Order Specific Question:   Admitting Physician     Answer:   Ruth Feldman     Order Specific Question:   Level of Care     Answer:   Med Surg [16]     ED Arrival Information     Expected Arrival Acuity Means of Arrival Escorted By Service Admission Type    - 6/28/2019 10:07 Urgent Ambulance [facility] General Medicine Urgent    Arrival Complaint    -        Chief Complaint   Patient presents with    Altered Mental Status     Patient comes to the E R  with report from family of a change in mental status  Patient just returned from [facility] into [place], [place] at 14:30 yesterday afternoon  Assessment/Plan:  26 y o  male with past medical history of spastic quadriparesis secondary to anoxic brain injury from cardiac arrest at age 13, long QT syndrome, GERD, urinary incontinence and history of multiple UTIs who presents with change in mental status  History from mother who reports patient returned from a trip to [facility] yesterday  Per mother,  Patient's caregiver called EMS because patient had change in baseline mental status given by eyes rolling up and looking somewhat lethargic  On arrival at the emergency room, vitals were noted to be within normal limits and blood work was concerning for increased procalcitonin of 0 86  However lactic acid was normal at 1 2, CBC and BMP were all within normal limits  Chest x-ray was concerning for right lower lobe infiltrate likely due to early pneumonia with possibility of aspiration pneumonia    Of note, patient was diagnosed with dysphagia in July 2018 and was recently cleared for  thin liquids within the past 3 months  Pneumonia  Assessment & Plan  Patient presents with change in baseline mental status  Vitals within normal limits   Procal elevated at 0 86  Lactic acid normal at 1 2  Chest x-ray concerning for right lower lobe infiltrate and LD pneumonia  Patient at risk of aspiration pneumonia  Status post ceftriaxone x1 and azithromycin x1 at the ED  Will discontinue IV ceftriaxone due to history of prolonged QT syndrome  Will order a m  EKG for QTc  Will trend WBC/temp curve  Follow-up blood culture  Repeat procalcitonin  F/u urine strep pneumo and Legionella      Urinary tract infection without hematuria  Assessment & Plan  History of multiple UTI in the past  Recently admitted in May with UTI secondary to E coli  UA on admission showed WBC of 10 to 20  Will follow urine culture  Will follow blood cultures  Continue IV ceftriaxone    6/29 --- PER SPEECH EVAL NOTE --- PT FAMILY REFUSING EVAL SINCE FOLLOW WITH SPEECH THERAPIST AT [facility] AND DOES NOT WANT THE POSSIBILITY OF DIFFERENT RECOMMENDATIONS        ED Triage Vitals   Temperature Pulse Respirations Blood Pressure SpO2   06/28/19 1012 06/28/19 1012 06/28/19 1012 06/28/19 1234 06/28/19 1012   (!) 97 4 °F (36 3 °C) 64 18 112/70 100 %      Temp Source Heart Rate Source Patient Position - Orthostatic VS BP Location FiO2 (%)   06/28/19 1012 06/28/19 1012 06/28/19 1012 06/28/19 1234 --   Oral Monitor Lying Right arm       Pain Score       06/28/19 1012       No Pain        Wt Readings from Last 1 Encounters:   06/28/19 58 6 kg (129 lb 3 oz)     Additional Vital Signs:   Date/Time  Temp  Pulse  Resp  BP  MAP (mmHg)  SpO2  O2 Device   06/29/19 07:24:30  97 8 °F (36 6 °C)  58  16  92/63  73  99 %  --   06/28/19 22:37:57  98 1 °F (36 7 °C)  81  --  112/67  82  98 %  --   06/28/19 1546  97 3 °F (36 3 °C)Abnormal   64  18  95/72  --  99 %  None (Room air)   06/28/19 1500  --  74  18  105/77  --  95 %  None (Room air)   06/28/19 1234  --  68  18  112/70  --  98 %  None (Room air)   06/28/19 1012  97 4 °F (36 3 °C)Abnormal   64  18  --  --  100 %  None (Room air)       Pertinent Labs/Diagnostic Test Results:   Results from last 7 days   Lab Units 06/29/19  0642 06/28/19  1058   WBC Thousand/uL 4 22* 4 44   HEMOGLOBIN g/dL 15 8 15 1   HEMATOCRIT % 46 1 44 8   PLATELETS Thousands/uL 184 158   NEUTROS ABS Thousands/µL 1 88 2 23         Results from last 7 days   Lab Units 06/29/19  0642 06/28/19  1058   SODIUM mmol/L 140 139   POTASSIUM mmol/L 4 2 4 4   CHLORIDE mmol/L 107 106   CO2 mmol/L 31 30   ANION GAP mmol/L 2* 3*   BUN mg/dL 13 14   CREATININE mg/dL 0 80 0 76   EGFR ml/min/1 73sq m 116 118   CALCIUM mg/dL 8 8 8 8     Results from last 7 days   Lab Units 06/28/19  1058   AST U/L 12   ALT U/L 21   ALK PHOS U/L 59   TOTAL PROTEIN g/dL 7 9   ALBUMIN g/dL 3 5   TOTAL BILIRUBIN mg/dL 0 57     Results from last 7 days   Lab Units 06/29/19  0642 06/28/19  1058   GLUCOSE RANDOM mg/dL 85 85     Results from last 7 days   Lab Units 06/28/19  1058   PROTIME seconds 15 4*   INR  1 26*   PTT seconds 34     Results from last 7 days   Lab Units 06/29/19  0642 06/28/19  1656 06/28/19  1058   PROCALCITONIN ng/ml 0 91* 0 81* 0 86*     Results from last 7 days   Lab Units 06/28/19  1747 06/28/19  1058   LACTIC ACID mmol/L 1 9 1 2     Results from last 7 days   Lab Units 06/28/19  1144   CLARITY UA  Cloudy   COLOR UA  Yellow   SPEC GRAV UA  1 020   PH UA  8 5*   GLUCOSE UA mg/dl Negative   KETONES UA mg/dl Negative   BLOOD UA  Moderate*   PROTEIN UA mg/dl Trace*   NITRITE UA  Negative   BILIRUBIN UA  Negative   UROBILINOGEN UA E U /dl 0 2   LEUKOCYTES UA  Large*   WBC UA /hpf 10-20*   RBC UA /hpf 4-10*   BACTERIA UA /hpf Occasional   EPITHELIAL CELLS WET PREP /hpf Occasional     CXR 6/28 -- Right lower lobe infiltrate   Correlate for early pneumonia   Given the history of altered mental status, correlate for aspiration related      ED Treatment:   Medication Administration from 06/28/2019 1007 to 06/28/2019 1542       Date/Time Order Dose Route Action     06/28/2019 1326 ceftriaxone (ROCEPHIN) 1 g/50 mL in dextrose IVPB 1,000 mg Intravenous New Bag     06/28/2019 1417 azithromycin (ZITHROMAX) 500 mg in sodium chloride 0 9% 250mL IVPB 500 mg 500 mg Intravenous New Bag     Past Medical History:   Diagnosis Date    Allergic rhinitis     Brain anoxia ([address] )     Cardiac arrest [facility])     age 13 s/p long Q-T syndrome    Community acquired pneumonia     last assessed/resolved:  10/9/2014    Depression     GERD (gastroesophageal reflux disease)     Long Q-T syndrome     Muscular rigidity and spasm, progressive     Osteopenia     Osteoporosis     Quadriplegia (HCC)     Spastic neurogenic bladder     Urinary incontinence     Urinary tract infection without hematuria 4/27/2019     Present on Admission:   Anoxic brain damage (HCC)   Long Q-T syndrome   At risk for aspiration   Urinary tract infection without hematuria      Admitting Diagnosis: Altered mental status [R41 82]  Known medical problems [Z78 9]  Age/Sex: 26 y o  male  Admission Orders:  Reg diet  Up with assist  SCD's    Current Facility-Administered Medications:  albuterol 2 5 mg Nebulization Q4H PRN   cefTRIAXone 1,000 mg Intravenous Q24H   docusate sodium 100 mg Oral BID   fish oil 1,000 mg Oral Daily   melatonin 4 5 mg Oral HS   metoprolol tartrate 12 5 mg Oral Daily   modafinil 200 mg Oral Daily   multivitamin-minerals 1 tablet Oral Daily   pantoprazole 40 mg Oral Early Morning   phenazopyridine 200 mg Oral Q8H PRN   polyethylene glycol 17 g Oral Every Other Day   psyllium 1 packet Oral Daily   sertraline 100 mg Oral Daily       Network Utilization Review Department  Phone: [phone]; Fax [phone]  [email or web address]  ATTENTION: Please call with any questions or concerns to [phone]  and carefully listen to the prompts so that you are directed to the right person  Send all requests for admission clinical reviews, approved or denied determinations and any other requests to fax [phone]   All voicemails are confidential

## 2019-06-29 NOTE — PLAN OF CARE
Problem: Potential for Falls  Goal: Patient will remain free of falls  Description  INTERVENTIONS:  - Assess patient frequently for physical needs  -  Identify cognitive and physical deficits and behaviors that affect risk of falls    -  [place] fall precautions as indicated by assessment   - Educate patient/family on patient safety including physical limitations  - Instruct patient to call for assistance with activity based on assessment  - Modify environment to reduce risk of injury  - Consider OT/PT consult to assist with strengthening/mobility  Outcome: Progressing     Problem: PAIN - ADULT  Goal: Verbalizes/displays adequate comfort level or baseline comfort level  Description  Interventions:  - Encourage patient to monitor pain and request assistance  - Assess pain using appropriate pain scale  - Administer analgesics based on type and severity of pain and evaluate response  - Implement non-pharmacological measures as appropriate and evaluate response  - Consider cultural and social influences on pain and pain management  - Notify physician/advanced practitioner if interventions unsuccessful or patient reports new pain  Outcome: Progressing     Problem: INFECTION - ADULT  Goal: Absence or prevention of progression during hospitalization  Description  INTERVENTIONS:  - Assess and monitor for signs and symptoms of infection  - Monitor lab/diagnostic results  - Monitor all insertion sites, i e  indwelling lines, tubes, and drains  - Monitor endotracheal (as able) and nasal secretions for changes in amount and color  - [place] appropriate cooling/warming therapies per order  - Administer medications as ordered  - Instruct and encourage patient and family to use good hand hygiene technique  - Identify and instruct in appropriate isolation precautions for identified infection/condition  Outcome: Progressing  Goal: Absence of fever/infection during neutropenic period  Description  INTERVENTIONS:  - Monitor WBC  - Implement neutropenic guidelines  Outcome: Progressing     Problem: SAFETY ADULT  Goal: Patient will remain free of falls  Description  INTERVENTIONS:  - Assess patient frequently for physical needs  -  Identify cognitive and physical deficits and behaviors that affect risk of falls    -  [place] fall precautions as indicated by assessment   - Educate patient/family on patient safety including physical limitations  - Instruct patient to call for assistance with activity based on assessment  - Modify environment to reduce risk of injury  - Consider OT/PT consult to assist with strengthening/mobility  Outcome: Progressing  Goal: Maintain or return to baseline ADL function  Description  INTERVENTIONS:  -  Assess patient's ability to carry out ADLs; assess patient's baseline for ADL function and identify physical deficits which impact ability to perform ADLs (bathing, care of mouth/teeth, toileting, grooming, dressing, etc )  - Assess/evaluate cause of self-care deficits   - Assess range of motion  - Assess patient's mobility; develop plan if impaired  - Assess patient's need for assistive devices and provide as appropriate  - Encourage maximum independence but intervene and supervise when necessary  ¯ Involve family in performance of ADLs  ¯ Assess for home care needs following discharge   ¯ Request OT consult to assist with ADL evaluation and planning for discharge  ¯ Provide patient education as appropriate  Outcome: Progressing  Goal: Maintain or return mobility status to optimal level  Description  INTERVENTIONS:  - Assess patient's baseline mobility status (ambulation, transfers, stairs, etc )    - Identify cognitive and physical deficits and behaviors that affect mobility  - Identify mobility aids required to assist with transfers and/or ambulation (gait belt, sit-to-stand, lift, walker, cane, etc )  - [place] fall precautions as indicated by assessment  - Record patient progress and toleration of activity level on Mobility SBAR; progress patient to next Phase/Stage  - Instruct patient to call for assistance with activity based on assessment  - Request Rehabilitation consult to assist with strengthening/weightbearing, etc   Outcome: Progressing     Problem: DISCHARGE PLANNING  Goal: Discharge to home or other facility with appropriate resources  Description  INTERVENTIONS:  - Identify barriers to discharge w/patient and caregiver  - Arrange for needed discharge resources and transportation as appropriate  - Identify discharge learning needs (meds, wound care, etc )  - Arrange for interpretive services to assist at discharge as needed  - Refer to Case Management Department for coordinating discharge planning if the patient needs post-hospital services based on physician/advanced practitioner order or complex needs related to functional status, cognitive ability, or social support system  Outcome: Progressing     Problem: Knowledge Deficit  Goal: Patient/family/caregiver demonstrates understanding of disease process, treatment plan, medications, and discharge instructions  Description  Complete learning assessment and assess knowledge base    Interventions:  - Provide teaching at level of understanding  - Provide teaching via preferred learning methods  Outcome: Progressing     Problem: Prexisting or High Potential for Compromised Skin Integrity  Goal: Skin integrity is maintained or improved  Description  INTERVENTIONS:  - Identify patients at risk for skin breakdown  - Assess and monitor skin integrity  - Assess and monitor nutrition and hydration status  - Monitor labs (i e  albumin)  - Assess for incontinence   - Turn and reposition patient  - Assist with mobility/ambulation  - Relieve pressure over bony prominences  - Avoid friction and shearing  - Provide appropriate hygiene as needed including keeping skin clean and dry  - Evaluate need for skin moisturizer/barrier cream  - Collaborate with interdisciplinary team (i e  Nutrition, Rehabilitation, etc )   - Patient/family teaching  Outcome: Progressing     Problem: RESPIRATORY - ADULT  Goal: Achieves optimal ventilation and oxygenation  Description  INTERVENTIONS:  - Assess for changes in respiratory status  - Assess for changes in mentation and behavior  - Position to facilitate oxygenation and minimize respiratory effort  - Oxygen administration by appropriate delivery method based on oxygen saturation (per order) or ABGs  - Initiate smoking cessation education as indicated  - Encourage broncho-pulmonary hygiene including cough, deep breathe, Incentive Spirometry  - Assess the need for suctioning and aspirate as needed  - Assess and instruct to report SOB or any respiratory difficulty  - Respiratory Therapy support as indicated  Outcome: Progressing

## 2019-06-29 NOTE — NURSING NOTE
Spoke with patient's mother, Laura Chun, regarding patient's care  Apparently, Dr Chinmay Mckeon was supposed to speak with the mother earlier in the day, but she has not yet received a call back  She has questions regarding the patient's urine culture, EKG, and ear drops  Spoke to family medicine resident at [address] [place] [address],[address], who stated they are aware and have been busy, but will be rounding to speak with patient's mother

## 2019-06-29 NOTE — RESPIRATORY THERAPY NOTE
RT Protocol Note  Clyde Hagen 26 y o  male MRN: [id number]  Unit/Bed#: [facility] 807-01 Encounter: [id number]    Assessment    Principal Problem:    Pneumonia  Active Problems:    Anoxic brain damage (HCC)    Long Q-T syndrome    Spastic hemiplegia of left nondominant side as late effect of cerebral infarction ([facility] )    At risk for aspiration    Urinary tract infection without hematuria      Home Pulmonary Medications:  n/a       Past Medical History:   Diagnosis Date    Allergic rhinitis     Brain anoxia ([facility] )     Cardiac arrest [facility])     age 13 s/p long Q-T syndrome    Community acquired pneumonia     last assessed/resolved:  10/9/2014    Depression     GERD (gastroesophageal reflux disease)     Long Q-T syndrome     Muscular rigidity and spasm, progressive     Osteopenia     Osteoporosis     Quadriplegia ([facility] )     Spastic neurogenic bladder     Urinary incontinence     Urinary tract infection without hematuria 4/27/2019     Social History     Socioeconomic History    Marital status: Single     Spouse name: None    Number of children: None    Years of education: None    Highest education level: None   Occupational History    None   Social Needs    Financial resource strain: None    Food insecurity:     Worry: None     Inability: None    Transportation needs:     Medical: None     Non-medical: None   Tobacco Use    Smoking status: Never Smoker    Smokeless tobacco: Never Used   Substance and Sexual Activity    Alcohol use: Not Currently     Frequency: Never     Binge frequency: Never    Drug use: Not Currently    Sexual activity: Not Currently   Lifestyle    Physical activity:     Days per week: None     Minutes per session: None    Stress: None   Relationships    Social connections:     Talks on phone: None     Gets together: None     Attends Religion service: None     Active member of club or organization: None     Attends meetings of clubs or organizations: None     Relationship status: None    Intimate partner violence:     Fear of current or ex partner: None     Emotionally abused: None     Physically abused: None     Forced sexual activity: None   Other Topics Concern    None   Social History Narrative    Lives in a group home       Subjective    Subjective Data: albuter    Objective    Physical Exam:   Assessment Type: Assess only  General Appearance: Awake  Respiratory Pattern: Normal  Chest Assessment: Chest expansion symmetrical  Bilateral Breath Sounds: Diminished    Vitals:  Blood pressure 95/72, pulse 64, temperature (!) 97 3 °F (36 3 °C), temperature source Axillary, resp  rate 18, height 5' 10" (1 778 m), weight 58 6 kg (129 lb 3 oz), SpO2 99 %  Imaging and other studies: I have personally reviewed pertinent reports  Plan             Resp Comments: Pt in no distress at this time  Pt has no pulmonary hx and takes no meds at home  Will order tx Q4prn for SOB? WHEEZING

## 2019-06-29 NOTE — ASSESSMENT & PLAN NOTE
-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13  -QTc on admission 411  -F/U today's EKG  -Avoid medications that prolong QT  -Continue PTA metoprolol 12 5 mg QAM

## 2019-06-29 NOTE — ASSESSMENT & PLAN NOTE
-Patient has 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 s/p cardiac arrest at 13years of age  -History of dysphagia since aspiration pneumonia July 2018, uses of thickening agents as needed  -Follows with [facility] as outpatient for swallow recommendations  -Aspiration precautions  -PTA omeprazole not on formulary, continue protonix 40 mg QD

## 2019-06-29 NOTE — ASSESSMENT & PLAN NOTE
-History of multiple UTI in the past  -Recently admitted in May with E  coli UTI   -UA on admission showed 10-20 WBC, nitrite negative  -Follow up on urine culture (preliminary results >100K enterococcus)  -Will await final sensitivities, but augmentin should cover enterococcus unless it is amp-resistant  -Follow up on blood cultures

## 2019-06-29 NOTE — PROGRESS NOTES
Progress Note - Denese Homans Viscardi 1985, 26 y o  male MRN: [id number]    Unit/Bed#: [facility] 807-01 Encounter: [id number]    Primary Care Provider: Elda Pierce MD   Date and time admitted to hospital: 6/28/2019 10:08 AM    Assessment/Plan:    * Right lower lobe pulmonary infiltrate  Assessment & Plan  -Presented to [facility] with change in baseline mental status  -VS WNL, did not meet SIRS criteria, lactic acid <2  -Afebrile, no leukocytosis but mild leukopenia today  -CXR concerning for RLL infiltrate/early pneumonia  -Patient at risk of aspiration, possibly pneumonia vs  pneumonitis  -Hx of aspiration pneumonia 7/2018 requiring hospitalization  -s/p ceftriaxone x1 and azithromycin x1 in ED  -On day 2 of IV ceftriaxone, D/C'ed azithromycin 2/2 hx of prolonged QT syndrome  -Favor adding anaerobic coverage at this time  -Will transition to PO augmentin 875 mg Q12H   -Discussed w/ ID pharmacist, this dose is empiric for both aspiration pneumonia & CAP   -Augmentin-XL not on formulary  -Continue to trend procalcitonin in a m , CBC, follow temperature curve  -F/U urine strep pneumo & Legionella urine ag  -Follow-up blood cultures x2    At risk for aspiration  Assessment & Plan  -Patient has 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 s/p cardiac arrest at 13years of age  -History of dysphagia since aspiration pneumonia July 2018, uses of thickening agents as needed  -Follows with [facility] as outpatient for swallow recommendations  -Aspiration precautions  -PTA omeprazole not on formulary, continue protonix 40 mg QD    Abnormal urinalysis  Assessment & Plan  -History of multiple UTI in the past  -Recently admitted in May with E  coli UTI   -UA on admission showed 10-20 WBC, nitrite negative  -Follow up on urine culture (preliminary results >100K enterococcus)  -Will await final sensitivities, but augmentin should cover enterococcus unless it is amp-resistant  -Follow up on blood cultures      Anoxic brain damage [facility])  Assessment & Plan  -Spastic quadriparesis 2/2 anoxic brain injury from cardiac arrest at age 13 years  -Patient back to baseline per caregiver; monitor for change in mental status  -Continue PTA modafinil 200 mg QD, zoloft 100 mg QD    Long Q-T syndrome  Assessment & Plan  -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13  -QTc on admission 411  -F/U today's EKG  -Avoid medications that prolong QT  -Continue PTA metoprolol 12 5 mg QAM    Constipation  Assessment & Plan  -Chronic for patient; TSH WNL March 2019  -Continue PTA miralax, metamucil, colace      Diet: Thick liquid, may change to dysphagia diet per swallow eval  VTE Pharmacologic Prophylaxis: Enoxaparin (Lovenox)  VTE Mechanical Prophylaxis: sequential compression device   Code status: Level 3 DNR/DNI  Disposition: If cultures negative, PCT downtrends and VSS & WNL, may be able to transition to discharge home on augmentin tomorrow    Plan discussed with the attending physician, Dr Georges Timmons, on the [facility] Team     Subjective:   Patient was seen and examined at bedside  Slept well, denies pain, no SOB but frequent cough reported  Afebrile  No other issues at this time      Overnight events: None      Current Facility-Administered Medications   Medication Dose Route Frequency    albuterol inhalation solution 2 5 mg  2 5 mg Nebulization Q4H PRN    amoxicillin-clavulanate (AUGMENTIN) 875-125 mg per tablet 1 tablet  1 tablet Oral Q12H [address]    docusate sodium (COLACE) capsule 100 mg  100 mg Oral BID    fish oil capsule 1,000 mg  1,000 mg Oral Daily    melatonin tablet 4 5 mg  4 5 mg Oral HS    metoprolol tartrate (LOPRESSOR) partial tablet 12 5 mg  12 5 mg Oral Daily    modafinil (PROVIGIL) tablet 200 mg  200 mg Oral Daily    multivitamin-minerals (CENTRUM) tablet 1 tablet  1 tablet Oral Daily    pantoprazole (PROTONIX) EC tablet 40 mg  40 mg Oral Early Morning    polyethylene glycol (MIRALAX) packet 17 g  17 g Oral Every Other Day    psyllium (METAMUCIL) 1 packet  1 packet Oral Daily    sertraline (ZOLOFT) tablet 100 mg  100 mg Oral Daily     Allergies   Allergen Reactions    Other      drugs that prolong the QT interval  drugs that prolong the QT interval  Seasonal allergies        Objective:   Vitals:  Vitals:    06/28/19 1500 06/28/19 1546 06/28/19 2237 06/29/19 0724   BP: 105/77 95/72 112/67 92/63   BP Location: Right arm Right arm     Pulse: 74 64 81 58   Resp: 18 18  16   Temp:  (!) 97 3 °F (36 3 °C) 98 1 °F (36 7 °C) 97 8 °F (36 6 °C)   TempSrc:  Axillary     SpO2: 95% 99% 98% 99%   Weight:  58 6 kg (129 lb 3 oz)     Height:  5' 10" (1 778 m)           Intake/Output Summary (Last 24 hours) at 6/29/2019 1455  Last data filed at 6/28/2019 2141  Gross per 24 hour   Intake 370 ml   Output 190 ml   Net 180 ml       Physical Exam:   Physical Exam   Constitutional:   Appears tired but no acute distress, resting in bed   HENT:   Head: Normocephalic and atraumatic  Eyes: Conjunctivae are normal  Left eye exhibits no discharge  Neck: Normal range of motion  Cardiovascular: Normal rate, regular rhythm and intact distal pulses  Pulmonary/Chest: No respiratory distress  Poor effort (unable to take deep breaths), diminished breath sounds at B/L bases   Abdominal: Soft  Bowel sounds are normal  He exhibits no distension  There is no tenderness  Musculoskeletal: He exhibits no edema  Muscle wasting B/L LE   Neurological:   Follows all commands, scant verbal communication but nods/shakes head no appropriately; moves all extremities, spasticity and hypertonicity of B/L UE & LE   Skin: Skin is warm and dry  Capillary refill takes less than 2 seconds  Vitals reviewed        Invasive Devices     Peripheral Intravenous Line            Peripheral IV 06/29/19 Right;Ventral (anterior) Forearm less than 1 day                Labs:   Admission on 06/28/2019   Component Date Value    WBC 06/28/2019 4 44     RBC 06/28/2019 4 86  Hemoglobin 06/28/2019 15 1     Hematocrit 06/28/2019 44 8     MCV 06/28/2019 92     MCH 06/28/2019 31 1     MCHC 06/28/2019 33 7     RDW 06/28/2019 11 9     MPV 06/28/2019 9 5     Platelets 91/68/7989 158     nRBC 06/28/2019 0     Neutrophils Relative 06/28/2019 50     Immat GRANS % 06/28/2019 0     Lymphocytes Relative 06/28/2019 37     Monocytes Relative 06/28/2019 9     Eosinophils Relative 06/28/2019 3     Basophils Relative 06/28/2019 1     Neutrophils Absolute 06/28/2019 2 23     Immature Grans Absolute 06/28/2019 0 01     Lymphocytes Absolute 06/28/2019 1 63     Monocytes Absolute 06/28/2019 0 40     Eosinophils Absolute 06/28/2019 0 14     Basophils Absolute 06/28/2019 0 03     Sodium 06/28/2019 139     Potassium 06/28/2019 4 4     Chloride 06/28/2019 106     CO2 06/28/2019 30     ANION GAP 06/28/2019 3*    BUN 06/28/2019 14     Creatinine 06/28/2019 0 76     Glucose 06/28/2019 85     Calcium 06/28/2019 8 8     AST 06/28/2019 12     ALT 06/28/2019 21     Alkaline Phosphatase 06/28/2019 59     Total Protein 06/28/2019 7 9     Albumin 06/28/2019 3 5     Total Bilirubin 06/28/2019 0 57     eGFR 06/28/2019 118     LACTIC ACID 06/28/2019 1 2     LACTIC ACID 06/28/2019 1 9     Protime 06/28/2019 15 4*    INR 06/28/2019 1 26*    PTT 06/28/2019 34     Procalcitonin 06/28/2019 0 86*    Blood Culture 06/28/2019 No Growth at 24 hrs   Blood Culture 06/28/2019 No Growth at 24 hrs       Color, UA 06/28/2019 Yellow     Clarity, UA 06/28/2019 Cloudy     pH, UA 06/28/2019 8 5*    Leukocytes, UA 06/28/2019 Large*    Nitrite, UA 06/28/2019 Negative     Protein, UA 06/28/2019 Trace*    Glucose, UA 06/28/2019 Negative     Ketones, UA 06/28/2019 Negative     Urobilinogen, UA 06/28/2019 0 2     Bilirubin, UA 06/28/2019 Negative     Blood, UA 06/28/2019 Moderate*    Specific [place], UA 06/28/2019 1 020     RBC, UA 06/28/2019 4-10*    WBC,  06/28/2019 10-20*    Epithelial Cells 06/28/2019 Occasional     Bacteria, UA 06/28/2019 Occasional     AMORPH PHOSPATES 06/28/2019 Occasional     Urine Culture 06/28/2019 >100,000 cfu/ml Enterococcus species*    Ventricular Rate 06/28/2019 55     Atrial Rate 06/28/2019 55     IL Interval 06/28/2019 166     QRSD Interval 06/28/2019 98     QT Interval 06/28/2019 430     QTC Interval 06/28/2019 411     P Axis 06/28/2019 73     QRS Axis 06/28/2019 -40     T Wave Axis 06/28/2019 60     Procalcitonin 06/28/2019 0 81*    Strep pneumoniae antigen* 06/29/2019 Negative     Legionella Urinary Antig* 06/29/2019 Negative     Procalcitonin 06/29/2019 0 91*    Sodium 06/29/2019 140     Potassium 06/29/2019 4 2     Chloride 06/29/2019 107     CO2 06/29/2019 31     ANION GAP 06/29/2019 2*    BUN 06/29/2019 13     Creatinine 06/29/2019 0 80     Glucose 06/29/2019 85     Glucose, Fasting 06/29/2019 85     Calcium 06/29/2019 8 8     eGFR 06/29/2019 116     WBC 06/29/2019 4 22*    RBC 06/29/2019 5 03     Hemoglobin 06/29/2019 15 8     Hematocrit 06/29/2019 46 1     MCV 06/29/2019 92     MCH 06/29/2019 31 4     MCHC 06/29/2019 34 3     RDW 06/29/2019 11 9     MPV 06/29/2019 9 5     Platelets 24/89/7366 184     nRBC 06/29/2019 0     Neutrophils Relative 06/29/2019 45     Immat GRANS % 06/29/2019 0     Lymphocytes Relative 06/29/2019 41     Monocytes Relative 06/29/2019 10     Eosinophils Relative 06/29/2019 3     Basophils Relative 06/29/2019 1     Neutrophils Absolute 06/29/2019 1 88     Immature Grans Absolute 06/29/2019 0 00     Lymphocytes Absolute 06/29/2019 1 74     Monocytes Absolute 06/29/2019 0 43     Eosinophils Absolute 06/29/2019 0 14     Basophils Absolute 06/29/2019 0 03     Ventricular Rate 06/29/2019 59     Atrial Rate 06/29/2019 59     IL Interval 06/29/2019 164     QRSD Interval 06/29/2019 88     QT Interval 06/29/2019 438     QTC Interval 06/29/2019 433     P Axis 06/29/2019 52     QRS Axis 06/29/2019 -41     T Wave Axis 06/29/2019 11        Imaging/Other:    XR chest 2 views   Final Result by Nancy Coffman DO (06/28 1144)   Right lower lobe infiltrate  Correlate for early pneumonia  Given the history of altered mental status, correlate for aspiration related  The study was marked in [facility] for immediate notification        Workstation performed: [id number]               Landon Kaminski MD PGY-2   Family Medicine

## 2019-06-29 NOTE — ASSESSMENT & PLAN NOTE
-Spastic quadriparesis 2/2 anoxic brain injury from cardiac arrest at age 13 years  -Patient back to baseline per caregiver; monitor for change in mental status  -Continue PTA modafinil 200 mg QD, zoloft 100 mg QD

## 2019-06-29 NOTE — ASSESSMENT & PLAN NOTE
-Presented to [facility] with change in baseline mental status  -VS WNL, did not meet SIRS criteria, lactic acid <2  -Afebrile, no leukocytosis but mild leukopenia today  -CXR concerning for RLL infiltrate/early pneumonia  -Patient at risk of aspiration, possibly pneumonia vs  pneumonitis  -Hx of aspiration pneumonia 7/2018 requiring hospitalization  -s/p ceftriaxone x1 and azithromycin x1 in ED  -On day 2 of IV ceftriaxone, D/C'ed azithromycin 2/2 hx of prolonged QT syndrome  -Favor adding anaerobic coverage at this time  -Will transition to PO augmentin 875 mg Q12H   -Discussed w/ ID pharmacist, this dose is empiric for both aspiration pneumonia & CAP   -Augmentin-XL not on formulary  -Continue to trend procalcitonin in a m , CBC, follow temperature curve  -F/U urine strep pneumo & Legionella urine ag  -Follow-up blood cultures x2

## 2019-06-30 VITALS
DIASTOLIC BLOOD PRESSURE: 71 MMHG | OXYGEN SATURATION: 100 % | HEIGHT: 70 IN | BODY MASS INDEX: 18.5 KG/M2 | RESPIRATION RATE: 18 BRPM | SYSTOLIC BLOOD PRESSURE: 101 MMHG | WEIGHT: 129.19 LBS | TEMPERATURE: 97.7 F | HEART RATE: 79 BPM

## 2019-06-30 LAB
ANION GAP SERPL CALCULATED.3IONS-SCNC: 3 MMOL/L (ref 4–13)
BACTERIA UR CULT: ABNORMAL
BASOPHILS # BLD AUTO: 0.03 THOUSANDS/ΜL (ref 0–0.1)
BASOPHILS NFR BLD AUTO: 1 % (ref 0–1)
BUN SERPL-MCNC: 12 MG/DL (ref 5–25)
CALCIUM SERPL-MCNC: 8.5 MG/DL (ref 8.3–10.1)
CHLORIDE SERPL-SCNC: 108 MMOL/L (ref 100–108)
CO2 SERPL-SCNC: 29 MMOL/L (ref 21–32)
CREAT SERPL-MCNC: 0.76 MG/DL (ref 0.6–1.3)
EOSINOPHIL # BLD AUTO: 0.13 THOUSAND/ΜL (ref 0–0.61)
EOSINOPHIL NFR BLD AUTO: 3 % (ref 0–6)
ERYTHROCYTE [DISTWIDTH] IN BLOOD BY AUTOMATED COUNT: 11.9 % (ref 11.6–15.1)
GFR SERPL CREATININE-BSD FRML MDRD: 118 ML/MIN/1.73SQ M
GLUCOSE SERPL-MCNC: 88 MG/DL (ref 65–140)
HCT VFR BLD AUTO: 42.7 % (ref 36.5–49.3)
HGB BLD-MCNC: 14.7 G/DL (ref 12–17)
IMM GRANULOCYTES # BLD AUTO: 0.01 THOUSAND/UL (ref 0–0.2)
IMM GRANULOCYTES NFR BLD AUTO: 0 % (ref 0–2)
LYMPHOCYTES # BLD AUTO: 1.63 THOUSANDS/ΜL (ref 0.6–4.47)
LYMPHOCYTES NFR BLD AUTO: 41 % (ref 14–44)
MAGNESIUM SERPL-MCNC: 1.9 MG/DL (ref 1.6–2.6)
MCH RBC QN AUTO: 31.5 PG (ref 26.8–34.3)
MCHC RBC AUTO-ENTMCNC: 34.4 G/DL (ref 31.4–37.4)
MCV RBC AUTO: 91 FL (ref 82–98)
MONOCYTES # BLD AUTO: 0.49 THOUSAND/ΜL (ref 0.17–1.22)
MONOCYTES NFR BLD AUTO: 12 % (ref 4–12)
NEUTROPHILS # BLD AUTO: 1.68 THOUSANDS/ΜL (ref 1.85–7.62)
NEUTS SEG NFR BLD AUTO: 43 % (ref 43–75)
NRBC BLD AUTO-RTO: 0 /100 WBCS
PHOSPHATE SERPL-MCNC: 3.4 MG/DL (ref 2.7–4.5)
PLATELET # BLD AUTO: 164 THOUSANDS/UL (ref 149–390)
PMV BLD AUTO: 9.6 FL (ref 8.9–12.7)
POTASSIUM SERPL-SCNC: 4.2 MMOL/L (ref 3.5–5.3)
PROCALCITONIN SERPL-MCNC: 0.86 NG/ML
RBC # BLD AUTO: 4.67 MILLION/UL (ref 3.88–5.62)
SODIUM SERPL-SCNC: 140 MMOL/L (ref 136–145)
WBC # BLD AUTO: 3.97 THOUSAND/UL (ref 4.31–10.16)

## 2019-06-30 PROCEDURE — 84145 PROCALCITONIN (PCT): CPT | Performed by: FAMILY MEDICINE

## 2019-06-30 PROCEDURE — 85025 COMPLETE CBC W/AUTO DIFF WBC: CPT | Performed by: FAMILY MEDICINE

## 2019-06-30 PROCEDURE — 83735 ASSAY OF MAGNESIUM: CPT | Performed by: FAMILY MEDICINE

## 2019-06-30 PROCEDURE — 84100 ASSAY OF PHOSPHORUS: CPT | Performed by: FAMILY MEDICINE

## 2019-06-30 PROCEDURE — 80048 BASIC METABOLIC PNL TOTAL CA: CPT | Performed by: FAMILY MEDICINE

## 2019-06-30 RX ORDER — AMOXICILLIN AND CLAVULANATE POTASSIUM 875; 125 MG/1; MG/1
1 TABLET, FILM COATED ORAL EVERY 12 HOURS SCHEDULED
Qty: 8 TABLET | Refills: 0 | Status: SHIPPED | OUTPATIENT
Start: 2019-06-30 | End: 2019-07-04

## 2019-06-30 RX ADMIN — POLYETHYLENE GLYCOL 3350 17 G: 17 POWDER, FOR SOLUTION ORAL at 09:14

## 2019-06-30 RX ADMIN — Medication 1 TABLET: at 09:14

## 2019-06-30 RX ADMIN — PANTOPRAZOLE SODIUM 40 MG: 40 TABLET, DELAYED RELEASE ORAL at 09:14

## 2019-06-30 RX ADMIN — AMOXICILLIN AND CLAVULANATE POTASSIUM 1 TABLET: 875; 125 TABLET, FILM COATED ORAL at 09:20

## 2019-06-30 RX ADMIN — Medication 1000 MG: at 09:14

## 2019-06-30 RX ADMIN — SERTRALINE HYDROCHLORIDE 100 MG: 100 TABLET ORAL at 09:14

## 2019-06-30 RX ADMIN — DOCUSATE SODIUM 100 MG: 100 CAPSULE, LIQUID FILLED ORAL at 09:14

## 2019-06-30 RX ADMIN — MODAFINIL 200 MG: 100 TABLET ORAL at 09:18

## 2019-06-30 NOTE — PLAN OF CARE
Problem: Potential for Falls  Goal: Patient will remain free of falls  Description  INTERVENTIONS:  - Assess patient frequently for physical needs  -  Identify cognitive and physical deficits and behaviors that affect risk of falls    -  [place] fall precautions as indicated by assessment   - Educate patient/family on patient safety including physical limitations  - Instruct patient to call for assistance with activity based on assessment  - Modify environment to reduce risk of injury  - Consider OT/PT consult to assist with strengthening/mobility  6/30/2019 1409 by Tala العراقي RN  Outcome: Completed  6/30/2019 1022 by Tala العراقي RN  Outcome: Progressing     Problem: PAIN - ADULT  Goal: Verbalizes/displays adequate comfort level or baseline comfort level  Description  Interventions:  - Encourage patient to monitor pain and request assistance  - Assess pain using appropriate pain scale  - Administer analgesics based on type and severity of pain and evaluate response  - Implement non-pharmacological measures as appropriate and evaluate response  - Consider cultural and social influences on pain and pain management  - Notify physician/advanced practitioner if interventions unsuccessful or patient reports new pain  6/30/2019 1409 by Tala العراقي RN  Outcome: Completed  6/30/2019 1022 by Tala العراقي RN  Outcome: Progressing     Problem: INFECTION - ADULT  Goal: Absence or prevention of progression during hospitalization  Description  INTERVENTIONS:  - Assess and monitor for signs and symptoms of infection  - Monitor lab/diagnostic results  - Monitor all insertion sites, i e  indwelling lines, tubes, and drains  - Monitor endotracheal (as able) and nasal secretions for changes in amount and color  - [place] appropriate cooling/warming therapies per order  - Administer medications as ordered  - Instruct and encourage patient and family to use good hand hygiene technique  - Identify and instruct in appropriate isolation precautions for identified infection/condition  6/30/2019 1409 by Tavares Squires RN  Outcome: Completed  6/30/2019 1022 by Tavares Squires RN  Outcome: Progressing  Goal: Absence of fever/infection during neutropenic period  Description  INTERVENTIONS:  - Monitor WBC  - Implement neutropenic guidelines  6/30/2019 1409 by Tavares Squires RN  Outcome: Completed  6/30/2019 1022 by Tavares Squires RN  Outcome: Progressing     Problem: SAFETY ADULT  Goal: Patient will remain free of falls  Description  INTERVENTIONS:  - Assess patient frequently for physical needs  -  Identify cognitive and physical deficits and behaviors that affect risk of falls    -  [place] fall precautions as indicated by assessment   - Educate patient/family on patient safety including physical limitations  - Instruct patient to call for assistance with activity based on assessment  - Modify environment to reduce risk of injury  - Consider OT/PT consult to assist with strengthening/mobility  6/30/2019 1409 by Tavares Squires RN  Outcome: Completed  6/30/2019 1022 by Tavares Squires RN  Outcome: Progressing  Goal: Maintain or return to baseline ADL function  Description  INTERVENTIONS:  -  Assess patient's ability to carry out ADLs; assess patient's baseline for ADL function and identify physical deficits which impact ability to perform ADLs (bathing, care of mouth/teeth, toileting, grooming, dressing, etc )  - Assess/evaluate cause of self-care deficits   - Assess range of motion  - Assess patient's mobility; develop plan if impaired  - Assess patient's need for assistive devices and provide as appropriate  - Encourage maximum independence but intervene and supervise when necessary  ¯ Involve family in performance of ADLs  ¯ Assess for home care needs following discharge   ¯ Request OT consult to assist with ADL evaluation and planning for discharge  ¯ Provide patient education as appropriate  6/30/2019 1409 by Bryce Abdi RN  Outcome: Completed  6/30/2019 1022 by Bryce Abdi RN  Outcome: Progressing  Goal: Maintain or return mobility status to optimal level  Description  INTERVENTIONS:  - Assess patient's baseline mobility status (ambulation, transfers, stairs, etc )    - Identify cognitive and physical deficits and behaviors that affect mobility  - Identify mobility aids required to assist with transfers and/or ambulation (gait belt, sit-to-stand, lift, walker, cane, etc )  - [place] fall precautions as indicated by assessment  - Record patient progress and toleration of activity level on Mobility SBAR; progress patient to next Phase/Stage  - Instruct patient to call for assistance with activity based on assessment  - Request Rehabilitation consult to assist with strengthening/weightbearing, etc   6/30/2019 1409 by Bryce Abdi RN  Outcome: Completed  6/30/2019 1022 by Bryce Abdi RN  Outcome: Progressing     Problem: DISCHARGE PLANNING  Goal: Discharge to home or other facility with appropriate resources  Description  INTERVENTIONS:  - Identify barriers to discharge w/patient and caregiver  - Arrange for needed discharge resources and transportation as appropriate  - Identify discharge learning needs (meds, wound care, etc )  - Arrange for interpretive services to assist at discharge as needed  - Refer to Case Management Department for coordinating discharge planning if the patient needs post-hospital services based on physician/advanced practitioner order or complex needs related to functional status, cognitive ability, or social support system  6/30/2019 1409 by Bryce Abdi RN  Outcome: Completed  6/30/2019 1022 by Bryce Abdi RN  Outcome: Progressing     Problem: Knowledge Deficit  Goal: Patient/family/caregiver demonstrates understanding of disease process, treatment plan, medications, and discharge instructions  Description  Complete learning assessment and assess knowledge base    Interventions:  - Provide teaching at level of understanding  - Provide teaching via preferred learning methods  6/30/2019 1409 by Nancy Zeng RN  Outcome: Completed  6/30/2019 1022 by Nancy Zeng RN  Outcome: Progressing     Problem: Prexisting or High Potential for Compromised Skin Integrity  Goal: Skin integrity is maintained or improved  Description  INTERVENTIONS:  - Identify patients at risk for skin breakdown  - Assess and monitor skin integrity  - Assess and monitor nutrition and hydration status  - Monitor labs (i e  albumin)  - Assess for incontinence   - Turn and reposition patient  - Assist with mobility/ambulation  - Relieve pressure over bony prominences  - Avoid friction and shearing  - Provide appropriate hygiene as needed including keeping skin clean and dry  - Evaluate need for skin moisturizer/barrier cream  - Collaborate with interdisciplinary team (i e  Nutrition, Rehabilitation, etc )   - Patient/family teaching  6/30/2019 1409 by Nancy Zeng RN  Outcome: Completed  6/30/2019 1022 by Nancy Zeng RN  Outcome: Progressing     Problem: RESPIRATORY - ADULT  Goal: Achieves optimal ventilation and oxygenation  Description  INTERVENTIONS:  - Assess for changes in respiratory status  - Assess for changes in mentation and behavior  - Position to facilitate oxygenation and minimize respiratory effort  - Oxygen administration by appropriate delivery method based on oxygen saturation (per order) or ABGs  - Initiate smoking cessation education as indicated  - Encourage broncho-pulmonary hygiene including cough, deep breathe, Incentive Spirometry  - Assess the need for suctioning and aspirate as needed  - Assess and instruct to report SOB or any respiratory difficulty  - Respiratory Therapy support as indicated  6/30/2019 1409 by Nancy Zeng RN  Outcome: Completed  6/30/2019 1022 by Nancy Zeng RN  Outcome: Progressing

## 2019-06-30 NOTE — PROGRESS NOTES
Notified by patient's caretaker "will his diet change on discharge? Do you think he should have a mechanical soft diet at home?  i've noticed he has been coughing a lot when eating"; dr strong aware; instructed that patient's mother declined speech and swallow eval here and requesting to have it done at home with his staff; the orders on dc will remain the same and he can follow up at home with his speech therapist

## 2019-06-30 NOTE — PROGRESS NOTES
Met with Adina Whalen, patient's mother, and caregiver at bedside to give her updates regarding plan for [place]  Blood cxs [id number], patient remains afebrile and appears to be feeling better clinically, as he is awake now and sitting up in wheelchair smiling and engaging  Respiratory status remains stable  Patient changed to augmentin 875 mg BID as empiric coverage for both aspiration pneumonia and CAP, which was verified as therapeutic dosing for both CAP & aspiration per discussion with ID pharmacist today  Plan is for 5 days total of augmentin  S  pneumo and legionella ags both negative  Urine Cx >100K enterococcus faecalis, awaiting sensitivities; however, if not amp-resistant, augmentin will be sufficient to treat UTI as well  Added pyridium for subjective dysuria after discussion with mother, as patient is indicating he has discomfort with urination  Due to specific gravity 1 020 on initial UA and BP running slightly lower than his typical baseline, will give 1L NS bolus at this time over 2 hours and D/C IV line thereafter  Will continue to monitor VS overnight, then repeat procal and labs in the morning  As long as procal is trending down, other labs remain stable with patient remaining afebrile, VSS & WNL, plan to D/C home tomorrow with augmentin x5d, with possible tailoring of abx depending of final urine sensitivities if ampicillin-resistant enterococcus      CHANTE Alex  PGY-2  [place] [id number]

## 2019-06-30 NOTE — PLAN OF CARE
Problem: Potential for Falls  Goal: Patient will remain free of falls  Description  INTERVENTIONS:  - Assess patient frequently for physical needs  -  Identify cognitive and physical deficits and behaviors that affect risk of falls    -  [place] fall precautions as indicated by assessment   - Educate patient/family on patient safety including physical limitations  - Instruct patient to call for assistance with activity based on assessment  - Modify environment to reduce risk of injury  - Consider OT/PT consult to assist with strengthening/mobility  Outcome: Progressing     Problem: PAIN - ADULT  Goal: Verbalizes/displays adequate comfort level or baseline comfort level  Description  Interventions:  - Encourage patient to monitor pain and request assistance  - Assess pain using appropriate pain scale  - Administer analgesics based on type and severity of pain and evaluate response  - Implement non-pharmacological measures as appropriate and evaluate response  - Consider cultural and social influences on pain and pain management  - Notify physician/advanced practitioner if interventions unsuccessful or patient reports new pain  Outcome: Progressing     Problem: INFECTION - ADULT  Goal: Absence or prevention of progression during hospitalization  Description  INTERVENTIONS:  - Assess and monitor for signs and symptoms of infection  - Monitor lab/diagnostic results  - Monitor all insertion sites, i e  indwelling lines, tubes, and drains  - Monitor endotracheal (as able) and nasal secretions for changes in amount and color  - [place] appropriate cooling/warming therapies per order  - Administer medications as ordered  - Instruct and encourage patient and family to use good hand hygiene technique  - Identify and instruct in appropriate isolation precautions for identified infection/condition  Outcome: Progressing  Goal: Absence of fever/infection during neutropenic period  Description  INTERVENTIONS:  - Monitor WBC  - Implement neutropenic guidelines  Outcome: Progressing     Problem: SAFETY ADULT  Goal: Patient will remain free of falls  Description  INTERVENTIONS:  - Assess patient frequently for physical needs  -  Identify cognitive and physical deficits and behaviors that affect risk of falls    -  [place] fall precautions as indicated by assessment   - Educate patient/family on patient safety including physical limitations  - Instruct patient to call for assistance with activity based on assessment  - Modify environment to reduce risk of injury  - Consider OT/PT consult to assist with strengthening/mobility  Outcome: Progressing  Goal: Maintain or return to baseline ADL function  Description  INTERVENTIONS:  -  Assess patient's ability to carry out ADLs; assess patient's baseline for ADL function and identify physical deficits which impact ability to perform ADLs (bathing, care of mouth/teeth, toileting, grooming, dressing, etc )  - Assess/evaluate cause of self-care deficits   - Assess range of motion  - Assess patient's mobility; develop plan if impaired  - Assess patient's need for assistive devices and provide as appropriate  - Encourage maximum independence but intervene and supervise when necessary  ¯ Involve family in performance of ADLs  ¯ Assess for home care needs following discharge   ¯ Request OT consult to assist with ADL evaluation and planning for discharge  ¯ Provide patient education as appropriate  Outcome: Progressing  Goal: Maintain or return mobility status to optimal level  Description  INTERVENTIONS:  - Assess patient's baseline mobility status (ambulation, transfers, stairs, etc )    - Identify cognitive and physical deficits and behaviors that affect mobility  - Identify mobility aids required to assist with transfers and/or ambulation (gait belt, sit-to-stand, lift, walker, cane, etc )  - [place] fall precautions as indicated by assessment  - Record patient progress and toleration of activity level on Mobility SBAR; progress patient to next Phase/Stage  - Instruct patient to call for assistance with activity based on assessment  - Request Rehabilitation consult to assist with strengthening/weightbearing, etc   Outcome: Progressing     Problem: DISCHARGE PLANNING  Goal: Discharge to home or other facility with appropriate resources  Description  INTERVENTIONS:  - Identify barriers to discharge w/patient and caregiver  - Arrange for needed discharge resources and transportation as appropriate  - Identify discharge learning needs (meds, wound care, etc )  - Arrange for interpretive services to assist at discharge as needed  - Refer to Case Management Department for coordinating discharge planning if the patient needs post-hospital services based on physician/advanced practitioner order or complex needs related to functional status, cognitive ability, or social support system  Outcome: Progressing     Problem: Knowledge Deficit  Goal: Patient/family/caregiver demonstrates understanding of disease process, treatment plan, medications, and discharge instructions  Description  Complete learning assessment and assess knowledge base    Interventions:  - Provide teaching at level of understanding  - Provide teaching via preferred learning methods  Outcome: Progressing     Problem: Prexisting or High Potential for Compromised Skin Integrity  Goal: Skin integrity is maintained or improved  Description  INTERVENTIONS:  - Identify patients at risk for skin breakdown  - Assess and monitor skin integrity  - Assess and monitor nutrition and hydration status  - Monitor labs (i e  albumin)  - Assess for incontinence   - Turn and reposition patient  - Assist with mobility/ambulation  - Relieve pressure over bony prominences  - Avoid friction and shearing  - Provide appropriate hygiene as needed including keeping skin clean and dry  - Evaluate need for skin moisturizer/barrier cream  - Collaborate with interdisciplinary team (i e  Nutrition, Rehabilitation, etc )   - Patient/family teaching  Outcome: Progressing     Problem: RESPIRATORY - ADULT  Goal: Achieves optimal ventilation and oxygenation  Description  INTERVENTIONS:  - Assess for changes in respiratory status  - Assess for changes in mentation and behavior  - Position to facilitate oxygenation and minimize respiratory effort  - Oxygen administration by appropriate delivery method based on oxygen saturation (per order) or ABGs  - Initiate smoking cessation education as indicated  - Encourage broncho-pulmonary hygiene including cough, deep breathe, Incentive Spirometry  - Assess the need for suctioning and aspirate as needed  - Assess and instruct to report SOB or any respiratory difficulty  - Respiratory Therapy support as indicated  Outcome: Progressing

## 2019-06-30 NOTE — NURSING NOTE
AM protonix retimed to 9am as per patient's mother's request; this is how he takes the med at home  Right forearm IV site left intact  Mother wishes this to be discontinued later in the day when patient is awake  Will pass on to oncoming RN

## 2019-06-30 NOTE — DISCHARGE SUMMARY
Discharge Summary - Alanna Hagen 26 y o  male MRN: [id number]    [facility] Encounter: [id number]    Admission Date:   Admission Orders (From admission, onward)    Ordered        06/28/19 1329  Place in Observation  Once             Admitting Diagnosis: Altered mental status [R41 82]  Known medical problems [Z78 9]    HPI:   Per Dr King Mouse: "Ben Valdez is a 26 y o  male with past medical history of spastic quadriparesis secondary to anoxic brain injury from cardiac arrest at age 13, long QT syndrome, GERD, urinary incontinence and history of multiple UTIs who presents with change in mental status  History from mother who reports patient returned from a trip to [facility] yesterday  Per mother,  Patient's caregiver called EMS because patient had change in baseline mental status given by eyes rolling up and looking somewhat lethargic  On arrival at the emergency room, vitals were noted to be within normal limits and blood work was concerning for increased procalcitonin of 0 86  However lactic acid was normal at 1 2, CBC and BMP were all within normal limits  Chest x-ray was concerning for right lower lobe infiltrate likely due to early pneumonia with possibility of aspiration pneumonia  Of note, patient was diagnosed with dysphagia in July 2018 and was recently cleared for  thin liquids within the past 3 months      ED Management:  Ceftriaxone 1 g IV x1, azithromycin 500 mg x1, IV fluids"    Procedures Performed: No orders of the defined types were placed in this encounter  Summary of Hospital Course:   Pt was found to have UTI and RLL PNA, initially started on Azithromycin, Ceftriaxone which all were discontinued at day 2, switched to Augmentin  Clinical has well responded with mental status was back to baseline, procalcitonin trending down, afebrile, no leukocytosis, cough improving, no further dysuria       With suspected aspiration pneumonia, pt was recommended to have swallowing and speech test but his biological mother would like to have it done by [facility] o/p  Aspiration precaution is discussed at length with patient and mother  Pt would benefits from chest therapy in order to help with loosening mucus and moving the airway  Pt will follow up with pulmonologist o/p  Urine culture is positive for enterococcus faecalis, its sensitivity has been still pending  Will continue Augmentin for another 5 days  Will contact with caregiver---mother---regarding sensitivity of urine culture and adjustment of abx if needed  Deemed stable to discharge home today  Follow up outpatient with Dr Dante Gamboa, new PCP  Complications: none    Discharge Diagnosis:   1  UTI  2  PNA    Condition at Discharge: stable     Discharge instructions/Information to patient and family:   See after visit summary for information provided to patient and family  Provisions for Follow-Up Care:  See after visit summary for information related to follow-up care and any pertinent home health orders  PCP: Yuki Bowen MD    Disposition: Home    Planned Readmission: No    Discharge Statement   I spent 29 minutes discharging the patient  This time was spent on the day of discharge  I had direct contact with the patient on the day of discharge  Additional documentation is required if more than 30 minutes were spent on discharge  Discharge Medications:  See after visit summary for reconciled discharge medications provided to patient and family

## 2019-07-01 ENCOUNTER — TRANSITIONAL CARE MANAGEMENT (OUTPATIENT)
Dept: FAMILY MEDICINE CLINIC | Facility: CLINIC | Age: 34
End: 2019-07-01

## 2019-07-01 ENCOUNTER — TELEPHONE (OUTPATIENT)
Dept: FAMILY MEDICINE CLINIC | Facility: CLINIC | Age: 34
End: 2019-07-01

## 2019-07-01 DIAGNOSIS — H61.23 BILATERAL IMPACTED CERUMEN: Primary | ICD-10-CM

## 2019-07-01 NOTE — TELEPHONE ENCOUNTER
Per Care taker Hayley Machado needs to keep his appointment for 07/05 for a phyiscal,But I was able to get patient in for tomorrow for TCM 7/2  Thanks

## 2019-07-01 NOTE — TELEPHONE ENCOUNTER
Patient is schedule for Annual PX and Ear Wax removal 60 Mins per patients mother she would like for Naman to have his PX done  Are you able to select a different date for TCM? Thank you in advance

## 2019-07-01 NOTE — TELEPHONE ENCOUNTER
called to request a change to [facility] directions  Needs to say either 1 day or  2 days a week  Kassie Bills said Dr Karishma Love had previosly added to ENT order at visit on 6/13   Please let Melyssa Coburn know

## 2019-07-01 NOTE — TELEPHONE ENCOUNTER
We need to do f/u on his pneumonia, the TCM should be done first  We have ordered drops for his ear wax as ordered by ENT, this will be used 2 times a week

## 2019-07-02 ENCOUNTER — OFFICE VISIT (OUTPATIENT)
Dept: FAMILY MEDICINE CLINIC | Facility: CLINIC | Age: 34
End: 2019-07-02

## 2019-07-02 ENCOUNTER — TELEPHONE (OUTPATIENT)
Dept: FAMILY MEDICINE CLINIC | Facility: CLINIC | Age: 34
End: 2019-07-02

## 2019-07-02 VITALS
WEIGHT: 134 LBS | SYSTOLIC BLOOD PRESSURE: 118 MMHG | BODY MASS INDEX: 19.18 KG/M2 | DIASTOLIC BLOOD PRESSURE: 70 MMHG | HEIGHT: 70 IN | RESPIRATION RATE: 16 BRPM | TEMPERATURE: 98.8 F | HEART RATE: 74 BPM

## 2019-07-02 DIAGNOSIS — N39.0 FREQUENT UTI: ICD-10-CM

## 2019-07-02 DIAGNOSIS — I69.354 SPASTIC HEMIPLEGIA OF LEFT NONDOMINANT SIDE AS LATE EFFECT OF CEREBRAL INFARCTION (HCC): ICD-10-CM

## 2019-07-02 DIAGNOSIS — J69.0 ASPIRATION PNEUMONIA, UNSPECIFIED ASPIRATION PNEUMONIA TYPE, UNSPECIFIED LATERALITY, UNSPECIFIED PART OF LUNG (HCC): Primary | ICD-10-CM

## 2019-07-02 DIAGNOSIS — J98.11 ATELECTASIS: ICD-10-CM

## 2019-07-02 PROCEDURE — G0438 PPPS, INITIAL VISIT: HCPCS | Performed by: FAMILY MEDICINE

## 2019-07-02 NOTE — TELEPHONE ENCOUNTER
----- Message from Watson Hagen sent at 6/30/2019  1:32 PM EDT -----  Regarding: Test Results Question  Contact: [phone] hi again,   I saw that new test results are now in the patient portal  Can you call, at your convenience, to discuss? Or, send a summary of findings from Naman's recent hospital stay so that we can develop strategies to prevent further infections     Thank you,   Baltazar Alves  [phone]

## 2019-07-02 NOTE — PROGRESS NOTES
Goldie Tannerkaterine 1985 male MRN: [id number]    [facility] Transition of Care Visit      SUBJECTIVE    CC: SABA PRIETO    Transitional Care Management Review:  Aaliyah Rizzo is a 26 y o  male here for TCM follow up  Admitted 6/28 -6/30 forenterococcus faecalis positive UTI and RLL PNA  Initially started on Azithromycin, Ceftriaxone which all were discontinued at day 2, switched to Augmentin  He improved clinically and procalcitonin was trending downwards  He was also back to baseline mentation  Given inability to clear airway and risk of aspiration PNA, swallowing and speech evalaution was suggested but family wanted it done o/p       Today, patient is doing much better  Working on trying to get patient to sit up right to help with clearing mucous  On day 3/5 of Augmentin  Mom would like a referral to speech  Requesting Timo Rios at [place] ( fax # [phone] Timo Rios)  Also requesting urology referral given his history of recurrent UTIs    During the TCM phone call patient stated:    TCM Call (since 6/3/2019)     Date and time call was made  7/1/2019  2:12 PM    Hospital care reviewed  Records reviewed    Patient was hospitialized at  Replaced by [facility]    Date of Admission  06/28/19    Date of discharge  06/30/19    Diagnosis  Right lower lobe pulmonary infiltrate, Urinary tract infection    Disposition  Home    Were the patients medications reviewed and updated  Yes    Current Symptoms  Cough    Cough Severity  Mild      TCM Call (since 6/3/2019)     Post hospital issues  Reduced activity    Should patient be enrolled in anticoag monitoring? No    Scheduled for follow up?   Yes    Did you obtain your prescribed medications  Yes    Do you need help managing your prescriptions or medications  No    Is transportation to your appointment needed  No    I have advised the patient to call PCP with any new or worsening symptoms  [facility] [address] Home care staff    The type of support provided  Emotional; Physical    Do you have social support  Yes, as much as I need    Are you recieving any outpatient services  No    Are you recieving home care services  No    Are you using any community resources  No    Current waiver services  No    Have you fallen in the last 12 months  No    Interperter language line needed  No    Counseling  Caregiver    Counseling topics  Activities of daily living;  Importance of RX compliance; instructions for management          Review of Systems   Unable to perform ROS: Patient nonverbal       Historical Information     The patient history was reviewed as follows:    Past Medical History:   Diagnosis Date    Allergic rhinitis     Brain anoxia ([facility] )     Cardiac arrest [facility])     age 13 s/p long Q-T syndrome    Community acquired pneumonia     last assessed/resolved:  10/9/2014    Depression     GERD (gastroesophageal reflux disease)     Long Q-T syndrome     Muscular rigidity and spasm, progressive     Osteopenia     Osteoporosis     [facility]     Spastic neurogenic bladder     Urinary incontinence     Urinary tract infection without hematuria 4/27/2019     Past Surgical History:   Procedure Laterality Date    APPENDECTOMY  1991    WISDOM TOOTH EXTRACTION       Family History   Problem Relation Age of Onset    Other Father         mitral valve replaced    Hypertension Father     Diabetes type II Maternal Grandfather     Diabetes type II Maternal Uncle       Social History   Social History     Substance and Sexual Activity   Alcohol Use Not Currently    Frequency: Never    Binge frequency: Never     Social History     Substance and Sexual Activity   Drug Use Not Currently     Social History     Tobacco Use   Smoking Status Never Smoker   Smokeless Tobacco Never Used       Medications:   Meds/Allergies     Current Outpatient Medications:     amoxicillin-clavulanate (AUGMENTIN) 875-125 mg per tablet, Take 1 tablet by mouth every 12 (twelve) hours for 8 doses, Disp: 8 tablet, Rfl: 0    b complex-vitamin C-folic acid (RENAL) 1 mg, - TAKE 1 CAPSULE BY MOUTH ONCE EVERY DAY AT AT 2PM FOR HEALTH MAINTENANCE, Disp: 31 each, Rfl: 11    carbamide peroxide (DEBROX) 6 5 % otic solution, Administer 5 drops into both ears 2 (two) times a week, Disp: 15 mL, Rfl: 5    coenzyme Q-10 100 MG capsule, 2 SOFTGELS(200MG) BY MOUTH DAILY @ 9AM (SUPPLEMENT) *LATONIA, Disp: 60 capsule, Rfl: 0    DEEP SEA NASAL SPRAY 0 65 % nasal spray, - INHALE 2 SPRAYS IN EACH NOSTRIL AS NEEDED FOR CONGESTION, Disp: 44 mL, Rfl: 11    Diapers & Supplies (CUTIES SIZE 3) MISC, Please provide 10 per day, Disp: 300 each, Rfl: 11    Diapers & Supplies (HUGGIES LITTLE MOVERS SIZE 3) MISC, by Does not apply route, Disp: , Rfl:     Disposable Gloves (VINYL GLOVES MEDIUM) MISC, by Does not apply route 4 (four) times a day Dispense 3 boxes monthly; Diagnosis R32, Disp: 3 each, Rfl: 5    docusate sodium (COLACE) 100 mg capsule, 100 mg 9am and 9pm, Disp: 10 capsule, Rfl: 5    dronabinol (MARINOL) 2 5 mg capsule, - *FRIG-SHELF TAKE 1 CAPSULE BY MOUTH FOUR TIMES DAILY (9AM,2PM,6PM,9PM) FOR POOR APPETITE, Disp: 120 capsule, Rfl: 5    ENEMEEZ MINI 283 MG enema, - INSERT 5ML PER RECTUM AS NEEDED FOR CONSTIPATION, Disp: 5 each, Rfl: 11    ENEMEEZ PLUS  MG ENEM, use as directed, Disp: 150 mL, Rfl: 0    Ginkgo Biloba Extract 40 MG CAPS, 3 CAPS(120MG) BY MOUTH DAILY @ 2PM (SUPPLEMENT) *LATONIA, Disp: 90 capsule, Rfl: 0    GNP VITAMIN C 500 MG tablet, 1 TAB BY MOUTH DAILY @ 2PM (SUPPLEMENT) *LATONIA, Disp: 30 tablet, Rfl: 0    HEMORRHOIDAL 0 25 % suppository, - UNWRAP AND INSERT 1 SUPPOSITORY PER RECTUM AS NEEDED FOR HEMORRHOID PAIN RELIEF, Disp: 12 suppository, Rfl: 11    ibuprofen (MOTRIN) 200 mg tablet, Take 2 tabs q6h PRN for pain not to exceed 2000mg per day, Disp: 200 tablet, Rfl: 2    Incontinence Supply Disposable (PREVAIL AIR BRIEFS) MISC, 1 each by Does not apply route every 4 (four) hours Please provide 5 briefs per day, Disp: 150 each, Rfl: 11    Incontinence Supply Disposable (SELECT DISPOSABLE UNDERWEAR SM) MISC, Please provide a 30 day supply 10 per day DX R32 incontinence, Disp: 22 each, Rfl: 6    loratadine (CLARITIN) 10 mg tablet, - TAKE 1 TABLET BY MOUTH ONCE EVERY DAY AS NEEDED FOR ALLERGIES, Disp: 30 tablet, Rfl: 11    Magnesium Oxide 500 MG TABS, - TAKE 1 TABLET BY MOUTH ONCE EVERY DAY AT 2PM FOR HEALTH MAINTENANCE, Disp: 31 each, Rfl: 11    Melatonin 5 MG TABS, - TAKE 1 TABLET BY MOUTH EVERY NIGHT AT BEDTIME AT 9PM, Disp: 31 each, Rfl: 11    metoprolol tartrate (LOPRESSOR) 25 mg tablet, 1/2 TAB(12 5MG) BY MOUTH DAILY @ 9AM (HTN) *LATONIA, Disp: 15 tablet, Rfl: 0    Misc   Devices [facility] MISC, Please provide pt with a new cord for power wheelchair, Disp: 1 each, Rfl: 0    modafinil (PROVIGIL) 100 mg tablet, - TAKE 2 TABLETS (200MG)  BY MOUTH ONCE EVERY DAY AT 9AM FOR HEALTH FOR BRAIN INJURY, Disp: 62 tablet, Rfl: 5    Multiple Vitamins-Minerals (CEROVITE SENIOR) TABS, - TAKE 1 TABLET BY MOUTH ONCE EVERY DAY AT 9AM FOR HEALTH MAINTENANCE, Disp: 31 tablet, Rfl: 11    Nutritional Supplements (NUTRITIONAL SHAKE) LIQD, Take 1 Can by mouth 2 (two) times a day as needed (nutritional supplementation, weight gain) Please provide Boost, Disp: 24 Bottle, Rfl: 6    Omega-3 Fatty Acids (FISH OIL) 1,000 mg, 1 CAPSULE BY MOUTH DAILY @ 9AM (SUPPLEMENT) *LATONIA, Disp: 30 capsule, Rfl: 0    omeprazole (PriLOSEC) 20 mg delayed release capsule, Take 20mg at 9am every day, Disp: 30 capsule, Rfl: 5    onabotulinumtoxin A (BOTOX) 100 units, inject 500 units into left gastroc soleus and abductor pollicis ankit, Disp: , Rfl:     phenazopyridine (PYRIDIUM) 200 mg tablet, Take 1 tablet (200 mg total) by mouth every 8 (eight) hours as needed for bladder spasms (burning with urination), Disp: 3 tablet, Rfl: 0    polyethylene glycol (MIRALAX) 17 g packet, Take 17 g by mouth every other day Take Monday, Wednesday, Friday at 0800, Disp: 14 each, Rfl: 0    PREPARATION H 1-0 25-14 4-15 % rectal cream, - APPLY RECTALLY AS NEEDED FOR HEMORRHOID PAIN RELIEF, Disp: 51 g, Rfl: 11    ranitidine (ZANTAC) 150 mg tablet, ranitidine 150 mg tablet, Disp: , Rfl:     Respiratory Therapy Supplies (SPIROMETER) KIT, by Does not apply route 6 (six) times a day, Disp: 1 kit, Rfl: 0    Respiratory Therapy Supplies (SPIROMETER) KIT, by Does not apply route 3 (three) times a day Do spirometry 3 times daily at 9am, 2pm and 9pm , Disp: 1 kit, Rfl: 0    sertraline (ZOLOFT) 100 mg tablet, - TAKE 1 TABLET BY MOUTH ONCE EVERY DAY AT 9PM FOR DEPRESSION, Disp: 31 tablet, Rfl: 11    Sodium Fluoride (PREVIDENT 5000 PLUS DT), Apply to teeth, Disp: , Rfl:     Starch, Thickening, LIQD, Nectar thick liquids up to honey thick if coughing occurs as needed, please use Simply Thick liquid only  Discontinue Thick-It powder , Disp: 237 mL, Rfl: 5    Tea Tree 100 % OIL, - APPLY TO SKIN FOLDS ONCE EVERY DAY AS NEEDED FOR RASH, Disp: 60 mL, Rfl: 11    VASCEPA 1 g CAPS, - TAKE 1 CAPSULE BY MOUTH ONCE EVERY DAY AT 9AM FOR HEALTH MAINTENANCE, Disp: 31 capsule, Rfl: 11    Vitamins A & D (VITAMIN A & D) ointment, - APPLY TO AFFECTED AREA (BUTTOCKS/ANAL) AS NEEDED, Disp: 454 g, Rfl: 11    wheat dextrin (BENEFIBER), Take 1 packet by mouth daily, Disp: 30 each, Rfl: 3    Zinc 50 MG TABS, Take 50mg daily @ 2:00pm every other month   (May, June, July,  August, September, October 2019), Disp: 30 tablet, Rfl: 5    zinc oxide (DESITIN) 40 % PSTE, Apply topically, Disp: , Rfl:     Coenzyme Q10 100 MG TABS, Take 2 capsules daily by mouth @ 9am, Disp: , Rfl:     Incontinence Supply Disposable (DEPEND UNDERWEAR SM/MED) MISC, by Does not apply route, Disp: , Rfl:     Magnesium 500 MG CAPS, Take 1 capsule (500 mg total) by mouth daily, Disp: 30 capsule, Rfl: 5    nystatin (MYCOSTATIN) powder, Apply topically 2 (two) times a day, Disp: 15 g, Rfl: 0    polyethylene glycol (GLYCOLAX) powder, Mix 17 g (1 capful) in 8 oz of water and take PO QOD PRN if no bowel movement in 3 days, Disp: 255 g, Rfl: 11    ranitidine (ZANTAC) 150 mg tablet, Take 1 tablet (150 mg total) by mouth daily, Disp: 30 tablet, Rfl: 5    Tea Tree Oil OIL, by Does not apply route daily Apply to skin folds  (Patient taking differently: by Does not apply route as needed Apply to skin folds  ), Disp: 1 Bottle, Rfl: 3    Zinc Picolinate 25 MG TABS, Take 1 76 tablets (44 mg total) by mouth daily Take 2 22 mg capsules daily every other month, Disp: 60 each, Rfl: 6  Allergies   Allergen Reactions    Other      drugs that prolong the QT interval  drugs that prolong the QT interval  Seasonal allergies        OBJECTIVE    Vitals:   Vitals:    07/02/19 1457   BP: 118/70   Pulse: 74   Resp: 16   Temp: 98 8 °F (37 1 °C)   Weight: 60 8 kg (134 lb)   Height: 5' 10" (1 778 m)     Physical Exam:     Physical Exam   Constitutional: He appears well-developed and well-nourished  No distress  pleasant   HENT:   Head: Normocephalic and atraumatic  Eyes: Pupils are equal, round, and reactive to light  EOM are normal  Right eye exhibits no discharge  Left eye exhibits no discharge  Neck: Neck supple  No JVD present  Cardiovascular: Normal rate, regular rhythm and normal heart sounds  No murmur heard  Pulmonary/Chest: Effort normal  No stridor  No respiratory distress  He has no wheezes  He has no rales  Abdominal: Soft  He exhibits no distension  There is no tenderness  There is no guarding  Musculoskeletal: He exhibits no edema  Lymphadenopathy:     He has no cervical adenopathy  Neurological: He is alert  Skin: Skin is warm  Capillary refill takes less than 2 seconds  Vitals reviewed  Labs: I have personally reviewed all pertinent results       Admission on 06/28/2019, Discharged on 06/30/2019   Component Date Value Ref Range Status    WBC 06/28/2019 4 44  4 31 - 10 16 Thousand/uL Final    RBC 06/28/2019 4 86  3 88 - 5 62 Million/uL Final    Hemoglobin 06/28/2019 15 1  12 0 - 17 0 g/dL Final    Hematocrit 06/28/2019 44 8  36 5 - 49 3 % Final    MCV 06/28/2019 92  82 - 98 fL Final    MCH 06/28/2019 31 1  26 8 - 34 3 pg Final    MCHC 06/28/2019 33 7  31 4 - 37 4 g/dL Final    RDW 06/28/2019 11 9  11 6 - 15 1 % Final    MPV 06/28/2019 9 5  8 9 - 12 7 fL Final    Platelets 60/19/8324 158  149 - 390 Thousands/uL Final    nRBC 06/28/2019 0  /100 WBCs Final    Neutrophils Relative 06/28/2019 50  43 - 75 % Final    Immat GRANS % 06/28/2019 0  0 - 2 % Final    Lymphocytes Relative 06/28/2019 37  14 - 44 % Final    Monocytes Relative 06/28/2019 9  4 - 12 % Final    Eosinophils Relative 06/28/2019 3  0 - 6 % Final    Basophils Relative 06/28/2019 1  0 - 1 % Final    Neutrophils Absolute 06/28/2019 2 23  1 85 - 7 62 Thousands/µL Final    Immature Grans Absolute 06/28/2019 0 01  0 00 - 0 20 Thousand/uL Final    Lymphocytes Absolute 06/28/2019 1 63  0 60 - 4 47 Thousands/µL Final    Monocytes Absolute 06/28/2019 0 40  0 17 - 1 22 Thousand/µL Final    Eosinophils Absolute 06/28/2019 0 14  0 00 - 0 61 Thousand/µL Final    Basophils Absolute 06/28/2019 0 03  0 00 - 0 10 Thousands/µL Final    Sodium 06/28/2019 139  136 - 145 mmol/L Final    Potassium 06/28/2019 4 4  3 5 - 5 3 mmol/L Final    Chloride 06/28/2019 106  100 - 108 mmol/L Final    CO2 06/28/2019 30  21 - 32 mmol/L Final    ANION GAP 06/28/2019 3* 4 - 13 mmol/L Final    BUN 06/28/2019 14  5 - 25 mg/dL Final    Creatinine 06/28/2019 0 76  0 60 - 1 30 mg/dL Final    Standardized to IDMS reference method    Glucose 06/28/2019 85  65 - 140 mg/dL Final      If the patient is fasting, the ADA then defines impaired fasting glucose as > 100 mg/dL and diabetes as > or equal to 123 mg/dL    Specimen collection should occur prior to Sulfasalazine administration due to the potential for falsely depressed results  Specimen collection should occur prior to Sulfapyridine administration due to the potential for falsely elevated results   Calcium 06/28/2019 8 8  8 3 - 10 1 mg/dL Final    AST 06/28/2019 12  5 - 45 U/L Final      Specimen collection should occur prior to Sulfasalazine administration due to the potential for falsely depressed results   ALT 06/28/2019 21  12 - 78 U/L Final      Specimen collection should occur prior to Sulfasalazine and/or Sulfapyridine administration due to the potential for falsely depressed results   Alkaline Phosphatase 06/28/2019 59  46 - 116 U/L Final    Total Protein 06/28/2019 7 9  6 4 - 8 2 g/dL Final    Albumin 06/28/2019 3 5  3 5 - 5 0 g/dL Final    Total Bilirubin 06/28/2019 0 57  0 20 - 1 00 mg/dL Final    eGFR 06/28/2019 118  ml/min/1 73sq m Final    LACTIC ACID 06/28/2019 1 2  0 5 - 2 0 mmol/L Final    LACTIC ACID 06/28/2019 1 9  0 5 - 2 0 mmol/L Final    Protime 06/28/2019 15 4* 11 6 - 14 5 seconds Final    INR 06/28/2019 1 26* 0 84 - 1 19 Final    PTT 06/28/2019 34  23 - 37 seconds Final    Therapeutic Heparin Range =  60-90 seconds    Procalcitonin 06/28/2019 0 86* <=0 25 ng/ml Final    Comment: Suspected Lower Respiratory Tract Infection (LRTI):  - LESS than or EQUAL to 0 25 ng/mL:   low likelihood for bacterial LRTI; antibiotics DISCOURAGED   - GREATER than 0 25 ng/mL:   increased likelihood for bacterial LRTI; antibiotics ENCOURAGED  Suspected Sepsis:  - Strongly consider initiating antibiotics in ALL UNSTABLE patients  - LESS than or EQUAL to 0 5 ng/mL:   low likelihood for bacterial sepsis; antibiotics DISCOURAGED   - GREATER than 0 5 ng/mL:   increased likelihood for bacterial sepsis; antibiotics ENCOURAGED   - GREATER than 2 ng/mL:   high risk for severe sepsis / septic shock; antibiotics strongly ENCOURAGED  Decisions on antibiotic use should not be based solely on Procalcitonin (PCT) levels   If PCT is low but uncertainty exists with stopping antibiotics, repeat PCT in 6-24 hours to confirm the low level  If antibiotics are administered (regardless if initial PCT was high or low), repeat PCT every 1-2 days to consider early antibiotic cessation (when GREATER                            than 80% decrease from the peak OR when PCT drops below designated cutoffs, whichever comes first), so long as the infection is NOT one that typically requires prolonged treatment durations (e g , bone/joint infections, endocarditis, Staph  aureus bacteremia)  Situations of FALSE-POSITIVE Procalcitonin values:  1) Newborns < 67 hours old  2) Massive stress from severe trauma / burns, major surgery, acute pancreatitis, cardiogenic / hemorrhagic shock, sickle cell crisis, or other organ perfusion abnormalities  3) Malaria and some Candidal infections  4) Treatment with agents that stimulate cytokines (e g , OKT3, anti-lymphocyte globulins, alemtuzumab, IL-2, granulocyte transfusion [NOT GCSFs])  5) Chronic renal disease causes elevated baseline levels (consider GREATER than 0 75 ng/mL as an abnormal cut-off); initiating HD/CRRT may cause transient decreases  6) Paraneoplastic syndromes from medullary thyroid or SCLC, some forms of vasculitis, and acute [id number]                            disease    Situations of FALSE-NEGATIVE Procalcitonin values:  1) Too early in clinical course for PCT to have reached its peak (may repeat in 6-24 hours to confirm low level)  2) Localized infection WITHOUT systemic (SIRS / sepsis) response (e g , an abscess, osteomyelitis, cystitis)  3) Mycobacteria (e g , Tuberculosis, MAC)  4) Cystic fibrosis exacerbations      Blood Culture 06/28/2019 No Growth After 5 Days  Final    Blood Culture 06/28/2019 No Growth After 5 Days     Final    Color, UA 06/28/2019 Yellow   Final    Clarity, UA 06/28/2019 Cloudy   Final    pH, UA 06/28/2019 8 5* 4 5 - 8 0 Final    Leukocytes, UA 06/28/2019 Large* Negative Final    Nitrite, UA 06/28/2019 Negative  Negative Final    Protein, UA 06/28/2019 Trace* Negative mg/dl Final    Glucose, UA 06/28/2019 Negative  Negative mg/dl Final    Ketones, UA 06/28/2019 Negative  Negative mg/dl Final    Urobilinogen, UA 06/28/2019 0 2  0 2, 1 0 E U /dl E U /dl Final    Bilirubin, UA 06/28/2019 Negative  Negative Final    Blood, UA 06/28/2019 Moderate* Negative Final    Specific [place], UA 06/28/2019 1 020  1 003 - 1 030 Final    RBC, UA 06/28/2019 4-10* None Seen, 0-5 /hpf Final    WBC, UA 06/28/2019 10-20* None Seen, 0-5, 5-55, 5-65 /hpf Final    Epithelial Cells 06/28/2019 Occasional  None Seen, Occasional /hpf Final    Bacteria, UA 06/28/2019 Occasional  None Seen, Occasional /hpf Final    AMORPH PHOSPATES 06/28/2019 Occasional  /hpf Final    Urine Culture 06/28/2019 >100,000 cfu/ml Enterococcus faecalis*  Final    Ventricular Rate 06/28/2019 55  BPM Final    Atrial Rate 06/28/2019 55  BPM Final    DE Interval 06/28/2019 166  ms Final    QRSD Interval 06/28/2019 98  ms Final    QT Interval 06/28/2019 430  ms Final    QTC Interval 06/28/2019 411  ms Final    P Axis 06/28/2019 73  degrees Final    QRS Axis 06/28/2019 -44  degrees Final    T Wave [place] 06/28/2019 60  degrees Final    Procalcitonin 06/28/2019 0 81* <=0 25 ng/ml Final    Comment: Suspected Lower Respiratory Tract Infection (LRTI):  - LESS than or EQUAL to 0 25 ng/mL:   low likelihood for bacterial LRTI; antibiotics DISCOURAGED   - GREATER than 0 25 ng/mL:   increased likelihood for bacterial LRTI; antibiotics ENCOURAGED  Suspected Sepsis:  - Strongly consider initiating antibiotics in ALL UNSTABLE patients    - LESS than or EQUAL to 0 5 ng/mL:   low likelihood for bacterial sepsis; antibiotics DISCOURAGED   - GREATER than 0 5 ng/mL:   increased likelihood for bacterial sepsis; antibiotics ENCOURAGED   - GREATER than 2 ng/mL:   high risk for severe sepsis / septic shock; antibiotics strongly ENCOURAGED  Decisions on antibiotic use should not be based solely on Procalcitonin (PCT) levels  If PCT is low but uncertainty exists with stopping antibiotics, repeat PCT in 6-24 hours to confirm the low level  If antibiotics are administered (regardless if initial PCT was high or low), repeat PCT every 1-2 days to consider early antibiotic cessation (when GREATER                            than 80% decrease from the peak OR when PCT drops below designated cutoffs, whichever comes first), so long as the infection is NOT one that typically requires prolonged treatment durations (e g , bone/joint infections, endocarditis, Staph  aureus bacteremia)      Situations of FALSE-POSITIVE Procalcitonin values:  1) Newborns < 67 hours old  2) Massive stress from severe trauma / burns, major surgery, acute pancreatitis, cardiogenic / hemorrhagic shock, sickle cell crisis, or other organ perfusion abnormalities  3) Malaria and some Candidal infections  4) Treatment with agents that stimulate cytokines (e g , OKT3, anti-lymphocyte globulins, alemtuzumab, IL-2, granulocyte transfusion [NOT GCSFs])  5) Chronic renal disease causes elevated baseline levels (consider GREATER than 0 75 ng/mL as an abnormal cut-off); initiating HD/CRRT may cause transient decreases  6) Paraneoplastic syndromes from medullary thyroid or SCLC, some forms of vasculitis, and acute [id number]                            disease    Situations of FALSE-NEGATIVE Procalcitonin values:  1) Too early in clinical course for PCT to have reached its peak (may repeat in 6-24 hours to confirm low level)  2) Localized infection WITHOUT systemic (SIRS / sepsis) response (e g , an abscess, osteomyelitis, cystitis)  3) Mycobacteria (e g , Tuberculosis, MAC)  4) Cystic fibrosis exacerbations      Strep pneumoniae antigen, urine 06/29/2019 Negative  Negative Final    Legionella Urinary Antigen 06/29/2019 Negative  Negative Final    Procalcitonin 06/29/2019 0 91* <=0 25 ng/ml Final    Comment: Suspected Lower Respiratory Tract Infection (LRTI):  - LESS than or EQUAL to 0 25 ng/mL:   low likelihood for bacterial LRTI; antibiotics DISCOURAGED   - GREATER than 0 25 ng/mL:   increased likelihood for bacterial LRTI; antibiotics ENCOURAGED  Suspected Sepsis:  - Strongly consider initiating antibiotics in ALL UNSTABLE patients  - LESS than or EQUAL to 0 5 ng/mL:   low likelihood for bacterial sepsis; antibiotics DISCOURAGED   - GREATER than 0 5 ng/mL:   increased likelihood for bacterial sepsis; antibiotics ENCOURAGED   - GREATER than 2 ng/mL:   high risk for severe sepsis / septic shock; antibiotics strongly ENCOURAGED  Decisions on antibiotic use should not be based solely on Procalcitonin (PCT) levels  If PCT is low but uncertainty exists with stopping antibiotics, repeat PCT in 6-24 hours to confirm the low level  If antibiotics are administered (regardless if initial PCT was high or low), repeat PCT every 1-2 days to consider early antibiotic cessation (when GREATER                            than 80% decrease from the peak OR when PCT drops below designated cutoffs, whichever comes first), so long as the infection is NOT one that typically requires prolonged treatment durations (e g , bone/joint infections, endocarditis, Staph  aureus bacteremia)      Situations of FALSE-POSITIVE Procalcitonin values:  1) Newborns < 67 hours old  2) Massive stress from severe trauma / burns, major surgery, acute pancreatitis, cardiogenic / hemorrhagic shock, sickle cell crisis, or other organ perfusion abnormalities  3) Malaria and some Candidal infections  4) Treatment with agents that stimulate cytokines (e g , OKT3, anti-lymphocyte globulins, alemtuzumab, IL-2, granulocyte transfusion [NOT GCSFs])  5) Chronic renal disease causes elevated baseline levels (consider GREATER than 0 75 ng/mL as an abnormal cut-off); initiating HD/CRRT may cause transient decreases  6) Paraneoplastic syndromes from medullary thyroid or SCLC, some forms of vasculitis, and acute [id number]                            disease    Situations of FALSE-NEGATIVE Procalcitonin values:  1) Too early in clinical course for PCT to have reached its peak (may repeat in 6-24 hours to confirm low level)  2) Localized infection WITHOUT systemic (SIRS / sepsis) response (e g , an abscess, osteomyelitis, cystitis)  3) Mycobacteria (e g , Tuberculosis, MAC)  4) Cystic fibrosis exacerbations      Sodium 06/29/2019 140  136 - 145 mmol/L Final    Potassium 06/29/2019 4 2  3 5 - 5 3 mmol/L Final    Chloride 06/29/2019 107  100 - 108 mmol/L Final    CO2 06/29/2019 31  21 - 32 mmol/L Final    ANION GAP 06/29/2019 2* 4 - 13 mmol/L Final    BUN 06/29/2019 13  5 - 25 mg/dL Final    Creatinine 06/29/2019 0 80  0 60 - 1 30 mg/dL Final    Standardized to IDMS reference method    Glucose 06/29/2019 85  65 - 140 mg/dL Final      If the patient is fasting, the ADA then defines impaired fasting glucose as > 100 mg/dL and diabetes as > or equal to 123 mg/dL  Specimen collection should occur prior to Sulfasalazine administration due to the potential for falsely depressed results  Specimen collection should occur prior to Sulfapyridine administration due to the potential for falsely elevated results   Glucose, Fasting 06/29/2019 85  65 - 99 mg/dL Final      Specimen collection should occur prior to Sulfasalazine administration due to the potential for falsely depressed results  Specimen collection should occur prior to Sulfapyridine administration due to the potential for falsely elevated results      Calcium 06/29/2019 8 8  8 3 - 10 1 mg/dL Final    eGFR 06/29/2019 116  ml/min/1 73sq m Final    WBC 06/29/2019 4 22* 4 31 - 10 16 Thousand/uL Final    RBC 06/29/2019 5 03  3 88 - 5 62 Million/uL Final    Hemoglobin 06/29/2019 15 8  12 0 - 17 0 g/dL Final    Hematocrit 06/29/2019 46 1  36 5 - 49 3 % Final    MCV 06/29/2019 92  82 - 98 fL Final    MCH 06/29/2019 31 4  26 8 - 34 3 pg Final    MCHC 06/29/2019 34 3  31 4 - 37 4 g/dL Final    RDW 06/29/2019 11 9  11 6 - 15 1 % Final    MPV 06/29/2019 9 5  8 9 - 12 7 fL Final    Platelets 79/99/9191 184  149 - 390 Thousands/uL Final    nRBC 06/29/2019 0  /100 WBCs Final    Neutrophils Relative 06/29/2019 45  43 - 75 % Final    Immat GRANS % 06/29/2019 0  0 - 2 % Final    Lymphocytes Relative 06/29/2019 41  14 - 44 % Final    Monocytes Relative 06/29/2019 10  4 - 12 % Final    Eosinophils Relative 06/29/2019 3  0 - 6 % Final    Basophils Relative 06/29/2019 1  0 - 1 % Final    Neutrophils Absolute 06/29/2019 1 88  1 85 - 7 62 Thousands/µL Final    Immature Grans Absolute 06/29/2019 0 00  0 00 - 0 20 Thousand/uL Final    Lymphocytes Absolute 06/29/2019 1 74  0 60 - 4 47 Thousands/µL Final    Monocytes Absolute 06/29/2019 0 43  0 17 - 1 22 Thousand/µL Final    Eosinophils Absolute 06/29/2019 0 14  0 00 - 0 61 Thousand/µL Final    Basophils Absolute 06/29/2019 0 03  0 00 - 0 10 Thousands/µL Final    Ventricular Rate 06/29/2019 59  BPM Final    Atrial Rate 06/29/2019 59  BPM Final    SC Interval 06/29/2019 164  ms Final    QRSD Interval 06/29/2019 88  ms Final    QT Interval 06/29/2019 438  ms Final    QTC Interval 06/29/2019 433  ms Final    P Axis 06/29/2019 52  degrees Final    QRS Axis 06/29/2019 -41  degrees Final    T Wave Axis 06/29/2019 11  degrees Final    Procalcitonin 06/30/2019 0 86* <=0 25 ng/ml Final    Comment: Suspected Lower Respiratory Tract Infection (LRTI):  - LESS than or EQUAL to 0 25 ng/mL:   low likelihood for bacterial LRTI; antibiotics DISCOURAGED   - GREATER than 0 25 ng/mL:   increased likelihood for bacterial LRTI; antibiotics ENCOURAGED  Suspected Sepsis:  - Strongly consider initiating antibiotics in ALL UNSTABLE patients    - LESS than or EQUAL to 0 5 ng/mL:   low likelihood for bacterial sepsis; antibiotics DISCOURAGED   - GREATER than 0 5 ng/mL:   increased likelihood for bacterial sepsis; antibiotics ENCOURAGED   - GREATER than 2 ng/mL:   high risk for severe sepsis / septic shock; antibiotics strongly ENCOURAGED  Decisions on antibiotic use should not be based solely on Procalcitonin (PCT) levels  If PCT is low but uncertainty exists with stopping antibiotics, repeat PCT in 6-24 hours to confirm the low level  If antibiotics are administered (regardless if initial PCT was high or low), repeat PCT every 1-2 days to consider early antibiotic cessation (when GREATER                            than 80% decrease from the peak OR when PCT drops below designated cutoffs, whichever comes first), so long as the infection is NOT one that typically requires prolonged treatment durations (e g , bone/joint infections, endocarditis, Staph  aureus bacteremia)      Situations of FALSE-POSITIVE Procalcitonin values:  1) Newborns < 67 hours old  2) Massive stress from severe trauma / burns, major surgery, acute pancreatitis, cardiogenic / hemorrhagic shock, sickle cell crisis, or other organ perfusion abnormalities  3) Malaria and some Candidal infections  4) Treatment with agents that stimulate cytokines (e g , OKT3, anti-lymphocyte globulins, alemtuzumab, IL-2, granulocyte transfusion [NOT GCSFs])  5) Chronic renal disease causes elevated baseline levels (consider GREATER than 0 75 ng/mL as an abnormal cut-off); initiating HD/CRRT may cause transient decreases  6) Paraneoplastic syndromes from medullary thyroid or SCLC, some forms of vasculitis, and acute [id number]                            disease    Situations of FALSE-NEGATIVE Procalcitonin values:  1) Too early in clinical course for PCT to have reached its peak (may repeat in 6-24 hours to confirm low level)  2) Localized infection WITHOUT systemic (SIRS / sepsis) response (e g , an abscess, osteomyelitis, cystitis)  3) Mycobacteria (e g , Tuberculosis, MAC)  4) Cystic fibrosis exacerbations      WBC 06/30/2019 3 97* 4 31 - 10 16 Thousand/uL Final    RBC 06/30/2019 4 67  3 88 - 5 62 Million/uL Final    Hemoglobin 06/30/2019 14 7  12 0 - 17 0 g/dL Final    Hematocrit 06/30/2019 42 7  36 5 - 49 3 % Final    MCV 06/30/2019 91  82 - 98 fL Final    MCH 06/30/2019 31 5  26 8 - 34 3 pg Final    MCHC 06/30/2019 34 4  31 4 - 37 4 g/dL Final    RDW 06/30/2019 11 9  11 6 - 15 1 % Final    MPV 06/30/2019 9 6  8 9 - 12 7 fL Final    Platelets 10/08/4091 164  149 - 390 Thousands/uL Final    nRBC 06/30/2019 0  /100 WBCs Final    Neutrophils Relative 06/30/2019 43  43 - 75 % Final    Immat GRANS % 06/30/2019 0  0 - 2 % Final    Lymphocytes Relative 06/30/2019 41  14 - 44 % Final    Monocytes Relative 06/30/2019 12  4 - 12 % Final    Eosinophils Relative 06/30/2019 3  0 - 6 % Final    Basophils Relative 06/30/2019 1  0 - 1 % Final    Neutrophils Absolute 06/30/2019 1 68* 1 85 - 7 62 Thousands/µL Final    Immature Grans Absolute 06/30/2019 0 01  0 00 - 0 20 Thousand/uL Final    Lymphocytes Absolute 06/30/2019 1 63  0 60 - 4 47 Thousands/µL Final    Monocytes Absolute 06/30/2019 0 49  0 17 - 1 22 Thousand/µL Final    Eosinophils Absolute 06/30/2019 0 13  0 00 - 0 61 Thousand/µL Final    Basophils Absolute 06/30/2019 0 03  0 00 - 0 10 Thousands/µL Final    Sodium 06/30/2019 140  136 - 145 mmol/L Final    Potassium 06/30/2019 4 2  3 5 - 5 3 mmol/L Final    Chloride 06/30/2019 108  100 - 108 mmol/L Final    CO2 06/30/2019 29  21 - 32 mmol/L Final    ANION GAP 06/30/2019 3* 4 - 13 mmol/L Final    BUN 06/30/2019 12  5 - 25 mg/dL Final    Creatinine 06/30/2019 0 76  0 60 - 1 30 mg/dL Final    Standardized to IDMS reference method    Glucose 06/30/2019 88  65 - 140 mg/dL Final      If the patient is fasting, the ADA then defines impaired fasting glucose as > 100 mg/dL and diabetes as > or equal to 123 mg/dL    Specimen collection should occur prior to Sulfasalazine administration due to the potential for falsely depressed results  Specimen collection should occur prior to Sulfapyridine administration due to the potential for falsely elevated results   Calcium 06/30/2019 8 5  8 3 - 10 1 mg/dL Final    eGFR 06/30/2019 118  ml/min/1 73sq m Final    Magnesium 06/30/2019 1 9  1 6 - 2 6 mg/dL Final    Phosphorus 06/30/2019 3 4  2 7 - 4 5 mg/dL Final       Imaging:  I have personally reviewed all pertinent results  Assessment/Plan    Aspiration pneumonia ([facility] )  Resolving  Doing well   On day 3/5 of Augmentin  Complete course and referred to pulmonology  Instructed how to to chest PT  Continue incentive spirometer every hour for 3 days  Goal 500 ml   Then decrease frequency to 4 x a day  Goal 750 ml     Frequent UTI  Positive for enterococcus fecalis   On day 5/7 of abx  Sensitivity to ampicillin   Will continue Augmentin and refer to urology given recurrent Regifunmi Gardner was seen today for transition of care management  Diagnoses and all orders for this visit:    Atelectasis  -     Ambulatory referral to Pulmonology; Future    Aspiration pneumonia, unspecified aspiration pneumonia type, unspecified laterality, unspecified part of lung ([facility] )  -     Ambulatory referral to Pulmonology; Future  -     Ambulatory referral to Speech Therapy; Future    Frequent UTI  -     Ambulatory referral to Urology;  Future    Spastic hemiplegia of left nondominant side as late effect of cerebral infarction (HCC)  -     Zinc Picolinate 25 MG TABS; Take 1 76 tablets (44 mg total) by mouth daily Take 2 22 mg capsules daily every other month        - PCP: López Oliveira MD  - Follow-up appointments:     Future Appointments   Date Time Provider Tab Teresa   7/5/2019  1:00 PM López Oliveira MD  FP [facility]   7/8/2019  1:00 PM BE DIETITIAN BE [facility]        __________________________________________________________________    Margi Aaron MD, PGY-3  [address] [facility]   7/2/2019

## 2019-07-02 NOTE — PATIENT INSTRUCTIONS
Continue incentive spirometer every hour for 3 days  Goal 500 ml   Then decrease frequency to 4 x a day   Goal 750 ml

## 2019-07-03 LAB
BACTERIA BLD CULT: NORMAL
BACTERIA BLD CULT: NORMAL

## 2019-07-04 PROBLEM — H61.21 IMPACTED CERUMEN OF RIGHT EAR: Status: RESOLVED | Noted: 2019-06-11 | Resolved: 2019-07-04

## 2019-07-04 NOTE — ASSESSMENT & PLAN NOTE
Positive for enterococcus fecalis   On day 5/7 of abx     Sensitivity to ampicillin   Will continue Augmentin and refer to urology given recurrent UTI's

## 2019-07-04 NOTE — ASSESSMENT & PLAN NOTE
Resolving  Doing well   On day 3/5 of Augmentin  Complete course and referred to pulmonology  Instructed how to to chest PT  Continue incentive spirometer every hour for 3 days  Goal 500 ml   Then decrease frequency to 4 x a day   Goal 750 ml

## 2019-07-05 ENCOUNTER — OFFICE VISIT (OUTPATIENT)
Dept: FAMILY MEDICINE CLINIC | Facility: CLINIC | Age: 34
End: 2019-07-05

## 2019-07-05 VITALS
HEIGHT: 70 IN | WEIGHT: 130.4 LBS | TEMPERATURE: 98.6 F | HEART RATE: 68 BPM | DIASTOLIC BLOOD PRESSURE: 78 MMHG | RESPIRATION RATE: 16 BRPM | BODY MASS INDEX: 18.67 KG/M2 | SYSTOLIC BLOOD PRESSURE: 120 MMHG

## 2019-07-05 DIAGNOSIS — G93.1 ANOXIC BRAIN DAMAGE (HCC): ICD-10-CM

## 2019-07-05 DIAGNOSIS — H61.23 BILATERAL IMPACTED CERUMEN: ICD-10-CM

## 2019-07-05 DIAGNOSIS — Z00.00 WELLNESS EXAMINATION: Primary | ICD-10-CM

## 2019-07-05 DIAGNOSIS — N39.0 FREQUENT UTI: ICD-10-CM

## 2019-07-05 DIAGNOSIS — J69.0 ASPIRATION PNEUMONIA, UNSPECIFIED ASPIRATION PNEUMONIA TYPE, UNSPECIFIED LATERALITY, UNSPECIFIED PART OF LUNG (HCC): ICD-10-CM

## 2019-07-05 PROCEDURE — 99395 PREV VISIT EST AGE 18-39: CPT | Performed by: FAMILY MEDICINE

## 2019-07-06 NOTE — TELEPHONE ENCOUNTER
I spoke with Bj Patel on 6/30/19 at length regarding Naman's test results, will follow up for CXR in 6 weeks and CBC to assess for resolution of leukopenia  Will mail slips to patient

## 2019-07-07 LAB
ATRIAL RATE: 59 BPM
P AXIS: 52 DEGREES
PR INTERVAL: 164 MS
QRS AXIS: -41 DEGREES
QRSD INTERVAL: 88 MS
QT INTERVAL: 438 MS
QTC INTERVAL: 433 MS
T WAVE AXIS: 11 DEGREES
VENTRICULAR RATE: 59 BPM

## 2019-07-07 PROCEDURE — 93010 ELECTROCARDIOGRAM REPORT: CPT | Performed by: INTERNAL MEDICINE

## 2019-07-08 ENCOUNTER — CLINICAL SUPPORT (OUTPATIENT)
Dept: NUTRITION | Facility: HOSPITAL | Age: 34
End: 2019-07-08
Attending: FAMILY MEDICINE
Payer: COMMERCIAL

## 2019-07-08 ENCOUNTER — TELEPHONE (OUTPATIENT)
Dept: FAMILY MEDICINE CLINIC | Facility: CLINIC | Age: 34
End: 2019-07-08

## 2019-07-08 VITALS — BODY MASS INDEX: 18.81 KG/M2 | WEIGHT: 131.4 LBS | HEIGHT: 70 IN

## 2019-07-08 DIAGNOSIS — R13.12 OROPHARYNGEAL DYSPHAGIA: ICD-10-CM

## 2019-07-08 DIAGNOSIS — R62.7 POOR WEIGHT GAIN IN ADULT: ICD-10-CM

## 2019-07-08 DIAGNOSIS — I69.354 SPASTIC HEMIPLEGIA OF LEFT NONDOMINANT SIDE AS LATE EFFECT OF CEREBRAL INFARCTION (HCC): ICD-10-CM

## 2019-07-08 DIAGNOSIS — R63.4 ABNORMAL WEIGHT LOSS: ICD-10-CM

## 2019-07-08 DIAGNOSIS — G93.1 ANOXIC BRAIN DAMAGE (HCC): ICD-10-CM

## 2019-07-08 DIAGNOSIS — R13.12 OROPHARYNGEAL DYSPHAGIA: Primary | ICD-10-CM

## 2019-07-08 PROBLEM — H61.23 BILATERAL IMPACTED CERUMEN: Status: ACTIVE | Noted: 2019-07-08

## 2019-07-08 PROCEDURE — 97802 MEDICAL NUTRITION INDIV IN: CPT

## 2019-07-08 NOTE — PROGRESS NOTES
Initial Nutrition Assessment Form    Patient Name: Michael Crane    YOB: 1985    Sex: Male     Assessment Date: 7/8/2019  Start Time: 1:00pm Stop Time: 2:00pm Total Minutes: 60min     Data:  Present at session: Self, Mother Vidhya Necessary direct Care for Marilee Barrett from [employer], [facility]  Parent Concerns: " As I age I need to relinquish more of Naman's care to a provider agency  I am hoping to keep Naman at the status he is now for as long as I can   I  need your help in enforcing the agency to provide well balanced high calorie/high fiber daily diet by all the staff members"    Medical Dx/Reason for Referral: R13 12, R63 4,R62 7   Past Medical History:   Diagnosis Date    Allergic rhinitis     [facility] anoxia ([facility] [address] )     Cardiac arrest [facility])     age 13 s/p long Q-T syndrome    Community acquired pneumonia     last assessed/resolved:  10/9/2014    Depression     GERD (gastroesophageal reflux disease)     Long Q-T syndrome     Muscular rigidity and spasm, progressive     Osteopenia     Osteoporosis     Quadriplegia ([facility] [address] )     Spastic neurogenic bladder     Urinary incontinence     Urinary tract infection without hematuria 4/27/2019    Hx Weaned from [employer] 2000    Dysphagia July 2018-  IDDSI Level 7-Regular diet  IDDSI Level 0-Can manage all liquid types    RD Physical Assessment:  Adequate LBM , suboptimal subcutaneous fat mass  Alert   Current Outpatient Medications   Medication Sig Dispense Refill    b complex-vitamin C-folic acid (RENAL) 1 mg - TAKE 1 CAPSULE BY MOUTH ONCE EVERY DAY AT AT 2PM FOR HEALTH MAINTENANCE 31 each 11    carbamide peroxide (DEBROX) 6 5 % otic solution Administer 5 drops into both ears 2 (two) times a week (Patient not taking: Reported on 7/5/2019) 15 mL 5    coenzyme Q-10 100 MG capsule 2 SOFTGELS(200MG) BY MOUTH DAILY @ 9AM (SUPPLEMENT) *GOODBRED 60 capsule 0    DEEP SEA NASAL SPRAY 0 65 % nasal spray - INHALE 2 SPRAYS IN EACH NOSTRIL AS NEEDED FOR CONGESTION 44 mL 11    Diapers & Supplies (HUGGIES LITTLE MOVERS SIZE 3) MISC by Does not apply route      Disposable Gloves (VINYL GLOVES MEDIUM) MISC by Does not apply route 4 (four) times a day Dispense 3 boxes monthly; Diagnosis R32 3 each 5    docusate sodium (COLACE) 100 mg capsule 100 mg 9am and 9pm 10 capsule 5    dronabinol (MARINOL) 2 5 mg capsule - *FRIG-SHELF TAKE 1 CAPSULE BY MOUTH FOUR TIMES DAILY (9AM,2PM,6PM,9PM) FOR POOR APPETITE 120 capsule 5    ENEMEEZ MINI 283 MG enema - INSERT 5ML PER RECTUM AS NEEDED FOR CONSTIPATION 5 each 11    ENEMEEZ PLUS  MG ENEM use as directed 150 mL 0    Ginkgo Biloba Extract 40 MG CAPS 3 CAPS(120MG) BY MOUTH DAILY @ 2PM (SUPPLEMENT) *GOODBRED 90 capsule 0    GNP VITAMIN C 500 MG tablet 1 TAB BY MOUTH DAILY @ 2PM (SUPPLEMENT) *GOODBRED 30 tablet 0    HEMORRHOIDAL 0 25 % suppository - UNWRAP AND INSERT 1 SUPPOSITORY PER RECTUM AS NEEDED FOR HEMORRHOID PAIN RELIEF 12 suppository 11    ibuprofen (MOTRIN) 200 mg tablet Take 2 tabs q6h PRN for pain not to exceed 2000mg per day 200 tablet 2    Incontinence Supply Disposable (DEPEND UNDERWEAR SM/MED) MISC by Does not apply route      Incontinence Supply Disposable (PREVAIL AIR BRIEFS) MISC 1 each by Does not apply route every 4 (four) hours Please provide 5 briefs per day 150 each 11    Incontinence Supply Disposable (SELECT DISPOSABLE UNDERWEAR SM) MISC Please provide a 30 day supply 10 per day DX R32 incontinence 22 each 6    loratadine (CLARITIN) 10 mg tablet - TAKE 1 TABLET BY MOUTH ONCE EVERY DAY AS NEEDED FOR ALLERGIES 30 tablet 11    Magnesium 500 MG CAPS Take 1 capsule (500 mg total) by mouth daily 30 capsule 5    Magnesium Oxide 500 MG TABS - TAKE 1 TABLET BY MOUTH ONCE EVERY DAY AT 2PM FOR HEALTH MAINTENANCE 31 each 11    Melatonin 5 MG TABS - TAKE 1 TABLET BY MOUTH EVERY NIGHT AT BEDTIME AT 9PM 31 each 11    metoprolol tartrate (LOPRESSOR) 25 mg tablet 1/2 TAB(12 5MG) BY MOUTH DAILY @ 9AM (HTN) *GOODBRED 15 tablet 0    Misc   Devices [employer]) MISC Please provide pt with a new cord for power wheelchair 1 each 0    modafinil (PROVIGIL) 100 mg tablet - TAKE 2 TABLETS (200MG)  BY MOUTH ONCE EVERY DAY AT 9AM FOR HEALTH FOR BRAIN INJURY 62 tablet 5    Multiple Vitamins-Minerals (CEROVITE SENIOR) TABS - TAKE 1 TABLET BY MOUTH ONCE EVERY DAY AT 9AM FOR HEALTH MAINTENANCE 31 tablet 11    Nutritional Supplements (NUTRITIONAL SHAKE) LIQD Take 1 Can by mouth 2 (two) times a day as needed (nutritional supplementation, weight gain) Please provide Boost 24 Bottle 6    nystatin (MYCOSTATIN) powder Apply topically 2 (two) times a day 15 g 0    Omega-3 Fatty Acids (FISH OIL) 1,000 mg 1 CAPSULE BY MOUTH DAILY @ 9AM (SUPPLEMENT) *GOODBRED 30 capsule 0    omeprazole (PriLOSEC) 20 mg delayed release capsule Take 20mg at 9am every day 30 capsule 5    onabotulinumtoxin A (BOTOX) 100 units inject 500 units into left gastroc soleus and abductor pollicis ankit      phenazopyridine (PYRIDIUM) 200 mg tablet Take 1 tablet (200 mg total) by mouth every 8 (eight) hours as needed for bladder spasms (burning with urination) 3 tablet 0    polyethylene glycol (GLYCOLAX) powder Mix 17 g (1 capful) in 8 oz of water and take PO QOD PRN if no bowel movement in 3 days 255 g 11    polyethylene glycol (MIRALAX) 17 g packet Take 17 g by mouth every other day Take Monday, Wednesday, Friday at 0800 14 each 0    PREPARATION H 1-0 25-14 4-15 % rectal cream - APPLY RECTALLY AS NEEDED FOR HEMORRHOID PAIN RELIEF 51 g 11    ranitidine (ZANTAC) 150 mg tablet Take 1 tablet (150 mg total) by mouth daily 30 tablet 5    ranitidine (ZANTAC) 150 mg tablet ranitidine 150 mg tablet      Respiratory Therapy Supplies (SPIROMETER) KIT by Does not apply route 6 (six) times a day 1 kit 0    Respiratory Therapy Supplies (SPIROMETER) KIT by Does not apply route 3 (three) times a day Do spirometry 3 times daily at 9am, 2pm and 9pm  1 kit 0    sertraline (ZOLOFT) 100 mg tablet - TAKE 1 TABLET BY MOUTH ONCE EVERY DAY AT 9PM FOR DEPRESSION 31 tablet 11    Sodium Fluoride (PREVIDENT 5000 PLUS DT) Apply to teeth      Starch, Thickening, LIQD Nectar thick liquids up to honey thick if coughing occurs as needed, please use Simply Thick liquid only  Discontinue Thick-It powder  237 mL 5    Tea Tree 100 % OIL - APPLY TO SKIN FOLDS ONCE EVERY DAY AS NEEDED FOR RASH 60 mL 11    Tea Tree Oil OIL by Does not apply route daily Apply to skin folds  (Patient taking differently: by Does not apply route as needed Apply to skin folds  ) 1 Bottle 3    VASCEPA 1 g CAPS - TAKE 1 CAPSULE BY MOUTH ONCE EVERY DAY AT 9AM FOR HEALTH MAINTENANCE 31 capsule 11    Vitamins A & D (VITAMIN A & D) ointment - APPLY TO AFFECTED AREA (BUTTOCKS/ANAL) AS NEEDED 454 g 11    wheat dextrin (BENEFIBER) Take 1 packet by mouth daily 30 each 3    Zinc 50 MG TABS Take 50mg daily @ 2:00pm every other month  (May, June, July,  August, September, October 2019) 30 tablet 5    zinc oxide (DESITIN) 40 % PSTE Apply topically      Zinc Picolinate 25 MG TABS Take 1 76 tablets (44 mg total) by mouth daily Take 2 22 mg capsules daily every other month 60 each 6     No current facility-administered medications for this visit  Additional Meds/Supplements: reviewed   Special Learning Needs: Non verbal, does interact   Height: HC Readings from Last 3 Encounters:   No data found for [facility]       Weight: RD could not weight patient this session, wheelchair scale not available  RD used last Office Visit weight  Wt Readings from Last 3 Encounters:   07/05/19 59 1 kg (130 lb 6 4 oz)   07/02/19 60 8 kg (134 lb)   06/28/19 58 6 kg (129 lb 3 oz)     Estimated body mass index is 18 85 kg/m² as calculated from the following:    Height as of this encounter: 5' 10" (1 778 m)  Weight as of this encounter: 59 6 kg (131 lb 6 4 oz)       Recent Weight Change: [x]Yes     []No  Amount: Favorable 13 lb weight gain in 2 months  Lowest weight this past year 4/8/19 117lb      Energy Needs: [address] [facility] Equation:  1599 x 1 4-1 9=3058-3939 kcal   Allergies   Allergen Reactions    Other      drugs that prolong the QT interval  drugs that prolong the QT interval  Seasonal allergies        Social History     Substance and Sexual Activity   Alcohol Use Not Currently    Frequency: Never    Binge frequency: Never       Social History     Tobacco Use   Smoking Status Never Smoker   Smokeless Tobacco Never Used       Who shops? Staff [employer]   Who cooks? [employer]   Exercise: Wheelchair exercises at gym   Prior Counseling? [x]Yes     []No  When:    Why:         Diet Hx:  Breakfast: Homemade Pancakes(with yogurt or nut butter sauce)  Eggs with cheese  [place]/Coconut milk with fruit or [place] Shake with benefiber   Lunch: Dinner leftovers  Limited fast food         Dinner: Spaghetti with meat sauce/bertin  Grilled vegetable  Chicken,fish,          Snacks: Fresh fruit,nutrigrain bar, yogurt,shake with nut butter        Nutrition Diagnosis:   Inadequate energy intake related to inability to consume adequate calories as evidenced by BMI, staff report of increasing fatigue and dysphagia  Medical Nutrition Therapy Intervention:  [x]Individualized Meal Plan->2200kcal ,90grm Protein []Understanding Lab Values   [x]Basic Pathophysiology of Disease-hx  underweight []Food/Medication Interactions   [x]Food Diary-staff access myfitnesspal reviewed []Exercise   []Lifestyle/Behavior Modification Techniques []Medication, Mechanism of Action   [x]Label Reading-3grm Fiber []Self Blood Glucose Monitoring   [x]Weight/BMI Vdcdj-492-507 lb  [x]Other - Reviewed Naman weight gain meal plan, staff meal schedule for Naman Florences Preferred Daily Foods and occasional (2x week) foods list   Other Notes:Naman is 19 years s/p injury   Age 13 y/o suffered a cardiac arrest at school  Dx Long QT Syndrome  With dx Anoxic Brain Injury  Staff currently report increased coughing with eating  Recent admission to [facility] IP with aspiration pneumonia  Staff report Princess Lee exhibiting increased feeling of fullness and fatigue with eating  Suggest adjust Benefiber at end of day from morning  RD Recommended OP SLP evaluation to assess current Regular Diet , thin liquid diet  Comprehension: []Excellent  [x]Very Good  []Good  []Fair   []Poor    Receptivity: []Excellent  [x]Very Good  []Good  []Fair   []Poor    Expected Compliance: []Excellent  [x]Very Good  []Good  []Fair   []Poor        Goals:  1  Princess Lee will achieve BMI 20 ( 140 lb) within 6-9 months by consuming >2200kcal, 90 grm Protein daily  2 Staff will consider adjusting Benefiber from morning to evening to assist in adequate energy intake  3 Naman will acquire a OP SLP evaluation to assess current safe food/fluid texture         Labs:  CMP  Lab Results   Component Value Date     09/06/2017    K 4 2 06/30/2019     06/30/2019    CO2 29 06/30/2019    BUN 12 06/30/2019    CREATININE 0 76 06/30/2019    GLUF 85 06/29/2019    CALCIUM 8 5 06/30/2019    AST 12 06/28/2019    ALT 21 06/28/2019    ALKPHOS 59 06/28/2019    PROT 8 0 09/06/2017    BILITOT 0 5 09/06/2017    EGFR 118 06/30/2019       BMP  Lab Results   Component Value Date    CALCIUM 8 5 06/30/2019     09/06/2017    K 4 2 06/30/2019    CO2 29 06/30/2019     06/30/2019    BUN 12 06/30/2019    CREATININE 0 76 06/30/2019       Lipids  No results found for: CHOL  Lab Results   Component Value Date    HDL 40 (L) 03/20/2019     No results found for: [facility]  Lab Results   Component Value Date    TRIG 129 03/20/2019     No results found for: CHOLHDL    Hemoglobin A1C  Lab Results   Component Value Date    HGBA1C 5 3 09/05/2014       Fasting Glucose  Lab Results   Component Value Date    GLUF 85 06/29/2019       Insulin     Thyroid  Lab Results   Component Value Date TSH 3 52 03/20/2019       Hepatic Function Panel  Lab Results   Component Value Date    ALT 21 06/28/2019    AST 12 06/28/2019    ALKPHOS 59 06/28/2019    BILITOT 0 5 09/06/2017       Celiac Disease Antibody Panel  No results found for: ENDOMYSIAL IGA, GLIADIN IGA, GLIADIN IGG, IGA, TISSUE TRANSGLUT AB, TTG IGA   Iron  No results found for: IRON, TIBC, FERRITIN    Vitamins  No results found for: VITAMIN B2   Nicotinamide   Date Value Ref Range Status   07/05/2018 <20 ng/mL Final     Comment:      Nicotinamide is a metabolite of nicotinic acid  Due to the large  variability in the metabolism of nicotinic acid, plasma   concentrations of this metabolite are variable  In one study,   fasting plasma concentrations were reported to be approximately   40 ng/mL  In another study it was reported that the   administration of a single 1000 mg of extended-release tablet of   nicotinic acid resulted in a mean peak nicotinamide concentration  of 400 ng/mL between 5 and 10 hours post dose, decreasing to   about 100 ng/mL by 16 hours post dose  This test was developed and its analytical performance   characteristics have been determined by [facility]  It has not been cleared or approved by the [facility] and Drug Administration  This assay has been validated   pursuant to the CLIA regulations and is used for clinical   purposes  Nicotinic Acid   Date Value Ref Range Status   07/05/2018 <20 ng/mL Final     Comment:      Due to the large variability in the metabolism of nicotinic   acid, the dosing preparation used (immediate-release vs  extended  release), and the mg doses used, the serum concentrations may   range from less than 20 ng/mL to about 30,000 ng/mL  After oral   administration of an immediate-release tablet, peak plasma   concentrations occur in 4 to 5 hours  The plasma half-life of   nicotinic acid is about one hour   In one study, fasting plasma   concentrations were reported to be less than 20 ng/mL  In another  study, it was reported that the administration of a single 1000   mg extended-release tablet resulted in mean nicotinic acid   concentrations of less than 50 ng/mL          No results found for: Kim Cordon  Lab Results   Component Value Date    Jackie Vasquez 1,072 07/05/2018     No results found for: VITB5  No results found for: K1KUVHKV  No results found for: THYROGLB  No results found for: VITAMIN K   No results found for: 25-HYDROXY VIT D   No components found for: [address] [address]

## 2019-07-08 NOTE — TELEPHONE ENCOUNTER
Patient's mother requesting a call back from Dr Yogi Reddy (only) to discuss further issues and referral to pulmonologist she suggested    Also requesting ORDER for a Swallow test needed for appt 07/11/19 FAX To  following:     [facility]     [address],  [address] [facility]     [phone] (R)     [phone] (f)

## 2019-07-08 NOTE — TELEPHONE ENCOUNTER
Patient's pharmacy calling, received prescription for Zinc Picolinate 25mg, They need clarification on dosing and administration  Please resubmit new rx  Thanks!

## 2019-07-08 NOTE — PATIENT INSTRUCTIONS
1  Achieve BMI 20 ( 140 lb) within 6-9 months  2  Consume at least 90 gram protein daily  3  Read food labels for > or = 3 gram Fiber per serving  4  Food Journal with myfitlakeisha pal 2200kcal, 90 gram protein to ensure consistency of energy intake to achieve goal weight  5  Consider adjusting Benefiber from morning to evening to assist in adequate energy intake  6  Make adjustments to [place] Daily Diet for staff with RD wording as discussed with Mother

## 2019-07-09 ENCOUNTER — HOSPITAL ENCOUNTER (EMERGENCY)
Facility: HOSPITAL | Age: 34
Discharge: HOME | End: 2019-07-09
Attending: EMERGENCY MEDICINE | Admitting: EMERGENCY MEDICINE
Payer: COMMERCIAL

## 2019-07-09 VITALS
BODY MASS INDEX: 19.04 KG/M2 | WEIGHT: 132.72 LBS | SYSTOLIC BLOOD PRESSURE: 100 MMHG | HEART RATE: 77 BPM | DIASTOLIC BLOOD PRESSURE: 56 MMHG | RESPIRATION RATE: 18 BRPM | OXYGEN SATURATION: 93 % | TEMPERATURE: 98 F

## 2019-07-09 DIAGNOSIS — R11.10 VOMITING: ICD-10-CM

## 2019-07-09 DIAGNOSIS — R55 NEAR SYNCOPE: Primary | ICD-10-CM

## 2019-07-09 LAB
ALBUMIN SERPL BCP-MCNC: 3.6 G/DL (ref 3.5–5)
ALP SERPL-CCNC: 70 U/L (ref 46–116)
ALT SERPL W P-5'-P-CCNC: 29 U/L (ref 12–78)
ANION GAP SERPL CALCULATED.3IONS-SCNC: 4 MMOL/L (ref 4–13)
AST SERPL W P-5'-P-CCNC: 20 U/L (ref 5–45)
BASOPHILS # BLD AUTO: 0.01 THOUSANDS/ΜL (ref 0–0.1)
BASOPHILS NFR BLD AUTO: 0 % (ref 0–1)
BILIRUB SERPL-MCNC: 0.5 MG/DL (ref 0.2–1)
BUN SERPL-MCNC: 15 MG/DL (ref 5–25)
CALCIUM SERPL-MCNC: 9.1 MG/DL (ref 8.3–10.1)
CHLORIDE SERPL-SCNC: 105 MMOL/L (ref 100–108)
CO2 SERPL-SCNC: 29 MMOL/L (ref 21–32)
CREAT SERPL-MCNC: 0.87 MG/DL (ref 0.6–1.3)
EOSINOPHIL # BLD AUTO: 0.03 THOUSAND/ΜL (ref 0–0.61)
EOSINOPHIL NFR BLD AUTO: 0 % (ref 0–6)
ERYTHROCYTE [DISTWIDTH] IN BLOOD BY AUTOMATED COUNT: 11.8 % (ref 11.6–15.1)
GFR SERPL CREATININE-BSD FRML MDRD: 112 ML/MIN/1.73SQ M
GLUCOSE SERPL-MCNC: 89 MG/DL (ref 65–140)
HCT VFR BLD AUTO: 47.4 % (ref 36.5–49.3)
HGB BLD-MCNC: 16.1 G/DL (ref 12–17)
IMM GRANULOCYTES # BLD AUTO: 0.03 THOUSAND/UL (ref 0–0.2)
IMM GRANULOCYTES NFR BLD AUTO: 0 % (ref 0–2)
LIPASE SERPL-CCNC: 127 U/L (ref 73–393)
LYMPHOCYTES # BLD AUTO: 0.89 THOUSANDS/ΜL (ref 0.6–4.47)
LYMPHOCYTES NFR BLD AUTO: 7 % (ref 14–44)
MCH RBC QN AUTO: 31.6 PG (ref 26.8–34.3)
MCHC RBC AUTO-ENTMCNC: 34 G/DL (ref 31.4–37.4)
MCV RBC AUTO: 93 FL (ref 82–98)
MONOCYTES # BLD AUTO: 0.61 THOUSAND/ΜL (ref 0.17–1.22)
MONOCYTES NFR BLD AUTO: 5 % (ref 4–12)
NEUTROPHILS # BLD AUTO: 10.38 THOUSANDS/ΜL (ref 1.85–7.62)
NEUTS SEG NFR BLD AUTO: 88 % (ref 43–75)
NRBC BLD AUTO-RTO: 0 /100 WBCS
PLATELET # BLD AUTO: 212 THOUSANDS/UL (ref 149–390)
PMV BLD AUTO: 10.1 FL (ref 8.9–12.7)
POTASSIUM SERPL-SCNC: 5 MMOL/L (ref 3.5–5.3)
PROT SERPL-MCNC: 8.6 G/DL (ref 6.4–8.2)
RBC # BLD AUTO: 5.1 MILLION/UL (ref 3.88–5.62)
SODIUM SERPL-SCNC: 138 MMOL/L (ref 136–145)
WBC # BLD AUTO: 11.95 THOUSAND/UL (ref 4.31–10.16)

## 2019-07-09 PROCEDURE — 99284 EMERGENCY DEPT VISIT MOD MDM: CPT | Performed by: EMERGENCY MEDICINE

## 2019-07-09 PROCEDURE — 85025 COMPLETE CBC W/AUTO DIFF WBC: CPT | Performed by: EMERGENCY MEDICINE

## 2019-07-09 PROCEDURE — 36415 COLL VENOUS BLD VENIPUNCTURE: CPT | Performed by: EMERGENCY MEDICINE

## 2019-07-09 PROCEDURE — 99283 EMERGENCY DEPT VISIT LOW MDM: CPT

## 2019-07-09 PROCEDURE — 80053 COMPREHEN METABOLIC PANEL: CPT | Performed by: EMERGENCY MEDICINE

## 2019-07-09 PROCEDURE — 96374 THER/PROPH/DIAG INJ IV PUSH: CPT

## 2019-07-09 PROCEDURE — 83690 ASSAY OF LIPASE: CPT | Performed by: EMERGENCY MEDICINE

## 2019-07-09 PROCEDURE — 96361 HYDRATE IV INFUSION ADD-ON: CPT

## 2019-07-09 RX ORDER — ONDANSETRON 2 MG/ML
4 INJECTION INTRAMUSCULAR; INTRAVENOUS ONCE
Status: COMPLETED | OUTPATIENT
Start: 2019-07-09 | End: 2019-07-09

## 2019-07-09 RX ADMIN — ONDANSETRON 4 MG: 2 INJECTION INTRAMUSCULAR; INTRAVENOUS at 13:42

## 2019-07-09 RX ADMIN — SODIUM CHLORIDE 1000 ML: 0.9 INJECTION, SOLUTION INTRAVENOUS at 13:43

## 2019-07-09 NOTE — ASSESSMENT & PLAN NOTE
Recent UTI with enterococcus fecalis  Completed  7 day ABX tereatment  Abhijeet with urology on 7/11

## 2019-07-09 NOTE — PROGRESS NOTES
Assessment/Plan:    Aspiration pneumonia ([address] )  Doing well  Competed ABX treatment  Abhijeet with pulmonology on 9/12   continue chest PT  Frequent UTI  Recent UTI with enterococcus fecalis  Completed  7 day ABX tereatment  Abhijeet with urology on 7/11  Bilateral impacted cerumen  Follows with ENT  On Debrox 5 gtts twice weekly  May increase to 3 times weekly if needed  Problem List Items Addressed This Visit        Respiratory    Aspiration pneumonia ([address] )     Doing well  Competed ABX treatment  Abhijeet with pulmonology on 9/12   continue chest PT  Nervous and Auditory    Anoxic brain damage (HCC)    Bilateral impacted cerumen     Follows with ENT  On Debrox 5 gtts twice weekly  May increase to 3 times weekly if needed  Genitourinary    Frequent UTI     Recent UTI with enterococcus fecalis  Completed  7 day ABX tereatment  Abhijeet with urology on 7/11  Other Visit Diagnoses     Wellness examination    -  Primary            Subjective:      Patient ID: Darus Hodgkin is a 26 y o  male  Mark Anthony Chairez is a 58-year-old male who presents to clinic for yearly physical   He presents with his mother and caretaker  He has  past medical history of spastic quadriparesis secondary to anoxic brain injury from cardiac arrest at age 13, long QT syndrome, GERD, urinary incontinence and history of multiple UTIs  He was recently hospitalized with a UTI and suspected aspiration pneumonia  According to his mother and caretaker he is doing well after being discharged from the hospital   They have been doing the chest physiotherapy there were taught in the hospital   He is following up with pulmonology  He also follows with Urology  He has an appointment with them 07/11/19 as a follow-up to his hospitalization  His mom mentions that he is having increased ear wax  He seen by ENT  And prescribed debrox  He takes 5 drops twice weekly         The following portions of the patient's history were reviewed and updated as appropriate: allergies, current medications, past family history, past medical history, past social history, past surgical history and problem list     Review of Systems   Constitutional: Negative for appetite change, chills and fever  Respiratory: Negative for cough and shortness of breath  Cardiovascular: Negative for chest pain  Gastrointestinal: Negative for abdominal pain, nausea and vomiting  Genitourinary: Negative for decreased urine volume and urgency  Objective:      /78 (BP Location: Right arm, Patient Position: Sitting, Cuff Size: Adult)   Pulse 68   Temp 98 6 °F (37 °C) (Tympanic)   Resp 16   Ht 5' 10" (1 778 m)   Wt 59 1 kg (130 lb 6 4 oz)   BMI 18 71 kg/m²          Physical Exam   Constitutional: He is oriented to person, place, and time  No distress  Eyes: Conjunctivae and EOM are normal    Neck: Normal range of motion  Neck supple  Cardiovascular: Normal rate, regular rhythm and normal heart sounds  Pulmonary/Chest: Effort normal and breath sounds normal  No respiratory distress  He has no wheezes  Abdominal: Soft  Musculoskeletal:   Wheelchair bound, UE spacticity    Neurological: He is alert and oriented to person, place, and time  Skin: He is not diaphoretic  Nursing note and vitals reviewed

## 2019-07-09 NOTE — ED PROVIDER NOTES
History  Chief Complaint   Patient presents with    Vomiting     Pt at horse therapy  Pt became pale, diaphoretic and had 1 episode of vomiting  Pt did not fall  Pt is no longer pale and diaphoretic       40-year-old male presents to the emergency department for evaluation having an episode of near-syncope and vomiting  The patient has a history of anoxic brain injury after cardiac arrest at the age of 13 and history is provided by his mother and caregiver    Patient was outside in a hot environment this morning riding a horse during horse therapy  His therapist was near by and noticed that he appeared to be slumping over  They helped him get down from the horse and he was extremely diaphoretic  He then had 2 episodes of vomiting  He took II of cool setting and he felt much better  His color improved from the very pale to normal color and he stopped sweating  Patient offers no complaints on arrival   Patient's mother and behavioral therapist at the bedside  Patient's mother states that he was recently admitted for a urinary tract infection  They had traveled to [facility] and had been using a condom catheter which has been a trigger for previous infections  He was admitted and treated and his symptoms seemed to have improved  Patient has not had fever today  He did have a normal breakfast this morning  Patient's mother and caregiver feel that he has returned to his baseline        History provided by:  Patient, medical records and parent   used: No    Vomiting   Severity:  Mild  Timing:  Rare  Number of daily episodes:  1  Quality:  Stomach contents  Progression:  Improving  Chronicity:  New  Recent urination:  Normal  Relieved by:  Nothing  Worsened by:  Nothing  Ineffective treatments:  None tried  Associated symptoms: no abdominal pain and no fever    Risk factors: prior abdominal surgery    Risk factors: no diabetes and no sick contacts        Prior to Admission Medications Prescriptions Last Dose Informant Patient Reported? Taking? DEEP SEA NASAL SPRAY 0 65 % nasal spray  Care Giver No No   Sig: - INHALE 2 SPRAYS IN EACH NOSTRIL AS NEEDED FOR CONGESTION   Diapers & Supplies (HUGGIES LITTLE MOVERS SIZE 3) MISC  Care Giver Yes No   Sig: by Does not apply route   Disposable Gloves (VINYL GLOVES MEDIUM) MISC  Care Giver No No   Sig: by Does not apply route 4 (four) times a day Dispense 3 boxes monthly; Diagnosis R32   ENEMEEZ MINI 283 MG enema  Care Giver No No   Sig: - INSERT 5ML PER RECTUM AS NEEDED FOR CONSTIPATION   ENEMEEZ PLUS  MG ENEM  Care Giver No No   Sig: use as directed   GNP VITAMIN C 500 MG tablet  Care Giver No No   Si TAB BY MOUTH DAILY @ 2PM (SUPPLEMENT) *GOODBRED   Ginkgo Biloba Extract 40 MG CAPS  Care Giver No No   Sig: 3 CAPS(120MG) BY MOUTH DAILY @ 2PM (SUPPLEMENT) *GOODBRED   HEMORRHOIDAL 0 25 % suppository  Care Giver No No   Sig: - UNWRAP AND INSERT 1 SUPPOSITORY PER RECTUM AS NEEDED FOR HEMORRHOID PAIN RELIEF   Incontinence Supply Disposable (DEPEND UNDERWEAR SM/MED) MISC  Care Giver Yes No   Sig: by Does not apply route   Incontinence Supply Disposable (PREVAIL AIR BRIEFS) MISC  Care Giver No No   Si each by Does not apply route every 4 (four) hours Please provide 5 briefs per day   Incontinence Supply Disposable (SELECT DISPOSABLE UNDERWEAR SM) MISC  Care Giver No No   Sig: Please provide a 30 day supply 10 per day DX R32 incontinence   Magnesium 500 MG CAPS  Care Giver No No   Sig: Take 1 capsule (500 mg total) by mouth daily   Magnesium Oxide 500 MG TABS  Care Giver No No   Sig: - TAKE 1 TABLET BY MOUTH ONCE EVERY DAY AT 2PM FOR HEALTH MAINTENANCE   Melatonin 5 MG TABS  Care Giver No No   Sig: - TAKE 1 TABLET BY MOUTH EVERY NIGHT AT BEDTIME AT 9PM   [facility] [place] [facility]   Devices [facility]) MISC  Care Giver No No   Sig: Please provide pt with a new cord for power wheelchair   Multiple Vitamins-Minerals (CEROVITE SENIOR) TABS  Care Giver No No   Sig: - TAKE 1 TABLET BY MOUTH ONCE EVERY DAY AT 9AM FOR HEALTH MAINTENANCE   Nutritional Supplements (NUTRITIONAL SHAKE) LIQD  Care Giver No No   Sig: Take 1 Can by mouth 2 (two) times a day as needed (nutritional supplementation, weight gain) Please provide Boost   Omega-3 Fatty Acids (FISH OIL) 1,000 mg  Care Giver No No   Si CAPSULE BY MOUTH DAILY @ 9AM (SUPPLEMENT) *GOODBRED   PREPARATION H 1-0 25-14 4-15 % rectal cream  Care Giver No No   Sig: - APPLY RECTALLY AS NEEDED FOR HEMORRHOID PAIN RELIEF   Respiratory Therapy Supplies (SPIROMETER) KIT  Care Giver No No   Sig: by Does not apply route 6 (six) times a day   Respiratory Therapy Supplies (SPIROMETER) KIT  Care Giver No No   Sig: by Does not apply route 3 (three) times a day Do spirometry 3 times daily at 9am, 2pm and 9pm    Sodium Fluoride (PREVIDENT 5000 PLUS DT)  Care Giver Yes No   Sig: Apply to teeth   Starch, Thickening, LIQD  Care Giver No No   Sig: Nectar thick liquids up to honey thick if coughing occurs as needed, please use Simply Thick liquid only  Discontinue Thick-It powder  Tea Tree 100 % OIL  Care Giver No No   Sig: - APPLY TO SKIN FOLDS ONCE EVERY DAY AS NEEDED FOR RASH   Tea Tree Oil OIL  Care Giver No No   Sig: by Does not apply route daily Apply to skin folds  Patient taking differently: by Does not apply route as needed Apply to skin folds  VASCEPA 1 g CAPS  Care Giver No No   Sig: - TAKE 1 CAPSULE BY MOUTH ONCE EVERY DAY AT 9AM FOR HEALTH MAINTENANCE   Vitamins A & D (VITAMIN A & D) ointment  Care Giver No No   Sig: - APPLY TO AFFECTED AREA (BUTTOCKS/ANAL) AS NEEDED   Zinc 50 MG TABS  Care Giver No No   Sig: Take 50mg daily @ 2:00pm every other month   (May, , July,  August, September, 2019)   Zinc Picolinate 25 MG TABS   No No   Sig: Take 1 76 tablets (44 mg total) by mouth daily Take 2 22 mg capsules daily every other month   b complex-vitamin C-folic acid (RENAL) 1 mg  Care Giver No No   Sig: - TAKE 1 CAPSULE BY MOUTH ONCE EVERY DAY AT AT 2PM FOR HEALTH MAINTENANCE   carbamide peroxide (DEBROX) 6 5 % otic solution   No No   Sig: Administer 5 drops into both ears 2 (two) times a week   Patient not taking: Reported on 2019   coenzyme Q-10 100 MG capsule  Care Giver No No   Si SOFTGELS(200MG) BY MOUTH DAILY @ 9AM (SUPPLEMENT) *GOODBRED   docusate sodium (COLACE) 100 mg capsule  Care Giver No No   Si mg 9am and 9pm   dronabinol (MARINOL) 2 5 mg capsule  Care Giver No No   Sig: - *- TAKE 1 CAPSULE BY MOUTH FOUR TIMES DAILY (9AM,2PM,6PM,9PM) FOR POOR APPETITE   ibuprofen (MOTRIN) 200 mg tablet  Care Giver No No   Sig: Take 2 tabs q6h PRN for pain not to exceed 2000mg per day   loratadine (CLARITIN) 10 mg tablet  Care Giver No No   Sig: - TAKE 1 TABLET BY MOUTH ONCE EVERY DAY AS NEEDED FOR ALLERGIES   metoprolol tartrate (LOPRESSOR) 25 mg tablet  Care Giver No No   Si/2 TAB(12 5MG) BY MOUTH DAILY @ 9AM (HTN) *GOODBRED   modafinil (PROVIGIL) 100 mg tablet  Care Giver No No   Sig: - TAKE 2 TABLETS (200MG)  BY MOUTH ONCE EVERY DAY AT 9AM FOR HEALTH FOR BRAIN INJURY   nystatin (MYCOSTATIN) powder  Care Giver No No   Sig: Apply topically 2 (two) times a day   omeprazole (PriLOSEC) 20 mg delayed release capsule  Care Giver No No   Sig: Take 20mg at 9am every day   onabotulinumtoxin A (BOTOX) 100 units  Care Giver Yes No   Sig: inject 500 units into left gastroc soleus and abductor pollicis ankit   phenazopyridine (PYRIDIUM) 200 mg tablet  Care Giver No No   Sig: Take 1 tablet (200 mg total) by mouth every 8 (eight) hours as needed for bladder spasms (burning with urination)   polyethylene glycol (GLYCOLAX) powder  Care Giver No No   Sig: Mix 17 g (1 capful) in 8 oz of water and take PO QOD PRN if no bowel movement in 3 days   polyethylene glycol (MIRALAX) 17 g packet  Care Giver No No   Sig: Take 17 g by mouth every other day Take Monday, Wednesday, Friday at 0800   ranitidine (ZANTAC) 150 mg tablet  Care Giver No No Sig: Take 1 tablet (150 mg total) by mouth daily   ranitidine (ZANTAC) 150 mg tablet   Yes No   Sig: ranitidine 150 mg tablet   sertraline (ZOLOFT) 100 mg tablet  Care Giver No No   Sig: - TAKE 1 TABLET BY MOUTH ONCE EVERY DAY AT 9PM FOR DEPRESSION   wheat dextrin (BENEFIBER)  Care Giver No No   Sig: Take 1 packet by mouth daily   zinc oxide (DESITIN) 40 % PSTE  Care Giver Yes No   Sig: Apply topically      Facility-Administered Medications: None       Past Medical History:   Diagnosis Date    Allergic rhinitis     Brain anoxia (HCC)     Cardiac arrest [facility])     age 13 s/p long Q-T syndrome    Community acquired pneumonia     last assessed/resolved:  10/9/2014    Depression     GERD (gastroesophageal reflux disease)     Long Q-T syndrome     Muscular rigidity and spasm, progressive     Osteopenia     Osteoporosis     Quadriplegia (HCC)     Spastic neurogenic bladder     Urinary incontinence     Urinary tract infection without hematuria 4/27/2019       Past Surgical History:   Procedure Laterality Date    APPENDECTOMY  1991    WISDOM TOOTH EXTRACTION         Family History   Problem Relation Age of Onset    Other Father         mitral valve replaced    Hypertension Father     Diabetes type II Maternal Grandfather     Diabetes type II Maternal Uncle      I have reviewed and agree with the history as documented  Social History     Tobacco Use    Smoking status: Never Smoker    Smokeless tobacco: Never Used   Substance Use Topics    Alcohol use: Not Currently     Frequency: Never     Binge frequency: Never    Drug use: Not Currently        Review of Systems   Unable to perform ROS: Patient nonverbal   Constitutional: Negative for fever  Gastrointestinal: Positive for vomiting  Negative for abdominal pain  All other systems reviewed and are negative  Physical Exam  Physical Exam   Constitutional: Vital signs are normal  He appears well-developed and well-nourished  No distress  HENT:   Head: Normocephalic and atraumatic  Mouth/Throat: Mucous membranes are dry  Eyes: Pupils are equal, round, and reactive to light  Conjunctivae and EOM are normal    Neck: Normal range of motion  Neck supple  No JVD present  Cardiovascular: Normal rate, regular rhythm, normal heart sounds and intact distal pulses  No extrasystoles are present  Pulmonary/Chest: Effort normal and breath sounds normal  No accessory muscle usage  No respiratory distress  He has no decreased breath sounds  He has no wheezes  He has no rhonchi  He exhibits no tenderness  Abdominal: Soft  Normal appearance and bowel sounds are normal  He exhibits no distension  There is no tenderness  There is no rebound and no guarding  Musculoskeletal: Normal range of motion  He exhibits no edema, tenderness or deformity  Atrophy of LE muscles   Lymphadenopathy:     He has no cervical adenopathy  Neurological: He is alert  He has normal reflexes  He displays atrophy  He exhibits abnormal muscle tone  Contracture of the hands   Skin: Skin is warm, dry and intact  No rash noted  There is pallor  Psychiatric: He has a normal mood and affect  His behavior is normal  Judgment and thought content normal    Nursing note and vitals reviewed        Vital Signs  ED Triage Vitals [07/09/19 1246]   Temperature Pulse Respirations Blood Pressure SpO2   98 °F (36 7 °C) 77 18 100/56 93 %      Temp Source Heart Rate Source Patient Position - Orthostatic VS BP Location FiO2 (%)   Oral Monitor Lying Right arm --      Pain Score       --           Vitals:    07/09/19 1246   BP: 100/56   Pulse: 77   Patient Position - Orthostatic VS: Lying         Visual Acuity      ED Medications  Medications   sodium chloride 0 9 % bolus 1,000 mL (0 mL Intravenous Stopped 7/9/19 1443)   ondansetron (ZOFRAN) injection 4 mg (4 mg Intravenous Given 7/9/19 1342)       Diagnostic Studies  Results Reviewed     Procedure Component Value Units Date/Time Comprehensive metabolic panel [[id number]]  (Abnormal) Collected:  07/09/19 1342    Lab Status:  Final result Specimen:  Blood from Arm, Left Updated:  07/09/19 1437     Sodium 138 mmol/L      Potassium 5 0 mmol/L      Chloride 105 mmol/L      CO2 29 mmol/L      ANION GAP 4 mmol/L      BUN 15 mg/dL      Creatinine 0 87 mg/dL      Glucose 89 mg/dL      Calcium 9 1 mg/dL      AST 20 U/L      ALT 29 U/L      Alkaline Phosphatase 70 U/L      Total Protein 8 6 g/dL      Albumin 3 6 g/dL      Total Bilirubin 0 50 mg/dL      eGFR 112 ml/min/1 73sq m     Narrative:       National Kidney Disease Foundation guidelines for Chronic Kidney Disease (CKD):     Stage 1 with normal or high GFR (GFR > 90 mL/min/1 73 square meters)    Stage 2 Mild CKD (GFR = 60-89 mL/min/1 73 square meters)    Stage 3A Moderate CKD (GFR = 45-59 mL/min/1 73 square meters)    Stage 3B Moderate CKD (GFR = 30-44 mL/min/1 73 square meters)    Stage 4 Severe CKD (GFR = 15-29 mL/min/1 73 square meters)    Stage 5 End Stage CKD (GFR <15 mL/min/1 73 square meters)  Note: GFR calculation is accurate only with a steady state creatinine    Lipase [[id number]]  (Normal) Collected:  07/09/19 1342    Lab Status:  Final result Specimen:  Blood from Arm, Left Updated:  07/09/19 1437     Lipase 127 u/L     CBC and differential [[id number]]  (Abnormal) Collected:  07/09/19 1342    Lab Status:  Final result Specimen:  Blood from Arm, Left Updated:  07/09/19 1423     WBC 11 95 Thousand/uL      RBC 5 10 Million/uL      Hemoglobin 16 1 g/dL      Hematocrit 47 4 %      MCV 93 fL      MCH 31 6 pg      MCHC 34 0 g/dL      RDW 11 8 %      MPV 10 1 fL      Platelets 130 Thousands/uL      nRBC 0 /100 WBCs      Neutrophils Relative 88 %      Immat GRANS % 0 %      Lymphocytes Relative 7 %      Monocytes Relative 5 %      Eosinophils Relative 0 %      Basophils Relative 0 %      Neutrophils Absolute 10 38 Thousands/µL      Immature Grans Absolute 0 03 Thousand/uL Lymphocytes Absolute 0 89 Thousands/µL      Monocytes Absolute 0 61 Thousand/µL      Eosinophils Absolute 0 03 Thousand/µL      Basophils Absolute 0 01 Thousands/µL                  No orders to display              Procedures  Procedures       ED Course  ED Course as of Jul 09 1602 Tue Jul 09, 2019   1456 Discussed results with patient's family and caregivers at the bedside  Patient has not had any further episodes of vomiting and appears in no acute distress  Will encourage p o  Intake with thickened water and if he tolerates will likely discharge home  MDM  Number of Diagnoses or Management Options  Near syncope: new and requires workup  Vomiting: new and requires workup     Amount and/or Complexity of Data Reviewed  Clinical lab tests: ordered and reviewed  Decide to obtain previous medical records or to obtain history from someone other than the patient: yes  Obtain history from someone other than the patient: yes  Discuss the patient with other providers: yes    Risk of Complications, Morbidity, and/or Mortality  General comments: 68-year-old male presents after having a near syncopal event and vomiting while participating in horse therapy  Patient felt better after receiving a L of IV fluids  He did tolerate oral intake in the department  Staff asked that I changes Benefiber supplement to the evening  Discussed signs and symptoms to return to the emergency department      Patient Progress  Patient progress: improved      Disposition  Final diagnoses:   Near syncope   Vomiting     Time reflects when diagnosis was documented in both MDM as applicable and the Disposition within this note     Time User Action Codes Description Comment    7/9/2019  3:38 PM Jess Minor Add [R55] Near syncope     7/9/2019  3:38 PM Jess Velazquez Add [R11 10] Vomiting       ED Disposition     ED Disposition Condition Date/Time Comment    Discharge Stable Tue Jul 9, 2019  3:38 PM Claudean Purple Viscardi discharge to home/self care  Follow-up Information    None         Patient's Medications   Discharge Prescriptions    No medications on file     No discharge procedures on file      ED Provider  Electronically Signed by           Diane Burdick DO  07/09/19 6064

## 2019-07-10 ENCOUNTER — TELEPHONE (OUTPATIENT)
Dept: CARDIOLOGY CLINIC | Facility: CLINIC | Age: 34
End: 2019-07-10

## 2019-07-10 NOTE — TELEPHONE ENCOUNTER
Pt's mother requested if a letter can be sent to her email at [employer] to be given to the home care in order to stop metoprolol

## 2019-07-10 NOTE — TELEPHONE ENCOUNTER
Pt's mother Mamadou Gonzalez called, would like to have Naman come off the metoprolol  His BP has been consistently low and he had an episode of near syncope yesterday  She stated on a few occasions the hospital has held the dose of metoprolol due to his BP  Home care does not check BP before giving the medication which makes her nervous about it as well  Please advise, thank you

## 2019-07-10 NOTE — TELEPHONE ENCOUNTER
No need to fill  Mother was able to find over the counter  Meant to document for our system  Patient will take 2 tablets of 22 mg daily every other month as Zinc can be toxic and advised not to take for more than 3 months at a time

## 2019-07-11 ENCOUNTER — OFFICE VISIT (OUTPATIENT)
Dept: UROLOGY | Facility: AMBULATORY SURGERY CENTER | Age: 34
End: 2019-07-11
Payer: COMMERCIAL

## 2019-07-11 ENCOUNTER — TELEPHONE (OUTPATIENT)
Dept: FAMILY MEDICINE CLINIC | Facility: CLINIC | Age: 34
End: 2019-07-11

## 2019-07-11 VITALS — HEART RATE: 45 BPM

## 2019-07-11 DIAGNOSIS — N39.0 FREQUENT UTI: Primary | ICD-10-CM

## 2019-07-11 PROCEDURE — 99203 OFFICE O/P NEW LOW 30 MIN: CPT | Performed by: PHYSICIAN ASSISTANT

## 2019-07-11 NOTE — TELEPHONE ENCOUNTER
On your note of 7/2/19 it is documented to take 2 22 mg capsules daily every other month  Please clarify

## 2019-07-11 NOTE — TELEPHONE ENCOUNTER
Yara from [employer] called needs clarification from Dr Cesar Tellez on how she wants  to dispense the medication Zinc Picolinate 25mg to patient  Yara can be reached at [phone]   Patient was seen by Dr Johnson Alamo on 7/2/2019

## 2019-07-11 NOTE — TELEPHONE ENCOUNTER
Spoke to Dr Chaz Zheng, metoprolol is not useful in the dx of long QT syndrome, he may stop the medication  Would like to know if pt has ever been recommended to take propanolol or nadolol  Called pt's mother, pt had been on propanolol in the past but never nadolol  Informed her as per Dr Chaz Zheng metoprolol can be stopped as it is not useful for long QT

## 2019-07-12 ENCOUNTER — TELEPHONE (OUTPATIENT)
Dept: FAMILY MEDICINE CLINIC | Facility: CLINIC | Age: 34
End: 2019-07-12

## 2019-07-12 NOTE — TELEPHONE ENCOUNTER
Patient should take 2 tablets of zinc 22 mg every other month  I already explained to them that we only have 25 mg doses available in system but they were able to find 22 mg dose OTC

## 2019-07-12 NOTE — TELEPHONE ENCOUNTER
Spoke with Amber Roth, she has multiple concerns, would really like to speak with you   Please call her @ [phone]

## 2019-07-12 NOTE — PROGRESS NOTES
UROLOGY NEW PATIENT NOTE     Patient Identifiers: Opal Zavala (MRN: [id number])    Service Providing Consultation:  Urology, Nara Willis PA-C  Consults  Date of Service: 7/12/2019      History of Present Illness:     Opal Zavala is a 26 y o  Male with a long and complicated medical history including anoxic brain injury from age 13 as well as aspiration pneumonia and more recently recurrent urinary tract infection  He is accompanied by his mother and his caregivers  He is in a wheelchair  He is awake and alert and somewhat interactive  The history is provided accurately by his mother  She states he has had a full evaluation at [facility] however those records are not available  He has had several urinary tract infections this year including E coli and Enterococcus  He is fully incontinent and wears a diaper  His mother has a special way of wrapping it around his penis and she believes the more recent caregivers have not changed it frequently enough  He had a CT scan of the abdomen and pelvis in May which showed suspicion for pneumonia but was essentially unremarkable from a  standpoint  He is on a routine bowel program and does not have a history of constipation  He is uncircumcised    His creatinine is 0 76      Past Medical, Past Surgical History:     Past Medical History:   Diagnosis Date    Allergic rhinitis     Brain anoxia ([facility] [address] )     Cardiac arrest [facility])     age 13 s/p long Q-T syndrome    Community acquired pneumonia     last assessed/resolved:  10/9/2014    Depression     GERD (gastroesophageal reflux disease)     Long Q-T syndrome     Muscular rigidity and spasm, progressive     Osteopenia     Osteoporosis     Quadriplegia ([address] )     Spastic neurogenic bladder     Urinary incontinence     Urinary tract infection without hematuria 4/27/2019   :    Past Surgical History:   Procedure Laterality Date    APPENDECTOMY  1991    WISDOM TOOTH EXTRACTION :    Medications, Allergies:     Current Outpatient Medications:     b complex-vitamin C-folic acid (RENAL) 1 mg, - TAKE 1 CAPSULE BY MOUTH ONCE EVERY DAY AT AT 2PM FOR HEALTH MAINTENANCE, Disp: 31 each, Rfl: 11    carbamide peroxide (DEBROX) 6 5 % otic solution, Administer 5 drops into both ears 2 (two) times a week, Disp: 15 mL, Rfl: 5    coenzyme Q-10 100 MG capsule, 2 SOFTGELS(200MG) BY MOUTH DAILY @ 9AM (SUPPLEMENT) *LATONIA, Disp: 60 capsule, Rfl: 0    DEEP SEA NASAL SPRAY 0 65 % nasal spray, - INHALE 2 SPRAYS IN EACH NOSTRIL AS NEEDED FOR CONGESTION, Disp: 44 mL, Rfl: 11    Diapers & Supplies (HUGGIES LITTLE MOVERS SIZE 3) MISC, by Does not apply route, Disp: , Rfl:     Disposable Gloves (VINYL GLOVES MEDIUM) MISC, by Does not apply route 4 (four) times a day Dispense 3 boxes monthly; Diagnosis R32, Disp: 3 each, Rfl: 5    docusate sodium (COLACE) 100 mg capsule, 100 mg 9am and 9pm, Disp: 10 capsule, Rfl: 5    dronabinol (MARINOL) 2 5 mg capsule, - *FRIG-SHELF TAKE 1 CAPSULE BY MOUTH FOUR TIMES DAILY (9AM,2PM,6PM,9PM) FOR POOR APPETITE, Disp: 120 capsule, Rfl: 5    ENEMEEZ MINI 283 MG enema, - INSERT 5ML PER RECTUM AS NEEDED FOR CONSTIPATION, Disp: 5 each, Rfl: 11    ENEMEEZ PLUS  MG ENEM, use as directed, Disp: 150 mL, Rfl: 0    GNP VITAMIN C 500 MG tablet, 1 TAB BY MOUTH DAILY @ 2PM (SUPPLEMENT) *LATONIA, Disp: 30 tablet, Rfl: 0    HEMORRHOIDAL 0 25 % suppository, - UNWRAP AND INSERT 1 SUPPOSITORY PER RECTUM AS NEEDED FOR HEMORRHOID PAIN RELIEF, Disp: 12 suppository, Rfl: 11    ibuprofen (MOTRIN) 200 mg tablet, Take 2 tabs q6h PRN for pain not to exceed 2000mg per day, Disp: 200 tablet, Rfl: 2    Incontinence Supply Disposable (PREVAIL AIR BRIEFS) MISC, 1 each by Does not apply route every 4 (four) hours Please provide 5 briefs per day, Disp: 150 each, Rfl: 11    Incontinence Supply Disposable (SELECT DISPOSABLE UNDERWEAR SM) MISC, Please provide a 30 day supply 10 per day DX R32 incontinence, Disp: 22 each, Rfl: 6    loratadine (CLARITIN) 10 mg tablet, - TAKE 1 TABLET BY MOUTH ONCE EVERY DAY AS NEEDED FOR ALLERGIES, Disp: 30 tablet, Rfl: 11    Magnesium 500 MG CAPS, Take 1 capsule (500 mg total) by mouth daily, Disp: 30 capsule, Rfl: 5    Magnesium Oxide 500 MG TABS, - TAKE 1 TABLET BY MOUTH ONCE EVERY DAY AT 2PM FOR HEALTH MAINTENANCE, Disp: 31 each, Rfl: 11    Melatonin 5 MG TABS, - TAKE 1 TABLET BY MOUTH EVERY NIGHT AT BEDTIME AT 9PM, Disp: 31 each, Rfl: 11    Misc   Devices [facility]) MISC, Please provide pt with a new cord for power wheelchair, Disp: 1 each, Rfl: 0    modafinil (PROVIGIL) 100 mg tablet, - TAKE 2 TABLETS (200MG)  BY MOUTH ONCE EVERY DAY AT 9AM FOR HEALTH FOR BRAIN INJURY, Disp: 62 tablet, Rfl: 5    Multiple Vitamins-Minerals (CEROVITE SENIOR) TABS, - TAKE 1 TABLET BY MOUTH ONCE EVERY DAY AT 9AM FOR HEALTH MAINTENANCE, Disp: 31 tablet, Rfl: 11    nystatin (MYCOSTATIN) powder, Apply topically 2 (two) times a day, Disp: 15 g, Rfl: 0    Omega-3 Fatty Acids (FISH OIL) 1,000 mg, 1 CAPSULE BY MOUTH DAILY @ 9AM (SUPPLEMENT) *LATONIA, Disp: 30 capsule, Rfl: 0    omeprazole (PriLOSEC) 20 mg delayed release capsule, Take 20mg at 9am every day, Disp: 30 capsule, Rfl: 5    polyethylene glycol (GLYCOLAX) powder, Mix 17 g (1 capful) in 8 oz of water and take PO QOD PRN if no bowel movement in 3 days, Disp: 255 g, Rfl: 11    polyethylene glycol (MIRALAX) 17 g packet, Take 17 g by mouth every other day Take Monday, Wednesday, Friday at 0800, Disp: 14 each, Rfl: 0    PREPARATION H 1-0 25-14 4-15 % rectal cream, - APPLY RECTALLY AS NEEDED FOR HEMORRHOID PAIN RELIEF, Disp: 51 g, Rfl: 11    ranitidine (ZANTAC) 150 mg tablet, Take 1 tablet (150 mg total) by mouth daily, Disp: 30 tablet, Rfl: 5    Respiratory Therapy Supplies (SPIROMETER) KIT, by Does not apply route 6 (six) times a day, Disp: 1 kit, Rfl: 0    Respiratory Therapy Supplies (SPIROMETER) KIT, by Does not apply route 3 (three) times a day Do spirometry 3 times daily at 9am, 2pm and 9pm , Disp: 1 kit, Rfl: 0    sertraline (ZOLOFT) 100 mg tablet, - TAKE 1 TABLET BY MOUTH ONCE EVERY DAY AT 9PM FOR DEPRESSION, Disp: 31 tablet, Rfl: 11    Sodium Fluoride (PREVIDENT 5000 PLUS DT), Apply to teeth, Disp: , Rfl:     Starch, Thickening, LIQD, Nectar thick liquids up to honey thick if coughing occurs as needed, please use Simply Thick liquid only  Discontinue Thick-It powder , Disp: 237 mL, Rfl: 5    Tea Tree 100 % OIL, - APPLY TO SKIN FOLDS ONCE EVERY DAY AS NEEDED FOR RASH, Disp: 60 mL, Rfl: 11    Tea Tree Oil OIL, by Does not apply route daily Apply to skin folds   (Patient taking differently: by Does not apply route as needed Apply to skin folds  ), Disp: 1 Bottle, Rfl: 3    VASCEPA 1 g CAPS, - TAKE 1 CAPSULE BY MOUTH ONCE EVERY DAY AT 9AM FOR HEALTH MAINTENANCE, Disp: 31 capsule, Rfl: 11    Vitamins A & D (VITAMIN A & D) ointment, - APPLY TO AFFECTED AREA (BUTTOCKS/ANAL) AS NEEDED, Disp: 454 g, Rfl: 11    wheat dextrin (BENEFIBER), Take 1 packet by mouth daily, Disp: 30 each, Rfl: 3    zinc oxide (DESITIN) 40 % PSTE, Apply topically, Disp: , Rfl:     Zinc Picolinate 25 MG TABS, Take 1 76 tablets (44 mg total) by mouth daily Take 2 22 mg capsules daily every other month, Disp: 60 each, Rfl: 6    Ginkgo Biloba Extract 40 MG CAPS, 3 CAPS(120MG) BY MOUTH DAILY @ 2PM (SUPPLEMENT) *GOODBRED (Patient not taking: Reported on 7/11/2019), Disp: 90 capsule, Rfl: 0    Incontinence Supply Disposable (DEPEND UNDERWEAR SM/MED) MISC, by Does not apply route, Disp: , Rfl:     metoprolol tartrate (LOPRESSOR) 25 mg tablet, 1/2 TAB(12 5MG) BY MOUTH DAILY @ 9AM (HTN) *GOODBRED (Patient not taking: Reported on 7/11/2019), Disp: 15 tablet, Rfl: 0    Nutritional Supplements (NUTRITIONAL SHAKE) LIQD, Take 1 Can by mouth 2 (two) times a day as needed (nutritional supplementation, weight gain) Please provide Boost (Patient not taking: Reported on 7/11/2019), Disp: 24 Bottle, Rfl: 6    onabotulinumtoxin A (BOTOX) 100 units, inject 500 units into left gastroc soleus and abductor pollicis ankit, Disp: , Rfl:     phenazopyridine (PYRIDIUM) 200 mg tablet, Take 1 tablet (200 mg total) by mouth every 8 (eight) hours as needed for bladder spasms (burning with urination) (Patient not taking: Reported on 7/11/2019), Disp: 3 tablet, Rfl: 0    ranitidine (ZANTAC) 150 mg tablet, ranitidine 150 mg tablet, Disp: , Rfl:     Zinc 50 MG TABS, Take 50mg daily @ 2:00pm every other month  (May, June, July,  August, September, October 2019) (Patient not taking: Reported on 7/11/2019), Disp: 30 tablet, Rfl: 5    Allergies: Allergies   Allergen Reactions    Other      drugs that prolong the QT interval  drugs that prolong the QT interval  Seasonal allergies    :    Social and Family History:   Social History:   Social History     Tobacco Use    Smoking status: Never Smoker    Smokeless tobacco: Never Used   Substance Use Topics    Alcohol use: Not Currently     Frequency: Never     Binge frequency: Never    Drug use: Not Currently     Social History     Tobacco Use   Smoking Status Never Smoker   Smokeless Tobacco Never Used       Family History:  Family History   Problem Relation Age of Onset    Other Father         mitral valve replaced    Hypertension Father     Diabetes type II Maternal Grandfather     Diabetes type II Maternal Uncle     Hypotension Mother    :     Review of Systems:     General: Fever, chills, or night sweats: negative  Cardiac: Negative for chest pain  Pulmonary: Negative for shortness of breath  Gastrointestinal: Abdominal pain negative  Nausea, vomiting, or diarrhea negative,  Genitourinary: See HPI above  Patient does not have hematuria  All other systems queried were negative  Physical Exam:   General: Patient is pleasant and in NAD   Awake and alert  Pulse (!) 45   Cardiac: Peripheral edema: negative  Pulmonary: Non-labored breathing  Abdomen: Soft, non-tender, non-distended  No surgical scars  No masses, tenderness, hernias noted  Genitourinary: Negative CVA tenderness, negative suprapubic tenderness  (Male): Penis uncircumcised, phallus normal, meatus patent  Testicles descended into scrotum bilaterally without masses nor tenderness  No inguinal hernias bilaterally  Labs:     Lab Results   Component Value Date    HGB 16 1 07/09/2019    HCT 47 4 07/09/2019    WBC 11 95 (H) 07/09/2019     07/09/2019   ]    Lab Results   Component Value Date     09/06/2017    K 5 0 07/09/2019     07/09/2019    CO2 29 07/09/2019    BUN 15 07/09/2019    CREATININE 0 87 07/09/2019    CALCIUM 9 1 07/09/2019   ]    Imaging:   I personally reviewed the images and report of the following studies, and reviewed them with the patient:  CT ABDOMEN AND PELVIS WITH IV CONTRAST        IMPRESSION:     Left lower lobe consolidation suspicious for pneumonia  Moderate fecal stasis  Kidneys appear normal      ASSESSMENT:      1  Recurrent urinary tract infection   2  Urinary incontinence   3  Anoxic brain injury    PLAN:   - I had a fairly lengthy discussion with the patient's mother and the caregivers  - I asked her how she felt about him being on prophylactic antibiotic for about 30 days  - she was resistant and would like me to check with her family physician  - I recommended Macrodantin 50 mg at bedtime for a month  - he could follow up in the office in about 6 weeks  - she should call with any questions or concerns  - she will hand deliver the records from [facility] to the office for review      Thank you for allowing me to participate in this patients care  Please do not hesitate to call with any additional questions    Florette Rubinstein, PA-C

## 2019-07-14 NOTE — TELEPHONE ENCOUNTER
Spoke with Marguerite Louie via phone, advised her to call Urology for Nitrofurantoin Rx as I concur with their recommendations  Will repeat CXR in mid August, but leukopenia has resolved  Answered all of Tila's questions

## 2019-07-15 ENCOUNTER — OFFICE VISIT (OUTPATIENT)
Dept: FAMILY MEDICINE CLINIC | Facility: CLINIC | Age: 34
End: 2019-07-15

## 2019-07-15 VITALS
DIASTOLIC BLOOD PRESSURE: 70 MMHG | WEIGHT: 129 LBS | TEMPERATURE: 97.9 F | BODY MASS INDEX: 18.47 KG/M2 | HEIGHT: 70 IN | HEART RATE: 70 BPM | SYSTOLIC BLOOD PRESSURE: 114 MMHG | RESPIRATION RATE: 18 BRPM

## 2019-07-15 DIAGNOSIS — R55 SYNCOPE, UNSPECIFIED SYNCOPE TYPE: Primary | ICD-10-CM

## 2019-07-15 DIAGNOSIS — R11.2 NAUSEA AND VOMITING, INTRACTABILITY OF VOMITING NOT SPECIFIED, UNSPECIFIED VOMITING TYPE: ICD-10-CM

## 2019-07-15 PROBLEM — R10.9 FLANK PAIN: Status: RESOLVED | Noted: 2019-05-24 | Resolved: 2019-07-15

## 2019-07-15 PROBLEM — R53.83 LETHARGY: Status: RESOLVED | Noted: 2019-04-08 | Resolved: 2019-07-15

## 2019-07-15 PROCEDURE — 99213 OFFICE O/P EST LOW 20 MIN: CPT | Performed by: FAMILY MEDICINE

## 2019-07-15 NOTE — PROGRESS NOTES
Assessment/Plan:    Syncope  Doing well and no other syncopal or near syncopal events since Ed visit on 7/9  Agree with discontinuation of Beta blocker  F/u as needed  Nausea and vomiting  Resolved  Problem List Items Addressed This Visit        Digestive    Nausea and vomiting     Resolved  Cardiovascular and Mediastinum    Syncope - Primary     Doing well and no other syncopal or near syncopal events since Ed visit on 7/9  Agree with discontinuation of Beta blocker  F/u as needed  Subjective:      Patient ID: Candido Jones is a 26 y o  male  Baltazar Lerma is a 27yo M  With PMH of anoxic brain injury and QT prolongation who presents for an ED visit 7/9/19  Accompanied by mother who gives his history  He was taken to ED for syncopal vs near syncopal episode along with N/V  Baltazar Lerma feels well and is back at his baseline today  Denies any further symptoms of N/V/D, fever, constipation, abdominal pain, SOB  Saw his pulmonologist who took him off of his beta blocker shortly after his ED visit, as it could be related to the near-syncope event and the vomiting  The following portions of the patient's history were reviewed and updated as appropriate: allergies, current medications, past family history, past medical history, past social history, past surgical history and problem list     Review of Systems   Constitutional: Negative for chills and fever  Respiratory: Negative for shortness of breath  Cardiovascular: Negative for chest pain and palpitations  Gastrointestinal: Negative for constipation, diarrhea, nausea and vomiting  Genitourinary: Negative for difficulty urinating  Neurological: Negative for dizziness, syncope and light-headedness           Objective:      /70   Pulse 70   Temp 97 9 °F (36 6 °C)   Resp 18   Ht 5' 10" (1 778 m)   Wt 58 5 kg (129 lb)   BMI 18 51 kg/m²          Physical Exam   Constitutional: He appears well-developed and well-nourished  No distress  HENT:   Head: Normocephalic and atraumatic  Cardiovascular: Normal rate, regular rhythm, normal heart sounds and intact distal pulses  Pulmonary/Chest: Effort normal and breath sounds normal  No respiratory distress  Abdominal: Soft  Bowel sounds are normal  He exhibits no distension and no mass  There is no tenderness  There is no guarding  Neurological: He is alert  Skin: He is not diaphoretic  Nursing note and vitals reviewed

## 2019-07-16 NOTE — ASSESSMENT & PLAN NOTE
Doing well and no other syncopal or near syncopal events since Ed visit on 7/9  Agree with discontinuation of Beta blocker  F/u as needed

## 2019-07-18 ENCOUNTER — TELEPHONE (OUTPATIENT)
Dept: FAMILY MEDICINE CLINIC | Facility: CLINIC | Age: 34
End: 2019-07-18

## 2019-07-18 NOTE — TELEPHONE ENCOUNTER
Please call Juan Luis Lyon, she is very upset with Naman's declining health, the agency that is taking care of him isn't following directions given by Speech/swallow therapist  He is wheezing and she is afraid of him getting pneumonia again  He has been to the ER several times  She asked to speak with Dr Fern Sims   I think she needs to talk to someone soon

## 2019-07-19 NOTE — TELEPHONE ENCOUNTER
Spoke with patient's mother  As recommended by [facility], she is pursuing use of a device called The Breaker  This device is for respiratory strengthening muscle therapy  She has purchased this herself, has been using on her own with good results  She is requesting order to be submitted to [address] for caretaker's to use daily with his care  I have created order, in your bin for review and signature  Thanks!

## 2019-07-23 ENCOUNTER — TELEPHONE (OUTPATIENT)
Dept: FAMILY MEDICINE CLINIC | Facility: CLINIC | Age: 34
End: 2019-07-23

## 2019-07-30 ENCOUNTER — TELEPHONE (OUTPATIENT)
Dept: UROLOGY | Facility: MEDICAL CENTER | Age: 34
End: 2019-07-30

## 2019-07-30 DIAGNOSIS — N39.0 RECURRENT UTI: Primary | ICD-10-CM

## 2019-07-30 NOTE — TELEPHONE ENCOUNTER
Call placed to group home  Left message for Estella to call office to discuss  Office number provided on [employer]

## 2019-07-30 NOTE — TELEPHONE ENCOUNTER
Returned call to [facility]  Patient was seen on 7/11 by Alena Quinones PA-C  At that time he recommended prophylactic antibiotic for about 30 days, however mother was resistant and wanted PA to check with PCP first  They have not heard back about the plan going forward at this time  Advised I will check with PA and someone from office would call back with plan

## 2019-07-30 NOTE — TELEPHONE ENCOUNTER
I spoke with Naman's mother to ask her what pharmacy she wanted me to send a prescription to  But instead we decided to hold off on prophylactic antibiotics unless he gets infected again

## 2019-07-30 NOTE — TELEPHONE ENCOUNTER
Patient of [facility] Deng seen in Center, managed by Dr Jeneal Ahumada [place],  for group home patient resides at, calling in regards to antibiotics  Patients mother requested we reach out to patients PCP to discuss this  She is following up      She is asking for a call back at [phone]

## 2019-08-01 ENCOUNTER — TELEPHONE (OUTPATIENT)
Dept: FAMILY MEDICINE CLINIC | Facility: CLINIC | Age: 34
End: 2019-08-01

## 2019-08-01 NOTE — TELEPHONE ENCOUNTER
Mother calling with a request to following:      Attn: Ceci Franklin [facility]    Y[address] (P)     [phone] (f)  For a Dysphasia Eval

## 2019-08-02 DIAGNOSIS — G93.1 ANOXIC BRAIN DAMAGE (HCC): Primary | ICD-10-CM

## 2019-08-02 DIAGNOSIS — R63.4 ABNORMAL WEIGHT LOSS: ICD-10-CM

## 2019-08-05 DIAGNOSIS — K59.00 CONSTIPATION, UNSPECIFIED CONSTIPATION TYPE: Primary | ICD-10-CM

## 2019-08-05 RX ORDER — WHEAT DEXTRIN 3 G/4 G
POWDER IN PACKET (EA) ORAL
Qty: 28 EACH | Refills: 10 | Status: SHIPPED | OUTPATIENT
Start: 2019-08-05 | End: 2021-01-29 | Stop reason: CLARIF

## 2019-08-06 ENCOUNTER — TELEPHONE (OUTPATIENT)
Dept: FAMILY MEDICINE CLINIC | Facility: CLINIC | Age: 34
End: 2019-08-06

## 2019-08-06 NOTE — TELEPHONE ENCOUNTER
Received call from Sheldon Su, ([phone])  Patient scheduled to see you on 8/13/19, she states she had previously discussed POOLST with you to address with Naman  In thinking about it, she feels it would be better if this discussion would be held with her and his father  She would prefer to meet with you without Naman  Any suggestions of when would be a good time?

## 2019-08-08 ENCOUNTER — TELEPHONE (OUTPATIENT)
Dept: UROLOGY | Facility: AMBULATORY SURGERY CENTER | Age: 34
End: 2019-08-08

## 2019-08-08 NOTE — TELEPHONE ENCOUNTER
Patient is scheduled to be seen on 8/22  Patient is in need of a insurance referral for the visit  Thanks

## 2019-08-08 NOTE — TELEPHONE ENCOUNTER
I don't see that Daisha Robledoyury is scheduled to see me at all in the future, did she cancel the appointment? I would say we could use an appointment slot to hold the discussion, even without Teresakenneth Chon coming to the visit  Let me know what she would like to do      Cooper Reilly

## 2019-08-08 NOTE — TELEPHONE ENCOUNTER
Patient is a patient of Dr Shun Bright and will be coming to see Urology on 8/22/19  Patient has [facility] and requires an insurance referral  Our NPI is [id number] and diagnosis code is [id number]  Thank you!

## 2019-08-09 NOTE — TELEPHONE ENCOUNTER
Alvina Flowers, The insurance referral has been issued for this patient  I've attached it to this encounter  It looks differently now because now they issued through availity  PS: Unfortunately, I am unable to fax due to our fax machine being down at this time  Patient is all set for this appointment  Any questions, please let me know  Thank you!

## 2019-08-12 NOTE — TELEPHONE ENCOUNTER
Please call mom, ask her if she wants Hayley Machado seen at time scheduled or does she want to use this time for her to come in and meet with Dr Solano Late without him to discuss [phone]?

## 2019-08-12 NOTE — TELEPHONE ENCOUNTER
Called and spoke with patients mother she will be bringing Naman in  tomorrow she stated she had requested a few times to speak with Dr Dang  Is Dr Dang able to call today ? Thanks

## 2019-08-12 NOTE — TELEPHONE ENCOUNTER
Was able to make appointment for long visit, are you able to address NADINE, let me know as I will have to contact Baltazar Alves

## 2019-08-13 ENCOUNTER — OFFICE VISIT (OUTPATIENT)
Dept: FAMILY MEDICINE CLINIC | Facility: CLINIC | Age: 34
End: 2019-08-13

## 2019-08-13 VITALS
BODY MASS INDEX: 19.03 KG/M2 | HEART RATE: 66 BPM | TEMPERATURE: 98.2 F | SYSTOLIC BLOOD PRESSURE: 100 MMHG | WEIGHT: 132.6 LBS | DIASTOLIC BLOOD PRESSURE: 68 MMHG | RESPIRATION RATE: 14 BRPM

## 2019-08-13 DIAGNOSIS — K59.00 CONSTIPATION, UNSPECIFIED CONSTIPATION TYPE: ICD-10-CM

## 2019-08-13 DIAGNOSIS — Z91.09 ENVIRONMENTAL ALLERGIES: ICD-10-CM

## 2019-08-13 DIAGNOSIS — E56.9 VITAMIN DEFICIENCY, UNSPECIFIED: ICD-10-CM

## 2019-08-13 DIAGNOSIS — F32.A DEPRESSION, UNSPECIFIED DEPRESSION TYPE: ICD-10-CM

## 2019-08-13 DIAGNOSIS — Z87.01 HX OF RECURRENT PNEUMONIA: ICD-10-CM

## 2019-08-13 DIAGNOSIS — Z11.1 PPD SCREENING TEST: ICD-10-CM

## 2019-08-13 DIAGNOSIS — S06.9X0D TRAUMATIC BRAIN INJURY, WITHOUT LOSS OF CONSCIOUSNESS, SUBSEQUENT ENCOUNTER: Primary | ICD-10-CM

## 2019-08-13 DIAGNOSIS — R25.2 SPASTICITY: ICD-10-CM

## 2019-08-13 DIAGNOSIS — E56.9 VITAMIN DEFICIENCY: ICD-10-CM

## 2019-08-13 PROCEDURE — 99213 OFFICE O/P EST LOW 20 MIN: CPT | Performed by: FAMILY MEDICINE

## 2019-08-13 PROCEDURE — 86580 TB INTRADERMAL TEST: CPT | Performed by: FAMILY MEDICINE

## 2019-08-13 RX ORDER — BACLOFEN 20 MG
TABLET ORAL
Qty: 31 EACH | Refills: 11 | Status: SHIPPED | OUTPATIENT
Start: 2019-08-13 | End: 2019-11-14 | Stop reason: SDUPTHER

## 2019-08-13 RX ORDER — DRONABINOL 2.5 MG/1
CAPSULE ORAL
Qty: 120 CAPSULE | Refills: 5 | Status: SHIPPED | OUTPATIENT
Start: 2019-08-13 | End: 2019-09-11 | Stop reason: SDUPTHER

## 2019-08-13 RX ORDER — POLYETHYLENE GLYCOL 3350 17 G/17G
POWDER, FOR SOLUTION ORAL
Qty: 30 EACH | Refills: 3 | Status: SHIPPED | OUTPATIENT
Start: 2019-08-13 | End: 2019-11-14 | Stop reason: SDUPTHER

## 2019-08-13 RX ORDER — ZINC GLUCONATE 50 MG
TABLET ORAL
Qty: 30 TABLET | Refills: 5 | Status: SHIPPED | OUTPATIENT
Start: 2019-08-13 | End: 2019-09-30

## 2019-08-13 RX ORDER — LORATADINE 10 MG/1
10 TABLET ORAL DAILY PRN
Qty: 30 TABLET | Refills: 11 | Status: SHIPPED | OUTPATIENT
Start: 2019-08-13 | End: 2019-08-16 | Stop reason: SDUPTHER

## 2019-08-13 RX ORDER — ASCORBIC ACID, THIAMINE MONONITRATE,RIBOFLAVIN, NIACINAMIDE, PYRIDOXINE HYDROCHLORIDE, FOLIC ACID, CYANOCOBALAMIN, BIOTIN, CALCIUM PANTOTHENATE, 100; 1.5; 1.7; 20; 10; 1; 6000; 150000; 5 MG/1; MG/1; MG/1; MG/1; MG/1; MG/1; UG/1; UG/1; MG/1
CAPSULE, LIQUID FILLED ORAL
Qty: 31 EACH | Refills: 11 | Status: SHIPPED | OUTPATIENT
Start: 2019-08-13 | End: 2019-11-14 | Stop reason: SDUPTHER

## 2019-08-13 RX ORDER — ASCORBIC ACID 500 MG
TABLET ORAL
Qty: 30 TABLET | Refills: 0 | Status: SHIPPED | OUTPATIENT
Start: 2019-08-13 | End: 2019-09-05 | Stop reason: SDUPTHER

## 2019-08-13 NOTE — LETTER
August 13, 2019     Patient: Lisset Taylor   YOB: 1985   Date of Visit: 8/13/2019       To Whom it May Concern:    Irene Queen is under my professional care  He was seen in my office on 8/13/2019  He no longer needs a repeat CBC (complete blood count), but will still need a repeat chest X-ray to ensure resolution of pneumonia from July 2019  If you have any questions or concerns, please don't hesitate to call           Sincerely,          Amado Acevedo MD

## 2019-08-13 NOTE — PROGRESS NOTES
Assessment/Plan:     Problem List Items Addressed This Visit        Other    Constipation    Relevant Medications    Magnesium Oxide 500 MG TABS    polyethylene glycol (MIRALAX) 17 g packet    Depression    Relevant Medications    ascorbic acid (GNP VITAMIN C) 500 MG tablet    Hx of recurrent pneumonia     -Patient asymptomatic at this time  -Repeat CXR for 6 week follow up to ensure resolution of RLL pneumonia July 2019  -Continue to use "the breather" to reduce atelectasis, follow-up with Pulmonology         Relevant Orders    XR chest pa & lateral    Spasticity     -Time adjusted on prescription for Marinol, but no refill needed, so prescription written only for caregiver's documentation, caregiver understands is not a valid prescription   -Gave referral to PMR at [facility] (Dr Ambrose Montana) for consideration of possible medical marijuana for ongoing spasticity         Relevant Orders    Ambulatory referral to Physical Medicine Rehab      Other Visit Diagnoses     Traumatic brain injury, without loss of consciousness, subsequent encounter    -  Primary    Relevant Medications    dronabinol (MARINOL) 2 5 mg capsule    Other Relevant Orders    Ambulatory referral to Physical Medicine Rehab    Environmental allergies        Relevant Medications    loratadine (CLARITIN) 10 mg tablet    Vitamin deficiency        Relevant Medications    Zinc 50 MG TABS    Vitamin deficiency, unspecified        Relevant Medications    b complex-vitamin C-folic acid (RENAL) 1 mg    PPD screening test        Relevant Orders    TB Skin Test (Completed)          Follow up at next available, as parents, Shelley Olvera and Lindsey Gibson, desire comprehensive discussion of POLST document        Subjective:      Patient ID: Higinio Mueller is a 26 y o  male with 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 s/p cardiac arrest at 13years of age      -Atelectasis/hx of PNA: Saw Pulmonology on 8/1/19, who recommended patient continue to practice inspiratory muscle strength training exercises to reduce his aspiration risk, as recommended by speech and Swallow at [facility], he currently using "the breather" machine, which is more effective than previous incentive spirometer per mother and caregiver  Typically can complete 30 reps in 1 sitting (3 sets of 10 each of inspiratory/expiratory)  Patient also due for >6 week follow up CXR to ensure full resolution of RLL pneumonia, presumed to be due to aspiration vs  CAP  No fevers, appears well per caregiver/mother     -Dysphagia/weight: History of dysphagia since aspiration pneumonia July 2018  Patient follows with [facility]  Thin liquids need thickening but at other times he tolerates thicker liquids without additional thickener added, such as nectar juices  Swallowing also seems to be better without using a straw; denies significant coughing while swallowing per caregiver and mother  Eats a variety if more solid foods but usually in the morning as he gets more tired at the end of the day  Saw Nutritionist, recommended goal weight around 135 lbs, patient has improved weight from past visits  Wt Readings from Last 3 Encounters:  08/13/19 : 60 1 kg (132 lb 9 6 oz)  07/15/19 : 58 5 kg (129 lb)  07/09/19 : 60 2 kg (132 lb 11 5 oz)    -Medication adjustment:  Caregiver requests some of the timing of patient's medication be adjusted to consolidate the administration times, for ease of medication administration  Would like medications to be given at 4:00 p m  rather than to be staggered  The following portions of the patient's history were reviewed and updated as appropriate: allergies, current medications, past family history, past medical history, past social history, past surgical history and problem list     Review of Systems   Constitutional: Negative for activity change, appetite change, chills, fatigue, fever and unexpected weight change          Appetite much improved; needs PPD today per caregiver   HENT: Positive for rhinorrhea and trouble swallowing  Intermittent congestion helped by Claritin   Eyes: Negative for visual disturbance  Respiratory: Negative for chest tightness and shortness of breath  Cardiovascular: Negative for chest pain, palpitations and leg swelling  Gastrointestinal: Negative for abdominal pain, diarrhea, nausea and vomiting  Constipation well controlled on current regimen   Genitourinary: Negative for discharge, dysuria, frequency and hematuria  No urinary symptoms   Musculoskeletal: Positive for gait problem  Wheelchair bound at baseline   Skin: Negative for rash  Neurological: Negative for syncope, weakness and light-headedness  Psychiatric/Behavioral: Negative for sleep disturbance  All other systems reviewed and are negative  Objective:  /68 (BP Location: Left arm, Patient Position: Sitting, Cuff Size: Standard)   Pulse 66   Temp 98 2 °F (36 8 °C) (Tympanic)   Resp 14   Wt 60 1 kg (132 lb 9 6 oz)   BMI 19 03 kg/m²      Physical Exam   Constitutional: He is oriented to person, place, and time  He appears well-developed and well-nourished  No distress  Smiling, sitting up in wheelchair, engaging, playful   HENT:   Head: Normocephalic and atraumatic  Eyes: Conjunctivae and EOM are normal  Right eye exhibits no discharge  Left eye exhibits no discharge  Neck: Normal range of motion  Neck supple  Cardiovascular: Normal rate, regular rhythm and intact distal pulses  Pulmonary/Chest: Effort normal and breath sounds normal  No respiratory distress  Poor effort (unable to take deep breaths) but grossly CTABL   Abdominal: Soft  Bowel sounds are normal  There is no tenderness  Musculoskeletal: Normal range of motion  He exhibits no edema  Spasticity of hands worse on left side vs  right side   Neurological: He is alert and oriented to person, place, and time     Follows all commands, scant verbal communication but nods/shakes head no appropriately; moves all extremities, spasticity and hypertonicity of B/L UE & LE    Skin: Skin is warm and dry  Capillary refill takes less than 2 seconds  No rash noted  Psychiatric: He has a normal mood and affect  His behavior is normal    Vitals reviewed        CHANTE Titus  PGY-3  [place] [id number]

## 2019-08-15 ENCOUNTER — CLINICAL SUPPORT (OUTPATIENT)
Dept: FAMILY MEDICINE CLINIC | Facility: CLINIC | Age: 34
End: 2019-08-15

## 2019-08-15 DIAGNOSIS — Z11.1 ENCOUNTER FOR PPD SKIN TEST READING: Primary | ICD-10-CM

## 2019-08-15 LAB
INDURATION: 0 MM
TB SKIN TEST: NEGATIVE

## 2019-08-16 PROBLEM — R25.2 SPASTICITY: Status: ACTIVE | Noted: 2019-08-16

## 2019-08-16 RX ORDER — LORATADINE 10 MG/1
10 TABLET ORAL DAILY PRN
Qty: 30 TABLET | Refills: 11 | Status: SHIPPED | OUTPATIENT
Start: 2019-08-16 | End: 2019-11-14 | Stop reason: SDUPTHER

## 2019-08-16 NOTE — ASSESSMENT & PLAN NOTE
-Patient asymptomatic at this time  -Repeat CXR for 6 week follow up to ensure resolution of RLL pneumonia July 2019  -Continue to use "the breather" to reduce atelectasis, follow-up with Pulmonology

## 2019-08-16 NOTE — ASSESSMENT & PLAN NOTE
-Time adjusted on prescription for Marinol, but no refill needed, so prescription written only for caregiver's documentation, caregiver understands is not a valid prescription   -Gave referral to PMR at [facility] (Dr Yasmani Warren) for consideration of possible medical marijuana for ongoing spasticity

## 2019-08-21 ENCOUNTER — APPOINTMENT (OUTPATIENT)
Dept: RADIOLOGY | Age: 34
End: 2019-08-21
Payer: COMMERCIAL

## 2019-08-21 ENCOUNTER — TRANSCRIBE ORDERS (OUTPATIENT)
Dept: ADMINISTRATIVE | Age: 34
End: 2019-08-21

## 2019-08-21 DIAGNOSIS — Z87.01 HX OF RECURRENT PNEUMONIA: ICD-10-CM

## 2019-08-21 PROCEDURE — 71046 X-RAY EXAM CHEST 2 VIEWS: CPT

## 2019-08-22 ENCOUNTER — OFFICE VISIT (OUTPATIENT)
Dept: FAMILY MEDICINE CLINIC | Facility: CLINIC | Age: 34
End: 2019-08-22

## 2019-08-22 ENCOUNTER — OFFICE VISIT (OUTPATIENT)
Dept: UROLOGY | Facility: AMBULATORY SURGERY CENTER | Age: 34
End: 2019-08-22
Payer: COMMERCIAL

## 2019-08-22 VITALS — SYSTOLIC BLOOD PRESSURE: 100 MMHG | HEART RATE: 42 BPM | DIASTOLIC BLOOD PRESSURE: 62 MMHG

## 2019-08-22 DIAGNOSIS — N39.0 FREQUENT UTI: ICD-10-CM

## 2019-08-22 DIAGNOSIS — N39.0 RECURRENT UTI: Primary | ICD-10-CM

## 2019-08-22 DIAGNOSIS — Z71.89 GOALS OF CARE, COUNSELING/DISCUSSION: Primary | ICD-10-CM

## 2019-08-22 PROCEDURE — 99213 OFFICE O/P EST LOW 20 MIN: CPT | Performed by: PHYSICIAN ASSISTANT

## 2019-08-22 PROCEDURE — 99213 OFFICE O/P EST LOW 20 MIN: CPT | Performed by: FAMILY MEDICINE

## 2019-08-22 RX ORDER — MULTIVIT-MIN/FA/LYCOPEN/LUTEIN .4-300-25
TABLET ORAL
COMMUNITY
End: 2019-09-30

## 2019-08-22 NOTE — PROGRESS NOTES
UROLOGY PROGRESS NOTE   Patient Identifiers: Rula Tello (MRN [id number])  Date of Service: 8/22/2019    Subjective:     49-year-old male with a history of anoxic brain injury aspiration pneumonia and more recently recurrent urinary tract infection  He is accompanied by his mother and caregiver and is in a wheelchair  He was experiencing recurrent E coli and enterococcus UTIs  This problem seems to have been resolved through better education of the staff regarding his incontinence  His mother is happy with the outcome as of now and does not wish to pursue any further treatment  I did recommend prophylactic nitrofurantoin previously but that no longer seems to be needed  Patient has  no complaints        Objective:     VITALS:    Vitals:    08/22/19 1513   BP: 100/62   Pulse: (!) 42           LABS:  Lab Results   Component Value Date    HGB 16 1 07/09/2019    HCT 47 4 07/09/2019    WBC 11 95 (H) 07/09/2019     07/09/2019   ]    Lab Results   Component Value Date     09/06/2017    K 5 0 07/09/2019     07/09/2019    CO2 29 07/09/2019    BUN 15 07/09/2019    CREATININE 0 87 07/09/2019    CALCIUM 9 1 07/09/2019   ]        INPATIENT MEDS:    Current Outpatient Medications:     ascorbic acid (GNP VITAMIN C) 500 MG tablet, Take 500 mg daily at 4 PM, Disp: 30 tablet, Rfl: 0    b complex-vitamin C-folic acid (RENAL) 1 mg, Take 1 mg daily at 4 PM, Disp: 31 each, Rfl: 11    carbamide peroxide (DEBROX) 6 5 % otic solution, Administer 5 drops into both ears 2 (two) times a week, Disp: 15 mL, Rfl: 5    coenzyme Q-10 100 MG capsule, 2 SOFTGELS(200MG) BY MOUTH DAILY @ 9AM (SUPPLEMENT) *LATONIA, Disp: 60 capsule, Rfl: 0    DEEP SEA NASAL SPRAY 0 65 % nasal spray, - INHALE 2 SPRAYS IN EACH NOSTRIL AS NEEDED FOR CONGESTION, Disp: 44 mL, Rfl: 11    Diapers & Supplies (HUGGIES LITTLE MOVERS SIZE 3) MISC, by Does not apply route, Disp: , Rfl:     Disposable Gloves (VINYL GLOVES MEDIUM) MISC, by Does not apply route 4 (four) times a day Dispense 3 boxes monthly; Diagnosis R32, Disp: 3 each, Rfl: 5    docusate sodium (COLACE) 100 mg capsule, 100 mg 9am and 9pm, Disp: 10 capsule, Rfl: 5    dronabinol (MARINOL) 2 5 mg capsule, - *FRIG-SHELF TAKE 1 CAPSULE BY MOUTH FOUR TIMES DAILY (9AM, 4PM, 9PM) FOR SPASTICITY, Disp: 120 capsule, Rfl: 5    ENEMEEZ MINI 283 MG enema, - INSERT 5ML PER RECTUM AS NEEDED FOR CONSTIPATION, Disp: 5 each, Rfl: 11    ENEMEEZ PLUS  MG ENEM, use as directed, Disp: 150 mL, Rfl: 0    HEMORRHOIDAL 0 25 % suppository, - UNWRAP AND INSERT 1 SUPPOSITORY PER RECTUM AS NEEDED FOR HEMORRHOID PAIN RELIEF, Disp: 12 suppository, Rfl: 11    ibuprofen (MOTRIN) 200 mg tablet, Take 2 tabs q6h PRN for pain not to exceed 2000mg per day, Disp: 200 tablet, Rfl: 2    Incontinence Supply Disposable (PREVAIL AIR BRIEFS) MISC, 1 each by Does not apply route every 4 (four) hours Please provide 5 briefs per day, Disp: 150 each, Rfl: 11    Incontinence Supply Disposable (SELECT DISPOSABLE UNDERWEAR SM) MISC, Please provide a 30 day supply 10 per day DX R32 incontinence, Disp: 22 each, Rfl: 6    loratadine (CLARITIN) 10 mg tablet, Take 1 tablet (10 mg total) by mouth daily as needed for allergies, Disp: 30 tablet, Rfl: 11    Magnesium Oxide 500 MG TABS, Take 1 tablet daily at 4 PM, Disp: 31 each, Rfl: 11    Melatonin 5 MG TABS, - TAKE 1 TABLET BY MOUTH EVERY NIGHT AT BEDTIME AT 9PM, Disp: 31 each, Rfl: 11    Misc   Devices [facility]) MISC, Please provide pt with a new cord for power wheelchair, Disp: 1 each, Rfl: 0    modafinil (PROVIGIL) 100 mg tablet, - TAKE 2 TABLETS (200MG)  BY MOUTH ONCE EVERY DAY AT 9AM FOR HEALTH FOR BRAIN INJURY, Disp: 62 tablet, Rfl: 5    Multiple Vitamins-Minerals (CEROVITE SENIOR) TABS, - TAKE 1 TABLET BY MOUTH ONCE EVERY DAY AT 9AM FOR HEALTH MAINTENANCE, Disp: 31 tablet, Rfl: 11    Multiple Vitamins-Minerals (CEROVITE SENIOR) TABS, Take by mouth, Disp: , Rfl:    Nutritional Supplements (NUTRITIONAL SHAKE) LIQD, Take 1 Can by mouth 2 (two) times a day as needed (nutritional supplementation, weight gain) Please provide Boost, Disp: 24 Bottle, Rfl: 6    omeprazole (PriLOSEC) 20 mg delayed release capsule, Take 20mg at 9am every day, Disp: 30 capsule, Rfl: 5    polyethylene glycol (MIRALAX) 17 g packet, Take Monday, Wednesday, Friday at 0900, Disp: 30 each, Rfl: 3    Respiratory Therapy Supplies (SPIROMETER) KIT, by Does not apply route 6 (six) times a day, Disp: 1 kit, Rfl: 0    Respiratory Therapy Supplies (SPIROMETER) KIT, by Does not apply route 3 (three) times a day Do spirometry 3 times daily at 9am, 2pm and 9pm , Disp: 1 kit, Rfl: 0    sertraline (ZOLOFT) 100 mg tablet, - TAKE 1 TABLET BY MOUTH ONCE EVERY DAY AT 9PM FOR DEPRESSION, Disp: 31 tablet, Rfl: 11    Sodium Fluoride (PREVIDENT 5000 PLUS DT), Apply to teeth, Disp: , Rfl:     Starch, Thickening, LIQD, Nectar thick liquids up to honey thick if coughing occurs as needed, please use Simply Thick liquid only   Discontinue Thick-It powder , Disp: 237 mL, Rfl: 5    Tea Tree 100 % OIL, - APPLY TO SKIN FOLDS ONCE EVERY DAY AS NEEDED FOR RASH, Disp: 60 mL, Rfl: 11    VASCEPA 1 g CAPS, - TAKE 1 CAPSULE BY MOUTH ONCE EVERY DAY AT 9AM FOR HEALTH MAINTENANCE, Disp: 31 capsule, Rfl: 11    Vitamins A & D (VITAMIN A & D) ointment, - APPLY TO AFFECTED AREA (BUTTOCKS/ANAL) AS NEEDED, Disp: 454 g, Rfl: 11    Wheat Dextrin (BENEFIBER ON THE GO) POWD, MIX 1 PACKET IN LIQUID & DRINK BY MOUTH ONCE EVERY DAY (9PM), Disp: 28 each, Rfl: 10    wheat dextrin (BENEFIBER), Take 1 packet by mouth daily, Disp: 30 each, Rfl: 3    zinc oxide (DESITIN) 40 % PSTE, Apply topically, Disp: , Rfl:     Zinc Picolinate 25 MG TABS, Take 1 76 tablets (44 mg total) by mouth daily Take 2 22 mg capsules daily every other month, Disp: 60 each, Rfl: 6    Ginkgo Biloba Extract 40 MG CAPS, 3 CAPS(120MG) BY MOUTH DAILY @ 2PM (SUPPLEMENT) *GOODBRED (Patient not taking: Reported on 7/11/2019), Disp: 90 capsule, Rfl: 0    Incontinence Supply Disposable (DEPEND UNDERWEAR SM/MED) MISC, by Does not apply route, Disp: , Rfl:     Magnesium 500 MG CAPS, Take 1 capsule (500 mg total) by mouth daily, Disp: 30 capsule, Rfl: 5    metoprolol tartrate (LOPRESSOR) 25 mg tablet, 1/2 TAB(12 5MG) BY MOUTH DAILY @ 9AM (HTN) *LATONIA (Patient not taking: Reported on 7/11/2019), Disp: 15 tablet, Rfl: 0    nystatin (MYCOSTATIN) powder, Apply topically 2 (two) times a day, Disp: 15 g, Rfl: 0    Omega-3 Fatty Acids (FISH OIL) 1,000 mg, 1 CAPSULE BY MOUTH DAILY @ 9AM (SUPPLEMENT) *LATONIA (Patient not taking: Reported on 8/22/2019), Disp: 30 capsule, Rfl: 0    onabotulinumtoxin A (BOTOX) 100 units, inject 500 units into left gastroc soleus and abductor pollicis ankit, Disp: , Rfl:     phenazopyridine (PYRIDIUM) 200 mg tablet, Take 1 tablet (200 mg total) by mouth every 8 (eight) hours as needed for bladder spasms (burning with urination) (Patient not taking: Reported on 7/11/2019), Disp: 3 tablet, Rfl: 0    polyethylene glycol (GLYCOLAX) powder, Mix 17 g (1 capful) in 8 oz of water and take PO QOD PRN if no bowel movement in 3 days, Disp: 255 g, Rfl: 11    PREPARATION H 1-0 25-14 4-15 % rectal cream, - APPLY RECTALLY AS NEEDED FOR HEMORRHOID PAIN RELIEF (Patient not taking: Reported on 8/22/2019), Disp: 51 g, Rfl: 11    ranitidine (ZANTAC) 150 mg tablet, Take 1 tablet (150 mg total) by mouth daily, Disp: 30 tablet, Rfl: 5    ranitidine (ZANTAC) 150 mg tablet, ranitidine 150 mg tablet, Disp: , Rfl:     Tea Tree Oil OIL, by Does not apply route daily Apply to skin folds  (Patient taking differently: by Does not apply route as needed Apply to skin folds  ), Disp: 1 Bottle, Rfl: 3    Zinc 50 MG TABS, Take 50mg daily @ 9:00pm every other month   (May, June, July,  August, September, October 2019) (Patient not taking: Reported on 8/22/2019), Disp: 30 tablet, Rfl: 5      Physical Exam:   /62 (BP Location: Left arm, Patient Position: Sitting, Cuff Size: Adult)   Pulse (!) 42   GEN: no acute distress    RESP: breathing comfortably with no accessory muscle use    ABD: soft, non-tender, non-distended   INCISION:    EXT: no significant peripheral edema     RADIOLOGY:     None     Assessment:    1  Incontinence   2  History of UTI   3   History of anoxic brain injury     Plan:   - follow-up on an as-needed basis  - will call with any questions or concerns  -  -

## 2019-08-22 NOTE — PROGRESS NOTES
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 s/p cardiac arrest at 13years of age; not present at today's visit  -Met with patient's parents, Zuleika Berry and Sarah Erik, who are the patient's legal guardians (documents brought in by them for scanning into Epic)  -Completed POLST form with parents today, copy made for medical records  -All questions answered                 Subjective:      Patient ID: Mana Rosario is a 26 y o  male  Mana Rosario is a 26 y o  male with 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 s/p cardiac arrest at 13years of age  Patient is not present at today's visit, only his parents are here for POLST discussion, would like to complete document today with patient's PCP  Would like patient to be DNR/DNI, accept trial of artificial nutrition, accept full antibiotic treatment  The following portions of the patient's history were reviewed and updated as appropriate: allergies, current medications, past family history, past medical history, past social history, past surgical history and problem list     Review of Systems      Objective: There were no vitals taken for this visit     Physical Exam  Deferred (patient not present at today's visit)    CHANTE George  PGY-3  [place] [phone]

## 2019-08-24 PROBLEM — Z71.89 GOALS OF CARE, COUNSELING/DISCUSSION: Status: ACTIVE | Noted: 2019-08-24

## 2019-08-24 NOTE — ASSESSMENT & PLAN NOTE
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

## 2019-09-05 DIAGNOSIS — S06.9X0D TRAUMATIC BRAIN INJURY, WITHOUT LOSS OF CONSCIOUSNESS, SUBSEQUENT ENCOUNTER: ICD-10-CM

## 2019-09-05 DIAGNOSIS — F32.A DEPRESSION, UNSPECIFIED DEPRESSION TYPE: ICD-10-CM

## 2019-09-09 ENCOUNTER — TELEPHONE (OUTPATIENT)
Dept: FAMILY MEDICINE CLINIC | Facility: CLINIC | Age: 34
End: 2019-09-09

## 2019-09-09 NOTE — TELEPHONE ENCOUNTER
Pharmacy called [phone]  Needing clarification on script Marinol  The instructions are contradicting on whether the med should be administered 3 or 4 times a day  Please resend script with correct instructions        Thank you

## 2019-09-10 ENCOUNTER — OFFICE VISIT (OUTPATIENT)
Dept: FAMILY MEDICINE CLINIC | Facility: CLINIC | Age: 34
End: 2019-09-10

## 2019-09-10 VITALS
WEIGHT: 130 LBS | HEART RATE: 68 BPM | RESPIRATION RATE: 18 BRPM | DIASTOLIC BLOOD PRESSURE: 74 MMHG | TEMPERATURE: 97.3 F | HEIGHT: 70 IN | SYSTOLIC BLOOD PRESSURE: 118 MMHG | BODY MASS INDEX: 18.61 KG/M2

## 2019-09-10 DIAGNOSIS — Z86.69 H/O IMPACTED CERUMEN: Primary | ICD-10-CM

## 2019-09-10 DIAGNOSIS — K59.00 CONSTIPATED: ICD-10-CM

## 2019-09-10 PROCEDURE — 99213 OFFICE O/P EST LOW 20 MIN: CPT | Performed by: FAMILY MEDICINE

## 2019-09-10 RX ORDER — DRONABINOL 2.5 MG/1
CAPSULE ORAL
Qty: 120 CAPSULE | Refills: 5 | Status: CANCELLED | OUTPATIENT
Start: 2019-09-10

## 2019-09-10 RX ORDER — ASCORBIC ACID 500 MG
TABLET ORAL
Qty: 30 TABLET | Refills: 5 | Status: SHIPPED | OUTPATIENT
Start: 2019-09-10 | End: 2019-09-12 | Stop reason: SDUPTHER

## 2019-09-10 NOTE — PROGRESS NOTES
Assessment/Plan:    Constipated  Well-controlled  - Enemeez refilled, printed, and handed to caregiver     H/O impacted cerumen  Complaints of ear drainage  Recently evaluated by ENT where cerumen removal was performed  - Has been applying cerumenlytics to the ears  - Both ear canals clear on exam  - May discontinue scheduled use of cerumenolytics  - Suggest once weekly application   - Return as needed for removal          Problem List Items Addressed This Visit        Other    Constipated     Well-controlled  - Enemeez refilled, printed, and handed to caregiver          Relevant Medications    Benzocaine-Docusate Sodium (ENEMEEZ PLUS)  MG ENEM    H/O impacted cerumen - Primary     Complaints of ear drainage  Recently evaluated by ENT where cerumen removal was performed  - Has been applying cerumenlytics to the ears  - Both ear canals clear on exam  - May discontinue scheduled use of cerumenolytics  - Suggest once weekly application   - Return as needed for removal                  Subjective:      Patient ID: Elzie Ahumada is a 26 y o  male  Patient here to assess ears for cerumen  He has a history of cerumen impaction requiring disimpaction with ENT  He was recently evaluated by ENT and prescribed ear drops  Also requesting medication refill  No other concerns reported  Ear Drainage          The following portions of the patient's history were reviewed and updated as appropriate: allergies, current medications, past family history, past medical history, past social history, past surgical history and problem list     Review of Systems      Objective:      /74   Pulse 68   Temp (!) 97 3 °F (36 3 °C)   Resp 18   Ht 5' 10" (1 778 m)   Wt 59 kg (130 lb)   BMI 18 65 kg/m²          Physical Exam   Constitutional: He appears well-developed and well-nourished  No distress  HENT:   Head: Normocephalic and atraumatic     Right Ear: Tympanic membrane and ear canal normal    Left Ear: Tympanic membrane and ear canal normal    Eyes: EOM are normal  Right eye exhibits no discharge  Left eye exhibits no discharge  Cardiovascular: Normal rate, regular rhythm and normal heart sounds  Exam reveals no friction rub  No murmur heard  Pulmonary/Chest: Effort normal and breath sounds normal  No stridor  No respiratory distress  He has no wheezes  Abdominal: Soft  He exhibits no distension  There is no tenderness  There is no guarding  Musculoskeletal: He exhibits no edema  Neurological: He is alert

## 2019-09-10 NOTE — ASSESSMENT & PLAN NOTE
Complaints of ear drainage   Recently evaluated by ENT where cerumen removal was performed  - Has been applying cerumenlytics to the ears  - Both ear canals clear on exam  - May discontinue scheduled use of cerumenolytics  - Suggest once weekly application   - Return as needed for removal

## 2019-09-11 DIAGNOSIS — K59.03 DRUG-INDUCED CONSTIPATION: ICD-10-CM

## 2019-09-11 DIAGNOSIS — S06.9X0D TRAUMATIC BRAIN INJURY, WITHOUT LOSS OF CONSCIOUSNESS, SUBSEQUENT ENCOUNTER: ICD-10-CM

## 2019-09-11 NOTE — TELEPHONE ENCOUNTER
Caregiver calling requesting to indicate 3 times a day and remove 4 times a day  Also needs another script that was given at yesterdays visit, she states staff lost between visit and home (patient is out of medication)  Please return call to caller asap

## 2019-09-12 DIAGNOSIS — F32.A DEPRESSION, UNSPECIFIED DEPRESSION TYPE: ICD-10-CM

## 2019-09-13 RX ORDER — DRONABINOL 2.5 MG/1
2.5 CAPSULE ORAL 3 TIMES DAILY
Qty: 270 CAPSULE | Refills: 3 | Status: SHIPPED | OUTPATIENT
Start: 2019-09-13 | End: 2019-09-27 | Stop reason: SDUPTHER

## 2019-09-13 NOTE — TELEPHONE ENCOUNTER
Same medication and no change in how using- I edited instructions as there was a typo- med is TID and the instructions had four listed in one place   Also edited reason for medication as listed spacticity and it is an appetite promoter

## 2019-09-14 RX ORDER — ASCORBIC ACID 500 MG
TABLET ORAL
Qty: 30 TABLET | Refills: 5 | Status: SHIPPED | OUTPATIENT
Start: 2019-09-14 | End: 2019-11-14 | Stop reason: SDUPTHER

## 2019-09-16 RX ORDER — MODAFINIL 100 MG/1
TABLET ORAL
Qty: 60 TABLET | Refills: 5 | Status: SHIPPED | OUTPATIENT
Start: 2019-09-16 | End: 2019-09-30

## 2019-09-20 DIAGNOSIS — R62.7 POOR WEIGHT GAIN IN ADULT: Primary | ICD-10-CM

## 2019-09-25 RX ORDER — MULTIVIT-MIN/FA/LYCOPEN/LUTEIN .4-300-25
TABLET ORAL
Qty: 30 TABLET | Refills: 4 | Status: SHIPPED | OUTPATIENT
Start: 2019-09-25 | End: 2019-11-14 | Stop reason: SDUPTHER

## 2019-09-27 ENCOUNTER — TELEPHONE (OUTPATIENT)
Dept: FAMILY MEDICINE CLINIC | Facility: CLINIC | Age: 34
End: 2019-09-27

## 2019-09-27 DIAGNOSIS — F32.A DEPRESSION, UNSPECIFIED DEPRESSION TYPE: ICD-10-CM

## 2019-09-27 DIAGNOSIS — S06.9X0D TRAUMATIC BRAIN INJURY, WITHOUT LOSS OF CONSCIOUSNESS, SUBSEQUENT ENCOUNTER: ICD-10-CM

## 2019-09-27 RX ORDER — DIMENHYDRINATE 50 MG
200 TABLET ORAL DAILY
Qty: 180 CAPSULE | Refills: 1 | Status: SHIPPED | OUTPATIENT
Start: 2019-09-27 | End: 2019-11-14 | Stop reason: SDUPTHER

## 2019-09-27 RX ORDER — DRONABINOL 2.5 MG/1
2.5 CAPSULE ORAL 3 TIMES DAILY
Qty: 270 CAPSULE | Refills: 3 | Status: SHIPPED | OUTPATIENT
Start: 2019-09-27 | End: 2020-01-07

## 2019-09-27 NOTE — TELEPHONE ENCOUNTER
Caregiver called regarding refill on Coenzymes  She said Abdirashid Pfeiffer told her "they need 's authoriztion to refill"   Also told her they haven't received updated refill  (TID) on Dronabinol which was sent electronically on 9/13/19   See encounter from Dr Winchester Reas 9/11/19

## 2019-09-27 NOTE — TELEPHONE ENCOUNTER
On 9/13/19 you sent order for Marinol however pharmacy never received, I entered new order for sign off  Please address  Thanks!

## 2019-09-27 NOTE — TELEPHONE ENCOUNTER
Meds refilled- I reviewed all steps and hope it gets to pharmacy this time  I will appreciate staff calling pharmacy in a couple days to confirm Rx completion  At future visit to review with Aviva Duron and his family what their expectation is for benefit of Co-enzyme Q and appreciate PharmD team is completing a literature review for me on this

## 2019-09-27 NOTE — TELEPHONE ENCOUNTER
Refills completed  Thanks for calling pharmacy in a couple days to confirm they received the Rxs this time

## 2019-09-30 ENCOUNTER — OFFICE VISIT (OUTPATIENT)
Dept: FAMILY MEDICINE CLINIC | Facility: CLINIC | Age: 34
End: 2019-09-30

## 2019-09-30 VITALS
BODY MASS INDEX: 18.9 KG/M2 | DIASTOLIC BLOOD PRESSURE: 70 MMHG | TEMPERATURE: 97 F | HEIGHT: 70 IN | WEIGHT: 132 LBS | HEART RATE: 72 BPM | SYSTOLIC BLOOD PRESSURE: 110 MMHG | RESPIRATION RATE: 16 BRPM

## 2019-09-30 DIAGNOSIS — R09.89 RHONCHI AT BOTH LUNG BASES: Primary | ICD-10-CM

## 2019-09-30 DIAGNOSIS — R82.90 FOUL SMELLING URINE: ICD-10-CM

## 2019-09-30 PROCEDURE — 99213 OFFICE O/P EST LOW 20 MIN: CPT | Performed by: FAMILY MEDICINE

## 2019-09-30 PROCEDURE — 3008F BODY MASS INDEX DOCD: CPT | Performed by: FAMILY MEDICINE

## 2019-09-30 NOTE — ASSESSMENT & PLAN NOTE
New and worsening  · Patient has poor cough reflex and inspiratory/expiratory effort due to intellectual disability  · Continue respiratory therapy/Incentive spirometry  · History of recurrent atelectasis and CAP  · Vital signs are stable and patient is otherwise acting normally  · Unable to communicate due to hypoxic encephalopathy  · Diffuse Rhonchi and crackles at bases of lung bilaterally  · Follow up Chest xray  · Counseled patient on possible viral infection due being early in course    · Follow up in 1 week if worsens or CXR is positive

## 2019-09-30 NOTE — ASSESSMENT & PLAN NOTE
New  · Due to patient's intellectual disability he is unable communicate dysuria, urgency, frequency, pelvic pain, or flank pain  · History of recurrent UTI's that were treated with antibiotics although culture was negative  · Patient is unable to urinate at appointment today  · Follow up with u/a with reflex micro/culture  · Precautions for uro-sepsis discussed

## 2019-09-30 NOTE — PROGRESS NOTES
Assessment/Plan:    Rhonchi at both lung bases  New and worsening  · Patient has poor cough reflex and inspiratory/expiratory effort due to intellectual disability  · Continue respiratory therapy/Incentive spirometry  · History of recurrent atelectasis and CAP  · Vital signs are stable and patient is otherwise acting normally  · Unable to communicate due to hypoxic encephalopathy  · Diffuse Rhonchi and crackles at bases of lung bilaterally  · Follow up Chest xray  · Counseled patient on possible viral infection due being early in course  · Follow up in 1 week if worsens or CXR is positive    Foul smelling urine  New  · Due to patient's intellectual disability he is unable communicate dysuria, urgency, frequency, pelvic pain, or flank pain  · History of recurrent UTI's that were treated with antibiotics although culture was negative  · Patient is unable to urinate at appointment today  · Follow up with u/a with reflex micro/culture  · Precautions for uro-sepsis discussed           Problem List Items Addressed This Visit        Other    Foul smelling urine     New  · Due to patient's intellectual disability he is unable communicate dysuria, urgency, frequency, pelvic pain, or flank pain  · History of recurrent UTI's that were treated with antibiotics although culture was negative  · Patient is unable to urinate at appointment today  · Follow up with u/a with reflex micro/culture  · Precautions for uro-sepsis discussed           Relevant Orders    UA w Reflex to Microscopic w Reflex to Culture -Lab Collect    Rhonchi at both lung bases - Primary     New and worsening  · Patient has poor cough reflex and inspiratory/expiratory effort due to intellectual disability    · Continue respiratory therapy/Incentive spirometry  · History of recurrent atelectasis and CAP  · Vital signs are stable and patient is otherwise acting normally  · Unable to communicate due to hypoxic encephalopathy  · Diffuse Rhonchi and crackles at bases of lung bilaterally  · Follow up Chest xray  · Counseled patient on possible viral infection due being early in course  · Follow up in 1 week if worsens or CXR is positive         Relevant Orders    XR chest pa & lateral            Subjective:      Patient ID: Michael Crane is a 26 y o  male  49-year-old male presents today with mother, and 2 caretakers for an acute visit with concern of tactile subjective fever, coarse breath sounds, rhinorrhea, fatigue, and foul-smelling urine  Patient has history of hypoxic encephalopathy and is unable to communicate complaints  Rhinorrhea and coarse breath sounds started Saturday and getting worse  Patient was more fatigued but improving energy levels now  Patient has good appetite and is voiding/stooling normally  Patient's caregivers deny diarrhea, nausea, or emesis  Patient has a history of community-acquired pneumonia and atelectasis  Patient uses respiratory  inhaler b i d   Patient's mother also admits to complaint of foul-smelling urine  Over the past year patient has had multiple episodes of urinary tract infections that were treated with antibiotics although urine cultures were negative  Main difficulty is patient's ability to communicate symptoms  Patient has been taking Sambucol which is a homeopathic medication with black elderberry  Denies recent antibiotic use  The following portions of the patient's history were reviewed and updated as appropriate: allergies, current medications, past family history, past medical history, past social history, past surgical history and problem list     Review of Systems   Unable to perform ROS: Patient nonverbal   Constitutional: Positive for fatigue  Negative for appetite change, chills, diaphoresis, fever and unexpected weight change  HENT: Positive for congestion and rhinorrhea  Negative for sinus pressure, sinus pain and sore throat  Respiratory: Positive for stridor   Negative for cough, choking, chest tightness and shortness of breath  Gastrointestinal: Negative for blood in stool, constipation, diarrhea and nausea  Genitourinary: Negative for enuresis, flank pain, frequency, hematuria and urgency  Skin: Negative for rash  Allergic/Immunologic: Positive for environmental allergies  Review of systems reported via caregivers and mother    Objective:      /70   Pulse 72   Temp (!) 97 °F (36 1 °C)   Resp 16   Ht 5' 10" (1 778 m)   Wt 59 9 kg (132 lb)   BMI 18 94 kg/m²          Physical Exam   Constitutional: He appears well-developed and well-nourished  No distress  HENT:   Head: Normocephalic and atraumatic  Right Ear: External ear normal    Left Ear: External ear normal    Nose: Nose normal    Mouth/Throat: Oropharynx is clear and moist  No oropharyngeal exudate  Eyes: Pupils are equal, round, and reactive to light  Conjunctivae and EOM are normal  Right eye exhibits no discharge  Left eye exhibits no discharge  No scleral icterus  Neck: Normal range of motion  Neck supple  No JVD present  No tracheal deviation present  No thyromegaly present  Cardiovascular: Normal rate, regular rhythm, normal heart sounds and intact distal pulses  Exam reveals no friction rub  No murmur heard  Pulmonary/Chest: Effort normal  No respiratory distress  He has no wheezes  He has rhonchi  He has rales  He exhibits no tenderness  Diffuse rhonchi and transmitted upper airway sounds     Abdominal: Soft  Bowel sounds are normal  He exhibits no distension and no mass  There is no tenderness  There is no rebound and no guarding  Musculoskeletal: He exhibits no edema or tenderness  Lymphadenopathy:     He has no cervical adenopathy  Neurological: He has normal reflexes  Skin: Skin is warm and dry  Capillary refill takes less than 2 seconds  No rash noted  He is not diaphoretic  No erythema  No pallor  Vitals reviewed

## 2019-10-01 ENCOUNTER — APPOINTMENT (OUTPATIENT)
Dept: LAB | Age: 34
End: 2019-10-01
Payer: COMMERCIAL

## 2019-10-01 ENCOUNTER — OFFICE VISIT (OUTPATIENT)
Dept: CARDIOLOGY CLINIC | Facility: CLINIC | Age: 34
End: 2019-10-01
Payer: COMMERCIAL

## 2019-10-01 ENCOUNTER — TRANSCRIBE ORDERS (OUTPATIENT)
Dept: RADIOLOGY | Facility: HOSPITAL | Age: 34
End: 2019-10-01

## 2019-10-01 ENCOUNTER — HOSPITAL ENCOUNTER (OUTPATIENT)
Dept: RADIOLOGY | Facility: HOSPITAL | Age: 34
Discharge: HOME | End: 2019-10-01
Payer: COMMERCIAL

## 2019-10-01 VITALS
HEIGHT: 70 IN | DIASTOLIC BLOOD PRESSURE: 80 MMHG | SYSTOLIC BLOOD PRESSURE: 100 MMHG | BODY MASS INDEX: 18.9 KG/M2 | HEART RATE: 85 BPM | WEIGHT: 132 LBS

## 2019-10-01 DIAGNOSIS — R09.89 RHONCHI AT BOTH LUNG BASES: ICD-10-CM

## 2019-10-01 DIAGNOSIS — I45.81 LONG Q-T SYNDROME: Primary | ICD-10-CM

## 2019-10-01 LAB
AMORPH URATE CRY URNS QL MICRO: ABNORMAL /HPF
BACTERIA UR QL AUTO: ABNORMAL /HPF
BILIRUB UR QL STRIP: NEGATIVE
CLARITY UR: ABNORMAL
COLOR UR: ABNORMAL
GLUCOSE UR STRIP-MCNC: NEGATIVE MG/DL
HGB UR QL STRIP.AUTO: ABNORMAL
KETONES UR STRIP-MCNC: NEGATIVE MG/DL
LEUKOCYTE ESTERASE UR QL STRIP: ABNORMAL
MUCOUS THREADS UR QL AUTO: ABNORMAL
NITRITE UR QL STRIP: NEGATIVE
NON-SQ EPI CELLS URNS QL MICRO: ABNORMAL /HPF
PH UR STRIP.AUTO: 6 [PH]
PROT UR STRIP-MCNC: ABNORMAL MG/DL
RBC #/AREA URNS AUTO: ABNORMAL /HPF
SP GR UR STRIP.AUTO: 1.03 (ref 1–1.03)
UROBILINOGEN UR QL STRIP.AUTO: 0.2 E.U./DL
WBC #/AREA URNS AUTO: ABNORMAL /HPF

## 2019-10-01 PROCEDURE — 87086 URINE CULTURE/COLONY COUNT: CPT | Performed by: FAMILY MEDICINE

## 2019-10-01 PROCEDURE — 71046 X-RAY EXAM CHEST 2 VIEWS: CPT

## 2019-10-01 PROCEDURE — 87186 SC STD MICRODIL/AGAR DIL: CPT | Performed by: FAMILY MEDICINE

## 2019-10-01 PROCEDURE — 99213 OFFICE O/P EST LOW 20 MIN: CPT | Performed by: INTERNAL MEDICINE

## 2019-10-01 PROCEDURE — 87077 CULTURE AEROBIC IDENTIFY: CPT | Performed by: FAMILY MEDICINE

## 2019-10-01 PROCEDURE — 81001 URINALYSIS AUTO W/SCOPE: CPT | Performed by: FAMILY MEDICINE

## 2019-10-01 PROCEDURE — 93000 ELECTROCARDIOGRAM COMPLETE: CPT | Performed by: INTERNAL MEDICINE

## 2019-10-01 NOTE — PROGRESS NOTES
Cardiology Follow Up    Akshat French Hieu  1985  [id number]  [address]  [address] [facility] [address]   [phone]    1  Long Q-T syndrome  POCT ECG          HPI:  Hanny Deluna is a 26 y o  male who presents to the office today for a 1 year follow-up  Patient has a history long QT syndrome, anoxic brain damage, and depression  Patient is doing well despite fight a common cold  He continues to horse back riding once weekly  Previous History:   Patient has a slow down hill course  He is still active - horse back riding, painting, guitar     The patient Zack Isidro is a very pleasant 25 yr old young man  he has a history of cardiac arrest when he was young  This has been extensively worked up  Finally at the end in [facility] the patient was confirmed to have prolonged QT syndrome  Since that origin event he has not had any cardiac arrest       The patient is accompanied by one of his friends and his care taker and his mother  He is currently in a wheelchair  The patient does communicate with his  mother       The patient has a relatively active lifestyle  He is active into all kinds of art  His mother shared with me one of his art collection that was shown at [facility] a year back     He is also active in [facility] rehabilitation exercises            /80 (BP Location: Left arm, Patient Position: Sitting, Cuff Size: Adult)   Pulse 85   Ht 5' 10" (1 778 m)   Wt 59 9 kg (132 lb)   BMI 18 94 kg/m²       Patient Active Problem List   Diagnosis    Abnormal bone density screening    Abnormal TSH    Accelerated essential hypertension    Anoxic brain damage (HCC)    Allergic rhinitis    Candidal intertrigo    Depression    Long Q-T syndrome    Quadriplegia (HCC)    Rosacea    Cough    Poor weight gain in adult    Spastic hemiplegia of left nondominant side as late effect of cerebral infarction (HCC)    Psychophysiological insomnia    Candidiasis    Aspiration pneumonia (HCC)    Leukocytosis    Constipated    Physical deconditioning    Muscular rigidity and spasm, progressive    Decreased muscle tone    Torticollis    Postnasal drip    Sore throat    Abnormal weight loss    At risk for aspiration    Decreased urine output    Abnormal urinalysis    Frequent UTI    Right lower lobe pulmonary infiltrate    Bilateral impacted cerumen    Syncope    Nausea and vomiting    Hx of recurrent pneumonia    Spasticity    Goals of care, counseling/discussion    H/O impacted cerumen    Foul smelling urine    Rhonchi at both lung bases     Past Medical History:   Diagnosis Date    Abnormal ECG 1998 and beyond    post cardiac arrest    Allergic rhinitis     Brain anoxia ([facility] [address] )     Cardiac arrest [facility])     age 13 s/p long Q-T syndrome    Community acquired pneumonia     last assessed/resolved:  10/9/2014    Depression     GERD (gastroesophageal reflux disease)     Long Q-T syndrome     Muscular rigidity and spasm, progressive     Osteopenia     Osteoporosis     Quadriplegia (HCC)     Spastic neurogenic bladder     Syncope     beta blocker medication DC because of low blood pressure    Urinary incontinence     Urinary tract infection without hematuria 4/27/2019     Social History     Socioeconomic History    Marital status: Single     Spouse name: Not on file    Number of children: Not on file    Years of education: Not on file    Highest education level: Not on file   Occupational History    Not on file   Social Needs    Financial resource strain: Not on file    Food insecurity:     Worry: Not on file     Inability: Not on file    Transportation needs:     Medical: Not on file     Non-medical: Not on file   Tobacco Use    Smoking status: Never Smoker    Smokeless tobacco: Never Used   Substance and Sexual Activity    Alcohol use: No     Frequency: Never     Binge frequency: Never    Drug use: No    Sexual activity: Not Currently   Lifestyle    Physical activity:     Days per week: Not on file     Minutes per session: Not on file    Stress: Not on file   Relationships    Social connections:     Talks on phone: Not on file     Gets together: Not on file     Attends [employer] service: Not on file     Active member of club or organization: Not on file     Attends meetings of clubs or organizations: Not on file     Relationship status: Not on file    Intimate partner violence:     Fear of current or ex partner: Not on file     Emotionally abused: Not on file     Physically abused: Not on file     Forced sexual activity: Not on file   Other Topics Concern    Not on file   Social History Narrative    Lives in a group home      Family History   Problem Relation Age of Onset    Other Father         mitral valve replaced    Hypertension Father     Diabetes type II Maternal Grandfather     Heart failure Maternal Grandfather         Congestive heart failure -     Diabetes type II Maternal Uncle     Hypotension Mother      Past Surgical History:   Procedure Laterality Date    APPENDECTOMY      WISDOM TOOTH EXTRACTION         Current Outpatient Medications:     ascorbic acid (GNP VITAMIN C) 500 MG tablet, Take 500 mg daily at 4 PM, Disp: 30 tablet, Rfl: 5    b complex-vitamin C-folic acid (RENAL) 1 mg, Take 1 mg daily at 4 PM, Disp: 31 each, Rfl: 11    Benzocaine-Docusate Sodium (ENEMEEZ PLUS)  MG ENEM, Insert 1 application into the rectum daily as needed (constipation) Use as directed, Disp: 150 mL, Rfl: 4    carbamide peroxide (DEBROX) 6 5 % otic solution, Administer 5 drops into both ears 2 (two) times a week, Disp: 15 mL, Rfl: 5    coenzyme Q-10 100 MG capsule, Take 2 capsules (200 mg total) by mouth daily (Patient taking differently: Take 200 mg by mouth daily Take 2 capsules (200mg) by mouth once every day 2 9am for health maintenance), Disp: 180 capsule, Rfl: 1    DEEP SEA NASAL SPRAY 0 65 % nasal spray, - INHALE 2 SPRAYS IN EACH NOSTRIL AS NEEDED FOR CONGESTION, Disp: 44 mL, Rfl: 11    Diapers & Supplies (HUGGIES LITTLE MOVERS SIZE 3) MISC, by Does not apply route, Disp: , Rfl:     Disposable Gloves (VINYL GLOVES MEDIUM) MISC, by Does not apply route 4 (four) times a day Dispense 3 boxes monthly; Diagnosis R32, Disp: 3 each, Rfl: 5    docusate sodium (COLACE) 100 mg capsule, 100 mg 9am and 9pm, Disp: 10 capsule, Rfl: 5    docusate sodium (ENEMEEZ MINI) 283 MG enema, Insert 1 enema into the rectum every other day, Disp: 15 each, Rfl: 5    dronabinol (MARINOL) 2 5 mg capsule, Take 1 capsule (2 5 mg total) by mouth 3 (three) times a day *FRIG-SHELF TAKE 1 by mouth at 9AM, 4PM, 9PM FOR Appetite, Disp: 270 capsule, Rfl: 3    HEMORRHOIDAL 0 25 % suppository, - UNWRAP AND INSERT 1 SUPPOSITORY PER RECTUM AS NEEDED FOR HEMORRHOID PAIN RELIEF, Disp: 12 suppository, Rfl: 11    ibuprofen (MOTRIN) 200 mg tablet, Take 2 tabs q6h PRN for pain not to exceed 2000mg per day, Disp: 200 tablet, Rfl: 2    Incontinence Supply Disposable (PREVAIL AIR BRIEFS) MISC, 1 each by Does not apply route every 4 (four) hours Please provide 5 briefs per day, Disp: 150 each, Rfl: 11    Incontinence Supply Disposable (SELECT DISPOSABLE UNDERWEAR SM) MISC, Please provide a 30 day supply 10 per day DX R32 incontinence, Disp: 22 each, Rfl: 6    loratadine (CLARITIN) 10 mg tablet, Take 1 tablet (10 mg total) by mouth daily as needed for allergies, Disp: 30 tablet, Rfl: 11    Magnesium Oxide 500 MG TABS, Take 1 tablet daily at 4 PM, Disp: 31 each, Rfl: 11    Melatonin 5 MG TABS, - TAKE 1 TABLET BY MOUTH EVERY NIGHT AT BEDTIME AT 9PM, Disp: 31 each, Rfl: 11    Misc   Devices [facility]) MISC, Please provide pt with a new cord for power wheelchair, Disp: 1 each, Rfl: 0    modafinil (PROVIGIL) 100 mg tablet, - TAKE 2 TABLETS (200MG) BY MOUTH ONCE EVERY DAY AT 9AM FOR HEALTH FOR BRAIN INJURY, Disp: 62 tablet, Rfl: 5    Multiple Vitamins-Minerals (CEROVITE SENIOR) TABS, TAKE 1 TABLET BY MOUTH ONCE EVERY DAY AT 9AM FOR HEALTH MAINTENANCE, Disp: 30 tablet, Rfl: 4    Nutritional Supplements (NUTRITIONAL SHAKE) LIQD, Take 1 Can by mouth 2 (two) times a day as needed (nutritional supplementation, weight gain) Please provide Boost, Disp: 24 Bottle, Rfl: 6    omeprazole (PriLOSEC) 20 mg delayed release capsule, Take 20mg at 9am every day, Disp: 30 capsule, Rfl: 5    onabotulinumtoxin A (BOTOX) 100 units, inject 500 units into left gastroc soleus and abductor pollicis ankit, Disp: , Rfl:     polyethylene glycol (MIRALAX) 17 g packet, Take Monday, Wednesday, Friday at 0900, Disp: 30 each, Rfl: 3    PREPARATION H 1-0 25-14 4-15 % rectal cream, - APPLY RECTALLY AS NEEDED FOR HEMORRHOID PAIN RELIEF, Disp: 51 g, Rfl: 11    ranitidine (ZANTAC) 150 mg tablet, ranitidine 150 mg tablet, Disp: , Rfl:     Respiratory Therapy Supplies (SPIROMETER) KIT, by Does not apply route 3 (three) times a day Do spirometry 3 times daily at 9am, 2pm and 9pm , Disp: 1 kit, Rfl: 0    sertraline (ZOLOFT) 100 mg tablet, - TAKE 1 TABLET BY MOUTH ONCE EVERY DAY AT 9PM FOR DEPRESSION, Disp: 31 tablet, Rfl: 11    Sodium Fluoride (PREVIDENT 5000 PLUS DT), Apply to teeth, Disp: , Rfl:     Starch, Thickening, LIQD, Nectar thick liquids up to honey thick if coughing occurs as needed, please use Simply Thick liquid only   Discontinue Thick-It powder , Disp: 237 mL, Rfl: 5    Tea Tree 100 % OIL, - APPLY TO SKIN FOLDS ONCE EVERY DAY AS NEEDED FOR RASH, Disp: 60 mL, Rfl: 11    VASCEPA 1 g CAPS, - TAKE 1 CAPSULE BY MOUTH ONCE EVERY DAY AT 9AM FOR HEALTH MAINTENANCE, Disp: 31 capsule, Rfl: 11    Vitamins A & D (VITAMIN A & D) ointment, - APPLY TO AFFECTED AREA (BUTTOCKS/ANAL) AS NEEDED, Disp: 454 g, Rfl: 11    Wheat Dextrin (BENEFIBER ON THE GO) POWD, MIX 1 PACKET IN LIQUID & DRINK BY MOUTH ONCE EVERY DAY (9PM), Disp: 28 each, Rfl: 10    wheat dextrin ([place]), Take 1 packet by mouth daily (Patient taking differently: Take 1 packet by mouth Mix 1 packet in liquid & drink by mouth once every day), Disp: 30 each, Rfl: 3    zinc oxide (DESITIN) 40 % PSTE, Apply topically, Disp: , Rfl:     Zinc Picolinate 25 MG TABS, Take 1 76 tablets (44 mg total) by mouth daily Take 2 22 mg capsules daily every other month (Patient taking differently: Take 44 mg by mouth daily Take 2 22 mg capsules daily every other month), Disp: 60 each, Rfl: 6  Allergies   Allergen Reactions    Other      drugs that prolong the QT interval  drugs that prolong the QT interval  Seasonal allergies        Labs:  Lab Results   Component Value Date     09/06/2017    K 5 0 07/09/2019    K 5 0 03/20/2019     07/09/2019     03/20/2019    CO2 29 07/09/2019    CO2 31 03/20/2019    BUN 15 07/09/2019    BUN 16 03/20/2019    CREATININE 0 87 07/09/2019    CREATININE 0 86 09/06/2017    CALCIUM 9 1 07/09/2019    CALCIUM 9 7 03/20/2019     No results found for: CKTOTAL, CKMB, CKMBINDEX, TROPONINI  Lab Results   Component Value Date    WBC 11 95 (H) 07/09/2019    WBC 5 1 09/06/2017    HGB 16 1 07/09/2019    HGB 15 8 09/06/2017    HCT 47 4 07/09/2019    HCT 47 5 09/06/2017    MCV 93 07/09/2019    MCV 93 3 09/06/2017     07/09/2019     09/06/2017     Lab Results   Component Value Date    TRIG 129 03/20/2019    HDL 40 (L) 03/20/2019     Imaging: No results found  Review of Systems:  Review of Systems   All other systems reviewed and are negative  As described in my history of present illness        Physical Exam:  Physical Exam   Constitutional: He appears well-developed  No distress     Not in any distress at the current time  Patient is limited to a wheelchair  He does have flexion deformities of upper limb  He is able to respond to certain questions  He is with his mother and caregiver  He does have anoxic injury and its effect   HENT:   Head: Normocephalic and atraumatic  Right Ear: External ear normal    Left Ear: External ear normal    Nose: Nose normal    Mouth/Throat: Uvula is midline and mucous membranes are normal    Posterior pharynx is crowded   Eyes: Conjunctivae and lids are normal  No scleral icterus  No pallor  No cyanosis  No icterus   Neck: Trachea normal  Neck supple  No JVD present  Carotid bruit is not present  No thyromegaly present  No jugular lymphadenopathy   Cardiovascular: Normal rate, regular rhythm, S1 normal, S2 normal, normal heart sounds, intact distal pulses and normal pulses  PMI is not displaced  Exam reveals no gallop, no S3, no S4 and no friction rub  No murmur heard  Pulmonary/Chest: Effort normal and breath sounds normal  No accessory muscle usage  No respiratory distress  He has no decreased breath sounds  He has no wheezes  He has no rhonchi  He has no rales  He exhibits no tenderness  Abdominal: Soft  Normal appearance and bowel sounds are normal  He exhibits no distension and no mass  There is no splenomegaly or hepatomegaly  There is no tenderness  Musculoskeletal: He exhibits no tenderness or deformity  Flexion deformity in upper limb  Movements are limited in both upper and lower limbs   Lymphadenopathy:     He has no cervical adenopathy  Neurological: He is alert  Skin: Skin is intact  No abrasion, no lesion and no rash noted  No erythema  Nails show no clubbing  Psychiatric: He has a normal mood and affect  His speech is normal and behavior is normal  Thought content normal    Vitals reviewed  Discussion/Summary:     1  History of long QT syndrome with cardiac arrest during exercise       The patient had sustained significant injury but is currently stable  He is well rehabilitated into his usual daily course  He needs help for and 24-hour care     His family is extremely well involved in his care      The current EKG shows normal lites QTC    The family is aware to look at all medications which are potential for QTC prolongation     The highest risk - antihistamine, antibiotics which are commonly prescribed   they are also aware that if patient were to develop diarrhea or vomiting the resulting hypokalemia can prolong QTC       The patient has not had any event since the original event that caused cardiac arrest    He does carry a defibrillator with him       His EKGs were reviewed and my suspicion for a Brugada syndrome is extremely low         Recommendation:  Continue all current medications  Follow-up in 18 months or sooner if symptoms develop      Scribe Attestation    I,:   Aida Hidalgo am acting as a scribe while in the presence of the attending physician :        I,:   Liz Avelar MD personally performed the services described in this documentation    as scribed in my presence :

## 2019-10-01 NOTE — PATIENT INSTRUCTIONS
Recommendation:  Continue all current medications  Follow-up in 18 months or sooner if symptoms develop

## 2019-10-01 NOTE — LETTER
October 1, 2019     Chelsey Underwood, [address] [address]    Patient: Deborah Butterfield   YOB: 1985   Date of Visit: 10/1/2019       Dear Dr Dannie Galeazzi:    Thank you for referring Su Lorenzo to me for evaluation  Below are my notes for this consultation  If you have questions, please do not hesitate to call me  I look forward to following your patient along with you           Sincerely,        Jennifer Friedman MD        CC: No Recipients

## 2019-10-02 ENCOUNTER — TELEPHONE (OUTPATIENT)
Dept: FAMILY MEDICINE CLINIC | Facility: CLINIC | Age: 34
End: 2019-10-02

## 2019-10-03 ENCOUNTER — TELEPHONE (OUTPATIENT)
Dept: FAMILY MEDICINE CLINIC | Facility: CLINIC | Age: 34
End: 2019-10-03

## 2019-10-03 NOTE — TELEPHONE ENCOUNTER
Spoke with mother regarding results of Chest Xray  There were no signs of focal consolidation suggestive of pneumonia although it did show borderline cardiomegaly with mild vascular congestion and mild atelectasis  Explained to the mother that there is no need for antibiotics at this time  Due to the borderline cardiomegaly and mild vascular congestion, I mentioned that an echo may be helpful to rule out a cardiac itiology for his lung congestion and cough  As per mother, everyone around the patient is now coming down with the same illness and likely viral in nature  The mother would like to hold off on ordering an echo at this moment but will revisit in the future  She would also like to inquire about a pneumonia vaccine for the patient  Although the patient does not have lung related chronic diseases he has impaired ability to cough up mucus and has frequent episodes of CAP   Will discuss further at next follow up appointment

## 2019-10-04 ENCOUNTER — TELEPHONE (OUTPATIENT)
Dept: FAMILY MEDICINE CLINIC | Facility: CLINIC | Age: 34
End: 2019-10-04

## 2019-10-04 DIAGNOSIS — R82.90 FOUL SMELLING URINE: Primary | ICD-10-CM

## 2019-10-04 RX ORDER — SULFAMETHOXAZOLE AND TRIMETHOPRIM 800; 160 MG/1; MG/1
1 TABLET ORAL EVERY 12 HOURS SCHEDULED
Qty: 14 TABLET | Refills: 0 | Status: SHIPPED | OUTPATIENT
Start: 2019-10-04 | End: 2019-10-11

## 2019-10-05 LAB
BACTERIA UR CULT: ABNORMAL

## 2019-10-07 ENCOUNTER — TELEPHONE (OUTPATIENT)
Dept: FAMILY MEDICINE CLINIC | Facility: CLINIC | Age: 34
End: 2019-10-07

## 2019-10-07 NOTE — TELEPHONE ENCOUNTER
Cyrus Rosenberg patients mother called requesting that Dr Linda Me her a call its very important in regards to a waiver and cardiac related

## 2019-10-08 ENCOUNTER — OFFICE VISIT (OUTPATIENT)
Dept: FAMILY MEDICINE CLINIC | Facility: CLINIC | Age: 34
End: 2019-10-08

## 2019-10-08 VITALS
HEART RATE: 72 BPM | DIASTOLIC BLOOD PRESSURE: 72 MMHG | BODY MASS INDEX: 18.74 KG/M2 | SYSTOLIC BLOOD PRESSURE: 112 MMHG | RESPIRATION RATE: 14 BRPM | WEIGHT: 130.6 LBS | TEMPERATURE: 97.2 F

## 2019-10-08 DIAGNOSIS — B96.20 E. COLI UTI (URINARY TRACT INFECTION): ICD-10-CM

## 2019-10-08 DIAGNOSIS — R05.9 COUGH: Primary | ICD-10-CM

## 2019-10-08 DIAGNOSIS — Z23 ENCOUNTER FOR IMMUNIZATION: ICD-10-CM

## 2019-10-08 DIAGNOSIS — I51.7 CARDIOMEGALY: ICD-10-CM

## 2019-10-08 DIAGNOSIS — N39.0 E. COLI UTI (URINARY TRACT INFECTION): ICD-10-CM

## 2019-10-08 PROCEDURE — 99213 OFFICE O/P EST LOW 20 MIN: CPT | Performed by: FAMILY MEDICINE

## 2019-10-08 PROCEDURE — 90471 IMMUNIZATION ADMIN: CPT | Performed by: FAMILY MEDICINE

## 2019-10-08 PROCEDURE — 90686 IIV4 VACC NO PRSV 0.5 ML IM: CPT | Performed by: FAMILY MEDICINE

## 2019-10-08 RX ORDER — NITROFURANTOIN 25; 75 MG/1; MG/1
100 CAPSULE ORAL 2 TIMES DAILY
Qty: 10 CAPSULE | Refills: 0 | Status: SHIPPED | OUTPATIENT
Start: 2019-10-08 | End: 2019-10-08 | Stop reason: SDUPTHER

## 2019-10-08 RX ORDER — NITROFURANTOIN 25; 75 MG/1; MG/1
100 CAPSULE ORAL 2 TIMES DAILY
Qty: 10 CAPSULE | Refills: 0 | Status: SHIPPED | OUTPATIENT
Start: 2019-10-08 | End: 2019-10-13

## 2019-10-08 RX ORDER — MENTHOL
LOZENGE MUCOUS MEMBRANE AS NEEDED
Qty: 170 G | Refills: 0 | Status: SHIPPED | OUTPATIENT
Start: 2019-10-08

## 2019-10-08 RX ORDER — FLUTICASONE PROPIONATE 50 MCG
1 SPRAY, SUSPENSION (ML) NASAL DAILY
Qty: 1 BOTTLE | Refills: 1 | Status: SHIPPED | OUTPATIENT
Start: 2019-10-08 | End: 2019-11-14 | Stop reason: SDUPTHER

## 2019-10-08 NOTE — LETTER
October 8, 2019     Patient: Phyllis Sanchez   YOB: 1985   Date of Visit: 10/8/2019       To Whom it May Concern:    Harrietann Luis is under my professional care  He was seen in my office on 10/8/2019  Please note that patient needs fluids thickened to an up to nectar thick consistency on an as-needed basis, and does not require thickening to the nectar thickness unless indicated at discretion of caregiver  He does not need nectar thickening with every fluid intake episode  If you have any questions or concerns, please don't hesitate to call           Sincerely,          Rigo Vasquez MD

## 2019-10-08 NOTE — PATIENT INSTRUCTIONS
Upper Respiratory Infection, Ambulatory Care   GENERAL INFORMATION:   An upper respiratory infection  is also called a common cold  It can affect your nose, throat, ears, and sinuses  Common symptoms include the following:   · Runny or stuffy nose    · Sneezing and coughing    · Sore throat or hoarseness    · Red, watery, and sore eyes    · Tiredness or restlessness    · Chills and fever    · Headache, body aches, or sore muscles  Seek immediate care for the following symptoms:   · Headaches or a stiff neck    · Bright lights hurt your eyes    · Chest pain or trouble breathing  Treatment for an upper respiratory infection  may include any of the following:  · Decongestants  help decrease nasal congestion and improve your breathing  Do not use decongestant sprays for more than a few days  · Cough suppressants  help decrease coughing  Ask your healthcare provider which type of cough medicine is best for you  Some cough medicines need a doctor's order  · NSAIDs  help decrease swelling and pain or fever  This medicine is available with or without a doctor's order  NSAIDs can cause stomach bleeding or kidney problems in certain people  If you take blood thinner medicine, always ask your healthcare provider if NSAIDs are safe for you  Always read the medicine label and follow directions  Care for an upper respiratory infection:   · Rest  until your fever is gone or you feel better  · Drink liquids as directed to prevent dehydration  You may need to drink 8 to 10 cups of liquid each day  Good liquids to drink include water, ginger ale, tea, or fruit juices  · Gargle  with warm salt water to help your sore throat feel better  Mix ¼ teaspoon salt with 1 cup warm water  You may also suck on hard candy or throat lozenges  · Saline nasal drops  help loosen your nasal congestion  They can be bought without a doctor's order      · Take a warm bath or shower  to help decrease body aches and help you breathe easier  · Use a cool-mist humidifier  to increase air moisture and make it easier for you to breathe  Prevent the spread of germs:   · Avoid others for the first 2 to 3 days of your cold  Germs are easily spread during this time  · Do not share food, drinks,  towels, or personal items with others  · Wash your hands often  Use soap and water  Wash your hands after you use the bathroom, change a child's diapers, or sneeze  Wash your hands before you prepare or eat food  Cover your mouth and nose with a tissue when you sneeze or cough  Follow up with your healthcare provider as directed:  Write down your questions so you remember to ask them during your visits  CARE AGREEMENT:   You have the right to help plan your care  Learn about your health condition and how it may be treated  Discuss treatment options with your caregivers to decide what care you want to receive  You always have the right to refuse treatment  The above information is an  only  It is not intended as medical advice for individual conditions or treatments  Talk to your doctor, nurse or pharmacist before following any medical regimen to see if it is safe and effective for you  © 2014 [address] is for End User's use only and may not be sold, redistributed or otherwise used for commercial purposes  All illustrations and images included in CareNotes® are the copyrighted property of [employer]  or [employer]

## 2019-10-08 NOTE — LETTER
October 8, 2019     Patient: Riccardo Neff   YOB: 1985   Date of Visit: 10/8/2019       To Whom it May Concern:    Bouchra Fillers is under my professional care  He was seen in my office on 10/8/2019  He may use vicks vaporub as needed for congestion, and he may use the nasal saline on an as needed basis without restriction on frequency, in combination with the flonase intranasal steroid (which is to be used once daily)  If you have any questions or concerns, please don't hesitate to call           Sincerely,          Sanjay Zelaya MD

## 2019-10-08 NOTE — PROGRESS NOTES
Assessment/Plan:     Problem List Items Addressed This Visit        Cardiovascular and Mediastinum    Cardiomegaly     -New finding on CXR 10/1/19, borderline cardiomegaly with mild pulmonary vascular congestion  -Unclear etiology, low suspicion of CHF  -CXR August 2019 unremarkable; no LE edema, orthopnea, SOB  -Hx of long QT syndrome which led to his anoxic brain injury -Discussed with mother today that echo would better evaluate cardiac function, she agrees with plan to obtain echo         Relevant Orders    Echo complete with contrast if indicated       Genitourinary    E  coli UTI (urinary tract infection)     -Urine Cx (+), and, while bactrim usually has good coverage for E   Coli, patient remains symptomatic and Cx/sensitivity not tested against bactrim; mother requests deferring repeat urine Cx at this time in favor of a repeat course of abx  -Given that Urology wanted to start patient on nitrofurantoin as UTI ppx previously but mother wanted to hold off, discussed with mother that it seems reasonable to given course of nitrofurantoin and evaluate for patient's symptoms to resolve  -Recommend to consider following back up with Urology if no improvement following course of nitrofurantoin         Relevant Medications    nitrofurantoin (MACROBID) 100 mg capsule       Other    Cough - Primary     -At risk of atelectasis due to poor respiratory effort 2/2 anoxic brain injury; follows with Pulmonology (recently established care)  -Continue use of mechanical percussion/incentive spirometry   -Hx of recurrent pneumonia (most recently July 2019) with CXR unremarkable in August 2019 (ordered to ensure resolution of pna)  -CXR 10/1/19 negative for findings of pneumonia, but did show new cardiomegaly as noted below  -Suspect patient's symptoms related to allergy congestion 2/2 season change; favor conservative treatment  -Start flonase to reduce post-nasal drip leading to cough  -Continue use of claritin, nasal saline PRN  -Close follow up if no improvement  -Cannot exclude reflux symptoms, recommended anti-reflux measures including timing of meals >2 hours before bed, etc          Relevant Medications    fluticasone (FLONASE) 50 mcg/act nasal spray    Camphor-Eucalyptus-Menthol (VICKS VAPORUB) 4 73-1 2-2 6 % OINT      Other Visit Diagnoses     Encounter for immunization        Relevant Orders    influenza vaccine, [id number], quadrivalent, 0 5 mL, preservative-free, for adult and pediatric patients 6 mos+ (AFLURIA, FLUARIX, FLULAVAL, FLUZONE) (Completed)            Subjective:      Patient ID: Zander Mercer is a 26 y o  male     -Cough/CXR: Unproductive and frequent, nose running w/ white sputum often, using nose spray (saline), no fevers, and is pretty much acting much like himself  Caregiver asks for Rx for Vicks Vaporub  No coughing or choking while eating, aspirating has gotten much better per patient's caregiver  Still using mechanical percussion machine to help mobilize sections due to poor inspiratory/expiratory efforts at baseline  Patient seen 9/30/19 by Dr Demetra Jimenez and CXR ordered, which showed slight posterior left base atelectasis but no focal consolidation  Also showed borderline cardiomegaly & mild vascular congestion not seen on CXR in August  No LE edema, patient not SOB despite cough      -UTI: Seen at last visit and urine Cx with >100K E  Coli, took bactrim but still symptomatic, Cx/sensitivity not tested against bactrim  Due to anoxic brain injury with patient unable to perform his own genital hygiene,  mother collects urine via clean catch as outpatient  The following portions of the patient's history were reviewed and updated as appropriate: allergies, current medications, past family history, past medical history, past social history, past surgical history and problem list     Review of Systems   Unable to perform ROS: Patient nonverbal   Constitutional: Negative for fever     HENT: Positive for postnasal drip and rhinorrhea  Respiratory: Positive for cough  Negative for chest tightness and shortness of breath  Cardiovascular: Negative for chest pain, palpitations and leg swelling  Gastrointestinal: Negative for abdominal pain, diarrhea and vomiting  Genitourinary: Negative for discharge and hematuria  Skin: Negative for rash  All other systems reviewed and are negative  Objective:      /72 (BP Location: Right arm, Patient Position: Sitting, Cuff Size: Standard)   Pulse 72   Temp (!) 97 2 °F (36 2 °C) (Tympanic)   Resp 14   Wt 59 2 kg (130 lb 9 6 oz)   BMI 18 74 kg/m²          Physical Exam   Constitutional: He appears well-developed and well-nourished  No distress  HENT:   Head: Normocephalic and atraumatic  Eyes: Conjunctivae and EOM are normal  Right eye exhibits no discharge  Left eye exhibits no discharge  Neck: Normal range of motion  Cardiovascular: Normal rate, regular rhythm and intact distal pulses  Pulmonary/Chest: Effort normal and breath sounds normal  No respiratory distress  Reduced breath sounds at B/L bases but no crackles or rhonchi heard, no tachypnea   Abdominal: Soft  Bowel sounds are normal    Musculoskeletal: Normal range of motion  He exhibits no edema  Patient in wheelchair, baseline   Neurological: He is alert  Skin: Skin is warm and dry  Capillary refill takes less than 2 seconds  No rash noted  Psychiatric: His behavior is normal    Vitals reviewed

## 2019-10-09 PROBLEM — J06.9 VIRAL URI WITH COUGH: Status: ACTIVE | Noted: 2019-10-09

## 2019-10-09 PROBLEM — I51.7 CARDIOMEGALY: Status: ACTIVE | Noted: 2019-10-09

## 2019-10-09 PROBLEM — N39.0 E. COLI UTI (URINARY TRACT INFECTION): Status: ACTIVE | Noted: 2019-10-09

## 2019-10-09 PROBLEM — B96.20 E. COLI UTI (URINARY TRACT INFECTION): Status: ACTIVE | Noted: 2019-10-09

## 2019-10-10 NOTE — ASSESSMENT & PLAN NOTE
-Urine Cx (+), and, while bactrim usually has good coverage for E   Coli, patient remains symptomatic and Cx/sensitivity not tested against bactrim; mother requests deferring repeat urine Cx at this time in favor of a repeat course of abx  -Given that Urology wanted to start patient on nitrofurantoin as UTI ppx previously but mother wanted to hold off, discussed with mother that it seems reasonable to given course of nitrofurantoin and evaluate for patient's symptoms to resolve  -Recommend to consider following back up with Urology if no improvement following course of nitrofurantoin

## 2019-10-10 NOTE — ASSESSMENT & PLAN NOTE
-New finding on CXR 10/1/19, borderline cardiomegaly with mild pulmonary vascular congestion  -Unclear etiology, low suspicion of CHF  -CXR August 2019 unremarkable; no LE edema, orthopnea, SOB  -Hx of long QT syndrome which led to his anoxic brain injury -Discussed with mother today that echo would better evaluate cardiac function, she agrees with plan to obtain echo

## 2019-10-10 NOTE — ASSESSMENT & PLAN NOTE
-At risk of atelectasis due to poor respiratory effort 2/2 anoxic brain injury; follows with Pulmonology (recently established care)  -Continue use of mechanical percussion/incentive spirometry   -Hx of recurrent pneumonia (most recently July 2019) with CXR unremarkable in August 2019 (ordered to ensure resolution of pna)  -CXR 10/1/19 negative for findings of pneumonia, but did show new cardiomegaly as noted below  -Suspect patient's symptoms related to allergy congestion 2/2 season change; favor conservative treatment  -Start flonase to reduce post-nasal drip leading to cough  -Continue use of claritin, nasal saline PRN  -Close follow up if no improvement  -Cannot exclude reflux symptoms, recommended anti-reflux measures including timing of meals >2 hours before bed, etc

## 2019-10-11 ENCOUNTER — TELEPHONE (OUTPATIENT)
Dept: FAMILY MEDICINE CLINIC | Facility: CLINIC | Age: 34
End: 2019-10-11

## 2019-10-11 DIAGNOSIS — M24.549 CONTRACTURE OF HAND: ICD-10-CM

## 2019-10-11 DIAGNOSIS — G25.82 MUSCULAR RIGIDITY AND SPASM, PROGRESSIVE: ICD-10-CM

## 2019-10-11 DIAGNOSIS — G93.1 ANOXIC BRAIN DAMAGE (HCC): Primary | ICD-10-CM

## 2019-10-11 NOTE — TELEPHONE ENCOUNTER
Can you place Referral for Hand Therapy (Physical Medicine Rehab) using DX of Head Injury and wrist contractions?     Please let me know, Thanks!!

## 2019-10-12 PROBLEM — M24.549 CONTRACTURE OF HAND: Status: ACTIVE | Noted: 2019-10-12

## 2019-10-17 ENCOUNTER — TELEPHONE (OUTPATIENT)
Dept: FAMILY MEDICINE CLINIC | Facility: CLINIC | Age: 34
End: 2019-10-17

## 2019-10-17 NOTE — TELEPHONE ENCOUNTER
Patients mom called for nurse H this morning  Naman saw Dr Patricia Khan on October 8, 2019  At that appointment she discussed compiling a chart overview of his UTI's, Labs, and Antibiotics ordered for the last 4 years for the patients new agency and herself  She would appreciate a call back on this when the chart is compiled

## 2019-10-17 NOTE — TELEPHONE ENCOUNTER
Did you discuss this with her? She really should be going through medical records to obtain all of this information

## 2019-10-24 ENCOUNTER — OFFICE VISIT (OUTPATIENT)
Dept: FAMILY MEDICINE CLINIC | Facility: CLINIC | Age: 34
End: 2019-10-24

## 2019-10-24 VITALS
DIASTOLIC BLOOD PRESSURE: 80 MMHG | TEMPERATURE: 96.7 F | WEIGHT: 132.8 LBS | HEART RATE: 74 BPM | BODY MASS INDEX: 19.05 KG/M2 | RESPIRATION RATE: 16 BRPM | SYSTOLIC BLOOD PRESSURE: 110 MMHG

## 2019-10-24 DIAGNOSIS — I51.7 CARDIOMEGALY: ICD-10-CM

## 2019-10-24 DIAGNOSIS — N39.0 FREQUENT UTI: Primary | ICD-10-CM

## 2019-10-24 PROBLEM — B37.2 CANDIDAL INTERTRIGO: Status: RESOLVED | Noted: 2017-12-12 | Resolved: 2019-10-24

## 2019-10-24 PROBLEM — R55 SYNCOPE: Status: RESOLVED | Noted: 2019-07-15 | Resolved: 2019-10-24

## 2019-10-24 PROBLEM — R11.2 NAUSEA AND VOMITING: Status: RESOLVED | Noted: 2019-07-15 | Resolved: 2019-10-24

## 2019-10-24 PROBLEM — L71.9 ROSACEA: Status: RESOLVED | Noted: 2018-01-18 | Resolved: 2019-10-24

## 2019-10-24 PROCEDURE — 99213 OFFICE O/P EST LOW 20 MIN: CPT | Performed by: FAMILY MEDICINE

## 2019-10-24 PROCEDURE — 1036F TOBACCO NON-USER: CPT | Performed by: FAMILY MEDICINE

## 2019-10-24 NOTE — PROGRESS NOTES
Assessment/Plan:     Problem List Items Addressed This Visit        Cardiovascular and Mediastinum    Cardiomegaly     -Patient remains asymptomatic, no SOB, chest pain, bilateral lower extremity swelling, diaphoresis, orthopnea, PND  -Echo scheduled 11/15/19, results of which will dictate further management/Cardiology consult as needed            Genitourinary    Frequent UTI - Primary     -Patient currently asymptomatic following treatment of Enterococcus UTI with nitrofurantoin after culture showed sensitivity to nitrofurantoin  -Discussed with mother and new caregiver importance of genital hygiene to prevent recurrence                 Subjective:      Patient ID: Real Foster is a 26 y o  male  Patient here for follow up on UTI, he was seen 10/1/19 and given nitrofurantoin due to enterococcus on U/Cx that did not improve with bactrim; U/Cx showed sensitivity to macrobid, patient completed course without any issues  Currently asymptomatic, no issues  Patient is doing more physical activity at the gym, working with new nutritionist, weight is stable  Overall feeling good, patient affirmatively nods when asked if he is excited for his birthday tomorrow  Echo ordered at last visit due to new cardiomegaly and pulmonary vascular congestion seen on chest x-ray, echo is scheduled November 15th  The following portions of the patient's history were reviewed and updated as appropriate: allergies, current medications, past family history, past medical history, past social history, past surgical history and problem list     Review of Systems   Unable to perform ROS: Other     Patient only scantly verbal    Objective:    /80 (BP Location: Left arm, Patient Position: Sitting, Cuff Size: Large)   Pulse 74   Temp (!) 96 7 °F (35 9 °C) (Tympanic)   Resp 16   Wt 60 2 kg (132 lb 12 8 oz)   BMI 19 05 kg/m²        Physical Exam   Constitutional: He is oriented to person, place, and time   He appears well-developed and well-nourished  No distress  HENT:   Head: Normocephalic and atraumatic  Eyes: Conjunctivae and EOM are normal  Right eye exhibits no discharge  Left eye exhibits no discharge  Neck: Normal range of motion  Neck supple  Cardiovascular: Normal rate, regular rhythm and intact distal pulses  Pulmonary/Chest: Effort normal and breath sounds normal  No respiratory distress  Abdominal: Soft  Bowel sounds are normal  There is no tenderness  Musculoskeletal: He exhibits no edema  Patient in wheelchair, his baseline; unchanged contractures of B/L UE   Neurological: He is alert and oriented to person, place, and time  Scantly verbal, at his baseline   Skin: Skin is warm and dry  Capillary refill takes less than 2 seconds  No rash noted  Psychiatric: He has a normal mood and affect  His behavior is normal    Vitals reviewed

## 2019-10-24 NOTE — ASSESSMENT & PLAN NOTE
-Patient currently asymptomatic following treatment of Enterococcus UTI with nitrofurantoin after culture showed sensitivity to nitrofurantoin  -Discussed with mother and new caregiver importance of genital hygiene to prevent recurrence

## 2019-10-24 NOTE — LETTER
October 24, 2019      Patient: Vannesa Martinez   YOB: 1985   Date of Visit: 10/24/2019       To Whom It May Concern:    Please note the above-named patient's history of urinary infections, as listed below:    04/11/19:  >100,000 cfu/mL Coagulase-negative Staphylococcus, not S   Saprophyticus   05/14/19:  >100,000 cfu/mL E  coli  06/28/19:  >100,000 cfu/mL Enterococcus faecalis  10/01/19:  >100,000 cfu/mL Enterococcus faecalis, 50,000-59,000 cfu/mL Staphylococcus coagulase-negative       Sincerely,        Maria Fernanda London MD

## 2019-10-24 NOTE — ASSESSMENT & PLAN NOTE
-Patient remains asymptomatic, no SOB, chest pain, bilateral lower extremity swelling, diaphoresis, orthopnea, PND  -Echo scheduled 11/15/19, results of which will dictate further management/Cardiology consult as needed

## 2019-10-24 NOTE — LETTER
04/11/19: Coagulase negative Staphylococcus, not S   Saprophyticus   05/14/19: E  coli  06/28/19: Enterococcus faecalis  10/01/19: Enterococcus faecalis, Staphylococcus coagulase negative

## 2019-11-14 DIAGNOSIS — R52 PAIN: ICD-10-CM

## 2019-11-14 DIAGNOSIS — F51.04 PSYCHOPHYSIOLOGICAL INSOMNIA: ICD-10-CM

## 2019-11-14 DIAGNOSIS — Z91.09 ENVIRONMENTAL ALLERGIES: ICD-10-CM

## 2019-11-14 DIAGNOSIS — R23.8 SKIN IRRITATION: ICD-10-CM

## 2019-11-14 DIAGNOSIS — E56.9 VITAMIN DEFICIENCY, UNSPECIFIED: ICD-10-CM

## 2019-11-14 DIAGNOSIS — K59.03 DRUG-INDUCED CONSTIPATION: ICD-10-CM

## 2019-11-14 DIAGNOSIS — K64.9 HEMORRHOIDS, UNSPECIFIED HEMORRHOID TYPE: ICD-10-CM

## 2019-11-14 DIAGNOSIS — R05.9 COUGH: ICD-10-CM

## 2019-11-14 DIAGNOSIS — I45.81 LONG Q-T SYNDROME: ICD-10-CM

## 2019-11-14 DIAGNOSIS — S06.9X0D TRAUMATIC BRAIN INJURY, WITHOUT LOSS OF CONSCIOUSNESS, SUBSEQUENT ENCOUNTER: ICD-10-CM

## 2019-11-14 DIAGNOSIS — I69.354 SPASTIC HEMIPLEGIA OF LEFT NONDOMINANT SIDE AS LATE EFFECT OF CEREBRAL INFARCTION (HCC): ICD-10-CM

## 2019-11-14 DIAGNOSIS — R13.12 OROPHARYNGEAL DYSPHAGIA: ICD-10-CM

## 2019-11-14 DIAGNOSIS — R63.4 ABNORMAL WEIGHT LOSS: ICD-10-CM

## 2019-11-14 DIAGNOSIS — R62.7 POOR WEIGHT GAIN IN ADULT: ICD-10-CM

## 2019-11-14 DIAGNOSIS — K59.00 CONSTIPATION, UNSPECIFIED CONSTIPATION TYPE: ICD-10-CM

## 2019-11-14 DIAGNOSIS — F32.A DEPRESSION, UNSPECIFIED DEPRESSION TYPE: ICD-10-CM

## 2019-11-14 RX ORDER — WHEAT DEXTRIN/ASPARTAME 3 G/6 G
1 POWDER IN PACKET (EA) ORAL DAILY
Qty: 30 EACH | Refills: 3 | Status: SHIPPED | OUTPATIENT
Start: 2019-11-14 | End: 2021-01-29 | Stop reason: CLARIF

## 2019-11-14 RX ORDER — OMEPRAZOLE 20 MG/1
CAPSULE, DELAYED RELEASE ORAL
Qty: 30 CAPSULE | Refills: 5 | Status: SHIPPED | OUTPATIENT
Start: 2019-11-14

## 2019-11-14 RX ORDER — MULTIVIT-MIN/FA/LYCOPEN/LUTEIN .4-300-25
1 TABLET ORAL DAILY
Qty: 30 TABLET | Refills: 5 | Status: SHIPPED | OUTPATIENT
Start: 2019-11-14

## 2019-11-14 RX ORDER — PETROLATUM,WHITE/LANOLIN
OINTMENT (GRAM) TOPICAL
Qty: 454 G | Refills: 11 | Status: SHIPPED | OUTPATIENT
Start: 2019-11-14

## 2019-11-14 RX ORDER — FLUTICASONE PROPIONATE 50 MCG
1 SPRAY, SUSPENSION (ML) NASAL DAILY
Qty: 1 BOTTLE | Refills: 1 | Status: SHIPPED | OUTPATIENT
Start: 2019-11-14 | End: 2021-06-17 | Stop reason: SDUPTHER

## 2019-11-14 RX ORDER — LORATADINE 10 MG/1
10 TABLET ORAL DAILY PRN
Qty: 30 TABLET | Refills: 5 | Status: SHIPPED | OUTPATIENT
Start: 2019-11-14 | End: 2020-11-23 | Stop reason: SDUPTHER

## 2019-11-14 RX ORDER — ASCORBIC ACID 500 MG
TABLET ORAL
Qty: 30 TABLET | Refills: 5 | Status: SHIPPED | OUTPATIENT
Start: 2019-11-14

## 2019-11-14 RX ORDER — BACLOFEN 20 MG
1 TABLET ORAL DAILY
Qty: 31 EACH | Refills: 5 | Status: SHIPPED | OUTPATIENT
Start: 2019-11-14

## 2019-11-14 RX ORDER — IBUPROFEN 200 MG
TABLET ORAL
Qty: 200 TABLET | Refills: 2 | Status: SHIPPED | OUTPATIENT
Start: 2019-11-14

## 2019-11-14 RX ORDER — SERTRALINE HYDROCHLORIDE 100 MG/1
100 TABLET, FILM COATED ORAL DAILY
Qty: 31 TABLET | Refills: 11 | Status: SHIPPED | OUTPATIENT
Start: 2019-11-14

## 2019-11-14 RX ORDER — DOCUSATE SODIUM 100 MG/1
CAPSULE, LIQUID FILLED ORAL
Qty: 10 CAPSULE | Refills: 5 | Status: SHIPPED | OUTPATIENT
Start: 2019-11-14

## 2019-11-14 RX ORDER — ACETAMINOPHEN/DIPHENHYDRAMINE 500MG-25MG
1 TABLET ORAL 2 TIMES DAILY PRN
Qty: 24 BOTTLE | Refills: 6 | Status: SHIPPED | OUTPATIENT
Start: 2019-11-14 | End: 2019-12-05 | Stop reason: SDUPTHER

## 2019-11-14 RX ORDER — DIMENHYDRINATE 50 MG
200 TABLET ORAL DAILY
Qty: 60 CAPSULE | Refills: 2 | Status: SHIPPED | OUTPATIENT
Start: 2019-11-14 | End: 2020-02-12

## 2019-11-14 RX ORDER — ASCORBIC ACID, THIAMINE MONONITRATE,RIBOFLAVIN, NIACINAMIDE, PYRIDOXINE HYDROCHLORIDE, FOLIC ACID, CYANOCOBALAMIN, BIOTIN, CALCIUM PANTOTHENATE, 100; 1.5; 1.7; 20; 10; 1; 6000; 150000; 5 MG/1; MG/1; MG/1; MG/1; MG/1; MG/1; UG/1; UG/1; MG/1
CAPSULE, LIQUID FILLED ORAL
Qty: 31 EACH | Refills: 5 | Status: SHIPPED | OUTPATIENT
Start: 2019-11-14

## 2019-11-14 RX ORDER — TEA TREE OIL 100 %
1 OIL (ML) TOPICAL DAILY PRN
Qty: 60 ML | Refills: 11 | Status: SHIPPED | OUTPATIENT
Start: 2019-11-14

## 2019-11-14 RX ORDER — CHOLECALCIFEROL (VITAMIN D3) 125 MCG
1 CAPSULE ORAL
Qty: 31 EACH | Refills: 5 | Status: SHIPPED | OUTPATIENT
Start: 2019-11-14

## 2019-11-14 RX ORDER — ICOSAPENT ETHYL 1000 MG/1
1 CAPSULE ORAL DAILY
Qty: 31 CAPSULE | Refills: 11 | Status: SHIPPED | OUTPATIENT
Start: 2019-11-14

## 2019-11-14 RX ORDER — POLYETHYLENE GLYCOL 3350 17 G/17G
POWDER, FOR SOLUTION ORAL
Qty: 30 EACH | Refills: 5 | Status: SHIPPED | OUTPATIENT
Start: 2019-11-14 | End: 2020-03-07 | Stop reason: SDUPTHER

## 2019-11-15 ENCOUNTER — HOSPITAL ENCOUNTER (OUTPATIENT)
Dept: NON INVASIVE DIAGNOSTICS | Facility: CLINIC | Age: 34
Discharge: HOME | End: 2019-11-15
Payer: COMMERCIAL

## 2019-11-15 DIAGNOSIS — I51.7 CARDIOMEGALY: ICD-10-CM

## 2019-11-15 PROCEDURE — 93306 TTE W/DOPPLER COMPLETE: CPT

## 2019-11-15 PROCEDURE — 93306 TTE W/DOPPLER COMPLETE: CPT | Performed by: INTERNAL MEDICINE

## 2019-11-15 RX ORDER — MODAFINIL 100 MG/1
200 TABLET ORAL DAILY
Qty: 62 TABLET | Refills: 5 | Status: CANCELLED | OUTPATIENT
Start: 2019-11-15

## 2019-11-15 RX ORDER — DRONABINOL 2.5 MG/1
2.5 CAPSULE ORAL 3 TIMES DAILY
Qty: 270 CAPSULE | Refills: 5 | Status: CANCELLED | OUTPATIENT
Start: 2019-11-15

## 2019-11-22 ENCOUNTER — TELEPHONE (OUTPATIENT)
Dept: FAMILY MEDICINE CLINIC | Facility: CLINIC | Age: 34
End: 2019-11-22

## 2019-11-22 DIAGNOSIS — G82.50 QUADRIPLEGIA (HCC): ICD-10-CM

## 2019-11-22 DIAGNOSIS — R32 URINARY INCONTINENCE, UNSPECIFIED TYPE: ICD-10-CM

## 2019-11-22 RX ORDER — DIAPER,BRIEF,ADULT, DISPOSABLE
EACH MISCELLANEOUS
Qty: 22 EACH | Refills: 6 | Status: SHIPPED | OUTPATIENT
Start: 2019-11-22 | End: 2021-03-11 | Stop reason: SDUPTHER

## 2019-11-22 NOTE — TELEPHONE ENCOUNTER
Patient has new agency for care  Previously, he has been coming in every 1-2 weeks for weight checks  Do you want to continue this?

## 2019-11-22 NOTE — TELEPHONE ENCOUNTER
Call from [facility] [employer] regarding weekly wt checks for [place]  They no longer need to bring him to [address]  Their nurse weighs him at their office   They just need to know if frequency will be changing back to monthly

## 2019-11-23 NOTE — TELEPHONE ENCOUNTER
I think monthly is reasonable at this point, but please call Jenny Armandose to discuss with her to make sure she is ok with this  His weight has been nice and stable      Wt Readings from Last 3 Encounters:   10/24/19 60 2 kg (132 lb 12 8 oz)   10/08/19 59 2 kg (130 lb 9 6 oz)   10/01/19 59 9 kg (132 lb)

## 2019-11-25 ENCOUNTER — TELEPHONE (OUTPATIENT)
Dept: FAMILY MEDICINE CLINIC | Facility: CLINIC | Age: 34
End: 2019-11-25

## 2019-11-25 DIAGNOSIS — G82.50 QUADRIPLEGIA (HCC): ICD-10-CM

## 2019-11-25 DIAGNOSIS — G93.1 ANOXIC BRAIN DAMAGE (HCC): Primary | ICD-10-CM

## 2019-11-25 DIAGNOSIS — Z91.89 AT RISK FOR ASPIRATION: ICD-10-CM

## 2019-11-25 NOTE — TELEPHONE ENCOUNTER
Patient's mom called for an order for her son to be treated and evaluated at [place] [id number] by India Quintero the Speech language and pathologist     The Fax # is  [phone]

## 2019-11-25 NOTE — TELEPHONE ENCOUNTER
Spoke with Bridger Flax, reviewed weights from past month, she is agreeable to monthly weight checks  Contacted agency with information

## 2019-12-05 ENCOUNTER — OFFICE VISIT (OUTPATIENT)
Dept: FAMILY MEDICINE CLINIC | Facility: CLINIC | Age: 34
End: 2019-12-05

## 2019-12-05 VITALS
RESPIRATION RATE: 14 BRPM | TEMPERATURE: 97 F | HEART RATE: 78 BPM | SYSTOLIC BLOOD PRESSURE: 100 MMHG | DIASTOLIC BLOOD PRESSURE: 60 MMHG

## 2019-12-05 DIAGNOSIS — R09.82 POSTNASAL DRIP: ICD-10-CM

## 2019-12-05 DIAGNOSIS — K59.04 CHRONIC IDIOPATHIC CONSTIPATION: ICD-10-CM

## 2019-12-05 DIAGNOSIS — R13.12 OROPHARYNGEAL DYSPHAGIA: Primary | ICD-10-CM

## 2019-12-05 DIAGNOSIS — R62.7 POOR WEIGHT GAIN IN ADULT: ICD-10-CM

## 2019-12-05 DIAGNOSIS — M54.50 CHRONIC BILATERAL LOW BACK PAIN WITHOUT SCIATICA: ICD-10-CM

## 2019-12-05 DIAGNOSIS — G89.29 CHRONIC BILATERAL LOW BACK PAIN WITHOUT SCIATICA: ICD-10-CM

## 2019-12-05 DIAGNOSIS — Z15.89 MONOALLELIC MUTATION OF RYR2 GENE: ICD-10-CM

## 2019-12-05 PROCEDURE — 99213 OFFICE O/P EST LOW 20 MIN: CPT | Performed by: FAMILY MEDICINE

## 2019-12-05 RX ORDER — LIDOCAINE 50 MG/G
1 PATCH TOPICAL DAILY PRN
Qty: 30 PATCH | Refills: 1 | Status: SHIPPED | OUTPATIENT
Start: 2019-12-05 | End: 2021-06-16 | Stop reason: ALTCHOICE

## 2019-12-05 RX ORDER — ECHINACEA PURPUREA EXTRACT 125 MG
2 TABLET ORAL AS NEEDED
Qty: 44 ML | Refills: 11 | Status: SHIPPED | OUTPATIENT
Start: 2019-12-05

## 2019-12-05 RX ORDER — MAG HYDROX/ALUMINUM HYD/SIMETH 400-400-40
SUSPENSION, ORAL (FINAL DOSE FORM) ORAL
Qty: 50 SUPPOSITORY | Refills: 6 | Status: SHIPPED | OUTPATIENT
Start: 2019-12-05

## 2019-12-05 RX ORDER — ACETAMINOPHEN/DIPHENHYDRAMINE 500MG-25MG
1 TABLET ORAL 2 TIMES DAILY
Qty: 60 BOTTLE | Refills: 6 | Status: SHIPPED | OUTPATIENT
Start: 2019-12-05

## 2019-12-05 RX ORDER — ACETAMINOPHEN 325 MG/1
975 TABLET ORAL EVERY 8 HOURS PRN
Qty: 120 TABLET | Refills: 3 | Status: SHIPPED | OUTPATIENT
Start: 2019-12-05 | End: 2022-03-02

## 2019-12-05 NOTE — PROGRESS NOTES
Assessment/Plan:     Problem List Items Addressed This Visit        Digestive    Oropharyngeal dysphagia - Primary     -Follows with [facility], on mechanical soft diet with nectar thick liquids via simply thick thickener  -Letter of medical necessity written today stating he needs thickening of liquids via simply thick thickener only, as thick-it thickener makes constipation worse per caregivers         Relevant Medications    Nutritional Supplements (NUTRITIONAL SHAKE) LIQD       Other    Constipated     -Continue miralax, but suspect enemas are helping due to patient's need for mechanical anorectal stimulation due to neurogenic dysfunction 2/2 hx of anoxic brain injury  -Advised against using enemas more frequently that QOD  -Start trial of glycerin suppository QOD (alternate with enemas) for 2 weeks, then may space glycerin suppository and enemas out to QOD PRN         Relevant Medications    glycerin adult 2 g suppository    Poor weight gain in adult     -Patient had similar period of weight loss with extensive workup last year; denies depression, mood stable on zoloft  -Will restart nutritional supplement shakes BID (90 minutes before lunch and 90 minutes before dinner) to evaluate for improvement to patient's weight, which should be around 130-135 lbs per past nutritionist recommendations  -Follows with [facility] and nutritionist regularly  -If no improvement to weight despite nutritional supplement shakes, will re-initiate workup, consider appetite stimulant            Relevant Medications    Nutritional Supplements (NUTRITIONAL SHAKE) LIQD    Postnasal drip    Relevant Medications    sodium chloride (DEEP SEA NASAL SPRAY) 0 65 % nasal spray    Monoallelic mutation of RYR2 gene     -Patient is s/p cardiac arrest at age 13 presumed to be due to previously undetected congenital long QT; no arrhythmias known to occur since that initial episode  -Follows with Cardiology (Dr Thalia Mclaughlin); asymptomatic  -Genetic testing 9/2018 revealed heterozygosity for a RYR2 gene variant ([id number]) of uncertain significance, but unclear whether the variant is pathogenic or rare benign  -Pathogenic variants in RYR2 gene are reportedly associated with CPVT or ARVC, but patient's [id number] variant is apparently not located where majority of those pathogenic missense variants occur, which is reassuring  -Mild RV dilation on echo but no RWMA or dyssynergy  -Continue to monitor, follow with Dr Brian Jha, but no indication for further workup or treatment at this time other than continued avoidance of QTc-prolonging agents           Other Visit Diagnoses     Chronic bilateral low back pain without sciatica        Relevant Medications    lidocaine (LIDODERM) 5 %    acetaminophen (TYLENOL) 325 mg tablet            Subjective:      Patient ID: Guilherme Copeland is a 37 y o  male with 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 s/p cardiac arrest at 13years of age 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 presenting for follow up on echo results  In addition, on November 18th started with new caregivers ([facility]), got a new wheelchair; came in today for new caregiving team to meet with patient's PCP and address the following:      HPI    -Echo results: Ordered due to reported borderline cardiomegaly and mild vascular congestion on CXR 10/1/19  Echo 11/15/19 showed normal systolic function with EF 55%, no RWMA, normal wall thickness, normal LV diastolic function, mild RV dilation, mild TVR, trace PVR and mild dilation of IVC with normal pulmonary arterial flow  Patient asymptomatic  Of note, patient had genetic testing done by Cardiology 9/2018 that showed he was heterozygous for a variant ([id number]) of uncertain significance in the RYR2 gene, but genetic report states it is unclear whether the variant is pathogenic or rare benign   Pathogenic variants in RYR2 gene are reportedly associated with autosomal dominant catecholaminergic polymorphic V-Tach (CPVT), and, less commonly, arrhythmogenic right ventricular cardiomyopathy (ARVC)  However, it does note the [id number] variant is not located in one of the 3 "hot-spot" regions of RYR2 (FKBP12 6 binding domain, calcium binding domain, and transmembrane domain), where majority of pathogenic missense variants occur  EKGs have been unchanged per chart review and no known arrhythmias have occurred since his episode of cardiac arrest at age 13  Patient follows with Dr Samantha Sanotyo (Cardiology)  -[facility]: Previously held discussion with patient's parents (guardians, Xin Henning, with patient's Advanced Directive in [address] [place] [address] under 9/25/19) regarding patient's wishes and [facility] form completed at that visit  Today, Mom and new caregivers report [facility] staff cannot honor POLST form "due to their policies", but allows mother to be called in the case of an emergency situation, where she can then refer to the Advanced Directives and/or give EMS POLST if she desires  Mom is not concerned that patient's wishes would not be honored  No other concerns from mother today      -Dysphagia: Patient follows with [facility] for dysphagia, is on mechanical soft diet with nectar thick liquids with simply thick thickener  Needs Rx and letter of medical necessity today stating he needs thickening of liquids  Thick-it thickener makes constipation worse per caregivers  No concern from Mom or caregivers regarding current diet, denies aspiration episodes     -Constipation: Stable overall, caregivers are using miralax, as well as enema QOD, and patient still having 2 days between BMs at times  BMs appear normal looking but caregivers report that miralax not sufficient for constipation, mechanical stimulation is what appears to initiate BMs   Patient has never had any suppository tried in the past       The following portions of the patient's history were reviewed and updated as appropriate: allergies, current medications, past family history, past medical history, past social history, past surgical history and problem list     Review of Systems   Constitutional: Positive for unexpected weight change  Negative for appetite change  Eating well but starting to lose weight again, apparently only 122 lbs today (previously 127 lbs, prior to that 132 lbs within past 2 months) and [facility] staff not giving patient nutritional supplemental shakes at this time   HENT: Positive for postnasal drip  Dysphagia stable; needs refill on nasal saline   Respiratory: Negative for chest tightness and shortness of breath  Cardiovascular: Negative for chest pain and leg swelling  Gastrointestinal: Positive for constipation  Negative for abdominal distention, abdominal pain, anal bleeding, blood in stool, diarrhea, nausea and vomiting  Genitourinary: Negative for decreased urine volume and dysuria  Musculoskeletal: Positive for gait problem  Patient has expressed to Mom and caregivers intermittent MSK back discomfort, chronic, requesting topical treatment if possible; uses wheelchair, gets PT for UE   Neurological: Negative for dizziness and seizures  Psychiatric/Behavioral: Negative for dysphoric mood and sleep disturbance  All other systems reviewed and are negative  Objective:    /60 (BP Location: Left arm, Patient Position: Sitting, Cuff Size: Large)   Pulse 78   Temp (!) 97 °F (36 1 °C) (Tympanic)   Resp 14      Physical Exam   Constitutional: No distress  HENT:   Head: Normocephalic and atraumatic  Right Ear: External ear normal    Left Ear: External ear normal    Nose: Nose normal    Mouth/Throat: Oropharynx is clear and moist    B/L TMs normal   Eyes: Conjunctivae are normal  Right eye exhibits no discharge  Left eye exhibits no discharge  Neck: Normal range of motion  Cardiovascular: Normal rate, regular rhythm, normal heart sounds and intact distal pulses     Pulmonary/Chest: Effort normal and breath sounds normal  No respiratory distress  He has no wheezes  Demonstrates successful use of incentive spirometer today   Abdominal: Soft  Bowel sounds are normal  He exhibits no distension  There is no tenderness  Musculoskeletal:   Muscle wasting and mild spastic hypertonicity of hands and B/L LE but able to move all extremities; no tenderness to palpation over lumbar spine or paravertebral musculature   Neurological:   Scantly verbal (at baseline) but engaging and interactive   Skin: Skin is warm and dry  Capillary refill takes less than 2 seconds  Psychiatric:   Pleasant, cooperative   Vitals reviewed

## 2019-12-05 NOTE — LETTER
December 5, 2019     Patient: Ceci Throne   YOB: 1985   Date of Visit: 12/5/2019       To Whom it May Concern:     Zoraida Olivera is under my professional care  He was seen in my office on 12/5/2019  Please note that patient needs fluids thickened to an up to nectar thick consistency, and necessitates the use of Simply Thick thickener (rather than alternative thickeners) due to his comorbid chronic constipation history          Sincerely,        Norm Zamudio MD

## 2019-12-07 PROBLEM — R82.90 ABNORMAL URINALYSIS: Status: RESOLVED | Noted: 2019-04-27 | Resolved: 2019-12-07

## 2019-12-07 PROBLEM — Z15.89: Status: ACTIVE | Noted: 2019-12-07

## 2019-12-07 NOTE — ASSESSMENT & PLAN NOTE
-Patient is s/p cardiac arrest at age 13 presumed to be due to previously undetected congenital long QT; no arrhythmias known to occur since that initial episode  -Follows with Cardiology (Dr Samanhta Santoyo); asymptomatic  -Genetic testing 9/2018 revealed heterozygosity for a RYR2 gene variant ([id number]) of uncertain significance, but unclear whether the variant is pathogenic or rare benign  -Pathogenic variants in RYR2 gene are reportedly associated with CPVT or ARVC, but patient's [id number] variant is apparently not located where majority of those pathogenic missense variants occur, which is reassuring  -Mild RV dilation on echo but no RWMA or dyssynergy  -Continue to monitor, follow with Dr Samantha Santoyo, but no indication for further workup or treatment at this time other than continued avoidance of QTc-prolonging agents

## 2019-12-07 NOTE — ASSESSMENT & PLAN NOTE
-Follows with [facility], on mechanical soft diet with nectar thick liquids via simply thick thickener  -Letter of medical necessity written today stating he needs thickening of liquids via simply thick thickener only, as thick-it thickener makes constipation worse per caregivers

## 2019-12-07 NOTE — ASSESSMENT & PLAN NOTE
-Continue miralax, but suspect enemas are helping due to patient's need for mechanical anorectal stimulation due to neurogenic dysfunction 2/2 hx of anoxic brain injury  -Advised against using enemas more frequently that QOD  -Start trial of glycerin suppository QOD (alternate with enemas) for 2 weeks, then may space glycerin suppository and enemas out to QOD PRN

## 2019-12-07 NOTE — ASSESSMENT & PLAN NOTE
-Patient had similar period of weight loss with extensive workup last year; denies depression, mood stable on zoloft  -Will restart nutritional supplement shakes BID (90 minutes before lunch and 90 minutes before dinner) to evaluate for improvement to patient's weight, which should be around 130-135 lbs per past nutritionist recommendations  -Follows with [facility] and nutritionist regularly  -If no improvement to weight despite nutritional supplement shakes, will re-initiate workup, consider appetite stimulant

## 2019-12-09 ENCOUNTER — CLINICAL SUPPORT (OUTPATIENT)
Dept: NUTRITION | Facility: HOSPITAL | Age: 34
End: 2019-12-09
Payer: COMMERCIAL

## 2019-12-09 VITALS — BODY MASS INDEX: 17.9 KG/M2 | HEIGHT: 70 IN | WEIGHT: 125 LBS

## 2019-12-09 DIAGNOSIS — R13.12 OROPHARYNGEAL DYSPHAGIA: ICD-10-CM

## 2019-12-09 PROCEDURE — 97803 MED NUTRITION INDIV SUBSEQ: CPT | Performed by: DIETITIAN, REGISTERED

## 2019-12-09 NOTE — PROGRESS NOTES
Follow-Up Nutrition Assessment Form    Patient Name: Nikky Altamirano    YOB: 1985    Sex: Male      Follow Up Date: 12/9/2019  Start Time: 1:00pm Stop Time: 1:30pm Total Minutes: 30min     Data:  Present at session: Self, Mother Marvin Coello, [place] staff, Nick Louie LPN Supervisor [place] ROD      Parent/Patient Concerns: " I see him too thin, no getting enough calories and he sees    Medical Dx/Reason for Referral: R13 12, R63 4   Past Medical History:   Diagnosis Date    Abnormal ECG 1998 and beyond    post cardiac arrest    Allergic rhinitis     [facility] anoxia ([facility] [address] )     Cardiac arrest [facility])     age 13 s/p long Q-T syndrome    Community acquired pneumonia     last assessed/resolved:  10/9/2014    Depression     GERD (gastroesophageal reflux disease)     Long Q-T syndrome     Muscular rigidity and spasm, progressive     Osteopenia     Osteoporosis     Quadriplegia ([facility] [address] )     Spastic neurogenic bladder     Syncope     beta blocker medication DC because of low blood pressure    Urinary incontinence     Urinary tract infection without hematuria 4/27/2019    Oropharyngeal dysphagia - Primary 12/5/19 [facility] Visit       -Follows with [facility], on mechanical soft diet with nectar thick liquids via simply thick thickener  -Letter of medical necessity written today stating he needs thickening of liquids via simply thick thickener only, as thick-it thickener makes constipation worse per caregivers        Hx Weaned from [employer] 2000   Dysphagia July 2018-  IDDSI Level 7-Regular diet  IDDSI Level 0-Can manage all liquid types     12/9/19 RD Physical Assessment  Severe Protein Calorie Malnutrition in Acute illness related to inadequate energy intake as evidenced by loss of lean body tissue(temples,intereosseous muscles, scapula, shoulders) and subcutaneous fat loss(rib overlay, buccal pads,orbitals) and 7 5% weight loss in 6 weeks    Treat with PO diet 2200 kcal daily to include nutrition supplements BID, weekly weights      7/8/19 RD Physical Assessment:  Adequate LBM , suboptimal subcutaneous fat mass  Alert   Current Outpatient Medications   Medication Sig Dispense Refill    acetaminophen (TYLENOL) 325 mg tablet Take 3 tablets (975 mg total) by mouth every 8 (eight) hours as needed for mild pain or headaches 120 tablet 3    ascorbic acid (GNP VITAMIN C) 500 MG tablet Take 500 mg daily at 4 PM 30 tablet 5    b complex-vitamin C-folic acid (RENAL) 1 mg Take 1 mg daily at 4 PM 31 each 5    Benzocaine-Docusate Sodium (ENEMEEZ PLUS)  MG ENEM Insert 1 application into the rectum daily as needed (constipation) Use as directed 150 mL 4    Camphor-Eucalyptus-Menthol (VICKS VAPORUB) 4 73-1 2-2 6 % OINT Apply topically as needed (congestion) 170 g 0    carbamide peroxide (DEBROX) 6 5 % otic solution Administer 5 drops into both ears 2 (two) times a week 15 mL 5    coenzyme Q-10 100 MG capsule Take 2 capsules (200 mg total) by mouth daily 60 capsule 2    Diapers & Supplies (HUGGIES LITTLE MOVERS SIZE 3) MISC by Does not apply route      Disposable Gloves (VINYL GLOVES MEDIUM) MISC by Does not apply route 4 (four) times a day Dispense 3 boxes monthly; Diagnosis R32 3 each 5    docusate sodium (COLACE) 100 mg capsule 100 mg 9am and 9pm 10 capsule 5    docusate sodium (ENEMEEZ MINI) 283 MG enema Insert 1 enema into the rectum every other day 15 each 5    dronabinol (MARINOL) 2 5 mg capsule Take 1 capsule (2 5 mg total) by mouth 3 (three) times a day *FRIG-SHELF TAKE 1 by mouth at 9AM, 4PM, 9PM FOR Appetite 270 capsule 3    fluticasone (FLONASE) 50 mcg/act nasal spray 1 spray into each nostril daily 1 Bottle 1    glycerin adult 2 g suppository Place 1 suppository QOD for 2 weeks, then QOD PRN for constipation 50 suppository 6    HEMORRHOIDAL 0 25 % suppository - UNWRAP AND INSERT 1 SUPPOSITORY PER RECTUM AS NEEDED FOR HEMORRHOID PAIN RELIEF 12 suppository 11    ibuprofen (MOTRIN) 200 mg tablet Take 2 tabs q6h PRN for pain not to exceed 2000mg per day 200 tablet 2    Icosapent Ethyl (VASCEPA) 1 g CAPS Take 1 capsule (1 g total) by mouth daily 31 capsule 11    Incontinence Supply Disposable (PREVAIL AIR BRIEFS) MISC 1 each by Does not apply route every 4 (four) hours Please provide 5 briefs per day 150 each 11    Incontinence Supply Disposable (SELECT DISPOSABLE UNDERWEAR SM) MISC Please provide a 30 day supply 10 per day DX R32 incontinence 22 each 6    lidocaine (LIDODERM) 5 % Apply 1 patch topically daily as needed (back pain) Remove & Discard patch within 12 hours or as directed by MD 30 patch 1    loratadine (CLARITIN) 10 mg tablet Take 1 tablet (10 mg total) by mouth daily as needed for allergies 30 tablet 5    Magnesium Oxide 500 MG TABS Take 1 tablet (500 mg total) by mouth daily Take 1 tablet daily at 4 PM 31 each 5    Melatonin 5 MG TABS Take 1 tablet (5 mg total) by mouth daily at bedtime 31 each 5    Misc   Devices [facility]) MISC Please provide pt with a new cord for power wheelchair 1 each 0    modafinil (PROVIGIL) 100 mg tablet - TAKE 2 TABLETS (200MG)  BY MOUTH ONCE EVERY DAY AT 9AM FOR HEALTH FOR BRAIN INJURY 62 tablet 5    Multiple Vitamins-Minerals (CEROVITE SENIOR) TABS Take 1 tablet by mouth daily 30 tablet 5    Nutritional Supplements (NUTRITIONAL SHAKE) LIQD Take 1 Can by mouth 2 (two) times a day Please provide Boost 90 minutes before lunch and 90 minutes before dinner 60 Bottle 6    omeprazole (PriLOSEC) 20 mg delayed release capsule Take 20mg at 9am every day 30 capsule 5    onabotulinumtoxin A (BOTOX) 100 units inject 500 units into left gastroc soleus and abductor pollicis ankit      polyethylene glycol (MIRALAX) 17 g packet Take Monday, Wednesday, Friday at 0900 30 each 5    PREPARATION H 1-0 25-14 4-15 % rectal cream - APPLY RECTALLY AS NEEDED FOR HEMORRHOID PAIN RELIEF 51 g 11    ranitidine (ZANTAC) 150 mg tablet ranitidine 150 mg tablet      sertraline (ZOLOFT) 100 mg tablet Take 1 tablet (100 mg total) by mouth daily 31 tablet 11    sodium chloride (DEEP SEA NASAL SPRAY) 0 65 % nasal spray 2 sprays into each nostril as needed for congestion 44 mL 11    Sodium Fluoride (PREVIDENT 5000 PLUS DT) Apply to teeth      Starch, Thickening, LIQD Nectar thick liquids up to honey thick if coughing occurs as needed, please use Simply Thick liquid only  Discontinue Thick-It powder  237 mL 5    Tea Tree 100 % OIL Apply 1 g topically daily as needed (rash) Apply topically daily to skin folds 60 mL 11    Vitamins A & D (VITAMIN A & D) ointment - APPLY TO AFFECTED AREA (BUTTOCKS/ANAL) AS NEEDED 454 g 11    Wheat Dextrin (BENEFIBER ON THE GO) POWD MIX 1 PACKET IN LIQUID & DRINK BY MOUTH ONCE EVERY DAY (9PM) 28 each 10    wheat dextrin (BENEFIBER) Take 1 packet by mouth daily 30 each 3    zinc oxide (DESITIN) 40 % PSTE Apply topically      Zinc Picolinate 25 MG TABS Take 1 76 tablets (44 mg total) by mouth daily Take 2 22 mg capsules daily every other month 60 each 6     No current facility-administered medications for this visit  Additional Meds/Supplements: See med list   Barriers to Learning: Other: Nonverbal, is able to interact   Labs: reviewed   Height: Ht Readings from Last 3 Encounters:   10/01/19 5' 10" (1 778 m)   09/30/19 5' 10" (1 778 m)   09/10/19 5' 10" (1 778 m)      Weight: Wt Readings from Last 10 Encounters:   10/24/19 60 2 kg (132 lb 12 8 oz)   10/08/19 59 2 kg (130 lb 9 6 oz)   10/01/19 59 9 kg (132 lb)   09/30/19 59 9 kg (132 lb)   09/10/19 59 kg (130 lb)   08/13/19 60 1 kg (132 lb 9 6 oz)   07/15/19 58 5 kg (129 lb)   07/09/19 60 2 kg (132 lb 11 5 oz)   07/08/19 59 6 kg (131 lb 6 4 oz)   07/05/19 59 1 kg (130 lb 6 4 oz)     Estimated body mass index is 17 94 kg/m² as calculated from the following:    Height as of this encounter: 5' 10" (1 778 m)  Weight as of this encounter: 56 7 kg (125 lb)  Wt  Change Since Last Visit: [x]Yes     []No  Amount:  Loss of 10# in 6 weeks (7 5%) Oct 132 lb  Overall 10# weight loss x 6 months (7 5%) from last session      Energy Needs: [address] Equation:   1599 x 1 4-1 1=6104-2478 kcal    Pain Screen: Are you having pain now? No      Goals Achieved:ST recommendations updated     New Goals:   1    5-1 lb weight gain /week until first goal met at 140 lb  2 [place] to consume 2200 kcal daily to include Nutrition Supplement BID by next encounter   3  Staff to report no dysphagia events by next session with compliance to [facility] Soft foods with nectar thick liquids       Initial PES: Inadequate energy intake related to inability to consume adequate calories as evidenced by BMI, weight loss, malnutrition , and staff report of increasing fatigue and dysphagia  New PES:- No Change        [place] Instant Breakfast with whole milk Provides 300 kcal/serving      Assessment:  Zo Chavarria is 19 years s/p injury  Age 13 y/o suffered a cardiac arrest at school  Dx Long QT Syndrome  With dx Anoxic Brain Injury,Quadriplegic  Alert ,sitting upright in electric wheelchair he can safely operate on his own   Staff currently observing if any  increased coughing with eating with recent downgrade of food/fluid texture  Discussion focused as well as new caregivers (SPIN) with task of adequate nutrition, resolution of constipation and prevention of aspiration/choking  Zo Chavarria will need to consume 1600 calories or more daily in addition to CIB made with whole milk BID       Medical Nutrition Therapy Intervention:  [x]Individualized Meal Plan-> or =2200kcal ,80grm Protein, 1700 ml fluid minimum daily []Understanding Lab Values   [x]Basic Pathophysiology of Disease-Underweight, SPCM []Food/Medication Interactions   [x]Food Diary-SPIN to use [facility] and Consumer Intake records  []Exercise   []Lifestyle/Behavior Modification Techniques [x]Medication, Mechanism of Action-follow bowel regimen  [x]Label Reading-Serv/Fiber []Self Blood Glucose Monitoring   [x]Weight/BMI Goals-Achieve 135-140 lb  []Other - After weight goal achieved consider elimination of dairy as a possible contributing factor to chronic constipation     Other Notes:        Comprehension: Mother and Staff []Excellent  []Very Good  [x]Good  []Fair   []Poor    Receptivity: []Excellent  []Very Good  [x]Good  []Fair   []Poor    Expected Compliance: []Excellent  []Very Good  [x]Good  []Fair   []Poor      Labs:  CMP  Lab Results   Component Value Date     09/06/2017    K 5 0 07/09/2019     07/09/2019    CO2 29 07/09/2019    BUN 15 07/09/2019    CREATININE 0 87 07/09/2019    GLUF 85 06/29/2019    CALCIUM 9 1 07/09/2019    AST 20 07/09/2019    ALT 29 07/09/2019    ALKPHOS 70 07/09/2019    PROT 8 0 09/06/2017    BILITOT 0 5 09/06/2017    EGFR 112 07/09/2019       BMP  Lab Results   Component Value Date    CALCIUM 9 1 07/09/2019     09/06/2017    K 5 0 07/09/2019    CO2 29 07/09/2019     07/09/2019    BUN 15 07/09/2019    CREATININE 0 87 07/09/2019       Lipids  No results found for: CHOL  Lab Results   Component Value Date    HDL 40 (L) 03/20/2019     No results found for: [address]  Lab Results   Component Value Date    TRIG 129 03/20/2019     No results found for: CHOLHDL    Hemoglobin A1C  Lab Results   Component Value Date    HGBA1C 5 3 09/05/2014       Fasting Glucose  Lab Results   Component Value Date    GLUF 85 06/29/2019       Insulin     Thyroid  Lab Results   Component Value Date    TSH 3 52 03/20/2019       Hepatic Function Panel  Lab Results   Component Value Date    ALT 29 07/09/2019    AST 20 07/09/2019    ALKPHOS 70 07/09/2019    BILITOT 0 5 09/06/2017       Celiac Disease Antibody Panel  No results found for: ENDOMYSIAL IGA, GLIADIN IGA, GLIADIN IGG, IGA, TISSUE TRANSGLUT AB, TTG IGA   Iron  No results found for: IRON, TIBC, FERRITIN    Vitamins  No results found for: VITAMIN B2   Nicotinamide   Date Value Ref Range Status   07/05/2018 <20 ng/mL Final     Comment:      Nicotinamide is a metabolite of nicotinic acid  Due to the large  variability in the metabolism of nicotinic acid, plasma   concentrations of this metabolite are variable  In one study,   fasting plasma concentrations were reported to be approximately   40 ng/mL  In another study it was reported that the   administration of a single 1000 mg of extended-release tablet of   nicotinic acid resulted in a mean peak nicotinamide concentration  of 400 ng/mL between 5 and 10 hours post dose, decreasing to   about 100 ng/mL by 16 hours post dose  This test was developed and its analytical performance   characteristics have been determined by [facility]  It has not been cleared or approved by the [facility] and Drug Administration  This assay has been validated   pursuant to the CLIA regulations and is used for clinical   purposes  Nicotinic Acid   Date Value Ref Range Status   07/05/2018 <20 ng/mL Final     Comment:      Due to the large variability in the metabolism of nicotinic   acid, the dosing preparation used (immediate-release vs  extended  release), and the mg doses used, the serum concentrations may   range from less than 20 ng/mL to about 30,000 ng/mL  After oral   administration of an immediate-release tablet, peak plasma   concentrations occur in 4 to 5 hours  The plasma half-life of   nicotinic acid is about one hour  In one study, fasting plasma   concentrations were reported to be less than 20 ng/mL  In another  study, it was reported that the administration of a single 1000   mg extended-release tablet resulted in mean nicotinic acid   concentrations of less than 50 ng/mL          No results found for: [facility]  Lab Results   Component Value Date    Inocencio Shields 1,072 07/05/2018     No results found for: VITB5  No results found for: V6YYVGCP  No results found for: THYROGLB  No results found for: VITAMIN K   No results found for: 25-HYDROXY VIT D   No components found for: [address] [facility]  [address] [address]

## 2019-12-16 ENCOUNTER — TELEPHONE (OUTPATIENT)
Dept: FAMILY MEDICINE CLINIC | Facility: CLINIC | Age: 34
End: 2019-12-16

## 2019-12-16 NOTE — TELEPHONE ENCOUNTER
Pt's caregiver dropped off paperwork to be signed by Dr Shaune Boxer, it states his LPN Ann will  when complete her # is [phone]    In Dr Hernando carr in triage

## 2019-12-18 NOTE — TELEPHONE ENCOUNTER
Signed forms and put in patient folder at  bin, please call LPN to let her know it is ready to be picked up, thanks!

## 2019-12-18 NOTE — TELEPHONE ENCOUNTER
Called pt's BAKARI Pena to let her know that paperwork is ready for pickup, she states she will be in asap to pick it up

## 2019-12-20 ENCOUNTER — TELEPHONE (OUTPATIENT)
Dept: FAMILY MEDICINE CLINIC | Facility: CLINIC | Age: 34
End: 2019-12-20

## 2019-12-20 NOTE — TELEPHONE ENCOUNTER
Deanna from [employer]  Dropped off a form for therapy for Dr Malone Second to complete  Please fax form when completed to [phone]  Cade Juarez can be reached at [phone]  Form is in Dr Estefani carr

## 2019-12-23 ENCOUNTER — TELEPHONE (OUTPATIENT)
Dept: FAMILY MEDICINE CLINIC | Facility: CLINIC | Age: 34
End: 2019-12-23

## 2019-12-23 NOTE — TELEPHONE ENCOUNTER
Danny Hinkle LPN dropped off forms for disability placard for Dr Maura Mcfarlane to complete  Please call Margret Cousin when form is complete at [phone]  Disability  Form is in Dr Miriam carr

## 2020-01-02 NOTE — TELEPHONE ENCOUNTER
Called Mallory ennis form is in  bin     Received call right after this one from [id number] [facility] [place], she said she will  form today

## 2020-01-03 ENCOUNTER — TELEPHONE (OUTPATIENT)
Dept: FAMILY MEDICINE CLINIC | Facility: CLINIC | Age: 35
End: 2020-01-03

## 2020-01-03 NOTE — TELEPHONE ENCOUNTER
Call from Home health care agency  They are requesting an order for Men's guard inserts #300 for Dx urinary incontinence to be faxed to her at [phone] [address] Carlos is going to call ins for approval

## 2020-01-03 NOTE — TELEPHONE ENCOUNTER
Please review  I tried to find order, but can not seem to do so  Let me know when placed and I will fax  Thanks!

## 2020-01-06 DIAGNOSIS — S06.9X0D TRAUMATIC BRAIN INJURY, WITHOUT LOSS OF CONSCIOUSNESS, SUBSEQUENT ENCOUNTER: ICD-10-CM

## 2020-01-07 RX ORDER — DRONABINOL 2.5 MG/1
CAPSULE ORAL
Qty: 90 CAPSULE | Refills: 0 | Status: SHIPPED | OUTPATIENT
Start: 2020-01-07 | End: 2020-02-04

## 2020-01-27 ENCOUNTER — TELEPHONE (OUTPATIENT)
Dept: FAMILY MEDICINE CLINIC | Facility: CLINIC | Age: 35
End: 2020-01-27

## 2020-01-27 NOTE — TELEPHONE ENCOUNTER
Patient's pharmacy calling, The pharmacist, Soy stated,"Calling to clarify the directions for the doc  Sodium enemas that are ordered for patient  The   staff tells me every other day but they say it was PRN    Could Dr Kwesi Deshpande please clarify?"

## 2020-02-03 DIAGNOSIS — S06.9X0D TRAUMATIC BRAIN INJURY, WITHOUT LOSS OF CONSCIOUSNESS, SUBSEQUENT ENCOUNTER: ICD-10-CM

## 2020-02-04 ENCOUNTER — TRANSCRIBE ORDERS (OUTPATIENT)
Dept: FAMILY MEDICINE CLINIC | Facility: CLINIC | Age: 35
End: 2020-02-04

## 2020-02-04 DIAGNOSIS — R32 URINARY INCONTINENCE, UNSPECIFIED TYPE: ICD-10-CM

## 2020-02-04 DIAGNOSIS — G93.1 ANOXIC BRAIN DAMAGE (HCC): ICD-10-CM

## 2020-02-04 DIAGNOSIS — S06.9X0D UNSPECIFIED INTRACRANIAL INJURY WITHOUT LOSS OF CONSCIOUSNESS, SUBSEQUENT ENCOUNTER: Primary | ICD-10-CM

## 2020-02-04 DIAGNOSIS — S06.9X0D TRAUMATIC BRAIN INJURY, WITHOUT LOSS OF CONSCIOUSNESS, SUBSEQUENT ENCOUNTER: ICD-10-CM

## 2020-02-04 RX ORDER — DRONABINOL 2.5 MG/1
CAPSULE ORAL
Qty: 90 CAPSULE | Refills: 0 | Status: SHIPPED | OUTPATIENT
Start: 2020-02-04 | End: 2020-02-27 | Stop reason: SDUPTHER

## 2020-02-04 NOTE — TELEPHONE ENCOUNTER
Received request from [facility], requesting refill of Modafinil 100mg #62 with refills

## 2020-02-07 ENCOUNTER — TELEPHONE (OUTPATIENT)
Dept: FAMILY MEDICINE CLINIC | Facility: CLINIC | Age: 35
End: 2020-02-07

## 2020-02-07 NOTE — TELEPHONE ENCOUNTER
I only see a refill from Dr Antonio Dumont on 3/25  I just want to confirm correct medication was sent on 1/27  Thanks!

## 2020-02-07 NOTE — TELEPHONE ENCOUNTER
[employer] send us the Detail Product Description for his wheelchair  Im going to put the order in your bin for you to review it and sign it if you are agree   Thanks Dr Papi Crews

## 2020-02-10 ENCOUNTER — TELEPHONE (OUTPATIENT)
Dept: FAMILY MEDICINE CLINIC | Facility: CLINIC | Age: 35
End: 2020-02-10

## 2020-02-10 DIAGNOSIS — K59.03 DRUG-INDUCED CONSTIPATION: ICD-10-CM

## 2020-02-10 NOTE — TELEPHONE ENCOUNTER
Pended modafinil order for attendings review and approval  PDMP reviewed and last fill 1/27/20, no red flags  Thank you!

## 2020-02-10 NOTE — TELEPHONE ENCOUNTER
[employer] form in triage on desk on the black paper daniel   To be completed and signed I attached the progress note    -thank you

## 2020-02-10 NOTE — TELEPHONE ENCOUNTER
Patient's nurse calling about fax that was sent to Dr Mal Méndez on 2/5/20 pertaining to his weight  Patient is not gaining weight he is currently on a 2200 loraine diet with no gain  His mother is very concerned she is requesting blood work ordered and TSH ordered or a GI consult for this patient  Could Dr Mal Méndez please advise?

## 2020-02-11 DIAGNOSIS — S06.9X0D TRAUMATIC BRAIN INJURY, WITHOUT LOSS OF CONSCIOUSNESS, SUBSEQUENT ENCOUNTER: ICD-10-CM

## 2020-02-11 RX ORDER — MODAFINIL 100 MG/1
TABLET ORAL
Qty: 62 TABLET | Refills: 5 | Status: SHIPPED | OUTPATIENT
Start: 2020-02-11 | End: 2020-08-05

## 2020-02-12 RX ORDER — DRONABINOL 2.5 MG/1
CAPSULE ORAL
Qty: 90 CAPSULE | Refills: 0 | OUTPATIENT
Start: 2020-02-12

## 2020-02-12 NOTE — TELEPHONE ENCOUNTER
Spoke with patient's agency nurse Alanna Kovacs @ [phone]  They have increased patient's diet to 7445-8829 calories daily, his weight on Mon was 125 5 lbs  Mother would like patient to have bowel movement every day, she would like enema schedule to be ordered as one every other day and on opposite day if there is no bowel movement  They have also started PT with patient, he does show knee and hip pain, wants to know if xrays could be ordered  Please review request  Thanks!

## 2020-02-13 NOTE — TELEPHONE ENCOUNTER
Maria Spurling  Patient's mom calling and wants Dr Dannie Galeazzi to please call Gena Garrison  They anxious about new orders

## 2020-02-14 DIAGNOSIS — R63.4 WEIGHT LOSS: Primary | ICD-10-CM

## 2020-02-14 DIAGNOSIS — G93.1 ANOXIC BRAIN DAMAGE (HCC): ICD-10-CM

## 2020-02-14 DIAGNOSIS — R62.7 POOR WEIGHT GAIN IN ADULT: ICD-10-CM

## 2020-02-14 DIAGNOSIS — R79.89 ABNORMAL TSH: ICD-10-CM

## 2020-02-14 DIAGNOSIS — Z91.89 AT RISK FOR IMPAIRED DIGESTION: ICD-10-CM

## 2020-02-14 RX ORDER — MODAFINIL 100 MG/1
TABLET ORAL
Qty: 58 TABLET | Refills: 0 | OUTPATIENT
Start: 2020-02-14

## 2020-02-14 NOTE — TELEPHONE ENCOUNTER
As a f/u to our phone conversation, Thad Hernandez did come to office  Reviewed plan of action with her, gave orders for labs, gave referral to endocrinology  Advised they keep food log, appoint scheduled 3/5/20  She is concerned about his bowel movements, she does understand your concern with enema's daily  They only used glycerine suppository for 2 weeks, they will restart them every other day  They still would like to have enema order written daily or as needed  Did enter order for sign off if you approve  Thanks!

## 2020-02-18 DIAGNOSIS — S06.9X0D TRAUMATIC BRAIN INJURY, WITHOUT LOSS OF CONSCIOUSNESS, SUBSEQUENT ENCOUNTER: ICD-10-CM

## 2020-02-18 RX ORDER — DRONABINOL 2.5 MG/1
CAPSULE ORAL
Qty: 90 CAPSULE | Refills: 0 | OUTPATIENT
Start: 2020-02-18

## 2020-02-20 ENCOUNTER — TELEPHONE (OUTPATIENT)
Dept: FAMILY MEDICINE CLINIC | Facility: CLINIC | Age: 35
End: 2020-02-20

## 2020-02-20 DIAGNOSIS — K59.00 CONSTIPATED: ICD-10-CM

## 2020-02-20 NOTE — TELEPHONE ENCOUNTER
Tarun Rivera ([phone], patient's nurse called  She is following up on whether or not a decision was made in them using enema daily  Patient still only moving bowels every other day, mom does not want them using glycerine suppositories  Please review

## 2020-02-21 ENCOUNTER — APPOINTMENT (OUTPATIENT)
Dept: LAB | Age: 35
End: 2020-02-21
Payer: COMMERCIAL

## 2020-02-21 DIAGNOSIS — R79.89 ABNORMAL TSH: ICD-10-CM

## 2020-02-21 DIAGNOSIS — R62.7 POOR WEIGHT GAIN IN ADULT: ICD-10-CM

## 2020-02-21 DIAGNOSIS — R63.4 WEIGHT LOSS: ICD-10-CM

## 2020-02-21 DIAGNOSIS — Z91.89 AT RISK FOR IMPAIRED DIGESTION: ICD-10-CM

## 2020-02-21 DIAGNOSIS — G93.1 ANOXIC BRAIN DAMAGE (HCC): ICD-10-CM

## 2020-02-21 DIAGNOSIS — R63.4 ABNORMAL WEIGHT LOSS: ICD-10-CM

## 2020-02-21 LAB
ALBUMIN SERPL BCP-MCNC: 3.8 G/DL (ref 3.5–5)
ALP SERPL-CCNC: 94 U/L (ref 46–116)
ALT SERPL W P-5'-P-CCNC: 30 U/L (ref 12–78)
ANION GAP SERPL CALCULATED.3IONS-SCNC: 4 MMOL/L (ref 4–13)
AST SERPL W P-5'-P-CCNC: 15 U/L (ref 5–45)
BASOPHILS # BLD AUTO: 0.02 THOUSANDS/ΜL (ref 0–0.1)
BASOPHILS NFR BLD AUTO: 0 % (ref 0–1)
BILIRUB SERPL-MCNC: 0.3 MG/DL (ref 0.2–1)
BUN SERPL-MCNC: 21 MG/DL (ref 5–25)
CALCIUM SERPL-MCNC: 9.4 MG/DL (ref 8.3–10.1)
CHLORIDE SERPL-SCNC: 114 MMOL/L (ref 100–108)
CHOLEST SERPL-MCNC: 145 MG/DL (ref 50–200)
CO2 SERPL-SCNC: 30 MMOL/L (ref 21–32)
CREAT SERPL-MCNC: 0.77 MG/DL (ref 0.6–1.3)
EOSINOPHIL # BLD AUTO: 0.15 THOUSAND/ΜL (ref 0–0.61)
EOSINOPHIL NFR BLD AUTO: 3 % (ref 0–6)
ERYTHROCYTE [DISTWIDTH] IN BLOOD BY AUTOMATED COUNT: 12.1 % (ref 11.6–15.1)
GFR SERPL CREATININE-BSD FRML MDRD: 117 ML/MIN/1.73SQ M
GLUCOSE P FAST SERPL-MCNC: 86 MG/DL (ref 65–99)
HCT VFR BLD AUTO: 46 % (ref 36.5–49.3)
HDLC SERPL-MCNC: 43 MG/DL
HGB BLD-MCNC: 15.1 G/DL (ref 12–17)
IMM GRANULOCYTES # BLD AUTO: 0.01 THOUSAND/UL (ref 0–0.2)
IMM GRANULOCYTES NFR BLD AUTO: 0 % (ref 0–2)
LDLC SERPL CALC-MCNC: 89 MG/DL (ref 0–100)
LIPASE SERPL-CCNC: 151 U/L (ref 73–393)
LYMPHOCYTES # BLD AUTO: 1.73 THOUSANDS/ΜL (ref 0.6–4.47)
LYMPHOCYTES NFR BLD AUTO: 38 % (ref 14–44)
MCH RBC QN AUTO: 31.4 PG (ref 26.8–34.3)
MCHC RBC AUTO-ENTMCNC: 32.8 G/DL (ref 31.4–37.4)
MCV RBC AUTO: 96 FL (ref 82–98)
MONOCYTES # BLD AUTO: 0.33 THOUSAND/ΜL (ref 0.17–1.22)
MONOCYTES NFR BLD AUTO: 7 % (ref 4–12)
NEUTROPHILS # BLD AUTO: 2.31 THOUSANDS/ΜL (ref 1.85–7.62)
NEUTS SEG NFR BLD AUTO: 52 % (ref 43–75)
NONHDLC SERPL-MCNC: 102 MG/DL
NRBC BLD AUTO-RTO: 0 /100 WBCS
PLATELET # BLD AUTO: 170 THOUSANDS/UL (ref 149–390)
PMV BLD AUTO: 10.4 FL (ref 8.9–12.7)
POTASSIUM SERPL-SCNC: 5.6 MMOL/L (ref 3.5–5.3)
PREALB SERPL-MCNC: 44.6 MG/DL (ref 18–40)
PROT SERPL-MCNC: 8.6 G/DL (ref 6.4–8.2)
RBC # BLD AUTO: 4.81 MILLION/UL (ref 3.88–5.62)
SODIUM SERPL-SCNC: 148 MMOL/L (ref 136–145)
T4 FREE SERPL-MCNC: 0.7 NG/DL (ref 0.76–1.46)
TRIGL SERPL-MCNC: 65 MG/DL
TSH SERPL DL<=0.05 MIU/L-ACNC: 4.09 UIU/ML (ref 0.36–3.74)
WBC # BLD AUTO: 4.55 THOUSAND/UL (ref 4.31–10.16)

## 2020-02-21 PROCEDURE — 83690 ASSAY OF LIPASE: CPT

## 2020-02-21 PROCEDURE — 87389 HIV-1 AG W/HIV-1&-2 AB AG IA: CPT

## 2020-02-21 PROCEDURE — 80061 LIPID PANEL: CPT

## 2020-02-21 PROCEDURE — 36415 COLL VENOUS BLD VENIPUNCTURE: CPT

## 2020-02-21 PROCEDURE — 84134 ASSAY OF PREALBUMIN: CPT

## 2020-02-21 PROCEDURE — 84443 ASSAY THYROID STIM HORMONE: CPT

## 2020-02-21 PROCEDURE — 84439 ASSAY OF FREE THYROXINE: CPT

## 2020-02-21 PROCEDURE — 85025 COMPLETE CBC W/AUTO DIFF WBC: CPT

## 2020-02-21 PROCEDURE — 80053 COMPREHEN METABOLIC PANEL: CPT

## 2020-02-24 LAB — HIV 1+2 AB+HIV1 P24 AG SERPL QL IA: NORMAL

## 2020-02-26 ENCOUNTER — TELEPHONE (OUTPATIENT)
Dept: FAMILY MEDICINE CLINIC | Facility: CLINIC | Age: 35
End: 2020-02-26

## 2020-02-26 DIAGNOSIS — R77.8 ELEVATED TOTAL PROTEIN: ICD-10-CM

## 2020-02-26 DIAGNOSIS — E87.5 HYPERKALEMIA: Primary | ICD-10-CM

## 2020-02-26 DIAGNOSIS — E03.9 HYPOTHYROIDISM, UNSPECIFIED TYPE: ICD-10-CM

## 2020-02-26 PROBLEM — D72.829 LEUKOCYTOSIS: Status: RESOLVED | Noted: 2018-07-18 | Resolved: 2020-02-26

## 2020-02-26 PROBLEM — R82.90 FOUL SMELLING URINE: Status: RESOLVED | Noted: 2019-09-30 | Resolved: 2020-02-26

## 2020-02-26 PROBLEM — R34 DECREASED URINE OUTPUT: Status: RESOLVED | Noted: 2019-04-08 | Resolved: 2020-02-26

## 2020-02-26 PROBLEM — R91.8 RIGHT LOWER LOBE PULMONARY INFILTRATE: Status: RESOLVED | Noted: 2019-05-24 | Resolved: 2020-02-26

## 2020-02-26 PROBLEM — B37.9 CANDIDIASIS: Status: RESOLVED | Noted: 2018-07-02 | Resolved: 2020-02-26

## 2020-02-26 PROBLEM — J02.9 SORE THROAT: Status: RESOLVED | Noted: 2019-03-12 | Resolved: 2020-02-26

## 2020-02-26 PROBLEM — R05.9 COUGH: Status: RESOLVED | Noted: 2018-02-01 | Resolved: 2020-02-26

## 2020-02-26 PROBLEM — R09.89 RHONCHI AT BOTH LUNG BASES: Status: RESOLVED | Noted: 2019-09-30 | Resolved: 2020-02-26

## 2020-02-26 PROBLEM — R09.82 POSTNASAL DRIP: Status: RESOLVED | Noted: 2018-12-04 | Resolved: 2020-02-26

## 2020-02-26 RX ORDER — LEVOTHYROXINE SODIUM 0.03 MG/1
25 TABLET ORAL DAILY
Qty: 60 TABLET | Refills: 0 | Status: SHIPPED | OUTPATIENT
Start: 2020-02-26 | End: 2020-08-04

## 2020-02-26 NOTE — TELEPHONE ENCOUNTER
Mother calling again, concerned about labs, concerned that son could have UTI or something else, scheduled [facility] with Dr Millie Saucedo

## 2020-02-26 NOTE — TELEPHONE ENCOUNTER
Patient's CMP came back with hyperkalemia, hypernatremia, hyperchloremia, all of unclear etiology  Favor repeating BMP as soon as possible to evaluate whether true electrolyte derangements vs  Possible hemolyzed specimen    Mag & phos also ordered  Total protein 8 6, which is a persistent elevation; SPEP & UPEP ordered  In addition, previous subclinical hypothyroidism appears to now have developed into true hypothyroidism with low FT4  Will start synthroid 25 mcg QD and plan to repeat TSH in 8 weeks  Mother, Chirag Zaidi, and patient's nurse, Riki Meneses, called and informed of results and plan of care

## 2020-02-26 NOTE — TELEPHONE ENCOUNTER
Mother calling, has been waiting for Dr Bernardo aCputo to call her in regards to labs  She is concerned as she is seeing changes in his overall status  Are you able to review?

## 2020-02-27 ENCOUNTER — OFFICE VISIT (OUTPATIENT)
Dept: FAMILY MEDICINE CLINIC | Facility: CLINIC | Age: 35
End: 2020-02-27

## 2020-02-27 ENCOUNTER — TELEPHONE (OUTPATIENT)
Dept: OTHER | Facility: HOSPITAL | Age: 35
End: 2020-02-27

## 2020-02-27 ENCOUNTER — TELEPHONE (OUTPATIENT)
Dept: FAMILY MEDICINE CLINIC | Facility: CLINIC | Age: 35
End: 2020-02-27

## 2020-02-27 VITALS
DIASTOLIC BLOOD PRESSURE: 70 MMHG | HEART RATE: 68 BPM | SYSTOLIC BLOOD PRESSURE: 104 MMHG | BODY MASS INDEX: 18.01 KG/M2 | TEMPERATURE: 96.8 F | RESPIRATION RATE: 12 BRPM | WEIGHT: 125.5 LBS

## 2020-02-27 DIAGNOSIS — R32 URINARY INCONTINENCE, UNSPECIFIED TYPE: Primary | ICD-10-CM

## 2020-02-27 DIAGNOSIS — E03.9 HYPOTHYROIDISM, UNSPECIFIED TYPE: ICD-10-CM

## 2020-02-27 DIAGNOSIS — M41.9 SCOLIOSIS OF THORACIC SPINE, UNSPECIFIED SCOLIOSIS TYPE: ICD-10-CM

## 2020-02-27 DIAGNOSIS — E87.8 ELECTROLYTE ABNORMALITY: ICD-10-CM

## 2020-02-27 DIAGNOSIS — S06.9X0D TRAUMATIC BRAIN INJURY, WITHOUT LOSS OF CONSCIOUSNESS, SUBSEQUENT ENCOUNTER: ICD-10-CM

## 2020-02-27 DIAGNOSIS — R77.8 ELEVATED TOTAL PROTEIN: Primary | ICD-10-CM

## 2020-02-27 PROCEDURE — 1036F TOBACCO NON-USER: CPT | Performed by: FAMILY MEDICINE

## 2020-02-27 PROCEDURE — 3078F DIAST BP <80 MM HG: CPT | Performed by: FAMILY MEDICINE

## 2020-02-27 PROCEDURE — 3074F SYST BP LT 130 MM HG: CPT | Performed by: FAMILY MEDICINE

## 2020-02-27 PROCEDURE — 99214 OFFICE O/P EST MOD 30 MIN: CPT | Performed by: FAMILY MEDICINE

## 2020-02-27 RX ORDER — DRONABINOL 2.5 MG/1
2.5 CAPSULE ORAL 3 TIMES DAILY
Qty: 90 CAPSULE | Refills: 0 | Status: SHIPPED | OUTPATIENT
Start: 2020-03-03 | End: 2020-04-07

## 2020-02-27 RX ORDER — DRONABINOL 2.5 MG/1
CAPSULE ORAL
Qty: 90 CAPSULE | Refills: 0 | Status: CANCELLED | OUTPATIENT
Start: 2020-02-27

## 2020-02-27 NOTE — ASSESSMENT & PLAN NOTE
Chronic  · With subjective penile burning, abdominal pain, history of urinary tract infections, will order urinalysis  · If urinalysis positive patient would benefit from following up with urology who wanted to start him daily nitrofurantoin for UTI prophylaxis

## 2020-02-27 NOTE — TELEPHONE ENCOUNTER
Received request from [id number] [address] [place] [address] to refill Dronabinol 2 5mg #90  Checked PDMP, last refill 2/5/20 #90  Please review

## 2020-02-27 NOTE — ASSESSMENT & PLAN NOTE
New  · Unclear origin:  Dehydration versus hemolyzed specimen  · Hypernatremia:  148  · Hyperkalemia 5 6  · Hyperchloremia:  114  · Elevated total protein: 8 6  · Encourage fluid intake  · Repeat BMP, magnesium, phos  · Follow-up SPEP/UPEP  ·

## 2020-02-27 NOTE — PROGRESS NOTES
Assessment/Plan:    Hypothyroid  New  Patient to start levothyroxine 25 mcg daily    Electrolyte abnormality  New  · Unclear origin:  Dehydration versus hemolyzed specimen  · Hypernatremia:  148  · Hyperkalemia 5 6  · Hyperchloremia:  114  · Elevated total protein: 8 6  · Encourage fluid intake  · Repeat BMP, magnesium, phos  · Follow-up SPEP/UPEP  ·     Urinary incontinence  Chronic  · With subjective penile burning, abdominal pain, history of urinary tract infections, will order urinalysis  · If urinalysis positive patient would benefit from following up with urology who wanted to start him daily nitrofurantoin for UTI prophylaxis  Scoliosis of thoracic spine  · Mild levoscoliosis of the thoracolumbar junction seen in 9/2010  · May be contributing to patient's slumping posture  · Consider further imaging but likely not severe enough for brace  · Tender Hypertonic paraspinal muscles  · Patient may benefit from lidocaine patches in heating pads  · Continues to receive massage therapy every other week  Problem List Items Addressed This Visit        Endocrine    Hypothyroid     New  Patient to start levothyroxine 25 mcg daily            Musculoskeletal and Integument    Scoliosis of thoracic spine     · Mild levoscoliosis of the thoracolumbar junction seen in 9/2010  · May be contributing to patient's slumping posture  · Consider further imaging but likely not severe enough for brace  · Tender Hypertonic paraspinal muscles  · Patient may benefit from lidocaine patches in heating pads  · Continues to receive massage therapy every other week              Other    Electrolyte abnormality     New  · Unclear origin:  Dehydration versus hemolyzed specimen  · Hypernatremia:  148  · Hyperkalemia 5 6  · Hyperchloremia:  114  · Elevated total protein: 8 6  · Encourage fluid intake  · Repeat BMP, magnesium, phos  · Follow-up SPEP/UPEP  ·          Urinary incontinence - Primary     Chronic  · With subjective penile burning, abdominal pain, history of urinary tract infections, will order urinalysis  · If urinalysis positive patient would benefit from following up with urology who wanted to start him daily nitrofurantoin for UTI prophylaxis  Relevant Orders    UA (URINE) with reflex to Scope      Other Visit Diagnoses     Traumatic brain injury, without loss of consciousness, subsequent encounter                Subjective:      Patient ID: Nguyễn Bradley is a 37 y o  male  HPI  13-year-old with history of anoxic brain injury, recurrent UTI, and dysphagia presents with caregivers and for abnormal slumping posture for the past 5-7 days  In the past, patient would begin slumping when he was ill and they are concerned there is an underlying systemic illnes  The patient has been on a 3000 calorie diet for weight loss in the past which he has been tolerating well  He is also drink 6-8 8 oz  Cups of water daily with 2 ensure supplement shakes  Over the past 2 days, he has been pointing to his belly and complaining that his penis hurts  Has not had a UTI since October 2019  He was also exposed to 2 people with confirmed flu  The caregiver denies nausea, vomiting, diahrrea, cough, rhinorrhea, sneezing, or tugging of his ears  Patient was also found to be hypothyroid and started on synthroid 25mcg daily which he is going to start  With routine blood work patient was found to have a Na of 148, K of 5 6 and cl of 114  His pcp suspects hemolyzed sample and reordered BMP with mg and phos  He was found to also have a sustained total protein of 8 6 and a SPEP/UPEP  Caregiver is also requesting a refill on dronabinol 2 5mg TID        The following portions of the patient's history were reviewed and updated as appropriate: allergies, current medications, past family history, past medical history, past social history, past surgical history and problem list     Review of Systems   Constitutional: Negative for appetite change, chills, diaphoresis, fatigue and fever  HENT: Positive for nosebleeds  Negative for postnasal drip, rhinorrhea, sinus pressure, sinus pain, sneezing and sore throat  Respiratory: Negative for apnea, cough and choking  Gastrointestinal: Positive for abdominal pain  Negative for constipation, diarrhea, nausea and vomiting  Genitourinary: Positive for penile pain  Negative for decreased urine volume, difficulty urinating, dysuria, enuresis, flank pain, frequency and urgency  Objective:      /70 (BP Location: Left arm, Patient Position: Sitting, Cuff Size: Standard)   Pulse 68   Temp (!) 96 8 °F (36 °C) (Tympanic)   Resp 12   Wt 56 9 kg (125 lb 8 oz) Comment: pt weight from the facility  BMI 18 01 kg/m²          Physical Exam   Constitutional: He appears well-developed and well-nourished  No distress  HENT:   Head: Normocephalic and atraumatic  Right Ear: External ear normal    Left Ear: External ear normal    Nose: Mucosal edema (and erythema) present  No rhinorrhea, nose lacerations or sinus tenderness  No epistaxis  No foreign bodies  Mouth/Throat: No oropharyngeal exudate  Posterior pharyngeal cobblestoning   Eyes: Pupils are equal, round, and reactive to light  Conjunctivae and EOM are normal  Right eye exhibits no discharge  Left eye exhibits no discharge  No scleral icterus  Neck: Normal range of motion  Neck supple  No JVD present  No tracheal deviation present  No thyromegaly present  Cardiovascular: Normal rate, regular rhythm, normal heart sounds and intact distal pulses  Exam reveals no gallop and no friction rub  No murmur heard  Pulmonary/Chest: Effort normal and breath sounds normal  No stridor  No respiratory distress  He has no wheezes  He has no rales  He exhibits no tenderness  Abdominal: Soft  Bowel sounds are normal  He exhibits no distension  There is no tenderness  There is no rebound and no guarding     No suprapubic tenderness or CVA tenderness     Musculoskeletal: He exhibits no edema or tenderness  Thoracic scoliosis present, some pain with paraspinal muscle palpation   Lymphadenopathy:     He has no cervical adenopathy  Neurological: He is alert  Follows commands but is nonverbal  Speaks few words and unable to answer questions or describe pain/symptoms   Skin: Skin is warm and dry  Capillary refill takes less than 2 seconds  He is not diaphoretic  No erythema

## 2020-02-27 NOTE — ASSESSMENT & PLAN NOTE
· Mild levoscoliosis of the thoracolumbar junction seen in 9/2010  · May be contributing to patient's slumping posture  · Consider further imaging but likely not severe enough for brace  · Tender Hypertonic paraspinal muscles  · Patient may benefit from lidocaine patches in heating pads  · Continues to receive massage therapy every other week

## 2020-02-28 ENCOUNTER — APPOINTMENT (OUTPATIENT)
Dept: LAB | Age: 35
End: 2020-02-28
Payer: COMMERCIAL

## 2020-02-28 DIAGNOSIS — R77.8 ELEVATED TOTAL PROTEIN: ICD-10-CM

## 2020-02-28 DIAGNOSIS — E87.5 HYPERKALEMIA: ICD-10-CM

## 2020-02-28 LAB
ANION GAP SERPL CALCULATED.3IONS-SCNC: 4 MMOL/L (ref 4–13)
BILIRUB UR QL STRIP: NEGATIVE
BUN SERPL-MCNC: 16 MG/DL (ref 5–25)
CALCIUM SERPL-MCNC: 9.4 MG/DL (ref 8.3–10.1)
CHLORIDE SERPL-SCNC: 105 MMOL/L (ref 100–108)
CLARITY UR: CLEAR
CO2 SERPL-SCNC: 27 MMOL/L (ref 21–32)
COLOR UR: YELLOW
CREAT SERPL-MCNC: 0.7 MG/DL (ref 0.6–1.3)
GFR SERPL CREATININE-BSD FRML MDRD: 121 ML/MIN/1.73SQ M
GLUCOSE P FAST SERPL-MCNC: 89 MG/DL (ref 65–99)
GLUCOSE UR STRIP-MCNC: NEGATIVE MG/DL
HGB UR QL STRIP.AUTO: NEGATIVE
KETONES UR STRIP-MCNC: NEGATIVE MG/DL
LEUKOCYTE ESTERASE UR QL STRIP: NEGATIVE
MAGNESIUM SERPL-MCNC: 2.2 MG/DL (ref 1.6–2.6)
NITRITE UR QL STRIP: NEGATIVE
PH UR STRIP.AUTO: 6.5 [PH]
PHOSPHATE SERPL-MCNC: 3.8 MG/DL (ref 2.7–4.5)
POTASSIUM SERPL-SCNC: 5.3 MMOL/L (ref 3.5–5.3)
PROT UR STRIP-MCNC: NEGATIVE MG/DL
SODIUM SERPL-SCNC: 136 MMOL/L (ref 136–145)
SP GR UR STRIP.AUTO: 1.01 (ref 1–1.03)
UROBILINOGEN UR QL STRIP.AUTO: 0.2 E.U./DL

## 2020-02-28 PROCEDURE — 36415 COLL VENOUS BLD VENIPUNCTURE: CPT

## 2020-02-28 PROCEDURE — 84165 PROTEIN E-PHORESIS SERUM: CPT | Performed by: PATHOLOGY

## 2020-02-28 PROCEDURE — 83735 ASSAY OF MAGNESIUM: CPT

## 2020-02-28 PROCEDURE — 80048 BASIC METABOLIC PNL TOTAL CA: CPT

## 2020-02-28 PROCEDURE — 84166 PROTEIN E-PHORESIS/URINE/CSF: CPT | Performed by: PATHOLOGY

## 2020-02-28 PROCEDURE — 84165 PROTEIN E-PHORESIS SERUM: CPT

## 2020-02-28 PROCEDURE — 84100 ASSAY OF PHOSPHORUS: CPT

## 2020-02-28 PROCEDURE — 84166 PROTEIN E-PHORESIS/URINE/CSF: CPT | Performed by: FAMILY MEDICINE

## 2020-02-28 PROCEDURE — 81003 URINALYSIS AUTO W/O SCOPE: CPT | Performed by: FAMILY MEDICINE

## 2020-03-02 ENCOUNTER — TELEPHONE (OUTPATIENT)
Dept: FAMILY MEDICINE CLINIC | Facility: CLINIC | Age: 35
End: 2020-03-02

## 2020-03-02 DIAGNOSIS — M62.830 MUSCLE SPASM OF BACK: ICD-10-CM

## 2020-03-02 DIAGNOSIS — M41.9 SCOLIOSIS OF THORACIC SPINE, UNSPECIFIED SCOLIOSIS TYPE: Primary | ICD-10-CM

## 2020-03-02 PROBLEM — N39.0 E. COLI UTI (URINARY TRACT INFECTION): Status: RESOLVED | Noted: 2019-10-09 | Resolved: 2020-03-02

## 2020-03-02 PROBLEM — R79.89 ABNORMAL TSH: Status: RESOLVED | Noted: 2017-10-20 | Resolved: 2020-03-02

## 2020-03-02 PROBLEM — M62.89 DECREASED MUSCLE TONE: Status: RESOLVED | Noted: 2018-08-24 | Resolved: 2020-03-02

## 2020-03-02 PROBLEM — B96.20 E. COLI UTI (URINARY TRACT INFECTION): Status: RESOLVED | Noted: 2019-10-09 | Resolved: 2020-03-02

## 2020-03-02 NOTE — TELEPHONE ENCOUNTER
Discussed lab results with mother & Naman Juarez's nurse, via phone today  BMP has normalized but potassium remains at upper limits of normal (5 3) with normal renal function  Suspect potassium level likely due to daily dietary potassium intake well above recommended, as patient gets 6 prunes per day with 2 servings of [place] instant breakfast daily (1500 mg K = 38 5 mEq K added to diet from prunes and [place] alone), as well as many high-potassium fruits and vegetables as part of the My25 diet plan  Reviewed entire medication list, no obvious hyperkalemia-causing agents, taking NSAIDs only 1-2 times per week so unlikely potassium level is significantly impacted by reduced urinary potassium excretion from NSAIDs  Discussed adjusting patient's diet to reduce potassium intake, sent Liam Lester a list of high and low potassium foods via email  Will consider changing [place] to Nepro shake (250 mg K instead of [place]'s 540 mg K per serving), reduce prune intake, substitute diet with low-potassium foods for next month, consider increasing miralax to daily use  No indication for SPS/kayexylate or loop diuretic at this time  Urine dip completely negative  Mag & phos levels WNL  Awaiting SPEP & UPEP  Patient has appt 3/5/20, per mother patient remains somewhat more tired (but taking synthroid QD now), mother feels mood is stable on zoloft but at times patient does express passive SI due to desire not to live s/p anoxic brain injury, most recently expressed passive SI to father on 3/1/20 but mother states patient denies active SI  Continues with leaning to the side, but discussed MSK etiology as a possibility as noted by Dr Robinson Ashley at last visit; mother amenable to Rx for voltaren gel, and she will consider OMT  Rx sent to pharmacy for voltaren gel   At next visit, will also discuss stopping omega-3 supplement as patient does not have hyperTG and is at increased bleeding risk with SSRI and NSAIDs

## 2020-03-03 LAB
ALBUMIN SERPL ELPH-MCNC: 4.81 G/DL (ref 3.5–5)
ALBUMIN SERPL ELPH-MCNC: 54.7 % (ref 52–65)
ALBUMIN UR ELPH-MCNC: 100 %
ALPHA1 GLOB MFR UR ELPH: 0 %
ALPHA1 GLOB SERPL ELPH-MCNC: 0.26 G/DL (ref 0.1–0.4)
ALPHA1 GLOB SERPL ELPH-MCNC: 2.9 % (ref 2.5–5)
ALPHA2 GLOB MFR UR ELPH: 0 %
ALPHA2 GLOB SERPL ELPH-MCNC: 0.57 G/DL (ref 0.4–1.2)
ALPHA2 GLOB SERPL ELPH-MCNC: 6.5 % (ref 7–13)
B-GLOBULIN MFR UR ELPH: 0 %
BETA GLOB ABNORMAL SERPL ELPH-MCNC: 0.48 G/DL (ref 0.4–0.8)
BETA1 GLOB SERPL ELPH-MCNC: 5.4 % (ref 5–13)
BETA2 GLOB SERPL ELPH-MCNC: 6.7 % (ref 2–8)
BETA2+GAMMA GLOB SERPL ELPH-MCNC: 0.59 G/DL (ref 0.2–0.5)
GAMMA GLOB ABNORMAL SERPL ELPH-MCNC: 2.09 G/DL (ref 0.5–1.6)
GAMMA GLOB MFR UR ELPH: 0 %
GAMMA GLOB SERPL ELPH-MCNC: 23.8 % (ref 12–22)
IGG/ALB SER: 1.21 {RATIO} (ref 1.1–1.8)
PROT PATTERN SERPL ELPH-IMP: ABNORMAL
PROT PATTERN UR ELPH-IMP: NORMAL
PROT SERPL-MCNC: 8.8 G/DL (ref 6.4–8.2)
PROT UR-MCNC: 6 MG/DL

## 2020-03-05 ENCOUNTER — OFFICE VISIT (OUTPATIENT)
Dept: FAMILY MEDICINE CLINIC | Facility: CLINIC | Age: 35
End: 2020-03-05

## 2020-03-05 ENCOUNTER — CONSULT (OUTPATIENT)
Dept: ENDOCRINOLOGY | Facility: CLINIC | Age: 35
End: 2020-03-05
Payer: COMMERCIAL

## 2020-03-05 VITALS — HEART RATE: 62 BPM | DIASTOLIC BLOOD PRESSURE: 64 MMHG | SYSTOLIC BLOOD PRESSURE: 102 MMHG

## 2020-03-05 DIAGNOSIS — G93.1 ANOXIC BRAIN DAMAGE (HCC): ICD-10-CM

## 2020-03-05 DIAGNOSIS — Z91.89 AT RISK FOR IMPAIRED DIGESTION: ICD-10-CM

## 2020-03-05 DIAGNOSIS — R79.89 ABNORMAL TSH: ICD-10-CM

## 2020-03-05 DIAGNOSIS — R09.89 RESPIRATORY CRACKLES AT LEFT LUNG BASE: Primary | ICD-10-CM

## 2020-03-05 DIAGNOSIS — M25.50 POLYARTHRALGIA: ICD-10-CM

## 2020-03-05 DIAGNOSIS — K59.00 CONSTIPATION, UNSPECIFIED CONSTIPATION TYPE: ICD-10-CM

## 2020-03-05 DIAGNOSIS — E03.9 HYPOTHYROIDISM, UNSPECIFIED TYPE: ICD-10-CM

## 2020-03-05 DIAGNOSIS — E55.9 VITAMIN D DEFICIENCY: Primary | ICD-10-CM

## 2020-03-05 DIAGNOSIS — R62.7 POOR WEIGHT GAIN IN ADULT: ICD-10-CM

## 2020-03-05 DIAGNOSIS — R63.4 WEIGHT LOSS: ICD-10-CM

## 2020-03-05 DIAGNOSIS — E87.8 ELECTROLYTE ABNORMALITY: ICD-10-CM

## 2020-03-05 DIAGNOSIS — M41.9 SCOLIOSIS OF THORACIC SPINE, UNSPECIFIED SCOLIOSIS TYPE: ICD-10-CM

## 2020-03-05 PROCEDURE — 3080F DIAST BP >= 90 MM HG: CPT | Performed by: FAMILY MEDICINE

## 2020-03-05 PROCEDURE — 99244 OFF/OP CNSLTJ NEW/EST MOD 40: CPT | Performed by: INTERNAL MEDICINE

## 2020-03-05 PROCEDURE — 1036F TOBACCO NON-USER: CPT | Performed by: FAMILY MEDICINE

## 2020-03-05 PROCEDURE — 3074F SYST BP LT 130 MM HG: CPT | Performed by: FAMILY MEDICINE

## 2020-03-05 PROCEDURE — 99213 OFFICE O/P EST LOW 20 MIN: CPT | Performed by: FAMILY MEDICINE

## 2020-03-05 RX ORDER — MINERAL OIL/I-PROP MYR/WATER
LOTION (ML) TOPICAL
COMMUNITY
End: 2020-12-16 | Stop reason: ALTCHOICE

## 2020-03-05 NOTE — PROGRESS NOTES
Chief Complaint   Patient presents with   [address]     Patient is being seen to establish care  He was referred by his PCP  He has a hx of abnormal BW with an abnormal TSH and was started on Levothyroxine  Referring Provider  Charley Florentino Md  [address], [place] [address]     History of Present Illness:   Kb Mahan is a 37 y o  male with hypothyroidism seen in consultation at the request of Dr Charley Florentino  His mother and caretakers provide all history  In May 1991, he had an cardiac arrest with anoxic brain injury during a school activity and was comatosed  He has a very supportive family  In the office today, he is attended by his mother and several support staff  The thyroid hormone levels were checked when he was noted by his family to be more fatigued and had unexplained weight loss  There is no known thyroid history in the past, though the were some mild changes in the TSH with normal T4 over the past 2 years  On this check, the TSH was mildly elevated and the free t4 was mildly decreased  His normal weight is 135#  Other symptoms include mlid skin dryness, but there are no changes in his hair, bowel movements, or visible changes in his neck  Additional ROS is limited  Dr Dinah Barrera started Levothyroxine 25mcg po once daily on Feb 28th, 2020  He takes this in the morning, 30mins prior to eating and separate from other medications       Fhx:   thyroid disorder- none; uncle w/ RA    Patient Active Problem List   Diagnosis    Abnormal bone density screening    Anoxic brain damage (HCC)    Allergic rhinitis    Depression    Long Q-T syndrome    Poor weight gain in adult    Oropharyngeal dysphagia    Spastic hemiplegia of left nondominant side as late effect of cerebral infarction ([facility] [address] )    History of sleep disturbance    Aspiration pneumonia (HCC)    Constipated    Physical deconditioning    Muscular rigidity and spasm, progressive    Torticollis    Abnormal weight loss    At risk for aspiration    Frequent UTI    Bilateral impacted cerumen    Hx of recurrent pneumonia    Spasticity    Goals of care, counseling/discussion    H/O impacted cerumen    Cardiomegaly    Contracture of hand    Monoallelic mutation of RYR2 gene    Hyperkalemia    Elevated total protein    Hypothyroid    Electrolyte abnormality    Urinary incontinence    Scoliosis of thoracic spine    Muscle spasm of back      Past Medical History:   Diagnosis Date    Abnormal ECG  and beyond    post cardiac arrest    Allergic rhinitis     Brain anoxia ([address] )     Cardiac arrest [facility])     age 13 s/p long Q-T syndrome    Community acquired pneumonia     last assessed/resolved:  10/9/2014    Depression     GERD (gastroesophageal reflux disease)     Hypothyroid 2020    Long Q-T syndrome     Muscular rigidity and spasm, progressive     Osteopenia     Osteoporosis     Quadriplegia (HCC)     Scoliosis of thoracic spine 2020    Spastic neurogenic bladder     Syncope     beta blocker medication DC because of low blood pressure    Urinary incontinence     Urinary tract infection without hematuria 2019      Past Surgical History:   Procedure Laterality Date    APPENDECTOMY      WISDOM TOOTH EXTRACTION        Family History   Problem Relation Age of Onset    Other Father         mitral valve replaced    Hypertension Father     Diabetes type II Maternal Grandfather     Heart failure Maternal Grandfather         Congestive heart failure -     Diabetes type II Maternal Uncle     Hypotension Mother      Social History     Tobacco Use    Smoking status: Never Smoker    Smokeless tobacco: Never Used   Substance Use Topics    Alcohol use: No     Frequency: Never     Binge frequency: Never     Allergies   Allergen Reactions    Other      drugs that prolong the QT interval  drugs that prolong the QT interval  Seasonal allergies          Current Outpatient Medications:     acetaminophen (TYLENOL) 325 mg tablet, Take 3 tablets (975 mg total) by mouth every 8 (eight) hours as needed for mild pain or headaches, Disp: 120 tablet, Rfl: 3    ascorbic acid (GNP VITAMIN C) 500 MG tablet, Take 500 mg daily at 4 PM, Disp: 30 tablet, Rfl: 5    b complex-vitamin C-folic acid (RENAL) 1 mg, Take 1 mg daily at 4 PM, Disp: 31 each, Rfl: 5    Benzocaine-Docusate Sodium (Enemeez Plus)  MG ENEM, Insert 1 application into the rectum daily as needed (constipation) Use as directed, Disp: 150 mL, Rfl: 4    Camphor-Eucalyptus-Menthol (VICKS VAPORUB) 4 73-1 2-2 6 % OINT, Apply topically as needed (congestion), Disp: 170 g, Rfl: 0    diclofenac sodium (VOLTAREN) 1 %, Apply 2 g topically 4 (four) times a day as needed (muscle tenderness/pain) to thoracic paravertebral musculature, Disp: 100 g, Rfl: 2    Disposable Gloves (VINYL GLOVES MEDIUM) MISC, by Does not apply route 4 (four) times a day Dispense 3 boxes monthly; Diagnosis R32, Disp: 3 each, Rfl: 5    docusate sodium (COLACE) 100 mg capsule, 100 mg 9am and 9pm, Disp: 10 capsule, Rfl: 5    docusate sodium (Enemeez Mini) 283 MG enema, Insert 1 enema into the rectum daily Use daily or as needed, Disp: 30 each, Rfl: 0    dronabinol (MARINOL) 2 5 mg capsule, Take 1 capsule (2 5 mg total) by mouth 3 (three) times a day Place whole capsule in yogurt/pudding , Disp: 90 capsule, Rfl: 0    fluticasone (FLONASE) 50 mcg/act nasal spray, 1 spray into each nostril daily, Disp: 1 Bottle, Rfl: 1    glycerin adult 2 g suppository, Place 1 suppository QOD for 2 weeks, then QOD PRN for constipation, Disp: 50 suppository, Rfl: 6    HEMORRHOIDAL 0 25 % suppository, - UNWRAP AND INSERT 1 SUPPOSITORY PER RECTUM AS NEEDED FOR HEMORRHOID PAIN RELIEF, Disp: 12 suppository, Rfl: 11    ibuprofen (MOTRIN) 200 mg tablet, Take 2 tabs q6h PRN for pain not to exceed 2000mg per day, Disp: 200 tablet, Rfl: 2    Icosapent Ethyl (VASCEPA) 1 g CAPS, Take 1 capsule (1 g total) by mouth daily, Disp: 31 capsule, Rfl: 11    Incontinence Supply Disposable (PREVAIL AIR BRIEFS) MISC, 1 each by Does not apply route every 4 (four) hours Please provide 5 briefs per day, Disp: 150 each, Rfl: 11    Incontinence Supply Disposable (SELECT DISPOSABLE UNDERWEAR SM) MISC, Please provide a 30 day supply 10 per day DX R32 incontinence, Disp: 22 each, Rfl: 6    levothyroxine 25 mcg tablet, Take 1 tablet (25 mcg total) by mouth daily, Disp: 60 tablet, Rfl: 0    lidocaine (LIDODERM) 5 %, Apply 1 patch topically daily as needed (back pain) Remove & Discard patch within 12 hours or as directed by MD, Disp: 30 patch, Rfl: 1    loratadine (CLARITIN) 10 mg tablet, Take 1 tablet (10 mg total) by mouth daily as needed for allergies, Disp: 30 tablet, Rfl: 5    Magnesium Oxide 500 MG TABS, Take 1 tablet (500 mg total) by mouth daily Take 1 tablet daily at 4 PM, Disp: 31 each, Rfl: 5    Melatonin 5 MG TABS, Take 1 tablet (5 mg total) by mouth daily at bedtime, Disp: 31 each, Rfl: 5    Misc   Devices [facility]) MISC, Please provide pt with a new cord for power wheelchair, Disp: 1 each, Rfl: 0    modafinil (PROVIGIL) 100 mg tablet, Take 2 whole tablets in yogurt/pudding PO QD at 9am, Disp: 62 tablet, Rfl: 5    Multiple Vitamins-Minerals (CEROVITE SENIOR) TABS, Take 1 tablet by mouth daily, Disp: 30 tablet, Rfl: 5    Nutritional Supplements (NUTRITIONAL SHAKE) LIQD, Take 1 Can by mouth 2 (two) times a day Please provide Boost 90 minutes before lunch and 90 minutes before dinner, Disp: 60 Bottle, Rfl: 6    omeprazole (PriLOSEC) 20 mg delayed release capsule, Take 20mg at 9am every day, Disp: 30 capsule, Rfl: 5    polyethylene glycol (MIRALAX) 17 g packet, Take Monday, Wednesday, Friday at 0900, Disp: 30 each, Rfl: 5    PREPARATION H 1-0 25-14 4-15 % rectal cream, - APPLY RECTALLY AS NEEDED FOR HEMORRHOID PAIN RELIEF, Disp: 51 g, Rfl: 11    sertraline (ZOLOFT) 100 mg tablet, Take 1 tablet (100 mg total) by mouth daily, Disp: 31 tablet, Rfl: 11    Skin Protectants, Misc  ([place]) Gaye Sit, Apply to both feet & both legs 2 times daily, Disp: , Rfl:     sodium chloride (DEEP SEA NASAL SPRAY) 0 65 % nasal spray, 2 sprays into each nostril as needed for congestion, Disp: 44 mL, Rfl: 11    Sodium Fluoride (PREVIDENT 5000 PLUS DT), Apply orally to teeth once daily while brushing at night time do not rinse mouth after brushing, Disp: , Rfl:     Tea Tree 100 % OIL, Apply 1 g topically daily as needed (rash) Apply topically daily to skin folds, Disp: 60 mL, Rfl: 11    Vitamins A & D (VITAMIN A & D) ointment, - APPLY TO AFFECTED AREA (BUTTOCKS/ANAL) AS NEEDED, Disp: 454 g, Rfl: 11    Wheat Dextrin (BENEFIBER ON THE GO) POWD, MIX 1 PACKET IN LIQUID & DRINK BY MOUTH ONCE EVERY DAY (9PM), Disp: 28 each, Rfl: 10    Zinc Picolinate 25 MG TABS, Take 1 76 tablets (44 mg total) by mouth daily Take 2 22 mg capsules daily every other month, Disp: 60 each, Rfl: 6    carbamide peroxide (DEBROX) 6 5 % otic solution, Administer 5 drops into both ears 2 (two) times a week (Patient not taking: Reported on 3/5/2020), Disp: 15 mL, Rfl: 5    onabotulinumtoxin A (BOTOX) 100 units, inject 500 units into left gastroc soleus and abductor pollicis ankit, Disp: , Rfl:     ranitidine (ZANTAC) 150 mg tablet, ranitidine 150 mg tablet, Disp: , Rfl:     Starch, Thickening, LIQD, Nectar thick liquids up to honey thick if coughing occurs as needed, please use Simply Thick liquid only  Discontinue Thick-It powder  (Patient not taking: Reported on 3/5/2020), Disp: 237 mL, Rfl: 5    wheat dextrin ([facility]), Take 1 packet by mouth daily (Patient not taking: Reported on 3/5/2020), Disp: 30 each, Rfl: 3    zinc oxide (DESITIN) 40 % PSTE, Apply topically, Disp: , Rfl:   Review of Systems   Unable to perform ROS: Other   Limited due to his cgonitive status   See HPI    Physical Exam:  There is no height or weight on file to calculate BMI   /64 (BP Location: Left arm, Patient Position: Sitting, Cuff Size: Standard)   Pulse 62    Wt Readings from Last 3 Encounters:   02/27/20 56 9 kg (125 lb 8 oz)   12/09/19 56 7 kg (125 lb)   10/24/19 60 2 kg (132 lb 12 8 oz)       GEN: NAD  E/n/m nl facies, hearing grossly intact bilat, tongue midline, lips nl  Eyes: no stare or proptosis  Neck: trachea midline, thyroid NT to palpation, nl in size, no nodules or neck masses noted  CV; heart reg rate s1s2 nl, no m/r/g appreciated  Resp: CTAB, good effort  Ab+BS  Neuro: no tremor, 2+ DTRs in BUE  MS: no c/c in digits, seated in wheelchair; hand contractions  Skin: warm and dry  Psych: awake and alert; able to cooperate with exam     DATA:  Labs:   Lab Results   Component Value Date    [id number] 4 090 (H) 02/21/2020    TSH 3 52 03/20/2019       Lab Results   Component Value Date    TSH 3 52 03/20/2019    FREET4 0 70 (L) 02/21/2020         Radiology    Impression:  1  Vitamin D deficiency    2  Weight loss    3  Anoxic brain damage (HCC)    4  Abnormal TSH    5  At risk for impaired digestion           Plan:    Esperanza Saldaña was seen today for establish care  Diagnoses and all orders for this visit:    Vitamin D deficiency  -     Vitamin D 25 hydroxy; Future    Weight loss  -     Ambulatory referral to Endocrinology    Anoxic brain damage [facility])  -     Ambulatory referral to Endocrinology    Abnormal TSH  -     Ambulatory referral to Endocrinology  -     TSH, 3rd generation Lab Collect; Future  -     T4, free Lab Collect; Future  -     Thyroid Antibodies Panel Lab Collect; Future    At risk for impaired digestion  -     Ambulatory referral to Endocrinology      1  Weight loss    - Ambulatory referral to Endocrinology    2  Anoxic brain damage ([facility] [address] )    - Ambulatory referral to Endocrinology    3  Abnormal TSH    - Ambulatory referral to Endocrinology    4  At risk for impaired digestion    - Ambulatory referral to Endocrinology    1   Hypothyroidism: With the lower free T4 and symptoms, I agree with beginning the levothyroxine  Naman's mother is concerned about pituitary function with his brain injury, but this is still most likely primary  I recommend continuing the levothyroxine 25mcg daily and repeating TSH, Free T4 in 5 weeks and checking TPO antibodies to help determine cause, Will also monitor 25 D for bone health,       Discussed with the patient and all questioned fully answered  He will call me if any problems arise          Thelma Cleaning MD

## 2020-03-06 ENCOUNTER — APPOINTMENT (OUTPATIENT)
Dept: RADIOLOGY | Age: 35
End: 2020-03-06
Payer: COMMERCIAL

## 2020-03-06 DIAGNOSIS — R09.89 RESPIRATORY CRACKLES AT LEFT LUNG BASE: ICD-10-CM

## 2020-03-06 PROCEDURE — 71046 X-RAY EXAM CHEST 2 VIEWS: CPT

## 2020-03-07 VITALS
RESPIRATION RATE: 16 BRPM | DIASTOLIC BLOOD PRESSURE: 90 MMHG | HEART RATE: 78 BPM | SYSTOLIC BLOOD PRESSURE: 110 MMHG | TEMPERATURE: 96.8 F

## 2020-03-07 PROBLEM — E87.5 HYPERKALEMIA: Status: RESOLVED | Noted: 2020-02-26 | Resolved: 2020-03-07

## 2020-03-07 PROBLEM — M25.50 POLYARTHRALGIA: Status: ACTIVE | Noted: 2020-03-02

## 2020-03-07 RX ORDER — POLYETHYLENE GLYCOL 3350 17 G/17G
17 POWDER, FOR SOLUTION ORAL DAILY
Qty: 100 EACH | Refills: 1 | Status: SHIPPED | OUTPATIENT
Start: 2020-03-07 | End: 2020-03-13 | Stop reason: SDUPTHER

## 2020-03-07 NOTE — ASSESSMENT & PLAN NOTE
-Discussed with mother and caregiver that using enemas as infrequently as possible would be preferred   -Continue Benefiber QD, glycerin suppositories QOD PRN  -Trial of increased miralax to QD for 1 month  -Continue high fiber diet

## 2020-03-07 NOTE — ASSESSMENT & PLAN NOTE
-Trial of increased voltaren gel to scheduled TID (10am, 4pm, 9 pm) applied to lower back  -Continue tylenol/PO NSAIDs PRN for hip & knee pain  -Referral made to Sports Medicine (Dr Sherry East), as I feel this patient would benefit from both Dr Sam Ayala expertise and bedside manner

## 2020-03-07 NOTE — ASSESSMENT & PLAN NOTE
-Continue My25 diet with supplementation via shakes, but due to high potassium content of [place] Instant Breakfast, trial replacing [place] [employer] Breakfast shake with home-made shake using heavy cream, peanut butter protein powder, fresh fruit with low potassium content  -Patient did not have low prealbumin level on labwork  -Continue daily weights  -Follow up with Nutrition 3/9/20

## 2020-03-07 NOTE — ASSESSMENT & PLAN NOTE
-Endocrinology agrees with current dose of synthroid  -Continue synthroid 25 mcg and repeat TFTs in 5 weeks with TPO ab, vitamin D level

## 2020-03-07 NOTE — ASSESSMENT & PLAN NOTE
-Potassium level at the upper limit of normal  -Encouraged mother and caregiver to adopt dietary changes as discussed via phone 3/2/20 to reduce intake of dietary potassium  -Re-ordered BMP to be done with next lab draw in 5 weeks with TSH, TPO ab, vitamin D

## 2020-03-07 NOTE — PROGRESS NOTES
Assessment/Plan:     Problem List Items Addressed This Visit        Endocrine    Hypothyroid     -Endocrinology agrees with current dose of synthroid  -Continue synthroid 25 mcg and repeat TFTs in 5 weeks with TPO ab, vitamin D level            Nervous and Auditory    Anoxic brain damage (HCC)    Relevant Orders    Ambulatory referral to Sports Medicine       Musculoskeletal and Integument    Scoliosis of thoracic spine    Relevant Medications    diclofenac sodium (VOLTAREN) 1 %    Other Relevant Orders    Ambulatory referral to Sports Medicine       Other    Constipation     -Discussed with mother and caregiver that using enemas as infrequently as possible would be preferred   -Continue Benefiber QD, glycerin suppositories QOD PRN  -Trial of increased miralax to QD for 1 month  -Continue high fiber diet         Relevant Medications    polyethylene glycol (MIRALAX) 17 g packet    Poor weight gain in adult     -Continue My25 diet with supplementation via shakes, but due to high potassium content of [employer] Instant Breakfast, trial replacing [place] [employer] Breakfast shake with home-made shake using heavy cream, peanut butter protein powder, fresh fruit with low potassium content  -Patient did not have low prealbumin level on labwork  -Continue daily weights  -Follow up with Nutrition 3/9/20         Electrolyte abnormality     -Potassium level at the upper limit of normal  -Encouraged mother and caregiver to adopt dietary changes as discussed via phone 3/2/20 to reduce intake of dietary potassium  -Re-ordered BMP to be done with next lab draw in 5 weeks with TSH, TPO ab, vitamin D         Relevant Orders    Basic metabolic panel    Polyarthralgia     -Trial of increased voltaren gel to scheduled TID (10am, 4pm, 9 pm) applied to lower back  -Continue tylenol/PO NSAIDs PRN for hip & knee pain  -Referral made to Sports Medicine (Dr Meme Gaitan), as I feel this patient would benefit from both Dr Johnie Shone expertise and bedside manner         Relevant Orders    Ambulatory referral to Sports Medicine    Respiratory crackles at left lung base - Primary     -Suspect due to atelectasis, but given temp is slightly low today, will obtain CXR  -Continue to use the Breather incentive spirometry  -Will trial percussion vest to help patient better mobilize secretions         Relevant Orders    XR chest pa & lateral            Subjective:      Patient ID: Pako Dawkins is a 37 y o  male  HPI  Patient here to follow up on lab results, medications  Previously discussed lab results over the phone with mother, Vinay Boyd, and nurse, Jennifer Ask over the phone over the past few weeks  -Hypothyroid: Patient found to be hypothyroid and started on synthroid 25 mcg QD; also had consult appointment today with Endocrinology, who noted they are in agreement with current treatment regimen and added TPO ab to help determine etiology of hypothyroidism, monitor vitamin 25 D level for bone health, and repeat TFTs in 5 weeks to titrate synthroid as indicated     -Weight: Continues to fluctuate somewhat but appears stable between 124-125 lbs at this time per review of daily weight record brought in by nursing staff  Currently on My25 diet with close nutrition follow up, next appointment 3/9/20  Taking marinol, no Rx needed     -Constipation: Currently using mini enemas QOD and alternating with glycerin suppository QOD with miralax 3d per week & benefiber QD  Still somewhat having to push hard to pass BMs  Mother amenable to trial of increasing miralax to QD     -Results review: Urine dip completely negative  CBC, prealbumin, lipase, Mag & phos levels WNL  No monoclonal bands on either SPEP & UPEP       -Elevated potassium level: Patient had hyperkalemia 5 6 on CMP 2/21/20 with hypernatremia and hyperchloremia; repeat BMP done showed normalization of sodium and chloride, but potassium remained at upper limits of normal (5 3) with normal renal function  Discussed with mother and nurse via phone, suspected potassium level likely due to daily dietary potassium intake well above recommended amount; see 3/2/20 telephone encounter for further details    -MSK discomfort: Continues with leaning to the side, but discussed MSK etiology as a possibility as noted by Dr Lawrence Lewis at last visit  Knee pain, hip pain, mother would like referral to Ortho/Sports Medicine  Does find the voltaren gel helpful, but Naman doesnt spontaneously report pain, so he isnt getting it unless asked whether he has pain  Mom says she will consider OMT  -Atelectasis: Using the Breather incentive spirometer, but caregivers that do therapy and exercises with him feel he is not taking deep breaths as much as he should  No     -Meds review: Discussed stopping omega-3 supplement as patient does not have hyperTG and is at increased bleeding risk with SSRI and PRN NSAIDs; mother is amenable to stopping omega-3 supplement  The following portions of the patient's history were reviewed and updated as appropriate: allergies, current medications, past family history, past medical history, past social history, past surgical history and problem list     Review of Systems   Constitutional: Positive for fatigue  Negative for appetite change, chills and fever  Respiratory: Negative for chest tightness and shortness of breath  Caregivers & mother deny cough during eating/drinking   Cardiovascular: Negative for chest pain, palpitations and leg swelling  Gastrointestinal: Negative for abdominal pain, diarrhea, nausea and vomiting  Genitourinary: Negative for dysuria  Musculoskeletal: Positive for arthralgias, back pain, gait problem and myalgias  Per mother   Skin: Negative for rash  Neurological: Negative for syncope, weakness and light-headedness  All other systems reviewed and are negative          Objective:    /90   Pulse 78   Temp (!) 96 8 °F (36 °C) Resp 16      Physical Exam   Constitutional: He appears well-developed and well-nourished  HENT:   Head: Normocephalic and atraumatic  Cardiovascular: Normal rate, regular rhythm and intact distal pulses  Pulmonary/Chest: No respiratory distress  He has no wheezes  Fair effort with faint crackles heard at LLL   Abdominal: Soft  Bowel sounds are normal    Musculoskeletal:   Wheelchair-bound  B/L UE/LE contractures, mild tenderness to palpation over thoracic paravertebral musculature   Neurological:   Scantly verbal but nods yes/shakes head no to most questions appropriately   Skin: Skin is warm and dry  Capillary refill takes less than 2 seconds  Psychiatric:   Calm, cooperative   Vitals reviewed

## 2020-03-07 NOTE — ASSESSMENT & PLAN NOTE
-Suspect due to atelectasis, but given temp is slightly low today, will obtain CXR  -Continue to use the Breather incentive spirometry  -Will trial percussion vest to help patient better mobilize secretions

## 2020-03-09 ENCOUNTER — TELEPHONE (OUTPATIENT)
Dept: OTHER | Facility: HOSPITAL | Age: 35
End: 2020-03-09

## 2020-03-09 ENCOUNTER — CLINICAL SUPPORT (OUTPATIENT)
Dept: NUTRITION | Facility: HOSPITAL | Age: 35
End: 2020-03-09
Payer: COMMERCIAL

## 2020-03-09 DIAGNOSIS — R63.4 ABNORMAL WEIGHT LOSS: ICD-10-CM

## 2020-03-09 PROCEDURE — 97803 MED NUTRITION INDIV SUBSEQ: CPT

## 2020-03-09 NOTE — PROGRESS NOTES
Follow-Up Nutrition Assessment Form    Patient Name: Austin Mora    YOB: 1985    Sex: Male      Follow Up Date: 3/9/2020  Start Time: 1:00 Stop Time: 1:45 Total Minutes: 45     Data:  Present at session: Self, Song Gandhi Mother , [place] Direct Support,  Cade Juarez    Parent/Patient Concerns: Mother's concern was that his sodium and potassium levels were elevated  Medical Dx/Reason for Referral:  R13 12, R63 4        Past Medical History:   Diagnosis Date    Abnormal ECG 1998 and beyond    post cardiac arrest    Allergic rhinitis     [facility] anoxia ([facility] [address] )     Cardiac arrest [facility])     age 13 s/p long Q-T syndrome    Community acquired pneumonia     last assessed/resolved:  10/9/2014    Depression     GERD (gastroesophageal reflux disease)     Hypothyroid 2/26/2020    Long Q-T syndrome     Muscular rigidity and spasm, progressive     Osteopenia     Osteoporosis     Quadriplegia ([facility] [address] )     Scoliosis of thoracic spine 2/27/2020    Spastic neurogenic bladder     Syncope     beta blocker medication DC because of low blood pressure    Urinary incontinence     Urinary tract infection without hematuria 4/27/2019    Oropharyngeal dysphagia - Primary 12/5/19 [facility] Visit         Follows with [facility], on mechanical soft diet with nectar thick liquids via simply thick thickener  -Letter of medical necessity written today stating he needs thickening of liquids via simply thick thickener only, as thick-it thickener makes constipation worse per caregivers    Hx Weaned from [employer] Support 2000   Dysphagia July 2018-  IDDSI Level 7-Regular diet  IDDSI Level 0-Can manage all liquid types    03/09/2020 RD Physical Assessment  Severe Protein Calorie Malnutrition in Acute illness related to inadequate energy intake as evidenced by loss of lean body tissue(temples,intereosseous muscles, scapula, shoulders) and subcutaneous fat loss(rib overlay, buccal pads,orbitals)  Treat with PO diet with 3000 kcal daily to inculde nutrition supplements once daily, weekly weights  12/9/19 RD Physical Assessment  Severe Protein Calorie Malnutrition in Acute illness related to inadequate energy intake as evidenced by loss of lean body tissue(temples,intereosseous muscles, scapula, shoulders) and subcutaneous fat loss(rib overlay, buccal pads,orbitals) and 7 5% weight loss in 6 weeks  Treat with PO diet 2200 kcal daily to include nutrition supplements BID, weekly weights       7/8/19 RD Physical Assessment:  Adequate LBM , suboptimal subcutaneous fat mass  Alert   Current Outpatient Medications   Medication Sig Dispense Refill    acetaminophen (TYLENOL) 325 mg tablet Take 3 tablets (975 mg total) by mouth every 8 (eight) hours as needed for mild pain or headaches 120 tablet 3    ascorbic acid (GNP VITAMIN C) 500 MG tablet Take 500 mg daily at 4 PM 30 tablet 5    b complex-vitamin C-folic acid (RENAL) 1 mg Take 1 mg daily at 4 PM 31 each 5    Benzocaine-Docusate Sodium (Enemeez Plus)  MG ENEM Insert 1 application into the rectum daily as needed (constipation) Use as directed 150 mL 4    Camphor-Eucalyptus-Menthol (VICKS VAPORUB) 4 73-1 2-2 6 % OINT Apply topically as needed (congestion) 170 g 0    carbamide peroxide (DEBROX) 6 5 % otic solution Administer 5 drops into both ears 2 (two) times a week (Patient not taking: Reported on 3/5/2020) 15 mL 5    diclofenac sodium (VOLTAREN) 1 % Apply 2 g topically 3 (three) times a day At 10am, 4pm, 9pm to thoracic paravertebral musculature 100 g 2    Disposable Gloves (VINYL GLOVES MEDIUM) MISC by Does not apply route 4 (four) times a day Dispense 3 boxes monthly; Diagnosis R32 3 each 5    docusate sodium (COLACE) 100 mg capsule 100 mg 9am and 9pm 10 capsule 5    docusate sodium (Enemeez Mini) 283 MG enema Insert 1 enema into the rectum daily Use daily or as needed 30 each 0    dronabinol (MARINOL) 2 5 mg capsule Take 1 capsule (2 5 mg total) by mouth 3 (three) times a day Place whole capsule in yogurt/pudding  90 capsule 0    fluticasone (FLONASE) 50 mcg/act nasal spray 1 spray into each nostril daily 1 Bottle 1    glycerin adult 2 g suppository Place 1 suppository QOD for 2 weeks, then QOD PRN for constipation 50 suppository 6    HEMORRHOIDAL 0 25 % suppository - UNWRAP AND INSERT 1 SUPPOSITORY PER RECTUM AS NEEDED FOR HEMORRHOID PAIN RELIEF 12 suppository 11    ibuprofen (MOTRIN) 200 mg tablet Take 2 tabs q6h PRN for pain not to exceed 2000mg per day 200 tablet 2    Icosapent Ethyl (VASCEPA) 1 g CAPS Take 1 capsule (1 g total) by mouth daily 31 capsule 11    Incontinence Supply Disposable (PREVAIL AIR BRIEFS) MISC 1 each by Does not apply route every 4 (four) hours Please provide 5 briefs per day 150 each 11    Incontinence Supply Disposable (SELECT DISPOSABLE UNDERWEAR SM) MISC Please provide a 30 day supply 10 per day DX R32 incontinence 22 each 6    levothyroxine 25 mcg tablet Take 1 tablet (25 mcg total) by mouth daily 60 tablet 0    lidocaine (LIDODERM) 5 % Apply 1 patch topically daily as needed (back pain) Remove & Discard patch within 12 hours or as directed by MD 30 patch 1    loratadine (CLARITIN) 10 mg tablet Take 1 tablet (10 mg total) by mouth daily as needed for allergies 30 tablet 5    Magnesium Oxide 500 MG TABS Take 1 tablet (500 mg total) by mouth daily Take 1 tablet daily at 4 PM 31 each 5    Melatonin 5 MG TABS Take 1 tablet (5 mg total) by mouth daily at bedtime 31 each 5    Misc   Devices [facility]) MISC Please provide pt with a new cord for power wheelchair 1 each 0    modafinil (PROVIGIL) 100 mg tablet Take 2 whole tablets in yogurt/pudding PO QD at 9am 62 tablet 5    Multiple Vitamins-Minerals (CEROVITE SENIOR) TABS Take 1 tablet by mouth daily 30 tablet 5    Nutritional Supplements (NUTRITIONAL SHAKE) LIQD Take 1 Can by mouth 2 (two) times a day Please provide Boost 90 minutes before lunch and 90 minutes before dinner 60 Bottle 6    omeprazole (PriLOSEC) 20 mg delayed release capsule Take 20mg at 9am every day 30 capsule 5    onabotulinumtoxin A (BOTOX) 100 units inject 500 units into left gastroc soleus and abductor pollicis ankit      polyethylene glycol (MIRALAX) 17 g packet Take 17 g by mouth daily 100 each 1    PREPARATION H 1-0 25-14 4-15 % rectal cream - APPLY RECTALLY AS NEEDED FOR HEMORRHOID PAIN RELIEF 51 g 11    ranitidine (ZANTAC) 150 mg tablet ranitidine 150 mg tablet      sertraline (ZOLOFT) 100 mg tablet Take 1 tablet (100 mg total) by mouth daily 31 tablet 11    Skin Protectants, Misc  ([place]) LOTN Apply to both feet & both legs 2 times daily      sodium chloride (DEEP SEA NASAL SPRAY) 0 65 % nasal spray 2 sprays into each nostril as needed for congestion 44 mL 11    Sodium Fluoride (PREVIDENT 5000 PLUS DT) Apply orally to teeth once daily while brushing at night time do not rinse mouth after brushing      Starch, Thickening, LIQD Nectar thick liquids up to honey thick if coughing occurs as needed, please use Simply Thick liquid only  Discontinue Thick-It powder  (Patient not taking: Reported on 3/5/2020) 237 mL 5    Tea Tree 100 % OIL Apply 1 g topically daily as needed (rash) Apply topically daily to skin folds 60 mL 11    Vitamins A & D (VITAMIN A & D) ointment - APPLY TO AFFECTED AREA (BUTTOCKS/ANAL) AS NEEDED 454 g 11    Wheat Dextrin (BENEFIBER ON THE GO) POWD MIX 1 PACKET IN LIQUID & DRINK BY MOUTH ONCE EVERY DAY (9PM) 28 each 10    wheat dextrin (BENEFIBER) Take 1 packet by mouth daily (Patient not taking: Reported on 3/5/2020) 30 each 3    zinc oxide (DESITIN) 40 % PSTE Apply topically      Zinc Picolinate 25 MG TABS Take 1 76 tablets (44 mg total) by mouth daily Take 2 22 mg capsules daily every other month 60 each 6     No current facility-administered medications for this visit           Additional Meds/Supplements: Reviewed med list, levothyroxine added Barriers to Learning: Other: Nonverbal, able to interact   Labs: Reviewed, noted 02/20/20 potassium and sodium elevated, then corrected to normal on 02/28/20   Height: Ht Readings from Last 3 Encounters:   12/09/19 5' 10" (1 778 m)   10/01/19 5' 10" (1 778 m)   09/30/19 5' 10" (1 778 m)      Weight: Wt Readings from Last 10 Encounters:   02/27/20 56 9 kg (125 lb 8 oz)   12/09/19 56 7 kg (125 lb)   10/24/19 60 2 kg (132 lb 12 8 oz)   10/08/19 59 2 kg (130 lb 9 6 oz)   10/01/19 59 9 kg (132 lb)   09/30/19 59 9 kg (132 lb)   09/10/19 59 kg (130 lb)   08/13/19 60 1 kg (132 lb 9 6 oz)   07/15/19 58 5 kg (129 lb)   07/09/19 60 2 kg (132 lb 11 5 oz)     [facility] to get accurate weight tomorrow   Wt  Change Since Last Visit: []Yes     [x]No  Amount: Weight stable past 3 months,   History of weight loss (7 5%) Nov to Dec        Energy Needs: [employer] Equation:    1599 x 1 4-1 3=5689-9712 kcal + 600 kcal    Pain Screen: Are you having pain now? No      Goals Achieved: Maintained weight  Per staff dysphagia is stable  New Goals:   1    5-1 lb weight gain /week until first goal met at 140 lb  2  Naman to consume 3000 kcal daily to include Nutrition Supplement once daily by next encounter  3   Staff to report no dysphagia events by next session with compliance to [facility] Soft foods with nectar thick liquids       Initial PES: Inadequate energy intake related to inability to consume adequate calories as evidenced by BMI, weight loss, malnutrition , and staff report of increasing fatigue and dysphagia         New PES: - No Change         Assessment:  Aparna Urban presents today for follow-up and did not meet his weight goals from last nutrition session 12/19  However, his weight did remain stable at 125 lb  [facility] staff will get an accurate weight tomorrow and contact this RD  Mother Marvin Coello was concerned with 02/20/20 labs of elevated potassium and sodium  02/28/20 revealed these labs were corrected   Discussion focused on allowing no more than one high potassium food per day  Continue monitoring of American Heart Association guidelines of 2300 mg of sodium daily  It was observed Flor Pollack continues to meet the criteria for severe protein calorie malnutrition related to his muscle and fat wasting  It also as observed he is getting individualized adult service care through [facility]  We will also continue the weight goal from last session of achieving of 140 lb  Medical Nutrition Therapy Intervention:  [x]Individualized Meal Plan - 3000 kcal with PO supplement once daily  []Understanding Lab Values   [x]Basic Pathophysiology of Disease - Underweight, SPCM []Food/Medication Interactions   [x]Food Diary - [facility] to use [employer] for House and Consumer intake records []Exercise   []Lifestyle/Behavior Modification Techniques [x]Medication, Mechanism of Action - follow bowel regimen   [x]Label Reading - Servings of fiber 3 gm or more []Self Blood Glucose Monitoring   [x]Weight/BMI Goals - Achieve 135-140 lb [x]Other - Provided AND high calorie and high protein nutrition therapy and high calorie recipes  Recommended American Heart Association of 2300 mg of sodium per day  Recommended 2000 mg daily of potassium  Other Notes:  Flor Pollack is 19 years s/p injury  Age 13 y/o suffered a cardiac arrest at school  Dx Long QT Syndrome   With dx Anoxic Brain Injury,Quadriplegic  Alert ,sitting upright in electric wheelchair he can safely operate on his own           Comprehension: []Excellent  []Very Good  [x]Good  []Fair   []Poor    Receptivity: []Excellent  [x]Very Good  []Good  []Fair   []Poor    Expected Compliance: []Excellent  []Very Good  [x]Good  []Fair   []Poor      Labs:  CMP  Lab Results   Component Value Date     09/06/2017    K 5 3 02/28/2020     02/28/2020    CO2 27 02/28/2020    BUN 16 02/28/2020    CREATININE 0 70 02/28/2020    GLUF 89 02/28/2020    CALCIUM 9 4 02/28/2020    AST 15 02/21/2020    ALT 30 02/21/2020    ALKPHOS 94 02/21/2020    PROT 8 0 09/06/2017    BILITOT 0 5 09/06/2017    EGFR 121 02/28/2020       BMP  Lab Results   Component Value Date    CALCIUM 9 4 02/28/2020     09/06/2017    K 5 3 02/28/2020    CO2 27 02/28/2020     02/28/2020    BUN 16 02/28/2020    CREATININE 0 70 02/28/2020       Lipids  No results found for: CHOL  Lab Results   Component Value Date    HDL 43 02/21/2020    HDL 40 (L) 03/20/2019     Lab Results   Component Value Date    LDLCALC 89 02/21/2020     Lab Results   Component Value Date    TRIG 65 02/21/2020    TRIG 129 03/20/2019     No results found for: CHOLHDL    Hemoglobin A1C  Lab Results   Component Value Date    HGBA1C 5 3 09/05/2014       Fasting Glucose  Lab Results   Component Value Date    GLUF 89 02/28/2020       Insulin     Thyroid  Lab Results   Component Value Date    TSH 3 52 03/20/2019       Hepatic Function Panel  Lab Results   Component Value Date    ALT 30 02/21/2020    AST 15 02/21/2020    ALKPHOS 94 02/21/2020    BILITOT 0 5 09/06/2017       Celiac Disease Antibody Panel  No results found for: ENDOMYSIAL IGA, GLIADIN IGA, GLIADIN IGG, IGA, TISSUE TRANSGLUT AB, TTG IGA   Iron  No results found for: IRON, TIBC, FERRITIN    Vitamins  No results found for: VITAMIN B2   Nicotinamide   Date Value Ref Range Status   07/05/2018 <20 ng/mL Final     Comment:      Nicotinamide is a metabolite of nicotinic acid  Due to the large  variability in the metabolism of nicotinic acid, plasma   concentrations of this metabolite are variable  In one study,   fasting plasma concentrations were reported to be approximately   40 ng/mL  In another study it was reported that the   administration of a single 1000 mg of extended-release tablet of   nicotinic acid resulted in a mean peak nicotinamide concentration  of 400 ng/mL between 5 and 10 hours post dose, decreasing to   about 100 ng/mL by 16 hours post dose        This test was developed and its analytical performance   characteristics have been determined by James Alvarenga  It has not been cleared or approved by the [employer] and Drug Administration  This assay has been validated   pursuant to the CLIA regulations and is used for clinical   purposes  Nicotinic Acid   Date Value Ref Range Status   07/05/2018 <20 ng/mL Final     Comment:      Due to the large variability in the metabolism of nicotinic   acid, the dosing preparation used (immediate-release vs  extended  release), and the mg doses used, the serum concentrations may   range from less than 20 ng/mL to about 30,000 ng/mL  After oral   administration of an immediate-release tablet, peak plasma   concentrations occur in 4 to 5 hours  The plasma half-life of   nicotinic acid is about one hour  In one study, fasting plasma   concentrations were reported to be less than 20 ng/mL  In another  study, it was reported that the administration of a single 1000   mg extended-release tablet resulted in mean nicotinic acid   concentrations of less than 50 ng/mL          No results found for: VITAMINB6  Lab Results   Component Value Date    Junior Barrera 1,072 07/05/2018     No results found for: VITB5  No results found for: I7BXQOEO  No results found for: THYROGLB  No results found for: VITAMIN K   No results found for: 25-HYDROXY VIT D   No components found for: VITAMINE     Follow-up 08/31/2020    [address]  [address] [id number]

## 2020-03-09 NOTE — TELEPHONE ENCOUNTER
Notified mother of results via phone, all questions answered  Mother reports patient still leaning in his wheelchair, suspect MSK etiology as infectious workup has been negative  Has appt with Sports Medicine 3/17/20

## 2020-03-13 ENCOUNTER — TELEPHONE (OUTPATIENT)
Dept: FAMILY MEDICINE CLINIC | Facility: CLINIC | Age: 35
End: 2020-03-13

## 2020-03-13 DIAGNOSIS — K59.00 CONSTIPATION, UNSPECIFIED CONSTIPATION TYPE: ICD-10-CM

## 2020-03-13 DIAGNOSIS — K59.2 CONSTIPATION DUE TO NEUROGENIC BOWEL: Primary | ICD-10-CM

## 2020-03-13 DIAGNOSIS — K59.03 DRUG-INDUCED CONSTIPATION: ICD-10-CM

## 2020-03-13 DIAGNOSIS — K59.00 CONSTIPATION DUE TO NEUROGENIC BOWEL: Primary | ICD-10-CM

## 2020-03-13 DIAGNOSIS — Z91.89: ICD-10-CM

## 2020-03-13 PROBLEM — R09.89 RESPIRATORY CRACKLES AT LEFT LUNG BASE: Status: RESOLVED | Noted: 2020-03-05 | Resolved: 2020-03-13

## 2020-03-13 RX ORDER — MAGNESIUM CARB/ALUMINUM HYDROX 105-160MG
30 TABLET,CHEWABLE ORAL
Qty: 355 ML | Refills: 0 | Status: SHIPPED | OUTPATIENT
Start: 2020-03-13 | End: 2020-04-07 | Stop reason: ALTCHOICE

## 2020-03-13 RX ORDER — BISACODYL 10 MG
SUPPOSITORY, RECTAL RECTAL
Qty: 12 SUPPOSITORY | Refills: 0 | Status: SHIPPED | OUTPATIENT
Start: 2020-03-13

## 2020-03-13 RX ORDER — POLYETHYLENE GLYCOL 3350 17 G/17G
POWDER, FOR SOLUTION ORAL
Qty: 100 EACH | Refills: 1 | Status: SHIPPED | OUTPATIENT
Start: 2020-03-13 | End: 2020-03-13 | Stop reason: SDUPTHER

## 2020-03-13 RX ORDER — POLYETHYLENE GLYCOL 3350 17 G/17G
POWDER, FOR SOLUTION ORAL
Qty: 100 EACH | Refills: 1 | Status: SHIPPED | OUTPATIENT
Start: 2020-03-13 | End: 2020-04-07 | Stop reason: SDUPTHER

## 2020-03-13 NOTE — TELEPHONE ENCOUNTER
Patient's nurse informed me that patient not having regular BMs on new regimen of daily miralax, having dry, hard stools and 2d between BMs  Discussed with nurse via phone, will reduce miralax back to previous regimen 3 days per week M/W/F, continue mini-enemeez QD PRN, add 30 mL mineral oil PO QHS PRN (due to reportedly hard, crumbly stools), and add dulcolax suppository QOD PRN until patient able to get in for an appointment with GI (referral made)  Also ordered percussion vest as discussed at previous visit, requested GI referral and order for vest be faxed to patient's nurse, Grace Menezes, at fax [phone]  Grace Menezes stated she will relay plan of care to Osito, patient's mother       CHANTE Brumfield  PGY-3  [place] [address]

## 2020-03-13 NOTE — PROGRESS NOTES
1  Anoxic brain damage [facility])  Ambulatory referral to Sports Medicine    Ambulatory referral to Orthopedic Surgery   2  Scoliosis of thoracic spine, unspecified scoliosis type  Ambulatory referral to Sports Medicine    Ambulatory referral to Orthopedic Surgery    CANCELED: XR entire spine (scoliosis) 2-3 vw   3  Polyarthralgia  Ambulatory referral to Sports Medicine    Ambulatory referral to Orthopedic Surgery   4  Acute pain of both knees  XR knee 4+ vw right injury    XR knee 4+ vw left injury    Ambulatory referral to Orthopedic Surgery     Orders Placed This Encounter   Procedures    XR knee 4+ vw right injury    XR knee 4+ vw left injury    Ambulatory referral to Orthopedic Surgery        Imaging Studies (I personally reviewed images in PACS and report):  X-ray entire spine scoliosis series 03/17/2020:  S shaped thoracolumbar scoliosis  Femoral acetabular impingement  X-ray right knee 03/17/2020:  No acute osseous abnormality  X-ray left knee 03/17/2020:  No acute osseous abnormality      IMPRESSION:  Anoxic brain injury secondary to long QT syndrome associated cardiac arrest  Chronic lower extremity muscle spasticity  Thoracolumbar scoliosis      Repeat X-ray next visit:   None      Return for follow-up with speciality clinic  Patient Instructions   Explained to mother that I recommend continued daily physical therapy exercises and refresher course for patient's caregivers quarterly with repeat evaluation of patient to ensure proper techniques followed especially since there is a high rate of turnover with caregivers  Explained that overall patient will likely benefit from upcoming Botox injections for spasticity however I recommended further evaluation by specialty clinic such as cerebral palsy specialty clinic at [facility] for evaluation of spascticty as well as scoliosis and for further recommendations to help reduce patient's risk for long-term complications in the future      For mother's questions specifically regarding bone health explained that physical therapy will help with maintaining muscular hypertrophy and also recommended vitamin D testing and supplementation which mother agreed to in states that he has upcoming vitamin D testing from endocrinologist     For patient's left lower extremity brace I recommended against using unless this is required from his other physicians for stability due to fall risk  Explained that for the majority of time patient is wheelchair-bound with knee in flexion and brace is not providing any relief  I will have manager attempt to coordinate appointment with [facility] cerebral palsy Clinic or other specialty clinic for patient to be evaluated  Mother expressed understanding and agreed to plan  CHIEF COMPLAINT:  Bilateral knee pain, back pain, scoliosis    HPI:  Alexa Braden is a 37 y o  male  who presents for       Visit 03/17/2020:  Past medical history significant for anoxic brain injury status post cardiac arrest associated with long QT syndrome at 13years old presents for evaluation of bilateral knee pain, back pain with history of scoliosis  Patient currently seen at [facility] by physician as well as physical therapy team   He has been receiving intermittent Botox injections to his feet  He does have upcoming Botox injections scheduled into his gastroc  He currently lives alone but has 24 hour care  He presents today with his mother as well as 1 of his caregivers  At baseline patient transfers from wheelchair and may stand with he went assistance but cannot walk with a walker  He has spasticity left lower extremity as well as leaning to the right with scoliosis since his anoxic brain injury  Mother tells me that patient understands commands but is limited significantly and verbal communication  On interview patient is limited to grunts as well as head nodding      Mother describes intermittent bilateral lower extremity knee pain exacerbated by sitting for long periods of time in his wheelchair as well as attempting to walk  She denies any specific injury event  Denies any swelling  Mother also inquires about patient scoliosis  No recent x-rays  She is unaware of the degree of curvature  He does lean to his right side at times and grimaces intermittently with pain  Overall mother inquires about strategies to reduce risk of further decline for her child including bone and tendon health  She is reluctant to pursue surgical intervention  Patient also currently wearing left hinged knee brace which prevents hyper extension and inquires about need  Patient not thought to be a fall risk without brace per caregiver and mother  States brace results and discomfort when wearing especially when seated in wheelchair with knee in flexion for which is his predominant position throughout 90+% of the day  Review of Systems   Constitutional: Negative for chills and fever  HENT: Negative for hearing loss and sore throat  Eyes: Negative for visual disturbance  Respiratory: Negative for cough and shortness of breath  Cardiovascular: Negative for chest pain and palpitations  Gastrointestinal: Negative for abdominal pain and nausea  Genitourinary: Negative for difficulty urinating and dysuria  Neurological: Negative for dizziness and numbness  Psychiatric/Behavioral: Negative for suicidal ideas           Following history reviewed and update:    Past Medical History:   Diagnosis Date    Abnormal ECG 1998 and beyond    post cardiac arrest    Allergic rhinitis     Brain anoxia ([facility] [address] )     Cardiac arrest [facility])     age 13 s/p long Q-T syndrome    Community acquired pneumonia     last assessed/resolved:  10/9/2014    Depression     GERD (gastroesophageal reflux disease)     Hypothyroid 2/26/2020    Long Q-T syndrome     Muscular rigidity and spasm, progressive     Osteopenia     Osteoporosis     Quadriplegia ([facility] [address] )     Scoliosis of thoracic spine 2020    Spastic neurogenic bladder     Syncope     beta blocker medication DC because of low blood pressure    Urinary incontinence     Urinary tract infection without hematuria 2019     Past Surgical History:   Procedure Laterality Date    APPENDECTOMY      WISDOM TOOTH EXTRACTION       Social History   Social History     Substance and Sexual Activity   Alcohol Use No    Frequency: Never    Binge frequency: Never     Social History     Substance and Sexual Activity   Drug Use No     Social History     Tobacco Use   Smoking Status Never Smoker   Smokeless Tobacco Never Used     Family History   Problem Relation Age of Onset    Other Father         mitral valve replaced    Hypertension Father     Diabetes type II Maternal Grandfather     Heart failure Maternal Grandfather         Congestive heart failure -     Diabetes type II Maternal Uncle     Hypotension Mother      Allergies   Allergen Reactions    Other      drugs that prolong the QT interval  drugs that prolong the QT interval  Seasonal allergies           Physical Exam  /74 (BP Location: Left arm, Patient Position: Sitting, Cuff Size: Standard)   Pulse 101   Ht 5' 10" (1 778 m)   Wt 56 5 kg (124 lb 8 oz)   BMI 17 86 kg/m²     Constitutional:  see vital signs  Gen: well-developed, normocephalic/atraumatic, well-groomed  Eyes: No inflammation or discharge of conjunctiva or lids; sclera clear   Pharynx: no inflammation, lesion, or mass of lips  Neck: supple, no masses, non-distended  MSK: no inflammation, lesion, mass, or clubbing of nails and digits except for other than mentioned below  SKIN: no visible rashes or skin lesions  Pulmonary/Chest: Effort normal  No respiratory distress     NEURO: cranial nerves grossly intact  PSYCH:  Alert and oriented to person, place, and time; recent and remote memory intact; mood normal, no depression, anxiety, or agitation, judgment and insight good and intact     Ortho Exam  BACK EXAM:  Gait:  Stands with assistance of another person with left leg spastic straightening    BACK TENDERNESS:  Spinous Processes: no  Paraspinal Muscles: no    DERMATOMAL SENSATION:  L1: normal   L2: normal   L3: normal   L4: normal   L5: normal   S1: normal    STRENGTH (bilateral):  Grossly patient's bilateral lower extremity strength intact with left lower extremity spasticity when walking  Patient follows most directions but unable to follow-up directions to engage specific lower extremity muscle compartments      BACK:   SUPINE STRAIGHT LEG: negative  PRONE STRAIGHT LEG:  SLUMP: negative    HIP:  LOG ROLL: negative  SARAH: negative  FADIR: negative       LEFT KNEE:  Erythema: no  Swelling: no  Increased Warmth: no  Tenderness: none  Flexion: intact  Extension:  Hyperextends 10° bilateral  Patellar Displacement:  Patellar Tilt:  Patellar Apprehension: negative  Patellar Grind Zarate's: negative  Lachman's: negative  Drawer: negative  Varus laxity: negative  Valgus laxity: negative  Nicole: negative       RIGHT KNEE:  Erythema: no  Swelling: no  Increased Warmth: no  Tenderness: none  Flexion: intact  Extension:  Hyper extends 10° bilateral  Patellar Displacement:  Patellar Tilt:  Patellar Apprehension: negative  Patellar Grind Zarate's: negative  Lachman's: negative  Drawer: negative  Varus laxity: negative  Valgus laxity: negative  Nicole: negative     Right ankle:  No swelling erythema or increased warmth  No tenderness  Dorsiflexion 0° passive and active  Plantar flexion:  45° passive and active    Left ankle:  No swelling erythema or increased warmth  No tenderness  Dorsiflexion 0° passive and active  Plantar flexion:  45° passive and active    Procedures    Total visit time was 60+  minutes of which more than 50% was face to face counseling and/or coordination of care with patient regarding their treatment plan as outlined in note

## 2020-03-17 ENCOUNTER — APPOINTMENT (OUTPATIENT)
Dept: RADIOLOGY | Facility: OTHER | Age: 35
End: 2020-03-17
Payer: COMMERCIAL

## 2020-03-17 ENCOUNTER — OFFICE VISIT (OUTPATIENT)
Dept: OBGYN CLINIC | Facility: OTHER | Age: 35
End: 2020-03-17
Payer: COMMERCIAL

## 2020-03-17 VITALS
HEIGHT: 70 IN | BODY MASS INDEX: 17.82 KG/M2 | WEIGHT: 124.5 LBS | DIASTOLIC BLOOD PRESSURE: 74 MMHG | HEART RATE: 101 BPM | SYSTOLIC BLOOD PRESSURE: 114 MMHG

## 2020-03-17 DIAGNOSIS — M25.50 POLYARTHRALGIA: ICD-10-CM

## 2020-03-17 DIAGNOSIS — M25.562 ACUTE PAIN OF BOTH KNEES: ICD-10-CM

## 2020-03-17 DIAGNOSIS — M25.561 ACUTE PAIN OF BOTH KNEES: ICD-10-CM

## 2020-03-17 DIAGNOSIS — M41.9 SCOLIOSIS OF THORACIC SPINE, UNSPECIFIED SCOLIOSIS TYPE: ICD-10-CM

## 2020-03-17 DIAGNOSIS — G93.1 ANOXIC BRAIN DAMAGE (HCC): Primary | ICD-10-CM

## 2020-03-17 PROCEDURE — 3074F SYST BP LT 130 MM HG: CPT | Performed by: FAMILY MEDICINE

## 2020-03-17 PROCEDURE — 73564 X-RAY EXAM KNEE 4 OR MORE: CPT

## 2020-03-17 PROCEDURE — 1036F TOBACCO NON-USER: CPT | Performed by: FAMILY MEDICINE

## 2020-03-17 PROCEDURE — 99205 OFFICE O/P NEW HI 60 MIN: CPT | Performed by: FAMILY MEDICINE

## 2020-03-17 PROCEDURE — 72081 X-RAY EXAM ENTIRE SPI 1 VW: CPT

## 2020-03-17 PROCEDURE — 3078F DIAST BP <80 MM HG: CPT | Performed by: FAMILY MEDICINE

## 2020-03-17 RX ORDER — LEVOTHYROXINE SODIUM 0.03 MG/1
TABLET ORAL DAILY
COMMUNITY
End: 2020-08-04

## 2020-03-19 ENCOUNTER — TELEPHONE (OUTPATIENT)
Dept: OBGYN CLINIC | Facility: OTHER | Age: 35
End: 2020-03-19

## 2020-03-19 NOTE — PATIENT INSTRUCTIONS
Explained to mother that I recommend continued daily physical therapy exercises and refresher course for patient's caregivers quarterly with repeat evaluation of patient to ensure proper techniques followed especially since there is a high rate of turnover with caregivers  Explained that overall patient will likely benefit from upcoming Botox injections for spasticity however I recommended further evaluation by specialty clinic such as cerebral palsy specialty clinic at [facility] for evaluation of spascticty as well as scoliosis and for further recommendations to help reduce patient's risk for long-term complications in the future  For mother's questions specifically regarding bone health explained that physical therapy will help with maintaining muscular hypertrophy and also recommended vitamin D testing and supplementation which mother agreed to in states that he has upcoming vitamin D testing from endocrinologist     For patient's left lower extremity brace I recommended against using unless this is required from his other physicians for stability due to fall risk  Explained that for the majority of time patient is wheelchair-bound with knee in flexion and brace is not providing any relief  I will have manager attempt to coordinate appointment with [facility] cerebral palsy Clinic or other specialty clinic for patient to be evaluated  Mother expressed understanding and agreed to plan  Ok to hold hctz   Need to keep BP checks daily  With a goal to keep BP < 130/80  Needs to follow up in 3 weeks with me (30 min)

## 2020-03-19 NOTE — TELEPHONE ENCOUNTER
Please help family to coordinate appointment with Neuroorthopedics at [facility] for anoxic brain injury cardiac arrest at 11yo with spasticity  website link for referral to Dr Sukhdev Naranjo MD, MPH, MSPT   [facility]

## 2020-03-26 ENCOUNTER — TELEMEDICINE (OUTPATIENT)
Dept: GASTROENTEROLOGY | Facility: CLINIC | Age: 35
End: 2020-03-26
Payer: COMMERCIAL

## 2020-03-26 DIAGNOSIS — K59.00 CONSTIPATION DUE TO NEUROGENIC BOWEL: ICD-10-CM

## 2020-03-26 DIAGNOSIS — K59.2 CONSTIPATION DUE TO NEUROGENIC BOWEL: ICD-10-CM

## 2020-03-26 PROCEDURE — 99214 OFFICE O/P EST MOD 30 MIN: CPT | Performed by: INTERNAL MEDICINE

## 2020-03-26 RX ORDER — POLYETHYLENE GLYCOL 3350 17 G/17G
17 POWDER, FOR SOLUTION ORAL DAILY
Qty: 90 EACH | Refills: 3 | Status: SHIPPED | OUTPATIENT
Start: 2020-03-26 | End: 2020-05-04 | Stop reason: SDUPTHER

## 2020-03-26 NOTE — PROGRESS NOTES
Virtual Regular Visit    Problem List Items Addressed This Visit        Digestive    Constipation due to neurogenic bowel               Reason for visit is chronic constipation    Encounter provider Vasmhi Bella DO    Provider located at [address] [address] [phone]      Recent Visits  No visits were found meeting these conditions  Showing recent visits within past 7 days and meeting all other requirements     Today's Visits  Date Type Provider Dept   03/26/20 [address], [address] today's visits and meeting all other requirements     Future Appointments  Date Type Provider Dept   03/26/20 Telemedicine Vamshi Bella DO [facility]   Showing future appointments within next 150 days and meeting all other requirements        After connecting through [employer], the patient and his mother were identified by name and date of birth  Pako Dawkins was informed that this is a telemedicine visit and that the visit is being conducted through [employer] which may not be secure and therefore, might not be HIPAA-compliant  My office door was closed  No one else was in the room  His mother acknowledged consent and understanding of privacy and security of the video platform  The patient has agreed to participate and understands they can discontinue the visit at any time  Subjective  Paok Dawkins is a 37 y o  male  here for follow up  Last seen in March 2019 for multiple GI complaints  The patient is non verbal   His mother is with him and the patient's nurse is available over the phone as well  The pt's mother reports the patient has had longstanding issues with constipation  He was age 13 when he suffered a cardiac arrest thought to be due to long QT syndrome         The mom reports that they try to have a BM every day and if he doesn't he has a set bowel regimen that they are currently using  The pt's RN states that he gets a mini-enema every other day  The pt is currently getting Colace 100 mg p o  B i d  as well as  Miralax one capful every M, W, F, and Benefiber daily  They also are using enemas as needed  He does have labs ordered which they haven't done given the COVID-19 outbreak and not wanting to take him out  His TSH was mildly increased         Past Medical History:   Diagnosis Date    Abnormal ECG 1998 and beyond    post cardiac arrest    Allergic rhinitis     Brain anoxia ([facility] [address] [facility]     Cardiac arrest [facility])     age 13 s/p long Q-T syndrome    Community acquired pneumonia     last assessed/resolved:  10/9/2014    Depression     GERD (gastroesophageal reflux disease)     Hypothyroid 2/26/2020    Long Q-T syndrome     Muscular rigidity and spasm, progressive     Osteopenia     Osteoporosis     Quadriplegia ([facility] [address]     Scoliosis of thoracic spine 2/27/2020    Spastic neurogenic bladder     Syncope     beta blocker medication DC because of low blood pressure    Urinary incontinence     Urinary tract infection without hematuria 4/27/2019       Past Surgical History:   Procedure Laterality Date    APPENDECTOMY  1991    WISDOM TOOTH EXTRACTION         Current Outpatient Medications   Medication Sig Dispense Refill    acetaminophen (TYLENOL) 325 mg tablet Take 3 tablets (975 mg total) by mouth every 8 (eight) hours as needed for mild pain or headaches 120 tablet 3    ascorbic acid (GNP VITAMIN C) 500 MG tablet Take 500 mg daily at 4 PM 30 tablet 5    b complex-vitamin C-folic acid (RENAL) 1 mg Take 1 mg daily at 4 PM 31 each 5    Benzocaine-Docusate Sodium (Enemeez Plus)  MG ENEM Insert 1 application into the rectum daily as needed (constipation) Use as directed 150 mL 4    bisacodyl (DULCOLAX) 10 mg suppository Use 1 supp  per rectum QOD PRN for constipation, max 3d/week 12 suppository 0    Camphor-Eucalyptus-Menthol (VICKS VAPORUB) 4 73-1 2-2 6 % OINT Apply topically as needed (congestion) 170 g 0    carbamide peroxide (DEBROX) 6 5 % otic solution Administer 5 drops into both ears 2 (two) times a week 15 mL 5    diclofenac sodium (VOLTAREN) 1 % Apply 2 g topically 3 (three) times a day At 10am, 4pm, 9pm to thoracic paravertebral musculature 100 g 2    Disposable Gloves (VINYL GLOVES MEDIUM) MISC by Does not apply route 4 (four) times a day Dispense 3 boxes monthly; Diagnosis R32 3 each 5    docusate sodium (COLACE) 100 mg capsule 100 mg 9am and 9pm 10 capsule 5    docusate sodium (Enemeez Mini) 283 MG enema Insert 1 enema into the rectum daily Use daily or as needed 30 each 0    dronabinol (MARINOL) 2 5 mg capsule Take 1 capsule (2 5 mg total) by mouth 3 (three) times a day Place whole capsule in yogurt/pudding   90 capsule 0    fluticasone (FLONASE) 50 mcg/act nasal spray 1 spray into each nostril daily 1 Bottle 1    glycerin adult 2 g suppository Place 1 suppository QOD for 2 weeks, then QOD PRN for constipation 50 suppository 6    HEMORRHOIDAL 0 25 % suppository - UNWRAP AND INSERT 1 SUPPOSITORY PER RECTUM AS NEEDED FOR HEMORRHOID PAIN RELIEF 12 suppository 11    ibuprofen (MOTRIN) 200 mg tablet Take 2 tabs q6h PRN for pain not to exceed 2000mg per day 200 tablet 2    Icosapent Ethyl (VASCEPA) 1 g CAPS Take 1 capsule (1 g total) by mouth daily 31 capsule 11    Incontinence Supply Disposable (PREVAIL AIR BRIEFS) MISC 1 each by Does not apply route every 4 (four) hours Please provide 5 briefs per day 150 each 11    Incontinence Supply Disposable (SELECT DISPOSABLE UNDERWEAR SM) MISC Please provide a 30 day supply 10 per day DX R32 incontinence 22 each 6    levothyroxine (Synthroid) 25 mcg tablet Daily      levothyroxine 25 mcg tablet Take 1 tablet (25 mcg total) by mouth daily 60 tablet 0    lidocaine (LIDODERM) 5 % Apply 1 patch topically daily as needed (back pain) Remove & Discard patch within 12 hours or as directed by MD 30 patch 1    loratadine (CLARITIN) 10 mg tablet Take 1 tablet (10 mg total) by mouth daily as needed for allergies 30 tablet 5    Magnesium Oxide 500 MG TABS Take 1 tablet (500 mg total) by mouth daily Take 1 tablet daily at 4 PM 31 each 5    Melatonin 5 MG TABS Take 1 tablet (5 mg total) by mouth daily at bedtime 31 each 5    mineral oil liquid Take 30 mL by mouth daily at bedtime as needed for constipation (Max 3d/week) Mix with applesauce or in a smoothie as desired 355 mL 0    Misc  Devices [facility]) MISC Please provide pt with a new cord for power wheelchair 1 each 0    modafinil (PROVIGIL) 100 mg tablet Take 2 whole tablets in yogurt/pudding PO QD at 9am 62 tablet 5    Multiple Vitamins-Minerals (CEROVITE SENIOR) TABS Take 1 tablet by mouth daily 30 tablet 5    Nutritional Supplements (NUTRITIONAL SHAKE) LIQD Take 1 Can by mouth 2 (two) times a day Please provide Boost 90 minutes before lunch and 90 minutes before dinner 60 Bottle 6    omeprazole (PriLOSEC) 20 mg delayed release capsule Take 20mg at 9am every day 30 capsule 5    onabotulinumtoxin A (BOTOX) 100 units inject 500 units into left gastroc soleus and abductor pollicis ankit      onabotulinumtoxin A (Botox) 100 units Botox 100 unit injection   bilateral gastrocsoleus muscles      polyethylene glycol (MIRALAX) 17 g packet 17g PO 3d/week on M/W/F 100 each 1    PREPARATION H 1-0 25-14 4-15 % rectal cream - APPLY RECTALLY AS NEEDED FOR HEMORRHOID PAIN RELIEF 51 g 11    ranitidine (ZANTAC) 150 mg tablet ranitidine 150 mg tablet      sertraline (ZOLOFT) 100 mg tablet Take 1 tablet (100 mg total) by mouth daily 31 tablet 11    Skin Protectants, Misc   ([place] Speaker) LOTN Apply to both feet & both legs 2 times daily      sodium chloride (DEEP SEA NASAL SPRAY) 0 65 % nasal spray 2 sprays into each nostril as needed for congestion 44 mL 11    Sodium Fluoride (PREVIDENT 5000 PLUS DT) Apply orally to teeth once daily while brushing at night time do not rinse mouth after brushing      Starch, Thickening, LIQD Nectar thick liquids up to honey thick if coughing occurs as needed, please use Simply Thick liquid only  Discontinue Thick-It powder  237 mL 5    Tea Tree 100 % OIL Apply 1 g topically daily as needed (rash) Apply topically daily to skin folds 60 mL 11    Vitamins A & D (VITAMIN A & D) ointment - APPLY TO AFFECTED AREA (BUTTOCKS/ANAL) AS NEEDED 454 g 11    Wheat Dextrin (BENEFIBER ON THE GO) POWD MIX 1 PACKET IN LIQUID & DRINK BY MOUTH ONCE EVERY DAY (9PM) 28 each 10    wheat dextrin (BENEFIBER) Take 1 packet by mouth daily 30 each 3    zinc oxide (DESITIN) 40 % PSTE Apply topically      Zinc Picolinate 25 MG TABS Take 1 76 tablets (44 mg total) by mouth daily Take 2 22 mg capsules daily every other month 60 each 6     No current facility-administered medications for this visit  Allergies   Allergen Reactions    Other      drugs that prolong the QT interval  drugs that prolong the QT interval  Seasonal allergies        REVIEW OF SYSTEMS:    CONSTITUTIONAL: Denies any fever, chills, rigors, and weight loss  HEENT: No earache or tinnitus  Denies hearing loss or visual disturbances  CARDIOVASCULAR: No chest pain or palpitations  RESPIRATORY: Denies any cough, hemoptysis, shortness of breath or dyspnea on exertion  GASTROINTESTINAL: As noted in the History of Present Illness  GENITOURINARY: No problems with urination  Denies any hematuria or dysuria  NEUROLOGIC: No dizziness or vertigo, denies headaches  MUSCULOSKELETAL: Denies any muscle or joint pain  SKIN: Denies skin rashes or itching  ENDOCRINE: Denies excessive thirst  Denies intolerance to heat or cold  PSYCHOSOCIAL: Denies depression or anxiety  Denies any recent memory loss  PHYSICAL EXAMINATION:    General Appearance: In no acute distress   HEENT:   Normocephalic, atraumatic, anicteric       Neck:  Supple, symmetrical, trachea midline   Lungs:   Equal chest rise and unlabored breathing   Neurological:   Alert and appropriate   Skin:  No jaundice, rashes, or lesions    Psych:  Normal affect and normal insight     Assessment/Plan     1  Constipation due to neurogenic bowel    -asked mom and nurse to DC the fiber  -continue water intake  -change MiraLax to 1 cap full daily starting at half a dose daily for week and then can increase to once daily to affected bowel movement to their liking  -continue Colace 100 mg p o  Liquid b i d   -can continue enemas as needed as well  -agree with checking thyroid studies given his TSH was elevated as hypothyroidism can exacerbate constipation  -he is not on any opioid narcotics  - polyethylene glycol (MIRALAX) 17 g packet; Take 17 g by mouth daily Please start with 1/2 a dose daily for one week and then can increase to one dose daily to affect the bowel movement to your liking  Dispense: 90 each; Refill: 3      I spent 19  minutes with the patient, his mother, and the nurse from his group home during this visit  Offered them a two-month followup video visit which they appreciated

## 2020-03-27 ENCOUNTER — TELEPHONE (OUTPATIENT)
Dept: GASTROENTEROLOGY | Facility: CLINIC | Age: 35
End: 2020-03-27

## 2020-03-27 NOTE — TELEPHONE ENCOUNTER
----- Message from Louis Vu MA sent at 3/27/2020  8:44 AM EDT -----  Thank Boone Vizcaino!!!  ----- Message -----  From: Nico Marte DO  Sent: 3/26/2020   3:29 PM EDT  To: , #    Pt just need a repeat video visit in 2 months with me

## 2020-04-02 DIAGNOSIS — S06.9X0D TRAUMATIC BRAIN INJURY, WITHOUT LOSS OF CONSCIOUSNESS, SUBSEQUENT ENCOUNTER: ICD-10-CM

## 2020-04-03 ENCOUNTER — TELEPHONE (OUTPATIENT)
Dept: FAMILY MEDICINE CLINIC | Facility: CLINIC | Age: 35
End: 2020-04-03

## 2020-04-03 NOTE — TELEPHONE ENCOUNTER
Called [facility] and left all patient information on their scheduling line at [phone]  They are currently scheduling the end of May and will call me back with appointment information  I spoke with patient's mom and advised her I would call her back with appointment information

## 2020-04-07 ENCOUNTER — TELEMEDICINE (OUTPATIENT)
Dept: FAMILY MEDICINE CLINIC | Facility: CLINIC | Age: 35
End: 2020-04-07

## 2020-04-07 DIAGNOSIS — Z91.89: Primary | ICD-10-CM

## 2020-04-07 DIAGNOSIS — E87.8 ELECTROLYTE ABNORMALITY: ICD-10-CM

## 2020-04-07 DIAGNOSIS — K59.2 CONSTIPATION DUE TO NEUROGENIC BOWEL: ICD-10-CM

## 2020-04-07 DIAGNOSIS — K59.00 CONSTIPATION DUE TO NEUROGENIC BOWEL: ICD-10-CM

## 2020-04-07 PROCEDURE — 99442 PR PHYS/QHP TELEPHONE EVALUATION 11-20 MIN: CPT | Performed by: FAMILY MEDICINE

## 2020-04-07 RX ORDER — DRONABINOL 2.5 MG/1
CAPSULE ORAL
Qty: 90 CAPSULE | Refills: 0 | Status: SHIPPED | OUTPATIENT
Start: 2020-04-07 | End: 2020-05-06

## 2020-04-10 ENCOUNTER — TELEPHONE (OUTPATIENT)
Dept: FAMILY MEDICINE CLINIC | Facility: CLINIC | Age: 35
End: 2020-04-10

## 2020-04-23 PROBLEM — G82.50 SPASTIC QUADRIPARESIS (HCC): Status: ACTIVE | Noted: 2020-04-23

## 2020-04-27 ENCOUNTER — TELEPHONE (OUTPATIENT)
Dept: FAMILY MEDICINE CLINIC | Facility: CLINIC | Age: 35
End: 2020-04-27

## 2020-04-30 ENCOUNTER — TELEPHONE (OUTPATIENT)
Dept: GASTROENTEROLOGY | Facility: CLINIC | Age: 35
End: 2020-04-30

## 2020-04-30 DIAGNOSIS — K64.9 HEMORRHOIDS, UNSPECIFIED HEMORRHOID TYPE: ICD-10-CM

## 2020-05-04 ENCOUNTER — TELEPHONE (OUTPATIENT)
Dept: FAMILY MEDICINE CLINIC | Facility: CLINIC | Age: 35
End: 2020-05-04

## 2020-05-04 DIAGNOSIS — K59.2 CONSTIPATION DUE TO NEUROGENIC BOWEL: ICD-10-CM

## 2020-05-04 DIAGNOSIS — K59.00 CONSTIPATION DUE TO NEUROGENIC BOWEL: ICD-10-CM

## 2020-05-04 RX ORDER — POLYETHYLENE GLYCOL 3350 17 G/17G
17 POWDER, FOR SOLUTION ORAL 2 TIMES DAILY
Qty: 180 EACH | Refills: 3 | Status: SHIPPED | OUTPATIENT
Start: 2020-05-04 | End: 2020-09-09 | Stop reason: SDUPTHER

## 2020-05-05 RX ORDER — HYDROCORTISONE ACETATE 25 MG/1
25 SUPPOSITORY RECTAL
Qty: 7 SUPPOSITORY | Refills: 1 | Status: SHIPPED | OUTPATIENT
Start: 2020-05-05 | End: 2021-06-17 | Stop reason: SDUPTHER

## 2020-05-05 RX ORDER — POLYETHYLENE GLYCOL 3350 17 G/17G
17 POWDER, FOR SOLUTION ORAL 2 TIMES DAILY
Qty: 180 EACH | Refills: 3 | Status: SHIPPED | OUTPATIENT
Start: 2020-05-05 | End: 2021-10-14 | Stop reason: SDUPTHER

## 2020-05-06 DIAGNOSIS — S06.9X0D TRAUMATIC BRAIN INJURY, WITHOUT LOSS OF CONSCIOUSNESS, SUBSEQUENT ENCOUNTER: ICD-10-CM

## 2020-05-06 RX ORDER — DRONABINOL 2.5 MG/1
CAPSULE ORAL
Qty: 90 CAPSULE | Refills: 0 | Status: SHIPPED | OUTPATIENT
Start: 2020-05-06 | End: 2020-06-04 | Stop reason: SDUPTHER

## 2020-05-15 NOTE — TELEPHONE ENCOUNTER
Naman's mom has not heard anything from [address] [place] [address] and wants to know if you have any information for her  Please call Marilyn Valderrama [phone]    Thank you

## 2020-05-19 NOTE — TELEPHONE ENCOUNTER
Called [facility] [place] to follow up on patients appointment  Their providers were not scheduling appointments due to Juliano  They will resume seeing patients after 6/1/20  Called patient's mom to make her aware and gave her [facility]'s phone# [phone]  She will contact them directly to schedule appointment

## 2020-06-03 ENCOUNTER — TELEPHONE (OUTPATIENT)
Dept: FAMILY MEDICINE CLINIC | Facility: CLINIC | Age: 35
End: 2020-06-03

## 2020-06-03 DIAGNOSIS — S06.9X0D TRAUMATIC BRAIN INJURY, WITHOUT LOSS OF CONSCIOUSNESS, SUBSEQUENT ENCOUNTER: ICD-10-CM

## 2020-06-04 DIAGNOSIS — S06.9X0D TRAUMATIC BRAIN INJURY, WITHOUT LOSS OF CONSCIOUSNESS, SUBSEQUENT ENCOUNTER: ICD-10-CM

## 2020-06-04 RX ORDER — DRONABINOL 2.5 MG/1
CAPSULE ORAL
Qty: 90 CAPSULE | Refills: 0 | Status: SHIPPED | OUTPATIENT
Start: 2020-06-04 | End: 2020-07-08

## 2020-06-09 RX ORDER — DRONABINOL 2.5 MG/1
CAPSULE ORAL
Qty: 90 CAPSULE | Refills: 0 | OUTPATIENT
Start: 2020-06-09

## 2020-06-09 NOTE — TELEPHONE ENCOUNTER
Pharmacy requesting marinol refill for patient, which he takes for TBI (anoxic brain injury) with spasticity  Reviewed PDMP, last refill 2/27/20  Patient will need contract signed at next visit, but is adherent to all visits and no red flags on PDMP  I feel he is appropriate for refill so I have pended the order  Thank you!

## 2020-06-30 ENCOUNTER — TELEPHONE (OUTPATIENT)
Dept: FAMILY MEDICINE CLINIC | Facility: CLINIC | Age: 35
End: 2020-06-30

## 2020-06-30 NOTE — TELEPHONE ENCOUNTER
Zaheer Jimenez called stating pt was supposed to get a keystone referral from PCP  cricket give much more info     Call back number   [phone]

## 2020-07-06 ENCOUNTER — TELEPHONE (OUTPATIENT)
Dept: OTHER | Facility: OTHER | Age: 35
End: 2020-07-06

## 2020-07-06 NOTE — TELEPHONE ENCOUNTER
Paged via TC:"Healthcall / Patient Jacobo Marianna 1985 / [place] ([facility]) [phone] /  family practice patient / She is calling because he had possible exposure to shingles  "

## 2020-07-07 ENCOUNTER — TELEPHONE (OUTPATIENT)
Dept: FAMILY MEDICINE CLINIC | Facility: CLINIC | Age: 35
End: 2020-07-07

## 2020-07-07 NOTE — TELEPHONE ENCOUNTER
Patient previously followed by Dr Gaxiola Members  Checked CDC site for shingle exposure  Documentation displayed as long as there was no exposure to open blisters, patient's risks are minimal  Did speak with caregiver, patient did not have skin to skin exposure and did have varicella vaccine as child  Did tell her to watch patient and if any symptoms of fever, rash develop, she should call back

## 2020-07-07 NOTE — TELEPHONE ENCOUNTER
Caregiver for this patient callingMary and stated, " We are wondering what the staff needs to watch out for as for as signs of Shingles for this patient and if this patient should get the Shingles vaccine at this time    Call back # is [phone]"

## 2020-07-07 NOTE — TELEPHONE ENCOUNTER
----- Message from Gevena Schirmer, MD sent at 7/6/2020 10:21 PM EDT -----  Hello,    Please schedule this patient for an apt with a provider on Tuesday, July 07/07 for possible exposure to shingles!     Thank you,    Alisson Lucero

## 2020-07-08 DIAGNOSIS — S06.9X0D TRAUMATIC BRAIN INJURY, WITHOUT LOSS OF CONSCIOUSNESS, SUBSEQUENT ENCOUNTER: ICD-10-CM

## 2020-07-08 RX ORDER — DRONABINOL 2.5 MG/1
CAPSULE ORAL
Qty: 90 CAPSULE | Refills: 0 | Status: SHIPPED | OUTPATIENT
Start: 2020-07-08 | End: 2020-08-05

## 2020-07-15 ENCOUNTER — TELEPHONE (OUTPATIENT)
Dept: FAMILY MEDICINE CLINIC | Facility: CLINIC | Age: 35
End: 2020-07-15

## 2020-07-15 NOTE — TELEPHONE ENCOUNTER
Mother calling, requesting a call back from nurse to see if there was anything in the system going back as far as 10 years that we can shed light on UTI's  She can only see as far as 2018  Looking for anything in office notes pertaining to UTI  I tried going through notes and shared as much as possible and gave dates for all labs with mother going back as far as 2014

## 2020-07-15 NOTE — TELEPHONE ENCOUNTER
We would have to obtain his paperchart as current records in electronic records only go back to 2016

## 2020-07-21 NOTE — TELEPHONE ENCOUNTER
Patients chart came in but apparently he has 2 charts we need chart 2of 2  Will contact [facility] once again    Chart arrival 07/27

## 2020-07-22 NOTE — TELEPHONE ENCOUNTER
Called mother, left message advising records reviewed back to 2014 and no record of any urinary symptoms other than incontinence

## 2020-07-22 NOTE — TELEPHONE ENCOUNTER
Chart 2 of 2 came in today from [facility]  Records are showing last visit in chart as 10/12/2012  Are we able to pull up anything in [employer]  We started Epic around 2011 or 2012

## 2020-07-27 ENCOUNTER — OFFICE VISIT (OUTPATIENT)
Dept: GASTROENTEROLOGY | Facility: CLINIC | Age: 35
End: 2020-07-27
Payer: COMMERCIAL

## 2020-07-27 VITALS
BODY MASS INDEX: 17.75 KG/M2 | HEART RATE: 81 BPM | DIASTOLIC BLOOD PRESSURE: 84 MMHG | SYSTOLIC BLOOD PRESSURE: 107 MMHG | WEIGHT: 124 LBS | HEIGHT: 70 IN | TEMPERATURE: 96.9 F

## 2020-07-27 DIAGNOSIS — K59.2 CONSTIPATION DUE TO NEUROGENIC BOWEL: ICD-10-CM

## 2020-07-27 DIAGNOSIS — R62.7 POOR WEIGHT GAIN IN ADULT: Primary | ICD-10-CM

## 2020-07-27 DIAGNOSIS — I69.354 SPASTIC HEMIPLEGIA OF LEFT NONDOMINANT SIDE AS LATE EFFECT OF CEREBRAL INFARCTION (HCC): ICD-10-CM

## 2020-07-27 DIAGNOSIS — E03.9 HYPOTHYROIDISM, UNSPECIFIED TYPE: ICD-10-CM

## 2020-07-27 DIAGNOSIS — R13.12 OROPHARYNGEAL DYSPHAGIA: ICD-10-CM

## 2020-07-27 DIAGNOSIS — K59.00 CONSTIPATION DUE TO NEUROGENIC BOWEL: ICD-10-CM

## 2020-07-27 DIAGNOSIS — E63.9 NUTRITIONAL DEFICIENCY: ICD-10-CM

## 2020-07-27 PROCEDURE — 3008F BODY MASS INDEX DOCD: CPT | Performed by: PHYSICIAN ASSISTANT

## 2020-07-27 PROCEDURE — 3074F SYST BP LT 130 MM HG: CPT | Performed by: PHYSICIAN ASSISTANT

## 2020-07-27 PROCEDURE — 3079F DIAST BP 80-89 MM HG: CPT | Performed by: PHYSICIAN ASSISTANT

## 2020-07-27 PROCEDURE — 99214 OFFICE O/P EST MOD 30 MIN: CPT | Performed by: PHYSICIAN ASSISTANT

## 2020-07-27 PROCEDURE — 1036F TOBACCO NON-USER: CPT | Performed by: PHYSICIAN ASSISTANT

## 2020-07-27 RX ORDER — CHOLECALCIFEROL (VITAMIN D3) 125 MCG
CAPSULE ORAL
COMMUNITY
End: 2020-11-24 | Stop reason: SDUPTHER

## 2020-07-27 NOTE — PROGRESS NOTES
[facility] - Outpatient Follow-up Note  Chyna Hagen 37 y o  male MRN: [id number]  Encounter: [id number]          ASSESSMENT AND PLAN:      1  Poor weight gain in adult  His caretakers are concerned that he has only gained 4-5 lbs over the past 9 months despite being on high calorie nutrient rich diet recommended by nutrition  His BMI is still below normal at 17 7  I recommend checking his celiac antibodies  We will obtain records from his previous gastroenterologist to see what celiac testing was already done  I explained we would prefer to avoid EGD if possible given need for anesthesia  In the meantime, he should follow-up with the nutritionist to make changes to his current diet  - Celiac Disease Antibody Profile; Future  - Vitamin B12; Future    2  Hypothyroidism, unspecified type  He is due for repeat thyroid blood work  Continue levothyroxine as prescribed  - Celiac Disease Antibody Profile; Future    3  Nutritional deficiency  His mother is concerned about B12 deficiency, so I ordered this blood test      - Vitamin B12; Future    4  Spastic hemiplegia of left nondominant side as late effect of cerebral infarction [facility])  Follow-up with PCP  5  Oropharyngeal dysphagia  Continue mechanical soft diet and nectar thick liquids as prescribed by Speech Pathology  6  Constipation due to neurogenic bowel  Well controlled  Continue current bowel regimen  Follow-up with Dr Dimas Poster in 2 months  ______________________________________________________________________    SUBJECTIVE:  76-year-old male with anoxic brain injury, spastic hemiplegia, hypothyroidism, oropharyngeal dysphagia, and chronic constipation presenting for office follow-up  He is non-verbal so his mother and nurse are present to provide history  He is having regular, bristol type 3-4 stools now on his bowel regimen   He takes MiraLAX twice daily, stool softener twice daily, bisacodyl suppositories every other day PRN, enema as needed, and glycerin suppository every other day PRN  Their main concern is Naman's inability to gain weight  He began seeing a nutritionist back in the Fall and has been on 3000 calorie diet  He is eating all of his meals  He eats modified diet - mechanical soft and nectar thick liquids  Despite this, he has only gained 4-5 pounds in the past 9 months or so  He should weight 130-135 lbs, ideally 140 lbs  He has no nausea or vomiting  His mother is concerned about protein synthesis related to his neurologic issues  They state he was tested for celiac disease in the past - we do not have these records  REVIEW OF SYSTEMS IS OTHERWISE NEGATIVE        Historical Information   Past Medical History:   Diagnosis Date    Abnormal ECG 1998 and beyond    post cardiac arrest    Allergic rhinitis     Brain anoxia ([facility] )     Cardiac arrest [facility])     age 13 s/p long Q-T syndrome    Community acquired pneumonia     last assessed/resolved:  10/9/2014    Depression     GERD (gastroesophageal reflux disease)     Hypothyroid 2/26/2020    Long Q-T syndrome     Muscular rigidity and spasm, progressive     Osteopenia     Osteoporosis     Quadriplegia ([facility] )     Scoliosis of thoracic spine 2/27/2020    Spastic neurogenic bladder     Syncope     beta blocker medication DC because of low blood pressure    Urinary incontinence     Urinary tract infection without hematuria 4/27/2019     Past Surgical History:   Procedure Laterality Date    APPENDECTOMY  1991    WISDOM TOOTH EXTRACTION       Social History   Social History     Substance and Sexual Activity   Alcohol Use No    Frequency: Never    Binge frequency: Never     Social History     Substance and Sexual Activity   Drug Use No     Social History     Tobacco Use   Smoking Status Never Smoker   Smokeless Tobacco Never Used     Family History   Problem Relation Age of Onset    Other Father         mitral valve replaced    Hypertension Father  Diabetes type II Maternal Grandfather     Heart failure Maternal Grandfather         Congestive heart failure -     Diabetes type II Maternal Uncle     Hypotension Mother        Meds/Allergies       Current Outpatient Medications:     acetaminophen (TYLENOL) 325 mg tablet    ascorbic acid (GNP VITAMIN C) 500 MG tablet    b complex-vitamin C-folic acid (RENAL) 1 mg    Benzocaine-Docusate Sodium (Enemeez Plus)  MG ENEM    bisacodyl (DULCOLAX) 10 mg suppository    Camphor-Eucalyptus-Menthol (VICKS VAPORUB) 4 73-1 2-2 6 % OINT    carbamide peroxide (DEBROX) 6 5 % otic solution    diclofenac sodium (VOLTAREN) 1 %    Disposable Gloves (VINYL GLOVES MEDIUM) MISC    docusate sodium (COLACE) 100 mg capsule    docusate sodium (Enemeez Mini) 283 MG enema    dronabinol (MARINOL) 2 5 mg capsule    fluticasone (FLONASE) 50 mcg/act nasal spray    glycerin adult 2 g suppository    HEMORRHOIDAL 0 25 % suppository    ibuprofen (MOTRIN) 200 mg tablet    Incontinence Supply Disposable (PREVAIL AIR BRIEFS) MISC    Incontinence Supply Disposable (SELECT DISPOSABLE UNDERWEAR SM) MISC    levothyroxine (Synthroid) 25 mcg tablet    levothyroxine 25 mcg tablet    lidocaine (LIDODERM) 5 %    loratadine (CLARITIN) 10 mg tablet    Magnesium Oxide 500 MG TABS    Melatonin 5 MG TABS    Misc  Devices (WHEELCHAIR) MISC    modafinil (PROVIGIL) 100 mg tablet    Multiple Vitamins-Minerals (CEROVITE SENIOR) TABS    Nutritional Supplements (NUTRITIONAL SHAKE) LIQD    omeprazole (PriLOSEC) 20 mg delayed release capsule    onabotulinumtoxin A (BOTOX) 100 units    onabotulinumtoxin A (Botox) 100 units    polyethylene glycol (MIRALAX) 17 g packet    polyethylene glycol (MIRALAX) 17 g packet    PREPARATION H 1-0 25-14 4-15 % rectal cream    sertraline (ZOLOFT) 100 mg tablet    Skin Protectants, Misc   (HYDROCERIN) LOTN    sodium chloride (DEEP SEA NASAL SPRAY) 0 65 % nasal spray    Sodium Fluoride (PREVIDENT 5000 PLUS DT)    Starch, Thickening, LIQD    Tea Tree 100 % OIL    Vitamins A & D (VITAMIN A & D) ointment    zinc oxide (DESITIN) 40 % PSTE    Zinc Picolinate 25 MG TABS    hydrocortisone (ANUSOL-HC) 25 mg suppository    Icosapent Ethyl (VASCEPA) 1 g CAPS    Melatonin 5 MG TABS    ranitidine (ZANTAC) 150 mg tablet    Wheat Dextrin (BENEFIBER ON THE GO) POWD    wheat dextrin (BENEFIBER)    Allergies   Allergen Reactions    Other      drugs that prolong the QT interval  drugs that prolong the QT interval  Seasonal allergies            Objective     Blood pressure 107/84, pulse 81, temperature (!) 96 9 °F (36 1 °C), temperature source Tympanic, height 5' 10" (1 778 m), weight 56 2 kg (124 lb)  Body mass index is 17 79 kg/m²  PHYSICAL EXAM:      General Appearance:   Alert, non-verbal male, in wheelchair, cooperative, no distress   HEENT:   Normocephalic, atraumatic, anicteric      Neck:  Supple, symmetrical, trachea midline   Lungs:   Clear to auscultation bilaterally    Heart[de-identified]   Regular rate and rhythm   Abdomen:   Soft, non-tender, non-distended; normal bowel sounds    Genitalia:   Deferred    Rectal:   Deferred    Extremities:  No cyanosis, clubbing or edema  Contractures      Pulses:  2+ and symmetric    Skin:  No jaundice, rashes, or lesions    Lymph nodes:  No palpable cervical lymphadenopathy        Lab Results:   No visits with results within 1 Day(s) from this visit     Latest known visit with results is:   Appointment on 02/28/2020   Component Date Value    A/G Ratio 02/28/2020 1 21     Albumin Electrophoresis 02/28/2020 54 7     Albumin CONC 02/28/2020 4 81     Alpha 1 02/28/2020 2 9     ALPHA 1 CONC 02/28/2020 0 26     Alpha 2 02/28/2020 6 5*    ALPHA 2 CONC 02/28/2020 0 57     Beta-1 02/28/2020 5 4     BETA 1 CONC 02/28/2020 0 48     Beta-2 02/28/2020 6 7     BETA 2 CONC 02/28/2020 0 59*    Gamma Globulin 02/28/2020 23 8*    GAMMA CONC 02/28/2020 2 09*    Total Protein 02/28/2020 8 8*    SPEP Interpretation 02/28/2020 No monoclonal bands noted  Reviewed by: Romina Farris MD **Electronic Signature**     Sodium 02/28/2020 136     Potassium 02/28/2020 5 3     Chloride 02/28/2020 105     CO2 02/28/2020 27     ANION GAP 02/28/2020 4     BUN 02/28/2020 16     Creatinine 02/28/2020 0 70     Glucose, Fasting 02/28/2020 89     Calcium 02/28/2020 9 4     eGFR 02/28/2020 121     Magnesium 02/28/2020 2 2     Phosphorus 02/28/2020 3 8          Radiology Results:   No results found

## 2020-07-27 NOTE — LETTER
July 27, 2020     Patricia Bernabe, [address] [facility] [address] [place] [address]    Patient: Marcia Ba   YOB: 1985   Date of Visit: 7/27/2020       Dear Dr Valeriano Grimaldor:    Thank you for referring Niles Part to me for evaluation  Below are my notes for this consultation  If you have questions, please do not hesitate to call me  I look forward to following your patient along with you  Sincerely,        Annalise Dick PA-C        CC: No Recipients  Annalise Dick PA-C  7/27/2020 12:15 PM  Sign at close encounter  [facility] - Outpatient Follow-up Note  Monica Hagen 37 y o  male MRN: [id number]  Encounter: [id number]          ASSESSMENT AND PLAN:      1  Poor weight gain in adult  His caretakers are concerned that he has only gained 4-5 lbs over the past 9 months despite being on high calorie nutrient rich diet recommended by nutrition  His BMI is still below normal at 17 7  I recommend checking his celiac antibodies  We will obtain records from his previous gastroenterologist to see what celiac testing was already done  I explained we would prefer to avoid EGD if possible given need for anesthesia  In the meantime, he should follow-up with the nutritionist to make changes to his current diet  - Celiac Disease Antibody Profile; Future  - Vitamin B12; Future    2  Hypothyroidism, unspecified type  He is due for repeat thyroid blood work  Continue levothyroxine as prescribed  - Celiac Disease Antibody Profile; Future    3  Nutritional deficiency  His mother is concerned about B12 deficiency, so I ordered this blood test      - Vitamin B12; Future    4  Spastic hemiplegia of left nondominant side as late effect of cerebral infarction [facility])  Follow-up with PCP  5  Oropharyngeal dysphagia  Continue mechanical soft diet and nectar thick liquids as prescribed by Speech Pathology  6  Constipation due to neurogenic bowel  Well controlled   Continue current bowel regimen  Follow-up with Dr Jay Jay Hernandez in 2 months  ______________________________________________________________________    SUBJECTIVE:  18-year-old male with anoxic brain injury, spastic hemiplegia, hypothyroidism, oropharyngeal dysphagia, and chronic constipation presenting for office follow-up  He is non-verbal so his mother and nurse are present to provide history  He is having regular, bristol type 3-4 stools now on his bowel regimen  He takes MiraLAX twice daily, stool softener twice daily, bisacodyl suppositories every other day PRN, enema as needed, and glycerin suppository every other day PRN  Their main concern is Naman's inability to gain weight  He began seeing a nutritionist back in the Fall and has been on 3000 calorie diet  He is eating all of his meals  He eats modified diet - mechanical soft and nectar thick liquids  Despite this, he has only gained 4-5 pounds in the past 9 months or so  He should weight 130-135 lbs, ideally 140 lbs  He has no nausea or vomiting  His mother is concerned about protein synthesis related to his neurologic issues  They state he was tested for celiac disease in the past - we do not have these records  REVIEW OF SYSTEMS IS OTHERWISE NEGATIVE        Historical Information   Past Medical History:   Diagnosis Date    Abnormal ECG 1998 and beyond    post cardiac arrest    Allergic rhinitis     Brain an[facility] ([facility] [address] )     Cardiac arrest [facility])     age 13 s/p long Q-T syndrome    Community acquired pneumonia     last assessed/resolved:  10/9/2014    Depression     GERD (gastroesophageal reflux disease)     Hypothyroid 2/26/2020    Long Q-T syndrome     Muscular rigidity and spasm, progressive     Osteopenia     Osteoporosis     Quadriplegia ([facility] )     Scoliosis of thoracic spine 2/27/2020    Spastic neurogenic bladder     Syncope     beta blocker medication DC because of low blood pressure    Urinary incontinence     Urinary tract infection without hematuria 2019     Past Surgical History:   Procedure Laterality Date    APPENDECTOMY      WISDOM TOOTH EXTRACTION       Social History   Social History     Substance and Sexual Activity   Alcohol Use No    Frequency: Never    Binge frequency: Never     Social History     Substance and Sexual Activity   Drug Use No     Social History     Tobacco Use   Smoking Status Never Smoker   Smokeless Tobacco Never Used     Family History   Problem Relation Age of Onset    Other Father         mitral valve replaced    Hypertension Father     Diabetes type II Maternal Grandfather     Heart failure Maternal Grandfather         Congestive heart failure -     Diabetes type II Maternal Uncle     Hypotension Mother        Meds/Allergies       Current Outpatient Medications:     acetaminophen (TYLENOL) 325 mg tablet    ascorbic acid (GNP VITAMIN C) 500 MG tablet    b complex-vitamin C-folic acid (RENAL) 1 mg    Benzocaine-Docusate Sodium (Enemeez Plus)  MG ENEM    bisacodyl (DULCOLAX) 10 mg suppository    Camphor-Eucalyptus-Menthol (VICKS VAPORUB) 4 73-1 2-2 6 % OINT    carbamide peroxide (DEBROX) 6 5 % otic solution    diclofenac sodium (VOLTAREN) 1 %    Disposable Gloves (VINYL GLOVES MEDIUM) MISC    docusate sodium (COLACE) 100 mg capsule    docusate sodium (Enemeez Mini) 283 MG enema    dronabinol (MARINOL) 2 5 mg capsule    fluticasone (FLONASE) 50 mcg/act nasal spray    glycerin adult 2 g suppository    HEMORRHOIDAL 0 25 % suppository    ibuprofen (MOTRIN) 200 mg tablet    Incontinence Supply Disposable (PREVAIL AIR BRIEFS) MISC    Incontinence Supply Disposable (SELECT DISPOSABLE UNDERWEAR SM) MISC    levothyroxine (Synthroid) 25 mcg tablet    levothyroxine 25 mcg tablet    lidocaine (LIDODERM) 5 %    loratadine (CLARITIN) 10 mg tablet    Magnesium Oxide 500 MG TABS    Melatonin 5 MG TABS    Misc   Devices [facility]) MISC    modafinil (PROVIGIL) 100 mg tablet   Multiple Vitamins-Minerals (CEROVITE SENIOR) TABS    Nutritional Supplements (NUTRITIONAL SHAKE) LIQD    omeprazole (PriLOSEC) 20 mg delayed release capsule    onabotulinumtoxin A (BOTOX) 100 units    onabotulinumtoxin A (Botox) 100 units    polyethylene glycol (MIRALAX) 17 g packet    polyethylene glycol (MIRALAX) 17 g packet    PREPARATION H 1-0 25-14 4-15 % rectal cream    sertraline (ZOLOFT) 100 mg tablet    Skin Protectants, Misc  (HYDROCERIN) LOTN    sodium chloride (DEEP SEA NASAL SPRAY) 0 65 % nasal spray    Sodium Fluoride (PREVIDENT 5000 PLUS DT)    Starch, Thickening, LIQD    Tea Tree 100 % OIL    Vitamins A & D (VITAMIN A & D) ointment    zinc oxide (DESITIN) 40 % PSTE    Zinc Picolinate 25 MG TABS    hydrocortisone (ANUSOL-HC) 25 mg suppository    Icosapent Ethyl (VASCEPA) 1 g CAPS    Melatonin 5 MG TABS    ranitidine (ZANTAC) 150 mg tablet    Wheat Dextrin (BENEFIBER ON THE GO) POWD    wheat dextrin (BENEFIBER)    Allergies   Allergen Reactions    Other      drugs that prolong the QT interval  drugs that prolong the QT interval  Seasonal allergies            Objective     Blood pressure 107/84, pulse 81, temperature (!) 96 9 °F (36 1 °C), temperature source Tympanic, height 5' 10" (1 778 m), weight 56 2 kg (124 lb)  Body mass index is 17 79 kg/m²  PHYSICAL EXAM:      General Appearance:   Alert, non-verbal male, in wheelchair, cooperative, no distress   HEENT:   Normocephalic, atraumatic, anicteric      Neck:  Supple, symmetrical, trachea midline   Lungs:   Clear to auscultation bilaterally    Heart[de-identified]   Regular rate and rhythm   Abdomen:   Soft, non-tender, non-distended; normal bowel sounds    Genitalia:   Deferred    Rectal:   Deferred    Extremities:  No cyanosis, clubbing or edema   Contractures      Pulses:  2+ and symmetric    Skin:  No jaundice, rashes, or lesions    Lymph nodes:  No palpable cervical lymphadenopathy        Lab Results:   No visits with results within 1 Day(s) from this visit  Latest known visit with results is:   Appointment on 02/28/2020   Component Date Value    A/G Ratio 02/28/2020 1 21     Albumin Electrophoresis 02/28/2020 54 7     Albumin CONC 02/28/2020 4 81     Alpha 1 02/28/2020 2 9     ALPHA 1 CONC 02/28/2020 0 26     Alpha 2 02/28/2020 6 5*    ALPHA 2 CONC 02/28/2020 0 57     Beta-1 02/28/2020 5 4     BETA 1 CONC 02/28/2020 0 48     Beta-2 02/28/2020 6 7     BETA 2 CONC 02/28/2020 0 59*    Gamma Globulin 02/28/2020 23 8*    GAMMA CONC 02/28/2020 2 09*    Total Protein 02/28/2020 8 8*    SPEP Interpretation 02/28/2020 No monoclonal bands noted  Reviewed by: Matt Edmonds MD **Electronic Signature**     Sodium 02/28/2020 136     Potassium 02/28/2020 5 3     Chloride 02/28/2020 105     CO2 02/28/2020 27     ANION GAP 02/28/2020 4     BUN 02/28/2020 16     Creatinine 02/28/2020 0 70     Glucose, Fasting 02/28/2020 89     Calcium 02/28/2020 9 4     eGFR 02/28/2020 121     Magnesium 02/28/2020 2 2     Phosphorus 02/28/2020 3 8          Radiology Results:   No results found

## 2020-07-30 ENCOUNTER — APPOINTMENT (OUTPATIENT)
Dept: LAB | Facility: HOSPITAL | Age: 35
End: 2020-07-30
Payer: COMMERCIAL

## 2020-07-30 DIAGNOSIS — R79.89 ABNORMAL TSH: ICD-10-CM

## 2020-07-30 DIAGNOSIS — E87.8 ELECTROLYTE ABNORMALITY: ICD-10-CM

## 2020-07-30 DIAGNOSIS — R62.7 POOR WEIGHT GAIN IN ADULT: ICD-10-CM

## 2020-07-30 DIAGNOSIS — E03.9 HYPOTHYROIDISM, UNSPECIFIED TYPE: ICD-10-CM

## 2020-07-30 DIAGNOSIS — E63.9 NUTRITIONAL DEFICIENCY: ICD-10-CM

## 2020-07-30 DIAGNOSIS — E55.9 VITAMIN D DEFICIENCY: ICD-10-CM

## 2020-07-30 LAB
25(OH)D3 SERPL-MCNC: 49.3 NG/ML (ref 30–100)
ANION GAP SERPL CALCULATED.3IONS-SCNC: 4 MMOL/L (ref 4–13)
BUN SERPL-MCNC: 16 MG/DL (ref 5–25)
CALCIUM SERPL-MCNC: 9.2 MG/DL (ref 8.3–10.1)
CHLORIDE SERPL-SCNC: 104 MMOL/L (ref 100–108)
CO2 SERPL-SCNC: 32 MMOL/L (ref 21–32)
CREAT SERPL-MCNC: 0.66 MG/DL (ref 0.6–1.3)
GFR SERPL CREATININE-BSD FRML MDRD: 124 ML/MIN/1.73SQ M
GLUCOSE SERPL-MCNC: 80 MG/DL (ref 65–140)
POTASSIUM SERPL-SCNC: 4.4 MMOL/L (ref 3.5–5.3)
SODIUM SERPL-SCNC: 140 MMOL/L (ref 136–145)
T4 FREE SERPL-MCNC: 0.91 NG/DL (ref 0.76–1.46)
TSH SERPL DL<=0.05 MIU/L-ACNC: 4.53 UIU/ML (ref 0.36–3.74)
VIT B12 SERPL-MCNC: 1022 PG/ML (ref 100–900)

## 2020-07-30 PROCEDURE — 80048 BASIC METABOLIC PNL TOTAL CA: CPT

## 2020-07-30 PROCEDURE — 82306 VITAMIN D 25 HYDROXY: CPT

## 2020-07-30 PROCEDURE — 84439 ASSAY OF FREE THYROXINE: CPT

## 2020-07-30 PROCEDURE — 82607 VITAMIN B-12: CPT

## 2020-07-30 PROCEDURE — 36415 COLL VENOUS BLD VENIPUNCTURE: CPT

## 2020-07-30 PROCEDURE — 84443 ASSAY THYROID STIM HORMONE: CPT

## 2020-07-30 PROCEDURE — 82784 ASSAY IGA/IGD/IGG/IGM EACH: CPT

## 2020-07-30 PROCEDURE — 83516 IMMUNOASSAY NONANTIBODY: CPT

## 2020-07-30 PROCEDURE — 86800 THYROGLOBULIN ANTIBODY: CPT

## 2020-07-30 PROCEDURE — 86376 MICROSOMAL ANTIBODY EACH: CPT

## 2020-07-30 PROCEDURE — 86255 FLUORESCENT ANTIBODY SCREEN: CPT

## 2020-07-31 LAB
THYROGLOB AB SERPL-ACNC: 1585.9 IU/ML (ref 0–0.9)
THYROPEROXIDASE AB SERPL-ACNC: >600 IU/ML (ref 0–34)

## 2020-08-01 LAB
ENDOMYSIUM IGA SER QL: NEGATIVE
GLIADIN PEPTIDE IGA SER-ACNC: 41 UNITS (ref 0–19)
GLIADIN PEPTIDE IGG SER-ACNC: 35 UNITS (ref 0–19)
IGA SERPL-MCNC: 558 MG/DL (ref 90–386)
TTG IGA SER-ACNC: 5 U/ML (ref 0–3)
TTG IGG SER-ACNC: 18 U/ML (ref 0–5)

## 2020-08-04 ENCOUNTER — TELEPHONE (OUTPATIENT)
Dept: FAMILY MEDICINE CLINIC | Facility: CLINIC | Age: 35
End: 2020-08-04

## 2020-08-04 DIAGNOSIS — E03.9 HYPOTHYROIDISM, UNSPECIFIED TYPE: Primary | ICD-10-CM

## 2020-08-04 DIAGNOSIS — S06.9X0D TRAUMATIC BRAIN INJURY, WITHOUT LOSS OF CONSCIOUSNESS, SUBSEQUENT ENCOUNTER: ICD-10-CM

## 2020-08-04 RX ORDER — LEVOTHYROXINE SODIUM 0.05 MG/1
50 TABLET ORAL DAILY
Qty: 90 TABLET | Refills: 1 | Status: SHIPPED | OUTPATIENT
Start: 2020-08-04 | End: 2021-07-06 | Stop reason: DRUGHIGH

## 2020-08-04 NOTE — TELEPHONE ENCOUNTER
TELEPHONE NOTE    Spoke with mother, Tony Berkowitz, regarding most recent lab results, including the elevated TSH despite synthroid  Sent message to Endocrinology, who is in agreement with increasing synthroid to 50 mcg QD; Rx sent to pharmacy  Repeat TSH ordered to be collected after 10/5/20  Also sent message to GI regarding celiac panel results, Tony Berkowitz stated they have an upcoming appointment 8/11/20  All questions answered  CHANTE Andrews

## 2020-08-05 DIAGNOSIS — S06.9X0D TRAUMATIC BRAIN INJURY, WITHOUT LOSS OF CONSCIOUSNESS, SUBSEQUENT ENCOUNTER: ICD-10-CM

## 2020-08-05 RX ORDER — MODAFINIL 100 MG/1
TABLET ORAL
Qty: 62 TABLET | Refills: 0 | Status: SHIPPED | OUTPATIENT
Start: 2020-08-05 | End: 2020-09-03

## 2020-08-05 RX ORDER — DRONABINOL 2.5 MG/1
CAPSULE ORAL
Qty: 90 CAPSULE | Refills: 0 | Status: SHIPPED | OUTPATIENT
Start: 2020-08-05 | End: 2020-09-09

## 2020-08-11 ENCOUNTER — TRANSCRIBE ORDERS (OUTPATIENT)
Dept: GASTROENTEROLOGY | Facility: CLINIC | Age: 35
End: 2020-08-11

## 2020-08-11 ENCOUNTER — TELEMEDICINE (OUTPATIENT)
Dept: GASTROENTEROLOGY | Facility: CLINIC | Age: 35
End: 2020-08-11
Payer: COMMERCIAL

## 2020-08-11 VITALS — BODY MASS INDEX: 18.04 KG/M2 | WEIGHT: 126 LBS | HEIGHT: 70 IN

## 2020-08-11 DIAGNOSIS — K59.2 CONSTIPATION DUE TO NEUROGENIC BOWEL: Primary | ICD-10-CM

## 2020-08-11 DIAGNOSIS — K59.00 CONSTIPATION DUE TO NEUROGENIC BOWEL: Primary | ICD-10-CM

## 2020-08-11 DIAGNOSIS — R13.12 OROPHARYNGEAL DYSPHAGIA: ICD-10-CM

## 2020-08-11 PROCEDURE — 3079F DIAST BP 80-89 MM HG: CPT | Performed by: INTERNAL MEDICINE

## 2020-08-11 PROCEDURE — 3074F SYST BP LT 130 MM HG: CPT | Performed by: INTERNAL MEDICINE

## 2020-08-11 PROCEDURE — 3008F BODY MASS INDEX DOCD: CPT | Performed by: FAMILY MEDICINE

## 2020-08-11 PROCEDURE — 3008F BODY MASS INDEX DOCD: CPT | Performed by: INTERNAL MEDICINE

## 2020-08-11 PROCEDURE — 1036F TOBACCO NON-USER: CPT | Performed by: INTERNAL MEDICINE

## 2020-08-11 PROCEDURE — 99214 OFFICE O/P EST MOD 30 MIN: CPT | Performed by: INTERNAL MEDICINE

## 2020-08-11 NOTE — H&P (VIEW-ONLY)
Virtual Regular Visit      Assessment/Plan:  38-year-old male here for follow-up  Problem List Items Addressed This Visit        Digestive    Oropharyngeal dysphagia    Constipation due to neurogenic bowel - Primary        Of I spoke to the patient's mom and nurse  Given elevated tTG and weight loss we will proceed with upper endoscopy with small-bowel biopsies to assess for celiac disease  His constipation is well controlled on the current regimen  Follow-up in a few months time  Reason for visit is   Chief Complaint   Patient presents with    Virtual Regular Visit     TEST RESULTS    Virtual Regular Visit        Encounter provider Brandon Monteiro DO    Provider located at [address] [place] [address] [address]  [address] [place] [address]  [phone]      Recent Visits  Date Type Provider Dept   08/04/20 Telephone Aidan Kearns MD [address] recent visits within past 7 days and meeting all other requirements     Today's Visits  Date Type Provider Dept   08/11/20 [address] [facility],[address], [place]   Showing today's visits and meeting all other requirements     Future Appointments  No visits were found meeting these conditions  Showing future appointments within next 150 days and meeting all other requirements        The patient was identified by name and date of birth  Migel Dhaliwal was informed that this is a telemedicine visit and that the visit is being conducted through [employer]  No one else was in the room  He acknowledged consent and understanding of privacy and security of the video platform  The patient has agreed to participate and understands they can discontinue the visit at any time  Patient is aware this is a billable service  Subjective  Migel Dhaliwal is a 37 y o  male here for follow up    Mom says Stoney Holley was tested for celiac disease in the past       Constipation regimen is improved      Past Medical History:   Diagnosis Date    Abnormal ECG 1998 and beyond    post cardiac arrest    Allergic rhinitis     [facility] anoxia ([facility] )     Cardiac arrest [facility])     age 13 s/p long Q-T syndrome    Community acquired pneumonia     last assessed/resolved:  10/9/2014    Depression     GERD (gastroesophageal reflux disease)     Hypothyroid 2/26/2020    Long Q-T syndrome     Muscular rigidity and spasm, progressive     Osteopenia     Osteoporosis     Quadriplegia ([facility] )     Scoliosis of thoracic spine 2/27/2020    Spastic neurogenic bladder     Syncope     beta blocker medication DC because of low blood pressure    Urinary incontinence     Urinary tract infection without hematuria 4/27/2019       Past Surgical History:   Procedure Laterality Date    APPENDECTOMY  1991    WISDOM TOOTH EXTRACTION         Current Outpatient Medications   Medication Sig Dispense Refill    acetaminophen (TYLENOL) 325 mg tablet Take 3 tablets (975 mg total) by mouth every 8 (eight) hours as needed for mild pain or headaches 120 tablet 3    ascorbic acid (GNP VITAMIN C) 500 MG tablet Take 500 mg daily at 4 PM 30 tablet 5    b complex-vitamin C-folic acid (RENAL) 1 mg Take 1 mg daily at 4 PM 31 each 5    Benzocaine-Docusate Sodium (Enemeez Plus)  MG ENEM Insert 1 application into the rectum daily as needed (constipation) Use as directed 150 mL 4    bisacodyl (DULCOLAX) 10 mg suppository Use 1 supp  per rectum QOD PRN for constipation, max 3d/week 12 suppository 0    Camphor-Eucalyptus-Menthol (VICKS VAPORUB) 4 73-1 2-2 6 % OINT Apply topically as needed (congestion) 170 g 0    carbamide peroxide (DEBROX) 6 5 % otic solution Administer 5 drops into both ears 2 (two) times a week 15 mL 5    diclofenac sodium (VOLTAREN) 1 % Apply 2 g topically 3 (three) times a day At 10am, 4pm, 9pm to thoracic paravertebral musculature 100 g 2    Disposable Gloves (VINYL GLOVES MEDIUM) MISC by Does not apply route 4 (four) times a day Dispense 3 boxes monthly; Diagnosis R32 3 each 5    docusate sodium (COLACE) 100 mg capsule 100 mg 9am and 9pm 10 capsule 5    docusate sodium (Enemeez Mini) 283 MG enema Insert 1 enema into the rectum daily Use daily or as needed 30 each 0    dronabinol (MARINOL) 2 5 mg capsule TAKE ONE CAPSULE -PLACE WHOLE IN YOGURT/PUDDING BY MOUTH 3 TIMES A DAY (9AM-4PM-9PM) *CALL PHARMACY 5 DAYS IN ADVANCE FOR REFILL* 90 capsule 0    fluticasone (FLONASE) 50 mcg/act nasal spray 1 spray into each nostril daily 1 Bottle 1    glycerin adult 2 g suppository Place 1 suppository QOD for 2 weeks, then QOD PRN for constipation 50 suppository 6    HEMORRHOIDAL 0 25 % suppository - UNWRAP AND INSERT 1 SUPPOSITORY PER RECTUM AS NEEDED FOR HEMORRHOID PAIN RELIEF 12 suppository 11    ibuprofen (MOTRIN) 200 mg tablet Take 2 tabs q6h PRN for pain not to exceed 2000mg per day 200 tablet 2    Incontinence Supply Disposable (SELECT DISPOSABLE UNDERWEAR SM) MISC Please provide a 30 day supply 10 per day DX R32 incontinence 22 each 6    levothyroxine 50 mcg tablet Take 1 tablet (50 mcg total) by mouth daily 90 tablet 1    loratadine (CLARITIN) 10 mg tablet Take 1 tablet (10 mg total) by mouth daily as needed for allergies 30 tablet 5    Magnesium Oxide 500 MG TABS Take 1 tablet (500 mg total) by mouth daily Take 1 tablet daily at 4 PM 31 each 5    Melatonin 5 MG TABS Take 1 tablet (5 mg total) by mouth daily at bedtime 31 each 5    Melatonin 5 MG TABS melatonin 5 mg tablet      [facility]   Devices [facility]) MISC Please provide pt with a new cord for power wheelchair 1 each 0    modafinil (PROVIGIL) 100 mg tablet TAKE TWO TABLETS -PLACE WHOLE IN YOGURT/PUDDING BY MOUTH EVERY DAY (9AM) 62 tablet 0    Multiple Vitamins-Minerals (CEROVITE SENIOR) TABS Take 1 tablet by mouth daily 30 tablet 5    Nutritional Supplements (NUTRITIONAL SHAKE) LIQD Take 1 Can by mouth 2 (two) times a day Please provide Boost 90 minutes before lunch and 90 minutes before dinner 60 Bottle 6    omeprazole (PriLOSEC) 20 mg delayed release capsule Take 20mg at 9am every day 30 capsule 5    onabotulinumtoxin A (BOTOX) 100 units inject 500 units into left gastroc soleus and abductor pollicis ankit      onabotulinumtoxin A (Botox) 100 units Botox 100 unit injection   bilateral gastrocsoleus muscles      polyethylene glycol (MIRALAX) 17 g packet Take 17 g by mouth 2 (two) times a day 180 each 3    PREPARATION H 1-0 25-14 4-15 % rectal cream - APPLY RECTALLY AS NEEDED FOR HEMORRHOID PAIN RELIEF 51 g 11    ranitidine (ZANTAC) 150 mg tablet ranitidine 150 mg tablet      sertraline (ZOLOFT) 100 mg tablet Take 1 tablet (100 mg total) by mouth daily 31 tablet 11    Skin Protectants, Misc  (Mai Matias) LOTN Apply to both feet & both legs 2 times daily      sodium chloride (DEEP SEA NASAL SPRAY) 0 65 % nasal spray 2 sprays into each nostril as needed for congestion 44 mL 11    Sodium Fluoride (PREVIDENT 5000 PLUS DT) Apply orally to teeth once daily while brushing at night time do not rinse mouth after brushing      Starch, Thickening, LIQD Nectar thick liquids up to honey thick if coughing occurs as needed, please use Simply Thick liquid only  Discontinue Thick-It powder   237 mL 5    Tea Tree 100 % OIL Apply 1 g topically daily as needed (rash) Apply topically daily to skin folds 60 mL 11    Vitamins A & D (VITAMIN A & D) ointment - APPLY TO AFFECTED AREA (BUTTOCKS/ANAL) AS NEEDED 454 g 11    zinc oxide (DESITIN) 40 % PSTE Apply topically      Zinc Picolinate 25 MG TABS Take 1 76 tablets (44 mg total) by mouth daily Take 2 22 mg capsules daily every other month 60 each 6    hydrocortisone (ANUSOL-HC) 25 mg suppository Insert 1 suppository (25 mg total) into the rectum daily at bedtime for 7 days 7 suppository 1    Icosapent Ethyl (VASCEPA) 1 g CAPS Take 1 capsule (1 g total) by mouth daily (Patient not taking: Reported on 7/27/2020) 31 capsule 11    Incontinence Supply Disposable (PREVAIL AIR BRIEFS) MISC 1 each by Does not apply route every 4 (four) hours Please provide 5 briefs per day 150 each 11    lidocaine (LIDODERM) 5 % Apply 1 patch topically daily as needed (back pain) Remove & Discard patch within 12 hours or as directed by MD 30 patch 1    polyethylene glycol (MIRALAX) 17 g packet Take 17 g by mouth 2 (two) times a day 180 each 3    Wheat Dextrin (BENEFIBER ON THE GO) POWD MIX 1 PACKET IN LIQUID & DRINK BY MOUTH ONCE EVERY DAY (9PM) (Patient not taking: Reported on 7/27/2020) 28 each 10    wheat dextrin (BENEFIBER) Take 1 packet by mouth daily (Patient not taking: Reported on 7/27/2020) 30 each 3     No current facility-administered medications for this visit  Allergies   Allergen Reactions    Other      drugs that prolong the QT interval  drugs that prolong the QT interval  Seasonal allergies          Video Exam    Vitals:    08/11/20 1056   Weight: 57 2 kg (126 lb)   Height: 5' 10" (1 778 m)           I spent 11 minutes directly with the patient during this visit      VIRTUAL VISIT [facility] acknowledges that he has consented to an online visit or consultation  He understands that the online visit is based solely on information provided by him, and that, in the absence of a face-to-face physical evaluation by the physician, the diagnosis he receives is both limited and provisional in terms of accuracy and completeness  This is not intended to replace a full medical face-to-face evaluation by the physician  Tony You understands and accepts these terms

## 2020-08-11 NOTE — PROGRESS NOTES
Virtual Regular Visit      Assessment/Plan:  38-year-old male here for follow-up  Problem List Items Addressed This Visit        Digestive    Oropharyngeal dysphagia    Constipation due to neurogenic bowel - Primary        Of I spoke to the patient's mom and nurse  Given elevated tTG and weight loss we will proceed with upper endoscopy with small-bowel biopsies to assess for celiac disease  His constipation is well controlled on the current regimen  Follow-up in a few months time  Reason for visit is   Chief Complaint   Patient presents with    Virtual Regular Visit     TEST RESULTS    Virtual Regular Visit        Encounter provider Ashley Garcia DO    Provider located at [address] [place] [address] [address]  [address] [place]  [phone]      Recent Visits  Date Type Provider Dept   08/04/20 Telephone Akosua Kumar MD [address] recent visits within past 7 days and meeting all other requirements     Today's Visits  Date Type Provider Dept   08[facility]11/20 [address] [facility], [facility]   Showing today's visits and meeting all other requirements     Future Appointments  No visits were found meeting these conditions  Showing future appointments within next 150 days and meeting all other requirements        The patient was identified by name and date of birth  Tony You was informed that this is a telemedicine visit and that the visit is being conducted through [employer]  No one else was in the room  He acknowledged consent and understanding of privacy and security of the video platform  The patient has agreed to participate and understands they can discontinue the visit at any time  Patient is aware this is a billable service  Subjective  Tony You is a 37 y o  male here for follow up    Mom says Alfredo Melchor was tested for celiac disease in the past       Constipation regimen is improved      Past Medical History:   Diagnosis Date    Abnormal ECG 1998 and beyond    post cardiac arrest    Allergic rhinitis     [facility] anoxia ([facility] [address] )     Cardiac arrest [facility])     age 13 s/p long Q-T syndrome    Community acquired pneumonia     last assessed/resolved:  10/9/2014    Depression     GERD (gastroesophageal reflux disease)     Hypothyroid 2/26/2020    Long Q-T syndrome     Muscular rigidity and spasm, progressive     Osteopenia     Osteoporosis     Quadriplegia ([facility] [address] )     Scoliosis of thoracic spine 2/27/2020    Spastic neurogenic bladder     Syncope     beta blocker medication DC because of low blood pressure    Urinary incontinence     Urinary tract infection without hematuria 4/27/2019       Past Surgical History:   Procedure Laterality Date    APPENDECTOMY  1991    WISDOM TOOTH EXTRACTION         Current Outpatient Medications   Medication Sig Dispense Refill    acetaminophen (TYLENOL) 325 mg tablet Take 3 tablets (975 mg total) by mouth every 8 (eight) hours as needed for mild pain or headaches 120 tablet 3    ascorbic acid (GNP VITAMIN C) 500 MG tablet Take 500 mg daily at 4 PM 30 tablet 5    b complex-vitamin C-folic acid (RENAL) 1 mg Take 1 mg daily at 4 PM 31 each 5    Benzocaine-Docusate Sodium (Enemeez Plus)  MG ENEM Insert 1 application into the rectum daily as needed (constipation) Use as directed 150 mL 4    bisacodyl (DULCOLAX) 10 mg suppository Use 1 supp  per rectum QOD PRN for constipation, max 3d/week 12 suppository 0    Camphor-Eucalyptus-Menthol (VICKS VAPORUB) 4 73-1 2-2 6 % OINT Apply topically as needed (congestion) 170 g 0    carbamide peroxide (DEBROX) 6 5 % otic solution Administer 5 drops into both ears 2 (two) times a week 15 mL 5    diclofenac sodium (VOLTAREN) 1 % Apply 2 g topically 3 (three) times a day At 10am, 4pm, 9pm to thoracic paravertebral musculature 100 g 2    Disposable Gloves (VINYL GLOVES MEDIUM) MISC by Does not apply route 4 (four) times a day Dispense 3 boxes monthly; Diagnosis R32 3 each 5    docusate sodium (COLACE) 100 mg capsule 100 mg 9am and 9pm 10 capsule 5    docusate sodium (Enemeez Mini) 283 MG enema Insert 1 enema into the rectum daily Use daily or as needed 30 each 0    dronabinol (MARINOL) 2 5 mg capsule TAKE ONE CAPSULE -PLACE WHOLE IN YOGURT/PUDDING BY MOUTH 3 TIMES A DAY (9AM-4PM-9PM) *CALL PHARMACY 5 DAYS IN ADVANCE FOR REFILL* 90 capsule 0    fluticasone (FLONASE) 50 mcg/act nasal spray 1 spray into each nostril daily 1 Bottle 1    glycerin adult 2 g suppository Place 1 suppository QOD for 2 weeks, then QOD PRN for constipation 50 suppository 6    HEMORRHOIDAL 0 25 % suppository - UNWRAP AND INSERT 1 SUPPOSITORY PER RECTUM AS NEEDED FOR HEMORRHOID PAIN RELIEF 12 suppository 11    ibuprofen (MOTRIN) 200 mg tablet Take 2 tabs q6h PRN for pain not to exceed 2000mg per day 200 tablet 2    Incontinence Supply Disposable (SELECT DISPOSABLE UNDERWEAR SM) MISC Please provide a 30 day supply 10 per day DX R32 incontinence 22 each 6    levothyroxine 50 mcg tablet Take 1 tablet (50 mcg total) by mouth daily 90 tablet 1    loratadine (CLARITIN) 10 mg tablet Take 1 tablet (10 mg total) by mouth daily as needed for allergies 30 tablet 5    Magnesium Oxide 500 MG TABS Take 1 tablet (500 mg total) by mouth daily Take 1 tablet daily at 4 PM 31 each 5    Melatonin 5 MG TABS Take 1 tablet (5 mg total) by mouth daily at bedtime 31 each 5    Melatonin 5 MG TABS melatonin 5 mg tablet      [facility]   Devices [facility]) MISC Please provide pt with a new cord for power wheelchair 1 each 0    modafinil (PROVIGIL) 100 mg tablet TAKE TWO TABLETS -PLACE WHOLE IN YOGURT/PUDDING BY MOUTH EVERY DAY (9AM) 62 tablet 0    Multiple Vitamins-Minerals (CEROVITE SENIOR) TABS Take 1 tablet by mouth daily 30 tablet 5    Nutritional Supplements (NUTRITIONAL SHAKE) LIQD Take 1 Can by mouth 2 (two) times a day Please provide Boost 90 minutes before lunch and 90 minutes before dinner 60 Bottle 6    omeprazole (PriLOSEC) 20 mg delayed release capsule Take 20mg at 9am every day 30 capsule 5    onabotulinumtoxin A (BOTOX) 100 units inject 500 units into left gastroc soleus and abductor pollicis ankit      onabotulinumtoxin A (Botox) 100 units Botox 100 unit injection   bilateral gastrocsoleus muscles      polyethylene glycol (MIRALAX) 17 g packet Take 17 g by mouth 2 (two) times a day 180 each 3    PREPARATION H 1-0 25-14 4-15 % rectal cream - APPLY RECTALLY AS NEEDED FOR HEMORRHOID PAIN RELIEF 51 g 11    ranitidine (ZANTAC) 150 mg tablet ranitidine 150 mg tablet      sertraline (ZOLOFT) 100 mg tablet Take 1 tablet (100 mg total) by mouth daily 31 tablet 11    Skin Protectants, Misc  (Brody Petrin) LOTN Apply to both feet & both legs 2 times daily      sodium chloride (DEEP SEA NASAL SPRAY) 0 65 % nasal spray 2 sprays into each nostril as needed for congestion 44 mL 11    Sodium Fluoride (PREVIDENT 5000 PLUS DT) Apply orally to teeth once daily while brushing at night time do not rinse mouth after brushing      Starch, Thickening, LIQD Nectar thick liquids up to honey thick if coughing occurs as needed, please use Simply Thick liquid only  Discontinue Thick-It powder   237 mL 5    Tea Tree 100 % OIL Apply 1 g topically daily as needed (rash) Apply topically daily to skin folds 60 mL 11    Vitamins A & D (VITAMIN A & D) ointment - APPLY TO AFFECTED AREA (BUTTOCKS/ANAL) AS NEEDED 454 g 11    zinc oxide (DESITIN) 40 % PSTE Apply topically      Zinc Picolinate 25 MG TABS Take 1 76 tablets (44 mg total) by mouth daily Take 2 22 mg capsules daily every other month 60 each 6    hydrocortisone (ANUSOL-HC) 25 mg suppository Insert 1 suppository (25 mg total) into the rectum daily at bedtime for 7 days 7 suppository 1    Icosapent Ethyl (VASCEPA) 1 g CAPS Take 1 capsule (1 g total) by mouth daily (Patient not taking: Reported on 7/27/2020) 31 capsule 11    Incontinence Supply Disposable (PREVAIL AIR BRIEFS) MISC 1 each by Does not apply route every 4 (four) hours Please provide 5 briefs per day 150 each 11    lidocaine (LIDODERM) 5 % Apply 1 patch topically daily as needed (back pain) Remove & Discard patch within 12 hours or as directed by MD 30 patch 1    polyethylene glycol (MIRALAX) 17 g packet Take 17 g by mouth 2 (two) times a day 180 each 3    Wheat Dextrin (BENEFIBER ON THE GO) POWD MIX 1 PACKET IN LIQUID & DRINK BY MOUTH ONCE EVERY DAY (9PM) (Patient not taking: Reported on 7/27/2020) 28 each 10    wheat dextrin (BENEFIBER) Take 1 packet by mouth daily (Patient not taking: Reported on 7/27/2020) 30 each 3     No current facility-administered medications for this visit  Allergies   Allergen Reactions    Other      drugs that prolong the QT interval  drugs that prolong the QT interval  Seasonal allergies          Video Exam    Vitals:    08/11/20 1056   Weight: 57 2 kg (126 lb)   Height: 5' 10" (1 778 m)           I spent 11 minutes directly with the patient during this visit      VIRTUAL VISIT [facility] acknowledges that he has consented to an online visit or consultation  He understands that the online visit is based solely on information provided by him, and that, in the absence of a face-to-face physical evaluation by the physician, the diagnosis he receives is both limited and provisional in terms of accuracy and completeness  This is not intended to replace a full medical face-to-face evaluation by the physician  Suhail Morris understands and accepts these terms

## 2020-08-13 ENCOUNTER — PREP FOR PROCEDURE (OUTPATIENT)
Dept: GASTROENTEROLOGY | Facility: CLINIC | Age: 35
End: 2020-08-13

## 2020-08-13 DIAGNOSIS — R13.12 OROPHARYNGEAL DYSPHAGIA: Primary | ICD-10-CM

## 2020-08-14 ENCOUNTER — TELEPHONE (OUTPATIENT)
Dept: GASTROENTEROLOGY | Facility: AMBULARY SURGERY CENTER | Age: 35
End: 2020-08-14

## 2020-08-18 ENCOUNTER — ANESTHESIA EVENT (OUTPATIENT)
Dept: GASTROENTEROLOGY | Facility: HOSPITAL | Age: 35
End: 2020-08-18

## 2020-08-18 RX ORDER — SODIUM CHLORIDE 9 MG/ML
125 INJECTION, SOLUTION INTRAVENOUS CONTINUOUS
Status: CANCELLED | OUTPATIENT
Start: 2020-08-18

## 2020-08-18 RX ORDER — LIDOCAINE HYDROCHLORIDE 10 MG/ML
0.5 INJECTION, SOLUTION EPIDURAL; INFILTRATION; INTRACAUDAL; PERINEURAL ONCE AS NEEDED
Status: CANCELLED | OUTPATIENT
Start: 2020-08-18

## 2020-08-18 NOTE — TELEPHONE ENCOUNTER
EGD scheduled on 8/19/20 with Dr Myles at [place]   I gave Dimple Ventura verbal instructions/emailed to [email or web address]

## 2020-08-19 ENCOUNTER — ANESTHESIA (OUTPATIENT)
Dept: GASTROENTEROLOGY | Facility: HOSPITAL | Age: 35
End: 2020-08-19

## 2020-08-19 ENCOUNTER — HOSPITAL ENCOUNTER (OUTPATIENT)
Dept: GASTROENTEROLOGY | Facility: HOSPITAL | Age: 35
Setting detail: OUTPATIENT SURGERY
Discharge: HOME | End: 2020-08-19
Attending: INTERNAL MEDICINE | Admitting: INTERNAL MEDICINE
Payer: COMMERCIAL

## 2020-08-19 VITALS
RESPIRATION RATE: 16 BRPM | OXYGEN SATURATION: 100 % | SYSTOLIC BLOOD PRESSURE: 98 MMHG | HEIGHT: 70 IN | DIASTOLIC BLOOD PRESSURE: 60 MMHG | TEMPERATURE: 97 F | BODY MASS INDEX: 18.04 KG/M2 | WEIGHT: 126 LBS | HEART RATE: 58 BPM

## 2020-08-19 DIAGNOSIS — R13.12 OROPHARYNGEAL DYSPHAGIA: ICD-10-CM

## 2020-08-19 PROCEDURE — 43239 EGD BIOPSY SINGLE/MULTIPLE: CPT | Performed by: INTERNAL MEDICINE

## 2020-08-19 PROCEDURE — 88305 TISSUE EXAM BY PATHOLOGIST: CPT | Performed by: PATHOLOGY

## 2020-08-19 RX ORDER — PROPOFOL 10 MG/ML
INJECTION, EMULSION INTRAVENOUS AS NEEDED
Status: DISCONTINUED | OUTPATIENT
Start: 2020-08-19 | End: 2020-08-19

## 2020-08-19 RX ORDER — GLYCOPYRROLATE 0.2 MG/ML
INJECTION INTRAMUSCULAR; INTRAVENOUS AS NEEDED
Status: DISCONTINUED | OUTPATIENT
Start: 2020-08-19 | End: 2020-08-19

## 2020-08-19 RX ORDER — SODIUM CHLORIDE 9 MG/ML
INJECTION, SOLUTION INTRAVENOUS CONTINUOUS PRN
Status: DISCONTINUED | OUTPATIENT
Start: 2020-08-19 | End: 2020-08-19

## 2020-08-19 RX ADMIN — PROPOFOL 50 MG: 10 INJECTION, EMULSION INTRAVENOUS at 10:10

## 2020-08-19 RX ADMIN — PROPOFOL 50 MG: 10 INJECTION, EMULSION INTRAVENOUS at 10:08

## 2020-08-19 RX ADMIN — PROPOFOL 30 MG: 10 INJECTION, EMULSION INTRAVENOUS at 10:09

## 2020-08-19 RX ADMIN — SODIUM CHLORIDE: 0.9 INJECTION, SOLUTION INTRAVENOUS at 10:04

## 2020-08-19 RX ADMIN — GLYCOPYRROLATE 0.1 MG: 0.2 INJECTION, SOLUTION INTRAMUSCULAR; INTRAVENOUS at 10:08

## 2020-08-19 NOTE — INTERVAL H&P NOTE
H&P reviewed  After examining the patient I find no changes in the patients condition since the H&P had been written      Vitals:    08/19/20 0940   BP: 102/60   Pulse: 61   Resp: 16   Temp: (!) 97 °F (36 1 °C)   SpO2: 97%

## 2020-08-19 NOTE — ANESTHESIA POSTPROCEDURE EVALUATION
Post-Op Assessment Note    CV Status:  Stable  Pain Score: 0    Pain management: adequate     Mental Status:  Alert and awake   Hydration Status:  Euvolemic   PONV Controlled:  Controlled   Airway Patency:  Patent      Post Op Vitals Reviewed: Yes      Staff: CRNA, Anesthesiologist         No complications documented      BP 93/59 (08/19/20 1041)    Temp     Pulse (!) 53 (08/19/20 1041)   Resp 16 (08/19/20 1041)    SpO2 99 % (08/19/20 1041)

## 2020-08-19 NOTE — ANESTHESIA PREPROCEDURE EVALUATION
Procedure:  EGD    Relevant Problems   ANESTHESIA (within normal limits)      CARDIO   (+) Long Q-T syndrome      ENDO   (+) Hypothyroid      GI/HEPATIC   (+) Oropharyngeal dysphagia      MUSCULOSKELETAL   (+) Scoliosis of thoracic spine   (+) Torticollis      NEURO/PSYCH   (+) Depression   (+) H/O impacted cerumen   (+) History of sleep disturbance   (+) Hx of recurrent pneumonia   (+) Muscular rigidity and spasm, progressive   (+) Spastic hemiplegia of left nondominant side as late effect of cerebral infarction (HCC)      PULMONARY   (+) Aspiration pneumonia (HCC)        Physical Exam    Airway    Mallampati score: II  TM Distance: >3 FB  Neck ROM: full     Dental   No notable dental hx     Cardiovascular      Pulmonary      Other Findings    EKG: QT is 438ms  Will avoid QT prolonging drugs such as droperidol  Propofol will be OK  Anesthesia Plan  ASA Score- 3     Anesthesia Type- IV sedation with anesthesia with ASA Monitors  Additional Monitors:   Airway Plan:           Plan Factors-    Chart reviewed  EKG reviewed  Existing labs reviewed  Patient summary reviewed  Patient is not a current smoker  Patient did not smoke on day of surgery  Induction-     Postoperative Plan-     Informed Consent- Anesthetic plan and risks discussed with patient, legal guardian and mother  I personally reviewed this patient with the CRNA  Discussed and agreed on the Anesthesia Plan with the CRNA  Sam Khoury

## 2020-08-24 ENCOUNTER — OFFICE VISIT (OUTPATIENT)
Dept: FAMILY MEDICINE CLINIC | Facility: CLINIC | Age: 35
End: 2020-08-24
Payer: COMMERCIAL

## 2020-08-24 DIAGNOSIS — K59.00 CONSTIPATION DUE TO NEUROGENIC BOWEL: ICD-10-CM

## 2020-08-24 DIAGNOSIS — Z11.1 PPD SCREENING TEST: ICD-10-CM

## 2020-08-24 DIAGNOSIS — Z00.00 ENCOUNTER FOR MEDICARE ANNUAL WELLNESS EXAM: Primary | ICD-10-CM

## 2020-08-24 DIAGNOSIS — G25.82 MUSCULAR RIGIDITY AND SPASM, PROGRESSIVE: ICD-10-CM

## 2020-08-24 DIAGNOSIS — R13.12 OROPHARYNGEAL DYSPHAGIA: ICD-10-CM

## 2020-08-24 DIAGNOSIS — R62.7 POOR WEIGHT GAIN IN ADULT: ICD-10-CM

## 2020-08-24 DIAGNOSIS — K59.2 CONSTIPATION DUE TO NEUROGENIC BOWEL: ICD-10-CM

## 2020-08-24 DIAGNOSIS — E03.9 HYPOTHYROIDISM, UNSPECIFIED TYPE: ICD-10-CM

## 2020-08-24 PROCEDURE — 3078F DIAST BP <80 MM HG: CPT | Performed by: FAMILY MEDICINE

## 2020-08-24 PROCEDURE — 86580 TB INTRADERMAL TEST: CPT

## 2020-08-24 PROCEDURE — 99214 OFFICE O/P EST MOD 30 MIN: CPT | Performed by: FAMILY MEDICINE

## 2020-08-24 PROCEDURE — 3725F SCREEN DEPRESSION PERFORMED: CPT | Performed by: FAMILY MEDICINE

## 2020-08-24 PROCEDURE — 1036F TOBACCO NON-USER: CPT | Performed by: FAMILY MEDICINE

## 2020-08-24 PROCEDURE — 3074F SYST BP LT 130 MM HG: CPT | Performed by: FAMILY MEDICINE

## 2020-08-24 PROCEDURE — G0438 PPPS, INITIAL VISIT: HCPCS | Performed by: FAMILY MEDICINE

## 2020-08-24 NOTE — PROGRESS NOTES
Assessment/Plan:     Problem List Items Addressed This Visit        Digestive    Constipation due to neurogenic bowel     -Stable at this time  -Following with GI  -Continue current regimen         Oropharyngeal dysphagia     -Follows with Jennifer Yi SLP  -Pending possible repeat swallow study   -Mom & nurse report some dry coughs at times; advised on ongoing risk of aspiration and monitoring for signs of aspiration   -Currently on mechanical soft diet with nectar thick liquids             Endocrine    Hypothyroid     -Previous TSH above goal, prompting synthroid increase to 50 mcg 8/4/20  -Repeat TSH 10/5/2020 to assess therapeutic response to synthroid increase  -Mom and nurse confirmed proper timing for administration of synthroid  -Patient also following with Endocrinology, has appt 9/9/20  -Of note, personally discussed increasing synthroid dose with Dr Naina Corbin and Auditory    Muscular rigidity and spasm, progressive     -Appt with Physiatry 9/1/20  -Will be getting Botox for hand contractures in the near future  -Appt with 9/3/20 Sports Medicine (Dr Segun Castillo)  -Mother and nurse plan on discuss changing to power chair with reclining feature  -Patient will qualify for new DME after November 2020  -Continue topical Voltaren gel, home PT exercises  -Restart formal PT program as indicated per specialist recommendations  -Reviewed PDMP;  Modafinil and Marinol filled 8/5/20, due for refill 9/5/20            Other    Poor weight gain in adult     -Weight fluctuating chronically, despite patient consistently completing all intake of meals per nurse and mom  -Follows with Nutritionist, has upcoming appt 8/31/20  -Advised mom & nurse about discussing short-term caloric increase for weight gain toward goal of BMI >18 5  -Following with GI, Celiac/malabsorption workup negative           Other Visit Diagnoses     Encounter for Medicare annual wellness exam    -  Primary    PPD screening test Relevant Orders    TB Skin Test (Completed)            Subjective:      Patient ID: William Morris is a 37 y o  male  HPI    Patient here to establish care with me at [facility]; has hx of 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 s/p cardiac arrest at 13years of age 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, [place] [id number]     -Poor weight gain, s/p EGD: Following with GI (Dr Richard Blair); small bowel biopsies were negative for Celiac or malabsorption pattern, came back normal  Weight somewhat fluctuant 123-126 lbs (seems to be dependent on BMs per nurse), he is 123 5 lb today; has 8/31/20 Nutritionist appointment, currently on My25 diet, and mom expresses anti-inflammatory diet interest, she plans on bringing it up at that appointment  Wt Readings from Last 3 Encounters:   08/19/20 57 2 kg (126 lb)   08/11/20 57 2 kg (126 lb)   07/27/20 56 2 kg (124 lb)     -Constipation: Currently stable, following with GI, on miralax, HC suppository, enemas; appears to be fluid-related and consistency among caregivers    -Dysphagia: Following with Brina Marie SLP, may be planning to have swallow test to be repeated  Mom and nurse deny hearing any wet coughs with intake, but some dry coughs at times although no concern for aspiration by mother and nurse at this point  Can be challenging to have patient take liquids without prompting, but staff stays on top of his intake  Currently on mechanical soft with nectar thick liquids with thickener    -Hypothyroid: due for repeat labs 10/5/2020 after synthroid increased to 50 mcg 8/4/20    -DME/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: Has appt with Physiatry 9/1/20, then 9/3/20 Sports Medicine appt with Dr Nila Banerjee   Mom reports she would like to have patient restart regular PT program  Also would like to discuss changing to power chair with reclining feature, will qualify for new DME after November  Modafinil and marinol filled 8/5/20     -Medicare Annual Wellness: Has paperwork today to be completed, needs PPD today    The following portions of the patient's history were reviewed and updated as appropriate: allergies, current medications, past family history, past medical history, past social history, past surgical history and problem list     Review of Systems   Constitutional: Positive for unexpected weight change  Negative for chills, fatigue and fever  Respiratory: Negative for choking, chest tightness and shortness of breath  Cardiovascular: Negative for chest pain, palpitations and leg swelling  Gastrointestinal: Negative for abdominal pain, constipation, diarrhea, nausea and vomiting  Genitourinary: Negative for dysuria, frequency, hematuria and urgency  Musculoskeletal: Positive for arthralgias, gait problem and myalgias  Wheelchair at baseline   Neurological: Negative for seizures and syncope  Psychiatric/Behavioral: Negative for agitation, dysphoric mood and sleep disturbance  All other systems reviewed and are negative  Objective: Wt 56 kg (123 lb 8 oz)   BMI 17 72 kg/m²      Physical Exam  Constitutional:       General: He is not in acute distress  Appearance: He is well-developed  HENT:      Head: Normocephalic and atraumatic  Eyes:      General:         Right eye: No discharge  Left eye: No discharge  Conjunctiva/sclera: Conjunctivae normal    Neck:      Musculoskeletal: Normal range of motion  Cardiovascular:      Rate and Rhythm: Normal rate and regular rhythm  Pulmonary:      Effort: No respiratory distress  Breath sounds: Normal breath sounds  Comments: Slightly reduced effort but grossly CTABL  Abdominal:      General: Bowel sounds are normal       Palpations: Abdomen is soft  Tenderness: There is no abdominal tenderness  Musculoskeletal: Normal range of motion        Comments: Spasticity and contractures of B/L UE, muscle wasting of B/L LE   Skin:     General: Skin is warm and dry  Capillary Refill: Capillary refill takes less than 2 seconds  Findings: No rash  Neurological:      Mental Status: He is alert  Mental status is at baseline        Comments: Scantly verbal at baseline but engages with facial expressions, reaching out his hands, etc    Psychiatric:         Mood and Affect: Mood normal

## 2020-08-26 ENCOUNTER — CLINICAL SUPPORT (OUTPATIENT)
Dept: FAMILY MEDICINE CLINIC | Facility: CLINIC | Age: 35
End: 2020-08-26

## 2020-08-26 VITALS — BODY MASS INDEX: 17.72 KG/M2 | WEIGHT: 123.5 LBS

## 2020-08-26 DIAGNOSIS — Z11.1 PPD SCREENING TEST: Primary | ICD-10-CM

## 2020-08-26 PROBLEM — H61.23 BILATERAL IMPACTED CERUMEN: Status: RESOLVED | Noted: 2019-07-08 | Resolved: 2020-08-26

## 2020-08-26 PROBLEM — R63.4 ABNORMAL WEIGHT LOSS: Status: RESOLVED | Noted: 2019-03-15 | Resolved: 2020-08-26

## 2020-08-26 LAB
INDURATION: 0 MM
TB SKIN TEST: NEGATIVE

## 2020-08-26 NOTE — ASSESSMENT & PLAN NOTE
-Previous TSH above goal, prompting synthroid increase to 50 mcg 8/4/20  -Repeat TSH 10/5/2020 to assess therapeutic response to synthroid increase  -Mom and nurse confirmed proper timing for administration of synthroid  -Patient also following with Endocrinology, has appt 9/9/20  -Of note, personally discussed increasing synthroid dose with Dr Niles Valderrama

## 2020-08-26 NOTE — ASSESSMENT & PLAN NOTE
-Appt with Physiatry 9/1/20  -Will be getting Botox for hand contractures in the near future  -Appt with 9/3/20 Sports Medicine (Dr Lashell Laboy)  -Mother and nurse plan on discuss changing to power chair with reclining feature  -Patient will qualify for new DME after November 2020  -Continue topical Voltaren gel, home PT exercises  -Restart formal PT program as indicated per specialist recommendations  -Reviewed PDMP;  Modafinil and Marinol filled 8/5/20, due for refill 9/5/20

## 2020-08-26 NOTE — PROGRESS NOTES
Assessment and Plan:     Problem List Items Addressed This Visit    Other Visit Diagnoses     Encounter for Medicare annual wellness exam    -  Primary    PPD screening test        Relevant Orders    TB Skin Test (Completed)        BMI Counseling: Body mass index is 17 72 kg/m²  The BMI is below normal  Patient advised to gain weight and dietary education for weight gain was provided  Patient referred to nutritionist due to patient being underweight  Preventive health issues were discussed with patient, and age appropriate screening tests were ordered as noted in patient's After Visit Summary  Personalized health advice and appropriate referrals for health education or preventive services given if needed, as noted in patient's After Visit Summary       History of Present Illness:     Patient presents for Medicare Annual Wellness visit    Patient Care Team:  Debbie Lewis MD as PCP - General (Family Medicine)  Bryon Pascual MD as PCP - Endocrinology (Endocrinology)     Problem List:     Patient Active Problem List   Diagnosis    Abnormal bone density screening    Anoxic brain damage (HCC)    Allergic rhinitis    Depression    Long Q-T syndrome    Poor weight gain in adult    Oropharyngeal dysphagia    Spastic hemiplegia of left nondominant side as late effect of cerebral infarction ([address] )    History of sleep disturbance    Aspiration pneumonia ([address] )    Constipation due to neurogenic bowel    Physical deconditioning    Muscular rigidity and spasm, progressive    Torticollis    At risk for aspiration    Frequent UTI    Hx of recurrent pneumonia    Spasticity    Goals of care, counseling/discussion    H/O impacted cerumen    Cardiomegaly    Contracture of hand    Monoallelic mutation of RYR2 gene    Elevated total protein    Hypothyroid    Electrolyte abnormality    Urinary incontinence    Scoliosis of thoracic spine    Polyarthralgia    At risk for atelectasis    Spastic quadriparesis [facility])      Past Medical and Surgical History:     Past Medical History:   Diagnosis Date    Abnormal ECG  and beyond    post cardiac arrest    Allergic rhinitis     [facility]     Cardiac arrest [facility])     age 13 s/p long Q-T syndrome    Community acquired pneumonia     last assessed/resolved:  10/9/2014    Depression     Disease of thyroid gland     GERD (gastroesophageal reflux disease)     Hypothyroid 2020    Long Q-T syndrome     Muscular rigidity and spasm, progressive     Osteopenia     Osteoporosis     Quadriplegia ([facility]     Scoliosis of thoracic spine 2020    Spastic neurogenic bladder     Syncope     beta blocker medication DC because of low blood pressure    Urinary incontinence     Urinary tract infection without hematuria 2019     Past Surgical History:   Procedure Laterality Date    APPENDECTOMY      WISDOM TOOTH EXTRACTION        Family History:     Family History   Problem Relation Age of Onset    Other Father         mitral valve replaced    Hypertension Father     Diabetes type II Maternal Grandfather     Heart failure Maternal Grandfather         Congestive heart failure -     Diabetes type II Maternal Uncle     Hypotension Mother       Social History:     E-Cigarette/Vaping    E-Cigarette Use Never User      E-Cigarette/Vaping Substances    Nicotine No     Flavoring No      Social History     Socioeconomic History    Marital status: Single     Spouse name: Not on file    Number of children: Not on file    Years of education: Not on file    Highest education level: Not on file   Occupational History    Not on file   Social Needs    Financial resource strain: Not on file    Food insecurity     Worry: Not on file     Inability: Not on file    Transportation needs     Medical: Not on file     Non-medical: Not on file   Tobacco Use    Smoking status: Never Smoker    Smokeless tobacco: Never Used   Substance and Sexual Activity    Alcohol use: No     Frequency: Never     Binge frequency: Never    Drug use: No    Sexual activity: Not Currently   Lifestyle    Physical activity     Days per week: Not on file     Minutes per session: Not on file    Stress: Not on file   Relationships    Social connections     Talks on phone: Not on file     Gets together: Not on file     Attends [employer] service: Not on file     Active member of club or organization: Not on file     Attends meetings of clubs or organizations: Not on file     Relationship status: Not on file    Intimate partner violence     Fear of current or ex partner: Not on file     Emotionally abused: Not on file     Physically abused: Not on file     Forced sexual activity: Not on file   Other Topics Concern    Not on file   Social History Narrative    Lives in a group home      Medications and Allergies:     Current Outpatient Medications   Medication Sig Dispense Refill    acetaminophen (TYLENOL) 325 mg tablet Take 3 tablets (975 mg total) by mouth every 8 (eight) hours as needed for mild pain or headaches 120 tablet 3    ascorbic acid (GNP VITAMIN C) 500 MG tablet Take 500 mg daily at 4 PM 30 tablet 5    b complex-vitamin C-folic acid (RENAL) 1 mg Take 1 mg daily at 4 PM 31 each 5    Benzocaine-Docusate Sodium (Enemeez Plus)  MG ENEM Insert 1 application into the rectum daily as needed (constipation) Use as directed 150 mL 4    bisacodyl (DULCOLAX) 10 mg suppository Use 1 supp  per rectum QOD PRN for constipation, max 3d/week 12 suppository 0    Camphor-Eucalyptus-Menthol (VICKS VAPORUB) 4 73-1 2-2 6 % OINT Apply topically as needed (congestion) 170 g 0    carbamide peroxide (DEBROX) 6 5 % otic solution Administer 5 drops into both ears 2 (two) times a week 15 mL 5    diclofenac sodium (VOLTAREN) 1 % Apply 2 g topically 3 (three) times a day At 10am, 4pm, 9pm to thoracic paravertebral musculature 100 g 2    Disposable Gloves (VINYL GLOVES MEDIUM) MISC by Does not apply route 4 (four) times a day Dispense 3 boxes monthly; Diagnosis R32 3 each 5    docusate sodium (COLACE) 100 mg capsule 100 mg 9am and 9pm 10 capsule 5    docusate sodium (Enemeez Mini) 283 MG enema Insert 1 enema into the rectum daily Use daily or as needed 30 each 0    dronabinol (MARINOL) 2 5 mg capsule TAKE ONE CAPSULE -PLACE WHOLE IN YOGURT/PUDDING BY MOUTH 3 TIMES A DAY (9AM-4PM-9PM) *CALL PHARMACY 5 DAYS IN ADVANCE FOR REFILL* 90 capsule 0    fluticasone (FLONASE) 50 mcg/act nasal spray 1 spray into each nostril daily 1 Bottle 1    glycerin adult 2 g suppository Place 1 suppository QOD for 2 weeks, then QOD PRN for constipation 50 suppository 6    HEMORRHOIDAL 0 25 % suppository - UNWRAP AND INSERT 1 SUPPOSITORY PER RECTUM AS NEEDED FOR HEMORRHOID PAIN RELIEF 12 suppository 11    hydrocortisone (ANUSOL-HC) 25 mg suppository Insert 1 suppository (25 mg total) into the rectum daily at bedtime for 7 days 7 suppository 1    ibuprofen (MOTRIN) 200 mg tablet Take 2 tabs q6h PRN for pain not to exceed 2000mg per day 200 tablet 2    Icosapent Ethyl (VASCEPA) 1 g CAPS Take 1 capsule (1 g total) by mouth daily 31 capsule 11    Incontinence Supply Disposable (PREVAIL AIR BRIEFS) MISC 1 each by Does not apply route every 4 (four) hours Please provide 5 briefs per day 150 each 11    Incontinence Supply Disposable (SELECT DISPOSABLE UNDERWEAR SM) MISC Please provide a 30 day supply 10 per day DX R32 incontinence 22 each 6    levothyroxine 50 mcg tablet Take 1 tablet (50 mcg total) by mouth daily 90 tablet 1    lidocaine (LIDODERM) 5 % Apply 1 patch topically daily as needed (back pain) Remove & Discard patch within 12 hours or as directed by MD 30 patch 1    loratadine (CLARITIN) 10 mg tablet Take 1 tablet (10 mg total) by mouth daily as needed for allergies 30 tablet 5    Magnesium Oxide 500 MG TABS Take 1 tablet (500 mg total) by mouth daily Take 1 tablet daily at 4 PM 31 each 5    Melatonin 5 MG TABS Take 1 tablet (5 mg total) by mouth daily at bedtime 31 each 5    Melatonin 5 MG TABS melatonin 5 mg tablet      Misc  Devices [facility]) MISC Please provide pt with a new cord for power wheelchair 1 each 0    modafinil (PROVIGIL) 100 mg tablet TAKE TWO TABLETS -PLACE WHOLE IN YOGURT/PUDDING BY MOUTH EVERY DAY (9AM) 62 tablet 0    Multiple Vitamins-Minerals (CEROVITE SENIOR) TABS Take 1 tablet by mouth daily 30 tablet 5    Nutritional Supplements (NUTRITIONAL SHAKE) LIQD Take 1 Can by mouth 2 (two) times a day Please provide Boost 90 minutes before lunch and 90 minutes before dinner 60 Bottle 6    omeprazole (PriLOSEC) 20 mg delayed release capsule Take 20mg at 9am every day 30 capsule 5    onabotulinumtoxin A (BOTOX) 100 units inject 500 units into left gastroc soleus and abductor pollicis ankit      onabotulinumtoxin A (Botox) 100 units Botox 100 unit injection   bilateral gastrocsoleus muscles      polyethylene glycol (MIRALAX) 17 g packet Take 17 g by mouth 2 (two) times a day 180 each 3    polyethylene glycol (MIRALAX) 17 g packet Take 17 g by mouth 2 (two) times a day 180 each 3    PREPARATION H 1-0 25-14 4-15 % rectal cream - APPLY RECTALLY AS NEEDED FOR HEMORRHOID PAIN RELIEF 51 g 11    ranitidine (ZANTAC) 150 mg tablet ranitidine 150 mg tablet      sertraline (ZOLOFT) 100 mg tablet Take 1 tablet (100 mg total) by mouth daily 31 tablet 11    Skin Protectants, Misc  (Niharika Clarke) LOTN Apply to both feet & both legs 2 times daily      sodium chloride (DEEP SEA NASAL SPRAY) 0 65 % nasal spray 2 sprays into each nostril as needed for congestion 44 mL 11    Sodium Fluoride (PREVIDENT 5000 PLUS DT) Apply orally to teeth once daily while brushing at night time do not rinse mouth after brushing      Starch, Thickening, LIQD Nectar thick liquids up to honey thick if coughing occurs as needed, please use Simply Thick liquid only  Discontinue Thick-It powder   237 mL 5    Tea Tree 100 % OIL Apply 1 g topically daily as needed (rash) Apply topically daily to skin folds 60 mL 11    Vitamins A & D (VITAMIN A & D) ointment - APPLY TO AFFECTED AREA (BUTTOCKS/ANAL) AS NEEDED 454 g 11    Wheat Dextrin (BENEFIBER ON THE GO) POWD MIX 1 PACKET IN LIQUID & DRINK BY MOUTH ONCE EVERY DAY (9PM) (Patient not taking: Reported on 7/27/2020) 28 each 10    wheat dextrin (BENEFIBER) Take 1 packet by mouth daily 30 each 3    zinc oxide (DESITIN) 40 % PSTE Apply topically      Zinc Picolinate 25 MG TABS Take 1 76 tablets (44 mg total) by mouth daily Take 2 22 mg capsules daily every other month 60 each 6     No current facility-administered medications for this visit  Allergies   Allergen Reactions    Other      drugs that prolong the QT interval  drugs that prolong the QT interval  Seasonal allergies       Immunizations:     Immunization History   Administered Date(s) Administered     Influenza (IM) Preservative Free 11/22/2013    H1N1, All Formulations 12/04/2009    INFLUENZA 11/02/2012, 11/22/2013, 10/09/2014, 11/01/2015, 11/12/2015, 10/06/2016, 11/01/2016, 10/16/2017, 10/09/2018, 10/01/2019    Influenza Quadrivalent Preservative Free 3 years and older IM 10/09/2014, 10/06/2016    Influenza Quadrivalent, 6-35 Months IM 10/20/2017    Influenza TIV (IM) 11/12/2015    Influenza, injectable, quadrivalent, preservative free 0 5 mL 10/09/2018, 10/08/2019    Tdap 04/05/2011    Tuberculin Skin Test-PPD Intradermal 09/05/2017, 08/13/2019, 08/24/2020    influenza, injectable, quadrivalent 10/01/2019      Health Maintenance: There are no preventive care reminders to display for this patient  Topic Date Due    Influenza Vaccine  07/01/2020      Medicare Health Risk Assessment: Wt 56 kg (123 lb 8 oz)   BMI 17 72 kg/m²      Pau Horne is here for his Initial Wellness visit  Health Risk Assessment:   Patient rates overall health as fair   Patient feels that their physical health rating is slightly better  Eyesight was rated as same  Hearing was rated as same  Patient feels that their emotional and mental health rating is slightly better  Pain experienced in the last 7 days has been some  Patient states that he has experienced no weight loss or gain in last 6 months  Interview questions mostly answered by mom and nurse, patient unable to provide verbal responses but able to nod head 'yes'/shake head 'no'    Depression Screening:   PHQ-2 Score: 1  PHQ-9 Score: 2      Fall Risk Screening: In the past year, patient has experienced: no history of falling in past year      Home Safety:  Patient has working smoke alarms Home safety hazards include: none  Home safety adequate per nurse and mom; patient uses wheelchair, has full-time caregivers    Nutrition:   On My25 diet, follows with Nutritionist and Heber Collins SLP for dysphagia, on mechanical soft with nectar thick liquids    Medications:   Patient is not currently taking any over-the-counter supplements  Patient is not able to manage medications  Medications managed by full-time caregiver team ([facility])    Activities of Daily Living (ADLs)/Instrumental Activities of Daily Living (IADLs):   Walk and transfer into and out of bed and chair?: No  Dress and groom yourself?: No    Bathe or shower yourself?: No    Feed yourself? No  Do your laundry/housekeeping?: No  Manage your money, pay your bills and track your expenses?: No  Make your own meals?: No    Do your own shopping?: No    ADL comments: All ADLs/IADLs performed with assistance of full-time caregiver team [facility])    Previous Hospitalizations:   Any hospitalizations or ED visits within the last 12 months?: No      Hospitalization Comments: Last admission 7/9/19 for pneumonia    Advance Care Planning:   Living will: Yes    Durable POA for healthcare:  Yes    Advanced directive: Yes    End of Life Decisions reviewed with patient: Yes    Provider agrees with end of life decisions: Yes      Comments: "Media" contains scanned copies of the following:  -4/9/2019 Document of [address] (estate and person) granted to mother and father (Luna Pendleton)  -8/22/19 POLST completed with parents (court-appointed guardians) and myself at an office visit to discuss goals of care 8/22/19   POLST outlines desires for DNR/DNI status, limited interventions (including IVF, cardiac monitoring, medical treatment as indicated), trial of NGT but no PEG tube, antibiotics if life can be prolonged       Cognitive Screening:   Provider or family/friend/caregiver concerned regarding cognition?: No    PREVENTIVE SCREENINGS      Cardiovascular Screening:    General: Screening Current      Diabetes Screening:     General: Screening Current      Colorectal Cancer Screening:     General: Screening Current and Screening Not Indicated      Prostate Cancer Screening:    General: Screening Not Indicated      Osteoporosis Screening:    General: Screening Current and Screening Not Indicated      Abdominal Aortic Aneurysm (AAA) Screening:        General: Screening Not Indicated      Lung Cancer Screening:     General: Screening Not Indicated      Hepatitis C Screening:    General: Screening Not Indicated      Preventive Screening Comments: UTD on all preventative screenings at this time      Bishop Danii MD

## 2020-08-26 NOTE — ASSESSMENT & PLAN NOTE
-Weight fluctuating chronically, despite patient consistently completing all intake of meals per nurse and mom  -Follows with Nutritionist, has upcoming appt 8/31/20  -Advised mom & nurse about discussing short-term caloric increase for weight gain toward goal of BMI >18 5  -Following with GI, Celiac/malabsorption workup negative

## 2020-08-26 NOTE — ASSESSMENT & PLAN NOTE
-Follows with [employer] SLP  -Pending possible repeat swallow study   -Mom & nurse report some dry coughs at times; advised on ongoing risk of aspiration and monitoring for signs of aspiration   -Currently on mechanical soft diet with nectar thick liquids

## 2020-08-28 ENCOUNTER — APPOINTMENT (EMERGENCY)
Dept: CT IMAGING | Facility: HOSPITAL | Age: 35
End: 2020-08-28
Payer: COMMERCIAL

## 2020-08-28 ENCOUNTER — HOSPITAL ENCOUNTER (EMERGENCY)
Facility: HOSPITAL | Age: 35
Discharge: HOME | End: 2020-08-28
Attending: EMERGENCY MEDICINE | Admitting: EMERGENCY MEDICINE
Payer: COMMERCIAL

## 2020-08-28 VITALS
SYSTOLIC BLOOD PRESSURE: 120 MMHG | DIASTOLIC BLOOD PRESSURE: 67 MMHG | BODY MASS INDEX: 18.22 KG/M2 | RESPIRATION RATE: 18 BRPM | HEART RATE: 86 BPM | WEIGHT: 126.98 LBS | OXYGEN SATURATION: 97 % | TEMPERATURE: 97.8 F

## 2020-08-28 DIAGNOSIS — S09.90XA INJURY OF HEAD, INITIAL ENCOUNTER: ICD-10-CM
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
## 2020-08-28 PROCEDURE — 99284 EMERGENCY DEPT VISIT MOD MDM: CPT

## 2020-08-28 PROCEDURE — 99284 EMERGENCY DEPT VISIT MOD MDM: CPT | Performed by: EMERGENCY MEDICINE

## 2020-08-28 PROCEDURE — 70450 CT HEAD/BRAIN W/O DYE: CPT

## 2020-08-28 PROCEDURE — G1004 CDSM NDSC: HCPCS

## 2020-08-28 RX ORDER — ACETAMINOPHEN 500 MG
1000 TABLET ORAL EVERY 6 HOURS PRN
Qty: 30 TABLET | Refills: 0 | Status: SHIPPED | OUTPATIENT
Start: 2020-08-28 | End: 2020-09-04

## 2020-08-28 RX ORDER — ACETAMINOPHEN 325 MG/1
975 TABLET ORAL ONCE
Status: COMPLETED | OUTPATIENT
Start: 2020-08-28 | End: 2020-08-28

## 2020-08-28 RX ORDER — IBUPROFEN 400 MG/1
400 TABLET ORAL EVERY 6 HOURS PRN
Qty: 28 TABLET | Refills: 0 | Status: SHIPPED | OUTPATIENT
Start: 2020-08-28 | End: 2020-09-04

## 2020-08-28 RX ADMIN — ACETAMINOPHEN 975 MG: 325 TABLET, FILM COATED ORAL at 23:22

## 2020-08-29 NOTE — DISCHARGE INSTRUCTIONS
Please take Tylenol and motrin as prescribed for headache  If you experience worsening headache or has severe nausea or vomiting, please call 911 or go to the nearest emergency room

## 2020-08-29 NOTE — ED PROVIDER NOTES
History  Chief Complaint   Patient presents with   [address] brings pt in for and unwitnessed fall around 1530  He was sitting in his wheelchair at table and caregiver stepped away and heard a bang and he was on hardwood floor  -LOC -thinners       History provided by:  Parent   used: No       38 y/o Male coming in with headache post fall  He had an unwitnessed fall around 3 30 PM    Patient had a cardiac arrest when he was 13 and suffered hypoxic brain injury  He is wheel chair bound and has caretaker   This afternoon, patient had a situational fall and has been complaining of headache ever since  Right sided headache  Photophobia +, No nausea or vomiting  No visual field defects  No diplopia  Bruise about 1 cm on the right side of scalp  No dizziness  Hard to evaluate neurological weakness with spasticity  Prior to Admission Medications   Prescriptions Last Dose Informant Patient Reported? Taking?    Benzocaine-Docusate Sodium (Enemeez Plus)  MG ENEM  Mother No No   Sig: Insert 1 application into the rectum daily as needed (constipation) Use as directed   Camphor-Eucalyptus-Menthol (VICKS VAPORUB) 4 73-1 2-2 6 % OINT  Mother No No   Sig: Apply topically as needed (congestion)   Disposable Gloves (VINYL GLOVES MEDIUM) MISC  Mother No No   Sig: by Does not apply route 4 (four) times a day Dispense 3 boxes monthly; Diagnosis R32   HEMORRHOIDAL 0 25 % suppository  Mother No No   Sig: - UNWRAP AND INSERT 1 SUPPOSITORY PER RECTUM AS NEEDED FOR HEMORRHOID PAIN RELIEF   Icosapent Ethyl (VASCEPA) 1 g CAPS  Mother No No   Sig: Take 1 capsule (1 g total) by mouth daily   Incontinence Supply Disposable (PREVAIL AIR BRIEFS) MISC  Mother No No   Si each by Does not apply route every 4 (four) hours Please provide 5 briefs per day   Incontinence Supply Disposable (SELECT DISPOSABLE UNDERWEAR SM) MISC  Mother No No   Sig: Please provide a 30 day supply 10 per day DX R32 incontinence Magnesium Oxide 500 MG TABS  Mother No No   Sig: Take 1 tablet (500 mg total) by mouth daily Take 1 tablet daily at 4 PM   Melatonin 5 MG TABS  Mother No No   Sig: Take 1 tablet (5 mg total) by mouth daily at bedtime   Melatonin 5 MG TABS  Mother Yes No   Sig: melatonin 5 mg tablet   Misc  Devices [facility]) MISC  Mother No No   Sig: Please provide pt with a new cord for power wheelchair   Multiple Vitamins-Minerals (Thalia Mas) TABS  Mother No No   Sig: Take 1 tablet by mouth daily   Nutritional Supplements (NUTRITIONAL SHAKE) LIQD  Mother No No   Sig: Take 1 Can by mouth 2 (two) times a day Please provide Boost 90 minutes before lunch and 90 minutes before dinner   PREPARATION H 1-0 25-14 4-15 % rectal cream  Mother No No   Sig: - APPLY RECTALLY AS NEEDED FOR HEMORRHOID PAIN RELIEF   Skin Protectants, Misc  (Radha Ríos) [address]  Mother Yes No   Sig: Apply to both feet & both legs 2 times daily   Sodium Fluoride (PREVIDENT 5000 PLUS DT)  Mother Yes No   Sig: Apply orally to teeth once daily while brushing at night time do not rinse mouth after brushing   Starch, Thickening, LIQD  Mother No No   Sig: Nectar thick liquids up to honey thick if coughing occurs as needed, please use Simply Thick liquid only  Discontinue Thick-It powder     Tea Tree 100 % OIL  Mother No No   Sig: Apply 1 g topically daily as needed (rash) Apply topically daily to skin folds   Vitamins A & D (VITAMIN A & D) ointment  Mother No No   Sig: - APPLY TO AFFECTED AREA (BUTTOCKS/ANAL) AS NEEDED   Wheat Dextrin (BENEFIBER ON THE GO) POWD  Mother No No   Sig: MIX 1 PACKET IN LIQUID & DRINK BY MOUTH ONCE EVERY DAY (9PM)   Patient not taking: Reported on 7/27/2020   Zinc Picolinate 25 MG TABS  Mother No No   Sig: Take 1 76 tablets (44 mg total) by mouth daily Take 2 22 mg capsules daily every other month   acetaminophen (TYLENOL) 325 mg tablet  Mother No No   Sig: Take 3 tablets (975 mg total) by mouth every 8 (eight) hours as needed for mild pain or headaches   ascorbic acid (GNP VITAMIN C) 500 MG tablet  Mother No No   Sig: Take 500 mg daily at 4 PM   b complex-vitamin C-folic acid (RENAL) 1 mg  Mother No No   Sig: Take 1 mg daily at 4 PM   bisacodyl (DULCOLAX) 10 mg suppository  Mother No No   Sig: Use 1 supp  per rectum QOD PRN for constipation, max 3d/week   carbamide peroxide (DEBROX) 6 5 % otic solution  Mother No No   Sig: Administer 5 drops into both ears 2 (two) times a week   diclofenac sodium (VOLTAREN) 1 %  Mother No No   Sig: Apply 2 g topically 3 (three) times a day At 10am, 4pm, 9pm to thoracic paravertebral musculature   docusate sodium (COLACE) 100 mg capsule  Mother No No   Si mg 9am and 9pm   docusate sodium (Enemeez Mini) 283 MG enema  Mother No No   Sig: Insert 1 enema into the rectum daily Use daily or as needed   dronabinol (MARINOL) 2 5 mg capsule  Mother No No   Sig: TAKE ONE CAPSULE -PLACE WHOLE IN YOGURT/PUDDING BY MOUTH 3 TIMES A DAY (9AM-4PM-9PM) *CALL PHARMACY 5 DAYS IN ADVANCE FOR REFILL*   fluticasone (FLONASE) 50 mcg/act nasal spray  Mother No No   Si spray into each nostril daily   glycerin adult 2 g suppository  Mother No No   Sig: Place 1 suppository QOD for 2 weeks, then QOD PRN for constipation   hydrocortisone (ANUSOL-HC) 25 mg suppository   No No   Sig: Insert 1 suppository (25 mg total) into the rectum daily at bedtime for 7 days   ibuprofen (MOTRIN) 200 mg tablet  Mother No No   Sig: Take 2 tabs q6h PRN for pain not to exceed 2000mg per day   levothyroxine 50 mcg tablet  Mother No No   Sig: Take 1 tablet (50 mcg total) by mouth daily   lidocaine (LIDODERM) 5 %  Mother No No   Sig: Apply 1 patch topically daily as needed (back pain) Remove & Discard patch within 12 hours or as directed by MD   loratadine (CLARITIN) 10 mg tablet  Mother No No   Sig: Take 1 tablet (10 mg total) by mouth daily as needed for allergies   modafinil (PROVIGIL) 100 mg tablet  Mother No No   Sig: TAKE TWO TABLETS -PLACE WHOLE IN YOGURT/PUDDING BY MOUTH EVERY DAY (9AM)   omeprazole (PriLOSEC) 20 mg delayed release capsule  Mother No No   Sig: Take 20mg at 9am every day   onabotulinumtoxin A (BOTOX) 100 units  Mother Yes No   Sig: inject 500 units into left gastroc soleus and abductor pollicis ankit   onabotulinumtoxin A (Botox) 100 units  Mother Yes No   Sig: Botox 100 unit injection   bilateral gastrocsoleus muscles   polyethylene glycol (MIRALAX) 17 g packet  Mother No No   Sig: Take 17 g by mouth 2 (two) times a day   polyethylene glycol (MIRALAX) 17 g packet  Care Giver No No   Sig: Take 17 g by mouth 2 (two) times a day   ranitidine (ZANTAC) 150 mg tablet  Mother Yes No   Sig: ranitidine 150 mg tablet   sertraline (ZOLOFT) 100 mg tablet  Mother No No   Sig: Take 1 tablet (100 mg total) by mouth daily   sodium chloride (DEEP SEA NASAL SPRAY) 0 65 % nasal spray  Mother No No   Si sprays into each nostril as needed for congestion   wheat dextrin (Brice Batters)  Mother No No   Sig: Take 1 packet by mouth daily   zinc oxide (DESITIN) 40 % PSTE  Mother Yes No   Sig: Apply topically      Facility-Administered Medications: None       Past Medical History:   Diagnosis Date    Abnormal ECG  and beyond    post cardiac arrest    Allergic rhinitis     [facility] anoxia ([facility] )     Cardiac arrest [facility])     age 13 s/p long Q-T syndrome    Community acquired pneumonia     last assessed/resolved:  10/9/2014    Depression     Disease of thyroid gland     GERD (gastroesophageal reflux disease)     Hypothyroid 2020    Long Q-T syndrome     Muscular rigidity and spasm, progressive     Osteopenia     Osteoporosis     Quadriplegia ([facility] [address] )     Scoliosis of thoracic spine 2020    Spastic neurogenic bladder     Syncope     beta blocker medication DC because of low blood pressure    Urinary incontinence     Urinary tract infection without hematuria 2019       Past Surgical History:   Procedure Laterality Date    APPENDECTOMY    WISDOM TOOTH EXTRACTION         Family History   Problem Relation Age of Onset    Other Father         mitral valve replaced    Hypertension Father     Diabetes type II Maternal Grandfather     Heart failure Maternal Grandfather         Congestive heart failure -     Diabetes type II Maternal Uncle     Hypotension Mother      I have reviewed and agree with the history as documented  E-Cigarette/Vaping    E-Cigarette Use Never User      E-Cigarette/Vaping Substances    Nicotine No     Flavoring No      Social History     Tobacco Use    Smoking status: Never Smoker    Smokeless tobacco: Never Used   Substance Use Topics    Alcohol use: No     Frequency: Never     Binge frequency: Never    Drug use: No        Review of Systems   Constitutional: Negative for chills, diaphoresis and fever  Cardiovascular: Negative for chest pain, palpitations and leg swelling  Gastrointestinal: Negative for abdominal pain, constipation, nausea and vomiting  Musculoskeletal: Negative for neck stiffness  Neurological: Positive for speech difficulty, weakness and headaches  Negative for dizziness and numbness  Physical Exam  ED Triage Vitals [20]   Temperature Pulse Respirations Blood Pressure SpO2   97 8 °F (36 6 °C) 86 18 120/67 97 %      Temp Source Heart Rate Source Patient Position - Orthostatic VS BP Location FiO2 (%)   Axillary Monitor Sitting Right arm --      Pain Score       --             Orthostatic Vital Signs  Vitals:    20   BP: 120/67   Pulse: 86   Patient Position - Orthostatic VS: Sitting       Physical Exam  HENT:      Head: Normocephalic  Comments: Bruise 1 cm on right side of scalp     Nose: Nose normal       Mouth/Throat:      Mouth: Mucous membranes are moist       Pharynx: Oropharynx is clear  Eyes:      Extraocular Movements: Extraocular movements intact  Pupils: Pupils are equal, round, and reactive to light     Neck:      Musculoskeletal: Normal range of motion  No neck rigidity  Cardiovascular:      Rate and Rhythm: Normal rate  Pulses: Normal pulses  Pulmonary:      Effort: Pulmonary effort is normal       Breath sounds: Normal breath sounds  Abdominal:      General: Bowel sounds are normal    Skin:     General: Skin is warm  Capillary Refill: Capillary refill takes less than 2 seconds  Neurological:      Mental Status: He is alert  Motor: Weakness present  Comments: Spasticity+     Psychiatric:         Mood and Affect: Mood normal          ED Medications  Medications   acetaminophen (TYLENOL) tablet 975 mg (975 mg Oral Given 8/28/20 2322)       Diagnostic Studies  Results Reviewed     None                 CT head without contrast   Final Result by Kiersten Morillo MD (08/28 2308)      No acute intracranial abnormality  Central atrophy again evident  Workstation performed: [id number]               Procedures  Procedures      ED Course       US AUDIT      Most Recent Value   Initial Alcohol Screen: US AUDIT-C    1  How often do you have a drink containing alcohol?  0 Filed at: 08/28/2020 2149   2  How many drinks containing alcohol do you have on a typical day you are drinking? 0 Filed at: 08/28/2020 2149   3a  Male UNDER 65: How often do you have five or more drinks on one occasion? 0 Filed at: 08/28/2020 2149   3b  FEMALE Any Age, or MALE 65+: How often do you have 4 or more drinks on one occassion? 0 Filed at: 08/28/2020 2149   Audit-C Score  0 Filed at: 08/28/2020 2149                  LORNE/DAST-10      Most Recent Value   How many times in the past year have you    Used an illegal drug or used a prescription medication for non-medical reasons?   Never Filed at: 08/28/2020 2148                              MDM  Number of Diagnoses or Management Options  Fall, initial encounter: new and requires workup  Injury of head, initial encounter: new and requires workup     Amount and/or Complexity of Data Reviewed  Tests in the radiology section of CPT®: ordered and reviewed  Obtain history from someone other than the patient: yes    Risk of Complications, Morbidity, and/or Mortality  Presenting problems: moderate  Diagnostic procedures: moderate  Management options: moderate    Patient Progress  Patient progress: stable        Disposition  Final diagnoses:   Fall, initial encounter   Injury of head, initial encounter     Time reflects when diagnosis was documented in both MDM as applicable and the Disposition within this note     Time User Action Codes Description Comment    8/28/2020 11:12 PM SHELLY Cazares 8 [S09 90XD] Injury of head, subsequent encounter     8/28/2020 11:13 PM Bhargav Kamillaci [W19  RYDG] Fall, initial encounter     8/28/2020 11:13 PM Bill Nine Modify [E94  CPGL] Fall, initial encounter     8/28/2020 11:13 PM Bill Nine Remove [S09 90XD] Injury of head, subsequent encounter     8/28/2020 11:13 PM Bill Nine Add [J54 69ZJ] Injury of head, initial encounter     8/28/2020 11:13 PM Bill Nine Modify [I71 75CA] Injury of head, initial encounter     8/28/2020 11:13 PM Ratna Dumont [U28  MQAV] Fall, initial encounter     8/28/2020 11:13 PM Bill Nine Remove [W28 65BI] Injury of head, initial encounter     8/28/2020 11:13 PM Bill Nine Add [W19  QEKJ] Fall, initial encounter     8/28/2020 11:13 PM Bill Nine Add [M18 50RG] Injury of head, initial encounter       ED Disposition     ED Disposition Condition Date/Time Comment    Discharge Stable Fri Aug 28, 2020 11:12 PM Maegan Blanks Viscardi discharge to home/self care              Follow-up Information     Follow up With Specialties Details Why Contact Info Additional Information    Gillian Duncan MD [facility] Medicine Schedule an appointment as soon as possible for a visit in 3 days As needed [address] [facility] Emergency Department Emergency Medicine Go to  If symptoms worsen [address] [facility] [address] [phone] AN ED, [address], [place], [place], [address]          Patient's Medications   Discharge Prescriptions    ACETAMINOPHEN (TYLENOL) 500 MG TABLET    Take 2 tablets (1,000 mg total) by mouth every 6 (six) hours as needed for headaches for up to 7 days       Start Date: 8/28/2020 End Date: 9/4/2020       Order Dose: 1,000 mg       Quantity: 30 tablet    Refills: 0    IBUPROFEN (MOTRIN) 400 MG TABLET    Take 1 tablet (400 mg total) by mouth every 6 (six) hours as needed for mild pain for up to 7 days       Start Date: 8/28/2020 End Date: 9/4/2020       Order Dose: 400 mg       Quantity: 28 tablet    Refills: 0     No discharge procedures on file  PDMP Review       Value Time User    PDMP Reviewed  Yes 6/9/2020  3:55 PM Governor Raven MD           ED Provider  Attending physically available and evaluated Veena Cecelia TRINH managed the patient along with the ED Attending      Electronically Signed by         Lazarus Noon, MD  08/28/20 3030       Benny Sheppard MD  08/28/20 6509

## 2020-08-29 NOTE — ED ATTENDING ATTESTATION
8/28/2020  I, Elizabeth Matias MD, saw and evaluated the patient  I have discussed the patient with the resident/non-physician practitioner and agree with the resident's/non-physician practitioner's findings, Plan of Care, and MDM as documented in the resident's/non-physician practitioner's note, except where noted  All available labs and Radiology studies were reviewed  I was present for key portions of any procedure(s) performed by the resident/non-physician practitioner and I was immediately available to provide assistance  At this point I agree with the current assessment done in the Emergency Department  I have conducted an independent evaluation of this patient a history and physical is as follows:  Patient with history of quadriplegia and chronic brain injury presents emergency department for evaluation after mechanical fall  He has had persistent headache since  CT scan with no acute intracranial hemorrhage or signs of fracture  Similar to baseline  Patient is baseline per mom  Continues to complain of headache  Tylenol/Motrin recommended for posttraumatic headache  Return precautions discussed  Patient has baseline  No additional areas of pain or injury      ED Course         Critical Care Time  Procedures

## 2020-08-31 ENCOUNTER — CLINICAL SUPPORT (OUTPATIENT)
Dept: NUTRITION | Facility: HOSPITAL | Age: 35
End: 2020-08-31
Payer: COMMERCIAL

## 2020-08-31 VITALS — WEIGHT: 123.8 LBS | BODY MASS INDEX: 17.72 KG/M2 | HEIGHT: 70 IN

## 2020-08-31 DIAGNOSIS — R62.7 POOR WEIGHT GAIN IN ADULT: ICD-10-CM

## 2020-08-31 PROCEDURE — 97803 MED NUTRITION INDIV SUBSEQ: CPT

## 2020-08-31 NOTE — PROGRESS NOTES
Virtual Brief Visit    Assessment/Plan:See below    Problem List Items Addressed This Visit     None                Reason for visit is No chief complaint on file  Encounter provider BE DIETICIAN    Provider located at [address] [place] [address]  [address] [address]    Recent Visits  Date Type Provider Dept   08/24/20 Office Visit Adriana Huynh MD [address] recent visits within past 7 days and meeting all other requirements     Future Appointments  No visits were found meeting these conditions  Showing future appointments within next 150 days and meeting all other requirements        After connecting through [employer] Teams, the patient was identified by name and date of birth  Young Rodrigez was informed that this is a telemedicine visit and that the visit is being conducted through [employer]  My office door was closed  No one else was in the room  He acknowledged consent and understanding of privacy and security of the platform  The patient has agreed to participate and understands he can discontinue the visit at any time  Patient is aware this is a billable service  Subjective    Naman Mixon is a 37 y o  male     HPI     Past Medical History:   Diagnosis Date    Abnormal ECG 1998 and beyond    post cardiac arrest    Allergic rhinitis     [facility] anoxia ([facility] [address] )     Cardiac arrest [facility])     age 13 s/p long Q-T syndrome    Community acquired pneumonia     last assessed/resolved:  10/9/2014    Depression     Disease of thyroid gland     GERD (gastroesophageal reflux disease)     Hypothyroid 2/26/2020    Long Q-T syndrome     Muscular rigidity and spasm, progressive     Osteopenia     Osteoporosis     Quadriplegia ([facility] [address] )     Scoliosis of thoracic spine 2/27/2020    Spastic neurogenic bladder     Syncope     beta blocker medication DC because of low blood pressure    Urinary incontinence     Urinary tract infection without hematuria 4/27/2019       Past Surgical History:   Procedure Laterality Date    APPENDECTOMY  1991    WISDOM TOOTH EXTRACTION         Current Outpatient Medications   Medication Sig Dispense Refill    acetaminophen (TYLENOL) 325 mg tablet Take 3 tablets (975 mg total) by mouth every 8 (eight) hours as needed for mild pain or headaches 120 tablet 3    acetaminophen (TYLENOL) 500 mg tablet Take 2 tablets (1,000 mg total) by mouth every 6 (six) hours as needed for headaches for up to 7 days 30 tablet 0    ascorbic acid (GNP VITAMIN C) 500 MG tablet Take 500 mg daily at 4 PM 30 tablet 5    b complex-vitamin C-folic acid (RENAL) 1 mg Take 1 mg daily at 4 PM 31 each 5    Benzocaine-Docusate Sodium (Enemeez Plus)  MG ENEM Insert 1 application into the rectum daily as needed (constipation) Use as directed 150 mL 4    bisacodyl (DULCOLAX) 10 mg suppository Use 1 supp  per rectum QOD PRN for constipation, max 3d/week 12 suppository 0    Camphor-Eucalyptus-Menthol (VICKS VAPORUB) 4 73-1 2-2 6 % OINT Apply topically as needed (congestion) 170 g 0    carbamide peroxide (DEBROX) 6 5 % otic solution Administer 5 drops into both ears 2 (two) times a week 15 mL 5    diclofenac sodium (VOLTAREN) 1 % Apply 2 g topically 3 (three) times a day At 10am, 4pm, 9pm to thoracic paravertebral musculature 100 g 2    Disposable Gloves (VINYL GLOVES MEDIUM) MISC by Does not apply route 4 (four) times a day Dispense 3 boxes monthly; Diagnosis R32 3 each 5    docusate sodium (COLACE) 100 mg capsule 100 mg 9am and 9pm 10 capsule 5    docusate sodium (Enemeez Mini) 283 MG enema Insert 1 enema into the rectum daily Use daily or as needed 30 each 0    dronabinol (MARINOL) 2 5 mg capsule TAKE ONE CAPSULE -PLACE WHOLE IN YOGURT/PUDDING BY MOUTH 3 TIMES A DAY (9AM-4PM-9PM) *CALL PHARMACY 5 DAYS IN ADVANCE FOR REFILL* 90 capsule 0    fluticasone (FLONASE) 50 mcg/act nasal spray 1 spray into each nostril daily 1 Bottle 1    glycerin adult 2 g suppository Place 1 suppository QOD for 2 weeks, then QOD PRN for constipation 50 suppository 6    HEMORRHOIDAL 0 25 % suppository - UNWRAP AND INSERT 1 SUPPOSITORY PER RECTUM AS NEEDED FOR HEMORRHOID PAIN RELIEF 12 suppository 11    hydrocortisone (ANUSOL-HC) 25 mg suppository Insert 1 suppository (25 mg total) into the rectum daily at bedtime for 7 days 7 suppository 1    ibuprofen (MOTRIN) 200 mg tablet Take 2 tabs q6h PRN for pain not to exceed 2000mg per day 200 tablet 2    ibuprofen (MOTRIN) 400 mg tablet Take 1 tablet (400 mg total) by mouth every 6 (six) hours as needed for mild pain for up to 7 days 28 tablet 0    Icosapent Ethyl (VASCEPA) 1 g CAPS Take 1 capsule (1 g total) by mouth daily 31 capsule 11    Incontinence Supply Disposable (PREVAIL AIR BRIEFS) MISC 1 each by Does not apply route every 4 (four) hours Please provide 5 briefs per day 150 each 11    Incontinence Supply Disposable (SELECT DISPOSABLE UNDERWEAR SM) MISC Please provide a 30 day supply 10 per day DX R32 incontinence 22 each 6    levothyroxine 50 mcg tablet Take 1 tablet (50 mcg total) by mouth daily 90 tablet 1    lidocaine (LIDODERM) 5 % Apply 1 patch topically daily as needed (back pain) Remove & Discard patch within 12 hours or as directed by MD 30 patch 1    loratadine (CLARITIN) 10 mg tablet Take 1 tablet (10 mg total) by mouth daily as needed for allergies 30 tablet 5    Magnesium Oxide 500 MG TABS Take 1 tablet (500 mg total) by mouth daily Take 1 tablet daily at 4 PM 31 each 5    Melatonin 5 MG TABS Take 1 tablet (5 mg total) by mouth daily at bedtime 31 each 5    Melatonin 5 MG TABS melatonin 5 mg tablet      [facility]   Devices [facility]) MISC Please provide pt with a new cord for power wheelchair 1 each 0    modafinil (PROVIGIL) 100 mg tablet TAKE TWO TABLETS -PLACE WHOLE IN YOGURT/PUDDING BY MOUTH EVERY DAY (9AM) 62 tablet 0    Multiple Vitamins-Minerals (CEROVITE SENIOR) TABS Take 1 tablet by mouth daily 30 tablet 5    Nutritional Supplements (NUTRITIONAL SHAKE) LIQD Take 1 Can by mouth 2 (two) times a day Please provide Boost 90 minutes before lunch and 90 minutes before dinner 60 Bottle 6    omeprazole (PriLOSEC) 20 mg delayed release capsule Take 20mg at 9am every day 30 capsule 5    onabotulinumtoxin A (BOTOX) 100 units inject 500 units into left gastroc soleus and abductor pollicis ankit      onabotulinumtoxin A (Botox) 100 units Botox 100 unit injection   bilateral gastrocsoleus muscles      polyethylene glycol (MIRALAX) 17 g packet Take 17 g by mouth 2 (two) times a day 180 each 3    polyethylene glycol (MIRALAX) 17 g packet Take 17 g by mouth 2 (two) times a day 180 each 3    PREPARATION H 1-0 25-14 4-15 % rectal cream - APPLY RECTALLY AS NEEDED FOR HEMORRHOID PAIN RELIEF 51 g 11    ranitidine (ZANTAC) 150 mg tablet ranitidine 150 mg tablet      sertraline (ZOLOFT) 100 mg tablet Take 1 tablet (100 mg total) by mouth daily 31 tablet 11    Skin Protectants, Misc  ([place]) LOTN Apply to both feet & both legs 2 times daily      sodium chloride (DEEP SEA NASAL SPRAY) 0 65 % nasal spray 2 sprays into each nostril as needed for congestion 44 mL 11    Sodium Fluoride (PREVIDENT 5000 PLUS DT) Apply orally to teeth once daily while brushing at night time do not rinse mouth after brushing      Starch, Thickening, LIQD Nectar thick liquids up to honey thick if coughing occurs as needed, please use Simply Thick liquid only  Discontinue Thick-It powder   237 mL 5    Tea Tree 100 % OIL Apply 1 g topically daily as needed (rash) Apply topically daily to skin folds 60 mL 11    Vitamins A & D (VITAMIN A & D) ointment - APPLY TO AFFECTED AREA (BUTTOCKS/ANAL) AS NEEDED 454 g 11    Wheat Dextrin (BENEFIBER ON THE GO) POWD MIX 1 PACKET IN LIQUID & DRINK BY MOUTH ONCE EVERY DAY (9PM) (Patient not taking: Reported on 7/27/2020) 28 each 10    wheat dextrin (BENEFIBER) Take 1 packet by mouth daily 30 each 3    zinc oxide (DESITIN) 40 % PSTE Apply topically      Zinc Picolinate 25 MG TABS Take 1 76 tablets (44 mg total) by mouth daily Take 2 22 mg capsules daily every other month 60 each 6     No current facility-administered medications for this visit  Allergies   Allergen Reactions    Other      drugs that prolong the QT interval  drugs that prolong the QT interval  Seasonal allergies            Vitals:    08/31/20 0816   Weight: 56 2 kg (123 lb 12 8 oz)   Height: 5' 10" (1 778 m)             VIRTUAL VISIT DISCLAIMER    Naman Martinez acknowledges that he has consented to an online visit or consultation  He understands that the online visit is based solely on information provided by him, and that, in the absence of a face-to-face physical evaluation by the physician, the diagnosis he receives is both limited and provisional in terms of accuracy and completeness  This is not intended to replace a full medical face-to-face evaluation by the physician  Keegan Mccormack understands and accepts these terms  Follow-Up Nutrition Assessment Form    Patient Name: Keegan Mccormack    YOB: 1985    Sex:  Male      Follow Up Date: 8/31/2020  Start Time: 1:00pm Stop Time:1:35pm  Total Minutes: 28 min  Telemed Visit  Mother states covered by Rafael Arita     Data:  Present at session: Zaheer Cohen his mother, Lester Lopez and Tk Patterson all Caregivers   Parent/Patient Concerns: " Shady Prudent now has Hosimoto Thyroid and I don;t want him geeting anymore autoimmune disease'   Medical Dx/Reason for Referral: Abnormal weight loss    Codes: R63 4         Past Medical History:   Diagnosis Date    Abnormal ECG 1998 and beyond    post cardiac arrest    Allergic rhinitis     Brain anoxia ([facility] [address] )     Cardiac arrest [facility])     age 13 s/p long Q-T syndrome    Community acquired pneumonia     last assessed/resolved:  10/9/2014    Depression     Disease of thyroid gland     GERD (gastroesophageal reflux disease)     Hypothyroid 2/26/2020    Long Q-T syndrome     Muscular rigidity and spasm, progressive     Osteopenia     Osteoporosis     Quadriplegia ([address] )     Scoliosis of thoracic spine 2/27/2020    Spastic neurogenic bladder     Syncope     beta blocker medication DC because of low blood pressure    Urinary incontinence     Urinary tract infection without hematuria 4/27/2019    Per 8/24/20 PCP Visit Hypothyroid        -Previous TSH above goal, prompting synthroid increase to 50 mcg 8/4/20  -Repeat TSH 10/5/2020 to assess therapeutic response to synthroid increase        8/19/20 GI Note  Given elevated tTG and weight loss we will proceed with upper endoscopy with small-bowel biopsies to assess for celiac disease  Specimens  A Duodenum, r/o celiac    Chriss Mcgregor Final Diagnosis  A  Duodenum, r/o celiac:  - Small bowel mucosa with no significant histopathologic abnormality   - Negative for malabsorption pattern  - Negative for malignancy  Hx Weaned from EN Support 2000   Dysphagia July 2018-  IDDSI Level 7-Regular diet  IDDSI Level 0-Can manage all liquid types     08/31/2020-continues  Severe Protein Calorie Malnutrition in Acute illness related to inadequate energy intake as evidenced by loss of lean body tissue(temples,intereosseous muscles, scapula, shoulders) and subcutaneous fat loss(rib overlay, buccal pads,orbitals)  Treat with PO diet with 3000 kcal daily to inculde nutrition supplements once daily, weekly weights    03/09/2020 RD Physical Assessment  Severe Protein Calorie Malnutrition in Acute illness related to inadequate energy intake as evidenced by loss of lean body tissue(temples,intereosseous muscles, scapula, shoulders) and subcutaneous fat loss(rib overlay, buccal pads,orbitals)   Treat with PO diet with 3000 kcal daily to inculde nutrition supplements once daily, weekly weights       12/9/19 RD Physical Assessment  Severe Protein Calorie Malnutrition in Acute illness related to inadequate energy intake as evidenced by loss of lean body tissue(temples,intereosseous muscles, scapula, shoulders) and subcutaneous fat loss(rib overlay, buccal pads,orbitals) and 7 5% weight loss in 6 weeks    Treat with PO diet 2200 kcal daily to include nutrition supplements BID, weekly weights       7/8/19 RD Physical Assessment:  Adequate LBM , suboptimal subcutaneous fat mass  Alert   Current Outpatient Medications   Medication Sig Dispense Refill    acetaminophen (TYLENOL) 325 mg tablet Take 3 tablets (975 mg total) by mouth every 8 (eight) hours as needed for mild pain or headaches 120 tablet 3    acetaminophen (TYLENOL) 500 mg tablet Take 2 tablets (1,000 mg total) by mouth every 6 (six) hours as needed for headaches for up to 7 days 30 tablet 0    ascorbic acid (GNP VITAMIN C) 500 MG tablet Take 500 mg daily at 4 PM 30 tablet 5    b complex-vitamin C-folic acid (RENAL) 1 mg Take 1 mg daily at 4 PM 31 each 5    Benzocaine-Docusate Sodium (Enemeez Plus)  MG ENEM Insert 1 application into the rectum daily as needed (constipation) Use as directed 150 mL 4    bisacodyl (DULCOLAX) 10 mg suppository Use 1 supp  per rectum QOD PRN for constipation, max 3d/week 12 suppository 0    Camphor-Eucalyptus-Menthol (VICKS VAPORUB) 4 73-1 2-2 6 % OINT Apply topically as needed (congestion) 170 g 0    carbamide peroxide (DEBROX) 6 5 % otic solution Administer 5 drops into both ears 2 (two) times a week 15 mL 5    diclofenac sodium (VOLTAREN) 1 % Apply 2 g topically 3 (three) times a day At 10am, 4pm, 9pm to thoracic paravertebral musculature 100 g 2    Disposable Gloves (VINYL GLOVES MEDIUM) MISC by Does not apply route 4 (four) times a day Dispense 3 boxes monthly; Diagnosis R32 3 each 5    docusate sodium (COLACE) 100 mg capsule 100 mg 9am and 9pm 10 capsule 5    docusate sodium (Enemeez Mini) 283 MG enema Insert 1 enema into the rectum daily Use daily or as needed 30 each 0    dronabinol (MARINOL) 2 5 mg capsule TAKE ONE CAPSULE -PLACE WHOLE IN YOGURT/PUDDING BY MOUTH 3 TIMES A DAY (9AM-4PM-9PM) *CALL PHARMACY 5 DAYS IN ADVANCE FOR REFILL* 90 capsule 0    fluticasone (FLONASE) 50 mcg/act nasal spray 1 spray into each nostril daily 1 Bottle 1    glycerin adult 2 g suppository Place 1 suppository QOD for 2 weeks, then QOD PRN for constipation 50 suppository 6    HEMORRHOIDAL 0 25 % suppository - UNWRAP AND INSERT 1 SUPPOSITORY PER RECTUM AS NEEDED FOR HEMORRHOID PAIN RELIEF 12 suppository 11    hydrocortisone (ANUSOL-HC) 25 mg suppository Insert 1 suppository (25 mg total) into the rectum daily at bedtime for 7 days 7 suppository 1    ibuprofen (MOTRIN) 200 mg tablet Take 2 tabs q6h PRN for pain not to exceed 2000mg per day 200 tablet 2    ibuprofen (MOTRIN) 400 mg tablet Take 1 tablet (400 mg total) by mouth every 6 (six) hours as needed for mild pain for up to 7 days 28 tablet 0    Icosapent Ethyl (VASCEPA) 1 g CAPS Take 1 capsule (1 g total) by mouth daily 31 capsule 11    Incontinence Supply Disposable (PREVAIL AIR BRIEFS) MISC 1 each by Does not apply route every 4 (four) hours Please provide 5 briefs per day 150 each 11    Incontinence Supply Disposable (SELECT DISPOSABLE UNDERWEAR SM) MISC Please provide a 30 day supply 10 per day DX R32 incontinence 22 each 6    levothyroxine 50 mcg tablet Take 1 tablet (50 mcg total) by mouth daily 90 tablet 1    lidocaine (LIDODERM) 5 % Apply 1 patch topically daily as needed (back pain) Remove & Discard patch within 12 hours or as directed by MD 30 patch 1    loratadine (CLARITIN) 10 mg tablet Take 1 tablet (10 mg total) by mouth daily as needed for allergies 30 tablet 5    Magnesium Oxide 500 MG TABS Take 1 tablet (500 mg total) by mouth daily Take 1 tablet daily at 4 PM 31 each 5    Melatonin 5 MG TABS Take 1 tablet (5 mg total) by mouth daily at bedtime 31 each 5    Melatonin 5 MG TABS melatonin 5 mg tablet      Misc  Devices [facility]) MISC Please provide pt with a new cord for power wheelchair 1 each 0    modafinil (PROVIGIL) 100 mg tablet TAKE TWO TABLETS -PLACE WHOLE IN YOGURT/PUDDING BY MOUTH EVERY DAY (9AM) 62 tablet 0    Multiple Vitamins-Minerals (CEROVITE SENIOR) TABS Take 1 tablet by mouth daily 30 tablet 5    Nutritional Supplements (NUTRITIONAL SHAKE) LIQD Take 1 Can by mouth 2 (two) times a day Please provide Boost 90 minutes before lunch and 90 minutes before dinner 60 Bottle 6    omeprazole (PriLOSEC) 20 mg delayed release capsule Take 20mg at 9am every day 30 capsule 5    onabotulinumtoxin A (BOTOX) 100 units inject 500 units into left gastroc soleus and abductor pollicis ankit      onabotulinumtoxin A (Botox) 100 units Botox 100 unit injection   bilateral gastrocsoleus muscles      polyethylene glycol (MIRALAX) 17 g packet Take 17 g by mouth 2 (two) times a day 180 each 3    polyethylene glycol (MIRALAX) 17 g packet Take 17 g by mouth 2 (two) times a day 180 each 3    PREPARATION H 1-0 25-14 4-15 % rectal cream - APPLY RECTALLY AS NEEDED FOR HEMORRHOID PAIN RELIEF 51 g 11    ranitidine (ZANTAC) 150 mg tablet ranitidine 150 mg tablet      sertraline (ZOLOFT) 100 mg tablet Take 1 tablet (100 mg total) by mouth daily 31 tablet 11    Skin Protectants, Misc  (Brody Moran) LOTN Apply to both feet & both legs 2 times daily      sodium chloride (DEEP SEA NASAL SPRAY) 0 65 % nasal spray 2 sprays into each nostril as needed for congestion 44 mL 11    Sodium Fluoride (PREVIDENT 5000 PLUS DT) Apply orally to teeth once daily while brushing at night time do not rinse mouth after brushing      Starch, Thickening, LIQD Nectar thick liquids up to honey thick if coughing occurs as needed, please use Simply Thick liquid only  Discontinue Thick-It powder   237 mL 5    Tea Tree 100 % OIL Apply 1 g topically daily as needed (rash) Apply topically daily to skin folds 60 mL 11  Vitamins A & D (VITAMIN A & D) ointment - APPLY TO AFFECTED AREA (BUTTOCKS/ANAL) AS NEEDED 454 g 11    Wheat Dextrin (BENEFIBER ON THE GO) POWD MIX 1 PACKET IN LIQUID & DRINK BY MOUTH ONCE EVERY DAY (9PM) (Patient not taking: Reported on 7/27/2020) 28 each 10    wheat dextrin (BENEFIBER) Take 1 packet by mouth daily 30 each 3    zinc oxide (DESITIN) 40 % PSTE Apply topically      Zinc Picolinate 25 MG TABS Take 1 76 tablets (44 mg total) by mouth daily Take 2 22 mg capsules daily every other month 60 each 6     No current facility-administered medications for this visit  Additional Meds/Supplements: Synthroid recently increased   Barriers to Learning: Other: Nonverbal, able to interact   Labs:   7/30/20 0930   TSH 3RD GENERATON  0 358 - 3 740 uIU/mL  4 530High          Height: Ht Readings from Last 3 Encounters:   08/19/20 5' 10" (1 778 m)   08/11/20 5' 10" (1 778 m)   07/27/20 5' 10" (1 778 m)      Weight: Wt Readings from Last 10 Encounters:   08/28/20 57 6 kg (126 lb 15 8 oz)   08/24/20 56 kg (123 lb 8 oz)   08/19/20 57 2 kg (126 lb)   08/11/20 57 2 kg (126 lb)   07/27/20 56 2 kg (124 lb)   03/17/20 56 5 kg (124 lb 8 oz)   02/27/20 56 9 kg (125 lb 8 oz)   12/09/19 56 7 kg (125 lb)   10/24/19 60 2 kg (132 lb 12 8 oz)   10/08/19 59 2 kg (130 lb 9 6 oz)     Estimated body mass index is 18 22 kg/m² as calculated from the following:    Height as of 8/19/20: 5' 10" (1 778 m)  Weight as of 8/28/20: 57 6 kg (126 lb 15 8 oz)  Wt  Change Since Last Visit: []Yes     [x]No  Amount: Weight stable as underweight status      Energy Needs: [address] Equation:   [address] Equation:    7155 x 1 4-1 2=7661-7389 kcal + 600 kcal    Pain Screen: Are you having pain now? No      Goals Achieved:     New Goals:   1  5-1 lb weight gain /week until first goal met at 140 lb  2 Naman to consume 3000 kcal daily to include Nutrition Supplement once daily by next encounter   3  Staff to report no dysphagia events by next session with compliance to [facility] Soft foods with nectar thick liquids       Initial PES: Inadequate energy intake related to inability to consume adequate calories as evidenced by BMI, weight loss, malnutrition , and staff report of increasing fatigue and dysphagia         New PES:- No Change      New Problem List:  1  Ongoing underweight      Assessment:  Staff report difficulty with brown rice  Does well with couscous, white rice ,quinoa  No appointments set up with ST at this time  Diagnosed with Hashimoto Disease  Mother Segundo Houston concerned with Sarah Cohen having an "Anti Hypothyroid Diet"  Explained the current best nutrition guidelines of consuming foods 4 hours after Synthroid/Levothyroxine to encourage bioavailability of the medication and to be cautious with Internet searches claiming other practices  Discussed RD concern with Sarah Cohen not achieving BMI 18 5 past  6 months  Staff food journal with [employer] website  Mother concerned with [employer] Breakfast having too much sugar in it  Although RD encourages caloric intake to achieve healthy BMI , did provide alternative options    Asked staff to initiate nutrient analysis with the software to include weight gain plan  Breakfast 600 kcal, am Snack 400 kcal, Lunch 600 kcal, Afternoon Snack 400 kcal, Dinner 600 kcal, PM Snack [id number]     Medical Nutrition Therapy Intervention:  [x]Individualized Meal Plan-3000 kcal with PO supplement once daily , Chopped Diet, [place] Thick Liquids  []Understanding Lab Values   [x]Basic Pathophysiology of Disease-Underweight, SPCM []Food/Medication Interactions   [x]Food Diary-SPIN to use [place] for House and Consumer intake records []Exercise   []Lifestyle/Behavior Modification Techniques []Medication, Mechanism of Action   [x]Label Reading-Servings of fiber 3 gm or more []Self Blood Glucose Monitoring   [x]Weight/BMI Goals-Achieve 135-140 lb [x]Other - 3/9/2020 Session Provided AND high calorie and high protein nutrition therapy and high calorie recipes  Recommended American Heart Association of 2300 mg of sodium per day  Recommended 2000 mg daily of potassium  Other Notes:Naman is 19 years s/p injury  Age 13 y/o suffered a cardiac arrest at school  Dx Long QT Syndrome   With dx Anoxic Brain Injury,Quadriplegic  Alert ,sitting upright in electric wheelchair he can safely operate on his own   Comprehension:Mother/Staff []Excellent  [x]Very Good  []Good  []Fair   []Poor    Receptivity: Mother/Staff []Excellent  [x]Very Good  []Good  []Fair   []Poor    Expected Compliance:  Mother/Staff []Excellent  []Very Good  [x]Good  []Fair   []Poor      Labs:  CMP  Lab Results   Component Value Date     09/06/2017    K 4 4 07/30/2020     07/30/2020    CO2 32 07/30/2020    BUN 16 07/30/2020    CREATININE 0 66 07/30/2020    GLUF 89 02/28/2020    CALCIUM 9 2 07/30/2020    AST 15 02/21/2020    ALT 30 02/21/2020    ALKPHOS 94 02/21/2020    PROT 8 0 09/06/2017    BILITOT 0 5 09/06/2017    EGFR 124 07/30/2020       BMP  Lab Results   Component Value Date    CALCIUM 9 2 07/30/2020     09/06/2017    K 4 4 07/30/2020    CO2 32 07/30/2020     07/30/2020    BUN 16 07/30/2020    CREATININE 0 66 07/30/2020       Lipids  No results found for: CHOL  Lab Results   Component Value Date    HDL 43 02/21/2020    HDL 40 (L) 03/20/2019     Lab Results   Component Value Date    LDLCALC 89 02/21/2020    LDLCALC 88 03/20/2019     Lab Results   Component Value Date    TRIG 65 02/21/2020    TRIG 129 03/20/2019     No results found for: CHOLHDL    Hemoglobin A1C  Lab Results   Component Value Date    HGBA1C 5 3 09/05/2014       Fasting Glucose  Lab Results   Component Value Date    GLUF 89 02/28/2020       Insulin     Thyroid  Lab Results   Component Value Date    TSH 3 52 03/20/2019       Hepatic Function Panel  Lab Results   Component Value Date    ALT 30 02/21/2020    AST 15 02/21/2020    ALKPHOS 94 02/21/2020    BILITOT 0 5 09/06/2017       Celiac Disease Antibody Panel  Endomysial IgA   Date Value Ref Range Status   07/30/2020 Negative Negative Final     Gliadin IgA   Date Value Ref Range Status   07/30/2020 41 (H) 0 - 19 units Final     Comment:                        Negative                   0 - 19                     Weak Positive             20 - 30                     Moderate to Strong Positive   >30     Gliadin IgG   Date Value Ref Range Status   07/30/2020 35 (H) 0 - 19 units Final     Comment:                        Negative                   0 - 19                     Weak Positive             20 - 30                     Moderate to Strong Positive   >30     IgA   Date Value Ref Range Status   07/30/2020 558 (H) 90 - 386 mg/dL Final     TISSUE TRANSGLUTAMINASE IGA   Date Value Ref Range Status   07/30/2020 5 (H) 0 - 3 U/mL Final     Comment:                                   Negative        0 -  3                                Weak Positive   4 - 10                                Positive           >10   Tissue Transglutaminase (tTG) has been identified   as the endomysial antigen  Studies have demonstr-   ated that endomysial IgA antibodies have over 99%   specificity for gluten sensitive enteropathy  Iron  No results found for: IRON, TIBC, FERRITIN    Vitamins  No results found for: VITAMIN B2   Nicotinamide   Date Value Ref Range Status   07/05/2018 <20 ng/mL Final     Comment:      Nicotinamide is a metabolite of nicotinic acid  Due to the large  variability in the metabolism of nicotinic acid, plasma   concentrations of this metabolite are variable  In one study,   fasting plasma concentrations were reported to be approximately   40 ng/mL   In another study it was reported that the   administration of a single 1000 mg of extended-release tablet of   nicotinic acid resulted in a mean peak nicotinamide concentration  of 400 ng/mL between 5 and 10 hours post dose, decreasing to   about 100 ng/mL by 16 hours post dose  This test was developed and its analytical performance   characteristics have been determined by [facility]  It has not been cleared or approved by the [employer] and Drug Administration  This assay has been validated   pursuant to the CLIA regulations and is used for clinical   purposes  Nicotinic Acid   Date Value Ref Range Status   07/05/2018 <20 ng/mL Final     Comment:      Due to the large variability in the metabolism of nicotinic   acid, the dosing preparation used (immediate-release vs  extended  release), and the mg doses used, the serum concentrations may   range from less than 20 ng/mL to about 30,000 ng/mL  After oral   administration of an immediate-release tablet, peak plasma   concentrations occur in 4 to 5 hours  The plasma half-life of   nicotinic acid is about one hour  In one study, fasting plasma   concentrations were reported to be less than 20 ng/mL  In another  study, it was reported that the administration of a single 1000   mg extended-release tablet resulted in mean nicotinic acid   concentrations of less than 50 ng/mL          No results found for: VITAMINB6  Lab Results   Component Value Date    VOSXAUJL02 6,436 (H) 07/30/2020     No results found for: VITB5  No results found for: P0ZZULFT  No results found for: THYROGLB  No results found for: VITAMIN K   No results found for: 25-HYDROXY VIT D   No components found for: [address] [address]

## 2020-08-31 NOTE — PATIENT INSTRUCTIONS
New Goals:   1  5-1 lb weight gain /week until first goal met at 140 lb  2 Naman to consume 3000 kcal daily to include Nutrition Supplement once daily by next encounter   3  Staff to report no dysphagia events by next session with compliance to [facility] Soft foods with nectar thick liquids

## 2020-09-03 ENCOUNTER — OFFICE VISIT (OUTPATIENT)
Dept: OBGYN CLINIC | Facility: OTHER | Age: 35
End: 2020-09-03
Payer: COMMERCIAL

## 2020-09-03 VITALS
HEIGHT: 70 IN | BODY MASS INDEX: 17.76 KG/M2 | DIASTOLIC BLOOD PRESSURE: 76 MMHG | SYSTOLIC BLOOD PRESSURE: 113 MMHG | HEART RATE: 82 BPM

## 2020-09-03 DIAGNOSIS — R25.2 SPASTICITY: ICD-10-CM

## 2020-09-03 DIAGNOSIS — S80.01XA CONTUSION OF RIGHT KNEE, INITIAL ENCOUNTER: ICD-10-CM

## 2020-09-03 DIAGNOSIS — S09.90XA INJURY OF HEAD, INITIAL ENCOUNTER: Primary | ICD-10-CM

## 2020-09-03 DIAGNOSIS — G93.1 ANOXIC BRAIN DAMAGE (HCC): ICD-10-CM

## 2020-09-03 DIAGNOSIS — M41.9 SCOLIOSIS OF THORACIC SPINE, UNSPECIFIED SCOLIOSIS TYPE: ICD-10-CM

## 2020-09-03 DIAGNOSIS — S06.9X0D TRAUMATIC BRAIN INJURY, WITHOUT LOSS OF CONSCIOUSNESS, SUBSEQUENT ENCOUNTER: ICD-10-CM

## 2020-09-03 PROCEDURE — 99215 OFFICE O/P EST HI 40 MIN: CPT | Performed by: FAMILY MEDICINE

## 2020-09-03 RX ORDER — MODAFINIL 100 MG/1
TABLET ORAL
Qty: 60 TABLET | Refills: 0 | Status: SHIPPED | OUTPATIENT
Start: 2020-09-03 | End: 2020-09-29

## 2020-09-03 NOTE — PROGRESS NOTES
1  Injury of head, initial encounter     2  Scoliosis of thoracic spine, unspecified scoliosis type  Ambulatory Referral to [address] [place] [address] Mihir Perez   3  Anoxic brain damage [facility])  Ambulatory Referral to Home Health   4  Spasticity  Ambulatory Referral to Home Health   5  Contusion of right knee, initial encounter       Orders Placed This Encounter   Procedures    Ambulatory Referral to [address] Studies (I personally reviewed images in PACS and report):    Past diagnostics:  CT head 08/28/2020 Report:   No acute intracranial abnormality  Central atrophy again evident        IMPRESSION:  Fall with head injury and intermittent headaches possible concussion  Chronic lower extremity muscle spasticity  Thoracolumbar scoliosis  PMH: Anoxic brain injury secondary to long QT syndrome associated cardiac arrest        Repeat X-ray next visit:   none      Return if symptoms worsen or fail to improve, for to follow-up with Dr Jaime Chan  Patient Instructions   Explained to mother that I recommend continued daily physical therapy home exercise program  I have placed order for home care evaluation for physical therapy to provide refresher course for patient's caregivers quarterly with repeat evaluation of patient to ensure proper techniques followed especially since there is a high rate of turnover with caregivers      Recommend continuing with botox injection clinic  I will have manager investigate if such injections for chronic spasticity are provided within [place] [id number] and refer as indicated  Manager has provided patient's mother with [employer] vendor business card to discuss options for new wheelchair since patient slouches and current wheelchair leads to poor posture and possibly pain  Patient requires wheelchair to perform ADL's within the home and home is suitable for wheelchair use  Patient's mother as well as hired caregiver are both able and willing to assist patient with wheelchair   Once chair options are finalized with vendor, I will place specific order  I also explained to mother that his new onset headache disorder could be due to eye strain as she suggested and/or concussion associated with patient's fall  Explained that and reassured that patient has not developed any new onset neurological deficits and he has had CT scan of the head which did not show any evidence of intracranial bleeding  He does follow-up with a neuropathy ophthalmologist and I have asked Mother to make follow-up appointment for evaluation of concussion symptoms as well as patient's headaches  Addendum 09/04/2020: I spoke with mother via telephone and explained to her risk of 2nd hit syndrome if patient strike his head again other falling from a horse or other impact injury  Mother expressed understanding  She explained that at [facility] there is walkers each side with a trained nurse and expertly training horses with minimal risk  She believes benefits outweigh the risk and would like to proceed with scheduled therapy horse riding on 09/08/2020  She does agree to follow-up with neuro Ophthalmology for evaluation of headaches and possible concussion  I will have  attempt to coordinate treatment plan as above  CHIEF COMPLAINT:  Follow-up thoracolumbar scoliosis, chronic muscle spasticity, complains of new onset of fall with head injury    HPI:  Orelia Bernheim is a 37 y o  male  who presents for       Visit 9/4/2020 :  Presents with mother and home caregiver impression, as well as nurse via virtual video call  Follow-up thoracolumbar scoliosis:  Stable  Mother tells me that patient did have consultation visit with  which resulted in no further recommendations for current treatment  Follow-up chronic muscle spasticity:  Stable  She is physician at [facility] for Botox injections    Mother request transferring care to [facility] network for Botox injections  He does continue with home exercise program and physical therapy  Mother requests home physical therapy teaching of maintenance program since she has a high turnover in home caregivers  Also complains of recent fall on 08/28/2020  This occurred within close proximity of patient's caregiver, Nilson Dill, who is in the room during today's exam and tells me that patient fell to the side out of his chair landing on buttock while caregivers head was turned  Though fall was unwitnessed oatient was visualized seated on floor holding his head  Mother tells me that there was abrasion on his head right side which has since healed  He was evaluated emergency department on same day of injury with a CT scan which was normal   Patient also was noted to have right knee redness after fall without swelling that resolved same day  Denies any knee pain today  Denies any other injuries  Mother tells me that approximately 2 days prior to his fall patient did develop new onset headaches  Since his fall he has had intermittent headaches  He does see a neuro ophthalmologist at [facility] for associated eye strain  Review of Systems   Constitutional: Negative for chills and fever  Neurological: Negative for weakness (no new weakness)           Following history reviewed and update:    Past Medical History:   Diagnosis Date    Abnormal ECG 1998 and beyond    post cardiac arrest    Allergic rhinitis     Brain anoxia ([facility] [address] )     Cardiac arrest [facility])     age 13 s/p long Q-T syndrome    Community acquired pneumonia     last assessed/resolved:  10/9/2014    Depression     Disease of thyroid gland     GERD (gastroesophageal reflux disease)     Hypothyroid 2/26/2020    Long Q-T syndrome     Muscular rigidity and spasm, progressive     Osteopenia     Osteoporosis     Quadriplegia ([facility] [address] )     Scoliosis of thoracic spine 2/27/2020    Spastic neurogenic bladder     Syncope     beta blocker medication DC because of low blood pressure    Urinary incontinence     Urinary tract infection without hematuria 2019     Past Surgical History:   Procedure Laterality Date    APPENDECTOMY      WISDOM TOOTH EXTRACTION       Social History   Social History     Substance and Sexual Activity   Alcohol Use No    Frequency: Never    Binge frequency: Never     Social History     Substance and Sexual Activity   Drug Use No     Social History     Tobacco Use   Smoking Status Never Smoker   Smokeless Tobacco Never Used     Family History   Problem Relation Age of Onset    Other Father         mitral valve replaced    Hypertension Father     Diabetes type II Maternal Grandfather     Heart failure Maternal Grandfather         Congestive heart failure -     Diabetes type II Maternal Uncle     Hypotension Mother      Allergies   Allergen Reactions    Other      drugs that prolong the QT interval  drugs that prolong the QT interval  Seasonal allergies           Physical Exam  /76   Pulse 82   Ht 5' 10" (1 778 m)   BMI 17 76 kg/m²     Constitutional:  see vital signs  Gen: well-developed, normocephalic/atraumatic, well-groomed  Eyes: No inflammation or discharge of conjunctiva or lids; sclera clear   Pharynx: no inflammation, lesion, or mass of lips  Neck: supple, no masses, non-distended  MSK: no inflammation, lesion, mass, or clubbing of nails and digits except for other than mentioned below  SKIN: no visible rashes or skin lesions  Pulmonary/Chest: Effort normal  No respiratory distress     NEURO: cranial nerves grossly intact  PSYCH:  Alert and oriented to person, place, and time; recent and remote memory intact; mood normal, no depression, anxiety, or agitation, judgment and insight good and intact     Ortho Exam    Constitutional:  see vital signs  Gen: normocephalic/atraumatic, well-groomed, in wheelchair   Eyes: No inflammation or discharge of conjunctiva or lids; sclera clear Pharynx: no inflammation, lesion, or mass of lips  Neck: supple, no masses, non-distended  MSK: no inflammation, lesion, mass, or clubbing of nails and digits except for other than mentioned below  SKIN: no visible rashes or skin lesions  Pulmonary/Chest: Effort normal  No respiratory distress  NEURO: cranial nerves grossly intact  PSYCH:  Alert and oriented to person, place; mood normal, no depression, anxiety, or agitation    RIGHT KNEE:  Erythema: no  Swelling: no  Increased Warmth: no  Tenderness: none  Flexion: intact  Extension: intact    Carrillo Sign: none  Raccoon Eyes: none  Ear Drainage: none  Nose Drainage: none  PERRL  EOMI:  Normal without pain  Smooth Pursuits: normal    Cervical  ROM: intact  Tenderness: no spinous process tenderness; no other tenderness    BACK EXAM:  BACK TENDERNESS:  Spinous Processes: no  Paraspinal Muscles: no  SI Joint: no  Sacrum: no    ROM:  Restrained in wheelchair  Able to bend forward without pain for back examination  HIP:  LOG ROLL: negative    Ankle exam right:  No swelling erythema or increased warmth  Nontender  Range-of-motion -5 dorsiflexion, 35 plantar flexion      Ankle exam left:  No swelling erythema or increased warmth  Nontender  Range-of-motion -5 dorsiflexion, 35 plantar flexion    Procedures      Total visit time was 40+ minutes of which more than 50% was face to face counseling and/or coordination of care with patient and caregivers including mother and home caregiver, Siva Chung, in room as well as nurse via virtual call regarding patient's treatment plan as outlined in note

## 2020-09-03 NOTE — PATIENT INSTRUCTIONS
Explained to mother that I recommend continued daily physical therapy home exercise program  I have placed order for home care evaluation for physical therapy to provide refresher course for patient's caregivers quarterly with repeat evaluation of patient to ensure proper techniques followed especially since there is a high rate of turnover with caregivers      Recommend continuing with botox injection clinic  I will have manager investigate if such injections for chronic spasticity are provided within [place] [address] and refer as indicated  Manager has provided patient's mother with DME vendor business card to discuss options for new wheelchair since patient slouches and current wheelchair leads to poor posture and possibly pain  Patient requires wheelchair to perform ADL's within the home and home is suitable for wheelchair use  Patient's mother as well as hired caregiver are both able and willing to assist patient with wheelchair  Once chair options are finalized with vendor, I will place specific order  I also explained to mother that his new onset headache disorder could be due to eye strain as she suggested and/or concussion associated with patient's fall  Explained that and reassured that patient has not developed any new onset neurological deficits and he has had CT scan of the head which did not show any evidence of intracranial bleeding  He does follow-up with a neuropathy ophthalmologist and I have asked Mother to make follow-up appointment for evaluation of concussion symptoms as well as patient's headaches  Addendum 09/04/2020: I spoke with mother via telephone and explained to her risk of 2nd hit syndrome if patient strike his head again other falling from a horse or other impact injury  Mother expressed understanding  She explained that at [facility] there is walkers each side with a trained nurse and expertly training horses with minimal risk    She believes benefits outweigh the risk and would like to proceed with scheduled therapy horse riding on 09/08/2020  She does agree to follow-up with neuro Ophthalmology for evaluation of headaches and possible concussion  I will have  attempt to coordinate treatment plan as above

## 2020-09-04 ENCOUNTER — TELEPHONE (OUTPATIENT)
Dept: OBGYN CLINIC | Facility: OTHER | Age: 35
End: 2020-09-04

## 2020-09-04 NOTE — TELEPHONE ENCOUNTER
Can you investigate this  What is the anticipated time frame for when they will be able to accept new patients  This mother wishes to keep everything under [facility]  Also, is there a list to place patient on for first available?

## 2020-09-04 NOTE — TELEPHONE ENCOUNTER
Please help coordinate and not let slip through the cracks  thanks    Checklist for Naman: Mother's name is Wallace Wade    1  Home therapy evaluation and standing order for refresher course every 3-4 months with home care providers    2  Recommend continuing with botox injection clinic  I will have manager investigate if such injections for chronic spasticity are provided within [facility] and refer as indicated  (reach out to pain management)  3  DME Vendor follow-up for new wheelchair  Once chair options are finalized with vendor, I will place specific order

## 2020-09-08 NOTE — TELEPHONE ENCOUNTER
Spoke to Venus Mayo, explained above  She stated she will reach out to the patient's mother in regards to scheduling him with [address] services  Spoke to patient's mom and advised that [facility] will be reaching out for scheduling within 24 to 48 hrs per Venus Mayo  Patient's mom verbalized understanding

## 2020-09-09 ENCOUNTER — OFFICE VISIT (OUTPATIENT)
Dept: ENDOCRINOLOGY | Facility: CLINIC | Age: 35
End: 2020-09-09
Payer: COMMERCIAL

## 2020-09-09 VITALS — TEMPERATURE: 97.2 F | SYSTOLIC BLOOD PRESSURE: 108 MMHG | DIASTOLIC BLOOD PRESSURE: 72 MMHG | HEART RATE: 84 BPM

## 2020-09-09 DIAGNOSIS — S06.9X0D TRAUMATIC BRAIN INJURY, WITHOUT LOSS OF CONSCIOUSNESS, SUBSEQUENT ENCOUNTER: ICD-10-CM

## 2020-09-09 DIAGNOSIS — E03.9 HYPOTHYROIDISM, UNSPECIFIED TYPE: Primary | ICD-10-CM

## 2020-09-09 PROCEDURE — 99214 OFFICE O/P EST MOD 30 MIN: CPT | Performed by: INTERNAL MEDICINE

## 2020-09-09 PROCEDURE — 3078F DIAST BP <80 MM HG: CPT | Performed by: INTERNAL MEDICINE

## 2020-09-09 RX ORDER — DRONABINOL 2.5 MG/1
CAPSULE ORAL
Qty: 90 CAPSULE | Refills: 0 | OUTPATIENT
Start: 2020-09-09

## 2020-09-09 RX ORDER — DRONABINOL 2.5 MG/1
CAPSULE ORAL
Qty: 90 CAPSULE | Refills: 0 | Status: SHIPPED | OUTPATIENT
Start: 2020-09-09 | End: 2022-05-18

## 2020-09-09 NOTE — PROGRESS NOTES
Chief Complaint   Patient presents with    Hypothyroidism      Referring Provider  Arie Martinez Md  [address] [place], [address] [facility]     History of Present Illness:   Pooja Velazquez is a 37 y o  male with hypothyroidism seen in follow-up  He was last seen in 3/2020  His mother and support staff accompany him for support  He has a hx of anoxic brain injury and his mother and support staff provide all history  His nurse also was confrenced in by the family  At his initial visit, he started levothyroxine for elevated TSH  TG and TPO antibodies are positive  His does of Levothyroxine is now 50mcg once daily  This was increased by Dr Pranay Aguayo on 8/4/2020  They have not noted changes in his neck  He has dysphagia related to his brain injury, but this has not changed    His mother is concerned about the presence of autoimmune disease, and has questions regarding "anti-inflammatory diets  She also asked this of his nutritionist as well         Patient Active Problem List   Diagnosis    Abnormal bone density screening    Anoxic brain damage (HCC)    Allergic rhinitis    Depression    Long Q-T syndrome    Poor weight gain in adult    Oropharyngeal dysphagia    Spastic hemiplegia of left nondominant side as late effect of cerebral infarction ([address] )    History of sleep disturbance    Aspiration pneumonia (HCC)    Constipation due to neurogenic bowel    Physical deconditioning    Muscular rigidity and spasm, progressive    Torticollis    At risk for aspiration    Frequent UTI    Hx of recurrent pneumonia    Spasticity    Goals of care, counseling/discussion    H/O impacted cerumen    Cardiomegaly    Contracture of hand    Monoallelic mutation of RYR2 gene    Elevated total protein    Hypothyroid    Electrolyte abnormality    Urinary incontinence    Scoliosis of thoracic spine    Polyarthralgia    At risk for atelectasis    Spastic quadriparesis (HCC)      Past Medical History: Diagnosis Date    Abnormal ECG  and beyond    post cardiac arrest    Allergic rhinitis     [facility] anoxia ([facility] )     Cardiac arrest [facility])     age 13 s/p long Q-T syndrome    Community acquired pneumonia     last assessed/resolved:  10/9/2014    Depression     Disease of thyroid gland     GERD (gastroesophageal reflux disease)     Hypothyroid 2020    Long Q-T syndrome     Muscular rigidity and spasm, progressive     Osteopenia     Osteoporosis     Quadriplegia ([facility] )     Scoliosis of thoracic spine 2020    Spastic neurogenic bladder     Syncope     beta blocker medication DC because of low blood pressure    Urinary incontinence     Urinary tract infection without hematuria 2019      Past Surgical History:   Procedure Laterality Date    APPENDECTOMY      WISDOM TOOTH EXTRACTION        Family History   Problem Relation Age of Onset    Other Father         mitral valve replaced    Hypertension Father     Diabetes type II Maternal Grandfather     Heart failure Maternal Grandfather         Congestive heart failure -     Diabetes type II Maternal Uncle     Hypotension Mother      Social History     Tobacco Use    Smoking status: Never Smoker    Smokeless tobacco: Never Used   Substance Use Topics    Alcohol use: No     Frequency: Never     Binge frequency: Never     Allergies   Allergen Reactions    Other      drugs that prolong the QT interval  drugs that prolong the QT interval  Seasonal allergies          Current Outpatient Medications:     acetaminophen (TYLENOL) 325 mg tablet, Take 3 tablets (975 mg total) by mouth every 8 (eight) hours as needed for mild pain or headaches, Disp: 120 tablet, Rfl: 3    ascorbic acid (GNP VITAMIN C) 500 MG tablet, Take 500 mg daily at 4 PM, Disp: 30 tablet, Rfl: 5    b complex-vitamin C-folic acid (RENAL) 1 mg, Take 1 mg daily at 4 PM, Disp: 31 each, Rfl: 5    Benzocaine-Docusate Sodium (Enemeez Plus)  MG ENEM, Insert 1 application into the rectum daily as needed (constipation) Use as directed, Disp: 150 mL, Rfl: 4    bisacodyl (DULCOLAX) 10 mg suppository, Use 1 supp  per rectum QOD PRN for constipation, max 3d/week, Disp: 12 suppository, Rfl: 0    Camphor-Eucalyptus-Menthol (VICKS VAPORUB) 4 73-1 2-2 6 % OINT, Apply topically as needed (congestion), Disp: 170 g, Rfl: 0    carbamide peroxide (DEBROX) 6 5 % otic solution, Administer 5 drops into both ears 2 (two) times a week, Disp: 15 mL, Rfl: 5    diclofenac sodium (VOLTAREN) 1 %, Apply 2 g topically 3 (three) times a day At 10am, 4pm, 9pm to thoracic paravertebral musculature, Disp: 100 g, Rfl: 2    Disposable Gloves (VINYL GLOVES MEDIUM) MISC, by Does not apply route 4 (four) times a day Dispense 3 boxes monthly; Diagnosis R32, Disp: 3 each, Rfl: 5    docusate sodium (COLACE) 100 mg capsule, 100 mg 9am and 9pm, Disp: 10 capsule, Rfl: 5    docusate sodium (Enemeez Mini) 283 MG enema, Insert 1 enema into the rectum daily Use daily or as needed, Disp: 30 each, Rfl: 0    fluticasone (FLONASE) 50 mcg/act nasal spray, 1 spray into each nostril daily, Disp: 1 Bottle, Rfl: 1    glycerin adult 2 g suppository, Place 1 suppository QOD for 2 weeks, then QOD PRN for constipation, Disp: 50 suppository, Rfl: 6    HEMORRHOIDAL 0 25 % suppository, - UNWRAP AND INSERT 1 SUPPOSITORY PER RECTUM AS NEEDED FOR HEMORRHOID PAIN RELIEF, Disp: 12 suppository, Rfl: 11    hydrocortisone (ANUSOL-HC) 25 mg suppository, Insert 1 suppository (25 mg total) into the rectum daily at bedtime for 7 days, Disp: 7 suppository, Rfl: 1    ibuprofen (MOTRIN) 200 mg tablet, Take 2 tabs q6h PRN for pain not to exceed 2000mg per day, Disp: 200 tablet, Rfl: 2    Icosapent Ethyl (VASCEPA) 1 g CAPS, Take 1 capsule (1 g total) by mouth daily, Disp: 31 capsule, Rfl: 11    Incontinence Supply Disposable (PREVAIL AIR BRIEFS) MISC, 1 each by Does not apply route every 4 (four) hours Please provide 5 briefs per day, Disp: 150 each, Rfl: 11    Incontinence Supply Disposable (SELECT DISPOSABLE UNDERWEAR SM) MISC, Please provide a 30 day supply 10 per day DX R32 incontinence, Disp: 22 each, Rfl: 6    levothyroxine 50 mcg tablet, Take 1 tablet (50 mcg total) by mouth daily, Disp: 90 tablet, Rfl: 1    lidocaine (LIDODERM) 5 %, Apply 1 patch topically daily as needed (back pain) Remove & Discard patch within 12 hours or as directed by MD, Disp: 30 patch, Rfl: 1    loratadine (CLARITIN) 10 mg tablet, Take 1 tablet (10 mg total) by mouth daily as needed for allergies, Disp: 30 tablet, Rfl: 5    Magnesium Oxide 500 MG TABS, Take 1 tablet (500 mg total) by mouth daily Take 1 tablet daily at 4 PM, Disp: 31 each, Rfl: 5    Melatonin 5 MG TABS, Take 1 tablet (5 mg total) by mouth daily at bedtime, Disp: 31 each, Rfl: 5    Misc   Devices [facility] MISC, Please provide pt with a new cord for power wheelchair, Disp: 1 each, Rfl: 0    modafinil (PROVIGIL) 100 mg tablet, TAKE TWO TABLETS -PLACE WHOLE IN YOGURT/PUDDING BY MOUTH EVERY DAY (9AM), Disp: 60 tablet, Rfl: 0    Multiple Vitamins-Minerals (CEROVITE SENIOR) TABS, Take 1 tablet by mouth daily, Disp: 30 tablet, Rfl: 5    Nutritional Supplements (NUTRITIONAL SHAKE) LIQD, Take 1 Can by mouth 2 (two) times a day Please provide Boost 90 minutes before lunch and 90 minutes before dinner, Disp: 60 Bottle, Rfl: 6    omeprazole (PriLOSEC) 20 mg delayed release capsule, Take 20mg at 9am every day, Disp: 30 capsule, Rfl: 5    onabotulinumtoxin A (BOTOX) 100 units, inject 500 units into left gastroc soleus and abductor pollicis ankit, Disp: , Rfl:     polyethylene glycol (MIRALAX) 17 g packet, Take 17 g by mouth 2 (two) times a day, Disp: 180 each, Rfl: 3    PREPARATION H 1-0 25-14 4-15 % rectal cream, - APPLY RECTALLY AS NEEDED FOR HEMORRHOID PAIN RELIEF, Disp: 51 g, Rfl: 11    sertraline (ZOLOFT) 100 mg tablet, Take 1 tablet (100 mg total) by mouth daily, Disp: 31 tablet, Rfl: [address], Misc  (HYDROCERIN) LOTN, Apply to both feet & both legs 2 times daily, Disp: , Rfl:     sodium chloride (DEEP SEA NASAL SPRAY) 0 65 % nasal spray, 2 sprays into each nostril as needed for congestion, Disp: 44 mL, Rfl: 11    Sodium Fluoride (PREVIDENT 5000 PLUS DT), Apply orally to teeth once daily while brushing at night time do not rinse mouth after brushing, Disp: , Rfl:     Starch, Thickening, LIQD, Nectar thick liquids up to honey thick if coughing occurs as needed, please use Simply Thick liquid only   Discontinue Thick-It powder , Disp: 237 mL, Rfl: 5    Tea Tree 100 % OIL, Apply 1 g topically daily as needed (rash) Apply topically daily to skin folds, Disp: 60 mL, Rfl: 11    Vitamins A & D (VITAMIN A & D) ointment, - APPLY TO AFFECTED AREA (BUTTOCKS/ANAL) AS NEEDED, Disp: 454 g, Rfl: 11    zinc oxide (DESITIN) 40 % PSTE, Apply topically, Disp: , Rfl:     Zinc Picolinate 25 MG TABS, Take 1 76 tablets (44 mg total) by mouth daily Take 2 22 mg capsules daily every other month, Disp: 60 each, Rfl: 6    dronabinol (MARINOL) 2 5 mg capsule, TAKE ONE CAPSULE -PLACE WHOLE IN YOGURT/PUDDING BY MOUTH 3 TIMES A DAY (9AM-4PM-9PM) *CALL PHARMACY 5 DAYS IN ADVANCE FOR REFILL*, Disp: 90 capsule, Rfl: 0    ibuprofen (MOTRIN) 400 mg tablet, Take 1 tablet (400 mg total) by mouth every 6 (six) hours as needed for mild pain for up to 7 days, Disp: 28 tablet, Rfl: 0    Melatonin 5 MG TABS, melatonin 5 mg tablet, Disp: , Rfl:     ranitidine (ZANTAC) 150 mg tablet, ranitidine 150 mg tablet, Disp: , Rfl:     Wheat Dextrin (BENEFIBER ON THE GO) POWD, MIX 1 PACKET IN LIQUID & DRINK BY MOUTH ONCE EVERY DAY (9PM) (Patient not taking: Reported on 7/27/2020), Disp: 28 each, Rfl: 10    wheat dextrin (BENEFIBER), Take 1 packet by mouth daily (Patient not taking: Reported on 9/9/2020), Disp: 30 each, Rfl: 3  Review of Systems   Unable to perform ROS: Patient nonverbal       Physical Exam:  There is no height or weight on file to calculate BMI  /72   Pulse 84   Temp (!) 97 2 °F (36 2 °C)    Wt Readings from Last 3 Encounters:   08/31/20 56 2 kg (123 lb 12 8 oz)   08/28/20 57 6 kg (126 lb 15 8 oz)   08/24/20 56 kg (123 lb 8 oz)       GEN: NAD  E/n/m mask in place  Eyes: EOMI  Neck: trachea midline, thyroid NT to palpation, nl in size, no nodules or neck masses noted, no cervical LAD  Resp: good effort  Neuro: no tremor  MS: seated in wheelchair, bilat upper ext contractions   Skin: warm and dry, no palmar erythema  Psych: awake and alert    DATA:  Labs:       Lab Results   Component Value Date    TSH 3 52 03/20/2019    FREET4 0 91 07/30/2020     Lab Results   Component Value Date    OTG2NEIEDJXM 4 530 (H) 07/30/2020    TSH 3 52 03/20/2019         Radiology    Impression:  1  Hypothyroidism, unspecified type           Plan:    Sarah Cohen was seen today for hypothyroidism  Diagnoses and all orders for this visit:    Hypothyroidism, unspecified type  -     TSH, 3rd generation Lab Collect; Future         1  Hypothyroidism, autoimmune: At this time, I recommend checking TSH in 2-3 weeks to assess the current dose of levothyroxine  I answered his mother's questions re: diet, and I do not advise any type of diet to modulate the immune system  I agree with the nutrition assessment at this time  Discussed with the family and all questioned fully answered  He will call me if any problems arise          Jesus Kline MD

## 2020-09-11 DIAGNOSIS — R25.2 SPASTICITY: Primary | ICD-10-CM

## 2020-09-11 NOTE — TELEPHONE ENCOUNTER
I called and left a message for Angi Dodge to check the status of patient's home health care  I did reach out to [facility] regarding patients wheelchair  They will get back to me on Monday  I spoke with Pain Management and they do not do Botox, however I also spoke with Neurology and and they do Botox for chronic spasticity  Can you please provide a referral to Neurology and I will make arrangements for an appointment?

## 2020-09-29 DIAGNOSIS — S06.9X0D TRAUMATIC BRAIN INJURY, WITHOUT LOSS OF CONSCIOUSNESS, SUBSEQUENT ENCOUNTER: ICD-10-CM

## 2020-09-29 RX ORDER — MODAFINIL 200 MG/1
TABLET ORAL
Qty: 180 TABLET | Refills: 0 | Status: SHIPPED | OUTPATIENT
Start: 2020-09-29

## 2020-10-01 ENCOUNTER — OFFICE VISIT (OUTPATIENT)
Dept: GASTROENTEROLOGY | Facility: CLINIC | Age: 35
End: 2020-10-01
Payer: COMMERCIAL

## 2020-10-01 VITALS
TEMPERATURE: 98.1 F | WEIGHT: 129 LBS | DIASTOLIC BLOOD PRESSURE: 72 MMHG | SYSTOLIC BLOOD PRESSURE: 112 MMHG | HEIGHT: 70 IN | BODY MASS INDEX: 18.47 KG/M2

## 2020-10-01 DIAGNOSIS — K59.2 CONSTIPATION DUE TO NEUROGENIC BOWEL: Primary | ICD-10-CM

## 2020-10-01 DIAGNOSIS — R13.12 OROPHARYNGEAL DYSPHAGIA: ICD-10-CM

## 2020-10-01 DIAGNOSIS — K59.00 CONSTIPATION DUE TO NEUROGENIC BOWEL: Primary | ICD-10-CM

## 2020-10-01 PROCEDURE — 1036F TOBACCO NON-USER: CPT | Performed by: INTERNAL MEDICINE

## 2020-10-01 PROCEDURE — 99214 OFFICE O/P EST MOD 30 MIN: CPT | Performed by: INTERNAL MEDICINE

## 2020-11-05 ENCOUNTER — TRANSCRIBE ORDERS (OUTPATIENT)
Dept: ADMISSIONS | Facility: HOSPITAL | Age: 35
End: 2020-11-05

## 2020-11-13 ENCOUNTER — TELEPHONE (OUTPATIENT)
Dept: FAMILY MEDICINE CLINIC | Facility: CLINIC | Age: 35
End: 2020-11-13

## 2020-11-13 DIAGNOSIS — Z20.822 EXPOSURE TO COVID-19 VIRUS: Primary | ICD-10-CM

## 2020-11-16 DIAGNOSIS — Z20.822 EXPOSURE TO COVID-19 VIRUS: ICD-10-CM

## 2020-11-16 PROCEDURE — U0003 INFECTIOUS AGENT DETECTION BY NUCLEIC ACID (DNA OR RNA); SEVERE ACUTE RESPIRATORY SYNDROME CORONAVIRUS 2 (SARS-COV-2) (CORONAVIRUS DISEASE [COVID-19]), AMPLIFIED PROBE TECHNIQUE, MAKING USE OF HIGH THROUGHPUT TECHNOLOGIES AS DESCRIBED BY CMS-2020-01-R: HCPCS | Performed by: FAMILY MEDICINE

## 2020-11-17 ENCOUNTER — TELEPHONE (OUTPATIENT)
Dept: OBGYN CLINIC | Facility: CLINIC | Age: 35
End: 2020-11-17

## 2020-11-17 LAB — SARS-COV-2 RNA SPEC QL NAA+PROBE: NOT DETECTED

## 2020-11-19 NOTE — ASSESSMENT & PLAN NOTE
Pt here for  Follow-up regarding aspiration pneumonia  Patient  And the family and the character and care team reassured her regarding patient's status  Vitals reviewed  Patient has not been febrile since hospital discharge  His post ox today was 97%  Respiratory rate 16  Normotensive  no follow-up labs or images at this time  will consider if follow-up imaging after he returns from his vacation     lungs are clear to auscultation on physical exam  - reassurance and follow-up  A roughly 1 months was primary medicine team 5-Fu Pregnancy And Lactation Text: This medication is Pregnancy Category X and contraindicated in pregnancy and in women who may become pregnant. It is unknown if this medication is excreted in breast milk.

## 2020-11-23 ENCOUNTER — OFFICE VISIT (OUTPATIENT)
Dept: FAMILY MEDICINE CLINIC | Facility: CLINIC | Age: 35
End: 2020-11-23
Payer: COMMERCIAL

## 2020-11-23 VITALS
TEMPERATURE: 96.8 F | SYSTOLIC BLOOD PRESSURE: 90 MMHG | WEIGHT: 130.31 LBS | DIASTOLIC BLOOD PRESSURE: 64 MMHG | BODY MASS INDEX: 18.66 KG/M2 | HEIGHT: 70 IN

## 2020-11-23 DIAGNOSIS — E03.9 HYPOTHYROIDISM, UNSPECIFIED TYPE: ICD-10-CM

## 2020-11-23 DIAGNOSIS — H61.23 BILATERAL IMPACTED CERUMEN: ICD-10-CM

## 2020-11-23 DIAGNOSIS — L85.3 XEROSIS OF SKIN: ICD-10-CM

## 2020-11-23 DIAGNOSIS — R62.7 POOR WEIGHT GAIN IN ADULT: ICD-10-CM

## 2020-11-23 DIAGNOSIS — Z91.09 ENVIRONMENTAL ALLERGIES: ICD-10-CM

## 2020-11-23 DIAGNOSIS — S06.9X9S CHRONIC BRAIN INJURY (HCC): ICD-10-CM

## 2020-11-23 DIAGNOSIS — G82.50 SPASTIC QUADRIPARESIS (HCC): Primary | ICD-10-CM

## 2020-11-23 PROBLEM — S06.9XAS CHRONIC BRAIN INJURY: Status: ACTIVE | Noted: 2020-11-23

## 2020-11-23 PROCEDURE — 3008F BODY MASS INDEX DOCD: CPT | Performed by: INTERNAL MEDICINE

## 2020-11-23 PROCEDURE — 99214 OFFICE O/P EST MOD 30 MIN: CPT | Performed by: FAMILY MEDICINE

## 2020-11-23 RX ORDER — LORATADINE 10 MG/1
10 TABLET ORAL DAILY PRN
Qty: 30 TABLET | Refills: 5 | Status: SHIPPED | OUTPATIENT
Start: 2020-11-23

## 2020-11-24 PROBLEM — Z91.09 ENVIRONMENTAL ALLERGIES: Status: ACTIVE | Noted: 2020-11-24

## 2020-11-25 PROBLEM — L85.3 XEROSIS OF SKIN: Status: ACTIVE | Noted: 2020-11-25

## 2020-12-01 ENCOUNTER — LAB (OUTPATIENT)
Dept: LAB | Age: 35
End: 2020-12-01
Payer: COMMERCIAL

## 2020-12-01 DIAGNOSIS — E03.9 HYPOTHYROIDISM, UNSPECIFIED TYPE: ICD-10-CM

## 2020-12-01 LAB — TSH SERPL DL<=0.05 MIU/L-ACNC: 2.78 UIU/ML (ref 0.36–3.74)

## 2020-12-01 PROCEDURE — 84443 ASSAY THYROID STIM HORMONE: CPT

## 2020-12-01 PROCEDURE — 36415 COLL VENOUS BLD VENIPUNCTURE: CPT

## 2020-12-16 ENCOUNTER — TELEPHONE (OUTPATIENT)
Dept: FAMILY MEDICINE CLINIC | Facility: CLINIC | Age: 35
End: 2020-12-16

## 2020-12-16 DIAGNOSIS — L85.3 XEROSIS OF SKIN: Primary | ICD-10-CM

## 2020-12-16 RX ORDER — LANOLIN ALCOHOL/MO/W.PET/CERES
CREAM (GRAM) TOPICAL
Qty: 480 ML | Refills: 3 | Status: SHIPPED | OUTPATIENT
Start: 2020-12-16

## 2020-12-26 DIAGNOSIS — S06.9X9S CHRONIC BRAIN INJURY (HCC): Primary | ICD-10-CM

## 2020-12-31 RX ORDER — ZINC SULFATE 50(220)MG
CAPSULE ORAL
Qty: 62 CAPSULE | Refills: 0 | Status: SHIPPED | OUTPATIENT
Start: 2020-12-31

## 2021-01-07 ENCOUNTER — TELEPHONE (OUTPATIENT)
Dept: FAMILY MEDICINE CLINIC | Facility: CLINIC | Age: 36
End: 2021-01-07

## 2021-01-07 NOTE — TELEPHONE ENCOUNTER
Mother requested updated letter be written for Lou Gonzalez to get his proper bedrails, etc  Letter written  Personally called [facility] and spoke with the Medical Care Coordinator, Tashi Crain ([phone]), who gave me the fax number to which the letter should be sent  Letter faxed and mother Larissa Humphries) notified      [facility])  [address] [address]  [place], [place] [address]  Office: [phone]  Fax: [phone]

## 2021-01-12 ENCOUNTER — TELEPHONE (OUTPATIENT)
Dept: FAMILY MEDICINE CLINIC | Facility: CLINIC | Age: 36
End: 2021-01-12

## 2021-01-12 NOTE — TELEPHONE ENCOUNTER
Patricia Robbins from [employer] in [place] [address]    Requesting letter:    Limit tv to 30min at a time with 15 minute breaks on letter head   Previous recommendation of neurologist     Fax: [phone]

## 2021-01-12 NOTE — LETTER
January 19, 2021     Patient: Talha Suárez   YOB: 1985       To Whom it May Concern:    Misael Patino has been under my professional care since 2017  Please limit Naman's television viewing time to 30-minute periods, with 15-minute breaks in between  If you have any questions or concerns, please don't hesitate to call           Sincerely,          CHANTE Alcazar

## 2021-01-19 ENCOUNTER — TELEPHONE (OUTPATIENT)
Dept: FAMILY MEDICINE CLINIC | Facility: CLINIC | Age: 36
End: 2021-01-19
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
## 2021-01-29 ENCOUNTER — TELEMEDICINE (OUTPATIENT)
Dept: GASTROENTEROLOGY | Facility: CLINIC | Age: 36
End: 2021-01-29
Payer: COMMERCIAL

## 2021-01-29 VITALS — BODY MASS INDEX: 18.47 KG/M2 | HEIGHT: 70 IN | WEIGHT: 129 LBS

## 2021-01-29 DIAGNOSIS — K59.2 CONSTIPATION DUE TO NEUROGENIC BOWEL: Primary | ICD-10-CM

## 2021-01-29 DIAGNOSIS — R13.12 OROPHARYNGEAL DYSPHAGIA: ICD-10-CM

## 2021-01-29 DIAGNOSIS — K59.00 CONSTIPATION DUE TO NEUROGENIC BOWEL: Primary | ICD-10-CM

## 2021-01-29 PROCEDURE — 99214 OFFICE O/P EST MOD 30 MIN: CPT | Performed by: INTERNAL MEDICINE

## 2021-01-29 NOTE — PROGRESS NOTES
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  He is on a bowel regimen as well as p r n  Laxatives  This is a good regimen so we will not change it  His weight has also increased and he is on a 3000 calorie diet which we will continue  We will follow-up in 6 months time  Problem List Items Addressed This Visit        Digestive    Oropharyngeal dysphagia    Constipation due to neurogenic bowel - Primary               Reason for visit is   Chief Complaint   Patient presents with    Virtual Regular Visit    Virtual Regular Visit        Encounter provider Jay Shannon DO    Provider located at [address] [place] [address] [id number]  [phone]      Recent Visits  Date Type Provider Dept   01/29/21 [id number] Teresa Van,[address], [place]   Showing recent visits within past 7 days and meeting all other requirements     Future Appointments  No visits were found meeting these conditions  Showing future appointments within next 150 days and meeting all other requirements        The patient was identified by name and date of birth  Mary Mayo was informed that this is a telemedicine visit and that the visit is being conducted through [address] [place] [address] and patient was informed that this is not a secure, HIPAA-compliant platform  He agrees to proceed     My office door was closed  No one else was in the room  He acknowledged consent and understanding of privacy and security of the video platform  The patient has agreed to participate and understands they can discontinue the visit at any time  Patient is aware this is a billable service  Subjective  Mary Mayo is a 38 y o  male here for follow-up  He is accompanied by his mom, his caretaker and the  at his facility  His weight is up  His bowel regimen is working  His mom and the staff are happy with his current regimen  He has standing orders for laxatives as well as p r n  Enemas  He now has a BMI of nearly 19  He is being given in over 3000 calorie diet at his facility            Past Medical History:   Diagnosis Date    Abnormal ECG 1998 and beyond    post cardiac arrest    Allergic rhinitis     [facility] )     Cardiac arrest [facility])     age 13 s/p long Q-T syndrome    Community acquired pneumonia     last assessed/resolved:  10/9/2014    Depression     Disease of thyroid gland     GERD (gastroesophageal reflux disease)     Hypothyroid 2/26/2020    Long Q-T syndrome     Muscular rigidity and spasm, progressive     Osteopenia     Osteoporosis     Quadriplegia ([facility] )     Scoliosis of thoracic spine 2/27/2020    Spastic neurogenic bladder     Syncope     beta blocker medication DC because of low blood pressure    Urinary incontinence     Urinary tract infection without hematuria 4/27/2019       Past Surgical History:   Procedure Laterality Date    APPENDECTOMY  1991    WISDOM TOOTH EXTRACTION         Current Outpatient Medications   Medication Sig Dispense Refill    acetaminophen (TYLENOL) 325 mg tablet Take 3 tablets (975 mg total) by mouth every 8 (eight) hours as needed for mild pain or headaches 120 tablet 3    ascorbic acid (GNP VITAMIN C) 500 MG tablet Take 500 mg daily at 4 PM 30 tablet 5    b complex-vitamin C-folic acid (RENAL) 1 mg Take 1 mg daily at 4 PM 31 each 5    Benzocaine-Docusate Sodium (Enemeez Plus)  MG ENEM Insert 1 application into the rectum daily as needed (constipation) Use as directed 150 mL 4    bisacodyl (DULCOLAX) 10 mg suppository Use 1 supp  per rectum QOD PRN for constipation, max 3d/week 12 suppository 0    Camphor-Eucalyptus-Menthol (VICKS VAPORUB) 4 73-1 2-2 6 % OINT Apply topically as needed (congestion) 170 g 0    carbamide peroxide (DEBROX) 6 5 % otic solution Administer 5 drops into both ears 2 (two) times a day for 7 days 15 mL 5    diclofenac sodium (VOLTAREN) 1 % Apply 2 g topically 3 (three) times a day At 10am, 4pm, 9pm to thoracic paravertebral musculature 100 g 2    Disposable Gloves (VINYL GLOVES MEDIUM) MISC by Does not apply route 4 (four) times a day Dispense 3 boxes monthly; Diagnosis R32 3 each 5    docusate sodium (COLACE) 100 mg capsule 100 mg 9am and 9pm 10 capsule 5    docusate sodium (Enemeez Mini) 283 MG enema Insert 1 enema into the rectum daily Use daily or as needed 30 each 0    dronabinol (MARINOL) 2 5 mg capsule TAKE ONE CAPSULE -PLACE WHOLE IN YOGURT/PUDDING BY MOUTH 3 TIMES A DAY (9AM-4PM-9PM) *CALL PHARMACY 5 DAYS IN ADVANCE FOR REFILL* 90 capsule 0    Emollient (eucerin) lotion Apply topically to face daily at 10a and 8p 480 mL 3    fluticasone (FLONASE) 50 mcg/act nasal spray 1 spray into each nostril daily 1 Bottle 1    glycerin adult 2 g suppository Place 1 suppository QOD for 2 weeks, then QOD PRN for constipation 50 suppository 6    HEMORRHOIDAL 0 25 % suppository - UNWRAP AND INSERT 1 SUPPOSITORY PER RECTUM AS NEEDED FOR HEMORRHOID PAIN RELIEF 12 suppository 11    hydrocortisone (ANUSOL-HC) 25 mg suppository Insert 1 suppository (25 mg total) into the rectum daily at bedtime for 7 days 7 suppository 1    ibuprofen (MOTRIN) 200 mg tablet Take 2 tabs q6h PRN for pain not to exceed 2000mg per day 200 tablet 2    ibuprofen (MOTRIN) 400 mg tablet Take 1 tablet (400 mg total) by mouth every 6 (six) hours as needed for mild pain for up to 7 days 28 tablet 0    Icosapent Ethyl (VASCEPA) 1 g CAPS Take 1 capsule (1 g total) by mouth daily 31 capsule 11    Incontinence Supply Disposable (PREVAIL AIR BRIEFS) MISC 1 each by Does not apply route every 4 (four) hours Please provide 5 briefs per day 150 each 11    Incontinence Supply Disposable (SELECT DISPOSABLE UNDERWEAR SM) MISC Please provide a 30 day supply 10 per day DX R32 incontinence 22 each 6    levothyroxine 50 mcg tablet Take 1 tablet (50 mcg total) by mouth daily 90 tablet 1    lidocaine (LIDODERM) 5 % Apply 1 patch topically daily as needed (back pain) Remove & Discard patch within 12 hours or as directed by MD 30 patch 1    loratadine (CLARITIN) 10 mg tablet Take 1 tablet (10 mg total) by mouth daily as needed for allergies 30 tablet 5    Magnesium Oxide 500 MG TABS Take 1 tablet (500 mg total) by mouth daily Take 1 tablet daily at 4 PM 31 each 5    Melatonin 5 MG TABS Take 1 tablet (5 mg total) by mouth daily at bedtime 31 each 5    Misc   Devices [facility]) MISC Please provide pt with a new cord for power wheelchair 1 each 0    modafinil (PROVIGIL) 200 MG tablet TAKE ONE TABLET -PLACE WHOLE IN YOGURT/PUDDING BY MOUTH EVERY DAY (9AM) 180 tablet 0    Multiple Vitamins-Minerals (CEROVITE SENIOR) TABS Take 1 tablet by mouth daily 30 tablet 5    Nutritional Supplements (NUTRITIONAL SHAKE) LIQD Take 1 Can by mouth 2 (two) times a day Please provide Boost 90 minutes before lunch and 90 minutes before dinner 60 Bottle 6    omeprazole (PriLOSEC) 20 mg delayed release capsule Take 20mg at 9am every day 30 capsule 5    onabotulinumtoxin A (BOTOX) 100 units inject 500 units into left gastroc soleus and abductor pollicis ankit      polyethylene glycol (MIRALAX) 17 g packet Take 17 g by mouth 2 (two) times a day 180 each 3    PREPARATION H 1-0 25-14 4-15 % rectal cream - APPLY RECTALLY AS NEEDED FOR HEMORRHOID PAIN RELIEF 51 g 11    ranitidine (ZANTAC) 150 mg tablet ranitidine 150 mg tablet      sertraline (ZOLOFT) 100 mg tablet Take 1 tablet (100 mg total) by mouth daily 31 tablet 11    sodium chloride (DEEP SEA NASAL SPRAY) 0 65 % nasal spray 2 sprays into each nostril as needed for congestion 44 mL 11    Sodium Fluoride (PREVIDENT 5000 PLUS DT) Apply orally to teeth once daily while brushing at night time do not rinse mouth after brushing      Starch, Thickening, LIQD Nectar thick liquids up to honey thick if coughing occurs as needed, please use Simply Thick liquid only  Discontinue Thick-It powder  237 mL 5    Tea Tree 100 % OIL Apply 1 g topically daily as needed (rash) Apply topically daily to skin folds 60 mL 11    Vitamins A & D (VITAMIN A & D) ointment - APPLY TO AFFECTED AREA (BUTTOCKS/ANAL) AS NEEDED 454 g 11    zinc oxide (DESITIN) 40 % PSTE Apply topically      zinc sulfate (ZINCATE) 220 mg capsule TAKE TWO CAPSULE -PLACE WHOLE IN YOGURT/PUDDING BY MOUTH EVERY DAY (9AM) EVERY OTHER MONTH **ODD NUMBERED MONTHS ONLY** 62 capsule 0     No current facility-administered medications for this visit  Allergies   Allergen Reactions    Other      drugs that prolong the QT interval  drugs that prolong the QT interval  Seasonal allergies          Vitals:    01/29/21 0837   Weight: 58 5 kg (129 lb)   Height: 5' 10" (1 778 m)     REVIEW OF SYSTEMS:    CONSTITUTIONAL: Denies any fever, chills, rigors, and weight loss  HEENT: No earache or tinnitus  Denies hearing loss or visual disturbances  CARDIOVASCULAR: No chest pain or palpitations  RESPIRATORY: Denies any cough, hemoptysis, shortness of breath or dyspnea on exertion  GASTROINTESTINAL: As noted in the History of Present Illness  GENITOURINARY: No problems with urination  Denies any hematuria or dysuria  NEUROLOGIC: No dizziness or vertigo, denies headaches  MUSCULOSKELETAL: Denies any muscle or joint pain  SKIN: Denies skin rashes or itching  ENDOCRINE: Denies excessive thirst  Denies intolerance to heat or cold  PSYCHOSOCIAL: Denies depression or anxiety  Denies any recent memory loss  PHYSICAL EXAMINATION:    General Appearance:   S/p brain injury, sitting in wheelchair   HEENT:  Smiling throughout conversation   Lungs:   Equal chest rise   Cardiovascular:      Abdomen:      Skin:   No jaundice   Musculoskeletal:      Psych:     Neuro:        I spent 11 minutes directly with the patient during this visit      VIRTUAL VISIT DISCLAIMER    Namanmichel Griffiths  acknowledges that he has consented to an online visit or consultation  He understands that the online visit is based solely on information provided by him, and that, in the absence of a face-to-face physical evaluation by the physician, the diagnosis he receives is both limited and provisional in terms of accuracy and completeness  This is not intended to replace a full medical face-to-face evaluation by the physician  Gayathri Marquis understands and accepts these terms

## 2021-02-22 ENCOUNTER — TELEPHONE (OUTPATIENT)
Dept: FAMILY MEDICINE CLINIC | Facility: CLINIC | Age: 36
End: 2021-02-22

## 2021-03-03 NOTE — TELEPHONE ENCOUNTER
Ken Hancock called to follow up on message from 2/22/21  Requesting call back when available  Thank you

## 2021-03-04 ENCOUNTER — TELEPHONE (OUTPATIENT)
Dept: FAMILY MEDICINE CLINIC | Facility: CLINIC | Age: 36
End: 2021-03-04

## 2021-03-04 NOTE — TELEPHONE ENCOUNTER
Dick Carbone called requesting call back  Dick Carbone is worried that Naman's lung capacity is reducing  She stated that he is yawning a lot and that he has no volume of breath  She also stated that Laura Hameed is back in swallow therapy

## 2021-03-08 ENCOUNTER — TELEPHONE (OUTPATIENT)
Dept: FAMILY MEDICINE CLINIC | Facility: CLINIC | Age: 36
End: 2021-03-08

## 2021-03-08 NOTE — TELEPHONE ENCOUNTER
Mary Bauer called requesting a script for the patient's gloves, wipes, insert pads and huggies #3  She stated they are using a new company because the old one stop sending any of his supplies  Her fax number is [phone] Please advise  Thank you

## 2021-03-11 ENCOUNTER — OFFICE VISIT (OUTPATIENT)
Dept: FAMILY MEDICINE CLINIC | Facility: CLINIC | Age: 36
End: 2021-03-11
Payer: COMMERCIAL

## 2021-03-11 DIAGNOSIS — Z91.89: ICD-10-CM

## 2021-03-11 DIAGNOSIS — R15.9 INCONTINENCE OF FECES, UNSPECIFIED FECAL INCONTINENCE TYPE: ICD-10-CM

## 2021-03-11 DIAGNOSIS — R53.83 FATIGUE, UNSPECIFIED TYPE: ICD-10-CM

## 2021-03-11 DIAGNOSIS — H61.23 BILATERAL IMPACTED CERUMEN: ICD-10-CM

## 2021-03-11 DIAGNOSIS — G82.50 SPASTIC QUADRIPARESIS (HCC): ICD-10-CM

## 2021-03-11 DIAGNOSIS — R32 URINARY INCONTINENCE, UNSPECIFIED TYPE: ICD-10-CM

## 2021-03-11 DIAGNOSIS — R25.2 SPASTICITY: ICD-10-CM

## 2021-03-11 DIAGNOSIS — G93.1 ANOXIC BRAIN DAMAGE (HCC): Primary | ICD-10-CM

## 2021-03-11 DIAGNOSIS — E03.9 HYPOTHYROIDISM, UNSPECIFIED TYPE: ICD-10-CM

## 2021-03-11 PROBLEM — E44.1 MILD PROTEIN-CALORIE MALNUTRITION (HCC): Status: ACTIVE | Noted: 2021-03-11

## 2021-03-11 PROCEDURE — 99214 OFFICE O/P EST MOD 30 MIN: CPT | Performed by: FAMILY MEDICINE

## 2021-03-11 RX ORDER — A/SINGAPORE/GP1908/2015 IVR-180 (H1N1) (AN A/MICHIGAN/45/2015-LIKE VIRUS), A/SINGAPORE/GP2050/2015 (H3N2) (AN A/HONG KONG/4801/2014 - LIKE VIRUS), B/UTAH/9/2014 (A B/PHUKET/3073/2013-LIKE VIRUS), B/HONG KONG/259/2010 (A B/BRISBANE/60/08-LIKE VIRUS) 15; 15; 15; 15 UG/.5ML; UG/.5ML; UG/.5ML; UG/.5ML
INJECTION, SUSPENSION INTRAMUSCULAR
COMMUNITY

## 2021-03-11 RX ORDER — DIAPER,BRIEF,ADULT, DISPOSABLE
EACH MISCELLANEOUS
Qty: 22 EACH | Refills: 6 | Status: SHIPPED | OUTPATIENT
Start: 2021-03-11

## 2021-03-11 RX ORDER — INCONTINENCE PAD,LINER,DISP
EACH MISCELLANEOUS
Qty: 450 EACH | Refills: 1 | Status: SHIPPED | OUTPATIENT
Start: 2021-03-11

## 2021-03-11 NOTE — PROGRESS NOTES
Assessment/Plan:     Problem List Items Addressed This Visit        Endocrine    Hypothyroid    Relevant Orders    TSH, 3rd generation       Nervous and Auditory    Anoxic brain damage ([facility] ) - Primary    Relevant Orders    Ambulatory referral to PT/OT hand therapy    Bilateral impacted cerumen    Relevant Medications    carbamide peroxide (DEBROX) 6 5 % otic solution    Spastic quadriparesis (HCC)    Relevant Orders    Ambulatory referral to PT/OT hand therapy       Other    Spasticity     -Referral to OT hand therapy per mother's request         Relevant Orders    Ambulatory referral to PT/OT hand therapy    Urinary incontinence    Relevant Medications    Disposable Gloves (Vinyl Gloves Medium) MISC    Incontinence Supply Disposable (Select Disposable Underwear Sm) MISC    Incontinence Supply Disposable (Prevail Air Briefs) MISC    Diapers & Supplies MISC    Incontinence Supply Disposable (Simplicity Insert Pad 79"-76") MISC    At risk for atelectasis     -Lungs CTABL today, no concern for pneumonia at this time  -Continue use of incentive breathing devices  -Encouraged increased physical activity to improve conditioning         Relevant Medications    Respiratory Therapy Supplies (Spirometer) KIT    Fatigue     -Likely due to deconditioning since patient not receiving typical physical therapy & exercise due to COVID-19 pandemic limitations, but will also check labs to ensure no alternative underlying cause          Relevant Orders    CBC and differential    Hepatic function panel    Basic metabolic panel    TSH, 3rd generation      Other Visit Diagnoses     Incontinence of feces, unspecified fecal incontinence type        Relevant Medications    Incontinence Supply Disposable (Prevail Air Briefs) MISC    Diapers & Supplies MISC            Subjective:      Patient ID: Aury Kellogg is a 38 y o  male      HPI  Patient here to follow up on chronic conditions; has hx of 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 s/p cardiac arrest at 13years of age 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  He previously followed with me at [address] [facility] by mother, Son Medel, and his nurse from [facility], [facility]  60  via virtual visit, who provider history for patient  Over past few weeks more yawning and seems to be breathing more shallow; using the breather and looking into adding another incentive spirometer  Does not appear to be more difficult to take deep breaths and denies evidence of SOB/respiratory distress  SpO2 running 92-98%  Not able to go to wheelchair gym 2/2 COVID so mother is concerned that patient has become deconditioned  Patient getting some modified swallow therapy at [facility], no coughing, gagging or concern for aspiration per staff, on nectar thick liquids for now  BMs stable, hasn't needed enemas PRN recently per Mary  Weight at [facility] reported 130 lbs (3/1/21), stable  Needs incontinence supplies refilled  No Rxs needed today for Modafinil or Marinol  Ceruminosis improved but requests refill for Debrox to have on hand  TSH at goal, 2 780 12/1/20 after synthroid increased to 50 mcg  The following portions of the patient's history were reviewed and updated as appropriate: allergies, current medications, past family history, past medical history, past social history, past surgical history and problem list     Review of Systems   Constitutional: Positive for fatigue  Negative for appetite change, fever and unexpected weight change  HENT: Positive for trouble swallowing  Respiratory: Negative for shortness of breath  Gastrointestinal: Negative for blood in stool, constipation and diarrhea  Genitourinary: Negative for difficulty urinating and dysuria  Musculoskeletal: Positive for gait problem  Wheelchair at baseline   Skin: Negative for rash  Neurological: Negative for seizures and syncope  Psychiatric/Behavioral: Negative for self-injury     All other systems reviewed and are negative  Objective:    /70 (BP Location: Right arm, Patient Position: Sitting, Cuff Size: Large)   Pulse 74   Temp 97 7 °F (36 5 °C)   SpO2 97%        Physical Exam  Vitals signs reviewed  Constitutional:       General: He is not in acute distress  Appearance: He is not ill-appearing or toxic-appearing  HENT:      Head: Normocephalic and atraumatic  Cardiovascular:      Rate and Rhythm: Normal rate and regular rhythm  Pulmonary:      Effort: No respiratory distress  Breath sounds: Normal breath sounds  Comments: No crackles or wheezes appreciated  Abdominal:      General: Bowel sounds are normal       Palpations: Abdomen is soft  Musculoskeletal:      Comments: Spasticity & hypertonicity B/L UE, muscle wasting B/L LE   Skin:     General: Skin is warm and dry  Capillary Refill: Capillary refill takes less than 2 seconds  Neurological:      Mental Status: He is alert  Mental status is at baseline        Comments: Scantly verbal

## 2021-03-16 ENCOUNTER — TELEPHONE (OUTPATIENT)
Dept: FAMILY MEDICINE CLINIC | Facility: CLINIC | Age: 36
End: 2021-03-16

## 2021-03-16 VITALS
SYSTOLIC BLOOD PRESSURE: 100 MMHG | HEART RATE: 74 BPM | TEMPERATURE: 97.7 F | DIASTOLIC BLOOD PRESSURE: 70 MMHG | OXYGEN SATURATION: 97 %

## 2021-03-16 PROBLEM — R53.83 FATIGUE: Status: ACTIVE | Noted: 2021-03-16

## 2021-03-16 NOTE — ASSESSMENT & PLAN NOTE
-Likely due to deconditioning since patient not receiving typical physical therapy & exercise due to COVID-19 pandemic limitations, but will also check labs to ensure no alternative underlying cause

## 2021-03-16 NOTE — ASSESSMENT & PLAN NOTE
-Lungs CTABL today, no concern for pneumonia at this time  -Continue use of incentive breathing devices  -Encouraged increased physical activity to improve conditioning

## 2021-03-16 NOTE — TELEPHONE ENCOUNTER
Patient has an appointment with Ortho Specialist on 3/18 @ 3 pm     Requires insurance referral    Doctors NPI: [id number]  Diagnosis: Bilateral Knee Pain  Procedure: 35333    Phone [phone]  Fax [phone]

## 2021-03-25 ENCOUNTER — OFFICE VISIT (OUTPATIENT)
Dept: OBGYN CLINIC | Facility: OTHER | Age: 36
End: 2021-03-25
Payer: COMMERCIAL

## 2021-03-25 ENCOUNTER — APPOINTMENT (OUTPATIENT)
Dept: RADIOLOGY | Facility: OTHER | Age: 36
End: 2021-03-25
Payer: COMMERCIAL

## 2021-03-25 ENCOUNTER — TELEPHONE (OUTPATIENT)
Dept: OBGYN CLINIC | Facility: OTHER | Age: 36
End: 2021-03-25

## 2021-03-25 DIAGNOSIS — M25.562 ACUTE PAIN OF BOTH KNEES: ICD-10-CM

## 2021-03-25 DIAGNOSIS — G93.1 ANOXIC BRAIN DAMAGE (HCC): ICD-10-CM

## 2021-03-25 DIAGNOSIS — M25.561 ACUTE PAIN OF BOTH KNEES: ICD-10-CM

## 2021-03-25 DIAGNOSIS — M62.569 ATROPHY OF MUSCLE OF LOWER LEG, UNSPECIFIED LATERALITY: ICD-10-CM

## 2021-03-25 DIAGNOSIS — M22.2X1 PATELLOFEMORAL PAIN SYNDROME OF BOTH KNEES: Primary | ICD-10-CM

## 2021-03-25 DIAGNOSIS — M22.2X2 PATELLOFEMORAL PAIN SYNDROME OF BOTH KNEES: Primary | ICD-10-CM

## 2021-03-25 PROCEDURE — 73564 X-RAY EXAM KNEE 4 OR MORE: CPT

## 2021-03-25 PROCEDURE — 99214 OFFICE O/P EST MOD 30 MIN: CPT | Performed by: FAMILY MEDICINE

## 2021-03-25 NOTE — PATIENT INSTRUCTIONS
Explained to mother that redness of does have hyperextension on standing of the knees and has malalignment of his patella causing patellofemoral pain syndrome  I discussed increasing his dietary intake for calories to help with weight gain as he is borderline for being underweight with a BMI of approximately 19  We have increased his current diet from 3200 calories to 3500 calories with two protein shakes to help with his protein intake  Currently his protein intake is approximately 140 g per day  After consultation with physical therapist in the room today Dr Maeve Osullivan, we engaged in shared decision-making to have patient see neural rehabilitation on 8 after new again for more specialized physical therapy with goal to increase muscle mass in lower extremities and help with patellar tracking as well as potentially improve patient's ambulatory status  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

## 2021-03-25 NOTE — PROGRESS NOTES
1  Patellofemoral pain syndrome of both knees  [facility]   2  Acute pain of both knees  XR knee 4+ vw left injury    XR knee 4+ vw right injury   3  Anoxic brain damage (HCC)  [facility]   4  Atrophy of muscle of lower leg, unspecified laterality  [facility]     Orders Placed This Encounter   Procedures    XR knee 4+ vw left injury    XR knee 4+ vw right injury    [facility]        Imaging Studies (I personally reviewed images in PACS and report):  X-rays bilateral knees 03/25/2021:  No acute osseous abnormality  Evidence of bilateral patella Judy    IMPRESSION:  Bilateral patellofemoral pain syndrome  Hyper extension of the knee left greater than right on standing  Bilateral lower extremity muscular atrophy secondary to ambulatory dysfunction  Borderline underweight with BMI of 18-19  Chronic lower extremity muscle spasticity  Thoracolumbar scoliosis  PMH: Anoxic brain injury secondary to long QT syndrome associated cardiac arrest      Repeat X-ray next visit:   none      Return in about 3 months (around 6/25/2021)  Patient Instructions   Explained to mother that redness of does have hyperextension on standing of the knees and has malalignment of his patella causing patellofemoral pain syndrome  I discussed increasing his dietary intake for calories to help with weight gain as he is borderline for being underweight with a BMI of approximately 19  We have increased his current diet from 3200 calories to 3500 calories with two protein shakes to help with his protein intake  Currently his protein intake is approximately 140 g per day    After consultation with physical therapist in the room today Dr Audrey Xiao, we engaged in shared decision-making to have patient see neural rehabilitation on 8 after new again for more specialized physical therapy with goal to increase muscle mass in lower extremities and help with patellar tracking as well as potentially improve patient's ambulatory status  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  CHIEF COMPLAINT:  Knee pain    HPI:  Moni Aguayo is a 38 y o  male  who presents for       Visit 3/25/2021 :   bilateral knee pain chronic  No specific injury event  Has history of chronic spasticity secondary to anoxic brain injury as a teenager  Presents with mother today  Patient is wheelchair-bound and is able to stand but only for short periods of time  He has been more ambulatory in the past   Mother tells me that she has seen physical therapist recently and was told that he has hyperextension of his knees  At home,patient describes discomfort in the anterior knees bilateral left greater than right   Intermittent mild to moderate intensity  Review of Systems   Constitutional: Negative for chills and fever  HENT: Negative for hearing loss  Eyes: Negative for visual disturbance  Respiratory: Negative for shortness of breath  Cardiovascular: Negative for chest pain  Gastrointestinal: Negative for abdominal pain  Genitourinary: Negative for difficulty urinating and dysuria  Neurological: Negative for weakness and numbness  Psychiatric/Behavioral: Negative for suicidal ideas           Following history reviewed and update:    Past Medical History:   Diagnosis Date    Abnormal ECG 1998 and beyond    post cardiac arrest    Allergic rhinitis     Brain anoxia ([facility] [address] [facility] )     Cardiac arrest [facility])     age 13 s/p long Q-T syndrome    Community acquired pneumonia     last assessed/resolved:  10/9/2014    Depression     Disease of thyroid gland     GERD (gastroesophageal reflux disease)     Hypothyroid 2/26/2020    Long Q-T syndrome     Muscular rigidity and spasm, progressive     Osteopenia     Osteoporosis     [facility]     Scoliosis of thoracic spine 2020    Spastic neurogenic bladder     Syncope     beta blocker medication DC because of low blood pressure    Urinary incontinence     Urinary tract infection without hematuria 2019     Past Surgical History:   Procedure Laterality Date    APPENDECTOMY      WISDOM TOOTH EXTRACTION       Social History   Social History     Substance and Sexual Activity   Alcohol Use No    Frequency: Never    Binge frequency: Never     Social History     Substance and Sexual Activity   Drug Use No     Social History     Tobacco Use   Smoking Status Never Smoker   Smokeless Tobacco Never Used     Family History   Problem Relation Age of Onset    Other Father         mitral valve replaced    Hypertension Father     Diabetes type II Maternal Grandfather     Heart failure Maternal Grandfather         Congestive heart failure -     Diabetes type II Maternal Uncle     Hypotension Mother      Allergies   Allergen Reactions    Other      drugs that prolong the QT interval  drugs that prolong the QT interval  Seasonal allergies           Physical Exam  There were no vitals taken for this visit  Constitutional:  see vital signs  Gen: well-developed, normocephalic/atraumatic, well-groomed  Eyes: No inflammation or discharge of conjunctiva or lids; sclera clear   Pharynx: no inflammation, lesion, or mass of lips  Neck: supple, no masses, non-distended  MSK: no inflammation, lesion, mass, or clubbing of nails and digits except for other than mentioned below  SKIN: no visible rashes or skin lesions  Pulmonary/Chest: Effort normal  No respiratory distress  NEURO: cranial nerves grossly intact  PSYCH:  Alert and oriented to person, place;  mood normal, no depression, anxiety, or agitation;   Speak some words in the examination room  Appears understand commands from mother      Ortho Exam    RIGHT KNEE:  Erythema: no  Swelling: no  Increased Warmth: no  Tenderness: +Grimaces patellofemoral joint line medial and lateral  Flexion: intact  Extension: intact  Spasticity bilateral chronic  Patient able to fully extend once relaxes  Patellar Grind Zarate's: negative  Lachman's: negative  Drawer: negative  Varus laxity: negative  Valgus laxity: negative    LEFT KNEE:  Erythema: no  Swelling: no  Increased Warmth: no  Tenderness: +Grimaces patellofemoral joint line medial and lateral  Flexion: intact  Extension: intact  Spasticity bilateral chronic  Patient able to fully extend once relaxes  Patellar Grind Zarate's: negative  Lachman's: negative  Drawer: negative  Varus laxity: negative  Valgus laxity: negative     Procedures        Total visit time was 30 minutes of which more than 50% was face to face counseling and/or coordination of care with patient regarding their treatment plan as outlined in note

## 2021-04-01 NOTE — TELEPHONE ENCOUNTER
Patient's mother Cory Gardner called asking, what she should do in regards Naman's PT? She wants to know, if she needs to call them or if someone will be contacting her for the appointment     # [phone]

## 2021-04-02 NOTE — TELEPHONE ENCOUNTER
[address] [facility]")  [address] [facility], [address]    [phone]

## 2021-04-02 NOTE — TELEPHONE ENCOUNTER
Please provide me with number for Dr Alyssa Kehr PT office   I would like to speak with Dr Donzella Jeans

## 2021-04-06 NOTE — TELEPHONE ENCOUNTER
No answer at number  Please inform mother that I have attempted to reach out and waiting to hear back for 1 on 1 conversation with PT team regarding [address]   Please inquire with [address] office for a back-end number so that I may speak to Dr Vaishali Scanlon directly thanks

## 2021-04-09 ENCOUNTER — TELEPHONE (OUTPATIENT)
Dept: FAMILY MEDICINE CLINIC | Facility: CLINIC | Age: 36
End: 2021-04-09

## 2021-04-09 ENCOUNTER — TELEPHONE (OUTPATIENT)
Dept: OBGYN CLINIC | Facility: OTHER | Age: 36
End: 2021-04-09

## 2021-04-09 DIAGNOSIS — Z20.822 EXPOSURE TO COVID-19 VIRUS: Primary | ICD-10-CM

## 2021-04-09 NOTE — TELEPHONE ENCOUNTER
Patient reportedly exposed to COVID-19 from horse handler at [facility], exposure was 4/6/21  Advised to test no earlier than 4/11/21, isolate and inform office if patient becomes symptomatic

## 2021-04-09 NOTE — TELEPHONE ENCOUNTER
I spoke with Dr Jen Braswell today  She agreed to take on N[facility] again as a patient attempt to help regain more ambulation  Please reach out to Naman's mother and inform her that I personally spoke to Dr Alyssia Mayfield to update her on the situation

## 2021-04-11 DIAGNOSIS — Z20.822 EXPOSURE TO COVID-19 VIRUS: ICD-10-CM

## 2021-04-11 PROCEDURE — U0005 INFEC AGEN DETEC AMPLI PROBE: HCPCS | Performed by: FAMILY MEDICINE

## 2021-04-11 PROCEDURE — U0003 INFECTIOUS AGENT DETECTION BY NUCLEIC ACID (DNA OR RNA); SEVERE ACUTE RESPIRATORY SYNDROME CORONAVIRUS 2 (SARS-COV-2) (CORONAVIRUS DISEASE [COVID-19]), AMPLIFIED PROBE TECHNIQUE, MAKING USE OF HIGH THROUGHPUT TECHNOLOGIES AS DESCRIBED BY CMS-2020-01-R: HCPCS | Performed by: FAMILY MEDICINE

## 2021-04-12 LAB — SARS-COV-2 RNA RESP QL NAA+PROBE: NEGATIVE

## 2021-04-13 ENCOUNTER — EVALUATION (OUTPATIENT)
Dept: PHYSICAL THERAPY | Facility: CLINIC | Age: 36
End: 2021-04-13
Payer: COMMERCIAL

## 2021-04-13 DIAGNOSIS — G93.1 ANOXIC BRAIN DAMAGE (HCC): ICD-10-CM

## 2021-04-13 DIAGNOSIS — M22.2X2 PATELLOFEMORAL PAIN SYNDROME OF BOTH KNEES: ICD-10-CM

## 2021-04-13 DIAGNOSIS — M62.569 ATROPHY OF MUSCLE OF LOWER LEG, UNSPECIFIED LATERALITY: ICD-10-CM

## 2021-04-13 DIAGNOSIS — M22.2X1 PATELLOFEMORAL PAIN SYNDROME OF BOTH KNEES: ICD-10-CM

## 2021-04-13 PROCEDURE — 97116 GAIT TRAINING THERAPY: CPT | Performed by: PHYSICAL THERAPIST

## 2021-04-13 PROCEDURE — 97162 PT EVAL MOD COMPLEX 30 MIN: CPT | Performed by: PHYSICAL THERAPIST

## 2021-04-13 NOTE — PROGRESS NOTES
PT Evaluation     Today's date: 2021  Patient name: Pedro Champion  : 1985  MRN: [id number]  Referring provider: Jossy Oden  Dx:   Encounter Diagnosis     ICD-10-CM    1  Anoxic brain damage (HCC)  G93 1    2  Atrophy of muscle of lower leg, unspecified laterality  M62 569    3  Patellofemoral pain syndrome of both knees  M22 2X1     M22 2X2        Start Time: 4214  Stop Time: 1830  Total time in clinic (min): 60 minutes    Assessment  Assessment details: Pt presents to physical therapy with knee pain and signifiant hyper extension in b/l with increased weakness  At this time patient has poor LE strength and coordination due to h/o TBI, decrased balance at a high risk of falls with difficulty in walking requiring 1-2 person mod assist and dificulty walking using AD, requires assistance for all transfers, and overall need for update in caregiver assisted HEP  Pt will benefit from skilled therapy intervention 2x/week for 6-8 weeks in order to improve gait, assess for bracing needs, train caregivers in HEP and safe ambulation       Goals  STG:  Pt will be able to walk for 50 ft with min assist of 1 wihtin 4 weeks  Pt will complete updated LE strengthening HEP with assist within 4 weeks  Pt will complete sit to stand tranfser within min assist within 4 weeks    LTG  Pt will complete trasnfers with no more than min assist in 8 weeks  Pt will be ambulating daily at home with caregivers within 8 weeks  Pt will have updated HEP completed at home with nowithin 8 weeks    Plan  Planned therapy interventions: neuromuscular re-education, balance, home exercise program, strengthening, therapeutic exercise, patient education and gait training  Frequency: 2x week  Duration in weeks: 4  Plan of Care beginning date: 2021  Plan of Care expiration date: 2021  Treatment plan discussed with: patient, family,  and caregiver        Subjective Evaluation    History of Present Illness  Mechanism of injury: Pt reports having difficulty with knee hyperextension  He was having difficulty with using his elevated shoes when ambulating sometimes due to ankle inversion  He was recently diagnosed with scoliosis and feel that might be contributing to overall mobility  Pain knee when standing  He has not been able to do walking with the walker lately due nervousness of staff  Does do exercises prescribed by therapist daily, but sometimes does not want to participate           Objective    Flowsheet Rows      Most Recent Value   PT/OT G-Codes   Current Score  12   Projected Score  34        Ambulation:  Able to walk      Coordination Left Right   Unable to test    Sensation Left Right   Kinesthesia     Light Touch Unable to test    Sharp/Dull     2 Point Discrimination         Muscle Tone Left Right   Modified Marco Scale WNL WNL   Hamstring     Gastroc     Quad         Manual Muscle Testing - Hip Left Right   Flexion 3+ 3   Extension 3 3   Abduction 2+ 3   External Rotation 2+ 3     Manual Muscle Testing - Knee Left Right   Flexion 3 3+   Extension 3 3     Manual Muscle Testing - Ankle Left Right   Doriflexion 3+ 3+   Plantarflexion 3 3        Transfers    Sit To Stand Min Assist   W/C To Bed Mod Assist   Sit To Supine Mod Assist   Roll Min Assist         Gait Assessment:           Short Term Goal Expiration Date:(5/15)  Long Term Goal Expiration Date: (6/15)  POC Expiration Date: (6/15)         Precautions anoxic brain injury, fall risk       Manuals 4/16                                       Neuro Re-Ed                                                                 Ther Ex                                                                        Ther Activity                        Gait Training                        Modalities

## 2021-04-15 ENCOUNTER — APPOINTMENT (EMERGENCY)
Dept: CT IMAGING | Facility: HOSPITAL | Age: 36
End: 2021-04-15
Payer: COMMERCIAL

## 2021-04-15 ENCOUNTER — OFFICE VISIT (OUTPATIENT)
Dept: FAMILY MEDICINE CLINIC | Facility: CLINIC | Age: 36
End: 2021-04-15
Payer: COMMERCIAL

## 2021-04-15 ENCOUNTER — HOSPITAL ENCOUNTER (EMERGENCY)
Facility: HOSPITAL | Age: 36
Discharge: HOME | End: 2021-04-15
Attending: EMERGENCY MEDICINE | Admitting: EMERGENCY MEDICINE
Payer: COMMERCIAL

## 2021-04-15 VITALS — DIASTOLIC BLOOD PRESSURE: 60 MMHG | HEART RATE: 68 BPM | TEMPERATURE: 97.8 F | SYSTOLIC BLOOD PRESSURE: 98 MMHG

## 2021-04-15 VITALS
TEMPERATURE: 97.3 F | RESPIRATION RATE: 18 BRPM | HEART RATE: 77 BPM | SYSTOLIC BLOOD PRESSURE: 107 MMHG | OXYGEN SATURATION: 97 % | DIASTOLIC BLOOD PRESSURE: 83 MMHG

## 2021-04-15 DIAGNOSIS — H60.501 ACUTE OTITIS EXTERNA OF RIGHT EAR, UNSPECIFIED TYPE: Primary | ICD-10-CM

## 2021-04-15 DIAGNOSIS — S00.03XA CONTUSION OF SCALP: ICD-10-CM

## 2021-04-15 DIAGNOSIS — S09.90XA HEAD INJURY: Primary | ICD-10-CM

## 2021-04-15 DIAGNOSIS — S00.01XA SCALP ABRASION: ICD-10-CM

## 2021-04-15 DIAGNOSIS — Z23 VACCINE FOR DIPHTHERIA-TETANUS: ICD-10-CM

## 2021-04-15 PROCEDURE — 99284 EMERGENCY DEPT VISIT MOD MDM: CPT

## 2021-04-15 PROCEDURE — 90471 IMMUNIZATION ADMIN: CPT | Performed by: FAMILY MEDICINE

## 2021-04-15 PROCEDURE — 99285 EMERGENCY DEPT VISIT HI MDM: CPT | Performed by: EMERGENCY MEDICINE

## 2021-04-15 PROCEDURE — G1004 CDSM NDSC: HCPCS

## 2021-04-15 PROCEDURE — 70450 CT HEAD/BRAIN W/O DYE: CPT

## 2021-04-15 PROCEDURE — 99213 OFFICE O/P EST LOW 20 MIN: CPT | Performed by: FAMILY MEDICINE

## 2021-04-15 PROCEDURE — 90714 TD VACC NO PRESV 7 YRS+ IM: CPT | Performed by: FAMILY MEDICINE

## 2021-04-15 RX ORDER — CIPROFLOXACIN AND DEXAMETHASONE 3; 1 MG/ML; MG/ML
4 SUSPENSION/ DROPS AURICULAR (OTIC) 2 TIMES DAILY
Qty: 7.5 ML | Refills: 0 | Status: SHIPPED | OUTPATIENT
Start: 2021-04-15 | End: 2021-06-16 | Stop reason: ALTCHOICE

## 2021-04-16 NOTE — ED PROVIDER NOTES
History  Chief Complaint   Patient presents with    Head Injury     per staff member, pt had fall approx 1 hour ago out of wheelchair, witniess, + head strike  no reported blood thinners per staff, no daily asa either  no loc  no vomiting  pt acting appropriately per staff member     25-year-old male with a history of traumatic brain injury presents with a caregiver for evaluation of head injury  Patient is nonverbal is unable to provide history  His caregiver states that just prior to change of shift the patient had a fall  The patient is normally nonambulatory and wheelchair bound  It is unclear how he fell  He struck his head on something and has a contusion and a small laceration to the frontal scalp  Patient has been acting like his normal self  Patient's caregiver states that he does have difficulty expressing emotion and often giggles when he should be sad  He is not indicating any distress  He has had no vomiting  He does acknowledge that he is hungry  He has tolerated sips of fluids since the fall  History provided by:  Medical records and caregiver  History limited by:  Patient nonverbal   used: No    Head Injury w/unknown LOC      Prior to Admission Medications   Prescriptions Last Dose Informant Patient Reported? Taking?    Benzocaine-Docusate Sodium (Enemeez Plus)  MG ENEM  Mother No No   Sig: Insert 1 application into the rectum daily as needed (constipation) Use as directed   Camphor-Eucalyptus-Menthol (VICKS VAPORUB) 4 73-1 2-2 6 % OINT  Mother No No   Sig: Apply topically as needed (congestion)   Diapers & Supplies MISC   No No   Sig: Use 5 (five) times a day Huggies #3   Disposable Gloves (Vinyl Gloves Medium) MISC   No No   Sig: Use 4 (four) times a day Dispense 3 boxes monthly; Diagnosis R32   Emollient (eucerin) lotion   No No   Sig: Apply topically to face daily at 10a and 8p   HEMORRHOIDAL 0 25 % suppository  Mother No No   Sig: - UNWRAP AND INSERT 1 SUPPOSITORY PER RECTUM AS NEEDED FOR HEMORRHOID PAIN RELIEF   Icosapent Ethyl (VASCEPA) 1 g CAPS  Mother No No   Sig: Take 1 capsule (1 g total) by mouth daily   Incontinence Supply Disposable (Prevail Air Briefs) MISC   No No   Sig: Use 1 each every 4 (four) hours Please provide 5 briefs per day   Incontinence Supply Disposable (Select Disposable Underwear Sm) MISC   No No   Sig: Please provide a 30 day supply 10 per day DX R32 incontinence   Incontinence Supply Disposable (Simplicity Insert Pad 95"-99") MISC   No No   Sig: Use 5 (five) times a day   Magnesium Oxide 500 MG TABS  Mother No No   Sig: Take 1 tablet (500 mg total) by mouth daily Take 1 tablet daily at 4 PM   Melatonin 5 MG TABS  Mother No No   Sig: Take 1 tablet (5 mg total) by mouth daily at bedtime   Misc  Devices [facility]) MISC  Mother No No   Sig: Please provide pt with a new cord for power wheelchair   Multiple Vitamins-Minerals ([employer]) TABS  Mother No No   Sig: Take 1 tablet by mouth daily   Nutritional Supplements (NUTRITIONAL SHAKE) LIQD  Mother No No   Sig: Take 1 Can by mouth 2 (two) times a day Please provide Boost 90 minutes before lunch and 90 minutes before dinner   PREPARATION H 1-0 25-14 4-15 % rectal cream  Mother No No   Sig: - APPLY RECTALLY AS NEEDED FOR HEMORRHOID PAIN RELIEF   Respiratory Therapy Supplies (Spirometer) KIT   No No   Sig: Use 3 (three) times a day Please provide incentive spirometer   Sodium Fluoride (PREVIDENT 5000 PLUS DT)  Mother Yes No   Sig: Apply orally to teeth once daily while brushing at night time do not rinse mouth after brushing   Starch, Thickening, LIQD  Mother No No   Sig: Nectar thick liquids up to honey thick if coughing occurs as needed, please use Simply Thick liquid only  Discontinue Thick-It powder     Tea Tree 100 % OIL  Mother No No   Sig: Apply 1 g topically daily as needed (rash) Apply topically daily to skin folds   Vitamins A & D (VITAMIN A & D) ointment  Mother No No   Sig: - APPLY TO AFFECTED AREA (BUTTOCKS/ANAL) AS NEEDED   acetaminophen (TYLENOL) 325 mg tablet  Mother No No   Sig: Take 3 tablets (975 mg total) by mouth every 8 (eight) hours as needed for mild pain or headaches   ascorbic acid (GNP VITAMIN C) 500 MG tablet  Mother No No   Sig: Take 500 mg daily at 4 PM   b complex-vitamin C-folic acid (RENAL) 1 mg  Mother No No   Sig: Take 1 mg daily at 4 PM   bisacodyl (DULCOLAX) 10 mg suppository  Mother No No   Sig: Use 1 supp  per rectum QOD PRN for constipation, max 3d/week   carbamide peroxide (DEBROX) 6 5 % otic solution   No No   Sig: Administer 5 drops into both ears 2 (two) times a day for 7 days   ciprofloxacin-dexamethasone (CIPRODEX) otic suspension   No No   Sig: Administer 4 drops to the right ear 2 (two) times a day for 7 days   diclofenac sodium (VOLTAREN) 1 %  Mother No No   Sig: Apply 2 g topically 3 (three) times a day At 10am, 4pm, 9pm to thoracic paravertebral musculature   docusate sodium (COLACE) 100 mg capsule  Mother No No   Si mg 9am and 9pm   docusate sodium (Enemeez Mini) 283 MG enema  Mother No No   Sig: Insert 1 enema into the rectum daily Use daily or as needed   dronabinol (MARINOL) 2 5 mg capsule  Mother No No   Sig: TAKE ONE CAPSULE -PLACE WHOLE IN YOGURT/PUDDING BY MOUTH 3 TIMES A DAY (9AM-4PM-9PM) *CALL PHARMACY 5 DAYS IN ADVANCE FOR REFILL*   fluticasone (FLONASE) 50 mcg/act nasal spray  Mother No No   Si spray into each nostril daily   glycerin adult 2 g suppository  Mother No No   Sig: Place 1 suppository QOD for 2 weeks, then QOD PRN for constipation   hydrocortisone (ANUSOL-HC) 25 mg suppository   No No   Sig: Insert 1 suppository (25 mg total) into the rectum daily at bedtime for 7 days   ibuprofen (MOTRIN) 200 mg tablet  Mother No No   Sig: Take 2 tabs q6h PRN for pain not to exceed 2000mg per day   ibuprofen (MOTRIN) 400 mg tablet   No No   Sig: Take 1 tablet (400 mg total) by mouth every 6 (six) hours as needed for mild pain for up to 7 days   influenza vaccine, subunit, quadrivalent (Flucelvax Quadrivalent)   Yes No   Sig: Flucelvax Quad [id number] 60 mcg (15 mcg x 4)/0 5 mL intramuscular susp   TO BE ADMINISTERED BY PHARMACIST FOR IMMUNIZATION (PER CDC GUIDELINES)   levothyroxine 50 mcg tablet  Mother No No   Sig: Take 1 tablet (50 mcg total) by mouth daily   lidocaine (LIDODERM) 5 %  Mother No No   Sig: Apply 1 patch topically daily as needed (back pain) Remove & Discard patch within 12 hours or as directed by MD   loratadine (CLARITIN) 10 mg tablet   No No   Sig: Take 1 tablet (10 mg total) by mouth daily as needed for allergies   modafinil (PROVIGIL) 200 MG tablet  Mother No No   Sig: TAKE ONE TABLET -PLACE WHOLE IN YOGURT/PUDDING BY MOUTH EVERY DAY (9AM)   omeprazole (PriLOSEC) 20 mg delayed release capsule  Mother No No   Sig: Take 20mg at 9am every day   onabotulinumtoxin A (BOTOX) 100 units  Mother Yes No   Sig: inject 500 units into left gastroc soleus and abductor pollicis ankit   polyethylene glycol (MIRALAX) 17 g packet  Care Giver No No   Sig: Take 17 g by mouth 2 (two) times a day   ranitidine (ZANTAC) 150 mg tablet  Mother Yes No   Sig: ranitidine 150 mg tablet   sertraline (ZOLOFT) 100 mg tablet  Mother No No   Sig: Take 1 tablet (100 mg total) by mouth daily   sodium chloride (DEEP SEA NASAL SPRAY) 0 65 % nasal spray  Mother No No   Si sprays into each nostril as needed for congestion   zinc oxide (DESITIN) 40 % PSTE  Mother Yes No   Sig: Apply topically   zinc sulfate (ZINCATE) 220 mg capsule   No No   Sig: TAKE TWO CAPSULE -PLACE WHOLE IN YOGURT/PUDDING BY MOUTH EVERY DAY (9AM) EVERY OTHER MONTH **ODD NUMBERED MONTHS ONLY**      Facility-Administered Medications: None       Past Medical History:   Diagnosis Date    Abnormal ECG  and beyond    post cardiac arrest    Allergic rhinitis     Brain anoxia (HCC)     Cardiac arrest [facility])     age 13 s/p long Q-T syndrome    Community acquired pneumonia     last assessed/resolved:  10/9/2014    Depression     Disease of thyroid gland     GERD (gastroesophageal reflux disease)     Hypothyroid 2020    Long Q-T syndrome     Muscular rigidity and spasm, progressive     Osteopenia     Osteoporosis     Quadriplegia ([address] )     Scoliosis of thoracic spine 2020    Spastic neurogenic bladder     Syncope     beta blocker medication DC because of low blood pressure    Urinary incontinence     Urinary tract infection without hematuria 2019       Past Surgical History:   Procedure Laterality Date    APPENDECTOMY      WISDOM TOOTH EXTRACTION         Family History   Problem Relation Age of Onset    Other Father         mitral valve replaced    Hypertension Father     Diabetes type II Maternal Grandfather     Heart failure Maternal Grandfather         Congestive heart failure -     Diabetes type II Maternal Uncle     Hypotension Mother      I have reviewed and agree with the history as documented  E-Cigarette/Vaping    E-Cigarette Use Never User      E-Cigarette/Vaping Substances    Nicotine No     Flavoring No      Social History     Tobacco Use    Smoking status: Never Smoker    Smokeless tobacco: Never Used   Substance Use Topics    Alcohol use: No     Frequency: Never     Binge frequency: Never    Drug use: No       Review of Systems   Unable to perform ROS: Patient nonverbal   Skin: Positive for wound  Physical Exam  Physical Exam  Vitals signs and nursing note reviewed  Constitutional:       General: He is not in acute distress  Appearance: Normal appearance  He is well-developed  He is not ill-appearing  HENT:      Head: Normocephalic  Abrasion and contusion present  No laceration  Jaw: There is normal jaw occlusion  Right Ear: External ear normal  No hemotympanum  Left Ear: External ear normal  No hemotympanum  Nose: Nose normal       Mouth/Throat:      Lips: Pink        Mouth: Mucous membranes are moist       Dentition: Normal dentition  Eyes:      General: Lids are normal       Extraocular Movements: Extraocular movements intact  Conjunctiva/sclera: Conjunctivae normal       Pupils: Pupils are equal, round, and reactive to light  Neck:      Musculoskeletal: Full passive range of motion without pain, normal range of motion and neck supple  No spinous process tenderness or muscular tenderness  Cardiovascular:      Rate and Rhythm: Normal rate and regular rhythm  Heart sounds: Normal heart sounds  Pulmonary:      Effort: Pulmonary effort is normal  No accessory muscle usage or respiratory distress  Breath sounds: Normal breath sounds  Chest:      Chest wall: No tenderness  Abdominal:      General: Bowel sounds are normal  There is no distension  Palpations: Abdomen is soft  Tenderness: There is no abdominal tenderness  There is no guarding or rebound  Musculoskeletal: Normal range of motion  General: No tenderness or deformity  Lymphadenopathy:      Cervical: No cervical adenopathy  Skin:     General: Skin is warm and dry  Findings: No rash  Neurological:      General: No focal deficit present  Mental Status: He is alert  Mental status is at baseline  GCS: GCS eye subscore is 4  GCS verbal subscore is 5  GCS motor subscore is 6  Sensory: Sensation is intact  Motor: Abnormal muscle tone present  Coordination: Coordination abnormal       Deep Tendon Reflexes: Reflexes are normal and symmetric  Comments: Patient has contracture and hypertonicity of the upper and lower extremities   Psychiatric:         Attention and Perception: Attention normal          Speech: He is communicative  Behavior: Behavior normal          Cognition and Memory: Cognition is impaired  Memory is impaired  He exhibits impaired recent memory and impaired remote memory           Vital Signs  ED Triage Vitals   Temperature Pulse Respirations Blood Pressure SpO2   04/15/21 1745 04/15/21 1749 04/15/21 1749 04/15/21 1749 04/15/21 1749   (!) 97 3 °F (36 3 °C) 77 18 107/83 97 %      Temp Source Heart Rate Source Patient Position - Orthostatic VS BP Location FiO2 (%)   04/15/21 1745 04/15/21 1749 04/15/21 1749 04/15/21 1749 --   Temporal Monitor Sitting Right arm       Pain Score       --                  Vitals:    04/15/21 1749   BP: 107/83   Pulse: 77   Patient Position - Orthostatic VS: Sitting         Visual Acuity      ED Medications  Medications - No data to display    Diagnostic Studies  Results Reviewed     None                 CT head without contrast   Final Result by Ninfa Rolle MD (04/15 2039)      1  Right frontal scalp soft tissue swelling  2   No intracranial hemorrhage or calvarial fracture  Workstation performed: [id number]                    Procedures  Procedures         ED Course                                           MDM  Number of Diagnoses or Management Options  Contusion of scalp: new and requires workup  Head injury: new and requires workup  Scalp abrasion: new and requires workup     Amount and/or Complexity of Data Reviewed  Tests in the radiology section of CPT®: ordered and reviewed  Decide to obtain previous medical records or to obtain history from someone other than the patient: yes  Obtain history from someone other than the patient: yes  Independent visualization of images, tracings, or specimens: yes    Risk of Complications, Morbidity, and/or Mortality  General comments: 40-year-old male presents after having a fall from wheelchair with head injury  Patient's CT scan negative for acute pathology  He does have a right scalp contusion and abrasion  No active bleeding  Head injuries precautions given to caregiver    We discussed signs and symptoms return to the emergency department    Patient Progress  Patient progress: stable      Disposition  Final diagnoses:   Head injury   Contusion of scalp   Scalp abrasion     Time reflects when diagnosis was documented in both MDM as applicable and the Disposition within this note     Time User Action Codes Description Comment    4/15/2021  9:09 PM Jess Minor Add [S09 90XA] Head injury     4/15/2021  9:09 PM Jess Minor Add [S00 03XA] Contusion of scalp     4/15/2021  9:09 PM Jess Minor Add [S00 01XA] Scalp abrasion       ED Disposition     ED Disposition Condition Date/Time Comment    Discharge Stable Thu Apr 15, 2021  9:09 PM Avelino Hagen discharge to home/self care  Follow-up Information     Follow up With Specialties Details Why Contact Info    Aidan Kearns MD [facility] Schedule an appointment as soon as possible for a visit  For recheck of current symptoms [phone] [facility]  [phone]            Patient's Medications   Discharge Prescriptions    No medications on file     No discharge procedures on file      PDMP Review       Value Time User    PDMP Reviewed  Yes 11/23/2020  1:34 PM Aidan Kearns MD          ED Provider  Electronically Signed by           Chelsea Mckenna DO  04/15/21 2135

## 2021-04-20 ENCOUNTER — OFFICE VISIT (OUTPATIENT)
Dept: PHYSICAL THERAPY | Facility: CLINIC | Age: 36
End: 2021-04-20
Payer: COMMERCIAL

## 2021-04-20 DIAGNOSIS — M22.2X2 PATELLOFEMORAL PAIN SYNDROME OF BOTH KNEES: ICD-10-CM

## 2021-04-20 DIAGNOSIS — M62.569 ATROPHY OF MUSCLE OF LOWER LEG, UNSPECIFIED LATERALITY: ICD-10-CM

## 2021-04-20 DIAGNOSIS — M22.2X1 PATELLOFEMORAL PAIN SYNDROME OF BOTH KNEES: ICD-10-CM

## 2021-04-20 DIAGNOSIS — G93.1 ANOXIC BRAIN DAMAGE (HCC): Primary | ICD-10-CM

## 2021-04-20 PROCEDURE — 97110 THERAPEUTIC EXERCISES: CPT | Performed by: PHYSICAL THERAPIST

## 2021-04-20 PROCEDURE — 97112 NEUROMUSCULAR REEDUCATION: CPT | Performed by: PHYSICAL THERAPIST

## 2021-04-20 PROCEDURE — 97116 GAIT TRAINING THERAPY: CPT | Performed by: PHYSICAL THERAPIST

## 2021-04-21 ENCOUNTER — OFFICE VISIT (OUTPATIENT)
Dept: PHYSICAL THERAPY | Facility: CLINIC | Age: 36
End: 2021-04-21
Payer: COMMERCIAL

## 2021-04-21 DIAGNOSIS — M22.2X2 PATELLOFEMORAL PAIN SYNDROME OF BOTH KNEES: ICD-10-CM

## 2021-04-21 DIAGNOSIS — G93.1 ANOXIC BRAIN DAMAGE (HCC): Primary | ICD-10-CM

## 2021-04-21 DIAGNOSIS — M22.2X1 PATELLOFEMORAL PAIN SYNDROME OF BOTH KNEES: ICD-10-CM

## 2021-04-21 DIAGNOSIS — M62.569 ATROPHY OF MUSCLE OF LOWER LEG, UNSPECIFIED LATERALITY: ICD-10-CM

## 2021-04-21 PROCEDURE — 97116 GAIT TRAINING THERAPY: CPT | Performed by: PHYSICAL THERAPIST

## 2021-04-21 PROCEDURE — 97112 NEUROMUSCULAR REEDUCATION: CPT | Performed by: PHYSICAL THERAPIST

## 2021-04-22 NOTE — PROGRESS NOTES
Daily Note     Today's date: 2021  Patient name: Vanessa Morfin  : 1985  MRN: [id number]  Referring provider: Mirella Durand  Dx:   Encounter Diagnosis     ICD-10-CM    1  Anoxic brain damage (HCC)  G93 1    2  Atrophy of muscle of lower leg, unspecified laterality  M62 569    3  Patellofemoral pain syndrome of both knees  M22 2X1     M22 2X2                   Subjective: Caregivers report feeling well today, no new changes      Objective: See treatment diary below      Assessment: Completed all ambulation in solo step today  Pt has significant retropulsion which is improved when wearing elevated shoes  No c/o pain through session in knee  Attempted trials of use of RW as well as HHA from side, both required atleast mod occasionally max assist for maintaining balance  Pt occasionally and randomly would throw himself into flexed posture with need of assist to maintain balance  Plan: Continue per plan of care          Short Term Goal Expiration Date:(5/15)  Long Term Goal Expiration Date: (6/15)  POC Expiration Date: (6/15)         Precautions anoxic brain injury, fall risk       Manuals                                       Neuro Re-Ed         stairs  2 x up and over, SOLO step max assist for balance      Kicking ball with 4# b/l  10x2      STS  10x2 max a                                      Ther Ex        LAQ holds  10x 5 sec ea LE                                                              Ther Activity                        Gait Training        RW  80 ft x 2    40 ft x 1    Max a walker maintenance and balance              Modalities

## 2021-04-23 PROBLEM — H60.501 ACUTE OTITIS EXTERNA OF RIGHT EAR: Status: ACTIVE | Noted: 2021-04-23

## 2021-04-24 NOTE — PROGRESS NOTES
Daily Note     Today's date: 2021  Patient name: Leah Romo  : 1985  MRN: [id number]  Referring provider: Mercedez Chapa  Dx:   No diagnosis found  Subjective: Caregivers report feeling well today, no new changes      Objective: See treatment diary below      Assessment: Completed all ambulation in solo step today  Pt has significant retropulsion which is improved when wearing elevated shoes  No c/o pain through session in knee  Attempted trials of use of RW as well as HHA from side, both required atleast mod occasionally max assist for maintaining balance  Pt occasionally and randomly would throw himself into flexed posture with need of assist to maintain balance  Plan: Continue per plan of care          Short Term Goal Expiration Date:(5/15)  Long Term Goal Expiration Date: (6/15)  POC Expiration Date: (6/15)         Precautions anoxic brain injury, fall risk       Manuals                                       Neuro Re-Ed         stairs  2 x up and over, SOLO step max assist for balance      Kicking ball with 4# b/l  10x2      STS  10x2 max a                                      Ther Ex        LAQ holds  10x 5 sec ea LE                                                              Ther Activity                        Gait Training        RW  80 ft x 2    40 ft x 1    Max a walker maintenance and balance              Modalities

## 2021-04-26 ENCOUNTER — TELEPHONE (OUTPATIENT)
Dept: FAMILY MEDICINE CLINIC | Facility: CLINIC | Age: 36
End: 2021-04-26

## 2021-04-26 ENCOUNTER — TELEMEDICINE (OUTPATIENT)
Dept: FAMILY MEDICINE CLINIC | Facility: CLINIC | Age: 36
End: 2021-04-26
Payer: COMMERCIAL

## 2021-04-26 DIAGNOSIS — R32 URINARY INCONTINENCE, UNSPECIFIED TYPE: Primary | ICD-10-CM

## 2021-04-26 DIAGNOSIS — H60.501 ACUTE OTITIS EXTERNA OF RIGHT EAR, UNSPECIFIED TYPE: ICD-10-CM

## 2021-04-26 PROCEDURE — 99213 OFFICE O/P EST LOW 20 MIN: CPT | Performed by: FAMILY MEDICINE

## 2021-04-26 PROCEDURE — 1036F TOBACCO NON-USER: CPT | Performed by: FAMILY MEDICINE

## 2021-04-26 NOTE — PROGRESS NOTES
Virtual Regular Visit      Assessment/Plan:    Problem List Items Addressed This Visit        Nervous and Auditory    Acute otitis externa of right ear     -Symptoms resolved s/p treatment with ciprodex  -Follow up PRN            Other    Urinary incontinence - Primary     -Huggies #3 diapers medically-necessary for this patient, as they are used to wrap around his penis for his incontinence; experienced skin breakdown from traditional diapers in the past  -Also reduces risks of UTI from fecal sources, and patient has hx of Enterococcus faecalis UTI  -Will complete prior auth form when received                    Reason for visit is follow up  Encounter provider Dominique Gallardo MD    Provider located at [address] [place] [address]  [phone]      Recent Visits  No visits were found meeting these conditions  Showing recent visits within past 7 days and meeting all other requirements     Today's Visits  Date Type Provider Dept   04/26/21 Telephone Andrés Melendez MD [address] [place] [address]   04/26/21 [address] [place] [address], [place] [phone] today's visits and meeting all other requirements     Future Appointments  No visits were found meeting these conditions  Showing future appointments within next 150 days and meeting all other requirements        The patient was identified by name and date of birth  Elizabeth Bishop was informed that this is a telemedicine visit and that the visit is being conducted through [address] [place] [address] Now and patient was informed that this is a secure, HIPAA-compliant platform  He agrees to proceed     My office door was closed  No one else was in the room  He acknowledged consent and understanding of privacy and security of the video platform  The patient has agreed to participate and understands they can discontinue the visit at any time  Patient is aware this is a billable service       [facility] is a 38 y o  male  Hx provided by mother, [facility] staff member also present with mother  HPI   Seen 4/15/21 for otitis externa of R ear, given ciprodex x7d  Today's visit is to ensure improvement to symptoms  Patient's mother, Chandlerenzo Fu, is with [facility] staff during call to provide the history  No discharge coming out, pain is resolved  Additionally, as patient does not meet weight requirements for Huggies #3, needs N97 form for prior auth for Huggies #3; used for wrap around his penis to prevent skin breakdown he experienced from normal diapers         Past Medical History:   Diagnosis Date    Abnormal ECG 1998 and beyond    post cardiac arrest    Allergic rhinitis     Brain anoxia ([facility] [address] )     Cardiac arrest [facility])     age 13 s/p long Q-T syndrome    Community acquired pneumonia     last assessed/resolved:  10/9/2014    Depression     Disease of thyroid gland     GERD (gastroesophageal reflux disease)     Hypothyroid 2/26/2020    Long Q-T syndrome     Muscular rigidity and spasm, progressive     Osteopenia     Osteoporosis     Quadriplegia ([facility] [address] )     Scoliosis of thoracic spine 2/27/2020    Spastic neurogenic bladder     Syncope     beta blocker medication DC because of low blood pressure    Urinary incontinence     Urinary tract infection without hematuria 4/27/2019       Past Surgical History:   Procedure Laterality Date    APPENDECTOMY  1991    WISDOM TOOTH EXTRACTION         Current Outpatient Medications   Medication Sig Dispense Refill    acetaminophen (TYLENOL) 325 mg tablet Take 3 tablets (975 mg total) by mouth every 8 (eight) hours as needed for mild pain or headaches 120 tablet 3    ascorbic acid (GNP VITAMIN C) 500 MG tablet Take 500 mg daily at 4 PM 30 tablet 5    b complex-vitamin C-folic acid (RENAL) 1 mg Take 1 mg daily at 4 PM 31 each 5    Benzocaine-Docusate Sodium (Enemeez Plus)  MG ENEM Insert 1 application into the rectum daily as needed (constipation) Use as directed 150 mL 4    bisacodyl (DULCOLAX) 10 mg suppository Use 1 supp  per rectum QOD PRN for constipation, max 3d/week 12 suppository 0    Camphor-Eucalyptus-Menthol (VICKS VAPORUB) 4 73-1 2-2 6 % OINT Apply topically as needed (congestion) 170 g 0    carbamide peroxide (DEBROX) 6 5 % otic solution Administer 5 drops into both ears 2 (two) times a day for 7 days 15 mL 5    ciprofloxacin-dexamethasone (CIPRODEX) otic suspension Administer 4 drops to the right ear 2 (two) times a day for 7 days 7 5 mL 0    Diapers & Supplies MISC Use 5 (five) times a day Huggies #3 450 each 2    diclofenac sodium (VOLTAREN) 1 % Apply 2 g topically 3 (three) times a day At 10am, 4pm, 9pm to thoracic paravertebral musculature 100 g 2    Disposable Gloves (Vinyl Gloves Medium) MISC Use 4 (four) times a day Dispense 3 boxes monthly; Diagnosis R32 3 each 5    docusate sodium (COLACE) 100 mg capsule 100 mg 9am and 9pm 10 capsule 5    docusate sodium (Enemeez Mini) 283 MG enema Insert 1 enema into the rectum daily Use daily or as needed 30 each 0    dronabinol (MARINOL) 2 5 mg capsule TAKE ONE CAPSULE -PLACE WHOLE IN YOGURT/PUDDING BY MOUTH 3 TIMES A DAY (9AM-4PM-9PM) *CALL PHARMACY 5 DAYS IN ADVANCE FOR REFILL* 90 capsule 0    Emollient (eucerin) lotion Apply topically to face daily at 10a and 8p 480 mL 3    fluticasone (FLONASE) 50 mcg/act nasal spray 1 spray into each nostril daily 1 Bottle 1    glycerin adult 2 g suppository Place 1 suppository QOD for 2 weeks, then QOD PRN for constipation 50 suppository 6    HEMORRHOIDAL 0 25 % suppository - UNWRAP AND INSERT 1 SUPPOSITORY PER RECTUM AS NEEDED FOR HEMORRHOID PAIN RELIEF 12 suppository 11    hydrocortisone (ANUSOL-HC) 25 mg suppository Insert 1 suppository (25 mg total) into the rectum daily at bedtime for 7 days 7 suppository 1    ibuprofen (MOTRIN) 200 mg tablet Take 2 tabs q6h PRN for pain not to exceed 2000mg per day 200 tablet 2    ibuprofen (MOTRIN) 400 mg tablet Take 1 tablet (400 mg total) by mouth every 6 (six) hours as needed for mild pain for up to 7 days 28 tablet 0    Icosapent Ethyl (VASCEPA) 1 g CAPS Take 1 capsule (1 g total) by mouth daily 31 capsule 11    Incontinence Supply Disposable (Prevail Air Briefs) MISC Use 1 each every 4 (four) hours Please provide 5 briefs per day 150 each 11    Incontinence Supply Disposable (Select Disposable Underwear Sm) MISC Please provide a 30 day supply 10 per day DX R32 incontinence 22 each 6    Incontinence Supply Disposable (Simplicity Insert Pad 39"-05") MISC Use 5 (five) times a day 450 each 1    influenza vaccine, subunit, quadrivalent (Flucelvax Quadrivalent) Flucelvax Quad 0728-4406 60 mcg (15 mcg x 4)/0 5 mL intramuscular susp   TO BE ADMINISTERED BY PHARMACIST FOR IMMUNIZATION (PER CDC GUIDELINES)      levothyroxine 50 mcg tablet Take 1 tablet (50 mcg total) by mouth daily 90 tablet 1    lidocaine (LIDODERM) 5 % Apply 1 patch topically daily as needed (back pain) Remove & Discard patch within 12 hours or as directed by MD 30 patch 1    loratadine (CLARITIN) 10 mg tablet Take 1 tablet (10 mg total) by mouth daily as needed for allergies 30 tablet 5    Magnesium Oxide 500 MG TABS Take 1 tablet (500 mg total) by mouth daily Take 1 tablet daily at 4 PM 31 each 5    Melatonin 5 MG TABS Take 1 tablet (5 mg total) by mouth daily at bedtime 31 each 5    Misc   Devices [facility]) MISC Please provide pt with a new cord for power wheelchair 1 each 0    modafinil (PROVIGIL) 200 MG tablet TAKE ONE TABLET -PLACE WHOLE IN YOGURT/PUDDING BY MOUTH EVERY DAY (9AM) 180 tablet 0    Multiple Vitamins-Minerals (CEROVITE SENIOR) TABS Take 1 tablet by mouth daily 30 tablet 5    Nutritional Supplements (NUTRITIONAL SHAKE) LIQD Take 1 Can by mouth 2 (two) times a day Please provide Boost 90 minutes before lunch and 90 minutes before dinner 60 Bottle 6    omeprazole (PriLOSEC) 20 mg delayed release capsule Take 20mg at 9am every day 30 capsule 5    onabotulinumtoxin A (BOTOX) 100 units inject 500 units into left gastroc soleus and abductor pollicis ankit      polyethylene glycol (MIRALAX) 17 g packet Take 17 g by mouth 2 (two) times a day 180 each 3    PREPARATION H 1-0 25-14 4-15 % rectal cream - APPLY RECTALLY AS NEEDED FOR HEMORRHOID PAIN RELIEF 51 g 11    ranitidine (ZANTAC) 150 mg tablet ranitidine 150 mg tablet      Respiratory Therapy Supplies (Spirometer) KIT Use 3 (three) times a day Please provide incentive spirometer 1 kit 0    sertraline (ZOLOFT) 100 mg tablet Take 1 tablet (100 mg total) by mouth daily 31 tablet 11    sodium chloride (DEEP SEA NASAL SPRAY) 0 65 % nasal spray 2 sprays into each nostril as needed for congestion 44 mL 11    Sodium Fluoride (PREVIDENT 5000 PLUS DT) Apply orally to teeth once daily while brushing at night time do not rinse mouth after brushing      Starch, Thickening, LIQD Nectar thick liquids up to honey thick if coughing occurs as needed, please use Simply Thick liquid only  Discontinue Thick-It powder  237 mL 5    Tea Tree 100 % OIL Apply 1 g topically daily as needed (rash) Apply topically daily to skin folds 60 mL 11    Vitamins A & D (VITAMIN A & D) ointment - APPLY TO AFFECTED AREA (BUTTOCKS/ANAL) AS NEEDED 454 g 11    zinc oxide (DESITIN) 40 % PSTE Apply topically      zinc sulfate (ZINCATE) 220 mg capsule TAKE TWO CAPSULE -PLACE WHOLE IN YOGURT/PUDDING BY MOUTH EVERY DAY (9AM) EVERY OTHER MONTH **ODD NUMBERED MONTHS ONLY** 62 capsule 0     No current facility-administered medications for this visit  Allergies   Allergen Reactions    Other      drugs that prolong the QT interval  drugs that prolong the QT interval  Seasonal allergies        Review of Systems   HENT:        Patient says "no" when asked if he has ear pain; no discharge per caregivers   All other systems reviewed and are negative  Video Exam    There were no vitals filed for this visit      Physical Exam  Constitutional:       General: He is not in acute distress  Appearance: He is not toxic-appearing  Pulmonary:      Effort: Pulmonary effort is normal    Neurological:      Mental Status: He is alert  Mental status is at baseline  Comments: Wheelchair bound and scantly verbal, at his baseline   Psychiatric:         Mood and Affect: Mood normal           I spent 10 minutes directly with the patient during this visit      VIRTUAL VISIT [facility] acknowledges that he has consented to an online visit or consultation  He understands that the online visit is based solely on information provided by him, and that, in the absence of a face-to-face physical evaluation by the physician, the diagnosis he receives is both limited and provisional in terms of accuracy and completeness  This is not intended to replace a full medical face-to-face evaluation by the physician  Michelle Oakes understands and accepts these terms
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
## 2021-04-26 NOTE — TELEPHONE ENCOUNTER
Bala from [facility] dropped off 180 day orders review  Placed in Dr Ramesh Farrar folder in preceptor room  Please advise  Thank you

## 2021-04-27 ENCOUNTER — OFFICE VISIT (OUTPATIENT)
Dept: PHYSICAL THERAPY | Facility: CLINIC | Age: 36
End: 2021-04-27
Payer: COMMERCIAL

## 2021-04-27 DIAGNOSIS — M62.569 ATROPHY OF MUSCLE OF LOWER LEG, UNSPECIFIED LATERALITY: ICD-10-CM

## 2021-04-27 DIAGNOSIS — M22.2X2 PATELLOFEMORAL PAIN SYNDROME OF BOTH KNEES: ICD-10-CM

## 2021-04-27 DIAGNOSIS — G93.1 ANOXIC BRAIN DAMAGE (HCC): Primary | ICD-10-CM

## 2021-04-27 DIAGNOSIS — M22.2X1 PATELLOFEMORAL PAIN SYNDROME OF BOTH KNEES: ICD-10-CM

## 2021-04-27 PROCEDURE — 97116 GAIT TRAINING THERAPY: CPT | Performed by: PHYSICAL THERAPIST

## 2021-04-27 PROCEDURE — 97112 NEUROMUSCULAR REEDUCATION: CPT | Performed by: PHYSICAL THERAPIST

## 2021-04-27 PROCEDURE — 97150 GROUP THERAPEUTIC PROCEDURES: CPT | Performed by: PHYSICAL THERAPIST

## 2021-04-28 ENCOUNTER — OFFICE VISIT (OUTPATIENT)
Dept: PHYSICAL THERAPY | Facility: CLINIC | Age: 36
End: 2021-04-28
Payer: COMMERCIAL

## 2021-04-28 DIAGNOSIS — M22.2X2 PATELLOFEMORAL PAIN SYNDROME OF BOTH KNEES: ICD-10-CM

## 2021-04-28 DIAGNOSIS — M22.2X1 PATELLOFEMORAL PAIN SYNDROME OF BOTH KNEES: ICD-10-CM

## 2021-04-28 DIAGNOSIS — G93.1 ANOXIC BRAIN DAMAGE (HCC): Primary | ICD-10-CM

## 2021-04-28 DIAGNOSIS — M62.569 ATROPHY OF MUSCLE OF LOWER LEG, UNSPECIFIED LATERALITY: ICD-10-CM

## 2021-04-28 PROCEDURE — 97112 NEUROMUSCULAR REEDUCATION: CPT | Performed by: PHYSICAL THERAPIST

## 2021-04-28 PROCEDURE — 97116 GAIT TRAINING THERAPY: CPT | Performed by: PHYSICAL THERAPIST

## 2021-04-29 ENCOUNTER — TELEPHONE (OUTPATIENT)
Dept: FAMILY MEDICINE CLINIC | Facility: CLINIC | Age: 36
End: 2021-04-29

## 2021-04-29 NOTE — TELEPHONE ENCOUNTER
MA97 form received from [facility] needed to be completed  Placed in Dr Rafaela Pardo folder in preceptor room  Please advise  Thank you

## 2021-04-29 NOTE — ASSESSMENT & PLAN NOTE
-Huggies #3 diapers medically-necessary for this patient, as they are used to wrap around his penis for his incontinence; experienced skin breakdown from traditional diapers in the past  -Also reduces risks of UTI from fecal sources, and patient has hx of Enterococcus faecalis UTI  -Will complete prior auth form when received

## 2021-04-30 NOTE — PROGRESS NOTES
Daily Note     Today's date: 2021  Patient name: Nohemi Parnell  : 1985  MRN: [id number]  Referring provider: Robert Wilkes  Dx:   Encounter Diagnosis     ICD-10-CM    1  Anoxic brain damage (HCC)  G93 1    2  Atrophy of muscle of lower leg, unspecified laterality  M62 569    3  Patellofemoral pain syndrome of both knees  M22 2X1     M22 2X2                   Subjective: Did well with horse back riding today with good posture      Objective: See treatment diary below      Assessment: Attempted use of treadmill this session which patient was able to complete with no physical assist required for about 30 seconds at 0 5 mph with good step through gait pattern  Plan: Continue per plan of care          Short Term Goal Expiration Date:(5/15)  Long Term Goal Expiration Date: (6/15)  POC Expiration Date: (6/15)         Precautions anoxic brain injury, fall risk       Manuals                                      Neuro Re-Ed         stairs  2 x up and over, SOLO step max assist for balance      Kicking ball with 4# b/l  10x2      STS  10x2 max a      treadmill   0 5 mph b/l UE  CG to mod a    30 sec  1 min 30 sec  1 min  1 min 15 sec                             Ther Ex        LAQ holds  10x 5 sec ea LE                                                              Ther Activity                        Gait Training        RW  80 ft x 2    40 ft x 1    Max a walker maintenance and balance 80 ft x 1 max a wlaker management and balance             Modalities

## 2021-04-30 NOTE — PROGRESS NOTES
Daily Note     Today's date: 2021  Patient name: Stepan Stephenson  : 1985  MRN: [id number]  Referring provider: Yanick Fiztgerald  Dx:   Encounter Diagnosis     ICD-10-CM    1  Anoxic brain damage (HCC)  G93 1    2  Atrophy of muscle of lower leg, unspecified laterality  M62 569    3  Patellofemoral pain syndrome of both knees  M22 2X1     M22 2X2                   Subjective: Not increase in pain noted, doing OK today      Objective: See treatment diary below      Assessment: Unable to tolerate as much walking on treadmill compared to previous session likelydue to overall fatigue  Plan to continue to to attempt walkin hakan treadmill in order to increase speed and decreased physical assist during sessions  Plan: Continue per plan of care          Short Term Goal Expiration Date:(5/15)  Long Term Goal Expiration Date: (6/15)  POC Expiration Date: (6/15)         Precautions anoxic brain injury, fall risk       Manuals                                     Neuro Re-Ed         stairs  2 x up and over, SOLO step max assist for balance      Kicking ball with 4# b/l  10x2      STS  10x2 max a  10x2    treadmill   0 5 mph b/l UE  CG to mod a    30 sec  1 min 30 sec  1 min  1 min 15 sec 0 5 mph b/l UE CG to mod a    30 sec x 5                            Ther Ex        LAQ holds  10x 5 sec ea LE                                                              Ther Activity                        Gait Training        RW  80 ft x 2    40 ft x 1    Max a walker maintenance and balance 80 ft x 1 max a wlaker management and balance 80 ft x 1            Modalities

## 2021-05-04 ENCOUNTER — OFFICE VISIT (OUTPATIENT)
Dept: PHYSICAL THERAPY | Facility: CLINIC | Age: 36
End: 2021-05-04
Payer: COMMERCIAL

## 2021-05-04 DIAGNOSIS — M62.569 ATROPHY OF MUSCLE OF LOWER LEG, UNSPECIFIED LATERALITY: ICD-10-CM

## 2021-05-04 DIAGNOSIS — M22.2X1 PATELLOFEMORAL PAIN SYNDROME OF BOTH KNEES: ICD-10-CM

## 2021-05-04 DIAGNOSIS — G93.1 ANOXIC BRAIN DAMAGE (HCC): Primary | ICD-10-CM

## 2021-05-04 DIAGNOSIS — M22.2X2 PATELLOFEMORAL PAIN SYNDROME OF BOTH KNEES: ICD-10-CM

## 2021-05-04 PROCEDURE — 97112 NEUROMUSCULAR REEDUCATION: CPT | Performed by: PHYSICAL THERAPIST

## 2021-05-04 PROCEDURE — 97116 GAIT TRAINING THERAPY: CPT | Performed by: PHYSICAL THERAPIST

## 2021-05-05 ENCOUNTER — OFFICE VISIT (OUTPATIENT)
Dept: PHYSICAL THERAPY | Facility: CLINIC | Age: 36
End: 2021-05-05
Payer: COMMERCIAL

## 2021-05-05 DIAGNOSIS — M22.2X2 PATELLOFEMORAL PAIN SYNDROME OF BOTH KNEES: ICD-10-CM

## 2021-05-05 DIAGNOSIS — M22.2X1 PATELLOFEMORAL PAIN SYNDROME OF BOTH KNEES: ICD-10-CM

## 2021-05-05 DIAGNOSIS — G93.1 ANOXIC BRAIN DAMAGE (HCC): Primary | ICD-10-CM

## 2021-05-05 DIAGNOSIS — M62.569 ATROPHY OF MUSCLE OF LOWER LEG, UNSPECIFIED LATERALITY: ICD-10-CM

## 2021-05-05 PROCEDURE — 97112 NEUROMUSCULAR REEDUCATION: CPT | Performed by: PHYSICAL THERAPIST

## 2021-05-05 NOTE — PROGRESS NOTES
Daily Note     Today's date: 2021  Patient name: Orelia Bernheim  : 1985  MRN: [id number]  Referring provider: Eduin Montano  Dx:   Encounter Diagnosis     ICD-10-CM    1  Anoxic brain damage (HCC)  G93 1    2  Atrophy of muscle of lower leg, unspecified laterality  M62 569    3  Patellofemoral pain syndrome of both knees  M22 2X1     M22 2X2                   Subjective: Not increase in pain noted, doing OK today      Objective: See treatment diary below      Assessment: Pt did significantly well with improved toleration of treadmill walking for up to one minute  Improved sit to stand transfers with target for forward weight shifting  Plan: Continue per plan of care          Short Term Goal Expiration Date:(5/15)  Long Term Goal Expiration Date: (6/15)  POC Expiration Date: (6/15)         Precautions anoxic brain injury, fall risk       Manuals                                    Neuro Re-Ed         stairs  2 x up and over, SOLO step max assist for balance      Kicking ball with 4# b/l  10x2      STS  10x2 max a  10x2    treadmill   0 5 mph b/l UE  CG to mod a    30 sec  1 min 30 sec  1 min  1 min 15 sec 0 5 mph b/l UE CG to mod a    30 sec x 5 0 5 mph b/l UE CG to mod a    38 sec x 1  1 min x 1  15 sec x 1  28 sec x 1                           Ther Ex        LAQ holds  10x 5 sec ea LE                                                              Ther Activity                        Gait Training        RW  80 ft x 2    40 ft x 1    Max a walker maintenance and balance 80 ft x 1 max a wlaker management and balance 80 ft x 1 15 ft x 1           Modalities

## 2021-05-06 ENCOUNTER — TELEPHONE (OUTPATIENT)
Dept: FAMILY MEDICINE CLINIC | Facility: CLINIC | Age: 36
End: 2021-05-06

## 2021-05-06 DIAGNOSIS — M62.569 ATROPHY OF MUSCLE OF LOWER LEG, UNSPECIFIED LATERALITY: ICD-10-CM

## 2021-05-06 DIAGNOSIS — G93.1 ANOXIC BRAIN DAMAGE (HCC): Primary | ICD-10-CM

## 2021-05-06 DIAGNOSIS — M22.2X2 PATELLOFEMORAL ARTHRALGIA OF BOTH KNEES: ICD-10-CM

## 2021-05-06 DIAGNOSIS — M22.2X1 PATELLOFEMORAL ARTHRALGIA OF BOTH KNEES: ICD-10-CM

## 2021-05-06 NOTE — TELEPHONE ENCOUNTER
Vern Steward contacted the office regarding a referral for physiatry  Order placed  Please sign off  Thank you

## 2021-05-07 NOTE — PROGRESS NOTES
Daily Note     Today's date: 2021  Patient name: Michelle Oakes  : 1985  MRN: [id number]  Referring provider: Adarsh Chao  Dx:   Encounter Diagnosis     ICD-10-CM    1  Anoxic brain damage (HCC)  G93 1    2  Atrophy of muscle of lower leg, unspecified laterality  M62 569    3  Patellofemoral pain syndrome of both knees  M22 2X1     M22 2X2                   Subjective: Not increase in pain noted, doing OK today      Objective: See treatment diary below      Assessment: Pt was able to walk with walker today with only stand by assist for 10 feet  Repeated traisl he was only able to do 5 ft but had no instances of falling  Plan: Continue per plan of care          Short Term Goal Expiration Date:(5/15)  Long Term Goal Expiration Date: (6/15)  POC Expiration Date: (6/15)         Precautions anoxic brain injury, fall risk       Manuals                                    Neuro Re-Ed         stairs  2 x up and over, SOLO step max assist for balance      Kicking ball with 4# b/l  10x2      STS  10x2 max a  10x2 10x2 with VC for low forward weight shift with hands   treadmill   0 5 mph b/l UE  CG to mod a    30 sec  1 min 30 sec  1 min  1 min 15 sec 0 5 mph b/l UE CG to mod a    30 sec x 5                            Ther Ex        LAQ holds  10x 5 sec ea LE                                                              Ther Activity                        Gait Training        RW  80 ft x 2    40 ft x 1    Max a walker maintenance and balance 80 ft x 1 max a wlaker management and balance 80 ft x 1 15 ft x 1  10 ft x 1  5 ft x 2    SBA in solo step           Modalities

## 2021-05-11 ENCOUNTER — TELEPHONE (OUTPATIENT)
Dept: NEUROLOGY | Facility: CLINIC | Age: 36
End: 2021-05-11

## 2021-05-11 NOTE — TELEPHONE ENCOUNTER
Best contact number for patient: Hossein Ken [phone]     Emergency Contact name and number:[phone]     Referring provider and telephone number:    Primary Care Provider Name and if affiliated with [facility]:      Reason for Appointment/Dx:- Patellofemoral arthralgia of both knees/ Atrophy of muscle of lower leg, unspecified laterality    Have you seen and followed up with a pediatric Neurologist for this disease in the past?  No     No (If yes ok to schedule with Dr Chuck Sabillon)    Neurology Location patient would like to be seen:    Order received? Yes                                                 Records Received? Yes    Have you ever seen another Neurologist?       No    Insurance Information    Insurance Name:    ID/Policy #:    Secondary Insurance:    ID/Policy#: Workman's Comp/ Accident/ School  Information      Workman's Comp/Accident/School related?        No    If yes name of Insurance company:    Claim #:    Date of Injury:    Type of Injury:     Name and Telephone Number:    Notes:                   Appointment date: 07/20/2201

## 2021-05-12 ENCOUNTER — OFFICE VISIT (OUTPATIENT)
Dept: PHYSICAL THERAPY | Facility: CLINIC | Age: 36
End: 2021-05-12
Payer: COMMERCIAL

## 2021-05-12 DIAGNOSIS — G93.1 ANOXIC BRAIN DAMAGE (HCC): Primary | ICD-10-CM

## 2021-05-12 DIAGNOSIS — M62.569 ATROPHY OF MUSCLE OF LOWER LEG, UNSPECIFIED LATERALITY: ICD-10-CM

## 2021-05-12 DIAGNOSIS — M22.2X1 PATELLOFEMORAL PAIN SYNDROME OF BOTH KNEES: ICD-10-CM

## 2021-05-12 DIAGNOSIS — M22.2X2 PATELLOFEMORAL PAIN SYNDROME OF BOTH KNEES: ICD-10-CM

## 2021-05-12 PROCEDURE — 97112 NEUROMUSCULAR REEDUCATION: CPT | Performed by: PHYSICAL THERAPIST

## 2021-05-15 NOTE — PROGRESS NOTES
Daily Note     Today's date: 2021  Patient name: Anne Doe  : 1985  MRN: [id number]  Referring provider: Sherry Chambers  Dx:   Encounter Diagnosis     ICD-10-CM    1  Anoxic brain damage (HCC)  G93 1    2  Atrophy of muscle of lower leg, unspecified laterality  M62 569    3  Patellofemoral pain syndrome of both knees  M22 2X1     M22 2X2                   Subjective: Not increase in pain noted, doing OK today      Objective: See treatment diary below      Assessment: Pt did significantly well with improved toleration of treadmill walking for up to one minute  Improved sit to stand transfers with target for forward weight shifting  Plan: Continue per plan of care          Short Term Goal Expiration Date:(5/15)  Long Term Goal Expiration Date: (6/15)  POC Expiration Date: (6/15)         Precautions anoxic brain injury, fall risk       Manuals                                    Neuro Re-Ed         stairs  2 x up and over, SOLO step max assist for balance      Kicking ball with 4# b/l  10x2      STS  10x2 max a  10x2    treadmill   0 5 mph b/l UE  CG to mod a    30 sec  1 min 30 sec  1 min  1 min 15 sec 0 5 mph b/l UE CG to mod a    30 sec x 5 0 5 mph b/l UE CG to mod a    38 sec x 1  1 min x 1  15 sec x 1  28 sec x 1                           Ther Ex        LAQ holds  10x 5 sec ea LE                                                              Ther Activity                        Gait Training        RW  80 ft x 2    40 ft x 1    Max a walker maintenance and balance 80 ft x 1 max a wlaker management and balance 80 ft x 1 15 ft x 1           Modalities

## 2021-05-19 ENCOUNTER — OFFICE VISIT (OUTPATIENT)
Dept: PHYSICAL THERAPY | Facility: CLINIC | Age: 36
End: 2021-05-19
Payer: COMMERCIAL

## 2021-05-19 ENCOUNTER — TELEPHONE (OUTPATIENT)
Dept: FAMILY MEDICINE CLINIC | Facility: CLINIC | Age: 36
End: 2021-05-19

## 2021-05-19 DIAGNOSIS — M22.2X1 PATELLOFEMORAL PAIN SYNDROME OF BOTH KNEES: ICD-10-CM

## 2021-05-19 DIAGNOSIS — M62.569 ATROPHY OF MUSCLE OF LOWER LEG, UNSPECIFIED LATERALITY: ICD-10-CM

## 2021-05-19 DIAGNOSIS — R13.12 OROPHARYNGEAL DYSPHAGIA: Primary | ICD-10-CM

## 2021-05-19 DIAGNOSIS — M22.2X2 PATELLOFEMORAL PAIN SYNDROME OF BOTH KNEES: ICD-10-CM

## 2021-05-19 DIAGNOSIS — G93.1 ANOXIC BRAIN DAMAGE (HCC): Primary | ICD-10-CM

## 2021-05-19 PROCEDURE — 97116 GAIT TRAINING THERAPY: CPT | Performed by: PHYSICAL THERAPIST

## 2021-05-19 PROCEDURE — 97112 NEUROMUSCULAR REEDUCATION: CPT | Performed by: PHYSICAL THERAPIST

## 2021-05-19 NOTE — TELEPHONE ENCOUNTER
Patient's mom called requesting a script for a modified barium swallow study be sent to [facility] at [phone]  Please advise  Thank you

## 2021-05-21 ENCOUNTER — TELEPHONE (OUTPATIENT)
Dept: OBGYN CLINIC | Facility: OTHER | Age: 36
End: 2021-05-21

## 2021-05-21 DIAGNOSIS — R25.2 SPASTICITY: Primary | ICD-10-CM

## 2021-05-21 NOTE — TELEPHONE ENCOUNTER
Physical Therapy called this morning, they are asking if you can please put in an order for BILATERAL [employer]

## 2021-05-21 NOTE — PROGRESS NOTES
Progress Note     Today's date: 2021  Patient name: Arcadio Pacheco  : 1985  MRN: [id number]  Referring provider: Nav Simmons  Dx:   Encounter Diagnosis     ICD-10-CM    1  Anoxic brain damage (HCC)  G93 1    2  Atrophy of muscle of lower leg, unspecified laterality  M62 569    3  Patellofemoral pain syndrome of both knees  M22 2X1     M22 2X2                   Subjective: Not increase in pain noted, doing OK today      Objective: See treatment diary below    Gait: 5 ft with RW with SBA  80 ft with  mod a    Assessment: Since initial evaluation patient has improved with overall tolerance of walking, has made several attempts to walk with walker, and is improving with overall endurance with less assist needed  However he continues to require assistance for ambulation that varies to sginficant assist, and is unale to tolerate more than 15 ft of walking before needing to sit  Occassional pain reported in left and right knee, feel orthotic evaluation may be appropriate  Plan to continue at 1x/week for 4 more weeks  Plan to continue to work towards greater independence with ambulation in order for staff to walk iwht pt at home  Will adapt HEP within the next 1-2 sessions  Plan: Continue per plan of care          Short Term Goal Expiration Date:(5/15)  Long Term Goal Expiration Date: (6/15)  POC Expiration Date: (6/15)    Goals  STG:  Pt will be able to walk for 50 ft with min assist of 1 wihtin 4 weeks - no tmet  Pt will complete updated LE strengthening HEP with assist within 4 weeks - not met  Pt will complete sit to stand tranfser within min assist within 4 weeks - not met    LTG  Pt will complete trasnfers with no more than min assist in 8 weeks  Pt will be ambulating daily at home with caregivers within 8 weeks  Pt will have updated HEP completed at home with nowithin 8 weeks     Precautions anoxic brain injury, fall risk       Manuals  Neuro Re-Ed         stairs        Kicking ball with 4# b/l        STS    10x2    treadmill 0 5 mph b/l ue CG to mod a    30 sec x 2   0 5 mph b/l UE CG to mod a    30 sec x 5 0 5 mph b/l UE CG to mod a    38 sec x 1  1 min x 1  15 sec x 1  28 sec x 1                           Ther Ex        LAQ holds                                                                Ther Activity                        Gait Training        RW 5 ft cga  15 ft x 2 bhupendra  80 ft x 1 moda   80 ft x 1 15 ft x 1           Modalities

## 2021-05-26 ENCOUNTER — APPOINTMENT (OUTPATIENT)
Dept: PHYSICAL THERAPY | Facility: CLINIC | Age: 36
End: 2021-05-26
Payer: COMMERCIAL

## 2021-06-01 ENCOUNTER — TELEPHONE (OUTPATIENT)
Dept: INTERNAL MEDICINE CLINIC | Facility: CLINIC | Age: 36
End: 2021-06-01

## 2021-06-01 DIAGNOSIS — M62.442 CONTRACTURE OF MUSCLE OF BOTH HANDS: Primary | ICD-10-CM

## 2021-06-01 DIAGNOSIS — M62.441 CONTRACTURE OF MUSCLE OF BOTH HANDS: Primary | ICD-10-CM

## 2021-06-01 NOTE — TELEPHONE ENCOUNTER
Cesar Nicholas called and is asking for a referral to orthopaedics for both of Naman's hands  She would like the referral to be to Dr Dinesh Vyas located in [place] with [facility]

## 2021-06-02 ENCOUNTER — OFFICE VISIT (OUTPATIENT)
Dept: PHYSICAL THERAPY | Facility: CLINIC | Age: 36
End: 2021-06-02
Payer: COMMERCIAL

## 2021-06-02 DIAGNOSIS — M22.2X2 PATELLOFEMORAL PAIN SYNDROME OF BOTH KNEES: ICD-10-CM

## 2021-06-02 DIAGNOSIS — M62.569 ATROPHY OF MUSCLE OF LOWER LEG, UNSPECIFIED LATERALITY: ICD-10-CM

## 2021-06-02 DIAGNOSIS — G93.1 ANOXIC BRAIN DAMAGE (HCC): Primary | ICD-10-CM

## 2021-06-02 DIAGNOSIS — M22.2X1 PATELLOFEMORAL PAIN SYNDROME OF BOTH KNEES: ICD-10-CM

## 2021-06-02 PROCEDURE — 97116 GAIT TRAINING THERAPY: CPT | Performed by: PHYSICAL THERAPIST

## 2021-06-02 PROCEDURE — 97110 THERAPEUTIC EXERCISES: CPT | Performed by: PHYSICAL THERAPIST

## 2021-06-04 NOTE — PROGRESS NOTES
Progress Note     Today's date: 2021  Patient name: Arcadio Pacheco  : 1985  MRN: [id number]  Referring provider: Nav Simmons  Dx:   Encounter Diagnosis     ICD-10-CM    1  Anoxic brain damage (HCC)  G93 1    2  Atrophy of muscle of lower leg, unspecified laterality  M62 569    3  Patellofemoral pain syndrome of both knees  M22 2X1     M22 2X2                   Subjective: Not increase in pain noted, doing OK today      Objective: See treatment diary below    Assessment: Reviewed strengthening HEP today with patient, caregiver and mother  Some more complex movements were too difficulty for patient to complete  Focused on LE strength in order to improve strength to prevent knee pain  Plan to contact orthotist for evaluation  Plan: Continue per plan of care          Short Term Goal Expiration Date:(5/15)  Long Term Goal Expiration Date: (6/15)  POC Expiration Date: (6/15)       Precautions anoxic brain injury, fall risk       Manuals                                    Neuro Re-Ed         stairs        Kicking ball with 4# b/l        STS  2x10  10x2    treadmill 0 5 mph b/l ue CG to mod a    30 sec x 2   0 5 mph b/l UE CG to mod a    30 sec x 5 0 5 mph b/l UE CG to mod a    38 sec x 1  1 min x 1  15 sec x 1  28 sec x 1   quadruped  5 sec x 2                      Ther Ex        LAQ holds  3 sec x 10ea      marches  Seated 30x      SAQ  10x2 ea      bridges  10x2 ea      rows  otb 10x                              Ther Activity                        Gait Training        RW 5 ft cga  15 ft x 2 bhupendra  80 ft x 1 moda 15 ft x 1 min to mod a   80 ft x 1 15 ft x 1           Modalities

## 2021-06-09 ENCOUNTER — OFFICE VISIT (OUTPATIENT)
Dept: PHYSICAL THERAPY | Facility: CLINIC | Age: 36
End: 2021-06-09
Payer: COMMERCIAL

## 2021-06-09 DIAGNOSIS — M22.2X2 PATELLOFEMORAL PAIN SYNDROME OF BOTH KNEES: ICD-10-CM

## 2021-06-09 DIAGNOSIS — G93.1 ANOXIC BRAIN DAMAGE (HCC): Primary | ICD-10-CM

## 2021-06-09 DIAGNOSIS — M62.569 ATROPHY OF MUSCLE OF LOWER LEG, UNSPECIFIED LATERALITY: ICD-10-CM

## 2021-06-09 DIAGNOSIS — M22.2X1 PATELLOFEMORAL PAIN SYNDROME OF BOTH KNEES: ICD-10-CM

## 2021-06-09 PROCEDURE — 97110 THERAPEUTIC EXERCISES: CPT | Performed by: PHYSICAL THERAPIST

## 2021-06-09 PROCEDURE — 97116 GAIT TRAINING THERAPY: CPT | Performed by: PHYSICAL THERAPIST

## 2021-06-09 NOTE — PROGRESS NOTES
Daily Note     Today's date: 2021  Patient name: Isma Kamara  : 1985  MRN: [id number]  Referring provider: Renée Packer  Dx:   Encounter Diagnosis     ICD-10-CM    1  Anoxic brain damage (HCC)  G93 1    2  Atrophy of muscle of lower leg, unspecified laterality  M62 569    3  Patellofemoral pain syndrome of both knees  M22 2X1     M22 2X2                   Subjective: Not increase in pain noted, doing OK today      Objective: See treatment diary below    Assessment: Reviewed strengthening HEP once again today  Pt had difficulty wit hstraight leg raises, and also rows  Attempted rows with bar attached to band with improved success  Plan: Continue per plan of care          Short Term Goal Expiration Date:(5/15)  Long Term Goal Expiration Date: (6/15)  POC Expiration Date: (6/15)       Precautions anoxic brain injury, fall risk       Manuals                                       Neuro Re-Ed         stairs        Kicking ball with 4# b/l        STS  2x10 2x10     treadmill 0 5 mph b/l ue CG to mod a    30 sec x 2       quadruped  5 sec x 2                      Ther Ex        LAQ holds  3 sec x 10ea 3 sec x 10 ea     marches  Seated 30x Seated 30x     SAQ  10x2 ea 10x 2 ea     bridges  10x2 ea 10x 2 ea     rows  otb 10x otb 10x                             Ther Activity                        Gait Training        RW 5 ft cga  15 ft x 2 bhupendra  80 ft x 1 moda 15 ft x 1 min to mod a  15 ft x 3 min to max a             Modalities

## 2021-06-16 ENCOUNTER — OFFICE VISIT (OUTPATIENT)
Dept: PHYSICAL THERAPY | Facility: CLINIC | Age: 36
End: 2021-06-16
Payer: COMMERCIAL

## 2021-06-16 DIAGNOSIS — Z91.849 AT RISK FOR DENTAL CARIES: ICD-10-CM

## 2021-06-16 DIAGNOSIS — M22.2X2 PATELLOFEMORAL PAIN SYNDROME OF BOTH KNEES: ICD-10-CM

## 2021-06-16 DIAGNOSIS — M62.569 ATROPHY OF MUSCLE OF LOWER LEG, UNSPECIFIED LATERALITY: ICD-10-CM

## 2021-06-16 DIAGNOSIS — L85.3 DRY SKIN: ICD-10-CM

## 2021-06-16 DIAGNOSIS — J30.9 ALLERGIC RHINITIS, UNSPECIFIED SEASONALITY, UNSPECIFIED TRIGGER: Primary | ICD-10-CM

## 2021-06-16 DIAGNOSIS — G93.1 ANOXIC BRAIN DAMAGE (HCC): Primary | ICD-10-CM

## 2021-06-16 DIAGNOSIS — I51.7 CARDIOMEGALY: ICD-10-CM

## 2021-06-16 DIAGNOSIS — M22.2X1 PATELLOFEMORAL PAIN SYNDROME OF BOTH KNEES: ICD-10-CM

## 2021-06-16 DIAGNOSIS — H60.501 ACUTE OTITIS EXTERNA OF RIGHT EAR, UNSPECIFIED TYPE: ICD-10-CM

## 2021-06-16 PROCEDURE — 97112 NEUROMUSCULAR REEDUCATION: CPT | Performed by: PHYSICAL THERAPIST

## 2021-06-16 PROCEDURE — 97116 GAIT TRAINING THERAPY: CPT | Performed by: PHYSICAL THERAPIST

## 2021-06-16 NOTE — TELEPHONE ENCOUNTER
I have updated the list for Bullard Me to match his list from the facility he resides at  The orders you receive should only be to update the med list not to place orders to the pharmacy   Just FYI Thank you

## 2021-06-17 ENCOUNTER — OFFICE VISIT (OUTPATIENT)
Dept: CARDIOLOGY CLINIC | Facility: CLINIC | Age: 36
End: 2021-06-17
Payer: COMMERCIAL

## 2021-06-17 VITALS
DIASTOLIC BLOOD PRESSURE: 76 MMHG | SYSTOLIC BLOOD PRESSURE: 92 MMHG | WEIGHT: 128 LBS | HEART RATE: 74 BPM | BODY MASS INDEX: 18.37 KG/M2

## 2021-06-17 DIAGNOSIS — I45.81 LONG Q-T SYNDROME: Primary | ICD-10-CM

## 2021-06-17 PROCEDURE — 93000 ELECTROCARDIOGRAM COMPLETE: CPT | Performed by: PHYSICIAN ASSISTANT

## 2021-06-17 PROCEDURE — 99212 OFFICE O/P EST SF 10 MIN: CPT | Performed by: PHYSICIAN ASSISTANT

## 2021-06-17 RX ORDER — MINERAL OIL/I-PROP MYR/WATER
LOTION (ML) TOPICAL 2 TIMES DAILY
Refills: 0
Start: 2021-06-17

## 2021-06-17 RX ORDER — SODIUM FLUORIDE 5 MG/ML
PASTE, DENTIFRICE DENTAL
Refills: 0
Start: 2021-06-17

## 2021-06-17 RX ORDER — FLUTICASONE PROPIONATE 50 MCG
1 SPRAY, SUSPENSION (ML) NASAL DAILY
Start: 2021-06-17

## 2021-06-17 RX ORDER — GLUCOSA SU 2KCL/CHONDROITIN SU 500-400 MG
2 CAPSULE ORAL DAILY
Qty: 60 CAPSULE | Refills: 1
Start: 2021-06-17

## 2021-06-17 NOTE — PROGRESS NOTES
Electrophysiology Office Note    Ray Hagen  1985  [id number]  [facility]  [address]        Assessment/Plan     1  Long Q-T syndrome  POCT ECG     1  Cardiac arrest    * patient presents to [facility] EP for routine 18 month follow up, normally a patient of Dr Lelo Fernandez    * today ECG showing NSR with acceptable QTc    * per RN and mother no CV concerns at this time   * he is stable from CV perspective, recommend 18 month f/u    * hx of cardiac arrest at age 13 resulting in anoxic brain injury, he has required 24H care since this time  He is a very pleasant young man  * mother informs me after his arrest she underwent exercise stress testing with her QTc prolonging immediately, his father did not have this issue      * genetic testing was performed but I am unsure what the result was    * in any case, when RN needs fire drill paper work filled out for the fall informed her to call our office for us to fill out but patient can see us every 18 months for routine monitoring of his QTc    * mom and RN very diligent when adding new medications, he is on zoloft 100mg QDay but stable QTc             Rhythm History:   Atrial fibrillation:     Atrial flutter:     SVT:     VT/VF:     Device history:   Pacemaker:    Defibrillator:    BIV PPM:    BIV ICD:    ILR:      Cardiac Testing:     ECHO: Results for orders placed during the hospital encounter of 11/15/19    Echo complete with contrast if indicated    Javier [address]  [address] [facility], [address] [place] [address]  [phone]    Transthoracic Echocardiogram  2D, M-mode, Doppler, and Color Doppler    Study date:  15-Nov-2019    Patient: Benton Moreno  MR number: [id number]  Account number: [de-identified]  : 1985  Age: 37 years  Gender: Male  Status: Outpatient  Location: [address] [place] [address] [facility]  Height: 70 in  Weight: 132 lb  BP: 110/ 80 mmHg    Indications: Cardiomegaly    Diagnoses: I51 7 - Cardiomegaly    Sonographer:  DAISHA Hdez  Referring Physician:  Aaron Flores MD  Group:  [facility]  Cardiology Fellow:  Leisa Kim MD  Interpreting Physician:  Checo Aviles MD    SUMMARY    LEFT VENTRICLE:  Systolic function was normal  Ejection fraction was estimated to be 55 %  There were no regional wall motion abnormalities  Wall thickness was normal   Left ventricular diastolic function parameters were normal     RIGHT VENTRICLE:  The ventricle was mildly dilated  Systolic function was normal     TRICUSPID VALVE:  There was mild regurgitation  IVC, HEPATIC VEINS:  The inferior vena cava was mildly dilated  PULMONARY ARTERIES:  The systolic flow profile was normal without notching  HISTORY: PRIOR HISTORY: Hypertension, long QT syndrome, cardiomegaly    PROCEDURE: The study was performed in the [address]  This was a routine study  The transthoracic approach was used  The study included complete 2D imaging, M-mode, complete spectral Doppler, and color Doppler  Echocardiographic views were limited due to restricted patient mobility and lung interference  Image quality was adequate  LEFT VENTRICLE: Size was normal  Systolic function was normal  Ejection fraction was estimated to be 55 %  There were no regional wall motion abnormalities  Wall thickness was normal  DOPPLER: Left ventricular diastolic function parameters  were normal     RIGHT VENTRICLE: The ventricle was mildly dilated  Systolic function was normal  Wall thickness was normal     LEFT ATRIUM: Size was normal     RIGHT ATRIUM: Size was normal     MITRAL VALVE: Valve structure was normal  There was normal leaflet separation  DOPPLER: The transmitral velocity was within the normal range  There was no evidence for stenosis  There was no significant regurgitation  AORTIC VALVE: The valve was trileaflet   Leaflets exhibited normal thickness and normal cuspal separation  DOPPLER: Transaortic velocity was within the normal range  There was no evidence for stenosis  There was no significant  regurgitation  TRICUSPID VALVE: The valve structure was normal  There was normal leaflet separation  DOPPLER: The transtricuspid velocity was within the normal range  There was no evidence for stenosis  There was mild regurgitation  The tricuspid jet  envelope definition was inadequate for estimation of RV systolic pressure  PULMONIC VALVE: Leaflets exhibited normal thickness, no calcification, and normal cuspal separation  DOPPLER: The transpulmonic velocity was within the normal range  There was trace regurgitation  PERICARDIUM: There was no pericardial effusion  The pericardium was normal in appearance  AORTA: The root exhibited normal size  SYSTEMIC VEINS: IVC: The inferior vena cava was mildly dilated  Respirophasic changes were blunted (less than 50% variation)  PULMONARY ARTERY: DOPPLER: The systolic flow profile was normal without notching      SYSTEM MEASUREMENT TABLES    2D  %FS: 31 18 %  Ao Diam: 3 05 cm  EDV(Teich): 113 12 ml  EF(Cube): 67 4 %  EF(Teich): 58 77 %  ESV(Cube): 38 49 ml  ESV(Teich): 46 64 ml  IVSd: 0 78 cm  LA Area: 10 19 cm2  LA Diam: 2 83 cm  LVEDV MOD A4C: 105 66 ml  LVEF MOD A4C: 62 82 %  LVESV MOD A4C: 39 29 ml  LVIDd: 4 91 cm  LVIDs: 3 38 cm  LVLd A4C: 7 79 cm  LVLs A4C: 5 68 cm  LVPWd: 0 83 cm  RA Area: 13 09 cm2  RV Diam : 3 39 cm  SV MOD A4C: 66 38 ml  SV(Cube): 79 57 ml  SV(Teich): 66 48 ml    MM  TAPSE: 2 47 cm    PW  E': 0 09 m/s  E/E': 5 33  MV A Ankush: 0 33 m/s  MV Dec Moca: 1 53 m/s2  MV DecT: 310 58 ms  MV E Ankush: 0 48 m/s  MV E/A Ratio: 1 42    [employer] Accredited Echocardiography Laboratory    Prepared and electronically signed by    Michelle Mon MD  Signed [id number] 15:49:12      CATH/STRESS TEST:   None     EKG:   NSR  QTc 430ms        History of Present Illness HPI/INTERVAL HISTORY: Moses Bolaños is a 38 y o  male with history as above who presents to B EP office today under direction of Dr Day March for 18 month follow up  Patient is non verbal, hx obtained from mother and RN accompanying him:   Per mom and RN patient has been doing extremely well! He is in equine therapy which is a mix of OT/PT and speech once weekly which he really enjoys  He is living in his own residence with 24 hour care  Recently having issues with atelectasis but they have been working with him on deep breathing techniques  Review of Systems  ROS as noted above, otherwise 12 point review of systems was performed and is negative  Historical Information   Social History     Socioeconomic History    Marital status: Single     Spouse name: Not on file    Number of children: Not on file    Years of education: Not on file    Highest education level: Not on file   Occupational History    Not on file   Tobacco Use    Smoking status: Never Smoker    Smokeless tobacco: Never Used   Vaping Use    Vaping Use: Never used   Substance and Sexual Activity    Alcohol use: No    Drug use: No    Sexual activity: Not Currently   Other Topics Concern    Not on file   Social History Narrative    Lives in a group home     Social Determinants of Health     Financial Resource Strain:     Difficulty of Paying Living Expenses:    Food Insecurity:     Worried About Running Out of Food in the Last Year:     [address] in the Last Year:    Transportation Needs:     Lack of Transportation (Medical):      Lack of Transportation (Non-Medical):    Physical Activity:     Days of Exercise per Week:     Minutes of Exercise per Session:    Stress:     Feeling of Stress :    Social Connections:     Frequency of Communication with Friends and Family:     Frequency of Social Gatherings with Friends and Family:     Attends Sabianist Services:     Active Member of Clubs or Organizations:     Attends Club or Organization Meetings:     Marital Status:    Intimate Partner Violence:     Fear of Current or Ex-Partner:     Emotionally Abused:     Physically Abused:     Sexually Abused:      Past Medical History:   Diagnosis Date    Abnormal ECG  and beyond    post cardiac arrest    Allergic rhinitis     [facility]oxia [facility]     [facility]     age 13 s/p long Q-T syndrome    Community acquired pneumonia     last assessed/resolved:  10/9/2014    Depression     Disease of thyroid gland     GERD (gastroesophageal reflux disease)     Hypothyroid 2020    Long Q-T syndrome     Muscular rigidity and spasm, progressive     Osteopenia     Osteoporosis     Quadriplegia [facility]     Scoliosis of thoracic spine 2020    Spastic neurogenic bladder     Syncope     beta blocker medication DC because of low blood pressure    Urinary incontinence     Urinary tract infection without hematuria 2019     Past Surgical History:   Procedure Laterality Date    APPENDECTOMY      WISDOM TOOTH EXTRACTION       Social History     Substance and Sexual Activity   Alcohol Use No     Social History     Substance and Sexual Activity   Drug Use No     Social History     Tobacco Use   Smoking Status Never Smoker   Smokeless Tobacco Never Used     Family History   Problem Relation Age of Onset    Other Father         mitral valve replaced    Hypertension Father     Diabetes type II Maternal Grandfather     Heart failure Maternal Grandfather         Congestive heart failure -     Diabetes type II Maternal Uncle     Hypotension Mother        Meds/Allergies       Current Outpatient Medications:     acetaminophen (TYLENOL) 325 mg tablet, Take 3 tablets (975 mg total) by mouth every 8 (eight) hours as needed for mild pain or headaches, Disp: 120 tablet, Rfl: 3    ascorbic acid (GNP VITAMIN C) 500 MG tablet, Take 500 mg daily at 4 PM, Disp: 30 tablet, Rfl: 5    b complex-vitamin C-folic acid (RENAL) 1 mg, Take 1 mg daily at 4 PM, Disp: 31 each, Rfl: 5    Benzocaine-Docusate Sodium (Enemeez Plus)  MG ENEM, Insert 1 application into the rectum daily as needed (constipation) Use as directed, Disp: 150 mL, Rfl: 4    bisacodyl (DULCOLAX) 10 mg suppository, Use 1 supp  per rectum QOD PRN for constipation, max 3d/week, Disp: 12 suppository, Rfl: 0    Camphor-Eucalyptus-Menthol (VICKS VAPORUB) 4 73-1 2-2 6 % OINT, Apply topically as needed (congestion), Disp: 170 g, Rfl: 0    carbamide peroxide (DEBROX) 6 5 % otic solution, Administer 5 drops into both ears 2 (two) times a day for 7 days, Disp: 15 mL, Rfl: 5    Coenzyme Q10 (Co Q10) 100 MG CAPS, Take 2 capsules by mouth daily Whole in yougurt or pudding, Disp: 60 capsule, Rfl: 1    Diapers & Supplies MISC, Use 5 (five) times a day Huggies #3, Disp: 450 each, Rfl: 2    diclofenac sodium (VOLTAREN) 1 %, Apply 2 g topically 3 (three) times a day At 10am, 4pm, 9pm to thoracic paravertebral musculature, Disp: 100 g, Rfl: 2    Disposable Gloves (Vinyl Gloves Medium) MISC, Use 4 (four) times a day Dispense 3 boxes monthly; Diagnosis R32, Disp: 3 each, Rfl: 5    docusate sodium (COLACE) 100 mg capsule, 100 mg 9am and 9pm, Disp: 10 capsule, Rfl: 5    docusate sodium (Enemeez Mini) 283 MG enema, Insert 1 enema into the rectum daily Use daily or as needed, Disp: 30 each, Rfl: 0    dronabinol (MARINOL) 2 5 mg capsule, TAKE ONE CAPSULE -PLACE WHOLE IN YOGURT/PUDDING BY MOUTH 3 TIMES A DAY (9AM-4PM-9PM) *CALL PHARMACY 5 DAYS IN ADVANCE FOR REFILL*, Disp: 90 capsule, Rfl: 0    Emollient (eucerin) lotion, Apply topically to face daily at 10a and 8p, Disp: 480 mL, Rfl: 3    fluticasone (FLONASE) 50 mcg/act nasal spray, 1 spray into each nostril daily, Disp: , Rfl:     glycerin adult 2 g suppository, Place 1 suppository QOD for 2 weeks, then QOD PRN for constipation, Disp: 50 suppository, Rfl: 6    HEMORRHOIDAL 0 25 % suppository, - UNWRAP AND INSERT 1 SUPPOSITORY PER RECTUM AS NEEDED FOR HEMORRHOID PAIN RELIEF, Disp: 12 suppository, Rfl: 11    ibuprofen (MOTRIN) 200 mg tablet, Take 2 tabs q6h PRN for pain not to exceed 2000mg per day, Disp: 200 tablet, Rfl: 2    Incontinence Supply Disposable (Prevail Air Briefs) MISC, Use 1 each every 4 (four) hours Please provide 5 briefs per day, Disp: 150 each, Rfl: 11    Incontinence Supply Disposable (Select Disposable Underwear Sm) MISC, Please provide a 30 day supply 10 per day DX R32 incontinence, Disp: 22 each, Rfl: 6    Incontinence Supply Disposable (Simplicity Insert Pad 35"-68") MISC, Use 5 (five) times a day, Disp: 450 each, Rfl: 1    levothyroxine 50 mcg tablet, Take 1 tablet (50 mcg total) by mouth daily, Disp: 90 tablet, Rfl: 1    loratadine (CLARITIN) 10 mg tablet, Take 1 tablet (10 mg total) by mouth daily as needed for allergies, Disp: 30 tablet, Rfl: 5    Magnesium Oxide 500 MG TABS, Take 1 tablet (500 mg total) by mouth daily Take 1 tablet daily at 4 PM, Disp: 31 each, Rfl: 5    Melatonin 5 MG TABS, Take 1 tablet (5 mg total) by mouth daily at bedtime, Disp: 31 each, Rfl: 5    Misc   Devices [facility]) MISC, Please provide pt with a new cord for power wheelchair, Disp: 1 each, Rfl: 0    modafinil (PROVIGIL) 200 MG tablet, TAKE ONE TABLET -PLACE WHOLE IN YOGURT/PUDDING BY MOUTH EVERY DAY (9AM), Disp: 180 tablet, Rfl: 0    Multiple Vitamins-Minerals (CEROVITE SENIOR) TABS, Take 1 tablet by mouth daily, Disp: 30 tablet, Rfl: 5    Nutritional Supplements (NUTRITIONAL SHAKE) LIQD, Take 1 Can by mouth 2 (two) times a day Please provide Boost 90 minutes before lunch and 90 minutes before dinner, Disp: 60 Bottle, Rfl: 6    omeprazole (PriLOSEC) 20 mg delayed release capsule, Take 20mg at 9am every day, Disp: 30 capsule, Rfl: 5    polyethylene glycol (MIRALAX) 17 g packet, Take 17 g by mouth 2 (two) times a day, Disp: 180 each, Rfl: 3    PREPARATION H 1-0 25-14 4-15 % rectal cream, - APPLY RECTALLY AS NEEDED FOR HEMORRHOID PAIN RELIEF, Disp: 51 g, Rfl: 11    Respiratory Therapy Supplies (Spirometer) KIT, Use 3 (three) times a day Please provide incentive spirometer, Disp: 1 kit, Rfl: 0    sertraline (ZOLOFT) 100 mg tablet, Take 1 tablet (100 mg total) by mouth daily, Disp: 31 tablet, Rfl: 11    Skin Protectants, Misc  (Hydrocerin) LOTN, Apply topically 2 (two) times a day Apply to both feeland legs twice a day, Disp: , Rfl: 0    sodium chloride (DEEP SEA NASAL SPRAY) 0 65 % nasal spray, 2 sprays into each nostril as needed for congestion, Disp: 44 mL, Rfl: 11    SODIUM FLUORIDE, DENTAL GEL, (PreviDent 5000 Plus) 1 1 % CREA, Take by mouth daily at bedtime Apply orally to teeth once daily while brushing at night time do not rinse mouth after brushing, Disp: , Rfl: 0    Starch, Thickening, LIQD, Nectar thick liquids up to honey thick if coughing occurs as needed, please use Simply Thick liquid only   Discontinue Thick-It powder , Disp: 237 mL, Rfl: 5    Tea Tree 100 % OIL, Apply 1 g topically daily as needed (rash) Apply topically daily to skin folds, Disp: 60 mL, Rfl: 11    Vitamins A & D (VITAMIN A & D) ointment, - APPLY TO AFFECTED AREA (BUTTOCKS/ANAL) AS NEEDED, Disp: 454 g, Rfl: 11    zinc oxide (DESITIN) 40 % PSTE, Apply topically, Disp: , Rfl:     zinc sulfate (ZINCATE) 220 mg capsule, TAKE TWO CAPSULE -PLACE WHOLE IN YOGURT/PUDDING BY MOUTH EVERY DAY (9AM) EVERY OTHER MONTH **ODD NUMBERED MONTHS ONLY**, Disp: 62 capsule, Rfl: 0    ibuprofen (MOTRIN) 400 mg tablet, Take 1 tablet (400 mg total) by mouth every 6 (six) hours as needed for mild pain for up to 7 days, Disp: 28 tablet, Rfl: 0    Icosapent Ethyl (VASCEPA) 1 g CAPS, Take 1 capsule (1 g total) by mouth daily, Disp: 31 capsule, Rfl: 11    influenza vaccine, subunit, quadrivalent (Flucelvax Quadrivalent), Flucelvax Quad [id number] 60 mcg (15 mcg x 4)/0 5 mL intramuscular susp  TO BE ADMINISTERED BY PHARMACIST FOR IMMUNIZATION (PER CDC GUIDELINES) (Patient not taking: Reported on 6/17/2021), Disp: , Rfl:     onabotulinumtoxin A (BOTOX) 100 units, inject 500 units into left gastroc soleus and abductor pollicis ankit, Disp: , Rfl:     Allergies   Allergen Reactions    Other      drugs that prolong the QT interval  drugs that prolong the QT interval  Seasonal allergies        Objective   Vitals: Blood pressure 92/76, pulse 74, weight 58 1 kg (128 lb)  Physical Exam  Constitutional:       Comments: In wheel chair    HENT:      Head: Normocephalic and atraumatic  Eyes:      Pupils: Pupils are equal, round, and reactive to light  Cardiovascular:      Rate and Rhythm: Normal rate and regular rhythm  Pulmonary:      Effort: Pulmonary effort is normal       Breath sounds: Normal breath sounds  Abdominal:      General: Bowel sounds are normal       Palpations: Abdomen is soft  Musculoskeletal:      Cervical back: No rigidity  Skin:     General: Skin is warm and dry  Labs:not applicable    Imaging: I have personally reviewed pertinent reports

## 2021-06-18 ENCOUNTER — TELEPHONE (OUTPATIENT)
Dept: FAMILY MEDICINE CLINIC | Facility: CLINIC | Age: 36
End: 2021-06-18

## 2021-06-18 NOTE — TELEPHONE ENCOUNTER
Aishwarya Nuno from [facility] called to clarify if patient should have ordered blood work done before his July appointment or if it should wait for his physical in August? Please advise  Thank you      Aishwarya Nuno can be reached at [phone]

## 2021-06-18 NOTE — PROGRESS NOTES
Daily Note     Today's date: 2021  Patient name: Steve Ch  : 1985  MRN: [id number]  Referring provider: Dallas Alvarado  Dx:   Encounter Diagnosis     ICD-10-CM    1  Anoxic brain damage (HCC)  G93 1    2  Atrophy of muscle of lower leg, unspecified laterality  M62 569    3  Patellofemoral pain syndrome of both knees  M22 2X1     M22 2X2                   Subjective: Caregivers feel confident in seated/supine HEP  Would like to attempt walking and stairs today  Objective: See treatment diary below    Assessment: Pt will be evaluated for orthotics next week to attempt to prevent knee hyperextension and correct for PF through gait  Pt will be placed on hold after next session  Plan: Continue per plan of care          Short Term Goal Expiration Date:(5/15)  Long Term Goal Expiration Date: (6/15)  POC Expiration Date: (6/15)       Precautions anoxic brain injury, fall risk       Manuals                                     Neuro Re-Ed         stairs    2 laps up and over    Kicking ball with 4# b/l        STS  2x10 2x10 2x10    treadmill 0 5 mph b/l ue CG to mod a    30 sec x 2       quadruped  5 sec x 2                      Ther Ex        LAQ holds  3 sec x 10ea 3 sec x 10 ea     marches  Seated 30x Seated 30x     SAQ  10x2 ea 10x 2 ea     bridges  10x2 ea 10x 2 ea     rows  otb 10x otb 10x                             Ther Activity                        Gait Training        RW 5 ft cga  15 ft x 2 bhupendra  80 ft x 1 moda 15 ft x 1 min to mod a  15 ft x 3 min to max a 15 ft x 2            Modalities

## 2021-06-23 ENCOUNTER — OFFICE VISIT (OUTPATIENT)
Dept: PHYSICAL THERAPY | Facility: CLINIC | Age: 36
End: 2021-06-23
Payer: COMMERCIAL

## 2021-06-23 DIAGNOSIS — M62.569 ATROPHY OF MUSCLE OF LOWER LEG, UNSPECIFIED LATERALITY: ICD-10-CM

## 2021-06-23 DIAGNOSIS — M22.2X2 PATELLOFEMORAL PAIN SYNDROME OF BOTH KNEES: ICD-10-CM

## 2021-06-23 DIAGNOSIS — M22.2X1 PATELLOFEMORAL PAIN SYNDROME OF BOTH KNEES: ICD-10-CM

## 2021-06-23 DIAGNOSIS — G93.1 ANOXIC BRAIN DAMAGE (HCC): Primary | ICD-10-CM

## 2021-06-23 PROCEDURE — 97116 GAIT TRAINING THERAPY: CPT | Performed by: PHYSICAL THERAPIST

## 2021-06-23 PROCEDURE — 97760 ORTHOTIC MGMT&TRAING 1ST ENC: CPT | Performed by: PHYSICAL THERAPIST

## 2021-06-23 NOTE — PROGRESS NOTES
Daily Note     Today's date: 2021  Patient name: Leah Romo  : 1985  MRN: [id number]  Referring provider: Mercedez Chapa  Dx:   Encounter Diagnosis     ICD-10-CM    1  Anoxic brain damage (HCC)  G93 1    2  Atrophy of muscle of lower leg, unspecified laterality  M62 569    3  Patellofemoral pain syndrome of both knees  M22 2X1     M22 2X2                   Subjective: Caregivers feel confident in seated/supine HEP, prepared for discharge  Objective: See treatment diary below    40 ft with mod to max A with RW    Assessment: Pt was evaluated this session for orthotics  He will be getting a left MAFO and order knee brace as well  Pt will be discharged until he received AFO  Plan: Continue per plan of care          Short Term Goal Expiration Date:(5/15)  Long Term Goal Expiration Date: (6/15)  POC Expiration Date: (6/15)       Precautions anoxic brain injury, fall risk       Manuals                                    Neuro Re-Ed         stairs    2 laps up and over    Kicking ball with 4# b/l        STS  2x10 2x10 2x10    treadmill 0 5 mph b/l ue CG to mod a    30 sec x 2       quadruped  5 sec x 2                      Ther Ex        LAQ holds  3 sec x 10ea 3 sec x 10 ea     marches  Seated 30x Seated 30x     SAQ  10x2 ea 10x 2 ea     bridges  10x2 ea 10x 2 ea     rows  otb 10x otb 10x                             Ther Activity                        Gait Training        RW 5 ft cga  15 ft x 2 bhupendra  80 ft x 1 moda 15 ft x 1 min to mod a  15 ft x 3 min to max a 15 ft x 2 15 x 2  40 x 1           Modalities

## 2021-06-24 ENCOUNTER — OFFICE VISIT (OUTPATIENT)
Dept: OBGYN CLINIC | Facility: OTHER | Age: 36
End: 2021-06-24
Payer: COMMERCIAL

## 2021-06-24 ENCOUNTER — TELEPHONE (OUTPATIENT)
Dept: OBGYN CLINIC | Facility: OTHER | Age: 36
End: 2021-06-24

## 2021-06-24 VITALS
WEIGHT: 128 LBS | BODY MASS INDEX: 18.37 KG/M2 | DIASTOLIC BLOOD PRESSURE: 70 MMHG | SYSTOLIC BLOOD PRESSURE: 102 MMHG | HEART RATE: 85 BPM

## 2021-06-24 DIAGNOSIS — M22.2X1 PATELLOFEMORAL PAIN SYNDROME OF BOTH KNEES: ICD-10-CM

## 2021-06-24 DIAGNOSIS — M22.2X2 PATELLOFEMORAL PAIN SYNDROME OF BOTH KNEES: ICD-10-CM

## 2021-06-24 DIAGNOSIS — G93.1 ANOXIC BRAIN DAMAGE (HCC): ICD-10-CM

## 2021-06-24 DIAGNOSIS — M62.569 ATROPHY OF MUSCLE OF LOWER LEG, UNSPECIFIED LATERALITY: ICD-10-CM

## 2021-06-24 DIAGNOSIS — R63.6 UNDERWEIGHT: ICD-10-CM

## 2021-06-24 DIAGNOSIS — R25.2 SPASTICITY: Primary | ICD-10-CM

## 2021-06-24 PROCEDURE — 99214 OFFICE O/P EST MOD 30 MIN: CPT | Performed by: FAMILY MEDICINE

## 2021-06-24 PROCEDURE — 1036F TOBACCO NON-USER: CPT | Performed by: FAMILY MEDICINE

## 2021-06-24 RX ORDER — BACLOFEN 20 MG
TABLET ORAL DAILY
COMMUNITY

## 2021-06-24 RX ORDER — SODIUM FLUORIDE 5 MG/G
GEL, DENTIFRICE DENTAL
COMMUNITY

## 2021-06-24 NOTE — PROGRESS NOTES
1  Spasticity     2  Anoxic brain damage (HCC)     3  Patellofemoral pain syndrome of both knees     4  Atrophy of muscle of lower leg, unspecified laterality     5  Underweight       No orders of the defined types were placed in this encounter  Imaging Studies (I personally reviewed images in PACS and report):      IMPRESSION:  Bilateral patellofemoral pain syndrome  Hyper extension of the knee left greater than right on standing  Bilateral lower extremity muscular atrophy secondary to ambulatory dysfunction  Borderline underweight with BMI of 18-19  Chronic lower extremity muscle spasticity  Thoracolumbar scoliosis  PMH: Anoxic brain injury secondary to long QT syndrome associated cardiac arrest  normal weight range approximately 130-170 lb with goal 135-140 in the next 3 months      Repeat X-ray next visit: None    Return in about 6 months (around 12/24/2021)  Patient Instructions   Recommended nurtrition re-evaluation  Explained that patient will have higher caloric intake for basal but not great due to his chronic spasticity  Currently eating approximately 3500 calories per day  May need for 1000 calories per day  Goal for weight 135-140 lb  Normal weight for patient 130-170 lb for height  Follow-up with treating physician for chronic spasticity  Explained to mother and team that there are limited options for oral therapies and that patient will likely require repeat Botox injections  Agree with AFO bracing  And continuation of physical therapy  CHIEF COMPLAINT:   follow-up underweight, spasticity, knee pain    HPI:  Tony You is a 38 y o  male  who presents for       Visit 6/28/2021 :   underweight:  Improving since last visit but not at target goal   Currently 128 lb  Calories increased to 3500 per day  Has seen Nutrition the past       Follow-up spasticity:  Improving with physical therapy    Continues to have chronic hyper extension of the lower extremities bilateral   Has tried oral treatments in the  Past including baclofen as well as Marinol  Currently Botox injections intermittently with scheduled follow-up  Follow-up bilateral knee pain:  Improving with physical therapy  Mother states that patient's ambulation has improved with therapy  Plans on attending physical therapy at [facility] where they have a tricycle like bike with harnessing for patient  Currently awaiting AFO brace for proceeding with harnessed ambulation at [facility] again  Doing well with hippotherapy riding horses  Review of Systems   Constitutional: Negative for chills, fever and unexpected weight change  HENT: Negative for hearing loss, nosebleeds and sore throat  Eyes: Negative for pain, redness and visual disturbance  Respiratory: Negative for cough, shortness of breath and wheezing  Cardiovascular: Negative for chest pain, palpitations and leg swelling  Gastrointestinal: Negative for abdominal distention, nausea and vomiting  Endocrine: Negative for polydipsia and polyuria  Genitourinary: Negative for dysuria and hematuria  Skin: Negative for rash and wound  Neurological: Negative for dizziness, numbness and headaches  Psychiatric/Behavioral: Negative for decreased concentration and suicidal ideas           Following history reviewed and update:    Past Medical History:   Diagnosis Date    Abnormal ECG 1998 and beyond    post cardiac arrest    Allergic rhinitis     Brain anoxia ([facility] [address] )     Cardiac arrest [facility])     age 13 s/p long Q-T syndrome    Community acquired pneumonia     last assessed/resolved:  10/9/2014    Depression     Disease of thyroid gland     GERD (gastroesophageal reflux disease)     Hypothyroid 2/26/2020    Long Q-T syndrome     Muscular rigidity and spasm, progressive     Osteopenia     Osteoporosis     Quadriplegia ([facility] [address] )     Scoliosis of thoracic spine 2/27/2020    Spastic neurogenic bladder     Syncope     beta blocker medication DC because of low blood pressure    Urinary incontinence     Urinary tract infection without hematuria 2019     Past Surgical History:   Procedure Laterality Date    APPENDECTOMY      WISDOM TOOTH EXTRACTION       Social History   Social History     Substance and Sexual Activity   Alcohol Use No     Social History     Substance and Sexual Activity   Drug Use No     Social History     Tobacco Use   Smoking Status Never Smoker   Smokeless Tobacco Never Used     Family History   Problem Relation Age of Onset    Other Father         mitral valve replaced    Hypertension Father     Diabetes type II Maternal Grandfather     Heart failure Maternal Grandfather         Congestive heart failure -     Diabetes type II Maternal Uncle     Hypotension Mother      Allergies   Allergen Reactions    Other      drugs that prolong the QT interval  drugs that prolong the QT interval  Seasonal allergies           Physical Exam  /70 (BP Location: Right arm, Patient Position: Sitting, Cuff Size: Standard)   Pulse 85   Wt 58 1 kg (128 lb)   BMI 18 37 kg/m²     Constitutional:  see vital signs  Gen: well-developed, normocephalic/atraumatic, well-groomed  Eyes: No inflammation or discharge of conjunctiva or lids; sclera clear   Pharynx: no inflammation, lesion, or mass of lips  Neck: supple, no masses, non-distended  MSK: no inflammation, lesion, mass, or clubbing of nails and digits except for other than mentioned below  SKIN: no visible rashes or skin lesions  Pulmonary/Chest: Effort normal  No respiratory distress     NEURO: cranial nerves grossly intact  PSYCH:  Alert; mood normal, no depression, anxiety, or agitation; smiling; cooperative in room    Ortho Exam    Knee:  No swelling erythema or increased warmth  +spasticity  +hyperextension bilateral    Procedures        Total visit time was 35 minutes of which more than 50% was face to face counseling and/or coordination of care with patient regarding their treatment plan as outlined in note

## 2021-06-25 ENCOUNTER — PATIENT MESSAGE (OUTPATIENT)
Dept: FAMILY MEDICINE CLINIC | Facility: CLINIC | Age: 36
End: 2021-06-25

## 2021-06-25 ENCOUNTER — TELEPHONE (OUTPATIENT)
Dept: LAB | Facility: HOSPITAL | Age: 36
End: 2021-06-25

## 2021-06-25 NOTE — TELEPHONE ENCOUNTER
From: Varinder Hagen  To: Nuris Degroot MD  Sent: 6/25/2021 12:46 PM EDT  Subject: Test Results Question    Hi Dr Aurelia Maki just weighed Patrick Bentley and he is losing weight, down to 126#, despite eating a very high calorie nutritious diet  I am extremely concerned  I know he has an office visit with you early next month  Are there any particular tests he should have done prior to that exam, so you have more information to go on? As always, feel free to call  [phone]    In general, he seems to be doing well  I'm worried and anxious to know what might be the base cause for his weight loss, despite all efforts       Thanks,   [employer]keLex

## 2021-06-28 ENCOUNTER — PATIENT MESSAGE (OUTPATIENT)
Dept: FAMILY MEDICINE CLINIC | Facility: CLINIC | Age: 36
End: 2021-06-28

## 2021-06-28 DIAGNOSIS — R62.7 POOR WEIGHT GAIN IN ADULT: Primary | ICD-10-CM

## 2021-06-28 NOTE — PATIENT INSTRUCTIONS
Recommended nurtrition re-evaluation  Explained that patient will have higher caloric intake for basal but not great due to his chronic spasticity  Currently eating approximately 3500 calories per day  May need for 1000 calories per day  Goal for weight 135-140 lb  Normal weight for patient 130-170 lb for height  Follow-up with treating physician for chronic spasticity  Explained to mother and team that there are limited options for oral therapies and that patient will likely require repeat Botox injections  Agree with AFO bracing  And continuation of physical therapy

## 2021-06-29 ENCOUNTER — TELEPHONE (OUTPATIENT)
Dept: FAMILY MEDICINE CLINIC | Facility: CLINIC | Age: 36
End: 2021-06-29

## 2021-06-29 NOTE — TELEPHONE ENCOUNTER
Nils Mesa from [facility] called stating patient is due for a nutrition consult and is requesting a referral placed  Please advise  Thank you

## 2021-06-29 NOTE — TELEPHONE ENCOUNTER
We received deinial of DME product Pull ups  We will need to locate the MA 97 and resubmit with a greater that plan limit request and follow the criteria on the denial  Is the MA 97 in our office?

## 2021-06-29 NOTE — TELEPHONE ENCOUNTER
From: Renato Hagen  To: Bishop Danii MD  Sent: 6/28/2021 11:21 AM EDT  Subject: Test Results Question    Thanks for your quick response  We have not yet contacted the nutritionist  Dr Divya Gaspar was going to send us info on a different nutritionist he works with, so perhaps another perspective will help  Divya Gaspar suggested Naman's increased metabolism, due to muscle spasticity might be burning the calories; I thought that was plausible until I remembered that Shady Roman has had spasticity since 1998; it was much, much worse than now  It will be interesting to see the blood test numbers  I'm curious about thyroid levels, but also wondering whether there might be another autoimmune disease at work  Marilyn Abdi I have no idea how that system functions  As always, attempting to turn over every stone when things are not right with Naman  Im searching for possible relationships between brain injury, weight loss, and autoimmune diseases  We have an appointment set to see a new physiatrist, Dr Linda Tapia, on 7/20  I will discuss that with her at that time  First foray into that subject area quote: The third sequela is that head trauma may induce an autoimmune inflammatory disorder  One working theory, in addition to hypothalamic-pituitary dysfunction, is that damaged brain tissue leaks through the blood-brain barrier into the general circulation and becomes, in fact, an antigen just like a virus or allergen  Antibodies are formed against brain tissue that may become "auto" and start to attack various tissues including the brain itself  Regardless, an autoimmune inflammatory disorder may develop after head trauma and not only cause joint and muscle pain but attack other organs such as the eye, lung, heart, liver, intestine, and kidneyjust like any autoimmune inflammatory condition    [employer]    As always, thank you for your concern and oversight  Lila Braxton      ----- Message -----   King Jimenez MD   Sent:6/28/2021 9:34 AM EDT   To:Naman Hagen   Subject:RE: Test Results Question    Wesley Mendiola, I don't think there are any additional tests I would order at this time, but please get the bloodwork done before his visit with me  Did his nutritionist have any input?      ----- Message -----   From:Naman Murphy   Sent:6/25/2021 12:46 PM EDT   To:Agatha Peraza MD   Subject:Test Results Question    Hi Dr Rene Purvis just weighed Sharee December and he is losing weight, down to 126#, despite eating a very high calorie nutritious diet  I am extremely concerned  I know he has an office visit with you early next month  Are there any particular tests he should have done prior to that exam, so you have more information to go on? As always, feel free to call  [phone]    In general, he seems to be doing well  I'm worried and anxious to know what might be the base cause for his weight loss, despite all efforts       Thanks,   Lila Braxton

## 2021-07-02 ENCOUNTER — APPOINTMENT (OUTPATIENT)
Dept: LAB | Age: 36
End: 2021-07-02
Payer: COMMERCIAL

## 2021-07-02 DIAGNOSIS — R53.83 FATIGUE, UNSPECIFIED TYPE: ICD-10-CM

## 2021-07-02 DIAGNOSIS — R62.7 POOR WEIGHT GAIN IN ADULT: ICD-10-CM

## 2021-07-02 DIAGNOSIS — E03.9 HYPOTHYROIDISM, UNSPECIFIED TYPE: ICD-10-CM

## 2021-07-02 PROCEDURE — 86038 ANTINUCLEAR ANTIBODIES: CPT

## 2021-07-02 PROCEDURE — 86430 RHEUMATOID FACTOR TEST QUAL: CPT

## 2021-07-03 ENCOUNTER — APPOINTMENT (OUTPATIENT)
Dept: LAB | Age: 36
End: 2021-07-03
Payer: COMMERCIAL

## 2021-07-03 LAB
ALBUMIN SERPL BCP-MCNC: 3.7 G/DL (ref 3.5–5)
ALP SERPL-CCNC: 95 U/L (ref 46–116)
ALT SERPL W P-5'-P-CCNC: 33 U/L (ref 12–78)
ANION GAP SERPL CALCULATED.3IONS-SCNC: 1 MMOL/L (ref 4–13)
AST SERPL W P-5'-P-CCNC: 21 U/L (ref 5–45)
BASOPHILS # BLD AUTO: 0.03 THOUSANDS/ΜL (ref 0–0.1)
BASOPHILS NFR BLD AUTO: 1 % (ref 0–1)
BILIRUB DIRECT SERPL-MCNC: 0.1 MG/DL (ref 0–0.2)
BILIRUB SERPL-MCNC: 0.32 MG/DL (ref 0.2–1)
BUN SERPL-MCNC: 20 MG/DL (ref 5–25)
CALCIUM SERPL-MCNC: 9 MG/DL (ref 8.3–10.1)
CHLORIDE SERPL-SCNC: 106 MMOL/L (ref 100–108)
CO2 SERPL-SCNC: 29 MMOL/L (ref 21–32)
CREAT SERPL-MCNC: 0.7 MG/DL (ref 0.6–1.3)
CRP SERPL QL: <3 MG/L
EOSINOPHIL # BLD AUTO: 0.09 THOUSAND/ΜL (ref 0–0.61)
EOSINOPHIL NFR BLD AUTO: 2 % (ref 0–6)
ERYTHROCYTE [DISTWIDTH] IN BLOOD BY AUTOMATED COUNT: 12 % (ref 11.6–15.1)
GFR SERPL CREATININE-BSD FRML MDRD: 121 ML/MIN/1.73SQ M
GLUCOSE P FAST SERPL-MCNC: 84 MG/DL (ref 65–99)
HCT VFR BLD AUTO: 47.2 % (ref 36.5–49.3)
HGB BLD-MCNC: 15.7 G/DL (ref 12–17)
IMM GRANULOCYTES # BLD AUTO: 0 THOUSAND/UL (ref 0–0.2)
IMM GRANULOCYTES NFR BLD AUTO: 0 % (ref 0–2)
LYMPHOCYTES # BLD AUTO: 1.77 THOUSANDS/ΜL (ref 0.6–4.47)
LYMPHOCYTES NFR BLD AUTO: 33 % (ref 14–44)
MCH RBC QN AUTO: 31.5 PG (ref 26.8–34.3)
MCHC RBC AUTO-ENTMCNC: 33.3 G/DL (ref 31.4–37.4)
MCV RBC AUTO: 95 FL (ref 82–98)
MONOCYTES # BLD AUTO: 0.34 THOUSAND/ΜL (ref 0.17–1.22)
MONOCYTES NFR BLD AUTO: 6 % (ref 4–12)
NEUTROPHILS # BLD AUTO: 3.11 THOUSANDS/ΜL (ref 1.85–7.62)
NEUTS SEG NFR BLD AUTO: 58 % (ref 43–75)
NRBC BLD AUTO-RTO: 0 /100 WBCS
PLATELET # BLD AUTO: 178 THOUSANDS/UL (ref 149–390)
PMV BLD AUTO: 10.6 FL (ref 8.9–12.7)
POTASSIUM SERPL-SCNC: 4.6 MMOL/L (ref 3.5–5.3)
PROT SERPL-MCNC: 8.6 G/DL (ref 6.4–8.2)
RBC # BLD AUTO: 4.99 MILLION/UL (ref 3.88–5.62)
SODIUM SERPL-SCNC: 136 MMOL/L (ref 136–145)
TSH SERPL DL<=0.05 MIU/L-ACNC: 5.05 UIU/ML (ref 0.36–3.74)
WBC # BLD AUTO: 5.34 THOUSAND/UL (ref 4.31–10.16)

## 2021-07-03 PROCEDURE — 86140 C-REACTIVE PROTEIN: CPT

## 2021-07-03 PROCEDURE — 36415 COLL VENOUS BLD VENIPUNCTURE: CPT

## 2021-07-03 PROCEDURE — 80048 BASIC METABOLIC PNL TOTAL CA: CPT

## 2021-07-03 PROCEDURE — 84443 ASSAY THYROID STIM HORMONE: CPT

## 2021-07-03 PROCEDURE — 85025 COMPLETE CBC W/AUTO DIFF WBC: CPT

## 2021-07-03 PROCEDURE — 80076 HEPATIC FUNCTION PANEL: CPT

## 2021-07-06 DIAGNOSIS — E03.9 HYPOTHYROIDISM, UNSPECIFIED TYPE: Primary | ICD-10-CM

## 2021-07-06 LAB
RHEUMATOID FACT SER QL LA: NEGATIVE
RYE IGE QN: NEGATIVE

## 2021-07-06 RX ORDER — LEVOTHYROXINE SODIUM 0.07 MG/1
75 TABLET ORAL DAILY
Qty: 90 TABLET | Refills: 1 | Status: SHIPPED | OUTPATIENT
Start: 2021-07-06 | End: 2021-10-27 | Stop reason: SDUPTHER

## 2021-07-06 NOTE — TELEPHONE ENCOUNTER
I spoke to Ann and we do need to enter a new referral his last nutritionist is not longer with [facility] she will call us with the new name to include in the referral

## 2021-07-06 NOTE — TELEPHONE ENCOUNTER
Brooke Sanders MD   7/5/2021 11:27 AM EDT       I have naman's results  The TSH is slightly high  We can offer his mother to increase Naman's levothyroxine to 75mcg daily  He would then need a new TSH just before his follow-up appt with me in the fall   Thanks

## 2021-07-08 ENCOUNTER — OFFICE VISIT (OUTPATIENT)
Dept: FAMILY MEDICINE CLINIC | Facility: CLINIC | Age: 36
End: 2021-07-08
Payer: COMMERCIAL

## 2021-07-08 VITALS
TEMPERATURE: 98.2 F | OXYGEN SATURATION: 96 % | SYSTOLIC BLOOD PRESSURE: 112 MMHG | HEART RATE: 104 BPM | DIASTOLIC BLOOD PRESSURE: 70 MMHG

## 2021-07-08 DIAGNOSIS — R63.6 UNDERWEIGHT: Primary | ICD-10-CM

## 2021-07-08 DIAGNOSIS — R13.12 OROPHARYNGEAL DYSPHAGIA: ICD-10-CM

## 2021-07-08 PROCEDURE — 99213 OFFICE O/P EST LOW 20 MIN: CPT | Performed by: FAMILY MEDICINE

## 2021-07-08 NOTE — ASSESSMENT & PLAN NOTE
-Patient following with SLP at [facility], will reach out to them to discuss further  -Trial of increasing thin water from 30 mL to 240 mL over approximately 4-hour period, between breakfast and lunch, following oral hygiene including flossing, with Ark valve and close monitoring for any signs of aspiration such as gagging, choking, coughing, increased salivation

## 2021-07-08 NOTE — PROGRESS NOTES
Assessment/Plan:       Problem List Items Addressed This Visit        Digestive    Oropharyngeal dysphagia     -Patient following with SLP at [facility], will reach out to them to discuss further  -Trial of increasing thin water from 30 mL to 240 mL over approximately 4-hour period, between breakfast and lunch, following oral hygiene including flossing, with Ark valve and close monitoring for any signs of aspiration such as gagging, choking, coughing, increased salivation             Other    Underweight - Primary     -Advised strict monitoring of  I&O with calorie count per recommendations of Nutritionist         Relevant Orders    Ambulatory referral to Nutrition Services            Subjective:      Patient ID: Elisabet Avila is a 38 y o  male  HPI    Patient here to follow up on chronic conditions; has hx of 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 s/p cardiac arrest at 13years of age presumably 2/2 QT prolongation, although 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 thought to be of little clinical significance  He previously followed with me at [id number] [place] [address] by mother, Charis Pardo, and his nurse from [facility] via virtual visit, who provider history for patient  Jori Muniz and bolivar    Mother is concerned that patient's weight has dropped below 130 lbs despite taking in a large amount of calories and working with a nutritionist, although the previous nutritionist left and new one hasn't started yet  Patient has been participating in more exercise lately  Synthroid was increased to 75 mcg after TSH found to be slightly elevated; following with Endocrinology, will be started tomorrow but nurse states it was not being given properly previously  Total protein remains elevated but previous SPEP was WNL; of note, labs weren't able to be drawn 7/2/21 due to patient being "very dehydrated" but was fasting and not given water   Still following with speech therapy at [facility], Vic Infante protocol to strengthen masticaor muscles, FFW protocol to increase free water intake remains slightly difficult to implement with SPIN  So, patient is still on nectar thick liquids and using the arc valve, with only a very small volume of free water  Wt Readings from Last 3 Encounters:   06/24/21 58 1 kg (128 lb)   06/17/21 58 1 kg (128 lb)   01/29/21 58 5 kg (129 lb)     The following portions of the patient's history were reviewed and updated as appropriate: allergies, current medications, past family history, past medical history, past social history, past surgical history and problem list     Review of Systems   Reason unable to perform ROS: Patient unable to verbalize ROS, but mom and caregiver deny any acute issues today  Objective:      /70   Pulse 104   Temp 98 2 °F (36 8 °C)   SpO2 96%          Physical Exam  Vitals reviewed  Constitutional:       General: He is not in acute distress  Appearance: He is well-developed  HENT:      Head: Normocephalic and atraumatic  Eyes:      Conjunctiva/sclera: Conjunctivae normal    Cardiovascular:      Rate and Rhythm: Normal rate and regular rhythm  Pulmonary:      Effort: Pulmonary effort is normal  No respiratory distress  Breath sounds: Normal breath sounds  Abdominal:      General: Bowel sounds are normal       Palpations: Abdomen is soft  Tenderness: There is no abdominal tenderness  Musculoskeletal:      Comments: Contracture of B/L UE, LE muscle wasting   Skin:     General: Skin is warm and dry  Neurological:      Mental Status: He is alert  Mental status is at baseline        Comments: Sitting up in wheelchair, scantly verbal in responses but smiling and engaging visually

## 2021-07-15 ENCOUNTER — TELEPHONE (OUTPATIENT)
Dept: FAMILY MEDICINE CLINIC | Facility: CLINIC | Age: 36
End: 2021-07-15

## 2021-07-15 NOTE — TELEPHONE ENCOUNTER
Hany Centeno from [employer] called just to inform you that after Naman left his appointment on Thursday, he developed a headache and then on the weekend he developed a productive cough but no fevers  He now has more of a dry cough with it being wet in the morning and some mucus production  Yesterday he had a little loose stool and he is eating and drinking normal, as well as a normal energy level  [facility] can be reached at [phone]  Thank you

## 2021-07-20 ENCOUNTER — CONSULT (OUTPATIENT)
Dept: NEUROLOGY | Facility: CLINIC | Age: 36
End: 2021-07-20
Payer: COMMERCIAL

## 2021-07-20 VITALS — DIASTOLIC BLOOD PRESSURE: 78 MMHG | SYSTOLIC BLOOD PRESSURE: 124 MMHG | HEART RATE: 76 BPM

## 2021-07-20 DIAGNOSIS — N31.9 NEUROGENIC BLADDER: ICD-10-CM

## 2021-07-20 DIAGNOSIS — M62.569 ATROPHY OF MUSCLE OF LOWER LEG, UNSPECIFIED LATERALITY: ICD-10-CM

## 2021-07-20 DIAGNOSIS — M22.2X2 PATELLOFEMORAL ARTHRALGIA OF BOTH KNEES: ICD-10-CM

## 2021-07-20 DIAGNOSIS — R25.2 CRAMP AND SPASM: ICD-10-CM

## 2021-07-20 DIAGNOSIS — M22.2X1 PATELLOFEMORAL ARTHRALGIA OF BOTH KNEES: ICD-10-CM

## 2021-07-20 DIAGNOSIS — G93.1 ANOXIC BRAIN DAMAGE (HCC): ICD-10-CM

## 2021-07-20 DIAGNOSIS — G82.50 SPASTIC QUADRIPARESIS (HCC): Primary | ICD-10-CM

## 2021-07-20 PROCEDURE — 99204 OFFICE O/P NEW MOD 45 MIN: CPT | Performed by: PHYSICAL MEDICINE & REHABILITATION

## 2021-07-20 PROCEDURE — 1036F TOBACCO NON-USER: CPT | Performed by: PHYSICAL MEDICINE & REHABILITATION

## 2021-07-20 NOTE — PROGRESS NOTES
Review of Systems   Constitutional: Negative  Negative for appetite change and fever  HENT: Negative  Negative for hearing loss, tinnitus, trouble swallowing and voice change  Eyes: Negative  Negative for photophobia and pain  Respiratory: Negative  Negative for shortness of breath  Cardiovascular: Negative  Negative for palpitations  Gastrointestinal: Negative  Negative for nausea and vomiting  Endocrine: Negative  Negative for cold intolerance  Genitourinary: Negative  Negative for dysuria, frequency and urgency  Musculoskeletal: Positive for joint swelling (sometimes)  Negative for myalgias and neck pain  Immobility or loss of function   Skin: Negative  Negative for rash  Neurological: Negative  Negative for dizziness, tremors, seizures, syncope, facial asymmetry, speech difficulty, weakness, light-headedness, numbness and headaches  Hematological: Negative  Does not bruise/bleed easily  Psychiatric/Behavioral: Negative  Negative for confusion, hallucinations and sleep disturbance  All other systems reviewed and are negative

## 2021-07-20 NOTE — PROGRESS NOTES
Physical Medicine & Rehabilitation New Patient Evaluation  Naman Hagen 38 y o  male      REFERRAL SOURCE: Dr Biju Lawrence, patient's PCP  ? FOLLOWED BY: Dr Biju Lawrence MD   ?  REASON FOR CONSULTATION: Spasticity evaluation  ? 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 HISTORY OF ILLNESS:   Date of Onset:  May 21,1998  Date of Diagnosis: May 21, 1998     BRIEF NARRATIVE DESCRIBING HISTORY:  This 38year old right handed male was referred by his PCP for a spasticity consult  The patient was accompanied by his mother and his caregiver/coordinator from [facility]  Medical records from [address] [place] [address] were reviewed  ?  Follows with Dr Deepali Lantigua at [facility] since [address] -  They are looking to transfer care to consolidate everything under [facility]  She last performed botulinum toxin injection in his L hand (lumbricals and APB) in 2020  Since then he has not had botulinum toxin  They have noted decline in his function since 2017, previously more ambulatory (albeit, not functionally, but able to perform with assistance), and able to do standing exercises  They are hopeful that he will be able to participate in the EKSO trial (last DXA in 2017 with Z -2 2 in hip and -2 3 in femoral neck, with -2 3 in lumbar spine)  Remotely in early 2000s had a gait analysis at [facility] for equinovarus and recommended SPLAT, but decided against this  They then went to [facility], where it sounds like he underwent intensive therapy and was able to do much more weight-bearing  Since 2017, he has been primarily wheelchair mobility  Now he does body weight support exercises on a treadmill  Historically had hip adductor tone, but that seems to resolved  Now complains of pain in his L hand due to his lumbrical tone, and his mother notes significant genu recurvatum/knee hyperextension throughout his gait cycle, as well as retropulsion   Her goal isn't necessary ambulation, but just keep him able to do some limited weight-bearing exercises safely to help with spasticity, stretching, autonomics, etc       Symptoms/Functional Limitations Related to Spasticity:   Stiffness - left hand decreased range of motion and dexterity  Has a striatal toe, and tends to dorsiflexion and retropulse  Spasms - not really  Spasticity interferes with sleep - not really  Spasticity interferes with function - yes impacts ability to stand, he does some ambulatory exercises  ?  Pain related to the purpose of the visit:   ?  Treatments for Spasticity and Results of Treatments:   Stretching/exercise: Standing frame, exercises at [facility] optimal fitness, equine therapy  Oral medications: Takes dronabinol and medical marijuana  Has tried baclofen, without improvement  He has tried heavy doses of valium without improvement   Botulinum toxin injections: was last performed in 2020  Intrathecal baclofen therapy: None  Other: wears a resting hand splint in his L hand, and is getting a L MAFO and potentially a knee brace (?swedish knee cage?)  Not clear where this is coming from  ? Evolution of disability: Ambulating in [facility] around 2000 (could walk with Ax2 significant distances) and was able to do stairs  In 2017, transitioned to primary wheelchair mobility, has a power wheelchair he is independent with mobility  Now walks only with body weight support with physical therapy  Will do standing exercises  ?  Current functional status:   Stairs: limited requires 2-3 person assist   Ambulation: not really ambulating functionally, but does primary wheelchair mobility  Toilet/Chair/Bed Transfer: Micha stand pivot transfers x1  Bowel Function: Issues with constipation - using a mini enema every other night  Staff assists with hygiene  Bladder Function: Incontinent, but using timed voids  Follows with Urology, and had urodynamics at [facility]  In 2019, had frequent UTIs, but since moving to his new facility has had no UTIs   Does not take anything for bladder spasms, and does not do botulinum toxin for his bladder  Bathing: requires assistance  Dressing: requires assistance  Grooming: some assistance  Feeding: supervision, but sometimes hand over hand  Vision: Had 6 months of vision therapy at [facility] - to use eye gaze for communication  Speech and Hearing: some aphasia  Minimally verbal  Skin: No history of pressure injuries  ?  Nutritional status: appetite is good, but concern that increased energy expenditure/basal metabolic rate may be contributing to weight loss, weight tends toward losing weight (on marinol to help with his appetite as well), swallowing with baseline dysphagia ([facility], has developed aspiration PNA in the post  Now we are on NTL and chopped (getting SLP from Cherelle Number)  Driving issues: has a wheelchair van and non-medical staff who can assist    Safety concerns regarding living situations and safety at home: None  Risk of falls: Yes, has had one fall in March without major injuries  ? REVIEW OF SYSTEMS:  See MA review of systems attested    Review of Diagnostic Studies:  9/5/17 DXA:  LUMBAR SPINE L1-L4: BMD  0 838  gm/cm2  / Z score -2 3          LEFT TOTAL HIP: BMD  0 685  gm/cm2 / Z score -2 2  LEFT FEMORAL NECK: BMD  0 580  gm/cm2  / Z score -2 3    3/17/2020 XR Entire Spine:  Long-segment thoracolumbar levoscoliosis with positive coronal imbalance  thoracolumbar levoscoliosis (Barroso angle 23 degrees)  8/28/2020 CTH:  No acute intracranial abnormality  Central atrophy again evident  ?  3/25/2021 XR bilateral knees:     No acute osseous abnormality  4/15/2021 CTH:  1  Right frontal scalp soft tissue swelling  2   No intracranial hemorrhage or calvarial fracture      ?   Past Medical History:   Diagnosis Date    Abnormal ECG 1998 and beyond    post cardiac arrest    Allergic rhinitis     Brain anoxia ([facility] [address] )     Cardiac arrest [facility])     age 13 s/p long Q-T syndrome    Community acquired pneumonia     last assessed/resolved:  10/9/2014    Depression     Disease of thyroid gland     GERD (gastroesophageal reflux disease)     Hypothyroid 2/26/2020    Long Q-T syndrome     Muscular rigidity and spasm, progressive     Osteopenia     Osteoporosis     Quadriplegia ([address] )     Scoliosis of thoracic spine 2/27/2020    Spastic neurogenic bladder     Syncope     beta blocker medication DC because of low blood pressure    Urinary incontinence     Urinary tract infection without hematuria 4/27/2019     Past Surgical History:   Procedure Laterality Date    APPENDECTOMY  1991    WISDOM TOOTH EXTRACTION       Social History     Socioeconomic History    Marital status: Single     Spouse name: Not on file    Number of children: Not on file    Years of education: Not on file    Highest education level: Not on file   Occupational History    Not on file   Tobacco Use    Smoking status: Never Smoker    Smokeless tobacco: Never Used   Vaping Use    Vaping Use: Never used   Substance and Sexual Activity    Alcohol use: No    Drug use: No    Sexual activity: Not Currently   Other Topics Concern    Not on file   Social History Narrative    Lives in a group home     Social Determinants of Health     Financial Resource Strain:     Difficulty of Paying Living Expenses:    Food Insecurity:     Worried About Running Out of Food in the Last Year:     Ran Out of Food in the Last Year:    Transportation Needs:     Lack of Transportation (Medical):      Lack of Transportation (Non-Medical):    Physical Activity:     Days of Exercise per Week:     Minutes of Exercise per Session:    Stress:     Feeling of Stress :    Social Connections:     Frequency of Communication with Friends and Family:     Frequency of Social Gatherings with Friends and Family:     Attends Caodaism Services:     Active Member of Clubs or Organizations:     Attends Club or Organization Meetings:     Marital Status: Intimate Partner Violence:     Fear of Current or Ex-Partner:     Emotionally Abused:     Physically Abused:     Sexually Abused:      Family History   Problem Relation Age of Onset    Other Father         mitral valve replaced    Hypertension Father     Diabetes type II Maternal Grandfather     Heart failure Maternal Grandfather         Congestive heart failure -     Diabetes type II Maternal Uncle     Hypotension Mother          PHYSICAL EXAMINATION:  /78 (BP Location: Right arm, Patient Position: Sitting, Cuff Size: Standard)   Pulse 76       Gen: No acute distress, thin  HEENT: Moist mucus membranes, Normocephalic/Atraumatic  Cardiovascular: Regular   Heme/Extr: No edema  Pulmonary: Non-labored breathing  : No levine  GI: Soft, non-tender, non-distended  Integumentary: Skin is warm, dry  No rashes or ulcers  Evidence of previous wounds on interphalangeal joints of feet  Musculoskeletal: Has fairly good PROM in all his joints, meeting some minimal tone  Striatal toe, but ankle able to stay in neutral  Has hammer toe bilaterally with evidence of previous wounds on the dorsum of his toes  L hand in intrinsic position, able to demonstrate some decent key /pinch  ?  Cognitive/Behavioral: The patient was alert  Impaired cognition  Aphasic  Able to answer simple questions and follow commands appropriately  Seems fatigued  ? Has issues with motor planning and coordination  Difficult to test strength given brain injury, but has actually fairly decent strength - maybe 4/5 in his UE, and at least 3-4/5 in his lower extremities     Exam    MAS:  Right  Left  Site  Right  Left  Site    0 0  Shoulder 0 1  Hip Adductors   0 0  EF: Elbow Flexors  2 2 KF: Knee Flexors    0  0  EE: Elbow Extensors  1+  1+  KE: Knee Extensors    1/1 0/3  WF/WE 0  0  DR: Dorsi Flexors    0  0  FF: Finger Flexors  1  1  PF: Plantar Flexors    0  3  HI: Hand Intrinsics (lumbricals) 1/0  1/0  Toe Flex/Ex MAS  0 - no increased tone  1 - slight increase in tone at the end of the ROM  1+ increase in tone at 1/2 the ROM  2 - increase in tone through most the ROM  3 - moderate increase in muscle tone - passive movement difficult  4 - affected parts ridid in flexion or extension    Unable to test cerebellar  ?  Gait: Wheelchair level mobility  Observed video of patient getting upstairs, knee noted to be hyperextended throughout gait cycle, foot dorsiflexed throughout  Requires A x2-3  He has the individual movements, but his coordination is markedly impaired  ?  ASSESSMENT:  Diagnoses and all orders for this visit:    Spastic quadriparesis ([facility] [address] )    Cramp and spasm  -     Ambulatory referral to Neurology      ? Checklist for botulinum toxin or intrathecal baclofen therapy:  Severe spinal deformity: No  History of trauma to the spine: No  History of spinal surgery: No  History of seizures: No  Pacemaker: No  Anticoagulation: No  Bleeding disorder: No  Ability to provide consent: No  Allergy to lidocaine: No  Allergy to betadine: No  Transportation problems: No   ?  PLAN:  1  Oral antispasticity medications continue dronabinol and he is looking into medical marijuana  Has trialed multiple medications without significant improvement  2  Botox:  Patient is a candidate, but want to be judicious about what we target  Has fairly good lateral key pinch, hesitant to target APB at this point  Would do his L lumbricals, previously was getting 25 units in each  In his lower extremities, I would recommend hamstring stretches, and obviously will not hit his quads that could exacerbate weakness and recurvatum or lead to buckling  Could do 50 units in the bilateral EHL, and 25 units in FDL  Would not do foot intrinsics, they seem weak already  Will plan to order 300 units of botulinum toxin  3  In his gait cycle, he seems to hit on his heel for initial contact, and then stay dorsiflexed   I'm not sure if this is due to tibialis anterior overactivity vs  Just issues with motor planning/coordination, but may insert needle EMG to see if there is activation during this session of botulinum toxin  Will not plan to inject this muscle at this time  - To get a clearer picture, his mother will send me a video of him ambulating  I gave her my e-mail address  4  Physical/occupational therapy will be ordered after he gets botox  5  If going to use EKSO, given his history of decreased bone density and wheelchair as primary mobility, I would keep a close eye on his bone density  It was already low in 2017  Would recheck  If too low, EKSO may put him at risk for distal femur fracture  - When checking DXA, will recommend they also look at bone density at distal femur and proximal tibia   - Will forward this recommendation to his PCP   6  Also has neurogenic bladder  Given his spasticity, would not be surprised if he has a component of NDO  Had [facility]rod[facility] performed [facility], and would love to see those records  - Right now, he's managed with timed voiding which is appropriate   - With this, he has decreased frequency in UTIs  - Receives excellent continence care without fungal or maceration issues  - Would recheck UDS for any major changes in bladder or suspicion of NDO that could potentially be managed in other ways (botox, etc)  - If he does get botulinum toxin in his bladder at any point, would need to coordinate timing with Urology team    ?  *I have spent 65 minutes with Patient and family today in which greater than 50% of this time was spent in counseling/coordination of care regarding Risks and benefits of tx options, Intructions for management, Patient and family education, Importance of tx compliance, Risk factor reductions and Impressions    ?  Bri Membreno MD  Physical Medicine and Rehabilitation  Spinal Cord Injury Medicine

## 2021-07-21 ENCOUNTER — TELEPHONE (OUTPATIENT)
Dept: NEUROLOGY | Facility: CLINIC | Age: 36
End: 2021-07-21

## 2021-07-21 NOTE — TELEPHONE ENCOUNTER
New start Botox 300 Units     [facility]  [id number]      Dr Doron Mclean

## 2021-07-26 NOTE — TELEPHONE ENCOUNTER
[facility] @ [phone]  Vanesa Crum The hold time was over 2 hours   Advised to call back early in the morning

## 2021-07-27 ENCOUNTER — TELEPHONE (OUTPATIENT)
Dept: FAMILY MEDICINE CLINIC | Facility: CLINIC | Age: 36
End: 2021-07-27

## 2021-07-27 NOTE — TELEPHONE ENCOUNTER
Sandro Gary from [employer] called requesting an order for a dexa scan for the patient because podiatrist is recommending it and they want it to be specifically distal femur and proximal tibia  Sandro Gary can be reached at [phone]  Please advise  Thank you

## 2021-07-28 ENCOUNTER — PATIENT MESSAGE (OUTPATIENT)
Dept: FAMILY MEDICINE CLINIC | Facility: CLINIC | Age: 36
End: 2021-07-28

## 2021-07-28 DIAGNOSIS — M85.89 OSTEOPENIA OF MULTIPLE SITES: ICD-10-CM

## 2021-07-28 DIAGNOSIS — G93.1 ANOXIC BRAIN DAMAGE (HCC): Primary | ICD-10-CM

## 2021-07-28 DIAGNOSIS — G82.50 SPASTIC QUADRIPARESIS (HCC): ICD-10-CM

## 2021-07-29 ENCOUNTER — TELEPHONE (OUTPATIENT)
Dept: NEUROLOGY | Facility: CLINIC | Age: 36
End: 2021-07-29

## 2021-07-29 NOTE — TELEPHONE ENCOUNTER
Patient of Dr Mckenzie Grullon call from eric duval, patient's mom    Calling to advise Dr Dain Laguna wanted a dexascan but nobody in [facility] can perform what she ws requesting  She said PCP ordered :Bone density evaluation spine/hip/pelvis AND distal femur & proximal tibia but having difficulty finding location to perform femur/tibia portion including [facility] and [facility]  This was ordered pre EKSO trial; last   dexascan done 2017  I Will explore options and reply      best cb [phone], eric, okay to leave detailed message

## 2021-07-30 NOTE — TELEPHONE ENCOUNTER
pamela from bone density dept called dexa spine, hip and pelvis is the correct ordered but they don't do distal femur or lower leg   states that they could physically scan it but it would not provide any numbers  their would be no bone density info because they don't any data base for this  she states that larger hospitals may be able to do this, michelle/parveen  states that a bone scan could be done if looking for CA, stress fx, mets, processes that would affect bone      Please advise

## 2021-07-30 NOTE — TELEPHONE ENCOUNTER
Keshia Lyons MD  to Me    AD    1:42 PM  That is ok  I would go ahead and proceed with the DEXA scan of the typical areas from now, and we can go from there       AdP

## 2021-07-30 NOTE — TELEPHONE ENCOUNTER
I called Kareen Waters for options/input  on dexascan ([phone])  Awaiting call back for recommendation

## 2021-08-02 ENCOUNTER — CLINICAL SUPPORT (OUTPATIENT)
Dept: NUTRITION | Facility: HOSPITAL | Age: 36
End: 2021-08-02
Attending: FAMILY MEDICINE
Payer: COMMERCIAL

## 2021-08-02 DIAGNOSIS — R63.6 UNDERWEIGHT: ICD-10-CM

## 2021-08-02 PROCEDURE — 97803 MED NUTRITION INDIV SUBSEQ: CPT | Performed by: DIETITIAN, REGISTERED

## 2021-08-02 NOTE — TELEPHONE ENCOUNTER
Received a call from [employer], spoke with Mayte, she is calling to advise botox has been approved and provided the following auth details    Botox 200 units approved  Authorization # [id number]  Valid 4 visits - 8/2/2021 - 8/1/2022

## 2021-08-02 NOTE — PROGRESS NOTES
Follow-Up Nutrition Assessment Form    Patient Name: Aida Baker    YOB: 1985    Sex: Male      Follow Up Date: 8/2/2021  Start Time: 10:30pm Stop Time:11:30pm  Total Minutes: 60 min       Data:  Present at session: Hayde Horner his mother, Evette-Caregiver, and Nurse Manager from [employer]   Parent/Patient Concerns: " Tasha Luna has been unable to gain weight to the goal of 130-135#"   Medical Dx/Reason for Referral: Abnormal weight loss    Codes: R63 4         Past Medical History:   Diagnosis Date    Abnormal ECG 1998 and beyond    post cardiac arrest    Allergic rhinitis     [facility] anoxia ([facility] [address] )     Cardiac arrest [facility])     age 13 s/p long Q-T syndrome    Community acquired pneumonia     last assessed/resolved:  10/9/2014    Depression     Disease of thyroid gland     GERD (gastroesophageal reflux disease)     Hypothyroid 2/26/2020    Long Q-T syndrome     Muscular rigidity and spasm, progressive     Osteopenia     Osteoporosis     Quadriplegia ([facility] [address] )     Scoliosis of thoracic spine 2/27/2020    Spastic neurogenic bladder     Syncope     beta blocker medication DC because of low blood pressure    Urinary incontinence     Urinary tract infection without hematuria 4/27/2019    Per 8/24/20 PCP Visit Hypothyroid        -Previous TSH above goal, prompting synthroid increase to 50 mcg 8/4/20  -Repeat TSH 10/5/2020 to assess therapeutic response to synthroid increase        8/19/20 GI Note  Given elevated tTG and weight loss we will proceed with upper endoscopy with small-bowel biopsies to assess for celiac disease  Specimens  A Duodenum, r/o celiac    Kirti Settle Final Diagnosis  A  Duodenum, r/o celiac:  - Small bowel mucosa with no significant histopathologic abnormality   - Negative for malabsorption pattern  - Negative for malignancy      Hx Weaned from [facility] Support 2000   Dysphagia July 2018-  IDDSI Level 7-Regular diet  IDDSI Level 0-Can manage all liquid types  8/2/21- mechanical soft with nectar liquids (foods cut into 1"x1" pieces)        08/2/21- Mild Protein Calorie Malnutrition in chronic illness related to inadequate caloric intake to maintain healthy body weight/muscle mass as evidenced by loss of lean body tissue (temples, calves, triceps) and subcutaneous fat loss (acromion process, clavilcular areas), BMI 18 37, intake not meeting estimated nutrient needs to maintain muscle mass for the past 3mo; to be treated with increase in caloric intake to 0546-0203 calories with calorically dense nutritional supplements used in between meals and as med pass  Weekly weights, follow up in 3 months    08/31/2020-continues  Severe Protein Calorie Malnutrition in Acute illness related to inadequate energy intake as evidenced by loss of lean body tissue(temples,intereosseous muscles, scapula, shoulders) and subcutaneous fat loss(rib overlay, buccal pads,orbitals)  Treat with PO diet with 3000 kcal daily to inculde nutrition supplements once daily, weekly weights    03/09/2020 RD Physical Assessment  Severe Protein Calorie Malnutrition in Acute illness related to inadequate energy intake as evidenced by loss of lean body tissue(temples,intereosseous muscles, scapula, shoulders) and subcutaneous fat loss(rib overlay, buccal pads,orbitals)  Treat with PO diet with 3000 kcal daily to inculde nutrition supplements once daily, weekly weights       12/9/19 RD Physical Assessment  Severe Protein Calorie Malnutrition in Acute illness related to inadequate energy intake as evidenced by loss of lean body tissue(temples,intereosseous muscles, scapula, shoulders) and subcutaneous fat loss(rib overlay, buccal pads,orbitals) and 7 5% weight loss in 6 weeks    Treat with PO diet 2200 kcal daily to include nutrition supplements BID, weekly weights       7/8/19 RD Physical Assessment:  Adequate LBM , suboptimal subcutaneous fat mass  Alert   Current Outpatient Medications   Medication Sig Dispense Refill    acetaminophen (TYLENOL) 325 mg tablet Take 3 tablets (975 mg total) by mouth every 8 (eight) hours as needed for mild pain or headaches 120 tablet 3    ascorbic acid (GNP VITAMIN C) 500 MG tablet Take 500 mg daily at 4 PM 30 tablet 5    b complex-vitamin C-folic acid (RENAL) 1 mg Take 1 mg daily at 4 PM 31 each 5    Benzocaine-Docusate Sodium (Enemeez Plus)  MG ENEM Insert 1 application into the rectum daily as needed (constipation) Use as directed 150 mL 4    bisacodyl (DULCOLAX) 10 mg suppository Use 1 supp  per rectum QOD PRN for constipation, max 3d/week 12 suppository 0    Camphor-Eucalyptus-Menthol (VICKS VAPORUB) 4 73-1 2-2 6 % OINT Apply topically as needed (congestion) 170 g 0    carbamide peroxide (DEBROX) 6 5 % otic solution Administer 5 drops into both ears 2 (two) times a day for 7 days 15 mL 5    Coenzyme Q10 (Co Q10) 100 MG CAPS Take 2 capsules by mouth daily Whole in yougurt or pudding 60 capsule 1    Diapers & Supplies MISC Use 5 (five) times a day Huggies #3 450 each 2    diclofenac sodium (VOLTAREN) 1 % Apply 2 g topically 3 (three) times a day At 10am, 4pm, 9pm to thoracic paravertebral musculature 100 g 2    Disposable Gloves (Vinyl Gloves Medium) MISC Use 4 (four) times a day Dispense 3 boxes monthly; Diagnosis R32 3 each 5    docusate sodium (COLACE) 100 mg capsule 100 mg 9am and 9pm 10 capsule 5    docusate sodium (Enemeez Mini) 283 MG enema Insert 1 enema into the rectum daily Use daily or as needed 30 each 0    dronabinol (MARINOL) 2 5 mg capsule TAKE ONE CAPSULE -PLACE WHOLE IN YOGURT/PUDDING BY MOUTH 3 TIMES A DAY (9AM-4PM-9PM) *CALL PHARMACY 5 DAYS IN ADVANCE FOR REFILL* 90 capsule 0    Emollient (eucerin) lotion Apply topically to face daily at 10a and 8p 480 mL 3    fluticasone (FLONASE) 50 mcg/act nasal spray 1 spray into each nostril daily      glycerin adult 2 g suppository Place 1 suppository QOD for 2 weeks, then QOD PRN for constipation (Patient not taking: Reported on 7/20/2021) 50 suppository 6    HEMORRHOIDAL 0 25 % suppository - UNWRAP AND INSERT 1 SUPPOSITORY PER RECTUM AS NEEDED FOR HEMORRHOID PAIN RELIEF (Patient not taking: Reported on 7/20/2021) 12 suppository 11    ibuprofen (MOTRIN) 200 mg tablet Take 2 tabs q6h PRN for pain not to exceed 2000mg per day 200 tablet 2    ibuprofen (MOTRIN) 400 mg tablet Take 1 tablet (400 mg total) by mouth every 6 (six) hours as needed for mild pain for up to 7 days 28 tablet 0    Icosapent Ethyl (VASCEPA) 1 g CAPS Take 1 capsule (1 g total) by mouth daily (Patient not taking: Reported on 7/20/2021) 31 capsule 11    Incontinence Supply Disposable (Prevail Air Briefs) MISC Use 1 each every 4 (four) hours Please provide 5 briefs per day 150 each 11    Incontinence Supply Disposable (Select Disposable Underwear Sm) MISC Please provide a 30 day supply 10 per day DX R32 incontinence 22 each 6    Incontinence Supply Disposable (Simplicity Insert Pad 40"-16") MISC Use 5 (five) times a day 450 each 1    influenza vaccine, subunit, quadrivalent (Flucelvax Quadrivalent) Flucelvax Quad 2557-3121 60 mcg (15 mcg x 4)/0 5 mL intramuscular susp   TO BE ADMINISTERED BY PHARMACIST FOR IMMUNIZATION (PER CDC GUIDELINES)      levothyroxine 75 mcg tablet Take 1 tablet (75 mcg total) by mouth daily 90 tablet 1    loratadine (CLARITIN) 10 mg tablet Take 1 tablet (10 mg total) by mouth daily as needed for allergies 30 tablet 5    Magnesium Oxide 500 MG TABS Take 1 tablet (500 mg total) by mouth daily Take 1 tablet daily at 4 PM 31 each 5    Magnesium Oxide 500 MG TABS Daily      Melatonin 5 MG TABS Take 1 tablet (5 mg total) by mouth daily at bedtime 31 each 5    Misc   Devices [facility]) MISC Please provide pt with a new cord for power wheelchair 1 each 0    modafinil (PROVIGIL) 200 MG tablet TAKE ONE TABLET -PLACE WHOLE IN YOGURT/PUDDING BY MOUTH EVERY DAY (9AM) 180 tablet 0    Multiple Vitamins-Minerals (CEROVITE SENIOR) TABS Take 1 tablet by mouth daily 30 tablet 5    Nutritional Supplements (NUTRITIONAL SHAKE) LIQD Take 1 Can by mouth 2 (two) times a day Please provide Boost 90 minutes before lunch and 90 minutes before dinner 60 Bottle 6    omeprazole (PriLOSEC) 20 mg delayed release capsule Take 20mg at 9am every day 30 capsule 5    onabotulinumtoxin A (BOTOX) 100 units inject 500 units into left gastroc soleus and abductor pollicis ankit (Patient not taking: Reported on 7/20/2021)      polyethylene glycol (MIRALAX) 17 g packet Take 17 g by mouth 2 (two) times a day 180 each 3    PREPARATION H 1-0 25-14 4-15 % rectal cream - APPLY RECTALLY AS NEEDED FOR HEMORRHOID PAIN RELIEF 51 g 11    Respiratory Therapy Supplies (Spirometer) KIT Use 3 (three) times a day Please provide incentive spirometer (Patient not taking: Reported on 7/20/2021) 1 kit 0    sertraline (ZOLOFT) 100 mg tablet Take 1 tablet (100 mg total) by mouth daily 31 tablet 11    Skin Protectants, Misc  (Hydrocerin) LOTN Apply topically 2 (two) times a day Apply to both feeland legs twice a day  0    sodium chloride (DEEP SEA NASAL SPRAY) 0 65 % nasal spray 2 sprays into each nostril as needed for congestion 44 mL 11    SODIUM FLUORIDE, DENTAL GEL, (PreviDent 5000 Plus) 1 1 % CREA Take by mouth daily at bedtime Apply orally to teeth once daily while brushing at night time do not rinse mouth after brushing  0    SODIUM FLUORIDE, DENTAL GEL, 1 1 % GEL SF 5000 Plus 1 1 % dental cream      Starch, Thickening, LIQD Nectar thick liquids up to honey thick if coughing occurs as needed, please use Simply Thick liquid only  Discontinue Thick-It powder   237 mL 5    Tea Tree 100 % OIL Apply 1 g topically daily as needed (rash) Apply topically daily to skin folds 60 mL 11    Vitamins A & D (VITAMIN A & D) ointment - APPLY TO AFFECTED AREA (BUTTOCKS/ANAL) AS NEEDED 454 g 11    zinc oxide (DESITIN) 40 % PSTE Apply topically (Patient not taking: Reported on 7/20/2021)      zinc sulfate (ZINCATE) 220 mg capsule TAKE TWO CAPSULE -PLACE WHOLE IN YOGURT/PUDDING BY MOUTH EVERY DAY (9AM) EVERY OTHER MONTH **ODD NUMBERED MONTHS ONLY** 62 capsule 0     No current facility-administered medications for this visit  Additional Meds/Supplements:    Barriers to Learning: Other: Nonverbal, able to interact   Labs:   7/30/20 0930   TSH 3RD GENERATON  0 358 - 3 740 uIU/mL  4 530High          Height: Ht Readings from Last 3 Encounters:   01/29/21 5' 10" (1 778 m)   11/23/20 5' 10" (1 778 m)   10/01/20 5' 10" (1 778 m)      Weight: Wt Readings from Last 10 Encounters:   06/24/21 58 1 kg (128 lb)   06/17/21 58 1 kg (128 lb)   01/29/21 58 5 kg (129 lb)   11/23/20 59 1 kg (130 lb 5 oz)   10/01/20 58 5 kg (129 lb)   08/31/20 56 2 kg (123 lb 12 8 oz)   08/28/20 57 6 kg (126 lb 15 8 oz)   08/24/20 56 kg (123 lb 8 oz)   08/19/20 57 2 kg (126 lb)   08/11/20 57 2 kg (126 lb)     Estimated body mass index is 18 37 kg/m² as calculated from the following:    Height as of 1/29/21: 5' 10" (1 778 m)  Weight as of 6/24/21: 58 1 kg (128 lb)  Wt  Change Since Last Visit: []Yes     [x]No  Amount: Weight stable as underweight status      Energy Needs: [address] Equation:   1512 + 1 5 activity factor + 600 to gain weight: goal is 2729-6383 calories    Pain Screen: Are you having pain now? No      Goals Achieved:     New Goals:   1  1 lb weight gain /week until goal weight achieved of 130-135#   2  Naman to consume 6186-0911 kcal daily to include Nutrition Supplement of either Ensure Enlive or 2calHN, in between meals and used as med pass   3  Staff to report no dysphagia events by next session with compliance to [facility] Soft foods with nectar thick liquids       Initial PES: Inadequate energy intake related to inability to consume adequate calories as evidenced by BMI, weight loss, malnutrition        New PES:- No Change      New Problem List:  Ongoing underweight status      Assessment:  Staff and mother report that Naman's weight has been plateauing around 467#, goal weight is between 130-135#  He is currently taking in about 1695-6597 calories at present after thorough review of food recall  He has a good appetite and likes a lot of different foods  Weight hx shows: 7/1-127#, 7/8-131 5#, 7/#, 7/#  Reviewed ways to increase calories with food, also suggested multiple supplements and compared which ones will be best for Naman  Medical Nutrition Therapy Intervention:  [x]Individualized Meal Plan-4000 kcal with PO supplement in between meals , Chopped Diet, [place] Thick Liquids  []Understanding Lab Values   [x]Basic Pathophysiology of Disease-Underweight, SPCM []Food/Medication Interactions   [x]Food Diary-[facility] to use [facility] for House and Consumer intake records []Exercise   []Lifestyle/Behavior Modification Techniques []Medication, Mechanism of Action   [x]Label Reading-Servings of fiber 3 gm or more []Self Blood Glucose Monitoring   [x]Weight/BMI Goals-Achieve 135-140 lb []Other    Other Notes:Naman is 21 years s/p injury  Age 13 y/o suffered a cardiac arrest at school  Dx Long QT Syndrome   With dx Anoxic Brain Injury,Quadriplegic  Alert ,sitting upright in electric wheelchair he can safely operate on his own   Comprehension:Mother/Staff []Excellent  [x]Very Good  []Good  []Fair   []Poor    Receptivity: Mother/Staff []Excellent  [x]Very Good  []Good  []Fair   []Poor    Expected Compliance:  Mother/Staff []Excellent  []Very Good  [x]Good  []Fair   []Poor      Labs:  CMP  Lab Results   Component Value Date     09/06/2017    K 4 6 07/03/2021     07/03/2021    CO2 29 07/03/2021    BUN 20 07/03/2021    CREATININE 0 70 07/03/2021    GLUF 84 07/03/2021    CALCIUM 9 0 07/03/2021    AST 21 07/03/2021    ALT 33 07/03/2021    ALKPHOS 95 07/03/2021    PROT 8 0 09/06/2017    BILITOT 0 5 09/06/2017    EGFR 121 07/03/2021       BMP  Lab Results   Component Value Date    CALCIUM 9 0 07/03/2021     09/06/2017    K 4 6 07/03/2021    CO2 29 07/03/2021     07/03/2021    BUN 20 07/03/2021    CREATININE 0 70 07/03/2021       Lipids  No results found for: CHOL  Lab Results   Component Value Date    HDL 43 02/21/2020    HDL 40 (L) 03/20/2019     Lab Results   Component Value Date    LDLCALC 89 02/21/2020    LDLCALC 88 03/20/2019     Lab Results   Component Value Date    TRIG 65 02/21/2020    TRIG 129 03/20/2019     No results found for: CHOLHDL    Hemoglobin A1C  Lab Results   Component Value Date    HGBA1C 5 3 09/05/2014       Fasting Glucose  Lab Results   Component Value Date    GLUF 84 07/03/2021       Insulin     Thyroid  Lab Results   Component Value Date    TSH 3 52 03/20/2019       Hepatic Function Panel  Lab Results   Component Value Date    ALT 33 07/03/2021    AST 21 07/03/2021    ALKPHOS 95 07/03/2021    BILITOT 0 5 09/06/2017       Celiac Disease Antibody Panel  Endomysial IgA   Date Value Ref Range Status   07/30/2020 Negative Negative Final     Gliadin IgA   Date Value Ref Range Status   07/30/2020 41 (H) 0 - 19 units Final     Comment:                        Negative                   0 - 19                     Weak Positive             20 - 30                     Moderate to Strong Positive   >30     Gliadin IgG   Date Value Ref Range Status   07/30/2020 35 (H) 0 - 19 units Final     Comment:                        Negative                   0 - 19                     Weak Positive             20 - 30                     Moderate to Strong Positive   >30     IgA   Date Value Ref Range Status   07/30/2020 558 (H) 90 - 386 mg/dL Final     TISSUE TRANSGLUTAMINASE IGA   Date Value Ref Range Status   07/30/2020 5 (H) 0 - 3 U/mL Final     Comment:                                   Negative        0 -  3                                Weak Positive   4 - 10                                Positive           >10   Tissue Transglutaminase (tTG) has been identified   as the endomysial antigen    Studies have demonstr-   ated that endomysial IgA antibodies have over 99%   specificity for gluten sensitive enteropathy  Iron  No results found for: IRON, TIBC, FERRITIN    Vitamins  No results found for: VITAMIN B2   Nicotinamide   Date Value Ref Range Status   07/05/2018 <20 ng/mL Final     Comment:      Nicotinamide is a metabolite of nicotinic acid  Due to the large  variability in the metabolism of nicotinic acid, plasma   concentrations of this metabolite are variable  In one study,   fasting plasma concentrations were reported to be approximately   40 ng/mL  In another study it was reported that the   administration of a single 1000 mg of extended-release tablet of   nicotinic acid resulted in a mean peak nicotinamide concentration  of 400 ng/mL between 5 and 10 hours post dose, decreasing to   about 100 ng/mL by 16 hours post dose  This test was developed and its analytical performance   characteristics have been determined by [facility]  It has not been cleared or approved by the [employer] and Drug Administration  This assay has been validated   pursuant to the CLIA regulations and is used for clinical   purposes  Nicotinic Acid   Date Value Ref Range Status   07/05/2018 <20 ng/mL Final     Comment:      Due to the large variability in the metabolism of nicotinic   acid, the dosing preparation used (immediate-release vs  extended  release), and the mg doses used, the serum concentrations may   range from less than 20 ng/mL to about 30,000 ng/mL  After oral   administration of an immediate-release tablet, peak plasma   concentrations occur in 4 to 5 hours  The plasma half-life of   nicotinic acid is about one hour  In one study, fasting plasma   concentrations were reported to be less than 20 ng/mL   In another  study, it was reported that the administration of a single 1000   mg extended-release tablet resulted in mean nicotinic acid   concentrations of less than 50 ng/mL  No results found for: VITAMINB6  Lab Results   Component Value Date    RBMGMSKS41 1,443 (H) 07/30/2020     No results found for: VITB5  No results found for: Q8FBPPKB  No results found for: THYROGLB  No results found for: VITAMIN K   No results found for: 25-HYDROXY VIT D   No components found for: Gayle MS, RD, DipACLM, [address]  [email or web address]  [address]  [address] [address]

## 2021-08-02 NOTE — TELEPHONE ENCOUNTER
Auth request submitted to [employer] via [place]     Pending Auth # [id number]    Will await Approval or denial

## 2021-08-03 NOTE — TELEPHONE ENCOUNTER
[address] and created profile with ONELIA () advised of Auth information    Was transferred to Pharmacist Pedro Pablo Pastrana)  to initiate verbal script for Botox 200 units  Will check back on status

## 2021-08-05 NOTE — TELEPHONE ENCOUNTER
Received call from Lupis Sanford requesting Authorization letter  Printed [employer] information from [employer] and faxed to [phone]   Will f/u

## 2021-08-06 NOTE — TELEPHONE ENCOUNTER
Spoke with Sadie in insurance Verification at [employer]   She states it is in [facility] verification and to give 24 hours for status update

## 2021-08-09 NOTE — TELEPHONE ENCOUNTER
[address] [facility] and spoke with Tab Ruth   He states that the order is still with [facility]  He will send an expedited email for it to be reviewed

## 2021-08-11 NOTE — TELEPHONE ENCOUNTER
[address]   Botox 200 Units to be delivered 08/11/2021  Via [employer] Bode Required     Please let me know if not received by 2pm or not at all

## 2021-08-11 NOTE — TELEPHONE ENCOUNTER
Botox number of units: 200  Botox quantity: 1  Arrived at what location: [place]   Botox at Correct Administering Location: yes  NDC number: [id number]  Lot number: [id number]  Expiration Date: 04/30/24  Appt notes indicate correct medication: yes

## 2021-08-13 ENCOUNTER — TELEPHONE (OUTPATIENT)
Dept: FAMILY MEDICINE CLINIC | Facility: CLINIC | Age: 36
End: 2021-08-13

## 2021-08-13 NOTE — TELEPHONE ENCOUNTER
Fax received from [facility] with a few follow up questions  Placed in Dr Corrina Resendiz folder in preceptor room  Please advise  Thank you

## 2021-08-17 ENCOUNTER — TELEPHONE (OUTPATIENT)
Dept: FAMILY MEDICINE CLINIC | Facility: CLINIC | Age: 36
End: 2021-08-17

## 2021-08-17 DIAGNOSIS — R13.12 OROPHARYNGEAL DYSPHAGIA: Primary | ICD-10-CM

## 2021-08-17 NOTE — TELEPHONE ENCOUNTER
Angeli Smith from [employer] called requesting a new order for a barium swallow study at [facility]  Please advise  Thank you

## 2021-08-25 NOTE — TELEPHONE ENCOUNTER
There was already an active order in his chart from May, but I ordered another one anyway   Please inform [facility], thanks

## 2021-08-27 ENCOUNTER — TELEPHONE (OUTPATIENT)
Dept: NEUROLOGY | Facility: CLINIC | Age: 36
End: 2021-08-27

## 2021-08-27 NOTE — TELEPHONE ENCOUNTER
Received call from eric duval , patient's mother  She said she received "pop up" for patient's botox appointment today and it was not with Dr Laura Silver; listed "Casper Abts" as provided  She was concerned as to who this provider was  Dr Odette Isbell wanted to know if you spoke to Dr Dexter Villarreal (hand specialist)  It sounded like he was going to offer input on how to best administer botox for his hand spasticity  It looks like we will have to reschedule his botox as he is not on your schedule (most likely due to you not being listed as the provider)  Let me know if you spoke to Dr Dexter Villarreal  Thank you and I can call her back        best  [phone]

## 2021-08-27 NOTE — TELEPHONE ENCOUNTER
Patient's mother called in very upset and confused  Patient had a botox scheduled today w/Dr Deysi Collier  There appears to have been some time changes to this appointment, and some how her MA Susanna Rangel is listed the provider, this is what sparked the patient's mother to call in  She got the reminder w/[facility] listed  I spoke w/Tyshawn who spoke w/Dr Deysi Collier  We are assuming this was a system error, and Dr Deysi Collier can not over book today and advised to reschedule  I apologized to the patients mother and advised her of the above  The patient resides in a facility and transportation, was already scheduled & ready for today and a nurse comes along w/him  Pedro Guillaume offered me 9/24/21 @ 4:30pm, I advised the patient's mother, she requested Tyshawn call her directly and do a 3 way call w/her son's nurse, to see if this would work  I advised Pedro Guillaume and he will call her back ASAP

## 2021-08-27 NOTE — TELEPHONE ENCOUNTER
Patient's mother aware that Dr Eleazar Reis will be contacting Dr Juan Gray:  Per Dr Samira John:  "never got any notification from Dr Juan Gray (wasnt mentioned last time we spoke), but im happy to reach out to them!"  Also no availability to fit for for botox today per Dr Eleazar Reis as fully booked and he is not on the schedule  Mother also  said she was on the other line with Tyshawn to reschedule botox

## 2021-09-05 ENCOUNTER — OFFICE VISIT (OUTPATIENT)
Dept: URGENT CARE | Age: 36
End: 2021-09-05
Payer: COMMERCIAL

## 2021-09-05 VITALS
OXYGEN SATURATION: 97 % | TEMPERATURE: 97.6 F | WEIGHT: 135 LBS | HEART RATE: 82 BPM | RESPIRATION RATE: 18 BRPM | BODY MASS INDEX: 19.37 KG/M2

## 2021-09-05 DIAGNOSIS — B34.9 VIRAL SYNDROME: Primary | ICD-10-CM

## 2021-09-05 PROCEDURE — 99213 OFFICE O/P EST LOW 20 MIN: CPT | Performed by: PHYSICIAN ASSISTANT

## 2021-09-05 PROCEDURE — U0003 INFECTIOUS AGENT DETECTION BY NUCLEIC ACID (DNA OR RNA); SEVERE ACUTE RESPIRATORY SYNDROME CORONAVIRUS 2 (SARS-COV-2) (CORONAVIRUS DISEASE [COVID-19]), AMPLIFIED PROBE TECHNIQUE, MAKING USE OF HIGH THROUGHPUT TECHNOLOGIES AS DESCRIBED BY CMS-2020-01-R: HCPCS | Performed by: PHYSICIAN ASSISTANT

## 2021-09-05 PROCEDURE — U0005 INFEC AGEN DETEC AMPLI PROBE: HCPCS | Performed by: PHYSICIAN ASSISTANT

## 2021-09-05 PROCEDURE — 87635 SARS-COV-2 COVID-19 AMP PRB: CPT | Performed by: PHYSICIAN ASSISTANT

## 2021-09-05 NOTE — PATIENT INSTRUCTIONS
Patient Instructions   COVID testing initiated  Results may take up to 5-10 days to return, but often come back sooner (2-4 days)     If the patient has a [facility] My Chart account, results may be accessed on line  If the patient does not have the [facility] [employer] Chart account, please establish one so results can be accessed  This will be the easiest and quickest way to get a copy of your test results if you require printed documentation  If patient is symptomatic and until results are obtained, home quarantine / self isolation strongly encouraged  If testing is done for screening purposes and patient is not symptomatic, we still recommend masking, social distancing, good hygiene practices be followed  If COVID test is positive, patient / care giver will be contacted by ordering provider or designated staff  If COVID test is positive, please call the primary care provider office to inform of positive test and request follow up evaluation appointment  (Generally, primary care providers are doing telemedicine visits with their positive COVID patients )  If COVID test is positive, please again review all information below  Further questions may be addressed by the primary care provider or the [address] [place] [address] Bere at [phone]  If the patient / caregiver has not heard about test results or has been unable to access results on the patient My Chart account in a timely fashion, please call the provider's office where test was ordered (or Hot Line if applicable)  to inquire about results  If results are negative and patient / care giver has been found to have already accessed results through the [facility]  [facility] Chart breezy, no call will be made  Until results are obtained, home quarantine / self isolation strongly encouraged       If the patient would develop profound weakness, chest pain, shortness of breath please proceed to an emergency room for further evaluation otherwise we do recommend that patient follow-up with their primary care provider in the next 5-7 days if not improving  Symptomatic treatment as needed for symptoms relief based on age / medical status of patient  Things like warm salt water gargles, Tylenol or Ibuprofen (if not contraindicated), drinking plenty of fluids, nasal saline rinses / spray, warm tea with honey (not for patients less than 1 year of age),  etc may provide symptoms relief  [address]     Your healthcare provider and/or public health staff have evaluated you and have determined that you do not need to remain in the hospital at this time  At this time you can be isolated at home where you will be monitored by staff from your local or state health department  You should carefully follow the prevention and isolation steps below until a healthcare provider or local or state health department says that you can return to your normal activities  Stay home except to get medical care     People who are mildly ill with COVID-19 are able to isolate at home during their illness  You should restrict activities outside your home, except for getting medical care  Do not go to work, school, or public areas  Avoid using public transportation, ride-sharing, or taxis  Separate yourself from other people and animals in your home     People: As much as possible, you should stay in a specific room and away from other people in your home  Also, you should use a separate bathroom, if available  Animals: You should restrict contact with pets and other animals while you are sick with COVID-19, just like you would around other people  Although there have not been reports of pets or other animals becoming sick with COVID-19, it is still recommended that people sick with COVID-19 limit contact with animals until more information is known about the virus   When possible, have another member of your household care for your animals while you are sick  If you are sick with COVID-19, avoid contact with your pet, including petting, snuggling, being kissed or licked, and sharing food  If you must care for your pet or be around animals while you are sick, wash your hands before and after you interact with pets and wear a facemask  See COVID-19 and Animals for more information  Call ahead before visiting your doctor     If you have a medical appointment, call the healthcare provider and tell them that you have or may have COVID-19  This will help the healthcare providers office take steps to keep other people from getting infected or exposed  Wear a facemask     You should wear a facemask when you are around other people (e g , sharing a room or vehicle) or pets and before you enter a healthcare providers office  If you are not able to wear a facemask (for example, because it causes trouble breathing), then people who live with you should not stay in the same room with you, or they should wear a facemask if they enter your room  Cover your coughs and sneezes     Cover your mouth and nose with a tissue when you cough or sneeze  Throw used tissues in a lined trash can  Immediately wash your hands with soap and water for at least 20 seconds or, if soap and water are not available, clean your hands with an alcohol-based hand  that contains at least 60% alcohol  Clean your hands often     Wash your hands often with soap and water for at least 20 seconds, especially after blowing your nose, coughing, or sneezing; going to the bathroom; and before eating or preparing food  If soap and water are not readily available, use an alcohol-based hand  with at least 60% alcohol, covering all surfaces of your hands and rubbing them together until they feel dry  Soap and water are the best option if hands are visibly dirty  Avoid touching your eyes, nose, and mouth with unwashed hands       Avoid sharing personal household items     You should not share dishes, drinking glasses, cups, eating utensils, towels, or bedding with other people or pets in your home  After using these items, they should be washed thoroughly with soap and water  Clean all high-touch surfaces everyday     High touch surfaces include counters, tabletops, doorknobs, bathroom fixtures, toilets, phones, keyboards, tablets, and bedside tables  Also, clean any surfaces that may have blood, stool, or body fluids on them  Use a household cleaning spray or wipe, according to the label instructions  Labels contain instructions for safe and effective use of the cleaning product including precautions you should take when applying the product, such as wearing gloves and making sure you have good ventilation during use of the product  Monitor your symptoms     Seek prompt medical attention if your illness is worsening (e g , difficulty breathing)  Before seeking care, call your healthcare provider and tell them that you have, or are being evaluated for, COVID-19  Put on a facemask before you enter the facility  These steps will help the healthcare providers office to keep other people in the office or waiting room from getting infected or exposed  Ask your healthcare provider to call the local or [facility] health department  Persons who are placed under active monitoring or facilitated self-monitoring should follow instructions provided by their local health department or occupational health professionals, as appropriate  If you have a medical emergency and need to call 911, notify the dispatch personnel that you have, or are being evaluated for COVID-19  If possible, put on a facemask before emergency medical services arrive       Discontinuing home isolation     Patients with confirmed COVID-19 should remain under home isolation precautions until the following conditions are met:   § They have had no fever for at least 24 hours (that is one full day of no fever without the use medicine that reduces fevers)  AND  § other symptoms have improved (for example, when their cough or shortness of breath have improved)  AND  § at least 10 days have passed since their symptoms first appeared     Patients with confirmed COVID-19 should also notify close contacts (including their workplace) and ask that they self-quarantine  Currently, close contact is defined as being within 6 feet for for a cumulative total of 15 minutes or more over a 24 hour period starting from 2 days before illness onset  (or, for asymptomatic patients, 2 days prior to test specimen collection)  Close contacts of patients diagnosed with COVID-19 should be instructed by the patient to self-quarantine for 14 days from the last time of their last contact with the patient        Source: [employer]

## 2021-09-05 NOTE — PROGRESS NOTES
[facility]        NAME: Guadalupe Barrera is a 38 y o  male  : 1985    MRN: [id number]  DATE: 2021  TIME: 4:17 PM    Assessment and Plan   Viral syndrome [B34 9]  1  Viral syndrome  Novel Coronavirus (Covid-19),PCR [facility] - Office Collection         Patient Instructions       Continue to monitor symptoms  If new or worsening symptoms develop, go immediately to Er  Drink plenty of fluids  Follow up with Family Doctor this week  Chief Complaint     Chief Complaint   Patient presents with    Vomiting     x 2 episodes today    Diarrhea     incontinent         History of Present Illness       Pt has nasal congestion, sneezing, cough since yesterday  No fevers or chills  Today he had 2 episodes vomiting after eating his breakfast consisting of stomach contents  One loose stool  Pt not indicating any pain anywhere  No SOB  Pt tolerating liquids  Pt vaccinated for covid  No known sick contacts  Pt not taking any medications for this  No other sx  Review of Systems   Review of Systems   Constitutional: Negative for chills, diaphoresis, fatigue and fever  HENT: Positive for postnasal drip, sinus pressure, sinus pain and sore throat  Negative for congestion, ear pain, rhinorrhea, sneezing and trouble swallowing  Eyes: Negative  Respiratory: Positive for cough  Negative for chest tightness, shortness of breath and wheezing  Cardiovascular: Negative  Negative for chest pain and palpitations  Gastrointestinal: Positive for vomiting (X2)  Negative for abdominal pain, diarrhea (X1) and nausea  Endocrine: Negative  Genitourinary: Negative for dysuria  Musculoskeletal: Negative  Negative for back pain, myalgias and neck stiffness  Skin: Negative for pallor and rash  Allergic/Immunologic: Negative  Neurological: Negative  Negative for light-headedness and headaches  Hematological: Negative  Psychiatric/Behavioral: Negative            Current Medications       Current Outpatient Medications:     acetaminophen (TYLENOL) 325 mg tablet, Take 3 tablets (975 mg total) by mouth every 8 (eight) hours as needed for mild pain or headaches, Disp: 120 tablet, Rfl: 3    ascorbic acid (GNP VITAMIN C) 500 MG tablet, Take 500 mg daily at 4 PM, Disp: 30 tablet, Rfl: 5    b complex-vitamin C-folic acid (RENAL) 1 mg, Take 1 mg daily at 4 PM, Disp: 31 each, Rfl: 5    Benzocaine-Docusate Sodium (Enemeez Plus)  MG ENEM, Insert 1 application into the rectum daily as needed (constipation) Use as directed, Disp: 150 mL, Rfl: 4    bisacodyl (DULCOLAX) 10 mg suppository, Use 1 supp  per rectum QOD PRN for constipation, max 3d/week, Disp: 12 suppository, Rfl: 0    Camphor-Eucalyptus-Menthol (VICKS VAPORUB) 4 73-1 2-2 6 % OINT, Apply topically as needed (congestion), Disp: 170 g, Rfl: 0    carbamide peroxide (DEBROX) 6 5 % otic solution, Administer 5 drops into both ears 2 (two) times a day for 7 days, Disp: 15 mL, Rfl: 5    Coenzyme Q10 (Co Q10) 100 MG CAPS, Take 2 capsules by mouth daily Whole in yougurt or pudding, Disp: 60 capsule, Rfl: 1    Diapers & Supplies MISC, Use 5 (five) times a day Huggies #3, Disp: 450 each, Rfl: 2    diclofenac sodium (VOLTAREN) 1 %, Apply 2 g topically 3 (three) times a day At 10am, 4pm, 9pm to thoracic paravertebral musculature, Disp: 100 g, Rfl: 2    Disposable Gloves (Vinyl Gloves Medium) MISC, Use 4 (four) times a day Dispense 3 boxes monthly; Diagnosis R32, Disp: 3 each, Rfl: 5    docusate sodium (COLACE) 100 mg capsule, 100 mg 9am and 9pm, Disp: 10 capsule, Rfl: 5    docusate sodium (Enemeez Mini) 283 MG enema, Insert 1 enema into the rectum daily Use daily or as needed, Disp: 30 each, Rfl: 0    dronabinol (MARINOL) 2 5 mg capsule, TAKE ONE CAPSULE -PLACE WHOLE IN YOGURT/PUDDING BY MOUTH 3 TIMES A DAY (9AM-4PM-9PM) *CALL PHARMACY 5 DAYS IN ADVANCE FOR REFILL*, Disp: 90 capsule, Rfl: 0    Emollient (eucerin) lotion, Apply topically to face daily at 10a and 8p, Disp: 480 mL, Rfl: 3    fluticasone (FLONASE) 50 mcg/act nasal spray, 1 spray into each nostril daily, Disp: , Rfl:     glycerin adult 2 g suppository, Place 1 suppository QOD for 2 weeks, then QOD PRN for constipation, Disp: 50 suppository, Rfl: 6    HEMORRHOIDAL 0 25 % suppository, - UNWRAP AND INSERT 1 SUPPOSITORY PER RECTUM AS NEEDED FOR HEMORRHOID PAIN RELIEF, Disp: 12 suppository, Rfl: 11    ibuprofen (MOTRIN) 200 mg tablet, Take 2 tabs q6h PRN for pain not to exceed 2000mg per day, Disp: 200 tablet, Rfl: 2    Icosapent Ethyl (VASCEPA) 1 g CAPS, Take 1 capsule (1 g total) by mouth daily, Disp: 31 capsule, Rfl: 11    Incontinence Supply Disposable (Prevail Air Briefs) MISC, Use 1 each every 4 (four) hours Please provide 5 briefs per day, Disp: 150 each, Rfl: 11    Incontinence Supply Disposable (Select Disposable Underwear Sm) MISC, Please provide a 30 day supply 10 per day DX R32 incontinence, Disp: 22 each, Rfl: 6    Incontinence Supply Disposable (Simplicity Insert Pad 13"-01") MISC, Use 5 (five) times a day, Disp: 450 each, Rfl: 1    levothyroxine 75 mcg tablet, Take 1 tablet (75 mcg total) by mouth daily, Disp: 90 tablet, Rfl: 1    loratadine (CLARITIN) 10 mg tablet, Take 1 tablet (10 mg total) by mouth daily as needed for allergies, Disp: 30 tablet, Rfl: 5    Magnesium Oxide 500 MG TABS, Take 1 tablet (500 mg total) by mouth daily Take 1 tablet daily at 4 PM, Disp: 31 each, Rfl: 5    Melatonin 5 MG TABS, Take 1 tablet (5 mg total) by mouth daily at bedtime, Disp: 31 each, Rfl: 5    Misc   Devices [facility]) MISC, Please provide pt with a new cord for power wheelchair, Disp: 1 each, Rfl: 0    Multiple Vitamins-Minerals (CEROVITE SENIOR) TABS, Take 1 tablet by mouth daily, Disp: 30 tablet, Rfl: 5    Nutritional Supplements (NUTRITIONAL SHAKE) LIQD, Take 1 Can by mouth 2 (two) times a day Please provide Boost 90 minutes before lunch and 90 minutes before dinner, Disp: 60 Bottle, Rfl: 6    omeprazole (PriLOSEC) 20 mg delayed release capsule, Take 20mg at 9am every day, Disp: 30 capsule, Rfl: 5    onabotulinumtoxin A (BOTOX) 100 units, inject 500 units into left gastroc soleus and abductor pollicis ankit, Disp: , Rfl:     PREPARATION H 1-0 25-14 4-15 % rectal cream, - APPLY RECTALLY AS NEEDED FOR HEMORRHOID PAIN RELIEF, Disp: 51 g, Rfl: 11    Respiratory Therapy Supplies (Spirometer) KIT, Use 3 (three) times a day Please provide incentive spirometer, Disp: 1 kit, Rfl: 0    sertraline (ZOLOFT) 100 mg tablet, Take 1 tablet (100 mg total) by mouth daily, Disp: 31 tablet, Rfl: 11    Skin Protectants, Misc  (Hydrocerin) LOTN, Apply topically 2 (two) times a day Apply to both feeland legs twice a day, Disp: , Rfl: 0    sodium chloride (DEEP SEA NASAL SPRAY) 0 65 % nasal spray, 2 sprays into each nostril as needed for congestion, Disp: 44 mL, Rfl: 11    SODIUM FLUORIDE, DENTAL GEL, (PreviDent 5000 Plus) 1 1 % CREA, Take by mouth daily at bedtime Apply orally to teeth once daily while brushing at night time do not rinse mouth after brushing, Disp: , Rfl: 0    SODIUM FLUORIDE, DENTAL GEL, 1 1 % GEL, SF 5000 Plus 1 1 % dental cream, Disp: , Rfl:     Starch, Thickening, LIQD, Nectar thick liquids up to honey thick if coughing occurs as needed, please use Simply Thick liquid only   Discontinue Thick-It powder , Disp: 237 mL, Rfl: 5    Tea Tree 100 % OIL, Apply 1 g topically daily as needed (rash) Apply topically daily to skin folds, Disp: 60 mL, Rfl: 11    Vitamins A & D (VITAMIN A & D) ointment, - APPLY TO AFFECTED AREA (BUTTOCKS/ANAL) AS NEEDED, Disp: 454 g, Rfl: 11    zinc oxide (DESITIN) 40 % PSTE, Apply topically , Disp: , Rfl:     zinc sulfate (ZINCATE) 220 mg capsule, TAKE TWO CAPSULE -PLACE WHOLE IN YOGURT/PUDDING BY MOUTH EVERY DAY (9AM) EVERY OTHER MONTH **ODD NUMBERED MONTHS ONLY**, Disp: 62 capsule, Rfl: 0    influenza vaccine, subunit, quadrivalent (Flucelvax Quadrivalent), Flucelvax Quad 8538-9395 60 mcg (15 mcg x 4)/0 5 mL intramuscular susp  TO BE ADMINISTERED BY PHARMACIST FOR IMMUNIZATION (PER CDC GUIDELINES), Disp: , Rfl:     Magnesium Oxide 500 MG TABS, Daily, Disp: , Rfl:     modafinil (PROVIGIL) 200 MG tablet, TAKE ONE TABLET -PLACE WHOLE IN YOGURT/PUDDING BY MOUTH EVERY DAY (9AM), Disp: 180 tablet, Rfl: 0    polyethylene glycol (MIRALAX) 17 g packet, Take 17 g by mouth 2 (two) times a day, Disp: 180 each, Rfl: 3    Current Allergies     Allergies as of 09/05/2021 - Reviewed 09/05/2021   Allergen Reaction Noted    Other  03/25/2016            The following portions of the patient's history were reviewed and updated as appropriate: allergies, current medications, past family history, past medical history, past social history, past surgical history and problem list      Past Medical History:   Diagnosis Date    Abnormal ECG 1998 and beyond    post cardiac arrest    Allergic rhinitis     [facility]     Cardiac arrest [facility])     age 13 s/p long Q-T syndrome    Community acquired pneumonia     last assessed/resolved:  10/9/2014    Depression     Disease of thyroid gland     GERD (gastroesophageal reflux disease)     Hypothyroid 2/26/2020    Long Q-T syndrome     Muscular rigidity and spasm, progressive     Osteopenia     Osteoporosis     [facility]riplegia ([facility]     Scoliosis of thoracic spine 2/27/2020    Spastic neurogenic bladder     Syncope     beta blocker medication DC because of low blood pressure    Urinary incontinence     Urinary tract infection without hematuria 4/27/2019       Past Surgical History:   Procedure Laterality Date    APPENDECTOMY  1991    WISDOM TOOTH EXTRACTION         Family History   Problem Relation Age of Onset    Other Father         mitral valve replaced    Hypertension Father     Diabetes type II Maternal Grandfather     Heart failure Maternal Grandfather         Congestive heart failure -     Diabetes type II Maternal Uncle     Hypotension Mother          Medications have been verified  Objective   Pulse 82   Temp 97 6 °F (36 4 °C)   Resp 18   Wt 61 2 kg (135 lb)   SpO2 97%   BMI 19 37 kg/m²        Physical Exam     Physical Exam  Vitals and nursing note reviewed  Constitutional:       General: He is not in acute distress  Appearance: Normal appearance  He is well-developed  He is not ill-appearing or diaphoretic  HENT:      Head: Normocephalic and atraumatic  Nose: Congestion present  No rhinorrhea  Mouth/Throat:      Pharynx: No oropharyngeal exudate or posterior oropharyngeal erythema  Eyes:      General:         Right eye: No discharge  Left eye: No discharge  Conjunctiva/sclera: Conjunctivae normal    Cardiovascular:      Rate and Rhythm: Normal rate and regular rhythm  Heart sounds: Normal heart sounds  Pulmonary:      Effort: Pulmonary effort is normal  No respiratory distress  Breath sounds: Normal breath sounds  No wheezing, rhonchi or rales  Abdominal:      General: Abdomen is flat  There is no distension  Tenderness: There is no abdominal tenderness  There is no guarding or rebound  Hernia: No hernia is present  Musculoskeletal:      Cervical back: Normal range of motion and neck supple  Lymphadenopathy:      Cervical: No cervical adenopathy  Skin:     General: Skin is warm  Capillary Refill: Capillary refill takes less than 2 seconds  Coloration: Skin is not pale  Findings: No rash  Neurological:      Mental Status: He is alert

## 2021-09-06 LAB — SARS-COV-2 RNA RESP QL NAA+PROBE: NEGATIVE

## 2021-09-07 NOTE — TELEPHONE ENCOUNTER
Gave patient's mother, dates and times  She will call facility to confirm transportation and will call back

## 2021-09-08 PROBLEM — R63.6 UNDERWEIGHT: Status: ACTIVE | Noted: 2021-09-08

## 2021-09-09 ENCOUNTER — TELEPHONE (OUTPATIENT)
Dept: FAMILY MEDICINE CLINIC | Facility: CLINIC | Age: 36
End: 2021-09-09

## 2021-09-09 NOTE — TELEPHONE ENCOUNTER
Patient's mother called back to confirm appointment September 23 at 1 PM     Moustapha Card, could you open up Dr Yogi Carranza schedule for that date/time and place appointment for patient  Thank you    Dr Papito Ayala:  Patient's mother also wanted to confirm you spoke to Dr Griselda Sanchez  Thank you!

## 2021-09-09 NOTE — TELEPHONE ENCOUNTER
Speech therapy forms received from [facility] needing to be signed  Placed in Dr Osorio Kidney folder in preceptor room  Please advise  Thank you

## 2021-09-10 ENCOUNTER — TELEPHONE (OUTPATIENT)
Dept: FAMILY MEDICINE CLINIC | Facility: CLINIC | Age: 36
End: 2021-09-10

## 2021-09-10 NOTE — TELEPHONE ENCOUNTER
Alejandro Hyde from speech therapy called today to let you know she sent the evaluation results  The results can be found in media scanned to the chart on 9/8/21  If you have any questions please call her at the number provided on this message    Thank you

## 2021-09-13 ENCOUNTER — TELEMEDICINE (OUTPATIENT)
Dept: FAMILY MEDICINE CLINIC | Facility: CLINIC | Age: 36
End: 2021-09-13
Payer: COMMERCIAL

## 2021-09-13 VITALS — WEIGHT: 138.5 LBS | BODY MASS INDEX: 19.87 KG/M2

## 2021-09-13 DIAGNOSIS — M25.561 CHRONIC PAIN OF RIGHT KNEE: ICD-10-CM

## 2021-09-13 DIAGNOSIS — K59.09 OTHER CONSTIPATION: ICD-10-CM

## 2021-09-13 DIAGNOSIS — G89.29 CHRONIC PAIN OF RIGHT KNEE: ICD-10-CM

## 2021-09-13 DIAGNOSIS — G89.29 CHRONIC MIDLINE LOW BACK PAIN, UNSPECIFIED WHETHER SCIATICA PRESENT: ICD-10-CM

## 2021-09-13 DIAGNOSIS — Z00.00 MEDICARE ANNUAL WELLNESS VISIT, SUBSEQUENT: Primary | ICD-10-CM

## 2021-09-13 DIAGNOSIS — M54.50 CHRONIC MIDLINE LOW BACK PAIN, UNSPECIFIED WHETHER SCIATICA PRESENT: ICD-10-CM

## 2021-09-13 PROCEDURE — G0439 PPPS, SUBSEQ VISIT: HCPCS | Performed by: FAMILY MEDICINE

## 2021-09-13 RX ORDER — DOCUSATE SODIUM 283 MG/5ML
1 LIQUID RECTAL DAILY
Qty: 30 EACH | Refills: 3 | Status: SHIPPED | OUTPATIENT
Start: 2021-09-13

## 2021-09-14 ENCOUNTER — TELEPHONE (OUTPATIENT)
Dept: FAMILY MEDICINE CLINIC | Facility: CLINIC | Age: 36
End: 2021-09-14

## 2021-09-14 NOTE — TELEPHONE ENCOUNTER
Annual physical examination form needing to be signed  Placed in Dr Osorio Kidney folder in preceptor room  Please advise  Thank you

## 2021-09-23 ENCOUNTER — PROCEDURE VISIT (OUTPATIENT)
Dept: NEUROLOGY | Facility: CLINIC | Age: 36
End: 2021-09-23
Payer: COMMERCIAL

## 2021-09-23 VITALS — DIASTOLIC BLOOD PRESSURE: 60 MMHG | TEMPERATURE: 97 F | SYSTOLIC BLOOD PRESSURE: 100 MMHG

## 2021-09-23 DIAGNOSIS — G82.50 SPASTIC QUADRIPARESIS (HCC): Primary | ICD-10-CM

## 2021-09-23 DIAGNOSIS — G93.1 ANOXIC BRAIN DAMAGE (HCC): ICD-10-CM

## 2021-09-23 PROCEDURE — 64643 CHEMODENERV 1 EXTREM 1-4 EA: CPT | Performed by: PHYSICAL MEDICINE & REHABILITATION

## 2021-09-23 PROCEDURE — 95874 GUIDE NERV DESTR NEEDLE EMG: CPT | Performed by: PHYSICAL MEDICINE & REHABILITATION

## 2021-09-23 PROCEDURE — 64642 CHEMODENERV 1 EXTREMITY 1-4: CPT | Performed by: PHYSICAL MEDICINE & REHABILITATION

## 2021-09-23 NOTE — PATIENT INSTRUCTIONS
You have received botulinum toxin injections today  The skin around the site of injections should be monitored for a couple of days  If redness or swelling occur, the skin should be examined by a health care professional to rule out infection  You can call my office if this occurs  Please contact our office via [employer] or by phone at [phone] in 2 weeks to report on the effects of the injections or any time with any questions or concerns  Please note that your next injections should not be scheduled less than 90 days from today  If your health insurance changes before the next injections, please contact our office as soon as possible so we can submit for a new prior authorization if needed  Please note that we may not be able to perform the injections if your insurance changes and the treatment is not pre-authorized  1  Zuleika Berry, I will contact Charlene Felder, and Dr Garcia Helm in one month to see how he has responded to the Botox   You can contact me in 1 month as well for updates

## 2021-09-23 NOTE — PROGRESS NOTES
Physical Medicine & Rehabilitation Spasticity Follow-up/Procedure  Naman Hagen 38 y o  male    ASSESSMENT/PLAN:   Diagnoses and all orders for this visit:    Spastic quadriparesis ([address] )  -     Discontinue: onabotulinumtoxin A (BOTOX) injection 300 Units  -     Ambulatory referral to Occupational Therapy; Future  -     onabotulinumtoxin A (BOTOX) injection 200 Units    Anoxic brain damage (HCC)      1  Oral antispasticity medications: Has tried Baclofen and Valium without improvement  Currently using medical marijuana, can continue  2  Schedule 400 units at next visit, may need to increase dose and potentially address lumbricals  Requires full assistance to transfer, performed in chair    - L Tibialis Anterior (75), L EHL (50), L ECR (50), L Lumbricals (25x4)    - R Tibialis Anterior (75), R EHL (50),   3  Physical/occupational therapy to continue at [address] [place] [address] with his PT/OT symptoms to look for over the course of the next month   - Provided script for OT, and will contact Giovanny Garcia  4  He will follow-up with Dr Gavi Madrid with Hand Surgery and PT/OT at [facility] in 1 month to see effects of botox  Vera Chow No plan for tendon transfers, nerve transfers, or lengthening at this point  5  Will route note to his PCP  ?  *I have spent 50 minutes with Patient and family today in which greater than 50% of this time was spent in counseling/coordination of care regarding Intructions for management, Patient and family education and Impressions  HPI/SUBJECTIVE:  Patient presents today in the office with chief complaint of tone affecting his gait and causing pain in his L hand  Have been in communication with his mother, physical and occupational therapists at [facility] and his hand surgeon to discuss plan for his botox   Decreased range of motion, retropulsion and risk of falls given his gait abnormalities (goes into dorsiflexion, difficulty with foot flat, knee hyperextends and he retropulses backward)  Last seen for chemodenervation: 2020  Results of chemodenervation: Significant improvement in L hand pain, last time they only did thumb and lumbricals  How long did effects last: At least 3 months  Side affect/Adverse Effects: None    Any issues with driving, swallowing, appetite: Appetite is fine, on marinol to help with weight  Has baseline dysphagia (on NTL/chopped diet) and works with SLP  Has a wheelchair [facility] and non-medical staff who can assist    Stretching/Exercise Program: Yes regularly at his home and at [facility]  Currently in therapy: At [facility] (PT/SLP)   Any falls with significant injuries: No  Any new weakness: No  Any changes to care since last seen: No major changes   Safe at home: None  Any oral meds: Has trialed baclofen without much improvement and heavy doses of valium  Takes medical marijuana as well  On anticoagulation/blood thinners: No  Current functional status: Requires assistance for stairs, not ambulating functionally - primary wheelchair mobility  Micha for stand pivot transfers  Requires assistance for all ADLs  Imaging: I have personally reviewed imaging with results as follows:  9/18 VBS: Ok for Regular Sol[employer] and NTL  OBJECTIVE:   /60   Temp (!) 97 °F (36 1 °C)      Gen: No acute distress  Heme/Extr: No edema  Pulmonary: Non-labored breathing  GI: Soft, non-tender, non-distended  MSK: L hand in intrinsic position, R hand with extension in some digits, and flexion in others  Able to open/close  Difficulty following commands for MMT  Generally strength seems 4/5, but difficult due to dystonic movements  Ankles able to get to neutral   - Observed gait, tends to stay in dorsiflexion throughout gait cycle with striatal toe  This causes retropulsion backward and hyperextension in the knee  Integumentary: Skin is warm, dry     Spasticity/Neuro: Really more almost dystonic movements than intrinsic tonic spasticity, in combination with motor planning/coordination deficits  L hand with intrinsic/lumbrical tone (MAS 3-4), WE/radial deviation tone (difficult to check MAS due to motor issues)  Has striatal toe (EHL tone) and DF tone, although he is able to get his foot fully plantarflexed, and will at times invert his foot  ? Botulinum Toxin Injection Procedure    Pre-operative diagnosis: Spasticity    Post-operative diagnosis: Same    Procedure: Chemodenervation      After risks and benefits were explained including bleeding, infection, worsening of pain, damage to the areas being injected, weakness of muscles, loss of muscle control, dysphagia if injecting the head or neck, facial droop if injecting the facial area, painful injection, allergic or other reaction to the medications being injected, and the failure of the procedure to help the problem, a signed consent was obtained on 09/23/2021 - patient unable to sign, mother signed him for him     The patient was placed in a seated position and the sites to be treated were identified  A time out was called and performed  The area to be treated was prepped three times with alcohol and the alcohol allowed to dry  Next, a 26 gauge, 50mm disposable monopolar electrode needle was used to inject the medication in the area to be treated  Guidance: EMG  Patient position:   Area(s) injected:    Muscle Dose (units) # Sites Technique   R Tibialis Anterior 50   EMG   R EHL 50   EMG   L Tibialis Anterior 50   EMG   L EHL 25   EMG   L ECR 25   EMG       EMG       EMG       EMG         EMG         EMG         EMG         EMG         EMG        Medications used: 200 units of botox diluted in 2 mL of preservative free saline    Botox Lot/Exp: [id number] / Exp 4/2024  Saline Lot/Exp:     The patient did tolerate the procedure well  There were no complications  Will try cold spray next time      The following portions of the patient's history were reviewed and updated as appropriate: allergies, current medications, past family history, past medical history, past social history, past surgical history and problem list       Current Outpatient Medications:     acetaminophen (TYLENOL) 325 mg tablet, Take 3 tablets (975 mg total) by mouth every 8 (eight) hours as needed for mild pain or headaches, Disp: 120 tablet, Rfl: 3    ascorbic acid (GNP VITAMIN C) 500 MG tablet, Take 500 mg daily at 4 PM, Disp: 30 tablet, Rfl: 5    b complex-vitamin C-folic acid (RENAL) 1 mg, Take 1 mg daily at 4 PM, Disp: 31 each, Rfl: 5    bisacodyl (DULCOLAX) 10 mg suppository, Use 1 supp  per rectum QOD PRN for constipation, max 3d/week, Disp: 12 suppository, Rfl: 0    Camphor-Eucalyptus-Menthol (VICKS VAPORUB) 4 73-1 2-2 6 % OINT, Apply topically as needed (congestion), Disp: 170 g, Rfl: 0    carbamide peroxide (DEBROX) 6 5 % otic solution, Administer 5 drops into both ears 2 (two) times a day for 7 days, Disp: 15 mL, Rfl: 5    Coenzyme Q10 (Co Q10) 100 MG CAPS, Take 2 capsules by mouth daily Whole in yougurt or pudding, Disp: 60 capsule, Rfl: 1    Diapers & Supplies MISC, Use 5 (five) times a day Huggies #3, Disp: 450 each, Rfl: 2    diclofenac sodium (VOLTAREN) 1 %, Apply 2 g topically 3 (three) times a day At 10am, 4pm, 9pm to thoracic paravertebral musculature, Disp: 100 g, Rfl: 2    Diclofenac Sodium (VOLTAREN) 1 %, Apply 2g to midline back and/or R knee TID PRN, Disp: 150 g, Rfl: 1    Disposable Gloves (Vinyl Gloves Medium) MISC, Use 4 (four) times a day Dispense 3 boxes monthly; Diagnosis R32, Disp: 3 each, Rfl: 5    docusate sodium (COLACE) 100 mg capsule, 100 mg 9am and 9pm, Disp: 10 capsule, Rfl: 5    docusate sodium (Enemeez Mini) 283 MG enema, Insert 1 enema into the rectum daily, Disp: 30 each, Rfl: 3    dronabinol (MARINOL) 2 5 mg capsule, TAKE ONE CAPSULE -PLACE WHOLE IN YOGURT/PUDDING BY MOUTH 3 TIMES A DAY (9AM-4PM-9PM) *CALL PHARMACY 5 DAYS IN ADVANCE FOR REFILL*, Disp: 90 capsule, Rfl: 0    Elastic Bandages & Supports (Knee Brace) MISC, Use as directed according to PT recommendations, Disp: 1 each, Rfl: 0    Emollient (eucerin) lotion, Apply topically to face daily at 10a and 8p, Disp: 480 mL, Rfl: 3    fluticasone (FLONASE) 50 mcg/act nasal spray, 1 spray into each nostril daily, Disp: , Rfl:     glycerin adult 2 g suppository, Place 1 suppository QOD for 2 weeks, then QOD PRN for constipation, Disp: 50 suppository, Rfl: 6    HEMORRHOIDAL 0 25 % suppository, - UNWRAP AND INSERT 1 SUPPOSITORY PER RECTUM AS NEEDED FOR HEMORRHOID PAIN RELIEF, Disp: 12 suppository, Rfl: 11    ibuprofen (MOTRIN) 200 mg tablet, Take 2 tabs q6h PRN for pain not to exceed 2000mg per day, Disp: 200 tablet, Rfl: 2    Icosapent Ethyl (VASCEPA) 1 g CAPS, Take 1 capsule (1 g total) by mouth daily, Disp: 31 capsule, Rfl: 11    Incontinence Supply Disposable (Prevail Air Briefs) MISC, Use 1 each every 4 (four) hours Please provide 5 briefs per day, Disp: 150 each, Rfl: 11    Incontinence Supply Disposable (Select Disposable Underwear Sm) MISC, Please provide a 30 day supply 10 per day DX R32 incontinence, Disp: 22 each, Rfl: 6    Incontinence Supply Disposable (Simplicity Insert Pad 12"-05") MISC, Use 5 (five) times a day, Disp: 450 each, Rfl: 1    influenza vaccine, subunit, quadrivalent (Flucelvax Quadrivalent), Flucelvax Quad [id number] 60 mcg (15 mcg x 4)/0 5 mL intramuscular susp  TO BE ADMINISTERED BY PHARMACIST FOR IMMUNIZATION (PER CDC GUIDELINES), Disp: , Rfl:     levothyroxine 75 mcg tablet, Take 1 tablet (75 mcg total) by mouth daily, Disp: 90 tablet, Rfl: 1    loratadine (CLARITIN) 10 mg tablet, Take 1 tablet (10 mg total) by mouth daily as needed for allergies, Disp: 30 tablet, Rfl: 5    Magnesium Oxide 500 MG TABS, Take 1 tablet (500 mg total) by mouth daily Take 1 tablet daily at 4 PM, Disp: 31 each, Rfl: 5    Magnesium Oxide 500 MG TABS, Daily, Disp: , Rfl:     Melatonin 5 MG TABS, Take 1 tablet (5 mg total) by mouth daily at bedtime, Disp: 31 each, Rfl: 5    Misc  Devices [facility]) MISC, Please provide pt with a new cord for power wheelchair, Disp: 1 each, Rfl: 0    modafinil (PROVIGIL) 200 MG tablet, TAKE ONE TABLET -PLACE WHOLE IN YOGURT/PUDDING BY MOUTH EVERY DAY (9AM), Disp: 180 tablet, Rfl: 0    Multiple Vitamins-Minerals (CEROVITE SENIOR) TABS, Take 1 tablet by mouth daily, Disp: 30 tablet, Rfl: 5    Nutritional Supplements (NUTRITIONAL SHAKE) LIQD, Take 1 Can by mouth 2 (two) times a day Please provide Boost 90 minutes before lunch and 90 minutes before dinner, Disp: 60 Bottle, Rfl: 6    omeprazole (PriLOSEC) 20 mg delayed release capsule, Take 20mg at 9am every day, Disp: 30 capsule, Rfl: 5    onabotulinumtoxin A (BOTOX) 100 units, inject 500 units into left gastroc soleus and abductor pollicis ankit, Disp: , Rfl:     polyethylene glycol (MIRALAX) 17 g packet, Take 17 g by mouth 2 (two) times a day, Disp: 180 each, Rfl: 3    PREPARATION H 1-0 25-14 4-15 % rectal cream, - APPLY RECTALLY AS NEEDED FOR HEMORRHOID PAIN RELIEF, Disp: 51 g, Rfl: 11    Respiratory Therapy Supplies (Spirometer) KIT, Use 3 (three) times a day Please provide incentive spirometer, Disp: 1 kit, Rfl: 0    sertraline (ZOLOFT) 100 mg tablet, Take 1 tablet (100 mg total) by mouth daily, Disp: 31 tablet, Rfl: 11    Skin Protectants, Misc   (Hydrocerin) LOTN, Apply topically 2 (two) times a day Apply to both feeland legs twice a day, Disp: , Rfl: 0    sodium chloride (DEEP SEA NASAL SPRAY) 0 65 % nasal spray, 2 sprays into each nostril as needed for congestion, Disp: 44 mL, Rfl: 11    SODIUM FLUORIDE, DENTAL GEL, (PreviDent 5000 Plus) 1 1 % CREA, Take by mouth daily at bedtime Apply orally to teeth once daily while brushing at night time do not rinse mouth after brushing, Disp: , Rfl: 0    SODIUM FLUORIDE, DENTAL GEL, 1 1 % GEL, SF 5000 Plus 1 1 % dental cream, Disp: , Rfl:     Starch, Thickening, LIQD, Nectar thick liquids up to honey thick if coughing occurs as needed, please use Simply Thick liquid only   Discontinue Thick-It powder , Disp: 237 mL, Rfl: 5    Tea Tree 100 % OIL, Apply 1 g topically daily as needed (rash) Apply topically daily to skin folds, Disp: 60 mL, Rfl: 11    Vitamins A & D (VITAMIN A & D) ointment, - APPLY TO AFFECTED AREA (BUTTOCKS/ANAL) AS NEEDED, Disp: 454 g, Rfl: 11    zinc oxide (DESITIN) 40 % PSTE, Apply topically , Disp: , Rfl:     zinc sulfate (ZINCATE) 220 mg capsule, TAKE TWO CAPSULE -PLACE WHOLE IN YOGURT/PUDDING BY MOUTH EVERY DAY (9AM) EVERY OTHER MONTH **ODD NUMBERED MONTHS ONLY**, Disp: 62 capsule, Rfl: 0    Past Surgical History:   Procedure Laterality Date    APPENDECTOMY  1991    WISDOM TOOTH EXTRACTION         Patient Active Problem List    Diagnosis Date Noted    Underweight 09/08/2021    Neurogenic bladder 07/20/2021    Acute otitis externa of right ear 04/23/2021    Exposure to COVID-19 virus 04/09/2021    Fatigue 03/16/2021    Mild protein-calorie malnutrition ([address] ) 03/11/2021    Xerosis of skin 11/25/2020    Environmental allergies 11/24/2020    Chronic brain injury ([address] ) 11/23/2020    Spastic quadriparesis ([address] ) 04/23/2020    At risk for atelectasis 03/13/2020    Polyarthralgia 03/02/2020    Electrolyte abnormality 02/27/2020    Urinary incontinence 02/27/2020    Scoliosis of thoracic spine 02/27/2020    Elevated total protein 02/26/2020    Hypothyroid 89/43/3700    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    Contracture of muscle of both hands 10/12/2019    Cardiomegaly 10/09/2019    H/O impacted cerumen 09/10/2019    Goals of care, counseling/discussion 08/24/2019    Spasticity 08/16/2019    Hx of recurrent pneumonia 08/13/2019    Bilateral impacted cerumen 07/08/2019    Frequent UTI 05/24/2019    At risk for aspiration 04/08/2019    Torticollis 11/27/2018    Muscular rigidity and spasm, progressive 08/07/2018    Physical deconditioning 07/25/2018    Asp[facility] pneumonia [facility] ) 07/18/2018    Constipation due to neurogenic bowel 07/18/2018    Oropharyngeal dysphagia 04/26/2018    Poor weight gain in adult 02/15/2018    Abnormal bone density screening 10/06/2017    Depression 08/03/2017    History of sleep disturbance 08/31/2016    Anoxic brain damage (HCC) 09/20/2013    Allergic rhinitis 06/20/2012    Long Q-T syndrome 05/21/1998

## 2021-09-24 ENCOUNTER — TELEPHONE (OUTPATIENT)
Dept: NEUROLOGY | Facility: CLINIC | Age: 36
End: 2021-09-24

## 2021-09-24 NOTE — TELEPHONE ENCOUNTER
----- Message from Ting Dodson MD sent at 2021  4:03 PM EDT -----  Regardin units  Hello,    Patient needs an increase to his Botox - 400 units for next visit  Detailed in my note      Thank you,  Rosa Elena Diane MD  Physical Medicine and Rehabilitation

## 2021-09-30 ENCOUNTER — TELEPHONE (OUTPATIENT)
Dept: FAMILY MEDICINE CLINIC | Facility: CLINIC | Age: 36
End: 2021-09-30

## 2021-09-30 DIAGNOSIS — R13.12 OROPHARYNGEAL DYSPHAGIA: Primary | ICD-10-CM

## 2021-09-30 DIAGNOSIS — M25.561 CHRONIC PAIN OF RIGHT KNEE: Primary | ICD-10-CM

## 2021-09-30 DIAGNOSIS — G89.29 CHRONIC PAIN OF RIGHT KNEE: Primary | ICD-10-CM

## 2021-09-30 NOTE — TELEPHONE ENCOUNTER
Karley Hernandez from [facility] is requesting another order for a Barium swallow study  Patient's speech therapist Desiree Dumas wants to get it done the week of October 18, which is 6 weeks since the last one and their goal is to have him on thin liquid all the time  The referral can be faxed to [address] [place] [address] fax at [phone]  Please advise  Thank you

## 2021-09-30 NOTE — TELEPHONE ENCOUNTER
Order placed and given to clerical staff for faxing; can you call Kat Corona and let her know after it has been faxed so she can keep an eye out for it? Thanks!

## 2021-10-08 ENCOUNTER — TELEPHONE (OUTPATIENT)
Dept: OBGYN CLINIC | Facility: HOSPITAL | Age: 36
End: 2021-10-08

## 2021-10-14 ENCOUNTER — OFFICE VISIT (OUTPATIENT)
Dept: GASTROENTEROLOGY | Facility: CLINIC | Age: 36
End: 2021-10-14
Payer: COMMERCIAL

## 2021-10-14 ENCOUNTER — TRANSCRIBE ORDERS (OUTPATIENT)
Dept: GASTROENTEROLOGY | Facility: CLINIC | Age: 36
End: 2021-10-14

## 2021-10-14 VITALS
HEIGHT: 70 IN | DIASTOLIC BLOOD PRESSURE: 70 MMHG | HEART RATE: 50 BPM | WEIGHT: 137.5 LBS | BODY MASS INDEX: 19.69 KG/M2 | SYSTOLIC BLOOD PRESSURE: 110 MMHG

## 2021-10-14 DIAGNOSIS — R89.4 ABNORMAL CELIAC ANTIBODY PANEL: ICD-10-CM

## 2021-10-14 DIAGNOSIS — K59.2 CONSTIPATION DUE TO NEUROGENIC BOWEL: Primary | ICD-10-CM

## 2021-10-14 DIAGNOSIS — E44.1 MILD PROTEIN-CALORIE MALNUTRITION (HCC): ICD-10-CM

## 2021-10-14 DIAGNOSIS — K64.9 HEMORRHOIDS, UNSPECIFIED HEMORRHOID TYPE: ICD-10-CM

## 2021-10-14 DIAGNOSIS — R13.12 OROPHARYNGEAL DYSPHAGIA: ICD-10-CM

## 2021-10-14 DIAGNOSIS — K21.9 GASTROESOPHAGEAL REFLUX DISEASE, UNSPECIFIED WHETHER ESOPHAGITIS PRESENT: ICD-10-CM

## 2021-10-14 DIAGNOSIS — K59.00 CONSTIPATION DUE TO NEUROGENIC BOWEL: Primary | ICD-10-CM

## 2021-10-14 PROCEDURE — 99214 OFFICE O/P EST MOD 30 MIN: CPT | Performed by: INTERNAL MEDICINE

## 2021-10-14 PROCEDURE — 3008F BODY MASS INDEX DOCD: CPT | Performed by: INTERNAL MEDICINE

## 2021-10-14 RX ORDER — POLYETHYLENE GLYCOL 3350 17 G/17G
17 POWDER, FOR SOLUTION ORAL DAILY
Qty: 90 EACH | Refills: 3 | Status: SHIPPED | OUTPATIENT
Start: 2021-10-14 | End: 2021-11-24 | Stop reason: SDUPTHER

## 2021-10-15 ENCOUNTER — OFFICE VISIT (OUTPATIENT)
Dept: OBGYN CLINIC | Facility: OTHER | Age: 36
End: 2021-10-15
Payer: COMMERCIAL

## 2021-10-15 ENCOUNTER — APPOINTMENT (OUTPATIENT)
Dept: RADIOLOGY | Facility: OTHER | Age: 36
End: 2021-10-15
Payer: COMMERCIAL

## 2021-10-15 VITALS
SYSTOLIC BLOOD PRESSURE: 107 MMHG | DIASTOLIC BLOOD PRESSURE: 79 MMHG | BODY MASS INDEX: 19.73 KG/M2 | WEIGHT: 137.5 LBS | HEART RATE: 102 BPM

## 2021-10-15 DIAGNOSIS — R63.6 UNDERWEIGHT: ICD-10-CM

## 2021-10-15 DIAGNOSIS — M22.2X1 PATELLOFEMORAL PAIN SYNDROME OF BOTH KNEES: ICD-10-CM

## 2021-10-15 DIAGNOSIS — G93.1 ANOXIC BRAIN DAMAGE (HCC): ICD-10-CM

## 2021-10-15 DIAGNOSIS — M25.561 ACUTE PAIN OF BOTH KNEES: ICD-10-CM

## 2021-10-15 DIAGNOSIS — M20.41 HAMMERTOES OF BOTH FEET: ICD-10-CM

## 2021-10-15 DIAGNOSIS — M25.562 ACUTE PAIN OF BOTH KNEES: ICD-10-CM

## 2021-10-15 DIAGNOSIS — M22.2X1 PATELLOFEMORAL PAIN SYNDROME OF BOTH KNEES: Primary | ICD-10-CM

## 2021-10-15 DIAGNOSIS — R25.2 SPASTICITY: ICD-10-CM

## 2021-10-15 DIAGNOSIS — S90.415A ABRASION OF TOE OF LEFT FOOT, INITIAL ENCOUNTER: ICD-10-CM

## 2021-10-15 DIAGNOSIS — M22.2X2 PATELLOFEMORAL PAIN SYNDROME OF BOTH KNEES: ICD-10-CM

## 2021-10-15 DIAGNOSIS — R26.2 AMBULATORY DYSFUNCTION: ICD-10-CM

## 2021-10-15 DIAGNOSIS — M22.2X2 PATELLOFEMORAL PAIN SYNDROME OF BOTH KNEES: Primary | ICD-10-CM

## 2021-10-15 DIAGNOSIS — M20.42 HAMMERTOES OF BOTH FEET: ICD-10-CM

## 2021-10-15 DIAGNOSIS — M62.569 ATROPHY OF MUSCLE OF LOWER LEG, UNSPECIFIED LATERALITY: ICD-10-CM

## 2021-10-15 PROCEDURE — 73502 X-RAY EXAM HIP UNI 2-3 VIEWS: CPT

## 2021-10-15 PROCEDURE — 99214 OFFICE O/P EST MOD 30 MIN: CPT | Performed by: FAMILY MEDICINE

## 2021-10-16 ENCOUNTER — TELEPHONE (OUTPATIENT)
Dept: OBGYN CLINIC | Facility: CLINIC | Age: 36
End: 2021-10-16

## 2021-10-16 NOTE — TELEPHONE ENCOUNTER
Will need your help coordinating RX for sit to stand power wheelchair for Naman  He has history of anoxic brain injury with severe spasticity  I placed wheelchair order on chart   Will likely require letter of medical necessity etc

## 2021-10-19 ENCOUNTER — TELEPHONE (OUTPATIENT)
Dept: ENDOCRINOLOGY | Facility: CLINIC | Age: 36
End: 2021-10-19

## 2021-10-19 ENCOUNTER — LAB (OUTPATIENT)
Dept: LAB | Age: 36
End: 2021-10-19
Payer: COMMERCIAL

## 2021-10-19 DIAGNOSIS — E03.9 HYPOTHYROIDISM, UNSPECIFIED TYPE: ICD-10-CM

## 2021-10-19 LAB — TSH SERPL DL<=0.05 MIU/L-ACNC: 2.65 UIU/ML (ref 0.36–3.74)

## 2021-10-19 PROCEDURE — 36415 COLL VENOUS BLD VENIPUNCTURE: CPT

## 2021-10-19 PROCEDURE — 84443 ASSAY THYROID STIM HORMONE: CPT

## 2021-10-20 ENCOUNTER — TELEPHONE (OUTPATIENT)
Dept: FAMILY MEDICINE CLINIC | Facility: CLINIC | Age: 36
End: 2021-10-20

## 2021-10-26 ENCOUNTER — TELEPHONE (OUTPATIENT)
Dept: NEUROLOGY | Facility: CLINIC | Age: 36
End: 2021-10-26

## 2021-10-27 ENCOUNTER — OFFICE VISIT (OUTPATIENT)
Dept: ENDOCRINOLOGY | Facility: CLINIC | Age: 36
End: 2021-10-27
Payer: COMMERCIAL

## 2021-10-27 VITALS — HEART RATE: 108 BPM | SYSTOLIC BLOOD PRESSURE: 102 MMHG | DIASTOLIC BLOOD PRESSURE: 80 MMHG

## 2021-10-27 DIAGNOSIS — E03.9 HYPOTHYROIDISM, UNSPECIFIED TYPE: Primary | ICD-10-CM

## 2021-10-27 PROCEDURE — 99214 OFFICE O/P EST MOD 30 MIN: CPT | Performed by: INTERNAL MEDICINE

## 2021-10-27 PROCEDURE — 1036F TOBACCO NON-USER: CPT | Performed by: INTERNAL MEDICINE

## 2021-10-27 RX ORDER — LEVOTHYROXINE SODIUM 0.07 MG/1
75 TABLET ORAL DAILY
Qty: 90 TABLET | Refills: 2 | Status: SHIPPED | OUTPATIENT
Start: 2021-10-27

## 2021-10-29 ENCOUNTER — TELEPHONE (OUTPATIENT)
Dept: FAMILY MEDICINE CLINIC | Facility: CLINIC | Age: 36
End: 2021-10-29

## 2021-11-08 ENCOUNTER — CLINICAL SUPPORT (OUTPATIENT)
Dept: NUTRITION | Facility: HOSPITAL | Age: 36
End: 2021-11-08
Payer: COMMERCIAL

## 2021-11-08 DIAGNOSIS — R63.6 UNDERWEIGHT: ICD-10-CM

## 2021-11-08 PROCEDURE — 97803 MED NUTRITION INDIV SUBSEQ: CPT

## 2021-11-09 ENCOUNTER — TELEPHONE (OUTPATIENT)
Dept: CARDIOLOGY CLINIC | Facility: CLINIC | Age: 36
End: 2021-11-09

## 2021-11-11 ENCOUNTER — OFFICE VISIT (OUTPATIENT)
Dept: PODIATRY | Facility: CLINIC | Age: 36
End: 2021-11-11
Payer: COMMERCIAL

## 2021-11-11 VITALS — DIASTOLIC BLOOD PRESSURE: 81 MMHG | HEART RATE: 77 BPM | SYSTOLIC BLOOD PRESSURE: 116 MMHG

## 2021-11-11 DIAGNOSIS — M21.6X1 EQUINUS DEFORMITY OF BOTH FEET: ICD-10-CM

## 2021-11-11 DIAGNOSIS — M21.6X2 EQUINUS DEFORMITY OF BOTH FEET: ICD-10-CM

## 2021-11-11 DIAGNOSIS — M20.41 HAMMERTOE, BILATERAL: ICD-10-CM

## 2021-11-11 DIAGNOSIS — M21.6X9 CAVUS DEFORMITY OF FOOT, ACQUIRED: ICD-10-CM

## 2021-11-11 DIAGNOSIS — M20.42 HAMMERTOE, BILATERAL: ICD-10-CM

## 2021-11-11 DIAGNOSIS — G25.82 MUSCULAR RIGIDITY AND SPASM, PROGRESSIVE: Primary | ICD-10-CM

## 2021-11-11 PROCEDURE — 1036F TOBACCO NON-USER: CPT | Performed by: PODIATRIST

## 2021-11-11 PROCEDURE — 99203 OFFICE O/P NEW LOW 30 MIN: CPT | Performed by: PODIATRIST

## 2021-11-18 ENCOUNTER — TELEPHONE (OUTPATIENT)
Dept: PODIATRY | Facility: CLINIC | Age: 36
End: 2021-11-18

## 2021-11-18 ENCOUNTER — TELEPHONE (OUTPATIENT)
Dept: CARDIOLOGY CLINIC | Facility: CLINIC | Age: 36
End: 2021-11-18

## 2021-11-20 ENCOUNTER — APPOINTMENT (EMERGENCY)
Dept: CT IMAGING | Facility: HOSPITAL | Age: 36
End: 2021-11-20
Payer: COMMERCIAL

## 2021-11-20 ENCOUNTER — HOSPITAL ENCOUNTER (EMERGENCY)
Facility: HOSPITAL | Age: 36
Discharge: HOME | End: 2021-11-20
Attending: EMERGENCY MEDICINE | Admitting: EMERGENCY MEDICINE
Payer: COMMERCIAL

## 2021-11-20 VITALS
OXYGEN SATURATION: 99 % | TEMPERATURE: 98.4 F | HEART RATE: 93 BPM | RESPIRATION RATE: 16 BRPM | SYSTOLIC BLOOD PRESSURE: 113 MMHG | DIASTOLIC BLOOD PRESSURE: 55 MMHG

## 2021-11-20 DIAGNOSIS — K59.00 CONSTIPATION, UNSPECIFIED CONSTIPATION TYPE: Primary | ICD-10-CM

## 2021-11-20 LAB
ALBUMIN SERPL BCP-MCNC: 3.8 G/DL (ref 3.5–5)
ALP SERPL-CCNC: 89 U/L (ref 46–116)
ALT SERPL W P-5'-P-CCNC: 30 U/L (ref 12–78)
ANION GAP SERPL CALCULATED.3IONS-SCNC: 8 MMOL/L (ref 4–13)
AST SERPL W P-5'-P-CCNC: 21 U/L (ref 5–45)
BACTERIA UR QL AUTO: ABNORMAL /HPF
BASOPHILS # BLD AUTO: 0.01 THOUSANDS/ΜL (ref 0–0.1)
BASOPHILS NFR BLD AUTO: 0 % (ref 0–1)
BILIRUB SERPL-MCNC: 0.77 MG/DL (ref 0.2–1)
BILIRUB UR QL STRIP: NEGATIVE
BUN SERPL-MCNC: 16 MG/DL (ref 5–25)
CALCIUM SERPL-MCNC: 8.8 MG/DL (ref 8.3–10.1)
CARDIAC TROPONIN I PNL SERPL HS: <2 NG/L
CHLORIDE SERPL-SCNC: 103 MMOL/L (ref 100–108)
CLARITY UR: ABNORMAL
CLARITY, POC: ABNORMAL
CO2 SERPL-SCNC: 30 MMOL/L (ref 21–32)
COLOR UR: YELLOW
COLOR, POC: ABNORMAL
CREAT SERPL-MCNC: 1.02 MG/DL (ref 0.6–1.3)
EOSINOPHIL # BLD AUTO: 0.01 THOUSAND/ΜL (ref 0–0.61)
EOSINOPHIL NFR BLD AUTO: 0 % (ref 0–6)
ERYTHROCYTE [DISTWIDTH] IN BLOOD BY AUTOMATED COUNT: 12.1 % (ref 11.6–15.1)
EXT BILIRUBIN, UA: ABNORMAL
EXT BLOOD URINE: ABNORMAL
EXT GLUCOSE, UA: ABNORMAL
EXT KETONES: 15
EXT NITRITE, UA: ABNORMAL
EXT PH, UA: 6
EXT PROTEIN, UA: ABNORMAL
EXT SPECIFIC GRAVITY, UA: 1.02
EXT UROBILINOGEN: 0.2
FLUAV RNA RESP QL NAA+PROBE: NEGATIVE
FLUBV RNA RESP QL NAA+PROBE: NEGATIVE
GFR SERPL CREATININE-BSD FRML MDRD: 93 ML/MIN/1.73SQ M
GLUCOSE SERPL-MCNC: 97 MG/DL (ref 65–140)
GLUCOSE UR STRIP-MCNC: NEGATIVE MG/DL
HCT VFR BLD AUTO: 47.9 % (ref 36.5–49.3)
HGB BLD-MCNC: 16.3 G/DL (ref 12–17)
HGB UR QL STRIP.AUTO: ABNORMAL
IMM GRANULOCYTES # BLD AUTO: 0.02 THOUSAND/UL (ref 0–0.2)
IMM GRANULOCYTES NFR BLD AUTO: 0 % (ref 0–2)
KETONES UR STRIP-MCNC: ABNORMAL MG/DL
LACTATE SERPL-SCNC: 0.7 MMOL/L (ref 0.5–2)
LEUKOCYTE ESTERASE UR QL STRIP: NEGATIVE
LIPASE SERPL-CCNC: 80 U/L (ref 73–393)
LYMPHOCYTES # BLD AUTO: 0.69 THOUSANDS/ΜL (ref 0.6–4.47)
LYMPHOCYTES NFR BLD AUTO: 9 % (ref 14–44)
MCH RBC QN AUTO: 31.7 PG (ref 26.8–34.3)
MCHC RBC AUTO-ENTMCNC: 34 G/DL (ref 31.4–37.4)
MCV RBC AUTO: 93 FL (ref 82–98)
MONOCYTES # BLD AUTO: 0.57 THOUSAND/ΜL (ref 0.17–1.22)
MONOCYTES NFR BLD AUTO: 7 % (ref 4–12)
MUCOUS THREADS UR QL AUTO: ABNORMAL
NEUTROPHILS # BLD AUTO: 6.64 THOUSANDS/ΜL (ref 1.85–7.62)
NEUTS SEG NFR BLD AUTO: 84 % (ref 43–75)
NITRITE UR QL STRIP: POSITIVE
NON-SQ EPI CELLS URNS QL MICRO: ABNORMAL /HPF
NRBC BLD AUTO-RTO: 0 /100 WBCS
PH UR STRIP.AUTO: 6 [PH] (ref 4.5–8)
PLATELET # BLD AUTO: 174 THOUSANDS/UL (ref 149–390)
PMV BLD AUTO: 10.4 FL (ref 8.9–12.7)
POTASSIUM SERPL-SCNC: 4.7 MMOL/L (ref 3.5–5.3)
PROT SERPL-MCNC: 8.4 G/DL (ref 6.4–8.2)
PROT UR STRIP-MCNC: NEGATIVE MG/DL
RBC # BLD AUTO: 5.14 MILLION/UL (ref 3.88–5.62)
RBC #/AREA URNS AUTO: ABNORMAL /HPF
RSV RNA RESP QL NAA+PROBE: NEGATIVE
SARS-COV-2 RNA RESP QL NAA+PROBE: NEGATIVE
SODIUM SERPL-SCNC: 141 MMOL/L (ref 136–145)
SP GR UR STRIP.AUTO: 1.02 (ref 1–1.03)
UROBILINOGEN UR QL STRIP.AUTO: 0.2 E.U./DL
WBC # BLD AUTO: 7.94 THOUSAND/UL (ref 4.31–10.16)
WBC # BLD EST: ABNORMAL 10*3/UL
WBC #/AREA URNS AUTO: ABNORMAL /HPF

## 2021-11-20 PROCEDURE — 36415 COLL VENOUS BLD VENIPUNCTURE: CPT | Performed by: EMERGENCY MEDICINE

## 2021-11-20 PROCEDURE — G1004 CDSM NDSC: HCPCS

## 2021-11-20 PROCEDURE — 81001 URINALYSIS AUTO W/SCOPE: CPT

## 2021-11-20 PROCEDURE — 99285 EMERGENCY DEPT VISIT HI MDM: CPT | Performed by: EMERGENCY MEDICINE

## 2021-11-20 PROCEDURE — 81002 URINALYSIS NONAUTO W/O SCOPE: CPT | Performed by: EMERGENCY MEDICINE

## 2021-11-20 PROCEDURE — 87077 CULTURE AEROBIC IDENTIFY: CPT | Performed by: FAMILY MEDICINE

## 2021-11-20 PROCEDURE — 99284 EMERGENCY DEPT VISIT MOD MDM: CPT

## 2021-11-20 PROCEDURE — 83690 ASSAY OF LIPASE: CPT | Performed by: EMERGENCY MEDICINE

## 2021-11-20 PROCEDURE — 93005 ELECTROCARDIOGRAM TRACING: CPT

## 2021-11-20 PROCEDURE — 96374 THER/PROPH/DIAG INJ IV PUSH: CPT

## 2021-11-20 PROCEDURE — 83605 ASSAY OF LACTIC ACID: CPT | Performed by: EMERGENCY MEDICINE

## 2021-11-20 PROCEDURE — 74177 CT ABD & PELVIS W/CONTRAST: CPT

## 2021-11-20 PROCEDURE — 80053 COMPREHEN METABOLIC PANEL: CPT | Performed by: EMERGENCY MEDICINE

## 2021-11-20 PROCEDURE — 87086 URINE CULTURE/COLONY COUNT: CPT | Performed by: FAMILY MEDICINE

## 2021-11-20 PROCEDURE — 87186 SC STD MICRODIL/AGAR DIL: CPT | Performed by: FAMILY MEDICINE

## 2021-11-20 PROCEDURE — 85025 COMPLETE CBC W/AUTO DIFF WBC: CPT | Performed by: EMERGENCY MEDICINE

## 2021-11-20 PROCEDURE — 96361 HYDRATE IV INFUSION ADD-ON: CPT

## 2021-11-20 PROCEDURE — 84484 ASSAY OF TROPONIN QUANT: CPT | Performed by: EMERGENCY MEDICINE

## 2021-11-20 PROCEDURE — 0241U HB NFCT DS VIR RESP RNA 4 TRGT: CPT | Performed by: EMERGENCY MEDICINE

## 2021-11-20 RX ORDER — KETOROLAC TROMETHAMINE 30 MG/ML
15 INJECTION, SOLUTION INTRAMUSCULAR; INTRAVENOUS ONCE
Status: COMPLETED | OUTPATIENT
Start: 2021-11-20 | End: 2021-11-20

## 2021-11-20 RX ORDER — MAGNESIUM CARB/ALUMINUM HYDROX 105-160MG
296 TABLET,CHEWABLE ORAL ONCE
Qty: 296 ML | Refills: 0 | Status: SHIPPED | OUTPATIENT
Start: 2021-11-20 | End: 2021-11-20

## 2021-11-20 RX ADMIN — KETOROLAC TROMETHAMINE 15 MG: 30 INJECTION, SOLUTION INTRAMUSCULAR at 19:25

## 2021-11-20 RX ADMIN — IOHEXOL 100 ML: 350 INJECTION, SOLUTION INTRAVENOUS at 20:42

## 2021-11-20 RX ADMIN — SODIUM CHLORIDE 1000 ML: 0.9 INJECTION, SOLUTION INTRAVENOUS at 19:28

## 2021-11-21 ENCOUNTER — DOCUMENTATION (OUTPATIENT)
Dept: FAMILY MEDICINE CLINIC | Facility: CLINIC | Age: 36
End: 2021-11-21

## 2021-11-21 ENCOUNTER — TELEPHONE (OUTPATIENT)
Dept: FAMILY MEDICINE CLINIC | Facility: CLINIC | Age: 36
End: 2021-11-21

## 2021-11-21 DIAGNOSIS — R82.90 ABNORMAL URINALYSIS: ICD-10-CM

## 2021-11-21 DIAGNOSIS — R82.90 ABNORMAL URINALYSIS: Primary | ICD-10-CM

## 2021-11-21 LAB
ATRIAL RATE: 96 BPM
P AXIS: 59 DEGREES
PR INTERVAL: 152 MS
QRS AXIS: -36 DEGREES
QRSD INTERVAL: 96 MS
QT INTERVAL: 350 MS
QTC INTERVAL: 433 MS
T WAVE AXIS: 42 DEGREES
VENTRICULAR RATE: 92 BPM

## 2021-11-21 PROCEDURE — 93010 ELECTROCARDIOGRAM REPORT: CPT | Performed by: INTERNAL MEDICINE

## 2021-11-21 RX ORDER — NITROFURANTOIN 25; 75 MG/1; MG/1
100 CAPSULE ORAL 2 TIMES DAILY
Qty: 10 CAPSULE | Refills: 0 | Status: SHIPPED | OUTPATIENT
Start: 2021-11-21 | End: 2021-11-26

## 2021-11-22 ENCOUNTER — HOSPITAL ENCOUNTER (OUTPATIENT)
Dept: RADIOLOGY | Age: 36
Discharge: HOME | End: 2021-11-22
Payer: COMMERCIAL

## 2021-11-22 DIAGNOSIS — M85.89 OSTEOPENIA OF MULTIPLE SITES: ICD-10-CM

## 2021-11-22 DIAGNOSIS — G93.1 ANOXIC BRAIN DAMAGE (HCC): ICD-10-CM

## 2021-11-22 DIAGNOSIS — G82.50 SPASTIC QUADRIPARESIS (HCC): ICD-10-CM

## 2021-11-22 PROCEDURE — 77080 DXA BONE DENSITY AXIAL: CPT

## 2021-11-23 ENCOUNTER — TELEPHONE (OUTPATIENT)
Dept: FAMILY MEDICINE CLINIC | Facility: CLINIC | Age: 36
End: 2021-11-23

## 2021-11-23 DIAGNOSIS — K64.9 HEMORRHOIDS, UNSPECIFIED HEMORRHOID TYPE: ICD-10-CM

## 2021-11-24 RX ORDER — POLYETHYLENE GLYCOL 3350 17 G/17G
17 POWDER, FOR SOLUTION ORAL 2 TIMES DAILY
Qty: 180 EACH | Refills: 3 | Status: SHIPPED | OUTPATIENT
Start: 2021-11-24 | End: 2022-03-21

## 2021-11-26 ENCOUNTER — TELEPHONE (OUTPATIENT)
Dept: FAMILY MEDICINE CLINIC | Facility: CLINIC | Age: 36
End: 2021-11-26

## 2021-11-26 LAB — BACTERIA UR CULT: ABNORMAL

## 2021-12-06 ENCOUNTER — TELEPHONE (OUTPATIENT)
Dept: PODIATRY | Facility: CLINIC | Age: 36
End: 2021-12-06

## 2021-12-16 ENCOUNTER — TELEPHONE (OUTPATIENT)
Dept: OBGYN CLINIC | Facility: MEDICAL CENTER | Age: 36
End: 2021-12-16

## 2021-12-16 ENCOUNTER — PATIENT MESSAGE (OUTPATIENT)
Dept: FAMILY MEDICINE CLINIC | Facility: CLINIC | Age: 36
End: 2021-12-16

## 2021-12-17 ENCOUNTER — TELEPHONE (OUTPATIENT)
Dept: FAMILY MEDICINE CLINIC | Facility: CLINIC | Age: 36
End: 2021-12-17

## 2021-12-20 ENCOUNTER — OFFICE VISIT (OUTPATIENT)
Dept: FAMILY MEDICINE CLINIC | Facility: CLINIC | Age: 36
End: 2021-12-20
Payer: COMMERCIAL

## 2021-12-20 VITALS
HEART RATE: 78 BPM | DIASTOLIC BLOOD PRESSURE: 80 MMHG | OXYGEN SATURATION: 99 % | SYSTOLIC BLOOD PRESSURE: 116 MMHG | TEMPERATURE: 97.3 F

## 2021-12-20 DIAGNOSIS — M21.6X1 EQUINUS DEFORMITY OF BOTH FEET: ICD-10-CM

## 2021-12-20 DIAGNOSIS — Z91.89: ICD-10-CM

## 2021-12-20 DIAGNOSIS — K59.2 CONSTIPATION DUE TO NEUROGENIC BOWEL: ICD-10-CM

## 2021-12-20 DIAGNOSIS — R25.2 SPASTICITY: Primary | ICD-10-CM

## 2021-12-20 DIAGNOSIS — M21.6X2 EQUINUS DEFORMITY OF BOTH FEET: ICD-10-CM

## 2021-12-20 DIAGNOSIS — K59.00 CONSTIPATION DUE TO NEUROGENIC BOWEL: ICD-10-CM

## 2021-12-20 DIAGNOSIS — N31.9 NEUROGENIC BLADDER: ICD-10-CM

## 2021-12-20 PROCEDURE — 99214 OFFICE O/P EST MOD 30 MIN: CPT | Performed by: FAMILY MEDICINE

## 2021-12-20 NOTE — PROGRESS NOTES
Assessment/Plan:       Problem List Items Addressed This Visit        Digestive    Constipation due to neurogenic bowel     · Continue current bowel regimen for now, as patient is stable  · Recommend manual stimulation via glycerin suppository to trigger rectal tone and hopefully prompt bowel movement when constipated  · Follow-up with GI as needed            Other    Spasticity - Primary     · Patient follows with Physiatry and Sports Medicine   · Will be getting Botox injections in January  · May consider increasing Marinol if recommended by Physiatry         At risk for atelectasis     · Continue use of 'the breather" to reduce risk of atelectasis  · Of note, patient had CT abdomen done in ED which showed no atelectasis at bilateral lung bases         Neurogenic bladder     · Patient had E coli UTI that was treated as outpatient after abnormal UA done via straight catheterization specimen in ED appears to have been missed at time of visit  · Recommend patient to have urine testing any time he appears to deviate from his baseline, as he does not tend to show obvious signs of infection such as fever         Equinus deformity of both feet     · Continue recommendations per Dr Alejandro Squires from Podiatry regarding wider toe box for shoes, etc   · Encouraged mom to attempt to obtain orthotics if covered by insurance with prior auth                 Subjective:      Patient ID: Ian Swain is a 43 y o  male  HPI    Patient tolerated antibiotic therapy for E  Coli UTI on urine culture ordered outpatient from straight cath specimen obtained in ED (the patient was diagnosed with only constipation) after personal review of chart showed urine was nitrite positive with pyuria  Overall, patient is doing well, currently having a large BM QOD, pain with some BMs but very minimal; using miralax and suppository PRN with it being used only twice since ED visit, not needing on a regular basis   Has mini-enema and glycerin suppository for digital stimulation as well  Dysphagia is at baseline with patient currently on thin liquids around the clock per SLP, no more thickened liquids  Weight continues to fluctuate somewhat, reportedly was 130 lb last week, nutritionist following with patient  Caregivers are not getting daily weights at the same time every day so weight is fluctuating between 130-135 lb  Active in PT twice a week and going to the gym and working out, doing the breather TID  Following with Physiatry and taking marinol for spasticity  Botox scheduled in January, also may receive Lidocaine injection for hand spasticity  Recently saw Podiatry who recommended orthotics but not covered by insurance apparently  Also recommended to use wider toe box for new balance shoes; metatarsal rings not helping  Posiatry recommending botox should be in posterior muscles  The following portions of the patient's history were reviewed and updated as appropriate: allergies, current medications, past family history, past medical history, past social history, past surgical history and problem list     Review of Systems   Reason unable to perform ROS: Patient scantly verbal, ROS provided by caregiver and mother  Constitutional: Negative for appetite change and fever  Respiratory: Negative for chest tightness, shortness of breath and wheezing  Cardiovascular: Negative for chest pain  Gastrointestinal: Positive for constipation  Negative for abdominal pain, diarrhea, nausea and vomiting  Genitourinary: Negative for dysuria  Musculoskeletal: Positive for gait problem  Psychiatric/Behavioral: Negative for agitation  Objective:      /80 (BP Location: Left arm, Patient Position: Sitting, Cuff Size: Large)   Pulse 78   Temp (!) 97 3 °F (36 3 °C) (Temporal)   SpO2 99%          Physical Exam  Vitals reviewed  Constitutional:       General: He is not in acute distress  Appearance: He is well-developed     HENT: Head: Normocephalic and atraumatic  Eyes:      Conjunctiva/sclera: Conjunctivae normal    Cardiovascular:      Rate and Rhythm: Normal rate and regular rhythm  Pulmonary:      Effort: Pulmonary effort is normal  No respiratory distress  Breath sounds: Normal breath sounds  Abdominal:      General: Bowel sounds are normal       Palpations: Abdomen is soft  Tenderness: There is no abdominal tenderness  Musculoskeletal:      Cervical back: Normal range of motion and neck supple  Comments: B/L UE with spasticity and contracture, B/L LE muscle wasting   Skin:     General: Skin is warm and dry  Findings: No rash  Neurological:      Mental Status: He is alert  Mental status is at baseline        Comments: Scantly verbal at baseline but very pleasant and will provide 1 word answers to simple questions   Psychiatric:         Mood and Affect: Mood normal          Behavior: Behavior normal

## 2021-12-22 ENCOUNTER — TELEPHONE (OUTPATIENT)
Dept: PODIATRY | Facility: CLINIC | Age: 36
End: 2021-12-22

## 2022-01-03 NOTE — ASSESSMENT & PLAN NOTE
· Continue recommendations per Dr Dee Machado from Podiatry regarding wider toe box for shoes, etc   · Encouraged mom to attempt to obtain orthotics if covered by insurance with prior auth

## 2022-01-03 NOTE — ASSESSMENT & PLAN NOTE
· Patient had E coli UTI that was treated as outpatient after abnormal UA done via straight catheterization specimen in ED appears to have been missed at time of visit  · Recommend patient to have urine testing any time he appears to deviate from his baseline, as he does not tend to show obvious signs of infection such as fever

## 2022-01-03 NOTE — ASSESSMENT & PLAN NOTE
· Continue current bowel regimen for now, as patient is stable  · Recommend manual stimulation via glycerin suppository to trigger rectal tone and hopefully prompt bowel movement when constipated  · Follow-up with GI as needed

## 2022-01-03 NOTE — ASSESSMENT & PLAN NOTE
· Patient follows with Physiatry and Sports Medicine   · Will be getting Botox injections in January  · May consider increasing Marinol if recommended by Physiatry

## 2022-01-03 NOTE — ASSESSMENT & PLAN NOTE
· Continue use of 'the breather" to reduce risk of atelectasis  · Of note, patient had CT abdomen done in ED which showed no atelectasis at bilateral lung bases

## 2022-01-12 ENCOUNTER — TELEPHONE (OUTPATIENT)
Dept: FAMILY MEDICINE CLINIC | Facility: CLINIC | Age: 37
End: 2022-01-12

## 2022-01-12 NOTE — TELEPHONE ENCOUNTER
Physician's repair order received from [facility]  Placed in Dr Chilango Calvo folder in preceptor room  Please advise  Thank you

## 2022-01-20 ENCOUNTER — TELEPHONE (OUTPATIENT)
Dept: FAMILY MEDICINE CLINIC | Facility: CLINIC | Age: 37
End: 2022-01-20

## 2022-01-20 NOTE — TELEPHONE ENCOUNTER
Outpatient service authorization request dropped off by [facility] from [employer]  She would like to be called to pick the forms up  She can be reached at [phone]  Forms placed in Dr Reji Aguirre folder in preceptor room  Please advise  Thank you

## 2022-01-20 NOTE — LETTER
February 1, 2022     Patient: Anne Doe   YOB: 1985       To Whom it May Concern:    Michael Cruz is under my professional care  He has a history of spastic quadriparesis 2/2 anoxic brain damage sustained at a young age, and, as a result, is incontinent of urine at baseline due to neurogenic bladder  His urinary incontinence has been effectively managed via the use of penile wraps made with Huggies diapers size 3; alternative treatments previously tried, such as traditional diapers, resulted in skin breakdown and irritant dermatitis  He also has a history of recurrent UTI, and using the penile wrap method mitigates the risk of future urinary tract infections by reducing perineal contact with his urethral meatus  If you have any questions or concerns, please don't hesitate to call           Sincerely,          Ana Barr MD

## 2022-01-24 ENCOUNTER — TELEPHONE (OUTPATIENT)
Dept: NEUROLOGY | Facility: CLINIC | Age: 37
End: 2022-01-24

## 2022-01-24 NOTE — TELEPHONE ENCOUNTER
yolanda    Pt's guardian Vern Steward called and states pt get PT at [address]  She feels that it would be beneficial if we could get pt's medical record (copy of PT visits) prior to pt's appt [facility]/ [facility]  Med record # is [id number]    Called  medical record, spoke w/   [place] and advised of the above  Can send in fax sheet w/ pt's name,  and what we're requesting be faxed to [phone]  Marked as urgent  Urgent med record requested  Fax sheet faxed to [phone]       Vern Steward made aware       Awaiting record                  [phone] ok to leave detailed message

## 2022-01-25 ENCOUNTER — TELEPHONE (OUTPATIENT)
Dept: NEUROLOGY | Facility: CLINIC | Age: 37
End: 2022-01-25

## 2022-01-25 ENCOUNTER — PROCEDURE VISIT (OUTPATIENT)
Dept: NEUROLOGY | Facility: CLINIC | Age: 37
End: 2022-01-25
Payer: MEDICARE

## 2022-01-25 VITALS — DIASTOLIC BLOOD PRESSURE: 64 MMHG | HEART RATE: 85 BPM | SYSTOLIC BLOOD PRESSURE: 94 MMHG | TEMPERATURE: 96.9 F

## 2022-01-25 DIAGNOSIS — G82.50 SPASTIC QUADRIPARESIS (HCC): Primary | ICD-10-CM

## 2022-01-25 PROCEDURE — 99215 OFFICE O/P EST HI 40 MIN: CPT | Performed by: PHYSICAL MEDICINE & REHABILITATION

## 2022-01-25 PROCEDURE — 64643 CHEMODENERV 1 EXTREM 1-4 EA: CPT | Performed by: PHYSICAL MEDICINE & REHABILITATION

## 2022-01-25 PROCEDURE — 64642 CHEMODENERV 1 EXTREMITY 1-4: CPT | Performed by: PHYSICAL MEDICINE & REHABILITATION

## 2022-01-25 NOTE — PROGRESS NOTES
Physical Medicine & Rehabilitation Spasticity Follow-up/Procedure  Naman Hagen 43 y o  male    ASSESSMENT/PLAN:   Diagnoses and all orders for this visit:    Spastic quadriparesis (HCC)  -     Botulinum Toxin Type A SOLR 200 Units  -     onabotulinumtoxin A (BOTOX) injection 200 Units      1  Oral antispasticity medications: Has tried Baclofen and Valium without improvement and excessive adverse effect  He has already tried dantrolene in the past with excessive weakness  We discussed tizanidine, but BP tends to be quite low with SBP in the 90s  I do not think adding a medication at this point confers added benefit vs  Risk  Can continue using medical marijuana  2  Schedule 400 units at next visit  Requires full assistance for transfers, procedure performed in chair     - Today I targeted his tibialis posterior given his inversion in his gait  I continued the dose to his EHL given his striatal toe  As this crosses the ankle, this should help with any dorsiflexion he has as well  - I also targeted his left lumbricals today, as well as his ECRL/ECRB and ECU  3  Physical/occupational therapy to continue at [address] [place] [address] with his PT/OT symptoms to look for over the course of the next month   - Continue    - I provided a script for an AFO as well for the R foot to help with standing exercises and what gait exercises he has  At this time, he does not functionally ambulate household distances  4  Power Wheelchair Mobility  POWER WHEELCHAIR FACE TO FACE  Power [facility] Medicare Face-To-Face Visit   1  nonambulatory due to 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  Unable to participate in any MRADLs without a power wheelchair  He is unable to functionally ambulate without hand held assistance from 1-2 people  He has significant spasticity and dystonia, as well as generalized weakness contributing to his ambulatory dysfunction    2  MRADLs activities that the patient cannot do include moving from room to room, toileting, dressing, feeding, grooming, and bathing   3  Power mobility device needed for home mobility because the patient does not have the muscle strength to be mobile with a cane or walker  His impaired motor control, spasticity, contracture, dystonia impact his ability to use any other less involved assistive device  He would require total A/dependence with a manual wheelchair, whereas he has been able to demonstrate in therapies the ability to manage a power wheelchair with more independence  4  Patient does not have the strength  Dexterity, motor control to propel a manual wheelchair in the home  5  Patient lacks strength and stability to use a scooter in the home  He needs significant truncal support  6  Patient is able and capable of operating the power wheelchair  7  Patient is willing and motivated to use the power wheelchair in the home  Length of need--99 mos (lifetime)    4  He will continue to follow-up with Dr Vamshi Lares for possible ulnar neurectomy, as well as his Orthopedic surgeon  11  Mother has access to note, and she will print to bring with her to [place] [id number] wheelchair clinic  I will reach out to [facility] and Dr Vamshi Lares     6  Reviewed hip imaging with his mother Heather Colvin   ? *I have spent 60 minutes with Patient and family today in which greater than 50% of this time was spent in counseling/coordination of care regarding Diagnostic results, Risks and benefits of tx options, Intructions for management, Patient and family education, Risk factor reductions and Impressions  HPI/SUBJECTIVE:   He is here with his mother, Heather Colvin, and one of his caregivers, Katie Williamson and Katie Williamson from [facility]  Patient presents with chief complaint of tone in all 4 extremities with dystonic component impacting his gait, care, pain, and posturing  Since last seen, he has continued to follow with Dr Vamshi Lares and therapy at [facility]   To help with his finger extension, Dr Vamshi Lares performed a diagnostic ulnar motor nerve block with some improvement - he may be a candidate for a neurectomy in the future  Aury Serna, his OT, recommends addressing his wrist extensors, and lumbricals  Larae Bernheim did contact us and we reached out to [facility] to have his notes from his recent physical therapy appointments faxed  Her focus is the positioning of his fingers in that hand, pain control and improvement in gait  His mom and his therapist have noted more inversion in the R leg only, and are working on getting a custom AFO that can help control that inversion  He has a new AFO in the L leg that also helps with his gait, he is not wearing it right now  He is rocking less on his heels/dorsiflexing  She reports physical therapy did notice improvement and ability to make progress in his gait after his last botulinum toxin injections  His mother is asking about adding additional medications at this time  Last seen for chemodenervation: 9/23/2021  Results of chemodenervation: Significant improvement in gait in Alter-G, walked 300'  Dorsiflexion benefits still noted  Did have benefit in his left upper extremity with his extension tone  How long did effects last: It has been at least 3 months with benefits  Side affect/Adverse Effects: No new major side effects/adverse  Any issues with driving, swallowing, appetite: Appetite is fine, on marinol to help with weight  Has baseline dysphagia (on Chopped/Thins significantly improved) has finished working with SLP for now  Has a wheelchair [facility] and non-medical staff who can assist    Stretching/Exercise Program: Yes regularly at his home and at [facility]  Currently in therapy: At [facility] (PT/OT)   Any falls with significant injuries: No, but did fall once since last seen with a contusion to his R side  No major injury  Any new weakness: No  Any changes to care since last seen: No major changes other than what is noted above     Safe at home: None  Any oral meds: Has trialed baclofen without much improvement and heavy doses of valium  Has also dantrolene in the past without good effect  Takes medical marijuana as well  On anticoagulation/blood thinners: No  Current functional status: Requires assistance for stairs, has been ambulating more with therapy - primary wheelchair mobility  Micha for stand pivot transfers  Requires assistance for all ADLs  ROS: A 10 point review of systems was negative except for what is noted in the HPI  Imaging: I have personally reviewed imaging with results as follows: No new pertinent imaging since last seen  OBJECTIVE:   BP 94/64   Pulse 85   Temp (!) 96 9 °F (36 1 °C)      Gen: NAD  Heme/Extr: No new edema  Pulmonary: Non-labored breathing  GI: S, NT/ND    MSK/Neuro: L hand still in intrinsic position, but improved ability to open up palm  R hand with dystonia when he extends  Able to open/close  Observed ambulating - less dorsiflexion/heel walking automatically, really starting to invert moreso especially on the right  Still with striatal throughout gait cycle  Knees tend toward recurvatum  Really unable to follow commands for MMT  Integumentary: Skin is warm/dry  Spasticity/Neuro:  Dystonic gait, hand hold assist  Really more almost dystonic movements than intrinsic tonic spasticity, in combination with motor planning/coordination deficits  L hand with intrinsic/lumbrical tone (MAS 3-4), WE tone notable (MAS 3)  Has striatal toe (EHL tone - MAS 3) bilaterally  More inversion today especially on the R  Difficult to really tell due to difficulty with motor coordination, processing/planning, significant dystonia  Requires assist x2 to safely ambulate even short distances  ?     Botulinum Toxin Injection Procedure    Pre-operative diagnosis: Spasticity    Post-operative diagnosis: Same    Procedure: Chemodenervation    After risks and benefits were explained including bleeding, infection, worsening of pain, damage to the areas being injected, weakness of muscles, loss of muscle control, dysphagia if injecting the head or neck, facial droop if injecting the facial area, painful injection, allergic or other reaction to the medications being injected, and the failure of the procedure to help the problem, a signed consent was obtained on 01/25/2022 - patient unable to sign, mother signed him for him     The patient was placed in a seated position and the sites to be treated were identified  A time out was called and performed  The area to be treated was prepped three times with alcohol and the alcohol allowed to dry  Next, a 26 gauge, 37 5 mm disposable monopolar electrode needle was used to inject the medication in the area to be treated  Guidance: EMG  Patient position: seated in chair  Area(s) injected:    Muscle Dose (units) # Sites Technique   R Tibialis Posterior  50   EMG   R EHL 50   EMG   L Tibialis Posterior 25   EMG   L EHL 50   EMG   L ECR L/B 50   EMG   L ECU 25   EMG   L Lumbricals x4 25 x4   EMG       EMG         EMG         EMG         EMG    Wasted 50 units   EMG         EMG        Medications used: 400 units of botox diluted in 4 mL of preservative free saline    Botox Lot/Exp:  100 units x2 (Lot [id number]/ Exp 02/29/2024) and 200 units Lot [id number], Exp 6/30/2024  The patient did tolerate the procedure well  There were no complications  Used cold spray       The following portions of the patient's history were reviewed and updated as appropriate: allergies, current medications, past family history, past medical history, past social history, past surgical history and problem list       Current Outpatient Medications:     acetaminophen (TYLENOL) 325 mg tablet, Take 3 tablets (975 mg total) by mouth every 8 (eight) hours as needed for mild pain or headaches, Disp: 120 tablet, Rfl: 3    ascorbic acid (GNP VITAMIN C) 500 MG tablet, Take 500 mg daily at 4 PM, Disp: 30 tablet, Rfl: 5    b complex-vitamin C-folic acid (RENAL) 1 mg, Take 1 mg daily at 4 PM (Patient not taking: Reported on 12/20/2021 ), Disp: 31 each, Rfl: 5    bisacodyl (DULCOLAX) 10 mg suppository, Use 1 supp  per rectum QOD PRN for constipation, max 3d/week, Disp: 12 suppository, Rfl: 0    Camphor-Eucalyptus-Menthol (VICKS VAPORUB) 4 73-1 2-2 6 % OINT, Apply topically as needed (congestion), Disp: 170 g, Rfl: 0    carbamide peroxide (DEBROX) 6 5 % otic solution, Administer 5 drops into both ears 2 (two) times a day for 7 days, Disp: 15 mL, Rfl: 5    Coenzyme Q10 (Co Q10) 100 MG CAPS, Take 2 capsules by mouth daily Whole in yougurt or pudding, Disp: 60 capsule, Rfl: 1    Diapers & Supplies MISC, Use 5 (five) times a day Huggies #3, Disp: 450 each, Rfl: 2    diclofenac sodium (VOLTAREN) 1 %, Apply 2 g topically 3 (three) times a day At 10am, 4pm, 9pm to thoracic paravertebral musculature, Disp: 100 g, Rfl: 2    Diclofenac Sodium (VOLTAREN) 1 %, Apply 2g to midline back and/or R knee TID PRN (Patient not taking: Reported on 10/27/2021), Disp: 150 g, Rfl: 1    Disposable Gloves (Vinyl Gloves Medium) MISC, Use 4 (four) times a day Dispense 3 boxes monthly; Diagnosis R32, Disp: 3 each, Rfl: 5    docusate sodium (COLACE) 100 mg capsule, 100 mg 9am and 9pm, Disp: 10 capsule, Rfl: 5    docusate sodium (Enemeez Mini) 283 MG enema, Insert 1 enema into the rectum daily, Disp: 30 each, Rfl: 3    dronabinol (MARINOL) 2 5 mg capsule, TAKE ONE CAPSULE -PLACE WHOLE IN YOGURT/PUDDING BY MOUTH 3 TIMES A DAY (9AM-4PM-9PM) *CALL PHARMACY 5 DAYS IN ADVANCE FOR REFILL*, Disp: 90 capsule, Rfl: 0    Elastic Bandages & Supports (Knee Brace) MISC, Use as directed according to PT recommendations, Disp: 1 each, Rfl: 0    Emollient (CETA-KLENZ EX), Apply topically, Disp: , Rfl:     Emollient (eucerin) lotion, Apply topically to face daily at 10a and 8p, Disp: 480 mL, Rfl: 3    fluticasone (FLONASE) 50 mcg/act nasal spray, 1 spray into each nostril daily, Disp: , Rfl:     glycerin adult 2 g suppository, Place 1 suppository QOD for 2 weeks, then QOD PRN for constipation, Disp: 50 suppository, Rfl: 6    HEMORRHOIDAL 0 25 % suppository, - UNWRAP AND INSERT 1 SUPPOSITORY PER RECTUM AS NEEDED FOR HEMORRHOID PAIN RELIEF (Patient not taking: Reported on 10/27/2021), Disp: 12 suppository, Rfl: 11    ibuprofen (MOTRIN) 200 mg tablet, Take 2 tabs q6h PRN for pain not to exceed 2000mg per day, Disp: 200 tablet, Rfl: 2    Icosapent Ethyl (VASCEPA) 1 g CAPS, Take 1 capsule (1 g total) by mouth daily, Disp: 31 capsule, Rfl: 11    Incontinence Supply Disposable (Prevail Air Briefs) MISC, Use 1 each every 4 (four) hours Please provide 5 briefs per day, Disp: 150 each, Rfl: 11    Incontinence Supply Disposable (Select Disposable Underwear Sm) MISC, Please provide a 30 day supply 10 per day DX R32 incontinence, Disp: 22 each, Rfl: 6    Incontinence Supply Disposable (Simplicity Insert Pad 22"-69") MISC, Use 5 (five) times a day, Disp: 450 each, Rfl: 1    influenza vaccine, subunit, quadrivalent (Flucelvax Quadrivalent), Flucelvax Quad [id number] 60 mcg (15 mcg x 4)/0 5 mL intramuscular susp  TO BE ADMINISTERED BY PHARMACIST FOR IMMUNIZATION (PER CDC GUIDELINES) (Patient not taking: Reported on 10/27/2021), Disp: , Rfl:     levothyroxine 75 mcg tablet, Take 1 tablet (75 mcg total) by mouth daily, Disp: 90 tablet, Rfl: 2    loratadine (CLARITIN) 10 mg tablet, Take 1 tablet (10 mg total) by mouth daily as needed for allergies, Disp: 30 tablet, Rfl: 5    magnesium citrate (CITROMA) 1 745 g/30 mL oral solution, Take 296 mL by mouth once for 1 dose, Disp: 296 mL, Rfl: 0    Magnesium Oxide 500 MG TABS, Take 1 tablet (500 mg total) by mouth daily Take 1 tablet daily at 4 PM, Disp: 31 each, Rfl: 5    Magnesium Oxide 500 MG TABS, Daily (Patient not taking: Reported on 10/27/2021), Disp: , Rfl:     Melatonin 5 MG TABS, Take 1 tablet (5 mg total) by mouth daily at bedtime, Disp: 31 each, Rfl: 5    Misc  Devices [facility]) MISC, Please provide pt with a new cord for power wheelchair, Disp: 1 each, Rfl: 0    modafinil (PROVIGIL) 200 MG tablet, TAKE ONE TABLET -PLACE WHOLE IN YOGURT/PUDDING BY MOUTH EVERY DAY (9AM), Disp: 180 tablet, Rfl: 0    Multiple Vitamins-Minerals (CEROVITE SENIOR) TABS, Take 1 tablet by mouth daily, Disp: 30 tablet, Rfl: 5    Nutritional Supplements (NUTRITIONAL SHAKE) LIQD, Take 1 Can by mouth 2 (two) times a day Please provide Boost 90 minutes before lunch and 90 minutes before dinner, Disp: 60 Bottle, Rfl: 6    omeprazole (PriLOSEC) 20 mg delayed release capsule, Take 20mg at 9am every day, Disp: 30 capsule, Rfl: 5    onabotulinumtoxin A (BOTOX) 100 units, inject 500 units into left gastroc soleus and abductor pollicis ankit, Disp: , Rfl:     polyethylene glycol (MIRALAX) 17 g packet, Take 17 g by mouth 2 (two) times a day, Disp: 180 each, Rfl: 3    PREPARATION H 1-0 25-14 4-15 % rectal cream, - APPLY RECTALLY AS NEEDED FOR HEMORRHOID PAIN RELIEF, Disp: 51 g, Rfl: 11    Respiratory Therapy Supplies (Spirometer) KIT, Use 3 (three) times a day Please provide incentive spirometer, Disp: 1 kit, Rfl: 0    sertraline (ZOLOFT) 100 mg tablet, Take 1 tablet (100 mg total) by mouth daily, Disp: 31 tablet, Rfl: 11    Skin Protectants, Misc   (Hydrocerin) LOTN, Apply topically 2 (two) times a day Apply to both feeland legs twice a day, Disp: , Rfl: 0    sodium chloride (DEEP SEA NASAL SPRAY) 0 65 % nasal spray, 2 sprays into each nostril as needed for congestion, Disp: 44 mL, Rfl: 11    SODIUM FLUORIDE, DENTAL GEL, (PreviDent 5000 Plus) 1 1 % CREA, Take by mouth daily at bedtime Apply orally to teeth once daily while brushing at night time do not rinse mouth after brushing, Disp: , Rfl: 0    SODIUM FLUORIDE, DENTAL GEL, 1 1 % GEL, SF 5000 Plus 1 1 % dental cream (Patient not taking: Reported on 10/27/2021), Disp: , Rfl:     Starch, Thickening, LIQD, Nectar thick liquids up to honey thick if coughing occurs as needed, please use Simply Thick liquid only   Discontinue Thick-It powder , Disp: 237 mL, Rfl: 5    Tea Tree 100 % OIL, Apply 1 g topically daily as needed (rash) Apply topically daily to skin folds, Disp: 60 mL, Rfl: 11    Vitamins A & D (VITAMIN A & D) ointment, - APPLY TO AFFECTED AREA (BUTTOCKS/ANAL) AS NEEDED, Disp: 454 g, Rfl: 11    zinc oxide (DESITIN) 40 % PSTE, Apply topically , Disp: , Rfl:     zinc sulfate (ZINCATE) 220 mg capsule, TAKE TWO CAPSULE -PLACE WHOLE IN YOGURT/PUDDING BY MOUTH EVERY DAY (9AM) EVERY OTHER MONTH **ODD NUMBERED MONTHS ONLY**, Disp: 62 capsule, Rfl: 0    Past Surgical History:   Procedure Laterality Date    APPENDECTOMY  1991    UPPER GASTROINTESTINAL ENDOSCOPY      WISDOM TOOTH EXTRACTION         Patient Active Problem List    Diagnosis Date Noted    Cavus deformity of foot, acquired 11/11/2021    Equinus deformity of both feet 11/11/2021    Hammertoe, bilateral 11/11/2021    Underweight 09/08/2021    Neurogenic bladder 07/20/2021    Acute otitis externa of right ear 04/23/2021    Exposure to COVID-19 virus 04/09/2021    Fatigue 03/16/2021    Mild protein-calorie malnutrition (HCC) 03/11/2021    Xerosis of skin 11/25/2020    Environmental allergies 11/24/2020    Chronic brain injury ([facility] [address] ) 11/23/2020    Spastic quadriparesis ([facility] [address] ) 04/23/2020    At risk for atelectasis 03/13/2020    Polyarthralgia 03/02/2020    Electrolyte abnormality 02/27/2020    Urinary incontinence 02/27/2020    Scoliosis of thoracic spine 02/27/2020    Elevated total protein 02/26/2020    Hypothyroid 21/78/1210    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    Contracture of muscle of both hands 10/12/2019    Cardiomegaly 10/09/2019    H/O impacted cerumen 09/10/2019    Goals of care, counseling/discussion 08/24/2019    Spasticity 08/16/2019    Hx of recurrent pneumonia 08/13/2019    Bilateral impacted cerumen 07/08/2019  Frequent UTI 05/24/2019    At risk for aspiration 04/08/2019    Torticollis 11/27/2018    Muscular rigidity and spasm, progressive 08/07/2018    Physical deconditioning 07/25/2018    Aspiration pneumonia ([facility] [address] ) 07/18/2018    Constipation due to neurogenic bowel 07/18/2018    Oropharyngeal dysphagia 04/26/2018    Poor weight gain in adult 02/15/2018    Abnormal bone density screening 10/06/2017    Depression 08/03/2017    History of sleep disturbance 08/31/2016    Anoxic brain damage (HCC) 09/20/2013    Allergic rhinitis 06/20/2012    Long Q-T syndrome 05/21/1998

## 2022-01-25 NOTE — TELEPHONE ENCOUNTER
fyi    Record from [address] [place] [address] received and this was scanned in the media

## 2022-01-26 ENCOUNTER — TELEPHONE (OUTPATIENT)
Dept: FAMILY MEDICINE CLINIC | Facility: CLINIC | Age: 37
End: 2022-01-26

## 2022-01-26 DIAGNOSIS — R79.89 ELEVATED SERUM CREATININE: Primary | ICD-10-CM

## 2022-01-26 NOTE — TELEPHONE ENCOUNTER
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 Such a fine balance with him  With this concern, I just reviewed the results of Naman's DXA (11/23/21)  Could you cross reference Naman's DXA with his recent hip x-rays at Dr Arie Gordon and advise? Should we consider adding any supplements or medications to help build/maintain bone density? I hope you're doing well through this most recent Covid variation   It seems eternal!  Thank you,   Nola Lesch

## 2022-02-04 ENCOUNTER — TELEPHONE (OUTPATIENT)
Dept: FAMILY MEDICINE CLINIC | Facility: CLINIC | Age: 37
End: 2022-02-04

## 2022-02-07 ENCOUNTER — OFFICE VISIT (OUTPATIENT)
Dept: FAMILY MEDICINE CLINIC | Facility: CLINIC | Age: 37
End: 2022-02-07
Payer: MEDICARE

## 2022-02-07 DIAGNOSIS — M85.80 DECREASED BONE DENSITY: ICD-10-CM

## 2022-02-07 DIAGNOSIS — N39.0 FREQUENT UTI: ICD-10-CM

## 2022-02-07 DIAGNOSIS — G93.1 ANOXIC BRAIN DAMAGE (HCC): Primary | ICD-10-CM

## 2022-02-07 DIAGNOSIS — R09.89 RESPIRATORY CRACKLES AT BOTH LUNG BASES: ICD-10-CM

## 2022-02-07 DIAGNOSIS — R79.89 INCREASE IN CREATININE: ICD-10-CM

## 2022-02-07 DIAGNOSIS — N31.9 NEUROGENIC BLADDER: ICD-10-CM

## 2022-02-07 DIAGNOSIS — E03.9 ACQUIRED HYPOTHYROIDISM: ICD-10-CM

## 2022-02-07 PROCEDURE — 99214 OFFICE O/P EST MOD 30 MIN: CPT | Performed by: FAMILY MEDICINE

## 2022-02-07 NOTE — PROGRESS NOTES
Assessment/Plan:    1  Hypothyroid: re-assess TSH levels to determine if thyroid is contributing to weight changes    2  /anoxic brain injury: prone to infections secondary to neurogenic bladder w/ hx of recurrent UTIs: get UA w/ reflex and urine culture to assess for any underlying infections contributory to status change (leaning)  Of note, patient does not tend to spike fevers or experience dysuria in the setting of UTI, often a subtle status change such as leaning is the only clinical clue  Repeat CMP to ensure renal status back to baseline following small bump in Cr during last UTI 11/2021    3  Nutrition/weight: order Vit D levels and CMP to assess nutritional status as possible contributor to weight changes +/-decreased bone density seen on DXA  Reach out to Nutrition to review intake logs and make adjustments as indicated    4  Respiratory: rales noticed in both lung bases b/l likely secondary to ongoing atelectasis, get CXR AP and lateral to look for any underlying pneumonia or other respiratory pathologies that may be contributing to his status change    5  MSK/spasticity: Recommend patient continue PT, schedule OMT appointment   Consider adding lidocaine patch, may continue voltaren gel     Problem List Items Addressed This Visit        Endocrine    Hypothyroid    Relevant Orders    TSH, 3rd generation       Nervous and Auditory    Anoxic brain damage (HCC) - Primary    Relevant Orders    UA (URINE) with reflex to Scope    Urine culture       Genitourinary    Frequent UTI    Relevant Orders    UA (URINE) with reflex to Scope    Urine culture       Other    Neurogenic bladder    Relevant Orders    UA (URINE) with reflex to Scope    Urine culture      Other Visit Diagnoses     Decreased bone density        Relevant Orders    Vitamin D 25 hydroxy    Increase in creatinine        Relevant Orders    Comprehensive metabolic panel    Respiratory crackles at both lung bases        Relevant Orders    XR chest pa & lateral            Subjective:       Patient ID: Rosa Arshad is a 43 y o  male  Kim Mendoza is a 41yo male with 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 presenting w/ new onset postural deficits and minor decrease in weight from baseline per caregivers  Posture leaning to the right for past few weeks, noticed by PT, nursing interventions done but denies pain or other changes from baeline  Weight has been 128 lb but no wheelchair scale in his home, unable to do it at the same time daily  Mom thinks that muscle mass is decreasing when she is lifting him during PT  Reports that he was leaning last week and then again over the weekend and it resolved with Tylenol per Gayathri Henning  Past Wednesday had a bad day at PT, stopped early due to leaning  Currently getting formal PT Monday and Wednesday of each week       Voltaren gel around times of therapy, has not tried lidocaine patch  Massage therapist off for 6 weeks 2/2 pandemic, Posture leaning to the right for past few weeks, noticed by PT, nursing itnerventions done but denies pain or other changes from baseline  Weight has been 128 lb but no wheelchair scale in his home, unable to do it at the same time daily  Mom thinks that muscle mass is decreasing when she is lifting him during PT  Reports that he was leaning last week and then again over the weekend and it resolved with Tylenol per Gayathri Henning  Past Wednesday had a bad day at PT, stopped early due to leaning  Currently getting formal PT Monday and Wednesday of each week       Voltaren gel around times of therapy, has not tried lidocaine patch  Massage therapist off for 6 weeks 2/2 pandemic, but is amenable to OMT      The following portions of the patient's history were reviewed and updated as appropriate: allergies, current medications, past family history, past medical history, past social history, past surgical history and problem list     Review of Systems   Constitutional: Positive for unexpected weight change  HENT: Negative  Eyes: Negative  Respiratory: Negative for wheezing  Cardiovascular: Negative  Gastrointestinal: Negative  Endocrine: Negative  Genitourinary: Negative  Musculoskeletal: Positive for back pain  Skin: Negative  Allergic/Immunologic: Negative  Neurological: Negative  Hematological: Negative  Psychiatric/Behavioral: Negative  Objective:      /70 (BP Location: Left arm, Patient Position: Sitting, Cuff Size: Large)   Pulse 78   Temp (!) 97 °F (36 1 °C) (Temporal)   SpO2 95%        Physical Exam  Vitals reviewed  HENT:      Head: Normocephalic and atraumatic  Ears:      Comments: Mild ceruminosis but TMs appear normal     Nose: Nose normal       Mouth/Throat:      Mouth: Mucous membranes are moist       Pharynx: Oropharynx is clear  Eyes:      General:         Right eye: No discharge  Left eye: No discharge  Conjunctiva/sclera: Conjunctivae normal    Cardiovascular:      Rate and Rhythm: Normal rate and regular rhythm  Pulmonary:      Effort: No respiratory distress  Breath sounds: No stridor  Examination of the right-lower field reveals rales  Examination of the left-lower field reveals rales  Rales present  No wheezing or rhonchi  Comments: Mild crackles at B/L bases  Chest:      Chest wall: No tenderness  Abdominal:      General: Abdomen is flat  Bowel sounds are normal       Palpations: Abdomen is soft  Musculoskeletal:      Comments: Muscle wasting B/L LE, spasticity B/L hands, lumbar paravertebral hypertonicity L>R without overt tenderness  Sitting up in wheelchair   Skin:     General: Skin is warm and dry  Capillary Refill: Capillary refill takes less than 2 seconds  Neurological:      Mental Status: He is alert  Mental status is at baseline        Comments: Scantly verbal, at his baseline, only simple yes/no responses to simple questions   Psychiatric:      Comments: Calm, cooperative

## 2022-02-07 NOTE — PROGRESS NOTES
Posture leaning to the right for past few weeks, noticed by PT, nursing itnerventions done but denies pain or other changes from baeline  Weight has been 128 lb but no wheelchair scale in his home, unable to do it at the same time daily  Mom thinks that muscle mass is decreasing when she is lifting him during PT  Reports that he was leaning last week and then again over the weekend and it resolved with Tylenol per Cynthia Matters  Past Wednesday had a bad day at PT, stopped early due to leaning  Currently getting formal PT Monday and Wednesday of each week  Voltaren gel around times of therapy, has not tried lidocaine patch   Massage therapist off for 6 weeks 2/2 pandemic,

## 2022-02-08 ENCOUNTER — LAB (OUTPATIENT)
Dept: LAB | Age: 37
End: 2022-02-08
Payer: MEDICARE

## 2022-02-08 ENCOUNTER — APPOINTMENT (OUTPATIENT)
Dept: RADIOLOGY | Age: 37
End: 2022-02-08
Payer: MEDICARE

## 2022-02-08 DIAGNOSIS — G93.1 ANOXIC BRAIN DAMAGE (HCC): ICD-10-CM

## 2022-02-08 DIAGNOSIS — N39.0 FREQUENT UTI: ICD-10-CM

## 2022-02-08 DIAGNOSIS — M85.80 DECREASED BONE DENSITY: ICD-10-CM

## 2022-02-08 DIAGNOSIS — N39.0 E. COLI UTI (URINARY TRACT INFECTION): Primary | ICD-10-CM

## 2022-02-08 DIAGNOSIS — N31.9 NEUROGENIC BLADDER: ICD-10-CM

## 2022-02-08 DIAGNOSIS — R79.89 INCREASE IN CREATININE: ICD-10-CM

## 2022-02-08 DIAGNOSIS — E03.9 ACQUIRED HYPOTHYROIDISM: ICD-10-CM

## 2022-02-08 DIAGNOSIS — B96.20 E. COLI UTI (URINARY TRACT INFECTION): Primary | ICD-10-CM

## 2022-02-08 DIAGNOSIS — E03.9 HYPOTHYROIDISM, UNSPECIFIED TYPE: ICD-10-CM

## 2022-02-08 DIAGNOSIS — R09.89 RESPIRATORY CRACKLES AT BOTH LUNG BASES: ICD-10-CM

## 2022-02-08 LAB
25(OH)D3 SERPL-MCNC: 39.1 NG/ML (ref 30–100)
ALBUMIN SERPL BCP-MCNC: 4.6 G/DL (ref 3.5–5)
ALP SERPL-CCNC: 87 U/L (ref 46–116)
ALT SERPL W P-5'-P-CCNC: 30 U/L (ref 12–78)
ANION GAP SERPL CALCULATED.3IONS-SCNC: 4 MMOL/L (ref 4–13)
AST SERPL W P-5'-P-CCNC: 18 U/L (ref 5–45)
BACTERIA UR QL AUTO: ABNORMAL /HPF
BILIRUB SERPL-MCNC: 0.72 MG/DL (ref 0.2–1)
BILIRUB UR QL STRIP: NEGATIVE
BUN SERPL-MCNC: 15 MG/DL (ref 5–25)
CALCIUM SERPL-MCNC: 10 MG/DL (ref 8.3–10.1)
CAOX CRY URNS QL MICRO: ABNORMAL /HPF
CHLORIDE SERPL-SCNC: 105 MMOL/L (ref 100–108)
CLARITY UR: ABNORMAL
CO2 SERPL-SCNC: 29 MMOL/L (ref 21–32)
COLOR UR: YELLOW
CREAT SERPL-MCNC: 0.81 MG/DL (ref 0.6–1.3)
GFR SERPL CREATININE-BSD FRML MDRD: 112 ML/MIN/1.73SQ M
GLUCOSE SERPL-MCNC: 86 MG/DL (ref 65–140)
GLUCOSE UR STRIP-MCNC: NEGATIVE MG/DL
HGB UR QL STRIP.AUTO: NEGATIVE
KETONES UR STRIP-MCNC: NEGATIVE MG/DL
LEUKOCYTE ESTERASE UR QL STRIP: ABNORMAL
MUCOUS THREADS UR QL AUTO: ABNORMAL
NITRITE UR QL STRIP: POSITIVE
NON-SQ EPI CELLS URNS QL MICRO: ABNORMAL /HPF
PH UR STRIP.AUTO: 6 [PH]
POTASSIUM SERPL-SCNC: 4.8 MMOL/L (ref 3.5–5.3)
PROT SERPL-MCNC: 9.8 G/DL (ref 6.4–8.2)
PROT UR STRIP-MCNC: NEGATIVE MG/DL
RBC #/AREA URNS AUTO: ABNORMAL /HPF
SODIUM SERPL-SCNC: 138 MMOL/L (ref 136–145)
SP GR UR STRIP.AUTO: >=1.03 (ref 1–1.03)
TSH SERPL DL<=0.05 MIU/L-ACNC: 1.51 UIU/ML (ref 0.36–3.74)
UROBILINOGEN UR QL STRIP.AUTO: 0.2 E.U./DL
WBC #/AREA URNS AUTO: ABNORMAL /HPF

## 2022-02-08 PROCEDURE — 87186 SC STD MICRODIL/AGAR DIL: CPT

## 2022-02-08 PROCEDURE — 87086 URINE CULTURE/COLONY COUNT: CPT

## 2022-02-08 PROCEDURE — 87077 CULTURE AEROBIC IDENTIFY: CPT

## 2022-02-08 PROCEDURE — 81001 URINALYSIS AUTO W/SCOPE: CPT | Performed by: FAMILY MEDICINE

## 2022-02-08 PROCEDURE — 36415 COLL VENOUS BLD VENIPUNCTURE: CPT

## 2022-02-08 PROCEDURE — 82306 VITAMIN D 25 HYDROXY: CPT

## 2022-02-08 PROCEDURE — 71046 X-RAY EXAM CHEST 2 VIEWS: CPT

## 2022-02-08 PROCEDURE — 84443 ASSAY THYROID STIM HORMONE: CPT

## 2022-02-08 PROCEDURE — 80053 COMPREHEN METABOLIC PANEL: CPT

## 2022-02-09 ENCOUNTER — TELEPHONE (OUTPATIENT)
Dept: FAMILY MEDICINE CLINIC | Facility: CLINIC | Age: 37
End: 2022-02-09

## 2022-02-09 RX ORDER — NITROFURANTOIN 25; 75 MG/1; MG/1
100 CAPSULE ORAL 2 TIMES DAILY
Qty: 10 CAPSULE | Refills: 0 | Status: SHIPPED | OUTPATIENT
Start: 2022-02-09 | End: 2022-02-14

## 2022-02-09 NOTE — TELEPHONE ENCOUNTER
There was an order faxed in from [id number] [facility] that needs to be signed  I will place it in your folder in the preceptor room  Please advise, thank you!

## 2022-02-09 NOTE — TELEPHONE ENCOUNTER
Nadege Cotto called stating she reviewed patients U/A and believes he has an infection  Nadege Cotto asked if you would be able to review and prescribe something if needed  Thank you

## 2022-02-10 ENCOUNTER — PATIENT MESSAGE (OUTPATIENT)
Dept: FAMILY MEDICINE CLINIC | Facility: CLINIC | Age: 37
End: 2022-02-10

## 2022-02-10 LAB — BACTERIA UR CULT: ABNORMAL

## 2022-02-11 VITALS
HEART RATE: 78 BPM | OXYGEN SATURATION: 95 % | SYSTOLIC BLOOD PRESSURE: 102 MMHG | TEMPERATURE: 97 F | DIASTOLIC BLOOD PRESSURE: 70 MMHG

## 2022-02-14 ENCOUNTER — PROCEDURE VISIT (OUTPATIENT)
Dept: FAMILY MEDICINE CLINIC | Facility: CLINIC | Age: 37
End: 2022-02-14
Payer: MEDICARE

## 2022-02-14 DIAGNOSIS — M99.06 SOMATIC DYSFUNCTION OF LOWER EXTREMITY: ICD-10-CM

## 2022-02-14 DIAGNOSIS — M99.02 SOMATIC DYSFUNCTION OF THORACIC REGION: ICD-10-CM

## 2022-02-14 DIAGNOSIS — M99.05 SOMATIC DYSFUNCTION OF PELVIC REGION: ICD-10-CM

## 2022-02-14 DIAGNOSIS — M41.9 SCOLIOSIS OF THORACIC SPINE, UNSPECIFIED SCOLIOSIS TYPE: ICD-10-CM

## 2022-02-14 DIAGNOSIS — G82.50 SPASTIC QUADRIPARESIS (HCC): Primary | ICD-10-CM

## 2022-02-14 DIAGNOSIS — M25.551 HIP PAIN, ACUTE, RIGHT: ICD-10-CM

## 2022-02-14 DIAGNOSIS — M99.01 SOMATIC DYSFUNCTION OF CERVICAL REGION: ICD-10-CM

## 2022-02-14 PROCEDURE — 98926 OSTEOPATH MANJ 3-4 REGIONS: CPT | Performed by: FAMILY MEDICINE

## 2022-02-14 PROCEDURE — 99213 OFFICE O/P EST LOW 20 MIN: CPT | Performed by: FAMILY MEDICINE

## 2022-02-14 NOTE — PROGRESS NOTES
Assessment/Plan     This is a 43 y o  male who presents for OMT initial for:  1  Spastic quadriparesis (HCC)     2  Scoliosis of thoracic spine, unspecified scoliosis type     3  Hip pain, acute, right     4  Somatic dysfunction of cervical region     5  Somatic dysfunction of thoracic region     6  Somatic dysfunction of lower extremity     7  Somatic dysfunction of pelvic region         Plan:   1  Patient tolerated OMT well for the above problems,  advised patient to drink fluids and can use NSAID for soreness after treatment     2  OMT Follow up in 3 weeks  Rosana Hamm is a 43 y o  male and is here for a OMT intial  The patient mom reports a fall on his hip getting out of the car and complaining of pain  Also leaning to right side more  Has history of scoliosis  Patient wheelchair bound  Doing hand therapy and botox injections  Doing other comprehensive PT/OT therapies to prevent contractures  Is the patient taking Pain medication? no  Has the patient completed physical therapy for this condition? yes      The following portions of the patient's history were reviewed and updated as appropriate: allergies, current medications, past family history, past medical history, past social history, past surgical history and problem list     Review of Systems  Do you have pain that bothers you in your daily life? no  Review of Systems   Constitutional: Negative for fatigue and fever  HENT: Negative for congestion  Respiratory: Negative for cough and shortness of breath  Cardiovascular: Negative for chest pain and palpitations  Gastrointestinal: Negative for abdominal pain  Musculoskeletal:        As noted in HPI    Neurological: Negative for headaches         Objective     OMT Exam   OMT  Performed by: Indra Jimenez DO  Authorized by: Indra Jimeenz, DO     Verbal consent obtained?: Yes    Consent given by:  Parent  Procedure Details:     Region evaluated and treated:  Cervical, Thoracic T1 - T4, Pelvis Innominate and Right Lower Extremity    Cervical Details:     Examination Method:  Tissue Texture Change, Stability, Laxity, Effusions, Tone and Range of Motion, Contracture    Severity:  Moderate    Osteopathic Findings:  Hypertonicity of bilateral scalenes, scm and upper trapezius muscles     Treatment Method:  Soft Tissue Treatment, Balanced Ligamentous Tension, Ligamentous Articular Strain Treatment and Facilitated Positional Release Treatment    Response:  Unchanged - The somatic dysfunction is unchanged or the same after treatment  Thoracic T1 - T4 details:     Examination Method:  Tissue Texture Change, Stability, Laxity, Effusions, Tone and Tenderness, Pain    Severity:  Moderate    Osteopathic Findings:  Left mid trapezius hypertonicity, thoracolumbar scoliosis     Treatment Method:  Soft Tissue Treatment    Pelvis Innominate details:     Examination Method:  Asymmetry, Misalignment, Crepitation, Defects, Masses    Severity:  Moderate    Osteopathic Findings:  Right innominate superior shear  Hypertonicity of sartorius tendon at insertion point, hypertonicity of right psoas muscle  Restricted right SI joint with positive ASIS compression test       Treatment Method:  Balanced Ligamentous Tension, Ligamentous Articular Strain Treatment, Facilitated Positional Release Treatment and Soft Tissue Treatment    Response:  Improved - The somatic dysfunction is improved but not completely resolved  Right Lower Extremity details:     Examination Method:  Tissue Texture Change, Stability, Laxity, Effusions, Tone and Tenderness, Pain    Severity:  Moderate    Osteopathic Findings:  Spasticity of bilateral hamstrings, diffuse muscle atrophy of lower extremities     Treatment Method:  Soft Tissue Treatment    Response:  Improved - The somatic dysfunction is improved but not completely resolved      Total Regions Treated:  4

## 2022-02-15 ENCOUNTER — TELEPHONE (OUTPATIENT)
Dept: OBGYN CLINIC | Facility: OTHER | Age: 37
End: 2022-02-15

## 2022-02-15 NOTE — TELEPHONE ENCOUNTER
Pt mom called in to schedule an appt with Darian Tejada for back pain       Pt is scheduled for next available on 2/24 however mom is requesting to be seen sooner     She stated Dr Darian Tejada knows patient's situation     Please advise  Pt mom can be reached at [phone]    Force on sent over to practice admin

## 2022-02-17 ENCOUNTER — OFFICE VISIT (OUTPATIENT)
Dept: OBGYN CLINIC | Facility: OTHER | Age: 37
End: 2022-02-17
Payer: MEDICARE

## 2022-02-17 VITALS
DIASTOLIC BLOOD PRESSURE: 81 MMHG | BODY MASS INDEX: 18.4 KG/M2 | HEIGHT: 70 IN | WEIGHT: 128.5 LBS | HEART RATE: 85 BPM | SYSTOLIC BLOOD PRESSURE: 138 MMHG

## 2022-02-17 DIAGNOSIS — M62.569 ATROPHY OF MUSCLE OF LOWER LEG, UNSPECIFIED LATERALITY: ICD-10-CM

## 2022-02-17 DIAGNOSIS — M70.61 GREATER TROCHANTERIC BURSITIS OF RIGHT HIP: Primary | ICD-10-CM

## 2022-02-17 DIAGNOSIS — R26.2 AMBULATORY DYSFUNCTION: ICD-10-CM

## 2022-02-17 DIAGNOSIS — R25.2 SPASTICITY: ICD-10-CM

## 2022-02-17 PROCEDURE — 99214 OFFICE O/P EST MOD 30 MIN: CPT | Performed by: STUDENT IN AN ORGANIZED HEALTH CARE EDUCATION/TRAINING PROGRAM

## 2022-02-17 PROCEDURE — 20610 DRAIN/INJ JOINT/BURSA W/O US: CPT | Performed by: STUDENT IN AN ORGANIZED HEALTH CARE EDUCATION/TRAINING PROGRAM

## 2022-02-17 RX ORDER — LIDOCAINE HYDROCHLORIDE 10 MG/ML
4 INJECTION, SOLUTION INFILTRATION; PERINEURAL
Status: COMPLETED | OUTPATIENT
Start: 2022-02-17 | End: 2022-02-17

## 2022-02-17 RX ORDER — TRIAMCINOLONE ACETONIDE 40 MG/ML
40 INJECTION, SUSPENSION INTRA-ARTICULAR; INTRAMUSCULAR
Status: COMPLETED | OUTPATIENT
Start: 2022-02-17 | End: 2022-02-17

## 2022-02-17 RX ADMIN — LIDOCAINE HYDROCHLORIDE 4 ML: 10 INJECTION, SOLUTION INFILTRATION; PERINEURAL at 15:01

## 2022-02-17 RX ADMIN — TRIAMCINOLONE ACETONIDE 40 MG: 40 INJECTION, SUSPENSION INTRA-ARTICULAR; INTRAMUSCULAR at 15:02

## 2022-02-17 NOTE — PROGRESS NOTES
1  Greater trochanteric bursitis of right hip  Large joint arthrocentesis: R greater trochanteric bursa   2  Spasticity     3  Ambulatory dysfunction     4  Atrophy of muscle of lower leg, unspecified laterality       Orders Placed This Encounter   Procedures    Large joint arthrocentesis: R greater trochanteric bursa        Imaging Studies (I personally reviewed images in PACS and report):    DEXA scan 11/22/2021: The Z score of -2 2 in the lumbar spine and -2 1 at the left femoral neck lie below the expected range for age  IMPRESSION:  Right greater trochanteric bursitis  Medill guided CSI injections today to greater trochanter  Spasticity  Bilateral lower extremity atrophy secondary to ambulatory dysfunction  Thoracolumbar scoliosis  Low BMI 18 4  PMH:  Anoxic brain injury secondary to long QT syndrome associated cardiac arrest    Repeat X-ray next visit: None    Return if symptoms worsen or fail to improve  Patient Instructions   red flags and risks of injection include but are not limited to infection <0 072% as referenced in some sources, nerve or artery penetration, and if steroids are used-skin dimpling <1%, hypo-pigmentation <1%  Recommended no submerging underwater in a tub, pool, ocean, lake, jacuzzi, hot tub, or any other body of water for 1 week until needle wound closes due to risk of infection  May take showers  Clean needle site with soap and water and keep covered at all times with sterile bandage such as a band-aid until fully healed  Educated if any symptoms including fevers, chills, swelling, or worsening symptoms occur then to call office or go to hospital for immediate care if physician unavailable due to possible infection or other complication which is a serious medical problem  Patient expressed understanding and agreed to proceed with procedure              CHIEF COMPLAINT:  Back pain    HPI:  Veena Rai is a 43 y o  male  who presents for       Visit 2/17/2022 :  Patient here for evaluation of back pain  He has history of anoxic brain injury secondary to long QT syndrome  He was last examined on 06/24/2021 for underweight, patellofemoral syndrome and spasticity  He is here with mom and caregiver  They say he was progressing well after the last visit  Over the last few weeks she noticed he was leaning towards the left and avoiding putting any weight to his right side  About 2 weeks ago while getting into the [employer] he began complaining of right-sided hip pain  Pain is localized to the outside of the hip  There was no trauma or injury  He has been otherwise working with physical therapy since  Review of Systems   Constitutional: Negative for chills and fever  HENT: Negative for ear pain and sore throat  Eyes: Negative for pain and visual disturbance  Respiratory: Negative for cough and shortness of breath  Cardiovascular: Negative for chest pain and palpitations  Gastrointestinal: Negative for abdominal pain and vomiting  Genitourinary: Negative for dysuria and hematuria  Musculoskeletal: Negative for arthralgias and back pain  Skin: Negative for color change and rash  Neurological: Negative for seizures and syncope  All other systems reviewed and are negative          Following history reviewed and update:    Past Medical History:   Diagnosis Date    Abnormal ECG 1998 and beyond    post cardiac arrest    Allergic rhinitis     [facility]     Cardiac arrest [facility])     age 13 s/p long Q-T syndrome    Community acquired pneumonia     last assessed/resolved:  10/9/2014    Depression     Disease of thyroid gland     GERD (gastroesophageal reflux disease)     Hypothyroid 2/26/2020    Long Q-T syndrome     Muscular rigidity and spasm, progressive     Osteopenia     Osteoporosis     Quadriplegia ([facility]     Scoliosis of thoracic spine 2/27/2020    Spastic neurogenic bladder     Syncope     beta blocker medication DC because of low blood pressure    Urinary incontinence     Urinary tract infection without hematuria 2019     Past Surgical History:   Procedure Laterality Date    APPENDECTOMY      UPPER GASTROINTESTINAL ENDOSCOPY      WISDOM TOOTH EXTRACTION       Social History   Social History     Substance and Sexual Activity   Alcohol Use No     Social History     Substance and Sexual Activity   Drug Use No     Social History     Tobacco Use   Smoking Status Never Smoker   Smokeless Tobacco Never Used     Family History   Problem Relation Age of Onset    Other Father         mitral valve replaced    Hypertension Father     Diabetes type II Maternal Grandfather     Heart failure Maternal Grandfather         Congestive heart failure -     Diabetes type II Maternal Uncle     Hypotension Mother      Allergies   Allergen Reactions    Other      drugs that prolong the QT interval  drugs that prolong the QT interval  Seasonal allergies           Physical Exam  /81 (BP Location: Right arm, Patient Position: Sitting, Cuff Size: Standard)   Pulse 85   Ht 5' 10" (1 778 m)   Wt 58 3 kg (128 lb 8 oz)   BMI 18 44 kg/m²     Constitutional:  see vital signs  Gen: well-developed, normocephalic/atraumatic, well-groomed  Eyes: No inflammation or discharge of conjunctiva or lids; sclera clear   Pharynx: no inflammation, lesion, or mass of lips  Neck: supple, no masses, non-distended  MSK: no inflammation, lesion, mass, or clubbing of nails and digits except for other than mentioned below  SKIN: no visible rashes or skin lesions  Pulmonary/Chest: Effort normal  No respiratory distress     NEURO: cranial nerves grossly intact  PSYCH:  Alert and oriented to person, place, and time; recent and remote memory intact; mood normal, no depression, anxiety, or agitation, judgment and insight good and intact     Ortho Exam    BACK EXAM:  Gait: normal, no trendelenberg gait, no antalgic gait    BACK TENDERNESS:  Spinous Processes: no  Paraspinal Muscles: no  SI Joint: no  Sacrum: no  Greater trochanter: + on the right    RIGHT HIP:  SARAH: negative  FADIR:  Reproduces lateral hip pain    LEFT HIP  SARAH: negative  FADIR: negative      Large joint arthrocentesis: R greater trochanteric bursa  Universal Protocol:  Procedure performed by: (Dr Marla Faye)  Consent: Verbal consent obtained  Consent given by: patient  Patient consent: the patient's understanding of the procedure matches consent given  Procedure consent: procedure consent matches procedure scheduled  Site marked: the operative site was marked  Radiology Images displayed and confirmed   If images not available, report reviewed: imaging studies available  Patient identity confirmed: verbally with patient    Supporting Documentation  Indications: pain   Procedure Details  Location: hip - R greater trochanteric bursa  Needle size: 22 G  Ultrasound guidance: no  Approach: lateral  Medications administered: 4 mL lidocaine 1 %; 40 mg triamcinolone acetonide 40 mg/mL    Patient tolerance: patient tolerated the procedure well with no immediate complications  Dressing:  Sterile dressing applied

## 2022-02-23 ENCOUNTER — TELEPHONE (OUTPATIENT)
Dept: FAMILY MEDICINE CLINIC | Facility: CLINIC | Age: 37
End: 2022-02-23

## 2022-02-23 ENCOUNTER — TELEPHONE (OUTPATIENT)
Dept: NEUROLOGY | Facility: CLINIC | Age: 37
End: 2022-02-23

## 2022-02-28 ENCOUNTER — TELEPHONE (OUTPATIENT)
Dept: NEUROLOGY | Facility: CLINIC | Age: 37
End: 2022-02-28

## 2022-02-28 DIAGNOSIS — M25.559 HIP PAIN: Primary | ICD-10-CM

## 2022-02-28 NOTE — TELEPHONE ENCOUNTER
taco from [facility] called and states that pt's mom has been communicating with dr Jennifer Rodriguez via email and needs to schedule an appt to get a script for an mri  Was advised to be scheduled a tues, wed or friday Tuesdays available after 10am  Wednesdays available after 2:30  Friday only available between 10-12:30  Please advise  [phone] to leave detailed message

## 2022-02-28 NOTE — TELEPHONE ENCOUNTER
Adma Barton MD  to [address]    AD      2/28/22 12:25 PM  I could do Wednesday or Friday this week? Any time  I can also do next week       Denise Aldridge MD   Physical Medicine and Rehabilitation

## 2022-03-01 ENCOUNTER — APPOINTMENT (OUTPATIENT)
Dept: RADIOLOGY | Age: 37
End: 2022-03-01
Payer: MEDICARE

## 2022-03-01 DIAGNOSIS — M25.559 HIP PAIN: ICD-10-CM

## 2022-03-01 PROCEDURE — 73521 X-RAY EXAM HIPS BI 2 VIEWS: CPT

## 2022-03-02 ENCOUNTER — OFFICE VISIT (OUTPATIENT)
Dept: NEUROLOGY | Facility: CLINIC | Age: 37
End: 2022-03-02
Payer: MEDICARE

## 2022-03-02 VITALS
TEMPERATURE: 97.9 F | SYSTOLIC BLOOD PRESSURE: 107 MMHG | HEIGHT: 70 IN | WEIGHT: 126 LBS | BODY MASS INDEX: 18.04 KG/M2 | HEART RATE: 93 BPM | DIASTOLIC BLOOD PRESSURE: 62 MMHG

## 2022-03-02 DIAGNOSIS — G89.29 CHRONIC BILATERAL LOW BACK PAIN WITHOUT SCIATICA: ICD-10-CM

## 2022-03-02 DIAGNOSIS — M41.9 SCOLIOSIS OF THORACIC SPINE, UNSPECIFIED SCOLIOSIS TYPE: ICD-10-CM

## 2022-03-02 DIAGNOSIS — M25.551 ACUTE RIGHT HIP PAIN: Primary | ICD-10-CM

## 2022-03-02 DIAGNOSIS — M54.50 CHRONIC BILATERAL LOW BACK PAIN WITHOUT SCIATICA: ICD-10-CM

## 2022-03-02 PROCEDURE — 99214 OFFICE O/P EST MOD 30 MIN: CPT | Performed by: PHYSICAL MEDICINE & REHABILITATION

## 2022-03-02 RX ORDER — ACETAMINOPHEN 325 MG/1
975 TABLET ORAL EVERY 8 HOURS
Qty: 120 TABLET | Refills: 0 | Status: SHIPPED | OUTPATIENT
Start: 2022-03-02 | End: 2022-03-16

## 2022-03-02 RX ORDER — METHOCARBAMOL 500 MG/1
TABLET, FILM COATED ORAL
Qty: 30 TABLET | Refills: 0 | Status: SHIPPED | OUTPATIENT
Start: 2022-03-02

## 2022-03-02 NOTE — PATIENT INSTRUCTIONS
1  For Acute Pain and muscle tightness/spasm   - Can schedule Tylenol 975mg Q8hr for the next 7-14 days    - Ordered Robaxin 250-500mg TID PRN for bad muscle spasms/pain  Monitor for somnolence and use lower dose if he tolerates that better  - Contact me if any adverse effect    - Can scheduled Diclofenac gel BID-TID to R hip region  2  Recommend MRI of R hip/femur to rule out any occult fracture or evaluate for soft tissue injury  3  Ok to weight bear for now  But use his tolerance as a guide  Excessive buckling, increased pain, limit weight bearing     - Ok for body weight support right now with therapies

## 2022-03-02 NOTE — PROGRESS NOTES
Physical Medicine & Rehabilitation Follow-up Note  Naman Hagen 43 y o  male    ASSESSMENT/PLAN:   Derrick Marcano is a pleasant 42 yo M with medical history of anoxic brain injury with subsequent spastic quadriparesis, language/communication difficulties, dystonic movements/motor planning difficulties, and decreased bone density for his age  After a fall to the ground in January, he has had persistent pain, increased buckling, and difficulty tolerating weight bearing in the R hip  He is not a complainer, and will push through therapies, and does not always signify that he has pain accurately, but his mother is a good advocate for him and has noticed his buckling and leaning on the R since the fall  I did order XR prior to his visit today which showed no acute fracture/osseous abnormality, but am concerned about the persistence of his symptoms  While it may just be increased myofascial pain, increased hip girdle musculature spasm/hypertonicity, given his limitations in communication, bone density, and change in his tolerance and persistence of symptoms, will check an MRI of his R hip and femur to eval for any soft tissue injury or occult fracture  1  At this point, he should be ok to continue therapy  Would continue to limit weight bearing and take Naman's cuing/self-limitations  For now ok to use body weight support  2  Recommended scheduling Tylenol 975mg Q8hr for the next 7-14 days  3  Also scheduling Diclofenac gel to R hip BID-TID for now  4  Provided Robaxin for acute muscle spasm/tone related to his injury 250-500mg TID PRN  Monitor for altered mental status/somnolence, and use lower dose if he can tolerate it  Can give this prior to therapies  4  Also ordered MRI of hip as above  I will reach out to [facility] with the results of his imaging  Derrick Marcano was seen today for follow-up      Diagnoses and all orders for this visit:    Acute right hip pain  -     MRI femur right wo contrast; Future  - Diclofenac Sodium (VOLTAREN) 1 %; Apply 2g 2-3x a day to R hip region schedule  -     methocarbamol (ROBAXIN) 500 mg tablet; Take 0 5-1 tablet by mouth TID PRN for muscle spasms/pain  Can take prior to therapy  Chronic bilateral low back pain without sciatica  -     acetaminophen (TYLENOL) 325 mg tablet; Take 3 tablets (975 mg total) by mouth every 8 (eight) hours for 14 days    Scoliosis of thoracic spine, unspecified scoliosis type        HPI/SUBJECTIVE:  Here with mother, Nereida Mary Ellen, and Severiano from [facility]  Patient presents today in the office with chief complaint of R hip pain after a fall in the end of January  He was doing a stand pivot into a car transfer (into a Rosaura Anes with a higher seat than normal), and he fell - but it was a controlled fall, where he was lowered to the ground  He was down for 15 minutes, his mother arrived, and he was able to get back in wheelchair, taken into the facility  His mother noticed that his hips seemed twisted when she lied him flat  She was able to manipulate him to be more flat - but had pain throughout  Terressa Georgia is accurate when he feels he needs to go to the hospital, and he did not feel that at that time he needed to go to the hospital      On 2/17 he had a trochanteric bursitis injection with Dr Kyara Childers - his mother did not notice significant improvement  He has also been getting OMT who notes increased tone in his psoas and sartorius  He also gets massages  His mother has not noticed significant improvement  He has been working with body weight support at [facility] - initially was doing ok, but they noticed as time went on, they had to cut session short due to pain and he would lean to try to put more weight onto his left leg  They then stopped all weight bearing exercises  Today he restart body weight support - his mother still notes that the R leg will buckle, and he struggles to maintain weight bearing in that hip   He is not one to complain about pain, but has indicated pain in that R hip when standing  He denies pain in his knee when standing  They have not noticed increased spasms in his LE  He is only really taking Tylenol as needed, and does not use the Diclofenac frequently  Imaging: I have personally reviewed imaging with results as follows:  3/1/2022 XR of bilateral hips showed no acute osseous injury  Function:   Current Level of Function:   Less weight bearing and standing is tolerable  He requires assistance with wheelchair mobility, was previously able to do stand pivot transfers with support, and does short distance ambulation in therapies (usually with body weight support)  Requires assistance with ADLs  OBJECTIVE:   /62 (BP Location: Left arm, Patient Position: Sitting, Cuff Size: Adult)   Pulse 93   Temp 97 9 °F (36 6 °C) (Skin)   Ht 5' 10" (1 778 m)   Wt 57 2 kg (126 lb)   BMI 18 08 kg/m²       Gen: No acute distress, thin  HEENT: Moist mucus membranes, Normocephalic/Atraumatic  Cardiovascular: Regular, Distal pulses palpable  Heme/Extr: No edema  Pulmonary: Non-labored breathing  GI: Soft, non-tender, non-distended  B  MSK: Unable to transfer patient onto the bed, as it is not adjustable height  Examined in chair - limited hip exam able to be performed, but did indicate pain with internal/external rotation  No pain with palpation over greater trochanter  Some pain palpating distal femur and knee joint line, but knee examines stable in chair with negative Lachman's, valgus/varus stress, mcmurrays  No effusion  He stood and knees immediately go into recurvatum  He is able to get foot flat now though  With any stepping and even after a few seconds of standing R leg would buckle and patient would require assistance to straighten up  Took some small steps - but indicated he was having pain  Integumentary: Skin is warm, dry  Neuro: Awake, alert  Aphasia   Motor planning/posturing impact exam  Able to demonstrate at least 3/5 strength in both LE including hip flexion   Less inversion in both feet, but R wearing AFO and L wearing an insert     The following portions of the patient's history were reviewed and updated as appropriate: allergies, current medications, past family history, past medical history, past social history, past surgical history and problem list       Current Outpatient Medications:     acetaminophen (TYLENOL) 325 mg tablet, Take 3 tablets (975 mg total) by mouth every 8 (eight) hours as needed for mild pain or headaches, Disp: 120 tablet, Rfl: 3    ascorbic acid (GNP VITAMIN C) 500 MG tablet, Take 500 mg daily at 4 PM, Disp: 30 tablet, Rfl: 5    b complex-vitamin C-folic acid (RENAL) 1 mg, Take 1 mg daily at 4 PM, Disp: 31 each, Rfl: 5    bisacodyl (DULCOLAX) 10 mg suppository, Use 1 supp  per rectum QOD PRN for constipation, max 3d/week, Disp: 12 suppository, Rfl: 0    Camphor-Eucalyptus-Menthol (VICKS VAPORUB) 4 73-1 2-2 6 % OINT, Apply topically as needed (congestion), Disp: 170 g, Rfl: 0    carbamide peroxide (DEBROX) 6 5 % otic solution, Administer 5 drops into both ears 2 (two) times a day for 7 days, Disp: 15 mL, Rfl: 5    Coenzyme Q10 (Co Q10) 100 MG CAPS, Take 2 capsules by mouth daily Whole in yougurt or pudding, Disp: 60 capsule, Rfl: 1    Diapers & Supplies MISC, Use 5 (five) times a day Huggies #3, Disp: 450 each, Rfl: 2    diclofenac sodium (VOLTAREN) 1 %, Apply 2 g topically 3 (three) times a day At 10am, 4pm, 9pm to thoracic paravertebral musculature, Disp: 100 g, Rfl: 2    Diclofenac Sodium (VOLTAREN) 1 %, Apply 2g to midline back and/or R knee TID PRN, Disp: 150 g, Rfl: 1    Disposable Gloves (Vinyl Gloves Medium) MISC, Use 4 (four) times a day Dispense 3 boxes monthly; Diagnosis R32, Disp: 3 each, Rfl: 5    docusate sodium (COLACE) 100 mg capsule, 100 mg 9am and 9pm, Disp: 10 capsule, Rfl: 5    docusate sodium (Enemeez Mini) 283 MG enema, Insert 1 enema into the rectum daily, Disp: 30 each, Rfl: 3    dronabinol (MARINOL) 2 5 mg capsule, TAKE ONE CAPSULE -PLACE WHOLE IN YOGURT/PUDDING BY MOUTH 3 TIMES A DAY (9AM-4PM-9PM) *CALL PHARMACY 5 DAYS IN ADVANCE FOR REFILL*, Disp: 90 capsule, Rfl: 0    fluticasone (FLONASE) 50 mcg/act nasal spray, 1 spray into each nostril daily, Disp: , Rfl:     glycerin adult 2 g suppository, Place 1 suppository QOD for 2 weeks, then QOD PRN for constipation, Disp: 50 suppository, Rfl: 6    HEMORRHOIDAL 0 25 % suppository, - UNWRAP AND INSERT 1 SUPPOSITORY PER RECTUM AS NEEDED FOR HEMORRHOID PAIN RELIEF, Disp: 12 suppository, Rfl: 11    ibuprofen (MOTRIN) 200 mg tablet, Take 2 tabs q6h PRN for pain not to exceed 2000mg per day, Disp: 200 tablet, Rfl: 2    Incontinence Supply Disposable (Simplicity Insert Pad 85"-91") MISC, Use 5 (five) times a day, Disp: 450 each, Rfl: 1    levothyroxine 75 mcg tablet, Take 1 tablet (75 mcg total) by mouth daily, Disp: 90 tablet, Rfl: 2    loratadine (CLARITIN) 10 mg tablet, Take 1 tablet (10 mg total) by mouth daily as needed for allergies, Disp: 30 tablet, Rfl: 5    Magnesium Oxide 500 MG TABS, Take 1 tablet (500 mg total) by mouth daily Take 1 tablet daily at 4 PM, Disp: 31 each, Rfl: 5    Melatonin 5 MG TABS, Take 1 tablet (5 mg total) by mouth daily at bedtime, Disp: 31 each, Rfl: 5    Misc   Devices [facility]) MISC, Please provide pt with a new cord for power wheelchair, Disp: 1 each, Rfl: 0    modafinil (PROVIGIL) 200 MG tablet, TAKE ONE TABLET -PLACE WHOLE IN YOGURT/PUDDING BY MOUTH EVERY DAY (9AM), Disp: 180 tablet, Rfl: 0    Multiple Vitamins-Minerals (CEROVITE SENIOR) TABS, Take 1 tablet by mouth daily, Disp: 30 tablet, Rfl: 5    Nutritional Supplements (NUTRITIONAL SHAKE) LIQD, Take 1 Can by mouth 2 (two) times a day Please provide Boost 90 minutes before lunch and 90 minutes before dinner, Disp: 60 Bottle, Rfl: 6    omeprazole (PriLOSEC) 20 mg delayed release capsule, Take 20mg at 9am every day, Disp: 30 capsule, Rfl: 5    onabotulinumtoxin A (BOTOX) 100 units, inject 500 units into left gastroc soleus and abductor pollicis ankit, Disp: , Rfl:     PREPARATION H 1-0 25-14 4-15 % rectal cream, - APPLY RECTALLY AS NEEDED FOR HEMORRHOID PAIN RELIEF, Disp: 51 g, Rfl: 11    Respiratory Therapy Supplies (Spirometer) KIT, Use 3 (three) times a day Please provide incentive spirometer, Disp: 1 kit, Rfl: 0    sertraline (ZOLOFT) 100 mg tablet, Take 1 tablet (100 mg total) by mouth daily, Disp: 31 tablet, Rfl: 11    Skin Protectants, Misc   (Hydrocerin) LOTN, Apply topically 2 (two) times a day Apply to both feeland legs twice a day, Disp: , Rfl: 0    sodium chloride (DEEP SEA NASAL SPRAY) 0 65 % nasal spray, 2 sprays into each nostril as needed for congestion, Disp: 44 mL, Rfl: 11    SODIUM FLUORIDE, DENTAL GEL, (PreviDent 5000 Plus) 1 1 % CREA, Take by mouth daily at bedtime Apply orally to teeth once daily while brushing at night time do not rinse mouth after brushing, Disp: , Rfl: 0    SODIUM FLUORIDE, DENTAL GEL, 1 1 % GEL, SF 5000 Plus 1 1 % dental cream, Disp: , Rfl:     Tea Tree 100 % OIL, Apply 1 g topically daily as needed (rash) Apply topically daily to skin folds, Disp: 60 mL, Rfl: 11    Vitamins A & D (VITAMIN A & D) ointment, - APPLY TO AFFECTED AREA (BUTTOCKS/ANAL) AS NEEDED, Disp: 454 g, Rfl: 11    zinc oxide (DESITIN) 40 % PSTE, Apply topically , Disp: , Rfl:     zinc sulfate (ZINCATE) 220 mg capsule, TAKE TWO CAPSULE -PLACE WHOLE IN YOGURT/PUDDING BY MOUTH EVERY DAY (9AM) EVERY OTHER MONTH **ODD NUMBERED MONTHS ONLY**, Disp: 62 capsule, Rfl: 0    Elastic Bandages & Supports (Knee Brace) MISC, Use as directed according to PT recommendations (Patient not taking: Reported on 3/2/2022 ), Disp: 1 each, Rfl: 0    Emollient (CETA-KLENZ EX), Apply topically, Disp: , Rfl:     Emollient (eucerin) lotion, Apply topically to face daily at 10a and 8p, Disp: 480 mL, Rfl: 3    Icosapent Ethyl (VASCEPA) 1 g CAPS, Take 1 capsule (1 g total) by mouth daily, Disp: 31 capsule, Rfl: 11    Incontinence Supply Disposable (Prevail Air Briefs) MISC, Use 1 each every 4 (four) hours Please provide 5 briefs per day, Disp: 150 each, Rfl: 11    Incontinence Supply Disposable (Select Disposable Underwear Sm) MISC, Please provide a 30 day supply 10 per day DX R32 incontinence, Disp: 22 each, Rfl: 6    influenza vaccine, subunit, quadrivalent (Flucelvax Quadrivalent), Flucelvax Quad [id number] 60 mcg (15 mcg x 4)/0 5 mL intramuscular susp  TO BE ADMINISTERED BY PHARMACIST FOR IMMUNIZATION (PER CDC GUIDELINES), Disp: , Rfl:     magnesium citrate (CITROMA) 1 745 g/30 mL oral solution, Take 296 mL by mouth once for 1 dose, Disp: 296 mL, Rfl: 0    Magnesium Oxide 500 MG TABS, Daily   (Patient not taking: Reported on 3/2/2022 ), Disp: , Rfl:     polyethylene glycol (MIRALAX) 17 g packet, Take 17 g by mouth 2 (two) times a day, Disp: 180 each, Rfl: 3    Starch, Thickening, LIQD, Nectar thick liquids up to honey thick if coughing occurs as needed, please use Simply Thick liquid only  Discontinue Thick-It powder   (Patient not taking: Reported on 3/2/2022 ), Disp: 237 mL, Rfl: 5    Past Surgical History:   Procedure Laterality Date    APPENDECTOMY  1991    UPPER GASTROINTESTINAL ENDOSCOPY      WISDOM TOOTH EXTRACTION         Patient Active Problem List    Diagnosis Date Noted    Cavus deformity of foot, acquired 11/11/2021    Equinus deformity of both feet 11/11/2021    Hammertoe, bilateral 11/11/2021    Underweight 09/08/2021    Neurogenic bladder 07/20/2021    Acute otitis externa of right ear 04/23/2021    Exposure to COVID-19 virus 04/09/2021    Fatigue 03/16/2021    Mild protein-calorie malnutrition ([facility] [address] ) 03/11/2021    Xerosis of skin 11/25/2020    Environmental allergies 11/24/2020    Chronic brain injury ([facility] [address] ) 11/23/2020    Spastic quadriparesis ([facility] [address] ) 04/23/2020    At risk for atelectasis 03/13/2020    Polyarthralgia 03/02/2020    Electrolyte abnormality 02/27/2020    Urinary incontinence 02/27/2020    Scoliosis of thoracic spine 02/27/2020    Elevated total protein 02/26/2020    Hypothyroid 64/01/5981    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    Contracture of muscle of both hands 10/12/2019    Cardiomegaly 10/09/2019    H/O impacted cerumen 09/10/2019    Goals of care, counseling/discussion 08/24/2019    Spasticity 08/16/2019    Hx of recurrent pneumonia 08/13/2019    Bilateral impacted cerumen 07/08/2019    Frequent UTI 05/24/2019    At risk for aspiration 04/08/2019    Torticollis 11/27/2018    Muscular rigidity and spasm, progressive 08/07/2018    Physical deconditioning 07/25/2018    Aspiration pneumonia ([address] ) 07/18/2018    Constipation due to neurogenic bowel 07/18/2018    Oropharyngeal dysphagia 04/26/2018    Poor weight gain in adult 02/15/2018    Abnormal bone density screening 10/06/2017    Depression 08/03/2017    History of sleep disturbance 08/31/2016    Anoxic brain damage (HCC) 09/20/2013    Allergic rhinitis 06/20/2012    Long Q-T syndrome 05/21/1998

## 2022-03-07 ENCOUNTER — TELEPHONE (OUTPATIENT)
Dept: NEUROLOGY | Facility: CLINIC | Age: 37
End: 2022-03-07

## 2022-03-07 ENCOUNTER — PROCEDURE VISIT (OUTPATIENT)
Dept: FAMILY MEDICINE CLINIC | Facility: CLINIC | Age: 37
End: 2022-03-07
Payer: MEDICARE

## 2022-03-07 DIAGNOSIS — M99.07 SOMATIC DYSFUNCTION OF LEFT UPPER EXTREMITY: ICD-10-CM

## 2022-03-07 DIAGNOSIS — M99.00 SOMATIC DYSFUNCTION OF HEAD REGION: ICD-10-CM

## 2022-03-07 DIAGNOSIS — M99.06 SOMATIC DYSFUNCTION OF LOWER EXTREMITY: ICD-10-CM

## 2022-03-07 DIAGNOSIS — M99.01 SOMATIC DYSFUNCTION OF SPINE, CERVICAL: ICD-10-CM

## 2022-03-07 DIAGNOSIS — G82.50 SPASTIC QUADRIPARESIS (HCC): Primary | ICD-10-CM

## 2022-03-07 DIAGNOSIS — M99.02 SOMATIC DYSFUNCTION OF SPINE, THORACIC: ICD-10-CM

## 2022-03-07 PROCEDURE — 98927 OSTEOPATH MANJ 5-6 REGIONS: CPT | Performed by: INTERNAL MEDICINE

## 2022-03-07 NOTE — TELEPHONE ENCOUNTER
Bala from [facility] called requesting a copy of the AVS dated 3/2/22 be faxed to [phone]  Fax sent             [phone]

## 2022-03-07 NOTE — PROGRESS NOTES
The Assessment/Plan     This is a 43 y o  male who presents for OMT follow-up for:  1  Spastic quadriparesis ([address] )     2  Somatic dysfunction of head region     3  Somatic dysfunction of spine, cervical     4  Somatic dysfunction of spine, thoracic     5  Somatic dysfunction of lower extremity     6  Somatic dysfunction of left upper extremity          1  Patient tolerated OMT well for the above problems,  advised patient to drink fluids and can use NSAID for soreness after treatment  Ambulates with automated wheelchair  Wheelchair bound  Receives hand therapy and botox injections along with PT/OT to reduce incidence of contractures  Spastic paralysis s/p anoxic brain injury s/p 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 not asked  Has the patient completed physical therapy for this condition? yes  Did Patient symptoms improve from last OMT appointment?  yes    The following portions of the patient's history were reviewed and updated as appropriate: allergies, current medications, past family history, past medical history, past social history, past surgical history and problem list     Review of Systems  Review of Systems   Unable to perform ROS: Other         Objective     OMT Exam     OMT  Performed by: Michael Salinas DO  Authorized by: Michael Salinas, DO     Verbal consent obtained?: Yes    Consent given by:  Patient  Patient states understanding of procedure being performed: Yes    Patient identity confirmed:  Verbally with patient  Procedure Details:     Region evaluated and treated:  Head, Cervical, Right Lower Extremity, Right Upper Extremity, Left Lower Extremity and Left Upper Extremity    Head Details:     Examination Method:  Tissue Texture Change, Stability, Laxity, Effusions, Tone, Range of Motion, Contracture, Asymmetry, Misalignment, Crepitation, Defects, Masses and Active    Severity:  Mild    Osteopathic Findings:  Hypertonicity  Suboccipital release    Treatment Method:  Soft Tissue Treatment and Facilitated Positional Release Treatment    Response:  Improved - The somatic dysfunction is improved but not completely resolved  Cervical Details:     Examination Method:  Range of Motion, Contracture, Tissue Texture Change, Stability, Laxity, Effusions, Tone, Asymmetry, Misalignment, Crepitation, Defects, Masses and Passive    Severity:  Mild    Osteopathic Findings:  Hypertonic scalenes, hypertonic trapezius, hypertonic C4 FRSL    Treatment Method:  Articulatory Treatment    Right Lower Extremity details:     Examination Method:  Tissue Texture Change, Stability, Laxity, Effusions, Tone, Range of Motion, Contracture and Asymmetry, Misalignment, Crepitation, Defects, Masses    Severity:  Mild    Treatment Method:  Direct Treatment, Soft Tissue Treatment and Myofascial Release Treatment    Response:  Improved - The somatic dysfunction is improved but not completely resolved  Left Lower Extremity details:     Examination Method:  Tissue Texture Change, Stability, Laxity, Effusions, Tone, Asymmetry, Misalignment, Crepitation, Defects, Masses and Range of Motion, Contracture    Treatment Method:  Direct Treatment, Myofascial Release Treatment and Soft Tissue Treatment    Response:  Improved - The somatic dysfunction is improved but not completely resolved  Right Upper Extremity details:     Examination Method:  Tissue Texture Change, Stability, Laxity, Effusions, Tone, Asymmetry, Misalignment, Crepitation, Defects, Masses and Range of Motion, Contracture    Severity:  Mild    Treatment Method:  Soft Tissue Treatment, Myofascial Release Treatment and Direct Treatment    Response:  Improved - The somatic dysfunction is improved but not completely resolved      Left Upper Extremity details: Examination Method:  Tissue Texture Change, Stability, Laxity, Effusions, Tone, Range of Motion, Contracture and Asymmetry, Misalignment, Crepitation, Defects, Masses    Severity:  Mild    Treatment Method:  Myofascial Release Treatment, Soft Tissue Treatment and Direct Treatment    Response:  Improved - The somatic dysfunction is improved but not completely resolved  Total Regions Treated:  6  Attending provider present in exam room for procedure:  Yes

## 2022-03-09 ENCOUNTER — TELEPHONE (OUTPATIENT)
Dept: NEUROLOGY | Facility: CLINIC | Age: 37
End: 2022-03-09

## 2022-03-09 DIAGNOSIS — M25.551 ACUTE RIGHT HIP PAIN: Primary | ICD-10-CM

## 2022-03-09 NOTE — PROGRESS NOTES
Discussed with Pedro Luis Vu, she and therapy are noticing more dysfunction not just in the R hip, but pain along his sacrum as well  Poly Georges has now progressed to being completely unable to weight bear due to pain  Given the difficulty in his exam given his baseline impairments, will order an MRI of his pelvis to see both hips and his sacrum  I contacted [facility] to change his MRI order      Ruddy Nava MD  Physical Medicine and Rehabilitation

## 2022-03-09 NOTE — TELEPHONE ENCOUNTER
Saul Ramos made aware of the below  States that she also wanted to include the sacral area and the left hip  Requesting to speak w/ you directly    [phone]    Thanks

## 2022-03-09 NOTE — TELEPHONE ENCOUNTER
Pt's mom Briana Gibbons called and states that pt's MRI femur is scheduled tmrw  Pt is having a lot of right hip pain  Asking if you can update the script to cover the hip area   Asking if you prefer ordering w and wo contrast?         [phone] ok to leave a detailed message

## 2022-03-10 ENCOUNTER — HOSPITAL ENCOUNTER (OUTPATIENT)
Dept: MRI IMAGING | Facility: HOSPITAL | Age: 37
Discharge: HOME | End: 2022-03-10
Attending: PHYSICAL MEDICINE & REHABILITATION
Payer: MEDICARE

## 2022-03-10 DIAGNOSIS — M25.551 ACUTE RIGHT HIP PAIN: ICD-10-CM

## 2022-03-10 PROCEDURE — 72195 MRI PELVIS W/O DYE: CPT

## 2022-03-10 PROCEDURE — G1004 CDSM NDSC: HCPCS

## 2022-03-10 NOTE — TELEPHONE ENCOUNTER
Diana Layne, nurse from [facility] called and states that pt is having MRI today and pt's mom told her that you   did not want him to do any weight bearing, and she is concerned as she needs pt to weight bear to get in and out of bed, toileting and getting him to MRI      She is asking for clarification,    if pt is to be non weight bearing, she needs and order and more discusion, is he bed bound or is she getting a jose juan lift  Please advise  [phone] to leave detailed message  [phone]

## 2022-03-10 NOTE — TELEPHONE ENCOUNTER
Sandra Leggett  to Fernando Esparza MD          3/9/22 4:47 PM  Hi Dr Danii Brennan,   I spoke with a nurse at your office earlier this afternoon to request that your Rx for Naman's MRI, scheduled for tomorrow, include imaging on both hips and the sacral region  Your nurse said the MRI would not include the contrast medium/dye  Wouldn't that provide better imaging of the surrounding tissues? I receive the dye for my MRIs, so I assume there is not contraindication with LQTs  Whatever is going on seems to have gotten worse      I asked this based on information from Naman's massage therapist, a former occupational therapist who texted me after her appointment with him yesterday  She hasn't been allowed to treat him for over two months, due to the agency's Covid concerns       She said:  "Physically, Beverly SI /sacroiliac hip needs an MRI! there are inconsistencies bi-laterally in both hips, soft tissue & his coccyx may be bruised  Margarita Berry all very painful!!!  We couldnt even pivot so he stood & I manipulated his w/c under his butt he did say he had no pain sitting in his w/c or recliner      Thanks, [place]

## 2022-03-10 NOTE — TELEPHONE ENCOUNTER
Discussed with Roman Puentes  It should be safe to get patient out of bed, recommended sit pivot transfer if at all possible  Limit weight bearing in R leg for now, but will update both her and [facility] once results of the MRI has returned  She reports that her team has not noticed increased pain with transfers  Tila then called, and I let her know how the discussion with Roman Puentes went  All is well  They would both like updates once the MRI returns      Rabia Read - [phone]  Jose Members [phone]

## 2022-03-10 NOTE — TELEPHONE ENCOUNTER
Pt's mom Heather Matter called and states that she just spoke w/ Dr Jaclyn Mcknight this morning  She had new information to discuss w/ Dr Jaclyn Mcknight   Requesting a CB      tt sent

## 2022-03-11 ENCOUNTER — TELEPHONE (OUTPATIENT)
Dept: PHYSICAL MEDICINE AND REHAB | Facility: HOSPITAL | Age: 37
End: 2022-03-11

## 2022-03-11 NOTE — TELEPHONE ENCOUNTER
Pt's mom called requesting MRI result  Advised that it is still in process  Requesting be expedited  Called Radiology, spoke w/ Shagufta Easton and advised of the above  States that she will send a message to the radiology  Report should be avail in epic today      Awaiting result

## 2022-03-11 NOTE — TELEPHONE ENCOUNTER
Discussed with Nadege Cotto and Boris Gutierrez and reviewed MRI - Lavell Gramajo has been going to the gym on top of 2x a week physical therapy, does a lot of stretching, strengthening, standing, EKSO exercises  He is also now wearing the MAFO on one side, with another AFO pending for the other leg - which Boris Gutierrez brought up may have affected his biomechanics  Boris Gutierrez has noticed he does tolerate standing transfers at home  Nadege Cotto is concerned that he has only focused on more passive modalities since the incident, and we discussed a graded return to back to activity to tolerance, and incorporating rest days to allow for recovery, as overwork can also be damaging  At this point, he is ok to weight bear as tolerated  I reviewed with Boris Gutierrez for the robaxin - to give it at night first to see how he tolerates, and then can give prior to therapies and monitor to make sure he tolerates it under supervision       Dex Millan MD  Physical Medicine and Rehabilitation

## 2022-03-14 ENCOUNTER — TELEPHONE (OUTPATIENT)
Dept: FAMILY MEDICINE CLINIC | Facility: CLINIC | Age: 37
End: 2022-03-14

## 2022-03-14 NOTE — TELEPHONE ENCOUNTER
Mallory from [facility] called and would like you to look at patient's MRI results  Patient's mom still feels like something is going on  Please advise  Thank you

## 2022-03-18 ENCOUNTER — TELEPHONE (OUTPATIENT)
Dept: FAMILY MEDICINE CLINIC | Facility: CLINIC | Age: 37
End: 2022-03-18

## 2022-03-18 DIAGNOSIS — R13.12 OROPHARYNGEAL DYSPHAGIA: Primary | ICD-10-CM

## 2022-03-18 NOTE — TELEPHONE ENCOUNTER
Goldie Wilkerson from [facility] called requesting a referral for speech  Patient is due for a reevaluation in April  The referral can be faxed to [address] [place] [address] at [phone]  Please advise  Thank you

## 2022-03-21 ENCOUNTER — OFFICE VISIT (OUTPATIENT)
Dept: GASTROENTEROLOGY | Facility: CLINIC | Age: 37
End: 2022-03-21
Payer: MEDICARE

## 2022-03-21 VITALS
OXYGEN SATURATION: 97 % | SYSTOLIC BLOOD PRESSURE: 88 MMHG | HEART RATE: 95 BPM | BODY MASS INDEX: 17.68 KG/M2 | DIASTOLIC BLOOD PRESSURE: 60 MMHG | WEIGHT: 123.5 LBS | HEIGHT: 70 IN

## 2022-03-21 DIAGNOSIS — E46 PROTEIN-CALORIE MALNUTRITION, UNSPECIFIED SEVERITY (HCC): ICD-10-CM

## 2022-03-21 DIAGNOSIS — E44.1 MILD PROTEIN-CALORIE MALNUTRITION (HCC): Primary | ICD-10-CM

## 2022-03-21 DIAGNOSIS — K59.2 CONSTIPATION DUE TO NEUROGENIC BOWEL: ICD-10-CM

## 2022-03-21 DIAGNOSIS — R62.7 POOR WEIGHT GAIN IN ADULT: ICD-10-CM

## 2022-03-21 DIAGNOSIS — K59.00 CONSTIPATION DUE TO NEUROGENIC BOWEL: ICD-10-CM

## 2022-03-21 DIAGNOSIS — E43 SEVERE PROTEIN-CALORIE MALNUTRITION (HCC): ICD-10-CM

## 2022-03-21 PROCEDURE — 99214 OFFICE O/P EST MOD 30 MIN: CPT | Performed by: INTERNAL MEDICINE

## 2022-03-21 RX ORDER — POLYETHYLENE GLYCOL 3350 17 G/17G
POWDER ORAL
COMMUNITY
Start: 2022-03-09

## 2022-03-24 ENCOUNTER — OFFICE VISIT (OUTPATIENT)
Dept: ENDOCRINOLOGY | Facility: CLINIC | Age: 37
End: 2022-03-24
Payer: MEDICARE

## 2022-03-24 ENCOUNTER — TELEPHONE (OUTPATIENT)
Dept: LAB | Facility: HOSPITAL | Age: 37
End: 2022-03-24

## 2022-03-24 VITALS — DIASTOLIC BLOOD PRESSURE: 70 MMHG | SYSTOLIC BLOOD PRESSURE: 104 MMHG | HEART RATE: 96 BPM

## 2022-03-24 DIAGNOSIS — G93.1 ANOXIC BRAIN INJURY (HCC): ICD-10-CM

## 2022-03-24 DIAGNOSIS — R63.4 WEIGHT LOSS: ICD-10-CM

## 2022-03-24 DIAGNOSIS — E03.9 ACQUIRED HYPOTHYROIDISM: Primary | ICD-10-CM

## 2022-03-24 PROCEDURE — 99214 OFFICE O/P EST MOD 30 MIN: CPT | Performed by: INTERNAL MEDICINE

## 2022-03-24 NOTE — PROGRESS NOTES
[facility] - Outpatient Follow-up Note  Norm Ruby Viscardi 43 y o  male MRN: [id number]  Encounter: [id number]          ASSESSMENT AND PLAN:  43year old male here for follow up  Mom is concerned about recent weight loss  He is continuing to eat a very high calorie diet  Has normal formed bowel movements  Had EGD with biopsies neg for Celiac disease  Has had recent CT and xray of chest without any disease found  Mom is not interested in pursuing a peg tube at this time  We will start by checking all labs and pt's mom has also made an appt with endocrine to assess for any endocrinopathies as a cause  1  Mild protein-calorie malnutrition (HCC)  - CBC and differential; Future  - Magnesium; Future  - Vitamin B12; Future  - Iron Panel (Includes Ferritin, Iron Sat%, Iron, and TIBC); Future  - Folate; Future  - Phosphorus; Future  - vitamin B 12    2  Poor weight gain in adult  - CBC and differential; Future  - Magnesium; Future  - Vitamin B12; Future  - Iron Panel (Includes Ferritin, Iron Sat%, Iron, and TIBC); Future  - Folate; Future  - Phosphorus; Future    3  Protein-calorie malnutrition, unspecified severity ([facility] [address] )   - Folate; Future    5  Constipation due to neurogenic bowel  -continue current regimen as bowel movements are moving currently    Follow up in a few months time     ______________________________________________________________________    SUBJECTIVE:  43year old female here for follow up  He is accompanied today by mom and support staff  Has been losing weight recently despite a high caloric diet  No complaints of abdominal pain  Bowel movements have been normal without diarrhea  REVIEW OF SYSTEMS IS OTHERWISE NEGATIVE      Historical Information   Past Medical History:   Diagnosis Date    Abnormal ECG 1998 and beyond    post cardiac arrest    Allergic rhinitis     Brain anoxia ([facility] [address] )     Cardiac arrest [facility])     age 13 s/p long Q-T syndrome    Community acquired pneumonia     last assessed/resolved:  10/9/2014    Depression     Disease of thyroid gland     GERD (gastroesophageal reflux disease)     Hypothyroid 2020    Long Q-T syndrome     Muscular rigidity and spasm, progressive     Osteopenia     Osteoporosis     Quadriplegia ([address] )     Scoliosis of thoracic spine 2020    Spastic neurogenic bladder     Syncope     beta blocker medication DC because of low blood pressure    Urinary incontinence     Urinary tract infection without hematuria 2019     Past Surgical History:   Procedure Laterality Date    APPENDECTOMY      UPPER GASTROINTESTINAL ENDOSCOPY      WISDOM TOOTH EXTRACTION       Social History   Social History     Substance and Sexual Activity   Alcohol Use No     Social History     Substance and Sexual Activity   Drug Use No     Social History     Tobacco Use   Smoking Status Never Smoker   Smokeless Tobacco Never Used     Family History   Problem Relation Age of Onset    Other Father         mitral valve replaced    Hypertension Father     Diabetes type II Maternal Grandfather     Heart failure Maternal Grandfather         Congestive heart failure -     Diabetes type II Maternal Uncle     Hypotension Mother        Meds/Allergies       Current Outpatient Medications:     ascorbic acid (GNP VITAMIN C) 500 MG tablet    b complex-vitamin C-folic acid (RENAL) 1 mg    bisacodyl (DULCOLAX) 10 mg suppository    Camphor-Eucalyptus-Menthol (VICKS VAPORUB) 4 73-1 2-2 6 % OINT    carbamide peroxide (DEBROX) 6 5 % otic solution    Coenzyme Q10 (Co Q10) 100 MG CAPS    Diapers & Supplies MISC    diclofenac sodium (VOLTAREN) 1 %    Diclofenac Sodium (VOLTAREN) 1 %    Diclofenac Sodium (VOLTAREN) 1 %    Disposable Gloves (Vinyl Gloves Medium) MISC    docusate sodium (COLACE) 100 mg capsule    docusate sodium (Enemeez Mini) 283 MG enema    dronabinol (MARINOL) 2 5 mg capsule    Elastic Bandages & Supports (Knee Brace) MISC    Emollient (CETA-KLENZ EX)    Emollient (eucerin) lotion    fluticasone (FLONASE) 50 mcg/act nasal spray    glycerin adult 2 g suppository    HEMORRHOIDAL 0 25 % suppository    ibuprofen (MOTRIN) 200 mg tablet    Icosapent Ethyl (VASCEPA) 1 g CAPS    Incontinence Supply Disposable (Prevail Air Briefs) MISC    Incontinence Supply Disposable (Select Disposable Underwear Sm) MISC    Incontinence Supply Disposable (Simplicity Insert Pad 16"-37") MISC    influenza vaccine, subunit, quadrivalent (Flucelvax Quadrivalent)    levothyroxine 75 mcg tablet    loratadine (CLARITIN) 10 mg tablet    Magnesium Oxide 500 MG TABS    Magnesium Oxide 500 MG TABS    Melatonin 5 MG TABS    methocarbamol (ROBAXIN) 500 mg tablet    Misc  Devices (WHEELCHAIR) MISC    modafinil (PROVIGIL) 200 MG tablet    Multiple Vitamins-Minerals (CEROVITE SENIOR) TABS    Nutritional Supplements (NUTRITIONAL SHAKE) LIQD    omeprazole (PriLOSEC) 20 mg delayed release capsule    onabotulinumtoxin A (BOTOX) 100 units    polyethylene glycol (MIRALAX) 17 g packet    PREPARATION H 1-0 25-14 4-15 % rectal cream    Respiratory Therapy Supplies (Spirometer) KIT    sertraline (ZOLOFT) 100 mg tablet    Skin Protectants, Misc   (Hydrocerin) LOTN    sodium chloride (DEEP SEA NASAL SPRAY) 0 65 % nasal spray    SODIUM FLUORIDE, DENTAL GEL, (PreviDent 5000 Plus) 1 1 % CREA    SODIUM FLUORIDE, DENTAL GEL, 1 1 % GEL    Starch, Thickening, LIQD    Tea Tree 100 % OIL    Vitamins A & D (VITAMIN A & D) ointment    zinc oxide (DESITIN) 40 % PSTE    zinc sulfate (ZINCATE) 220 mg capsule    magnesium citrate (CITROMA) 1 745 g/30 mL oral solution    polyethylene glycol (GLYCOLAX) 17 GM/SCOOP    Allergies   Allergen Reactions    Other      drugs that prolong the QT interval  drugs that prolong the QT interval  Seasonal allergies      Objective     Blood pressure (!) 88/60, pulse 95, height 5' 10" (1 778 m), weight 56 kg (123 lb 8 oz), SpO2 97 %  Body mass index is 17 72 kg/m²  PHYSICAL EXAM:      General Appearance:   S/p TBI- in a wheelchair   HEENT:   Normocephalic, atraumatic, anicteric      Neck:  Supple, symmetrical, trachea midline   Lungs:   Clear to auscultation bilaterally; no rales, rhonchi or wheezing; respirations unlabored    Heart[de-identified]   Regular rate and rhythm; no murmur, rub, or gallop  Abdomen:   Soft, non-tender, non-distended; normal bowel sounds; no masses, no organomegaly, prior peg tube scar well healed     Genitalia:   Deferred    Rectal:   Deferred    Extremities:  No cyanosis, clubbing or edema    Pulses:  2+ and symmetric    Skin:  No jaundice, rashes, or lesions    Lymph nodes:  No palpable cervical lymphadenopathy        Lab Results:   No visits with results within 1 Day(s) from this visit  Latest known visit with results is:   Lab on 02/08/2022   Component Date Value    TSH 3RD GENERATON 02/08/2022 1 510     Vit D, 25-Hydroxy 02/08/2022 39 1     Sodium 02/08/2022 138     Potassium 02/08/2022 4 8     Chloride 02/08/2022 105     CO2 02/08/2022 29     ANION GAP 02/08/2022 4     BUN 02/08/2022 15     Creatinine 02/08/2022 0 81     Glucose 02/08/2022 86     Calcium 02/08/2022 10 0     AST 02/08/2022 18     ALT 02/08/2022 30     Alkaline Phosphatase 02/08/2022 87     Total Protein 02/08/2022 9 8*    Albumin 02/08/2022 4 6     Total Bilirubin 02/08/2022 0 72     eGFR 02/08/2022 112     Urine Culture 02/08/2022 >100,000 cfu/ml Escherichia coli*     Radiology Results:   XR hips bilateral 2 vw w pelvis if performed  Result Date: 3/1/2022  Narrative: BILATERAL HIPS AND PELVIS INDICATION:   M25 [id number]: Pain in unspecified hip  COMPARISON:  10/15/2021 VIEWS:  XR HIPS BILATERAL 2 VW W PELVIS IF PERFORMED Images: 5  FINDINGS: The bony pelvis appears intact  Visualized lower lumbar spine is unremarkable  LEFT HIP: There is no acute fracture or dislocation  No significant hip degenerative changes  No lytic or blastic osseous lesion  Soft tissues are unremarkable  RIGHT HIP: There is no acute fracture or dislocation  No significant hip degenerative changes  No lytic or blastic osseous lesion  Soft tissues are unremarkable  Impression: Normal bilateral hips  Workstation performed: [id number]     MRI pelvis wo contrast  Result Date: 3/11/2022  Narrative: MRI BONY PELVIS WITHOUT CONTRAST INDICATION:   M25 [id number]: Pain in right hip  COMPARISON: Correlation is made with the prior radiograph dated 3/1/2022  TECHNIQUE:  MR sequences were obtained of the pelvis including: Localizer, Coronal STIR or coronal T2 fat sat, coronal T1, axial T1, axial T2 fat sat, sagittal T2 fat sat, Sagittal T1  Imaging performed on 1 5T MRI Gadolinium was not used  FINDINGS: RIGHT HIP: -JOINT EFFUSION: None  -BONE MARROW SIGNAL AND ALIGNMENT: Normal marrow signal demonstrated without hip fracture or AVN  -TROCHANTERIC BURSA: Normal  -MUSCLES AND TENDONS:  Unremarkable  LEFT HIP: -JOINT EFFUSION: None  -BONE MARROW SIGNAL AND ALIGNMENT: No fracture or avascular necrosis  No bone marrow edema  In the distal coccyx, there is an 8 mm slightly lobulated intramedullary lesion demonstrating increased T2 and decreased T1 signal with suspected tiny foci of decreased signal intensity centrally  This most likely reflects a tiny low-grade chondroid lesion such as an enchondroma  -TROCHANTERIC BURSA: Normal  -MUSCLES AND TENDONS:  Unremarkable  BONY PELVIS: -SI JOINTS AND SYMPHYSIS PUBIS:   Intact  -VISUALIZED LUMBAR SPINE:  Unremarkable  -OVERALL MARROW SIGNAL:  As above  Overall marrow signal is within normal limits  -MUSCULATURE:  Unremarkable  -PELVIC SOFT TISSUES:   Normal  SUBCUTANEOUS TISSUES: Normal   Impression: No MR evidence for fracture, contusion, or stress reaction  No MR evidence for avascular necrosis  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   Workstation performed: [id number]   Answers for HPI/ROS submitted by the patient on 3/18/2022  weight loss:  Yes

## 2022-03-24 NOTE — PROGRESS NOTES
Chief Complaint   Patient presents with    Weight Loss      Referring Provider  Richelle Bartlett Md  [address] [place],  [address] [facility]     History of Present Illness:   Orelia Bernheim is a 43 y o  male with hypothyroidism seen in follow-up  He was last seen in 10/2021  His team brings him back early due to concerns about his weight  His mother and support staff accompany him for support  He has a hx of anoxic brain injury and his mother and support staff provide all history  Since his last visit, he has finished speech therapy and is getting Botox with [facility]  At his initial visit, he started levothyroxine for elevated TSH  TG and TPO antibodies are positive  His does of Levothyroxine is now 75mcg once daily  Levothyroxine is  appropriately from food or mother medications  He is on a high calorie diet, and they do see GI  He has had an evaluation for Celiac  Caregivers have not noted any changes in stools or behavior suggesting dizziness on transfer  His mother brings concerns about other endocrine causes for this         Patient Active Problem List   Diagnosis    Abnormal bone density screening    Anoxic brain damage (HCC)    Allergic rhinitis    Depression    Long Q-T syndrome    Poor weight gain in adult    Oropharyngeal dysphagia    History of sleep disturbance    Aspiration pneumonia ([address] )    Constipation due to neurogenic bowel    Physical deconditioning    Muscular rigidity and spasm, progressive    Torticollis    At risk for aspiration    Frequent UTI    Bilateral impacted cerumen    Hx of recurrent pneumonia    Spasticity    Goals of care, counseling/discussion    H/O impacted cerumen    Cardiomegaly    Contracture of muscle of both hands    Monoallelic mutation of RYR2 gene    Elevated total protein    Hypothyroid    Electrolyte abnormality    Urinary incontinence    Scoliosis of thoracic spine    Polyarthralgia    At risk for atelectasis    Spastic quadriparesis (HCC)    Chronic brain injury ([facility]    Environmental allergies    Xerosis of skin    Mild protein-calorie malnutrition (HCC)    Fatigue    Exposure to COVID-19 virus    Acute otitis externa of right ear    Neurogenic bladder    Underweight    Cavus deformity of foot, acquired    Equinus deformity of both feet    Hammertoe, bilateral    Acute right hip pain    Somatic dysfunction of head region    Somatic dysfunction of spine, cervical    Somatic dysfunction of spine, thoracic    Somatic dysfunction of lower extremity    Somatic dysfunction of left upper extremity      Past Medical History:   Diagnosis Date    Abnormal ECG  and beyond    post cardiac arrest    Allergic rhinitis     [facility] anoxia ([facility] )     Cardiac arrest [facility])     age 13 s/p long Q-T syndrome    Community acquired pneumonia     last assessed/resolved:  10/9/2014    Depression     Disease of thyroid gland     GERD (gastroesophageal reflux disease)     Hypothyroid 2020    Long Q-T syndrome     Muscular rigidity and spasm, progressive     Osteopenia     Osteoporosis     Quadriplegia ([facility]     Scoliosis of thoracic spine 2020    Spastic neurogenic bladder     Syncope     beta blocker medication DC because of low blood pressure    Urinary incontinence     Urinary tract infection without hematuria 2019      Past Surgical History:   Procedure Laterality Date    APPENDECTOMY      UPPER GASTROINTESTINAL ENDOSCOPY      WISDOM TOOTH EXTRACTION        Family History   Problem Relation Age of Onset    Other Father         mitral valve replaced    Hypertension Father     Diabetes type II Maternal Grandfather     Heart failure Maternal Grandfather         Congestive heart failure -     Diabetes type II Maternal Uncle     Hypotension Mother      Social History     Tobacco Use    Smoking status: Never Smoker    Smokeless tobacco: Never Used   Substance Use Topics    Alcohol use: No     Allergies   Allergen Reactions    Other      drugs that prolong the QT interval  drugs that prolong the QT interval  Seasonal allergies          Current Outpatient Medications:     ascorbic acid (GNP VITAMIN C) 500 MG tablet, Take 500 mg daily at 4 PM, Disp: 30 tablet, Rfl: 5    b complex-vitamin C-folic acid (RENAL) 1 mg, Take 1 mg daily at 4 PM, Disp: 31 each, Rfl: 5    bisacodyl (DULCOLAX) 10 mg suppository, Use 1 supp  per rectum QOD PRN for constipation, max 3d/week, Disp: 12 suppository, Rfl: 0    Camphor-Eucalyptus-Menthol (VICKS VAPORUB) 4 73-1 2-2 6 % OINT, Apply topically as needed (congestion), Disp: 170 g, Rfl: 0    carbamide peroxide (DEBROX) 6 5 % otic solution, Administer 5 drops into both ears 2 (two) times a day for 7 days, Disp: 15 mL, Rfl: 5    Coenzyme Q10 (Co Q10) 100 MG CAPS, Take 2 capsules by mouth daily Whole in yougurt or pudding, Disp: 60 capsule, Rfl: 1    Diapers & Supplies MISC, Use 5 (five) times a day Huggies #3, Disp: 450 each, Rfl: 2    diclofenac sodium (VOLTAREN) 1 %, Apply 2 g topically 3 (three) times a day At 10am, 4pm, 9pm to thoracic paravertebral musculature, Disp: 100 g, Rfl: 2    Disposable Gloves (Vinyl Gloves Medium) MISC, Use 4 (four) times a day Dispense 3 boxes monthly; Diagnosis R32, Disp: 3 each, Rfl: 5    docusate sodium (COLACE) 100 mg capsule, 100 mg 9am and 9pm, Disp: 10 capsule, Rfl: 5    docusate sodium (Enemeez Mini) 283 MG enema, Insert 1 enema into the rectum daily, Disp: 30 each, Rfl: 3    dronabinol (MARINOL) 2 5 mg capsule, TAKE ONE CAPSULE -PLACE WHOLE IN YOGURT/PUDDING BY MOUTH 3 TIMES A DAY (9AM-4PM-9PM) *CALL PHARMACY 5 DAYS IN ADVANCE FOR REFILL*, Disp: 90 capsule, Rfl: 0    Elastic Bandages & Supports (Knee Brace) MISC, Use as directed according to PT recommendations, Disp: 1 each, Rfl: 0    Emollient (CETA-KLENZ EX), Apply topically, Disp: , Rfl:     Emollient (eucerin) lotion, Apply topically to face daily at 10a and 8p, Disp: 480 mL, Rfl: 3    fluticasone (FLONASE) 50 mcg/act nasal spray, 1 spray into each nostril daily, Disp: , Rfl:     glycerin adult 2 g suppository, Place 1 suppository QOD for 2 weeks, then QOD PRN for constipation, Disp: 50 suppository, Rfl: 6    HEMORRHOIDAL 0 25 % suppository, - UNWRAP AND INSERT 1 SUPPOSITORY PER RECTUM AS NEEDED FOR HEMORRHOID PAIN RELIEF, Disp: 12 suppository, Rfl: 11    ibuprofen (MOTRIN) 200 mg tablet, Take 2 tabs q6h PRN for pain not to exceed 2000mg per day, Disp: 200 tablet, Rfl: 2    Icosapent Ethyl (VASCEPA) 1 g CAPS, Take 1 capsule (1 g total) by mouth daily, Disp: 31 capsule, Rfl: 11    Incontinence Supply Disposable (Prevail Air Briefs) MISC, Use 1 each every 4 (four) hours Please provide 5 briefs per day, Disp: 150 each, Rfl: 11    Incontinence Supply Disposable (Select Disposable Underwear Sm) MISC, Please provide a 30 day supply 10 per day DX R32 incontinence, Disp: 22 each, Rfl: 6    Incontinence Supply Disposable (Simplicity Insert Pad 74"-72") MISC, Use 5 (five) times a day, Disp: 450 each, Rfl: 1    influenza vaccine, subunit, quadrivalent (Flucelvax Quadrivalent), Flucelvax Quad [id number] 60 mcg (15 mcg x 4)/0 5 mL intramuscular susp  TO BE ADMINISTERED BY PHARMACIST FOR IMMUNIZATION (PER CDC GUIDELINES), Disp: , Rfl:     levothyroxine 75 mcg tablet, Take 1 tablet (75 mcg total) by mouth daily, Disp: 90 tablet, Rfl: 2    loratadine (CLARITIN) 10 mg tablet, Take 1 tablet (10 mg total) by mouth daily as needed for allergies, Disp: 30 tablet, Rfl: 5    Magnesium Oxide 500 MG TABS, Take 1 tablet (500 mg total) by mouth daily Take 1 tablet daily at 4 PM, Disp: 31 each, Rfl: 5    Melatonin 5 MG TABS, Take 1 tablet (5 mg total) by mouth daily at bedtime, Disp: 31 each, Rfl: 5    methocarbamol (ROBAXIN) 500 mg tablet, Take 0 5-1 tablet by mouth TID PRN for muscle spasms/pain  Can take prior to therapy  , Disp: 30 tablet, Rfl: 0    Misc   Devices [facility] [place]) MISC, Please provide pt with a new cord for power wheelchair, Disp: 1 each, Rfl: 0    modafinil (PROVIGIL) 200 MG tablet, TAKE ONE TABLET -PLACE WHOLE IN YOGURT/PUDDING BY MOUTH EVERY DAY (9AM), Disp: 180 tablet, Rfl: 0    Multiple Vitamins-Minerals (CEROVITE SENIOR) TABS, Take 1 tablet by mouth daily, Disp: 30 tablet, Rfl: 5    Nutritional Supplements (NUTRITIONAL SHAKE) LIQD, Take 1 Can by mouth 2 (two) times a day Please provide Boost 90 minutes before lunch and 90 minutes before dinner, Disp: 60 Bottle, Rfl: 6    omeprazole (PriLOSEC) 20 mg delayed release capsule, Take 20mg at 9am every day, Disp: 30 capsule, Rfl: 5    onabotulinumtoxin A (BOTOX) 100 units, inject 500 units into left gastroc soleus and abductor pollicis ankit, Disp: , Rfl:     PREPARATION H 1-0 25-14 4-15 % rectal cream, - APPLY RECTALLY AS NEEDED FOR HEMORRHOID PAIN RELIEF, Disp: 51 g, Rfl: 11    Respiratory Therapy Supplies (Spirometer) KIT, Use 3 (three) times a day Please provide incentive spirometer, Disp: 1 kit, Rfl: 0    sertraline (ZOLOFT) 100 mg tablet, Take 1 tablet (100 mg total) by mouth daily, Disp: 31 tablet, Rfl: 11    Skin Protectants, Misc  (Hydrocerin) LOTN, Apply topically 2 (two) times a day Apply to both feeland legs twice a day, Disp: , Rfl: 0    sodium chloride (DEEP SEA NASAL SPRAY) 0 65 % nasal spray, 2 sprays into each nostril as needed for congestion, Disp: 44 mL, Rfl: 11    SODIUM FLUORIDE, DENTAL GEL, (PreviDent 5000 Plus) 1 1 % CREA, Take by mouth daily at bedtime Apply orally to teeth once daily while brushing at night time do not rinse mouth after brushing, Disp: , Rfl: 0    SODIUM FLUORIDE, DENTAL GEL, 1 1 % GEL, SF 5000 Plus 1 1 % dental cream, Disp: , Rfl:     Starch, Thickening, LIQD, Nectar thick liquids up to honey thick if coughing occurs as needed, please use Simply Thick liquid only   Discontinue Thick-It powder , Disp: 237 mL, Rfl: 5    Tea Tree 100 % OIL, Apply 1 g topically daily as needed (rash) Apply topically daily to skin folds, Disp: 60 mL, Rfl: 11    Vitamins A & D (VITAMIN A & D) ointment, - APPLY TO AFFECTED AREA (BUTTOCKS/ANAL) AS NEEDED, Disp: 454 g, Rfl: 11    zinc oxide (DESITIN) 40 % PSTE, Apply topically , Disp: , Rfl:     zinc sulfate (ZINCATE) 220 mg capsule, TAKE TWO CAPSULE -PLACE WHOLE IN YOGURT/PUDDING BY MOUTH EVERY DAY (9AM) EVERY OTHER MONTH **ODD NUMBERED MONTHS ONLY**, Disp: 62 capsule, Rfl: 0    Diclofenac Sodium (VOLTAREN) 1 %, Apply 2g to midline back and/or R knee TID PRN (Patient not taking: Reported on 3/24/2022 ), Disp: 150 g, Rfl: 1    Diclofenac Sodium (VOLTAREN) 1 %, Apply 2g 2-3x a day to R hip region schedule  (Patient not taking: Reported on 3/24/2022 ), Disp: 100 g, Rfl: 0    magnesium citrate (CITROMA) 1 745 g/30 mL oral solution, Take 296 mL by mouth once for 1 dose, Disp: 296 mL, Rfl: 0    Magnesium Oxide 500 MG TABS, Daily   (Patient not taking: Reported on 3/24/2022 ), Disp: , Rfl:     polyethylene glycol (GLYCOLAX) 17 GM/SCOOP, , Disp: , Rfl:     polyethylene glycol (MIRALAX) 17 g packet, Take 17 g by mouth 2 (two) times a day, Disp: 180 each, Rfl: 3  Review of Systems   Unable to perform ROS: Patient nonverbal   see HPI for comments from mother and staff    Physical Exam:  There is no height or weight on file to calculate BMI    /70   Pulse 96    Wt Readings from Last 3 Encounters:   03/21/22 56 kg (123 lb 8 oz)   03/02/22 57 2 kg (126 lb)   02/17/22 58 3 kg (128 lb 8 oz)       GEN: NAD  E/n/m mask in place, hearing grossly intact  Eyes: EOMI  Neck: trachea midline, thyroid NT to palpation, nl in size, no nodules or neck masses noted, no cervical LAD  Resp: good effort, CTAB, no accessory muscle use  CV: heart reg rate s1s2 nl no m/r/g appreciated  Neuro: no tremor  MS: seated in wheelchair, moving upper upper ext  Skin: warm and dry, no palmar erythema, androgen dependent hair growth appears normal on face, chest, and abdomen  Psych: awake and alert    DATA:  Labs:   Lab Results   Component Value Date     09/06/2017    SODIUM 138 02/08/2022    K 4 8 02/08/2022     02/08/2022    CO2 29 02/08/2022    AGAP 4 02/08/2022    BUN 15 02/08/2022    CREATININE 0 81 02/08/2022    GLUC 86 02/08/2022    GLUF 84 07/03/2021    CALCIUM 10 0 02/08/2022    AST 18 02/08/2022    ALT 30 02/08/2022    ALKPHOS 87 02/08/2022    PROT 8 0 09/06/2017    TP 9 8 (H) 02/08/2022    BILITOT 0 5 09/06/2017    TBILI 0 72 02/08/2022    EGFR 112 02/08/2022         Lab Results   Component Value Date    TSH 3 52 03/20/2019    FREET4 0 91 07/30/2020     Lab Results   Component Value Date    KWO8YGGOKTDJ 1 510 02/08/2022    TSH 3 52 03/20/2019         Radiology  CT   No adrenal abnormalities    Impression:  1  Acquired hypothyroidism    2  Anoxic brain injury ([facility] )    3  Weight loss           Plan:    Madison Pemberton was seen today for weight loss  Diagnoses and all orders for this visit:    Acquired hypothyroidism  -     TSH, 3rd generation Lab Collect; Future  -     T4, free Lab Collect; Future    Anoxic brain injury ([place] [address] )  -     Cortisol- Lab Collect; Future  -     ACTH- Lab Collect; Future  -     DHEA-sulfate Lab Collect; Future  -     Follicle stimulating hormone Lab Collect; Future  -     Insulin-like growth factor 1 (IGF-1) Lab Collect; Future  -     Luteinizing hormone Lab Collect; Future  -     Testosterone, free, total Lab Collect; Future  -     TSH, 3rd generation Lab Collect; Future  -     T4, free Lab Collect; Future    Weight loss  -     Cortisol- Lab Collect; Future  -     ACTH- Lab Collect; Future  -     TSH, 3rd generation Lab Collect; Future  -     T4, free Lab Collect; Future         1  Hypothyroidism, autoimmune: He is doing well and is on levothyroxine 75mcg daily  Reviwed the TSH has risen when free t4 drops, c/w functional pituitary    2  Anoxic brain injury, weight issues: Pituitary hypofunction can occur with anoxic brain injury   Will assess ant pituitary function of all axes  He does not appear hypogonadal or have symptoms suggestive of adrenal insufficiency clinically apart from weight and some recent BP changes  CMP is normal for glucoes, calcium and electrilytes    Discussed with the family and all questioned fully answered  He will call me if any problems arise          Bryon Pascual MD

## 2022-03-25 ENCOUNTER — APPOINTMENT (OUTPATIENT)
Dept: LAB | Age: 37
End: 2022-03-25
Payer: MEDICARE

## 2022-03-25 DIAGNOSIS — E43 SEVERE PROTEIN-CALORIE MALNUTRITION (HCC): ICD-10-CM

## 2022-03-25 DIAGNOSIS — R62.7 POOR WEIGHT GAIN IN ADULT: Primary | ICD-10-CM

## 2022-03-25 DIAGNOSIS — R63.4 WEIGHT LOSS: ICD-10-CM

## 2022-03-25 DIAGNOSIS — E46 PROTEIN-CALORIE MALNUTRITION, UNSPECIFIED SEVERITY (HCC): ICD-10-CM

## 2022-03-25 DIAGNOSIS — G93.1 ANOXIC BRAIN INJURY (HCC): ICD-10-CM

## 2022-03-25 DIAGNOSIS — E03.9 ACQUIRED HYPOTHYROIDISM: ICD-10-CM

## 2022-03-25 DIAGNOSIS — E44.1 MILD PROTEIN-CALORIE MALNUTRITION (HCC): ICD-10-CM

## 2022-03-25 LAB
BASOPHILS # BLD AUTO: 0.01 THOUSANDS/ΜL (ref 0–0.1)
BASOPHILS NFR BLD AUTO: 0 % (ref 0–1)
CORTIS SERPL-MCNC: 14.3 UG/DL
EOSINOPHIL # BLD AUTO: 0.05 THOUSAND/ΜL (ref 0–0.61)
EOSINOPHIL NFR BLD AUTO: 1 % (ref 0–6)
ERYTHROCYTE [DISTWIDTH] IN BLOOD BY AUTOMATED COUNT: 12.6 % (ref 11.6–15.1)
FERRITIN SERPL-MCNC: 74 NG/ML (ref 8–388)
FOLATE SERPL-MCNC: >20 NG/ML (ref 3.1–17.5)
FSH SERPL-ACNC: 11.6 MIU/ML (ref 0.7–10.8)
HCT VFR BLD AUTO: 50 % (ref 36.5–49.3)
HGB BLD-MCNC: 17.2 G/DL (ref 12–17)
IMM GRANULOCYTES # BLD AUTO: 0.01 THOUSAND/UL (ref 0–0.2)
IMM GRANULOCYTES NFR BLD AUTO: 0 % (ref 0–2)
IRON SATN MFR SERPL: 45 % (ref 20–50)
IRON SERPL-MCNC: 162 UG/DL (ref 65–175)
LH SERPL-ACNC: 5.4 MIU/ML (ref 1.2–10.6)
LYMPHOCYTES # BLD AUTO: 1.57 THOUSANDS/ΜL (ref 0.6–4.47)
LYMPHOCYTES NFR BLD AUTO: 31 % (ref 14–44)
MAGNESIUM SERPL-MCNC: 2.3 MG/DL (ref 1.6–2.6)
MCH RBC QN AUTO: 32 PG (ref 26.8–34.3)
MCHC RBC AUTO-ENTMCNC: 34.4 G/DL (ref 31.4–37.4)
MCV RBC AUTO: 93 FL (ref 82–98)
MONOCYTES # BLD AUTO: 0.4 THOUSAND/ΜL (ref 0.17–1.22)
MONOCYTES NFR BLD AUTO: 8 % (ref 4–12)
NEUTROPHILS # BLD AUTO: 3.02 THOUSANDS/ΜL (ref 1.85–7.62)
NEUTS SEG NFR BLD AUTO: 60 % (ref 43–75)
NRBC BLD AUTO-RTO: 0 /100 WBCS
PHOSPHATE SERPL-MCNC: 3.6 MG/DL (ref 2.7–4.5)
PLATELET # BLD AUTO: 194 THOUSANDS/UL (ref 149–390)
PMV BLD AUTO: 10.1 FL (ref 8.9–12.7)
RBC # BLD AUTO: 5.38 MILLION/UL (ref 3.88–5.62)
T4 FREE SERPL-MCNC: 0.84 NG/DL (ref 0.76–1.46)
TIBC SERPL-MCNC: 360 UG/DL (ref 250–450)
TSH SERPL DL<=0.05 MIU/L-ACNC: 2.29 UIU/ML (ref 0.36–3.74)
VIT B12 SERPL-MCNC: 907 PG/ML (ref 100–900)
WBC # BLD AUTO: 5.06 THOUSAND/UL (ref 4.31–10.16)

## 2022-03-25 PROCEDURE — 82728 ASSAY OF FERRITIN: CPT

## 2022-03-25 PROCEDURE — 84402 ASSAY OF FREE TESTOSTERONE: CPT

## 2022-03-25 PROCEDURE — 82746 ASSAY OF FOLIC ACID SERUM: CPT

## 2022-03-25 PROCEDURE — 83735 ASSAY OF MAGNESIUM: CPT

## 2022-03-25 PROCEDURE — 85025 COMPLETE CBC W/AUTO DIFF WBC: CPT

## 2022-03-25 PROCEDURE — 82627 DEHYDROEPIANDROSTERONE: CPT

## 2022-03-25 PROCEDURE — 83002 ASSAY OF GONADOTROPIN (LH): CPT

## 2022-03-25 PROCEDURE — 82024 ASSAY OF ACTH: CPT

## 2022-03-25 PROCEDURE — 84443 ASSAY THYROID STIM HORMONE: CPT

## 2022-03-25 PROCEDURE — 83001 ASSAY OF GONADOTROPIN (FSH): CPT

## 2022-03-25 PROCEDURE — 84305 ASSAY OF SOMATOMEDIN: CPT

## 2022-03-25 PROCEDURE — 83540 ASSAY OF IRON: CPT

## 2022-03-25 PROCEDURE — 84100 ASSAY OF PHOSPHORUS: CPT

## 2022-03-25 PROCEDURE — 82607 VITAMIN B-12: CPT

## 2022-03-25 PROCEDURE — 83550 IRON BINDING TEST: CPT

## 2022-03-25 PROCEDURE — 82533 TOTAL CORTISOL: CPT

## 2022-03-25 PROCEDURE — 84403 ASSAY OF TOTAL TESTOSTERONE: CPT

## 2022-03-25 PROCEDURE — 84439 ASSAY OF FREE THYROXINE: CPT

## 2022-03-25 PROCEDURE — 36415 COLL VENOUS BLD VENIPUNCTURE: CPT

## 2022-03-26 LAB
ACTH PLAS-MCNC: 15.4 PG/ML (ref 7.2–63.3)
DHEA-S SERPL-MCNC: 52.2 UG/DL (ref 102.6–416.3)

## 2022-03-27 LAB — IGF-I SERPL-MCNC: 255 NG/ML (ref 90–278)

## 2022-03-28 LAB
TESTOST FREE SERPL-MCNC: 31.3 PG/ML (ref 8.7–25.1)
TESTOST SERPL-MCNC: 1041 NG/DL (ref 264–916)

## 2022-04-07 ENCOUNTER — PROCEDURE VISIT (OUTPATIENT)
Dept: FAMILY MEDICINE CLINIC | Facility: CLINIC | Age: 37
End: 2022-04-07
Payer: MEDICARE

## 2022-04-07 DIAGNOSIS — M99.02 SOMATIC DYSFUNCTION OF SPINE, THORACIC: ICD-10-CM

## 2022-04-07 DIAGNOSIS — M99.06 SOMATIC DYSFUNCTION OF LOWER EXTREMITIES: ICD-10-CM

## 2022-04-07 DIAGNOSIS — M99.01 SOMATIC DYSFUNCTION OF SPINE, CERVICAL: ICD-10-CM

## 2022-04-07 DIAGNOSIS — R53.81 PHYSICAL DECONDITIONING: ICD-10-CM

## 2022-04-07 DIAGNOSIS — G25.82 MUSCULAR RIGIDITY AND SPASM, PROGRESSIVE: Primary | ICD-10-CM

## 2022-04-07 PROCEDURE — 98926 OSTEOPATH MANJ 3-4 REGIONS: CPT

## 2022-04-07 PROCEDURE — 99213 OFFICE O/P EST LOW 20 MIN: CPT

## 2022-04-07 NOTE — PROGRESS NOTES
The Assessment/Plan     This is a 43 y o  male who presents for OMT follow-up for:  1  Muscular rigidity and spasm, progressive  OMT   2  Physical deconditioning  OMT   3  Somatic dysfunction of spine, cervical  OMT   4  Somatic dysfunction of spine, thoracic  OMT   5  Somatic dysfunction of lower extremities  OMT        1  Patient tolerated OMT well for the above problems,  advised patient to drink fluids and can use NSAID for soreness after treatment     2  OMT Follow up in 3 weeks  Subjective     Guilherme Copeland is a 43 y o  male and is here for a OMT follow up  The patient's mother and caregiver report a good response to OMT and PT  The patient has been doing really well with the physical therapist  They have only noticed some right knee discomfort  Otherwise he has been doing well  Is the patient taking Pain medication? not asked  Has the patient completed physical therapy for this condition? yes  Did Patient symptoms improve from last OMT appointment? yes    The following portions of the patient's history were reviewed and updated as appropriate: allergies, current medications, past family history, past medical history, past social history, past surgical history and problem list     Review of Systems  Review of Systems   Musculoskeletal: Positive for arthralgias and myalgias  Negative for back pain and joint swelling  All other systems reviewed and are negative        Objective      OMT Exam     OMT  Performed by: Karen Pettit, DO  Authorized by: Karen Pettit, DO     Verbal consent obtained?: Yes    Risks and benefits: Risks, benefits and alternatives were discussed    Consent given by:  Parent  Procedure Details:     Region evaluated and treated:  Cervical, Thoracic T1 - T4, Right Lower Extremity and Left Lower Extremity    Cervical Details:     Examination Method:  Tissue Texture Change, Stability, Laxity, Effusions, Tone    Osteopathic Findings:  Right SCM hypertonicity Treatment Method:  Soft Tissue Treatment and Myofascial Release Treatment    Response:  Improved - The somatic dysfunction is improved but not completely resolved  Thoracic T1 - T4 details:     Examination Method:  Tissue Texture Change, Stability, Laxity, Effusions, Tone    Osteopathic Findings:  Trigger point at the superiomedial border of the scapula    Treatment Method:  Myofascial Release Treatment    Response:  Improved    Right Lower Extremity details:     Examination Method:  Passive    Osteopathic Findings:  Hypertonic adductors and hamstrings, with tendon spasticity as well  Psoas tenderpoint, contracted toes and flexor tendons have increased tone    Treatment Method:  Soft Tissue Treatment and Facilitated Positional Release Treatment    Response:  Improved - The somatic dysfunction is improved but not completely resolved  Left Lower Extremity details:     Examination Method:  Tissue Texture Change, Stability, Laxity, Effusions, Tone    Osteopathic Findings:  Hypertonic hamstrings, and gastrocnemius    Treatment Method:  Soft Tissue Treatment and Myofascial Release Treatment    Total Regions Treated:  4  Attending provider present in exam room for procedure:  Yes

## 2022-04-12 ENCOUNTER — TELEPHONE (OUTPATIENT)
Dept: FAMILY MEDICINE CLINIC | Facility: CLINIC | Age: 37
End: 2022-04-12

## 2022-04-12 NOTE — TELEPHONE ENCOUNTER
Katty Lopez from [facility] called for a recommendation on the second COVID booster  Please advise  Thank you      Patient had first Vaccine 1/18/2021  Second 2/8/2021  First booster on 10/14/2021    Katty Lopez [phone]  Fax- [phone]

## 2022-04-18 ENCOUNTER — TELEPHONE (OUTPATIENT)
Dept: NEUROLOGY | Facility: CLINIC | Age: 37
End: 2022-04-18

## 2022-04-21 NOTE — TELEPHONE ENCOUNTER
Discussed with Evelyne Sotomayor 4/20/22 that recommendation is to hold off on 4th COVID booster for now, given patient is not moderately-severely immunocompromised and the numbers are currently low enough to warrant waiting until next wave to give next booster to ensure immunization optimization  Evelyne Sotomayor also shared with me that Lou Gonzalez does not complete 100% of his meals and is likely not getting the full 4000 calorie diet that he is prescribed, mostly because it takes at least an hour to complete his meals and when he has activities scheduled he then does not complete the full meal  Evelyne Sotomayor is working on a way to collect objective meal completion percentages with SPIN so that we may more accurately calculate his true intake  I did recommend they reach out to [facility]'s nutritionist for assistance in developing this documentation system, if possible  Called mom 4/21/11 and advised her on COVID booster recommendations as outlined above, also shared with her the calorie intake information obtained from Evelyne Sotomayor, as it does make more sense that Lou Gonzalez is losing weight due to inadequate calorie intake than some other occult cause, as full endocrine and GI workup to date have been unrevealing as to a cause for his weight loss  Reviewed in detail results of [facility]'s bloodwork with mom and answered all questions in that regard  Plan to repeat CBC in 6 months given elevated H/H, but no overt concern for chronic hypoxia or MPN at this time  She will follow up with Evelyne Sotomayor about the meal completion percentage documentation system they are in the process of developing  Bridger Coreas did share Lou Gonzalez does not have that many activities scheduled during the day so I advised her to touch base with Evelyne Sotomayor regarding his schedule to ensure he is given adequate time around meals to complete them as much as possible   Also advised Bridger Coreas to consider restarting Ensure Clear as they are a good source of calories without adding excess protein (8g per serving; patient has a high level of protein in his diet already), and he can drink one with meals  Jenny Cease had D/C'ed the supplement previously over concern for the sugar content  However, I advised Jenny Cease that the supplements have good caloric content and a lot of nutritional value, and that he is not on a low-carb diet  She will consider restarting Ensure Clear  Will follow up PRN

## 2022-04-25 ENCOUNTER — TELEPHONE (OUTPATIENT)
Dept: OBGYN CLINIC | Facility: CLINIC | Age: 37
End: 2022-04-25

## 2022-04-26 ENCOUNTER — PROCEDURE VISIT (OUTPATIENT)
Dept: NEUROLOGY | Facility: CLINIC | Age: 37
End: 2022-04-26
Payer: MEDICARE

## 2022-04-26 VITALS
SYSTOLIC BLOOD PRESSURE: 131 MMHG | HEART RATE: 89 BPM | HEIGHT: 70 IN | BODY MASS INDEX: 17.72 KG/M2 | DIASTOLIC BLOOD PRESSURE: 75 MMHG | WEIGHT: 123.8 LBS | TEMPERATURE: 97.6 F

## 2022-04-26 DIAGNOSIS — G93.1 ANOXIC BRAIN DAMAGE (HCC): ICD-10-CM

## 2022-04-26 DIAGNOSIS — G82.50 SPASTIC QUADRIPARESIS (HCC): Primary | ICD-10-CM

## 2022-04-26 PROCEDURE — 3008F BODY MASS INDEX DOCD: CPT | Performed by: INTERNAL MEDICINE

## 2022-04-26 PROCEDURE — 64642 CHEMODENERV 1 EXTREMITY 1-4: CPT | Performed by: PHYSICAL MEDICINE & REHABILITATION

## 2022-04-26 PROCEDURE — 99214 OFFICE O/P EST MOD 30 MIN: CPT | Performed by: PHYSICAL MEDICINE & REHABILITATION

## 2022-04-26 PROCEDURE — 64643 CHEMODENERV 1 EXTREM 1-4 EA: CPT | Performed by: PHYSICAL MEDICINE & REHABILITATION

## 2022-04-26 NOTE — PATIENT INSTRUCTIONS
You have received botulinum toxin injections today  The skin around the site of injections should be monitored for a couple of days  If redness or swelling occur, the skin should be examined by a health care professional to rule out infection  You can call my office if this occurs  Please contact our office via [phone] in 2 weeks to report on the effects of the injections or any time with any questions or concerns  Please note that your next injections should not be scheduled less than 90 days from today  If your health insurance changes before the next injections, please contact our office as soon as possible so we can submit for a new prior authorization if needed  Please note that we may not be able to perform the injections if your insurance changes and the treatment is not pre-authorized

## 2022-04-26 NOTE — PROGRESS NOTES
Physical Medicine & Rehabilitation Spasticity Follow-up/Procedure  Naman Shipman Hieu 43 y o  male    ASSESSMENT/PLAN:   Lillian Avina was seen today for botulinum toxin injection  Diagnoses and all orders for this visit:    Spastic quadriparesis ([address] )  -     onabotulinumtoxin A (BOTOX) injection 400 Units    Anoxic brain damage [facility])      Mr Richie Orellana is a 41yo M with anoxic brain injury with subsequent spasticity and dystonia that impacts his function and ambulation  He has made remarkable gains in terms of his posturing and positioning when ambulating and now walks with a flat foot, rather than inverted or in dorsiflexion  He still tends to pull into inversion and walk on the side of his foot when he wears his shoes (which may be due in part to excess medial posting/support)  On exam today, noted to have hypertrophic EDB/EHB, so I addressed those today, as he still has a bit of a striatal toe (this has improved)  I decreased the amount to his tibialis posterior, as his inversion has improved  I maintained the same doses of medication in his LUE, as his OT reports that he was able to get much further with him in terms of range of motion by addressing his tendency to extend his wrist (dystonic), and to address his intrinsic hand  His mother is planning on following through with ulnar neurectomy with Dr Brandyn Mae in the fall, after which he will resume OT  Of note, with wrist extension, both muscles are dystonic, (ECRL/B and ECU), but he tends to radially deviate more  He is also working on getting an off the shelf AFO for his R foot - I recommended therapy consider a T-strap to help with his inversion if that remains a problem  1  Oral antispasticity medications: Continue to use medical marijuana  Has tried baclofen, valium, and dantrolene in the past  He tends to run low with his BP, so will not initiate tizanidine at this time  2  Schedule 400 units at next visit   Requires full assistance for transfers, procedure performed in chair  3  Physical therapy to continue at [facility] T-straps to be added to his AFOs to help with inversion    4  In regards to his anoxic injury, his mother gave me permission to speak to my colleagues who are certified in brain injury medicine in regards to his endocrinologic disturbances  I will look through his labs, and reach out to his mother after we speak  5  Follow-up in 90 days for repeat botulinum toxin injections  HPI/SUBJECTIVE:   He is here with his mother, Osito, and one of his caregivers, Tk Odom and Tk Odom from [facility]  Per Gui Desai from OT stated that they got the best range of motion and movement in the LUE since his last botox  His striatal toe is still present, and still contributes to retropulsion at times, but his foot is flatter  His inversion has improved as well - his mother wants to make sure that his ankles remain strong, and that his arch is still supported  Continues to have primarily dystonic movements that contribute to his gait and functional impairments  His L hand tend toward intrinsic positioning still, with slightly better range of motion compared to his last visit  Last seen for chemodenervation: 01/25/2022  Results of chemodenervation: Improved range of motion in hand/wrist  Improved foot position for his gait  Improved gait  How long did effects last: At least 12 weeks  Side affect/Adverse Effects:  None  Any issues with driving, swallowing, appetite: Appetite is stable, on marinol to help with weight  He has been following with endocrine who is doing a work-up to see if he has any pituitary issues contributing  He is still on chopped/thins diet  Has a wheelchair Inocencia Swanson and non-medical staff who can assist     Stretching/Exercise Program: Yes, still getting PT on Mon/Wed at [facility]  He is no longer in OT   He does go to the gym on Tues/Thurs (motomat, bike, and standing frame)   Currently in therapy: Yes, at [facility]  Any falls with significant injuries: Since last seen in the office no new major injuries or falls  Any new weakness: None - his mother was worried about his ankles  Any changes to care since last seen: No major changes except what is documented above  Safe at home: Yes  Very safe  Any oral meds: Has trialed baclofen, dantrolene, and valium without good effect  Now on medical marijuana  On anticoagulation/blood thinners: No    Current functional status:   Still requires assistance for ADLs, he is able to use his power wheelchair  ROS: A 10 point review of systems was negative except for what is noted in the HPI  Imaging: I personally reviewed the results of this imaging and discussed with his mother  3/10/2022 MRI Pelvis:  No MR evidence for fracture, contusion, or stress reaction  No MR evidence for avascular necrosis      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      OBJECTIVE:   /75 (BP Location: Left arm, Patient Position: Sitting)   Pulse 89   Temp 97 6 °F (36 4 °C) (Temporal)   Ht 5' 10" (1 778 m)   Wt 56 2 kg (123 lb 12 8 oz)   BMI 17 76 kg/m²      Gen: No acute distress, Well-nourished, well-appearing  HEENT: Moist mucus membranes, Normocephalic/Atraumatic  Cardiovascular: Regular rate, rhythm, S1/S2  Distal pulses palpable  Heme/Extr: No edema  Pulmonary: Non-labored breathing  : No levine  GI: Soft, non-tender, non-distended  MSK: Dystonic movements and spasticity impact his exam  His L hand is in intrinsic position, but improved range of motion most notable in his index finger and ring finger to almost neutral    Gait: Stood with mother and able to achieve foot flat  Walks with a stiff gait throughout his knees, with ankles in a dorsiflexed position  Able to clear, with impaired advancement   Holds both his mother's hands with a second person behind just in case he retropulses (which he still does on occasion, seems to initiate with his great toe)  Able to get toes flat, but as he walked, they tend to extend/flex  Integumentary: Skin is warm, dry   Neuro: Awake and alert  Dystonic movements in all 4 extremities - most notably striatal toe, L wrist extension  Able to use R hand functionally  L hand range of motion has improved  L wrist range of motion likewise has improved and patient able to get wrist to neutral position  He does have MAS 3 in his hand intrinsics  The tone in his toe and wrist have improved markedly  Much less inversion today bilaterally  Still with issues with processing/planning  Botulinum Toxin Injection Procedure    Pre-operative diagnosis: Spasticity    Post-operative diagnosis: Same    Procedure: Chemodenervation    After risks and benefits were explained including bleeding, infection, worsening of pain, damage to the areas being injected, weakness of muscles, loss of muscle control, dysphagia if injecting the head or neck, facial droop if injecting the facial area, painful injection, allergic or other reaction to the medications being injected, and the failure of the procedure to help the problem, a signed consent was obtained on 04/26/2022- patient unable to sign, mother signed him for him     The patient was placed in a seated position and the sites to be treated were identified  A time out was called and performed  The area to be treated was prepped three times with alcohol and the alcohol allowed to dry  Next, a 26 gauge, 50 mm disposable monopolar electrode needle was used to inject the medication in the area to be treated  Would use 37 5mm needle next time  Guidance: EMG  Patient position: seated in chair     Area(s) injected:    Muscle Dose (units) # Sites Technique   R Tibialis Posterior  25   EMG   R EHL 50   EMG   R EDB  25   EMG   L tibialis posterior 25   EMG   L EHL 50   EMG   L EDB 25   EMG   L ECRL/B 50   EMG   L ECU 25   EMG   L Lumbricals x4 25x4   EMG         EMG         EMG    Wasted 25 units EMG         EMG        Medications used: 400 units of botox diluted in 4 mL of preservative free saline    Botox Lot/Exp: [id number] x4, Exp Date 05/2024 x4    The patient did tolerate the procedure well  There were no complications  Used cold spray   The following portions of the patient's history were reviewed and updated as appropriate: allergies, current medications, past family history, past medical history, past social history, past surgical history and problem list       Current Outpatient Medications:     ascorbic acid (GNP VITAMIN C) 500 MG tablet, Take 500 mg daily at 4 PM, Disp: 30 tablet, Rfl: 5    b complex-vitamin C-folic acid (RENAL) 1 mg, Take 1 mg daily at 4 PM, Disp: 31 each, Rfl: 5    bisacodyl (DULCOLAX) 10 mg suppository, Use 1 supp  per rectum QOD PRN for constipation, max 3d/week, Disp: 12 suppository, Rfl: 0    Camphor-Eucalyptus-Menthol (VICKS VAPORUB) 4 73-1 2-2 6 % OINT, Apply topically as needed (congestion), Disp: 170 g, Rfl: 0    carbamide peroxide (DEBROX) 6 5 % otic solution, Administer 5 drops into both ears 2 (two) times a day for 7 days, Disp: 15 mL, Rfl: 5    Coenzyme Q10 (Co Q10) 100 MG CAPS, Take 2 capsules by mouth daily Whole in yougurt or pudding, Disp: 60 capsule, Rfl: 1    Diapers & Supplies MISC, Use 5 (five) times a day Huggies #3, Disp: 450 each, Rfl: 2    diclofenac sodium (VOLTAREN) 1 %, Apply 2 g topically 3 (three) times a day At 10am, 4pm, 9pm to thoracic paravertebral musculature, Disp: 100 g, Rfl: 2    Diclofenac Sodium (VOLTAREN) 1 %, Apply 2g to midline back and/or R knee TID PRN (Patient not taking: Reported on 3/24/2022 ), Disp: 150 g, Rfl: 1    Diclofenac Sodium (VOLTAREN) 1 %, Apply 2g 2-3x a day to R hip region schedule   (Patient not taking: Reported on 3/24/2022 ), Disp: 100 g, Rfl: 0    Disposable Gloves (Vinyl Gloves Medium) MISC, Use 4 (four) times a day Dispense 3 boxes monthly; Diagnosis R32, Disp: 3 each, Rfl: 5    docusate sodium (COLACE) 100 mg capsule, 100 mg 9am and 9pm, Disp: 10 capsule, Rfl: 5    docusate sodium (Enemeez Mini) 283 MG enema, Insert 1 enema into the rectum daily, Disp: 30 each, Rfl: 3    dronabinol (MARINOL) 2 5 mg capsule, TAKE ONE CAPSULE -PLACE WHOLE IN YOGURT/PUDDING BY MOUTH 3 TIMES A DAY (9AM-4PM-9PM) *CALL PHARMACY 5 DAYS IN ADVANCE FOR REFILL*, Disp: 90 capsule, Rfl: 0    Elastic Bandages & Supports (Knee Brace) MISC, Use as directed according to PT recommendations, Disp: 1 each, Rfl: 0    Emollient (CETA-KLENZ EX), Apply topically, Disp: , Rfl:     Emollient (eucerin) lotion, Apply topically to face daily at 10a and 8p, Disp: 480 mL, Rfl: 3    fluticasone (FLONASE) 50 mcg/act nasal spray, 1 spray into each nostril daily, Disp: , Rfl:     glycerin adult 2 g suppository, Place 1 suppository QOD for 2 weeks, then QOD PRN for constipation, Disp: 50 suppository, Rfl: 6    HEMORRHOIDAL 0 25 % suppository, - UNWRAP AND INSERT 1 SUPPOSITORY PER RECTUM AS NEEDED FOR HEMORRHOID PAIN RELIEF, Disp: 12 suppository, Rfl: 11    ibuprofen (MOTRIN) 200 mg tablet, Take 2 tabs q6h PRN for pain not to exceed 2000mg per day, Disp: 200 tablet, Rfl: 2    Icosapent Ethyl (VASCEPA) 1 g CAPS, Take 1 capsule (1 g total) by mouth daily, Disp: 31 capsule, Rfl: 11    Incontinence Supply Disposable (Prevail Air Briefs) MISC, Use 1 each every 4 (four) hours Please provide 5 briefs per day, Disp: 150 each, Rfl: 11    Incontinence Supply Disposable (Select Disposable Underwear Sm) MISC, Please provide a 30 day supply 10 per day DX R32 incontinence, Disp: 22 each, Rfl: 6    Incontinence Supply Disposable (Simplicity Insert Pad 55"-57") MISC, Use 5 (five) times a day, Disp: 450 each, Rfl: 1    influenza vaccine, subunit, quadrivalent (Flucelvax Quadrivalent), Flucelvax Quad 8723-1697 60 mcg (15 mcg x 4)/0 5 mL intramuscular susp  TO BE ADMINISTERED BY PHARMACIST FOR IMMUNIZATION (PER CDC GUIDELINES), Disp: , Rfl:     levothyroxine 75 mcg tablet, Take 1 tablet (75 mcg total) by mouth daily, Disp: 90 tablet, Rfl: 2    loratadine (CLARITIN) 10 mg tablet, Take 1 tablet (10 mg total) by mouth daily as needed for allergies, Disp: 30 tablet, Rfl: 5    magnesium citrate (CITROMA) 1 745 g/30 mL oral solution, Take 296 mL by mouth once for 1 dose, Disp: 296 mL, Rfl: 0    Magnesium Oxide 500 MG TABS, Take 1 tablet (500 mg total) by mouth daily Take 1 tablet daily at 4 PM, Disp: 31 each, Rfl: 5    Magnesium Oxide 500 MG TABS, Daily   (Patient not taking: Reported on 3/24/2022 ), Disp: , Rfl:     Melatonin 5 MG TABS, Take 1 tablet (5 mg total) by mouth daily at bedtime, Disp: 31 each, Rfl: 5    methocarbamol (ROBAXIN) 500 mg tablet, Take 0 5-1 tablet by mouth TID PRN for muscle spasms/pain  Can take prior to therapy  , Disp: 30 tablet, Rfl: 0    Misc   Devices [facility]) MISC, Please provide pt with a new cord for power wheelchair, Disp: 1 each, Rfl: 0    modafinil (PROVIGIL) 200 MG tablet, TAKE ONE TABLET -PLACE WHOLE IN YOGURT/PUDDING BY MOUTH EVERY DAY (9AM), Disp: 180 tablet, Rfl: 0    Multiple Vitamins-Minerals (CEROVITE SENIOR) TABS, Take 1 tablet by mouth daily, Disp: 30 tablet, Rfl: 5    Nutritional Supplements (NUTRITIONAL SHAKE) LIQD, Take 1 Can by mouth 2 (two) times a day Please provide Boost 90 minutes before lunch and 90 minutes before dinner, Disp: 60 Bottle, Rfl: 6    omeprazole (PriLOSEC) 20 mg delayed release capsule, Take 20mg at 9am every day, Disp: 30 capsule, Rfl: 5    onabotulinumtoxin A (BOTOX) 100 units, inject 500 units into left gastroc soleus and abductor pollicis ankit, Disp: , Rfl:     polyethylene glycol (GLYCOLAX) 17 GM/SCOOP, , Disp: , Rfl:     polyethylene glycol (MIRALAX) 17 g packet, Take 17 g by mouth 2 (two) times a day, Disp: 180 each, Rfl: 3    PREPARATION H 1-0 25-14 4-15 % rectal cream, - APPLY RECTALLY AS NEEDED FOR HEMORRHOID PAIN RELIEF, Disp: 51 g, Rfl: 11    Respiratory Therapy Supplies (Spirometer) KIT, Use 3 (three) times a day Please provide incentive spirometer, Disp: 1 kit, Rfl: 0    sertraline (ZOLOFT) 100 mg tablet, Take 1 tablet (100 mg total) by mouth daily, Disp: 31 tablet, Rfl: 11    Skin Protectants, Misc  (Hydrocerin) LOTN, Apply topically 2 (two) times a day Apply to both feeland legs twice a day, Disp: , Rfl: 0    sodium chloride (DEEP SEA NASAL SPRAY) 0 65 % nasal spray, 2 sprays into each nostril as needed for congestion, Disp: 44 mL, Rfl: 11    SODIUM FLUORIDE, DENTAL GEL, (PreviDent 5000 Plus) 1 1 % CREA, Take by mouth daily at bedtime Apply orally to teeth once daily while brushing at night time do not rinse mouth after brushing, Disp: , Rfl: 0    SODIUM FLUORIDE, DENTAL GEL, 1 1 % GEL, SF 5000 Plus 1 1 % dental cream, Disp: , Rfl:     Starch, Thickening, LIQD, Nectar thick liquids up to honey thick if coughing occurs as needed, please use Simply Thick liquid only   Discontinue Thick-It powder , Disp: 237 mL, Rfl: 5    Tea Tree 100 % OIL, Apply 1 g topically daily as needed (rash) Apply topically daily to skin folds, Disp: 60 mL, Rfl: 11    Vitamins A & D (VITAMIN A & D) ointment, - APPLY TO AFFECTED AREA (BUTTOCKS/ANAL) AS NEEDED, Disp: 454 g, Rfl: 11    zinc oxide (DESITIN) 40 % PSTE, Apply topically , Disp: , Rfl:     zinc sulfate (ZINCATE) 220 mg capsule, TAKE TWO CAPSULE -PLACE WHOLE IN YOGURT/PUDDING BY MOUTH EVERY DAY (9AM) EVERY OTHER MONTH **ODD NUMBERED MONTHS ONLY**, Disp: 62 capsule, Rfl: 0    Past Surgical History:   Procedure Laterality Date    APPENDECTOMY  1991    UPPER GASTROINTESTINAL ENDOSCOPY      WISDOM TOOTH EXTRACTION         Patient Active Problem List    Diagnosis Date Noted    Somatic dysfunction of head region 03/07/2022    Somatic dysfunction of spine, cervical 03/07/2022    Somatic dysfunction of spine, thoracic 03/07/2022    Somatic dysfunction of lower extremity 03/07/2022    Somatic dysfunction of left upper extremity 03/07/2022  Acute right hip pain 03/02/2022    Cavus deformity of foot, acquired 11/11/2021    Equinus deformity of both feet 11/11/2021    Hammertoe, bilateral 11/11/2021    Underweight 09/08/2021    Neurogenic bladder 07/20/2021    Acute otitis externa of right ear 04/23/2021    Exposure to COVID-19 virus 04/09/2021    Fatigue 03/16/2021    Mild protein-calorie malnutrition (HCC) 03/11/2021    Xerosis of skin 11/25/2020    Environmental allergies 11/24/2020    Chronic brain injury ([facility] ) 11/23/2020    Spastic quadriparesis ([facility] ) 04/23/2020    At risk for atelectasis 03/13/2020    Polyarthralgia 03/02/2020    Electrolyte abnormality 02/27/2020    Urinary incontinence 02/27/2020    Scoliosis of thoracic spine 02/27/2020    Elevated total protein 02/26/2020    Hypothyroid 41/10/4048    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    Contracture of muscle of both hands 10/12/2019    Cardiomegaly 10/09/2019    H/O impacted cerumen 09/10/2019    Goals of care, counseling/discussion 08/24/2019    Spasticity 08/16/2019    Hx of recurrent pneumonia 08/13/2019    Bilateral impacted cerumen 07/08/2019    Frequent UTI 05/24/2019    At risk for aspiration 04/08/2019    Torticollis 11/27/2018    Muscular rigidity and spasm, progressive 08/07/2018    Physical deconditioning 07/25/2018    Aspiration pneumonia ([facility] ) 07/18/2018    Constipation due to neurogenic bowel 07/18/2018    Oropharyngeal dysphagia 04/26/2018    Poor weight gain in adult 02/15/2018    Abnormal bone density screening 10/06/2017    Depression 08/03/2017    History of sleep disturbance 08/31/2016    Anoxic brain damage (HCC) 09/20/2013    Allergic rhinitis 06/20/2012    Long Q-T syndrome 05/21/1998

## 2022-04-27 ENCOUNTER — TELEPHONE (OUTPATIENT)
Dept: FAMILY MEDICINE CLINIC | Facility: CLINIC | Age: 37
End: 2022-04-27

## 2022-04-27 NOTE — TELEPHONE ENCOUNTER
Krissy Girard from [employer] called stating patient was exposed to a [employer] with Covid in his home on 4/22  Luciano Vidal is fully vaccinated and has no symptoms  Krissy Girard is requesting advise as to if they need to test or anything  Luciano Vidal is currently on his way to physical therapy and is masking  Please advise   Thank you

## 2022-04-28 NOTE — TELEPHONE ENCOUNTER
I did convey this to the Lis Double  She did question some things because Aura Laughter will not typically have symptoms  His exposure was with masks intact  Just wanted to share the update with you as his primary care provider

## 2022-04-29 ENCOUNTER — TELEPHONE (OUTPATIENT)
Dept: FAMILY MEDICINE CLINIC | Facility: CLINIC | Age: 37
End: 2022-04-29

## 2022-04-29 NOTE — TELEPHONE ENCOUNTER
Patient's Medical Care Coordinator, Kelsea Cruz dropped off [employer] Orders Review forms that need to be reviewed and signed  They will be placed in your folder in preceptor room  Please advise, thank you

## 2022-04-29 NOTE — TELEPHONE ENCOUNTER
Outpatient Certification forms from [facility] that need to be signed  They will be placed in your folder in preceptor room  Please advise, thank you

## 2022-05-03 NOTE — TELEPHONE ENCOUNTER
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

## 2022-05-05 ENCOUNTER — TELEPHONE (OUTPATIENT)
Dept: GASTROENTEROLOGY | Facility: CLINIC | Age: 37
End: 2022-05-05

## 2022-05-05 ENCOUNTER — PROCEDURE VISIT (OUTPATIENT)
Dept: FAMILY MEDICINE CLINIC | Facility: CLINIC | Age: 37
End: 2022-05-05
Payer: MEDICARE

## 2022-05-05 DIAGNOSIS — M99.05 SOMATIC DYSFUNCTION OF PELVIC REGION: ICD-10-CM

## 2022-05-05 DIAGNOSIS — M99.03 SOMATIC DYSFUNCTION OF LUMBAR REGION: ICD-10-CM

## 2022-05-05 DIAGNOSIS — M99.06 SOMATIC DYSFUNCTION OF LOWER EXTREMITY: ICD-10-CM

## 2022-05-05 DIAGNOSIS — G82.50 SPASTIC QUADRIPARESIS (HCC): Primary | ICD-10-CM

## 2022-05-05 DIAGNOSIS — M99.02 SOMATIC DYSFUNCTION OF THORACIC REGION: ICD-10-CM

## 2022-05-05 DIAGNOSIS — M99.04 SOMATIC DYSFUNCTION OF SACRAL REGION: ICD-10-CM

## 2022-05-05 DIAGNOSIS — M99.09 SEGMENTAL AND SOMATIC DYSFUNCTION OF ABDOMEN AND OTHER REGIONS: ICD-10-CM

## 2022-05-05 DIAGNOSIS — K59.00 CONSTIPATION, UNSPECIFIED CONSTIPATION TYPE: ICD-10-CM

## 2022-05-05 PROCEDURE — 99213 OFFICE O/P EST LOW 20 MIN: CPT | Performed by: FAMILY MEDICINE

## 2022-05-05 PROCEDURE — 98927 OSTEOPATH MANJ 5-6 REGIONS: CPT | Performed by: FAMILY MEDICINE

## 2022-05-05 NOTE — PROGRESS NOTES
Assessment/Plan     This is a 43 y o  male who presents for OMT follow-up for:  1  Spastic quadriparesis ([address] )     2  Constipation, unspecified constipation type     3  Somatic dysfunction of lower extremity     4  Somatic dysfunction of sacral region     5  Somatic dysfunction of pelvic region     6  Somatic dysfunction of lumbar region     7  Somatic dysfunction of thoracic region     8  Segmental and somatic dysfunction of abdomen and other regions         Plan:   1  Patient tolerated OMT well for the above problems,  advised patient to drink fluids and can use NSAID for soreness after treatment     2  OMT Follow up in 4 weeks  Subjective     Hanny Deluna is a 43 y o  male and is here for a OMT follow up  The patients mother reports he was complaining of hip pain  He is also having weight loss and constipation which he takes a nightly enema for  Is the patient taking Pain medication? no  Has the patient completed physical therapy for this condition? yes  Did Patient symptoms improve from last OMT appointment? yes    The following portions of the patient's history were reviewed and updated as appropriate: allergies, current medications, past family history, past medical history, past social history, past surgical history and problem list     Review of Systems  Do you have pain that bothers you in your daily life? no  Review of Systems   Constitutional: Negative for fatigue and fever  HENT: Negative for congestion  Respiratory: Negative for cough  Cardiovascular: Negative for palpitations  Gastrointestinal: Positive for constipation  Negative for abdominal pain  Musculoskeletal:        As noted in HPI    Neurological: Negative for headaches  Objective     OMT Exam   OMT  Performed by: Luisa Miranda DO  Authorized by: Luisa Miranda DO   Universal Protocol:  Consent: Verbal consent obtained    Consent given by: parent  Patient identity confirmed: verbally with patient        Procedure Details:     Region evaluated and treated:  Lumbar, Sacrum/Pelvis, Pelvis Innominate, Left Extremities, Abdomen and Thoracic    Extremity Information  Extremities: left lower extremity    Thoracic Information  Thoracic Region: T10 - T12  Thoracic T10 - T12 details:     Examination Method:  Tissue Texture Change, Stability, Laxity, Effusions, Tone    Severity:  Moderate    Osteopathic Findings:  Left paraspinal hyperrtonicity     Treatment Method:  Soft Tissue Treatment    Response:  Improved - The somatic dysfunction is improved but not completely resolved  Lumbar details:     Examination Method:  Tissue Texture Change, Stability, Laxity, Effusions, Tone    Severity:  Moderate    Osteopathic Findings:  Right paraspinal hypertonicity     Treatment Method:  Soft Tissue Treatment    Response:  Improved - The somatic dysfunction is improved but not completely resolved  Sacrum/Pelvis details:     Examination Method:  Tissue Texture Change, Stability, Laxity, Effusions, Tone    Severity:  Severe    Osteopathic Findings:  Piriformis, glimellous hypertonicity and spasticity     Treatment Method:  Soft Tissue Treatment    Response:  Improved - The somatic dysfunction is improved but not completely resolved  Pelvis Innominate details:     Examination Method:  Asymmetry, Misalignment, Crepitation, Defects, Masses and Tissue Texture Change, Stability, Laxity, Effusions, Tone    Severity:  Moderate    Osteopathic Findings:  Bilateral psoas hypertonicity in pelvis, left posterior innominate with restricted left SI joint     Treatment Method:  Facilitated Positional Release Treatment, Muscle Energy Treatment and Soft Tissue Treatment    Response:  Improved - The somatic dysfunction is improved but not completely resolved      Left Lower Extremity details:     Examination Method:  Tissue Texture Change, Stability, Laxity, Effusions, Tone    Severity:  Moderate    Osteopathic Findings:  Bilateral hypertonicity and contraction of hamstring muscle and tendons     Treatment Method:  Soft Tissue Treatment    Response:  Improved - The somatic dysfunction is improved but not completely resolved  Abdomen details:     Examination Method:  Tissue Texture Change, Stability, Laxity, Effusions, Tone    Severity:  Moderate    Osteopathic Findings:  Restriction of fascial to the right     Treatment Method:  Visceral Manipulative Treatment and Myofascial Release Treatment    Response:  Improved - The somatic dysfunction is improved but not completely resolved      Total Regions Treated:  6

## 2022-05-05 NOTE — TELEPHONE ENCOUNTER
LMOM regarding pt's 7/14 F/U with Dr Lorie Pate  We need to reschedule his appointment to 8/31 due to change in provider's schedule

## 2022-05-06 ENCOUNTER — TELEPHONE (OUTPATIENT)
Dept: FAMILY MEDICINE CLINIC | Facility: CLINIC | Age: 37
End: 2022-05-06

## 2022-05-06 NOTE — TELEPHONE ENCOUNTER
Outpatient certification forms were faxed [facility] from [facility] that need to be signed  They will be placed in your folder in the preceptor room  Please advise, thank you

## 2022-05-09 ENCOUNTER — OFFICE VISIT (OUTPATIENT)
Dept: FAMILY MEDICINE CLINIC | Facility: CLINIC | Age: 37
End: 2022-05-09
Payer: MEDICARE

## 2022-05-09 VITALS
DIASTOLIC BLOOD PRESSURE: 80 MMHG | HEART RATE: 96 BPM | SYSTOLIC BLOOD PRESSURE: 118 MMHG | OXYGEN SATURATION: 98 % | TEMPERATURE: 97.7 F

## 2022-05-09 DIAGNOSIS — R93.5 ABNORMAL MRI, PELVIS: ICD-10-CM

## 2022-05-09 DIAGNOSIS — R62.7 POOR WEIGHT GAIN IN ADULT: ICD-10-CM

## 2022-05-09 DIAGNOSIS — K59.00 CONSTIPATION DUE TO NEUROGENIC BOWEL: Primary | ICD-10-CM

## 2022-05-09 DIAGNOSIS — G93.1 ANOXIC BRAIN DAMAGE (HCC): ICD-10-CM

## 2022-05-09 DIAGNOSIS — K59.2 CONSTIPATION DUE TO NEUROGENIC BOWEL: Primary | ICD-10-CM

## 2022-05-09 PROCEDURE — 99214 OFFICE O/P EST MOD 30 MIN: CPT | Performed by: FAMILY MEDICINE

## 2022-05-09 NOTE — PROGRESS NOTES
Assessment/Plan:       Problem List Items Addressed This Visit        Digestive    Constipation due to neurogenic bowel - Primary     -Well-managed on current bowel regimen, continue at this time  -Follows with GI, next appointment September 2022              Nervous and Auditory    Anoxic brain damage [facility])     -Mother, Zari Zepeda, is interested in starting omega-3 FA after reading articles about their benefit in patients with TBI   -Personally reviewed articles-- the findings of those studies does not seem suitable to be generalized to Naman's anoxic brain injury, given both differences in TBI vs  anoxic brain injury, and long duration since Naman's initial anoxic brain injury (>20 years ago)  -Thus, unclear whether adding omega-3 FA would be beneficial to Marilee Chaves, specifically  -However, sent messages to Dr Traci Gutierrez ([address]) and [facility] for their expertise into whether there are benefits of omega-3 FA for anoxic brain injury  -Continue all other current management and therapies              Other    Poor weight gain in adult     -Continues to lose weight despite reportedly completing 100% of his high-calorie meals  -However, actual meal completion remains unclear until documentation system is more regulated  -Extensive endocrinologic and GI workups being unrevealing as to etiology of weight loss  -Will discuss with Physiatry regarding supplementations           Abnormal MRI, pelvis     -Incidental finding on MRI pelvis suggestive of endochondroma  -Discussed with Sports Medicine, Dr Jacky North, who recommended MRI pelvis repeat in 3 months to re-evaluate  -Patient will return in 2 months (July 2022) and will order at that time                   Subjective:      Patient ID: Aury Kellogg is a 43 y o  male w/ spastic quadriparesis s/p anoxic brain injury at age 13   [employer] staff member Lisa Caal is attending visit virtually via phone, Betina Branch and Carlos Aguillon from [facility] are present in the visit with mom, Zari Zepeda HPI    -MRI findings: Dr Jesus Cedeño recommended MRI pelvis repeat in 3 months to re-evaluate (after June 2022), but that 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 so no other further workup needed at this time  -MSK: Patient has been receiving OMT for his back and hip pain, which seems to be helping per mother and caregivers     -Weight: Mother has been very concerned with weight loss of unclear etiology, and patient underwent extensive workup from Endocrinology who noted there was to pituitary or adrenal cause for his weight loss  I had previously personally spoke with his caregivers and nurse [facility] who noted that patient requires a long duration to complete his meals so he is not always meeting the intake requirements and calorie counts set forth by the nutritionist, especially when he has a lot of activities during the day  Weight 121 today at his home, was 119 lbs last Thursday, fluctuates  They are working on developing a meal completion percentage chart to the staff can keep track on the actual amount of meals he is completing, as reduced caloric intake seems to be a salient factor in his weight loss, especially in the setting of increased physical activity  Low De Jesus reports that staff now has a way to document meals since 4/29, and that they have reported completion of 100% meals and snacks since then  However, it is apparently difficult to ensure uniformity in the reporting among staff members, so they are working on that  He is currently getting Ensure Max protein as a supplement, but mom is interesting in exploring some of the supplements mentioned by Dr Yanna Rocha, such as Magic Cup, [facility], or [employer]  Mom is also very interested in starting omega-3 fish oil for Esperanza Copping due to her researching about potential benefit in brain injury      The following portions of the patient's history were reviewed and updated as appropriate: allergies, current medications, past family history, past medical history, past social history, past surgical history and problem list     Review of Systems   Constitutional: Negative for chills and fever  HENT:        Baseline dysphagia stable, working with SLP at [address] [place] [address], recent swallow eval passed without issues, taking free water per Sarah Rogers  protocol   Respiratory: Negative for cough, chest tightness and shortness of breath  Cardiovascular: Negative for leg swelling  Gastrointestinal: Negative for abdominal pain and vomiting  Constipation stable, no issues passing BM, used suppository only 1 time in past month   Genitourinary: Negative for dysuria  Musculoskeletal:        Wheelchair at baseline except when at PT, etc  Attendds PT twice a week M/W, also gets massage therapy and OMT, goes to gym on the other days with some machine exercises like standing frame, sri-med, etc  Had botox and has been discussing possible ulnar neurectomy, but not currently decided per mom   Neurological: Negative for seizures  Psychiatric/Behavioral:        Sleeping through the night, no issues, uses melatonin         Objective:    /80   Pulse 96   Temp 97 7 °F (36 5 °C)   SpO2 98%        Physical Exam  Vitals reviewed  Constitutional:       General: He is not in acute distress  Appearance: He is well-developed  He is not ill-appearing  HENT:      Head: Normocephalic and atraumatic  Right Ear: There is no impacted cerumen  Left Ear: There is no impacted cerumen  Eyes:      General:         Right eye: No discharge  Left eye: No discharge  Conjunctiva/sclera: Conjunctivae normal    Cardiovascular:      Rate and Rhythm: Normal rate and regular rhythm  Pulmonary:      Effort: Pulmonary effort is normal  No respiratory distress  Breath sounds: Normal breath sounds  Abdominal:      General: Bowel sounds are normal       Palpations: Abdomen is soft  Tenderness: There is no abdominal tenderness     Musculoskeletal: Comments: In wheelchair; baseline spasticity B/L UE/hands, B/L LE muscle wasting, stable   Skin:     General: Skin is warm and dry  Neurological:      Mental Status: He is alert  Mental status is at baseline        Comments: Very scantly verbal, will reply yes or no at times

## 2022-05-11 NOTE — ASSESSMENT & PLAN NOTE
-Mother, Marilyn Valderrama, is interested in starting omega-3 FA after reading articles about their benefit in patients with TBI   -Personally reviewed articles-- the findings of those studies does not seem suitable to be generalized to Naman's anoxic brain injury, given both differences in TBI vs  anoxic brain injury, and long duration since Naman's initial anoxic brain injury (>20 years ago)  -Thus, unclear whether adding omega-3 FA would be beneficial to [facility], specifically  -However, sent messages to Dr Shira Sears ([address]) and [facility] for their expertise into whether there are benefits of omega-3 FA for anoxic brain injury  -Continue all other current management and therapies

## 2022-05-11 NOTE — ASSESSMENT & PLAN NOTE
-Continues to lose weight despite completing 100% of his high-calorie meals  -Extensive endocrinologic and GI workups being unrevealing as to etiology of weight loss  -Will discuss with Physiatry regarding supplementations

## 2022-05-11 NOTE — ASSESSMENT & PLAN NOTE
-Well-managed on current bowel regimen, continue at this time  -Follows with GI, next appointment September 2022

## 2022-05-16 ENCOUNTER — PATIENT MESSAGE (OUTPATIENT)
Dept: FAMILY MEDICINE CLINIC | Facility: CLINIC | Age: 37
End: 2022-05-16

## 2022-05-16 DIAGNOSIS — G82.50 SPASTIC QUADRIPARESIS (HCC): ICD-10-CM

## 2022-05-16 DIAGNOSIS — R63.6 UNDERWEIGHT: ICD-10-CM

## 2022-05-16 DIAGNOSIS — R62.7 POOR WEIGHT GAIN IN ADULT: ICD-10-CM

## 2022-05-16 DIAGNOSIS — R63.4 ABNORMAL WEIGHT LOSS: Primary | ICD-10-CM

## 2022-05-18 DIAGNOSIS — S06.9X0D TRAUMATIC BRAIN INJURY, WITHOUT LOSS OF CONSCIOUSNESS, SUBSEQUENT ENCOUNTER: ICD-10-CM

## 2022-05-18 RX ORDER — DRONABINOL 2.5 MG/1
CAPSULE ORAL
Qty: 90 CAPSULE | Refills: 0 | Status: SHIPPED | OUTPATIENT
Start: 2022-05-18 | End: 2022-07-20

## 2022-05-18 NOTE — TELEPHONE ENCOUNTER
Spoke with Renata Anglin from [facility] and mother, Beth Peters, via phone after discussing supplements with Dr Mason Hagan  Will replace Ensure MaxProtein (he gets 1 serving QD) with 1 4 oz serving of magic cup, Rx written to be faxed to [facility] Anglin also requested referral to [facility] for aquatic therapy, made today  Discussed with mother and Renata Anglin that for Naman the omega-3 FA would be for appetite stimulation and not to improve brain function as mother had inquired about, and since his appetite is not an issue there is no clear benefit to adding omega-3 FA supplements at this time  Encouraged dietary sources such as fish  Will follow up on tolerance to magic cup and trend weight  All questions answered

## 2022-05-23 PROBLEM — R93.5 ABNORMAL MRI, PELVIS: Status: ACTIVE | Noted: 2022-05-23

## 2022-05-23 NOTE — ASSESSMENT & PLAN NOTE
-Incidental finding on MRI pelvis suggestive of endochondroma  -Discussed with Sports Medicine, Dr Jeannette Rene, who recommended MRI pelvis repeat in 3 months to re-evaluate  -Patient will return in 2 months (July 2022) and will order at that time

## 2022-05-26 ENCOUNTER — TELEPHONE (OUTPATIENT)
Dept: FAMILY MEDICINE CLINIC | Facility: CLINIC | Age: 37
End: 2022-05-26

## 2022-05-26 NOTE — LETTER
June 9, 2022     Patient: Kathy Stein  YOB: 1985      To Whom it May Concern:    Kar Hannah is under my professional care  He has a history of anoxic brain injury with resulting muscle atrophy and muscle spasticity  He has limited motor control  He is chair-bound and requires total care with transferring from bed to chair as he is unable to walk or stand unassisted  Currently, Nino Sommers does sleep in a conventional full-sized adult bed  He does require a full-length adult bed rail on his bed  This is not a restraint  The purpose of this bed rail is to reduce the risk of Naman falling out of bed, allow free movement while sleeping, as well as providing comfort and security for him  The bed rail should be padded in order to prevent risk of injury, including strangulation or asphyxiation  Naman's condition is life-long, and it is unlikely that there will be any significant change in the future  If you have any questions or concerns, please don't hesitate to call          Sincerely,          Brannon Diaz MD

## 2022-05-30 ENCOUNTER — PATIENT MESSAGE (OUTPATIENT)
Dept: FAMILY MEDICINE CLINIC | Facility: CLINIC | Age: 37
End: 2022-05-30

## 2022-05-31 ENCOUNTER — TELEPHONE (OUTPATIENT)
Dept: FAMILY MEDICINE CLINIC | Facility: CLINIC | Age: 37
End: 2022-05-31

## 2022-05-31 NOTE — TELEPHONE ENCOUNTER
An outpatient certification summary was sent in from [facility] and needs a signature  The form will be placed in your folder in the preceptor room  Please advise, thank you

## 2022-05-31 NOTE — TELEPHONE ENCOUNTER
Patient's mother called because patient has lump on throat and wants to know if she should bring him in to be seen for it

## 2022-06-01 ENCOUNTER — OFFICE VISIT (OUTPATIENT)
Dept: FAMILY MEDICINE CLINIC | Facility: CLINIC | Age: 37
End: 2022-06-01
Payer: MEDICARE

## 2022-06-01 VITALS — DIASTOLIC BLOOD PRESSURE: 70 MMHG | TEMPERATURE: 98.3 F | SYSTOLIC BLOOD PRESSURE: 106 MMHG

## 2022-06-01 DIAGNOSIS — J03.90 TONSILLITIS: Primary | ICD-10-CM

## 2022-06-01 LAB — S PYO AG THROAT QL: NEGATIVE

## 2022-06-01 PROCEDURE — 87636 SARSCOV2 & INF A&B AMP PRB: CPT | Performed by: FAMILY MEDICINE

## 2022-06-01 PROCEDURE — 99213 OFFICE O/P EST LOW 20 MIN: CPT | Performed by: FAMILY MEDICINE

## 2022-06-01 PROCEDURE — 87070 CULTURE OTHR SPECIMN AEROBIC: CPT | Performed by: FAMILY MEDICINE

## 2022-06-01 PROCEDURE — 87880 STREP A ASSAY W/OPTIC: CPT | Performed by: FAMILY MEDICINE

## 2022-06-01 NOTE — ASSESSMENT & PLAN NOTE
-Exam with mild posterior pharyngeal erythema, L>R tonsillar enlargement with scant white tonsillar exudates (which could also be retained food particles, as caregiver did not brush his teeth before coming here), tongue and uvula appear normal  -No gross evidence of peritonsillar abscess on exam given absence of soft palate swelling, airway appears patent on exam  -Rapid strep negative in-office today  -Throat Cx sent out today  -COVID-19/Flu swab done today  -Further management based on results of above-mentioned testing  -Suspect viral tonsillitis given duration of illness and physical exam findings noted above  -Day 1 of symptoms 5/27/22, so likely to have contracted virus around 5/25/22, approximately; expect improvement by day 10, so advised mom/caregiver to reach out if no better by 6/5/22 due to risk of progression to superimposed bacterial tonsillitis  -However, patient seems to be improving per mom/caregiver so unlikely will need abx, but will order augmentin 875 mg Q12H if no improvement by 6/5/22

## 2022-06-01 NOTE — PROGRESS NOTES
Assessment/Plan:     Problem List Items Addressed This Visit        Digestive    Tonsillitis - Primary     -Exam with mild posterior pharyngeal erythema, L>R tonsillar enlargement with scant white tonsillar exudates (which could also be retained food particles, as caregiver did not brush his teeth before coming here), tongue and uvula appear normal  -No gross evidence of peritonsillar abscess on exam given absence of soft palate swelling, airway appears patent on exam  -Rapid strep negative in-office today  -Throat Cx sent out today  -COVID-19/Flu swab done today  -Further management based on results of above-mentioned testing  -Suspect viral tonsillitis given duration of illness and physical exam findings noted above  -Day 1 of symptoms 5/27/22, so likely to have contracted virus around 5/25/22, approximately; expect improvement by day 10, so advised mom/caregiver to reach out if no better by 6/5/22 due to risk of progression to superimposed bacterial tonsillitis  -However, patient seems to be improving per mom/caregiver and tonsillar enlargement appears to be improving in comparison to photo brought in by mom, so unlikely will need abx, but will order augmentin 875 mg Q12H if no improvement by 6/5/22            Relevant Orders    POCT rapid strepA    Covid/Flu- Office Collect    Throat culture            Subjective:      Patient ID: Oksana Garvey is a 43 y o  male  HPI    Patient here today with mom and caregiver for an acute visit for swollen tonsils; history provided by caregiver/mom as patient is very scantly verbal as a result of anoxic brain injury as age 13   Per mom and caregiver, patient began to exhibit behavior that was a deviation from his baseline per caregivers on 5/27/22, appearing more tired than usual, as well as on 5/28/22, Saturday evening mom noticed his voice sounded more hoarse, so she looked in his throat with a flashlight and saw the L-sided enlarged tonsil so she scheduled a virtual visit with an out-of-network provider who diagnosed patient with "inflamed adenoid", but mom was concerned that swelling appeared to be more in the area of his L tonsil, so she wanted him to be evaluated fully and scheduled him to be seen today  No treatment recommended as a result of that visit, mom says she gave motrin maybe once or twice but no other new meds  Mom says tonsil did start to look a little better yesterday, less swollen  Caregiver reports his eating has been about the same but maybe took a little longer than usual, was a little slower but denied any sore throat when specifically asked per mom  No other symptoms, no cough; no sick contacts known  No fevers, no rhinorrhea or malaise, just a little more fatigue on Friday which seems to have improved as his energy level today was quite good per caregiver  Of note, patient rarely juan a fever or obvious illness symptoms even when he has had acute pneumonia in the past, so his clinical signs are often subtle  Patient is fully vaccinated and boosted for COVID-19 and caregivers deny any known exposures or sick contacts  Did not get flu shot this year  The following portions of the patient's history were reviewed and updated as appropriate: allergies, current medications, past family history, past medical history, past social history, past surgical history and problem list     Review of Systems   Constitutional: Positive for fatigue  Negative for chills and fever  HENT: Positive for voice change  Negative for congestion, drooling, ear pain, postnasal drip, rhinorrhea and trouble swallowing  L tonsil swelling per mom; has photo on her phone which shows L tonsillar enlargement   Respiratory: Negative for chest tightness and shortness of breath  Cardiovascular: Negative for chest pain  Gastrointestinal: Negative for abdominal pain, diarrhea, nausea and vomiting           Objective:    /70   Temp 98 3 °F (36 8 °C)        Physical Exam  Vitals reviewed  Constitutional:       General: He is not in acute distress  Appearance: He is not ill-appearing or toxic-appearing  HENT:      Right Ear: Tympanic membrane and ear canal normal       Left Ear: Tympanic membrane and ear canal normal       Nose: Nose normal       Mouth/Throat:      Pharynx: Posterior oropharyngeal erythema present  Comments: L>R tonsillar enlargement (but less severe than in photo produced by mom), L tonsil with ?scant white exudates (vs  retained food particles); no coating on tongue  Mild erythema of posterior pharynx but no uvula or soft palate tissue swelling  Airway patent  Eyes:      General:         Right eye: No discharge  Left eye: No discharge  Neck:      Comments: Anterior chain LAD B/L, non-tender  Cardiovascular:      Rate and Rhythm: Normal rate and regular rhythm  Pulmonary:      Breath sounds: Normal breath sounds  No wheezing, rhonchi or rales  Abdominal:      General: Abdomen is flat  Bowel sounds are normal       Palpations: Abdomen is soft  Lymphadenopathy:      Cervical: Cervical adenopathy present  Neurological:      Mental Status: He is alert  Mental status is at baseline

## 2022-06-02 ENCOUNTER — TELEPHONE (OUTPATIENT)
Dept: FAMILY MEDICINE CLINIC | Facility: CLINIC | Age: 37
End: 2022-06-02

## 2022-06-02 LAB
FLUAV RNA RESP QL NAA+PROBE: NEGATIVE
FLUBV RNA RESP QL NAA+PROBE: NEGATIVE
SARS-COV-2 RNA RESP QL NAA+PROBE: NEGATIVE

## 2022-06-04 LAB — BACTERIA THROAT CULT: NORMAL

## 2022-06-08 ENCOUNTER — TELEPHONE (OUTPATIENT)
Dept: OBGYN CLINIC | Facility: HOSPITAL | Age: 37
End: 2022-06-08

## 2022-06-08 NOTE — TELEPHONE ENCOUNTER
Dr Marian Shelton    Patient mom called, they have received a denial for a wheelchair, asking if a peer to peer can be done with Medicaid?      # [phone]  Mom Kendrick Rosas

## 2022-06-15 ENCOUNTER — TELEPHONE (OUTPATIENT)
Dept: NEUROLOGY | Facility: CLINIC | Age: 37
End: 2022-06-15

## 2022-06-15 NOTE — TELEPHONE ENCOUNTER
Contacted Marisa Mckeon - everything with the wheelchair is taken care of  The wheelchair will be paid for by Medicare, but there were some recommendations for a wheel bumper, attendant control, and nikhil lift seat elevator (to help with sit to stand transfers) that were not approved through [employer]  They decided to forego the attendant control, pay for the wheel bumper ($27 or so), and pay for the nikhil lift seat elevator ($1700)  I told her she can always email me, it may be quicker to reach me that way  And if there is anything additional I need to sign, please let me know      Nathalie Torres MD  Physical Medicine and Rehabilitation

## 2022-06-30 ENCOUNTER — PROCEDURE VISIT (OUTPATIENT)
Dept: FAMILY MEDICINE CLINIC | Facility: CLINIC | Age: 37
End: 2022-06-30
Payer: MEDICARE

## 2022-06-30 DIAGNOSIS — G82.50 SPASTIC QUADRIPARESIS (HCC): Primary | ICD-10-CM

## 2022-06-30 DIAGNOSIS — M99.01 SOMATIC DYSFUNCTION OF CERVICAL REGION: ICD-10-CM

## 2022-06-30 DIAGNOSIS — M99.06 SOMATIC DYSFUNCTION OF LOWER EXTREMITY: ICD-10-CM

## 2022-06-30 DIAGNOSIS — Z11.59 NEED FOR HEPATITIS C SCREENING TEST: ICD-10-CM

## 2022-06-30 DIAGNOSIS — M99.03 SOMATIC DYSFUNCTION OF LUMBAR REGION: ICD-10-CM

## 2022-06-30 DIAGNOSIS — M99.09 SEGMENTAL AND SOMATIC DYSFUNCTION OF ABDOMEN AND OTHER REGIONS: ICD-10-CM

## 2022-06-30 PROCEDURE — 99213 OFFICE O/P EST LOW 20 MIN: CPT | Performed by: FAMILY MEDICINE

## 2022-06-30 PROCEDURE — 98926 OSTEOPATH MANJ 3-4 REGIONS: CPT | Performed by: FAMILY MEDICINE

## 2022-06-30 NOTE — PATIENT INSTRUCTIONS
Timi Perez was seen for OMT on 06/30/22  He had a very tight SCM muscle in his right neck which relaxed some with treatment  He also had tight left paraspinal muscles along his lumbar spine and bilateral psoas tightness in his abdomen  We also worked on his tight hamstring on right leg, he is doing great pushing his leg against resistance during therapy

## 2022-07-01 NOTE — PROGRESS NOTES
Assessment/Plan     This is a 43 y o  male who presents for OMT follow-up for:  1  Spastic quadriparesis ([address] )     2  Need for hepatitis C screening test  Hepatitis C Antibody ([facility])   3  Somatic dysfunction of cervical region     4  Somatic dysfunction of thoracic region     5  Somatic dysfunction of lumbar region     6  Somatic dysfunction of lower extremity         Plan:   1  Patient tolerated OMT well for the above problems,  advised patient to drink fluids and can use NSAID for soreness after treatment     2  Patient is due for one time screening of hepatitis C      3   OMT Follow up in 4 weeks  Subjective     Bessy Purcell is a 43 y o  male and is here for a OMT follow up  The history is provided by caregiver, she reports weight has been more stable and he has not been complaining of any pain  Is the patient taking Pain medication? no  Has the patient completed physical therapy for this condition? yes      The following portions of the patient's history were reviewed and updated as appropriate: allergies, current medications, past family history, past medical history, past social history, past surgical history and problem list     Review of Systems  Do you have pain that bothers you in your daily life? not asked  Review of Systems   Unable to perform ROS: Patient nonverbal       Objective     OMT Exam   OMT  Performed by: Gale Bello DO  Authorized by: Gale Bello DO   Universal Protocol:  Consent: Verbal consent obtained    Consent given by: patient  Patient understanding: patient states understanding of the procedure being performed  Patient identity confirmed: verbally with patient        Procedure Details:     Region evaluated and treated:  Cervical, Lumbar, Right Extremities and Abdomen    Extremity Information  Extremities: right lower extremity      Cervical Details:     Examination Method:  Tissue Texture Change, Stability, Laxity, Effusions, Tone    Severity:  Moderate Osteopathic Findings:  Right SCM hypertonicity and spasticity     Treatment Method:  Soft Tissue Treatment and Facilitated Positional Release Treatment    Response:  Improved - The somatic dysfunction is improved but not completely resolved  Lumbar details:     Examination Method:  Tissue Texture Change, Stability, Laxity, Effusions, Tone    Severity:  Moderate    Osteopathic Findings:  Left paraspinal hypertonicity     Treatment Method:  Facilitated Positional Release Treatment and Soft Tissue Treatment    Response:  Improved - The somatic dysfunction is improved but not completely resolved  Right Lower Extremity details:     Examination Method:  Tissue Texture Change, Stability, Laxity, Effusions, Tone    Severity:  Moderate    Osteopathic Findings:  Right hamstring hypertonicity and spasticity     Treatment Method:  Soft Tissue Treatment and Facilitated Positional Release Treatment    Response:  Improved - The somatic dysfunction is improved but not completely resolved  Abdomen details:     Examination Method:  Tissue Texture Change, Stability, Laxity, Effusions, Tone    Severity:  Moderate    Osteopathic Findings:  Bilateral psoas hypertonicity     Treatment Method:  Facilitated Positional Release Treatment and Muscle Energy Treatment    Response:  Improved - The somatic dysfunction is improved but not completely resolved      Total Regions Treated:  4

## 2022-07-12 ENCOUNTER — TELEPHONE (OUTPATIENT)
Dept: NEUROLOGY | Facility: CLINIC | Age: 37
End: 2022-07-12

## 2022-07-18 ENCOUNTER — TELEPHONE (OUTPATIENT)
Dept: NEUROLOGY | Facility: CLINIC | Age: 37
End: 2022-07-18

## 2022-07-18 NOTE — TELEPHONE ENCOUNTER
Pt's mother called to cancel the upcoming botox appointment on 07/26/22  They don't need to r/s this time but she is requesting of call back from Dr Solis Funk to discuss something  I advised that I can leave a message from Dr Solis Funk and her MA and we will give them a call back  Thank you!

## 2022-07-19 ENCOUNTER — TELEPHONE (OUTPATIENT)
Dept: NEUROLOGY | Facility: CLINIC | Age: 37
End: 2022-07-19

## 2022-07-19 NOTE — TELEPHONE ENCOUNTER
Spoke with Saul Ramos, states Chuy is doing so well since last visit, she would like to wait and will call back if/when he needs more botox

## 2022-07-20 DIAGNOSIS — S06.9X0D TRAUMATIC BRAIN INJURY, WITHOUT LOSS OF CONSCIOUSNESS, SUBSEQUENT ENCOUNTER: ICD-10-CM

## 2022-07-20 RX ORDER — DRONABINOL 2.5 MG/1
CAPSULE ORAL
Qty: 90 CAPSULE | Refills: 0 | Status: SHIPPED | OUTPATIENT
Start: 2022-07-20 | End: 2022-08-29

## 2022-07-21 ENCOUNTER — TELEPHONE (OUTPATIENT)
Dept: FAMILY MEDICINE CLINIC | Facility: CLINIC | Age: 37
End: 2022-07-21

## 2022-07-21 NOTE — TELEPHONE ENCOUNTER
Prescription auth for controlled substance form in your folder in preceptor room to be signed and faxed back   Thank you

## 2022-07-28 ENCOUNTER — PROCEDURE VISIT (OUTPATIENT)
Dept: FAMILY MEDICINE CLINIC | Facility: CLINIC | Age: 37
End: 2022-07-28
Payer: MEDICARE

## 2022-07-28 DIAGNOSIS — M43.6 CONTRACTURE OF STERNOCLEIDOMASTOID MUSCLE: Primary | ICD-10-CM

## 2022-07-28 DIAGNOSIS — M99.02 SOMATIC DYSFUNCTION OF THORACIC REGION: ICD-10-CM

## 2022-07-28 DIAGNOSIS — D22.9 ATYPICAL NEVI: ICD-10-CM

## 2022-07-28 DIAGNOSIS — M99.01 SOMATIC DYSFUNCTION OF CERVICAL REGION: ICD-10-CM

## 2022-07-28 DIAGNOSIS — G82.50 SPASTIC QUADRIPARESIS (HCC): ICD-10-CM

## 2022-07-28 DIAGNOSIS — M99.05 SOMATIC DYSFUNCTION OF PELVIC REGION: ICD-10-CM

## 2022-07-28 PROCEDURE — 98926 OSTEOPATH MANJ 3-4 REGIONS: CPT | Performed by: FAMILY MEDICINE

## 2022-07-28 PROCEDURE — 99213 OFFICE O/P EST LOW 20 MIN: CPT | Performed by: FAMILY MEDICINE

## 2022-07-28 NOTE — PROGRESS NOTES
Assessment/Plan     This is a 43 y o  male who presents for OMT follow-up for:  1  Contracture of sternocleidomastoid muscle     2  Atypical nevi  Ambulatory Referral to Dermatology   3  Spastic quadriparesis ([address] )     4  Somatic dysfunction of cervical region     5  Somatic dysfunction of thoracic region     6  Somatic dysfunction of pelvic region         Plan:   1  Patient tolerated OMT well for the above problems,  advised patient to drink fluids and can use NSAID for soreness after treatment     2  OMT Follow up in 4 weeks  Subjective     Priya Shabazz is a 43 y o  male and is here for a OMT follow up  His mother reports he is tilting his head to the right and leaning to one side in his wheelchair  She also reports the color of his mole on his shoulder has changed and gotten darker, she showed images from a few months ago  Is the patient taking Pain medication? no  Has the patient completed physical therapy for this condition? yes      The following portions of the patient's history were reviewed and updated as appropriate: allergies, current medications, past family history, past medical history, past social history, past surgical history and problem list     Review of Systems  Do you have pain that bothers you in your daily life? not asked  Review of Systems   Unable to perform ROS: Patient nonverbal       Objective     OMT Exam   OMT  Performed by: Valeriano Fontanez DO  Authorized by: Valeriano Fontanez DO   Universal Protocol:  Consent: Verbal consent obtained    Consent given by: patient  Patient understanding: patient states understanding of the procedure being performed  Patient identity confirmed: verbally with patient        Procedure Details:     Region evaluated and treated:  Cervical, Pelvis Innominate and Thoracic    Thoracic Information  Thoracic Region: T1 - T4  Cervical Details:     Examination Method:  Tissue Texture Change, Stability, Laxity, Effusions, Tone, Asymmetry, Misalignment, Crepitation, Defects, Masses and Range of Motion, Contracture    Severity:  Moderate    Osteopathic Findings:  Right SCM hypertonicity and restricted range of motion of cervical spine     Treatment Method:  Muscle Energy Treatment, Soft Tissue Treatment and Facilitated Positional Release Treatment    Response:  Improved - The somatic dysfunction is improved but not completely resolved  Thoracic T1 - T4 details:     Examination Method:  Tissue Texture Change, Stability, Laxity, Effusions, Tone    Severity:  Moderate    Osteopathic Findings:  Left mid trapezius hypertonicity     Treatment Method:  Soft Tissue Treatment    Response:  Improved    Pelvis Innominate details:     Examination Method:  Tissue Texture Change, Stability, Laxity, Effusions, Tone    Severity:  Moderate    Osteopathic Findings:  Right psoas hypertonicity in pelvic region     Treatment Method:  Soft Tissue Treatment and Facilitated Positional Release Treatment    Response:  Improved - The somatic dysfunction is improved but not completely resolved      Total Regions Treated:  3

## 2022-08-23 ENCOUNTER — TELEPHONE (OUTPATIENT)
Dept: NEUROLOGY | Facility: CLINIC | Age: 37
End: 2022-08-23

## 2022-08-23 DIAGNOSIS — G82.50 SPASTIC QUADRIPARESIS (HCC): Primary | ICD-10-CM

## 2022-08-23 NOTE — TELEPHONE ENCOUNTER
Pt's guardian Vern Steward left a vm yesterday at 3:53 and stated that pt is having extreme backpain   Requesting neuro orthopedic referral  Nori Brady [phone]    Will call after [phone] to get more info

## 2022-08-23 NOTE — TELEPHONE ENCOUNTER
Called Liliana Henry and states that pt has scoliosis  Back pain comes and goes  States that Dr Kely Esquivel is aware  When pt is sitting in his WC, he is putting all his weight on his hip  Pt goes to PT 2x/week and goes to [email or web address] [facility] 2x/ [facility]  Pt's whole right upper side of his back gets so tight and c/o extreme pain  Pt takes tylenol and voltaren cream prn     Also, she notices that pt right leg is giving out     Requesting neuro orthopedic referral      [phone], ok to leave detailed message

## 2022-08-24 DIAGNOSIS — S06.9X0D TRAUMATIC BRAIN INJURY, WITHOUT LOSS OF CONSCIOUSNESS, SUBSEQUENT ENCOUNTER: ICD-10-CM

## 2022-08-25 ENCOUNTER — PROCEDURE VISIT (OUTPATIENT)
Dept: FAMILY MEDICINE CLINIC | Facility: CLINIC | Age: 37
End: 2022-08-25
Payer: MEDICARE

## 2022-08-25 ENCOUNTER — TELEPHONE (OUTPATIENT)
Dept: NEUROLOGY | Facility: CLINIC | Age: 37
End: 2022-08-25

## 2022-08-25 ENCOUNTER — TELEPHONE (OUTPATIENT)
Dept: OBGYN CLINIC | Facility: HOSPITAL | Age: 37
End: 2022-08-25

## 2022-08-25 DIAGNOSIS — M41.45 NEUROMUSCULAR SCOLIOSIS OF THORACOLUMBAR REGION: ICD-10-CM

## 2022-08-25 DIAGNOSIS — M25.551 ACUTE RIGHT HIP PAIN: ICD-10-CM

## 2022-08-25 DIAGNOSIS — M99.07 SOMATIC DYSFUNCTION OF LEFT UPPER EXTREMITY: ICD-10-CM

## 2022-08-25 DIAGNOSIS — M99.06 SOMATIC DYSFUNCTION OF LOWER EXTREMITY: ICD-10-CM

## 2022-08-25 DIAGNOSIS — M99.01 SOMATIC DYSFUNCTION OF SPINE, CERVICAL: ICD-10-CM

## 2022-08-25 DIAGNOSIS — G25.82 MUSCULAR RIGIDITY AND SPASM, PROGRESSIVE: Primary | ICD-10-CM

## 2022-08-25 DIAGNOSIS — S06.9X0D TRAUMATIC BRAIN INJURY, WITHOUT LOSS OF CONSCIOUSNESS, SUBSEQUENT ENCOUNTER: ICD-10-CM

## 2022-08-25 DIAGNOSIS — M99.02 SOMATIC DYSFUNCTION OF SPINE, THORACIC: ICD-10-CM

## 2022-08-25 PROCEDURE — 99213 OFFICE O/P EST LOW 20 MIN: CPT

## 2022-08-25 PROCEDURE — 98926 OSTEOPATH MANJ 3-4 REGIONS: CPT

## 2022-08-25 NOTE — TELEPHONE ENCOUNTER
Spoke with Parish Givens at [facility] to schedule: [phone], set up for 9/8 at 200      ----- Message from Danita Emery MD sent at 8/25/2022 12:04 PM EDT -----  Regarding: RE: Schedule  Can be anytime next two weeks? A  ----- Message -----  From: Jerel Palmer LPN  Sent: 6/67/4270  10:22 AM EDT  To: Danita Emery MD  Subject: RE: Schedule                                     Absolutely  How soon do you want to see him?  ----- Message -----  From: Danita Emery MD  Sent: 8/25/2022   9:53 AM EDT  To: Jerel Palmer LPN  Subject: Schedule                                         Hi Echo,    Can we get Madison Pemberton in for an appointment - not for botox, but for a re-eval to see where we are it with things  It'll take 45min-1hr      We can call Parish Givens at [facility] to schedule: Chana Cobb MD  Physical Medicine and Rehabilitation

## 2022-08-25 NOTE — PATIENT INSTRUCTIONS
Emilia Mcgrath was treated for OMT for back pain and right leg pain  I feel as if his scoliosis is worsening in his lumbar region, may have a functional component with leaning to the right  His right hip was elevated with a tight psoas  Discussed some pectoralis stretches  Would recommend orthopedic spinal surgery consult and order whole spine scoliosis xray

## 2022-08-25 NOTE — TELEPHONE ENCOUNTER
Discussed case with mom, Ananda Tran  We discussed different potential options  Not currently looking into surgery, but would like an orthopod who has a background treating neurologic issues - for that I would recommend one of our pediatric orthopedic surgeons, even though Tere Ruiz is 43years old  I discussed the case with Dr Florin Ramirez who offered to evaluate him in clinic  I will place a referral     She would not like to go see Dr Willie Garza at this time  However, Dr Maci Finn at [facility] is another option as he runs their neuro-orthopedic program     Appointments should be made with Zo Leggett at [facility] - [phone]  He is past due for botox, but we need to do another evaluation to see how we need to change his regimen moving forward   Reached out to Echo to get him scheduled purely for a re-brandinal     Jordan Jacob MD  Physical Medicine and Rehabilitation

## 2022-08-25 NOTE — PROGRESS NOTES
The Assessment/Plan     This is a 43 y o  male who presents for OMT follow-up for:  1  Muscular rigidity and spasm, progressive  OMT   2  Acute right hip pain  OMT   3  Somatic dysfunction of spine, cervical  OMT   4  Somatic dysfunction of spine, thoracic  OMT   5  Somatic dysfunction of lower extremity  OMT   6  Somatic dysfunction of left upper extremity  OMT   7  Neuromuscular scoliosis of thoracolumbar region  XR entire spine (scoliosis) 4-5 vw    Ambulatory Referral to Orthopedic Surgery    OMT        1  Patient tolerated OMT well for the above problems,  advised patient to drink fluids and can use NSAID for soreness after treatment     2  OMT Follow up in 2 weeks  Subjective     Tony You is a 43 y o  male and is here for a OMT follow up  The patient's mother reports persistence of his right bonilla leaning  She also has noticed Naman rubbing his right thigh and endorsing pain in his leg  Is the patient taking Pain medication? no  Has the patient completed physical therapy for this condition? yes  Did Patient symptoms improve from last OMT appointment? yes    The following portions of the patient's history were reviewed and updated as appropriate: allergies, current medications, past family history, past medical history, past social history, past surgical history and problem list     Review of Systems  Review of Systems   Musculoskeletal: Positive for back pain and myalgias  All other systems reviewed and are negative          Objective     OMT Exam     OMT  Performed by: Sarah Bryan DO  Authorized by: Sarah Bryan DO   Universal Protocol:  Consent given by: patient and parent        Procedure Details:     Region evaluated and treated:  Thoracic, Cervical, Pelvis Innominate and Lumbar    Thoracic Information  Thoracic Region: T1 - T4  Cervical Details:     Examination Method:  Asymmetry, Misalignment, Crepitation, Defects, Masses and Tissue Texture Change, Stability, Laxity, Effusions, Tone    Osteopathic Findings:  Right sternocleidomastoid hypertonicity, upper trapezius also hypertonic bilaterally    Treatment Method:  Myofascial Release Treatment and Soft Tissue Treatment    Response:  Improved - The somatic dysfunction is improved but not completely resolved  Thoracic T1 - T4 details:     Examination Method:  Asymmetry, Misalignment, Crepitation, Defects, Masses and Tissue Texture Change, Stability, Laxity, Effusions, Tone    Osteopathic Findings:  Thoracic posterior hypertonicity as well as pectoralis major tightness left worse than right  Treatment Method:  Direct Treatment, Soft Tissue Treatment and Myofascial Release Treatment    Response:  Improved    Lumbar details:     Examination Method:  Tissue Texture Change, Stability, Laxity, Effusions, Tone and Asymmetry, Misalignment, Crepitation, Defects, Masses    Osteopathic Findings:  Levoscoliosis of lumbar spine with associated QL and paraspinal hypertonicity     Treatment Method:  Myofascial Release Treatment, Soft Tissue Treatment and Muscle Energy Treatment    Response:  Improved - The somatic dysfunction is improved but not completely resolved  Pelvis Innominate details:     Examination Method:  Asymmetry, Misalignment, Crepitation, Defects, Masses and Range of Motion, Contracture    Osteopathic Findings:  Right ASIS superior to left     Treatment Method:  Myofascial Release Treatment and Soft Tissue Treatment    Response:  Improved - The somatic dysfunction is improved but not completely resolved  Total Regions Treated:  4  Attending provider present in exam room for procedure:  Yes

## 2022-08-26 ENCOUNTER — TELEPHONE (OUTPATIENT)
Dept: FAMILY MEDICINE CLINIC | Facility: CLINIC | Age: 37
End: 2022-08-26

## 2022-08-26 DIAGNOSIS — M41.9 SCOLIOSIS OF THORACIC SPINE, UNSPECIFIED SCOLIOSIS TYPE: Primary | ICD-10-CM

## 2022-08-26 NOTE — TELEPHONE ENCOUNTER
Called patient to schedule lab appointment but she stated she received 6 weeks of levothyroxine 125 but only has 2 weeks of the levothyroxine 137. She would like enough of the higher dosage to last 6 week. Referral created and faxed to [facility]

## 2022-08-26 NOTE — TELEPHONE ENCOUNTER
Patients mother called to request a referral for pt for patient's scoliosis  Referral to be faxed to [facility]

## 2022-08-29 RX ORDER — DRONABINOL 2.5 MG/1
CAPSULE ORAL
Qty: 90 CAPSULE | Refills: 4 | Status: SHIPPED | OUTPATIENT
Start: 2022-08-29

## 2022-08-29 RX ORDER — DRONABINOL 2.5 MG/1
CAPSULE ORAL
Qty: 90 CAPSULE | Refills: 0 | OUTPATIENT
Start: 2022-08-29

## 2022-08-30 ENCOUNTER — OFFICE VISIT (OUTPATIENT)
Dept: OBGYN CLINIC | Facility: HOSPITAL | Age: 37
End: 2022-08-30
Payer: MEDICARE

## 2022-08-30 VITALS — BODY MASS INDEX: 18.08 KG/M2 | WEIGHT: 126 LBS

## 2022-08-30 DIAGNOSIS — Z86.69 HISTORY OF ANOXIC BRAIN INJURY: Primary | ICD-10-CM

## 2022-08-30 DIAGNOSIS — R25.2 SPASTICITY: ICD-10-CM

## 2022-08-30 PROCEDURE — 99214 OFFICE O/P EST MOD 30 MIN: CPT | Performed by: ORTHOPAEDIC SURGERY

## 2022-08-30 NOTE — PROGRESS NOTES
ASSESSMENT/PLAN:    Assessment:   43 y o  male History of anoxic brain injury, bilateral upper and lower extremity spasticity    Plan: Today I had a long discussion with the patient and caregiver regarding the diagnosis and plan moving forward  Clinically I do not appreciate a true scoliosis for [place]  X-ray findings appear more to be positional   I did discuss with mom that the pain he is having in the right buttock region is likely more positional and postural   It is difficult for him to offload these areas secondary to his underlying neuromuscular disease  I would not recommend any bracing or additional imaging of his spine at this time  We did discuss utilizing wedges in the wheelchair to help keep him more upright and hopefully offload any areas of increased pressure  At present time his feet are well position in the braces and he is resting comfortably in the chair  I would not recommend any surgical intervention for him or additional bracing  He should continue with strengthening to optimize his ability to compensate for his underlying condition  Follow up:  As needed    The above diagnosis and plan has been dicussed with the patient and caregiver  They verbalized an understanding and will follow up accordingly  _____________________________________________________  CHIEF COMPLAINT:  Chief Complaint   Patient presents with    Spine - New Patient Visit, Scoliosis         SUBJECTIVE:  Stepan Stephenson is a 43 y o  male who presents today with mother and nurse caregiver who assisted in history, for evaluation of scoliosis  Patient was referred for orthopedic consultation by his Physical Medicine and Rehabilitation physician Dr Rikki Reyes  Patient has a history of anoxic brain injury at age 13 and has been using a wheelchair since  He has residual spasticity in the upper and lower extremities which mom reports has improved substantially over time   He does ongoing physical therapy and has done Botox injections in the lower extremities  He will walking and do weightbearing exercises during physical therapy but otherwise is nonambulatory  Patient does have a history of scoliosis but mom has noticed he is leaning more to one side recently  He has been doing OMT sessions recently to alleviate some of his pain  Pain:Positive in the lower back, also history of pain in the right hip  Previously relieved with Voltaren gel  Patient denies any weakness, numbness, night pain, bowel or bladder incontinence       PAST MEDICAL HISTORY:  Past Medical History:   Diagnosis Date    Abnormal ECG  and beyond    post cardiac arrest    Allergic rhinitis     [facility]     Cardiac arrest [facility])     age 13 s/p long Q-T syndrome    Community acquired pneumonia     last assessed/resolved:  10/9/2014    Depression     Disease of thyroid gland     GERD (gastroesophageal reflux disease)     Hypothyroid 2020    Long Q-T syndrome     Muscular rigidity and spasm, progressive     Osteopenia     Osteoporosis     [facility]gia [facility] [facility]     Scoliosis of thoracic spine 2020    Spastic neurogenic bladder     Syncope     beta blocker medication DC because of low blood pressure    Urinary incontinence     Urinary tract infection without hematuria 2019       PAST SURGICAL HISTORY:  Past Surgical History:   Procedure Laterality Date    APPENDECTOMY      UPPER GASTROINTESTINAL ENDOSCOPY      WISDOM TOOTH EXTRACTION         FAMILY HISTORY:  Family History   Problem Relation Age of Onset    Other Father         mitral valve replaced    Hypertension Father     Diabetes type II Maternal Grandfather     Heart failure Maternal Grandfather         Congestive heart failure -     Diabetes type II Maternal Uncle     Hypotension Mother        SOCIAL HISTORY:  Social History     Tobacco Use    Smoking status: Never Smoker    Smokeless tobacco: Never Used   Vaping Use    Vaping Use: Never used   Substance Use Topics    Alcohol use: No    Drug use: No       MEDICATIONS:    Current Outpatient Medications:     ascorbic acid (GNP VITAMIN C) 500 MG tablet, Take 500 mg daily at 4 PM, Disp: 30 tablet, Rfl: 5    b complex-vitamin C-folic acid (RENAL) 1 mg, Take 1 mg daily at 4 PM, Disp: 31 each, Rfl: 5    bisacodyl (DULCOLAX) 10 mg suppository, Use 1 supp  per rectum QOD PRN for constipation, max 3d/week, Disp: 12 suppository, Rfl: 0    Camphor-Eucalyptus-Menthol (VICKS VAPORUB) 4 73-1 2-2 6 % OINT, Apply topically as needed (congestion), Disp: 170 g, Rfl: 0    carbamide peroxide (DEBROX) 6 5 % otic solution, Administer 5 drops into both ears 2 (two) times a day for 7 days, Disp: 15 mL, Rfl: 5    Coenzyme Q10 (Co Q10) 100 MG CAPS, Take 2 capsules by mouth daily Whole in yougurt or pudding, Disp: 60 capsule, Rfl: 1    Diapers & Supplies MISC, Use 5 (five) times a day Huggies #3, Disp: 450 each, Rfl: 2    diclofenac sodium (VOLTAREN) 1 %, Apply 2 g topically 3 (three) times a day At 10am, 4pm, 9pm to thoracic paravertebral musculature, Disp: 100 g, Rfl: 2    Diclofenac Sodium (VOLTAREN) 1 %, Apply 2g to midline back and/or R knee TID PRN (Patient not taking: Reported on 3/24/2022 ), Disp: 150 g, Rfl: 1    Diclofenac Sodium (VOLTAREN) 1 %, Apply 2g 2-3x a day to R hip region schedule   (Patient not taking: Reported on 3/24/2022 ), Disp: 100 g, Rfl: 0    Disposable Gloves (Vinyl Gloves Medium) MISC, Use 4 (four) times a day Dispense 3 boxes monthly; Diagnosis R32, Disp: 3 each, Rfl: 5    docusate sodium (COLACE) 100 mg capsule, 100 mg 9am and 9pm, Disp: 10 capsule, Rfl: 5    docusate sodium (Enemeez Mini) 283 MG enema, Insert 1 enema into the rectum daily, Disp: 30 each, Rfl: 3    dronabinol (MARINOL) 2 5 mg capsule, TAKE ONE CAPSULE -PLACE WHOLE IN YOGURT/PUDDING BY MOUTH 3 TIMES A DAY (9AM-4PM-9PM) *CALL PHARMACY 5 DAYS IN ADVANCE FOR REFILL*, Disp: 90 capsule, Rfl: 4   Elastic Bandages & Supports (Knee Brace) MISC, Use as directed according to PT recommendations, Disp: 1 each, Rfl: 0    Emollient (CETA-KLENZ EX), Apply topically, Disp: , Rfl:     Emollient (eucerin) lotion, Apply topically to face daily at 10a and 8p, Disp: 480 mL, Rfl: 3    fluticasone (FLONASE) 50 mcg/act nasal spray, 1 spray into each nostril daily, Disp: , Rfl:     glycerin adult 2 g suppository, Place 1 suppository QOD for 2 weeks, then QOD PRN for constipation, Disp: 50 suppository, Rfl: 6    HEMORRHOIDAL 0 25 % suppository, - UNWRAP AND INSERT 1 SUPPOSITORY PER RECTUM AS NEEDED FOR HEMORRHOID PAIN RELIEF, Disp: 12 suppository, Rfl: 11    ibuprofen (MOTRIN) 200 mg tablet, Take 2 tabs q6h PRN for pain not to exceed 2000mg per day, Disp: 200 tablet, Rfl: 2    Icosapent Ethyl (VASCEPA) 1 g CAPS, Take 1 capsule (1 g total) by mouth daily, Disp: 31 capsule, Rfl: 11    Incontinence Supply Disposable (Prevail Air Briefs) MISC, Use 1 each every 4 (four) hours Please provide 5 briefs per day, Disp: 150 each, Rfl: 11    Incontinence Supply Disposable (Select Disposable Underwear Sm) MISC, Please provide a 30 day supply 10 per day DX R32 incontinence, Disp: 22 each, Rfl: 6    Incontinence Supply Disposable (Simplicity Insert Pad 84"-69") MISC, Use 5 (five) times a day, Disp: 450 each, Rfl: 1    influenza vaccine, subunit, quadrivalent (Flucelvax Quadrivalent), Flucelvax Quad 4820-3936 60 mcg (15 mcg x 4)/0 5 mL intramuscular susp  TO BE ADMINISTERED BY PHARMACIST FOR IMMUNIZATION (PER CDC GUIDELINES), Disp: , Rfl:     levothyroxine 75 mcg tablet, Take 1 tablet (75 mcg total) by mouth daily, Disp: 90 tablet, Rfl: 2    loratadine (CLARITIN) 10 mg tablet, Take 1 tablet (10 mg total) by mouth daily as needed for allergies, Disp: 30 tablet, Rfl: 5    magnesium citrate (CITROMA) 1 745 g/30 mL oral solution, Take 296 mL by mouth once for 1 dose, Disp: 296 mL, Rfl: 0    Magnesium Oxide 500 MG TABS, Take 1 tablet (500 mg total) by mouth daily Take 1 tablet daily at 4 PM, Disp: 31 each, Rfl: 5    Magnesium Oxide 500 MG TABS, Daily   (Patient not taking: Reported on 3/24/2022 ), Disp: , Rfl:     Melatonin 5 MG TABS, Take 1 tablet (5 mg total) by mouth daily at bedtime, Disp: 31 each, Rfl: 5    methocarbamol (ROBAXIN) 500 mg tablet, Take 0 5-1 tablet by mouth TID PRN for muscle spasms/pain  Can take prior to therapy  , Disp: 30 tablet, Rfl: 0    Misc  Devices [facility]) MISC, Please provide pt with a new cord for power wheelchair, Disp: 1 each, Rfl: 0    modafinil (PROVIGIL) 200 MG tablet, TAKE ONE TABLET -PLACE WHOLE IN YOGURT/PUDDING BY MOUTH EVERY DAY (9AM), Disp: 180 tablet, Rfl: 0    Multiple Vitamins-Minerals (CEROVITE SENIOR) TABS, Take 1 tablet by mouth daily, Disp: 30 tablet, Rfl: 5    Nutritional Supplements (Nutra Shake) (Frozen) LIQD, Give 1 Magic Cup frozen dessert (4 oz/118 mL) PO QD; may also give PRN for added nutritional/caloric/fat intake  Give frozen or defrost to eat as a pudding   Flavor choice per patient preference (vanilla, chocolate, butter pecan, wild berry, orange creme), Disp: 118 mL, Rfl: 3    Nutritional Supplements (NUTRITIONAL SHAKE) LIQD, Take 1 Can by mouth 2 (two) times a day Please provide Boost 90 minutes before lunch and 90 minutes before dinner, Disp: 60 Bottle, Rfl: 6    omeprazole (PriLOSEC) 20 mg delayed release capsule, Take 20mg at 9am every day, Disp: 30 capsule, Rfl: 5    onabotulinumtoxin A (BOTOX) 100 units, inject 500 units into left gastroc soleus and abductor pollicis ankit, Disp: , Rfl:     polyethylene glycol (GLYCOLAX) 17 GM/SCOOP, , Disp: , Rfl:     polyethylene glycol (MIRALAX) 17 g packet, Take 17 g by mouth 2 (two) times a day, Disp: 180 each, Rfl: 3    PREPARATION H 1-0 25-14 4-15 % rectal cream, - APPLY RECTALLY AS NEEDED FOR HEMORRHOID PAIN RELIEF, Disp: 51 g, Rfl: 11    Respiratory Therapy Supplies (Spirometer) KIT, Use 3 (three) times a day Please provide incentive spirometer, Disp: 1 kit, Rfl: 0    sertraline (ZOLOFT) 100 mg tablet, Take 1 tablet (100 mg total) by mouth daily, Disp: 31 tablet, Rfl: 11    Skin Protectants, Misc  (Hydrocerin) LOTN, Apply topically 2 (two) times a day Apply to both feeland legs twice a day, Disp: , Rfl: 0    sodium chloride (DEEP SEA NASAL SPRAY) 0 65 % nasal spray, 2 sprays into each nostril as needed for congestion, Disp: 44 mL, Rfl: 11    SODIUM FLUORIDE, DENTAL GEL, (PreviDent 5000 Plus) 1 1 % CREA, Take by mouth daily at bedtime Apply orally to teeth once daily while brushing at night time do not rinse mouth after brushing, Disp: , Rfl: 0    SODIUM FLUORIDE, DENTAL GEL, 1 1 % GEL, SF 5000 Plus 1 1 % dental cream, Disp: , Rfl:     Starch, Thickening, LIQD, Nectar thick liquids up to honey thick if coughing occurs as needed, please use Simply Thick liquid only  Discontinue Thick-It powder , Disp: 237 mL, Rfl: 5    Tea Tree 100 % OIL, Apply 1 g topically daily as needed (rash) Apply topically daily to skin folds, Disp: 60 mL, Rfl: 11    Vitamins A & D (VITAMIN A & D) ointment, - APPLY TO AFFECTED AREA (BUTTOCKS/ANAL) AS NEEDED, Disp: 454 g, Rfl: 11    zinc oxide (DESITIN) 40 % PSTE, Apply topically , Disp: , Rfl:     zinc sulfate (ZINCATE) 220 mg capsule, TAKE TWO CAPSULE -PLACE WHOLE IN YOGURT/PUDDING BY MOUTH EVERY DAY (9AM) EVERY OTHER MONTH **ODD NUMBERED MONTHS ONLY**, Disp: 62 capsule, Rfl: 0    ALLERGIES:  Allergies   Allergen Reactions    Other      drugs that prolong the QT interval  drugs that prolong the QT interval  Seasonal allergies        REVIEW OF SYSTEMS:  ROS is negative other than that noted in the HPI  Constitutional: Negative for fatigue and fever  HENT: Negative for sore throat  Respiratory: Negative for shortness of breath  Cardiovascular: Negative for chest pain  Gastrointestinal: Negative for abdominal pain  Endocrine: Negative for cold intolerance and heat intolerance  Genitourinary: Negative for flank pain  Musculoskeletal: Negative for back pain  Skin: Negative for rash  Allergic/Immunologic: Negative for immunocompromised state  Neurological: Negative for dizziness  Psychiatric/Behavioral: Negative for agitation  _____________________________________________________  PHYSICAL EXAMINATION:  There were no vitals filed for this visit  General/Constitutional:  Wheelchair-bound male good head control  HENT: Normocephalic, atraumatic  CV: Intact distal pulses, regular rate  Resp: No respiratory distress or labored breathing  Lymphatic: No lymphadenopathy palpated  Neuro:  Cooperative with exam, nonverbal; increased tone  Skin: Warm, dry, no rashes, no erythema      MUSCULOSKELETAL EXAMINATION:  Examination mostly wheelchair, patient nonambulatory  Has good head control with increased tone in the upper and lower extremities  Sitting mostly upright although he lists to his right side  Skin: Intact, no hairy patches, no rashes or lesions  Shoulder height: Right higher than left  Deformity: None  Slight asymmetry with forward bend   Trunk Shift: Negative  Leg lengths grossly equal  Bilateral feet/ankles with cavus and increased tone, dorsiflexion to neutral  Flexion contracture of the knees of approximately 10°  , no flexion contractures of the hips    Spontaneously moving both upper and lower extremities  Able to stand with assistance    _____________________________________________________  STUDIES REVIEWED:  Previous scoliosis x-rays reviewed and demonstrate no significant scoliotic curve  Any curve noted appears to be positional relative to how he was standing for the x-ray  MRI pelvis reviewed and demonstrates normal hips with no significant degenerative changes  No acute osseous abnormalities  Both hips well located        PROCEDURES PERFORMED:  No Procedures performed today     Scribe Attestation    I,:  Arie Pérez am acting as a scribe while in the presence of the attending physician :       I,:  Reta Scanlon DO personally performed the services described in this documentation    as scribed in my presence :

## 2022-09-01 ENCOUNTER — OFFICE VISIT (OUTPATIENT)
Dept: GASTROENTEROLOGY | Facility: CLINIC | Age: 37
End: 2022-09-01
Payer: MEDICARE

## 2022-09-01 VITALS — TEMPERATURE: 97.7 F | DIASTOLIC BLOOD PRESSURE: 70 MMHG | SYSTOLIC BLOOD PRESSURE: 112 MMHG

## 2022-09-01 DIAGNOSIS — K59.2 CONSTIPATION DUE TO NEUROGENIC BOWEL: ICD-10-CM

## 2022-09-01 DIAGNOSIS — K59.00 CONSTIPATION DUE TO NEUROGENIC BOWEL: ICD-10-CM

## 2022-09-01 DIAGNOSIS — R13.12 OROPHARYNGEAL DYSPHAGIA: Primary | ICD-10-CM

## 2022-09-01 DIAGNOSIS — S06.9XAS CHRONIC BRAIN INJURY: ICD-10-CM

## 2022-09-01 PROCEDURE — 99214 OFFICE O/P EST MOD 30 MIN: CPT | Performed by: INTERNAL MEDICINE

## 2022-09-01 RX ORDER — MULTIVITAMIN WITH IRON
TABLET ORAL DAILY
COMMUNITY
Start: 2022-08-24

## 2022-09-01 NOTE — PROGRESS NOTES
[facility] - Outpatient Follow-up Note  Monica Hagen 43 y o  male MRN: [id number]  Encounter: [id number]    ASSESSMENT AND PLAN: 43year old male here for follow up  He has a chronic brain injury and constipation due to neurogenic bowel  His team including a caregiver and his mom on the phone accompanied him to this appointment  He is having a bowel movement daily  His weight is stable with a BMI of 18  His PCP added a protein shake  His team brought his diet and this was reviewed  He eats a great variety of foods  I recommend continued current treatment plan  All patient and family members questions addressed  ____________________________________________________  SUBJECTIVE:  43year old male here for follow up  Having a BM daily  His weight is stable  He is eating a variety of foods that are presented to him  He is accompanied by 2 staff members and also his mom on the phone  REVIEW OF SYSTEMS IS OTHERWISE NEGATIVE        Historical Information   Past Medical History:   Diagnosis Date    Abnormal ECG 1998 and beyond    post cardiac arrest    Allergic rhinitis     [facility]     Cardiac arrest [facility])     age 13 s/p long Q-T syndrome    Community acquired pneumonia     last assessed/resolved:  10/9/2014    Depression     Disease of thyroid gland     GERD (gastroesophageal reflux disease)     Hypothyroid 2/26/2020    Long Q-T syndrome     Muscular rigidity and spasm, progressive     Osteopenia     Osteoporosis     Quadri[facility]gia ([facility]     Scoliosis of thoracic spine 2/27/2020    Spastic neurogenic bladder     Syncope     beta blocker medication DC because of low blood pressure    Urinary incontinence     Urinary tract infection without hematuria 4/27/2019     Past Surgical History:   Procedure Laterality Date    APPENDECTOMY  1991    UPPER GASTROINTESTINAL ENDOSCOPY      WISDOM TOOTH EXTRACTION       Social History   Social History     Substance and Sexual Activity   Alcohol Use No     Social History     Substance and Sexual Activity   Drug Use No     Social History     Tobacco Use   Smoking Status Never Smoker   Smokeless Tobacco Never Used     Family History   Problem Relation Age of Onset    Other Father         mitral valve replaced    Hypertension Father     Diabetes type II Maternal Grandfather     Heart failure Maternal Grandfather         Congestive heart failure -     Diabetes type II Maternal Uncle     Hypotension Mother        Meds/Allergies       Current Outpatient Medications:     ascorbic acid (GNP VITAMIN C) 500 MG tablet    b complex-vitamin C-folic acid (RENAL) 1 mg    bisacodyl (DULCOLAX) 10 mg suppository    Camphor-Eucalyptus-Menthol (VICKS VAPORUB) 4 73-1 2-2 6 % OINT    carbamide peroxide (DEBROX) 6 5 % otic solution    Coenzyme Q10 (Co Q10) 100 MG CAPS    Diapers & Supplies MISC    diclofenac sodium (VOLTAREN) 1 %    Disposable Gloves (Vinyl Gloves Medium) MISC    docusate sodium (COLACE) 100 mg capsule    docusate sodium (Enemeez Mini) 283 MG enema    dronabinol (MARINOL) 2 5 mg capsule    Elastic Bandages & Supports (Knee Brace) MISC    Emollient (CETA-KLENZ EX)    Emollient (eucerin) lotion    fluticasone (FLONASE) 50 mcg/act nasal spray    glycerin adult 2 g suppository    HEMORRHOIDAL 0 25 % suppository    ibuprofen (MOTRIN) 200 mg tablet    Icosapent Ethyl (VASCEPA) 1 g CAPS    Incontinence Supply Disposable (Prevail Air Briefs) MISC    Incontinence Supply Disposable (Select Disposable Underwear Sm) MISC    Incontinence Supply Disposable (Simplicity Insert Pad 44"-85") MISC    influenza vaccine, subunit, quadrivalent (Flucelvax Quadrivalent)    levothyroxine 75 mcg tablet    loratadine (CLARITIN) 10 mg tablet    Magnesium 250 MG TABS    Magnesium Oxide 500 MG TABS    Melatonin 5 MG TABS    methocarbamol (ROBAXIN) 500 mg tablet    Misc   Devices [facility]) MISC    modafinil (PROVIGIL) 200 MG tablet    Multiple Vitamins-Minerals (CEROVITE SENIOR) TABS    Nutritional Supplements (Nutra Shake) (Frozen) LIQD    Nutritional Supplements (NUTRITIONAL SHAKE) LIQD    omeprazole (PriLOSEC) 20 mg delayed release capsule    onabotulinumtoxin A (BOTOX) 100 units    PREPARATION H 1-0 25-14 4-15 % rectal cream    sertraline (ZOLOFT) 100 mg tablet    Skin Protectants, Misc  (Hydrocerin) LOTN    sodium chloride (DEEP SEA NASAL SPRAY) 0 65 % nasal spray    SODIUM FLUORIDE, DENTAL GEL, (PreviDent 5000 Plus) 1 1 % CREA    Starch, Thickening, LIQD    Tea Tree 100 % OIL    Vitamins A & D (VITAMIN A & D) ointment    zinc oxide (DESITIN) 40 % PSTE    zinc sulfate (ZINCATE) 220 mg capsule    Diclofenac Sodium (VOLTAREN) 1 %    Diclofenac Sodium (VOLTAREN) 1 %    magnesium citrate (CITROMA) 1 745 g/30 mL oral solution    Magnesium Oxide 500 MG TABS    polyethylene glycol (GLYCOLAX) 17 GM/SCOOP    polyethylene glycol (MIRALAX) 17 g packet    Respiratory Therapy Supplies (Spirometer) KIT    SODIUM FLUORIDE, DENTAL GEL, 1 1 % GEL    Allergies   Allergen Reactions    Other      drugs that prolong the QT interval  drugs that prolong the QT interval  Seasonal allergies            Objective     Blood pressure 112/70, temperature 97 7 °F (36 5 °C), temperature source Tympanic  There is no height or weight on file to calculate BMI  PHYSICAL EXAM:      General Appearance:   Wheelchair-bound, well-kept, thin appearing   HEENT:   Normocephalic, atraumatic, anicteric      Neck:  Supple, symmetrical, trachea midline   Lungs:   Clear to auscultation bilaterally; no rales, rhonchi or wheezing; respirations unlabored    Heart[de-identified]   Regular rate and rhythm; no murmur, rub, or gallop     Abdomen:   Soft, non-tender, non-distended; normal bowel sounds; no masses, no organomegaly    Genitalia:   Deferred    Rectal:   Deferred    Extremities:  No cyanosis, clubbing or edema    Pulses:  2+ and symmetric    Skin:  No jaundice, rashes, or lesions    Lymph nodes:  No palpable cervical lymphadenopathy        Lab Results:   No visits with results within 1 Day(s) from this visit  Latest known visit with results is:   Office Visit on 06/01/2022   Component Date Value     RAPID STREP A 06/01/2022 Negative     SARS-CoV-2 06/01/2022 Negative     INFLUENZA A PCR 06/01/2022 Negative     INFLUENZA B PCR 06/01/2022 Negative     Throat Culture 06/01/2022 Negative for beta-hemolytic Streptococcus          Radiology Results:   No results found

## 2022-09-02 ENCOUNTER — TELEPHONE (OUTPATIENT)
Dept: FAMILY MEDICINE CLINIC | Facility: CLINIC | Age: 37
End: 2022-09-02

## 2022-09-02 ENCOUNTER — DOCUMENTATION (OUTPATIENT)
Dept: FAMILY MEDICINE CLINIC | Facility: CLINIC | Age: 37
End: 2022-09-02

## 2022-09-08 ENCOUNTER — TELEPHONE (OUTPATIENT)
Dept: FAMILY MEDICINE CLINIC | Facility: CLINIC | Age: 37
End: 2022-09-08

## 2022-09-08 ENCOUNTER — TELEMEDICINE (OUTPATIENT)
Dept: FAMILY MEDICINE CLINIC | Facility: CLINIC | Age: 37
End: 2022-09-08
Payer: MEDICARE

## 2022-09-08 DIAGNOSIS — U07.1 COVID-19: Primary | ICD-10-CM

## 2022-09-08 DIAGNOSIS — R05.9 COUGH: Primary | ICD-10-CM

## 2022-09-08 PROCEDURE — 99214 OFFICE O/P EST MOD 30 MIN: CPT | Performed by: FAMILY MEDICINE

## 2022-09-08 RX ORDER — NIRMATRELVIR AND RITONAVIR 300-100 MG
3 KIT ORAL 2 TIMES DAILY
Qty: 30 TABLET | Refills: 0 | Status: SHIPPED | OUTPATIENT
Start: 2022-09-08 | End: 2022-09-13

## 2022-09-08 NOTE — PROGRESS NOTES
I spoke with Isabella Morales and Chrissy Grier regarding positive COVID test   Paxlovid was discussed, with all of the aspects of the EUA reviewed  We agreed that Naman's underlying pulmonary function and previous PNA, as well as his frailty, increase his level of risk  No medication interactions are concerning at this time  Will proceed with rx and monitor closely  Call precautions emphasized  Shivamdavid Grier has increased to q4h vital signs and will call if Naman deteriorates  Mercymarce Love is an investigational medicine used to treat mild-to-moderate COVID-19 in adults and children [15years of age and older weighing at least 80 pounds (36 kg)] with positive results of direct SARS-CoV-2 viral testing, and who are at high risk for progression to severe COVID-19, including hospitalization or death  PAXLOVID is investigational because it is still being studied  There is limited information about the safety and effectiveness of using PAXLOVID to treat people with mild-to-moderate COVID-19  The FDA has authorized the emergency use of PAXLOVID for the treatment of mild-tomoderate COVID-19 in adults and children [15years of age and older weighing at least 80 pounds (36 kg)] with a positive test for the virus that causes COVID-19, and who are at high risk for progression to severe COVID-19, including hospitalization or death, under an EUA  What should I tell my healthcare provider before I take PAXLOVID? Tell your healthcare provider if you:   Have any allergies   Have liver or kidney disease   Are pregnant or plan to become pregnant   Are breastfeeding a child   ave any serious illnesses    Tell your healthcare provider about all the medicines you take, including prescription and over-the-counter medicines, vitamins, and herbal supplements  Some medicines may interact with PAXLOVID and may cause serious side effects  Keep a list of your medicines to show your healthcare provider and pharmacist when you get a new medicine      You can ask your healthcare provider or pharmacist for a list of medicines that interact with PAXLOVID  Do not start taking a new medicine without telling your healthcare provider  Your healthcare provider can tell you if it is safe to take PAXLOVID with other medicines  Tell your healthcare provider if you are taking combined hormonal contraceptive  PAXLOVID may affect how your birth control pills work  Females who are able to become pregnant should use another effective alternative form of contraception or an additional barrier method of contraception  Talk to your healthcare provider if you have any questions about contraceptive methods that might be right for you  How do I take [address] consists of 2 medicines: nirmatrelvir and ritonavir  o Take 2 pink tablets of nirmatrelvir with 1 white tablet of ritonavir by mouth 2 times each day (in the morning and in the evening) for 5 days  For each dose, take all 3 tablets at the same time  o If you have kidney disease, talk to your healthcare provider  You may need a different dose   Swallow the tablets whole  Do not chew, break, or crush the tablets   Take PAXLOVID with or without food   Do not stop taking PAXLOVID without talking to your healthcare provider, even if you feel better   If you miss a dose of PAXLOVID within 8 hours of the time it is usually taken, take it as soon as you remember  If you miss a dose by more than 8 hours, skip the missed dose and take the next dose at your regular time  Do not take 2 doses of PAXLOVID at the same time   If you take too much PAXLOVID, call your healthcare provider or go to the nearest hospital emergency room right away   If you are taking a ritonavir- or cobicistat-containing medicine to treat hepatitis C or Human Immunodeficiency Virus (HIV), you should continue to take your medicine as prescribed by your healthcare provider     Talk to your healthcare provider if you do not feel better or if you feel worse after 5 days  Who should generally not take PAXLOVID? Do not take PAXLOVID if:   You are allergic to nirmatrelvir, ritonavir, or any of the ingredients in PAXLOVID   You are taking any of the following medicines:  o Alfuzosin  o Pethidine, piroxicam, propoxyphene  o Ranolazine  o Amiodarone, dronedarone, flecainide, propafenone, quinidine  o Colchicine  o Lurasidone, pimozide, clozapine  o Dihydroergotamine, ergotamine, methylergonovine  o Lovastatin, simvastatin  o Sildenafil (Revatio®) for pulmonary arterial hypertension (PAH)  o Triazolam, oral midazolam  o Apalutamide  o Carbamazepine, phenobarbital, phenytoin  o Rifampin  o St  Edmundos Wort (hypericum perforatum)    What are the important possible side effects of PAXLOVID? Possible side effects of PAXLOVID are:   Liver Problems  Tell your healthcare provider right away if you have any of these signs and symptoms of liver problems: loss of appetite, yellowing of your skin and the whites of eyes (jaundice), dark-colored urine, pale colored stools and itchy skin, stomach area (abdominal) pain   Resistance to HIV Medicines  If you have untreated HIV infection, PAXLOVID may lead to some HIV medicines not working as well in the future   Other possible side effects include: altered sense of taste, diarrhea, high blood pressure, or muscle aches    These are not all the possible side effects of PAXLOVID  Not many people have taken PAXLOVID  Serious and unexpected side effects may happen  Mercymarce Love is still being studied, so it is possible that all of the risks are not known at this time  What other treatment choices are there? Like Rachel Monahan may allow for the emergency use of other medicines to treat people with COVID-19   Go to https://[employer]Perfect/ for information on the emergency use of other medicines that are authorized by FDA to treat people with COVID-19  Your healthcare provider may talk with you about clinical trials for which you may be eligible  It is your choice to be treated or not to be treated with PAXLOVID  Should you decide not to receive it or for your child not to receive it, it will not change your standard medical care  What if I am pregnant or breastfeeding? There is no experience treating pregnant women or breastfeeding mothers with PAXLOVID  For a mother and unborn baby, the benefit of taking PAXLOVID may be greater than the risk from the treatment  If you are pregnant, discuss your options and specific situation with your healthcare provider  It is recommended that you use effective barrier contraception or do not have sexual activity while taking PAXLOVID  If you are breastfeeding, discuss your options and specific situation with your healthcare provider  How do I report side effects with PAXLOVID? Contact your healthcare provider if you have any side effects that bother you or do not go away  Report side effects to FDA MedWatch at www [employer] gov/medwatch or call 3-098-AZG3127 or you can report side effects to [facility]  at the contact information provided below  Website Fax number Telephone number   Naldo [phone]     How should I store Marybel Castro? Store PAXLOVID tablets at room temperature between 68°F to 77°F (20°C to 25°C)    Full fact sheet for patients, parents, and caregivers can be found at: http://www [employer]/

## 2022-09-08 NOTE — TELEPHONE ENCOUNTER
Gayathri Henning said that Naman tested positive for Covid, and was seeking advice for what to do next   Thank you

## 2022-09-08 NOTE — PROGRESS NOTES
COVID-19 Outpatient Progress Note    Assessment/Plan:    Problem List Items Addressed This Visit        Other    Cough - Primary         Disposition:     Discussed vitamin D, vitamin C, and/or zinc supplementation with patient  I've asked Hector Layton and his care team to perform a home COVID test and call with results  Hector Layton is borderline for being high-risk for MKEAE-92 complications  While his anoxic injury and limitations of ambulation confer a degree of risk, and he has a history of predisposition for PNA, he does not meet traditional high risk criteria  We will discuss the results of his test when available and consider oral antivirals if appropriate  I have spent 15 minutes directly with the patient  Greater than 50% of this time was spent in counseling/coordination of care regarding: instructions for management  Encounter provider: Leda Emery MD     Provider located at: [address]      Recent Visits  Date Type Provider Dept   09/02/22 Telephone Governarnav Carbajal MD [address] recent visits within past 7 days and meeting all other requirements  Today's Visits  Date Type Provider Dept   09/08/22 Telemedicine Leda Emery MD [id number] today's visits and meeting all other requirements  Future Appointments  No visits were found meeting these conditions  Showing future appointments within next 150 days and meeting all other requirements     This virtual check-in was done via [employer] and patient was informed that this is a secure, HIPAA-compliant platform  He agrees to proceed  Patient agrees to participate in a virtual check in via telephone or video visit instead of presenting to the office to address urgent/immediate medical needs  Patient is aware this is a billable service  He acknowledged consent and understanding of privacy and security of the video platform   The patient has agreed to participate and understands they can discontinue the visit at any time  After connecting through [facility], the patient was identified by name and date of birth  Samara Cox was informed that this was a telemedicine visit and that the exam was being conducted confidentially over secure lines  My office door was closed  No one else was in the room  Samara Cox acknowledged consent and understanding of privacy and security of the telemedicine visit  I informed the patient that I have reviewed his record in Epic and presented the opportunity for him to ask any questions regarding the visit today  The patient agreed to participate  Verification of patient location:  Patient is located in the following state in which I hold an active license: [place]    Subjective:   Samara Cox is a 43 y o  male who is concerned about COVID-19  Patient's symptoms include nasal congestion, rhinorrhea, cough and myalgias  Patient denies fever, chills, fatigue, malaise, sore throat, anosmia, loss of taste, shortness of breath, chest tightness, abdominal pain, nausea, vomiting, diarrhea and headaches           COVID-19 vaccination status: Fully vaccinated with booster    Exposure:   Contact with a person who is under investigation (PUI) for or who is positive for COVID-19 within the last 14 days?: No    Hospitalized recently for fever and/or lower respiratory symptoms?: No      Currently a healthcare worker that is involved in direct patient care?: No      Works in a special setting where the risk of COVID-19 transmission may be high? (this may include long-term care, correctional and halfway facilities; homeless shelters; assisted-living facilities and group homes ): No      Resident in a special setting where the risk of COVID-19 transmission may be high? (this may include long-term care, correctional and halfway facilities; homeless shelters; assisted-living facilities and group homes ): No      Naman started yesterday with cough (productive), congestion (given nasal saline, Pato's)  Having myalgia today  No cough today, but sneezing today  No fever  No known COVID exposure  One sick contact last week      Lab Results   Component Value Date    SARSCOV2 Negative 06/01/2022    SARSCOV2 Not Detected 11/16/2020     Past Medical History:   Diagnosis Date    Abnormal ECG 1998 and beyond    post cardiac arrest    Allergic rhinitis     [facility] anoxia ([facility] )     Cardiac arrest [facility])     age 13 s/p long Q-T syndrome    Community acquired pneumonia     last assessed/resolved:  10/9/2014    Depression     Disease of thyroid gland     GERD (gastroesophageal reflux disease)     Hypothyroid 2/26/2020    Long Q-T syndrome     Muscular rigidity and spasm, progressive     Osteopenia     Osteoporosis     Quadriplegia ([facility] )     Scoliosis of thoracic spine 2/27/2020    Spastic neurogenic bladder     Syncope     beta blocker medication DC because of low blood pressure    Urinary incontinence     Urinary tract infection without hematuria 4/27/2019     Past Surgical History:   Procedure Laterality Date    APPENDECTOMY  1991    UPPER GASTROINTESTINAL ENDOSCOPY      WISDOM TOOTH EXTRACTION       Current Outpatient Medications   Medication Sig Dispense Refill    ascorbic acid (GNP VITAMIN C) 500 MG tablet Take 500 mg daily at 4 PM 30 tablet 5    b complex-vitamin C-folic acid (RENAL) 1 mg Take 1 mg daily at 4 PM 31 each 5    bisacodyl (DULCOLAX) 10 mg suppository Use 1 supp  per rectum QOD PRN for constipation, max 3d/week 12 suppository 0    Camphor-Eucalyptus-Menthol (VICKS VAPORUB) 4 73-1 2-2 6 % OINT Apply topically as needed (congestion) 170 g 0    carbamide peroxide (DEBROX) 6 5 % otic solution Administer 5 drops into both ears 2 (two) times a day for 7 days 15 mL 5    Coenzyme Q10 (Co Q10) 100 MG CAPS Take 2 capsules by mouth daily Whole in yougurt or pudding 60 capsule 1    Diapers & Supplies MISC Use 5 (five) times a day Huggies #3 450 each 2    diclofenac sodium (VOLTAREN) 1 % Apply 2 g topically 3 (three) times a day At 10am, 4pm, 9pm to thoracic paravertebral musculature 100 g 2    Disposable Gloves (Vinyl Gloves Medium) MISC Use 4 (four) times a day Dispense 3 boxes monthly; Diagnosis R32 3 each 5    docusate sodium (COLACE) 100 mg capsule 100 mg 9am and 9pm 10 capsule 5    docusate sodium (Enemeez Mini) 283 MG enema Insert 1 enema into the rectum daily 30 each 3    dronabinol (MARINOL) 2 5 mg capsule TAKE ONE CAPSULE -PLACE WHOLE IN YOGURT/PUDDING BY MOUTH 3 TIMES A DAY (9AM-4PM-9PM) *CALL PHARMACY 5 DAYS IN ADVANCE FOR REFILL* 90 capsule 4    Elastic Bandages & Supports (Knee Brace) MISC Use as directed according to PT recommendations 1 each 0    Emollient (CETA-KLENZ EX) Apply topically      Emollient (eucerin) lotion Apply topically to face daily at 10a and 8p 480 mL 3    fluticasone (FLONASE) 50 mcg/act nasal spray 1 spray into each nostril daily      glycerin adult 2 g suppository Place 1 suppository QOD for 2 weeks, then QOD PRN for constipation 50 suppository 6    HEMORRHOIDAL 0 25 % suppository - UNWRAP AND INSERT 1 SUPPOSITORY PER RECTUM AS NEEDED FOR HEMORRHOID PAIN RELIEF 12 suppository 11    ibuprofen (MOTRIN) 200 mg tablet Take 2 tabs q6h PRN for pain not to exceed 2000mg per day 200 tablet 2    Icosapent Ethyl (VASCEPA) 1 g CAPS Take 1 capsule (1 g total) by mouth daily 31 capsule 11    Incontinence Supply Disposable (Prevail Air Briefs) MISC Use 1 each every 4 (four) hours Please provide 5 briefs per day 150 each 11    Incontinence Supply Disposable (Select Disposable Underwear Sm) MISC Please provide a 30 day supply 10 per day DX R32 incontinence 22 each 6    Incontinence Supply Disposable (Simplicity Insert Pad 31"-66") MISC Use 5 (five) times a day 450 each 1    influenza vaccine, subunit, quadrivalent (Flucelvax Quadrivalent) Flucelvax Quad [id number] 60 mcg (15 mcg x 4)/0 5 mL intramuscular susp   TO BE ADMINISTERED BY PHARMACIST FOR IMMUNIZATION (PER CDC GUIDELINES)      levothyroxine 75 mcg tablet Take 1 tablet (75 mcg total) by mouth daily 90 tablet 2    loratadine (CLARITIN) 10 mg tablet Take 1 tablet (10 mg total) by mouth daily as needed for allergies 30 tablet 5    Magnesium 250 MG TABS       magnesium citrate (CITROMA) 1 745 g/30 mL oral solution Take 296 mL by mouth once for 1 dose 296 mL 0    Magnesium Oxide 500 MG TABS Take 1 tablet (500 mg total) by mouth daily Take 1 tablet daily at 4 PM 31 each 5    Melatonin 5 MG TABS Take 1 tablet (5 mg total) by mouth daily at bedtime 31 each 5    methocarbamol (ROBAXIN) 500 mg tablet Take 0 5-1 tablet by mouth TID PRN for muscle spasms/pain  Can take prior to therapy  30 tablet 0    Misc  Devices [facility]) MISC Please provide pt with a new cord for power wheelchair 1 each 0    modafinil (PROVIGIL) 200 MG tablet TAKE ONE TABLET -PLACE WHOLE IN YOGURT/PUDDING BY MOUTH EVERY DAY (9AM) 180 tablet 0    Multiple Vitamins-Minerals (CEROVITE SENIOR) TABS Take 1 tablet by mouth daily 30 tablet 5    Nutritional Supplements (Nutra Shake) (Frozen) LIQD Give 1 Magic Cup frozen dessert (4 oz/118 mL) PO QD; may also give PRN for added nutritional/caloric/fat intake  Give frozen or defrost to eat as a pudding   Flavor choice per patient preference (vanilla, chocolate, butter pecan, wild berry, orange creme) 118 mL 3    Nutritional Supplements (NUTRITIONAL SHAKE) LIQD Take 1 Can by mouth 2 (two) times a day Please provide Boost 90 minutes before lunch and 90 minutes before dinner 60 Bottle 6    omeprazole (PriLOSEC) 20 mg delayed release capsule Take 20mg at 9am every day 30 capsule 5    onabotulinumtoxin A (BOTOX) 100 units inject 500 units into left gastroc soleus and abductor pollicis ankit      polyethylene glycol (MIRALAX) 17 g packet Take 17 g by mouth 2 (two) times a day 180 each 3    PREPARATION H 1-0 25-14 4-15 % rectal cream - APPLY RECTALLY AS NEEDED FOR HEMORRHOID PAIN RELIEF 51 g 11    sertraline (ZOLOFT) 100 mg tablet Take 1 tablet (100 mg total) by mouth daily 31 tablet 11    Skin Protectants, Misc  (Hydrocerin) LOTN Apply topically 2 (two) times a day Apply to both feeland legs twice a day  0    sodium chloride (DEEP SEA NASAL SPRAY) 0 65 % nasal spray 2 sprays into each nostril as needed for congestion 44 mL 11    SODIUM FLUORIDE, DENTAL GEL, (PreviDent 5000 Plus) 1 1 % CREA Take by mouth daily at bedtime Apply orally to teeth once daily while brushing at night time do not rinse mouth after brushing  0    Starch, Thickening, LIQD Nectar thick liquids up to honey thick if coughing occurs as needed, please use Simply Thick liquid only  Discontinue Thick-It powder  237 mL 5    Tea Tree 100 % OIL Apply 1 g topically daily as needed (rash) Apply topically daily to skin folds 60 mL 11    Vitamins A & D (VITAMIN A & D) ointment - APPLY TO AFFECTED AREA (BUTTOCKS/ANAL) AS NEEDED 454 g 11    zinc oxide (DESITIN) 40 % PSTE Apply topically       zinc sulfate (ZINCATE) 220 mg capsule TAKE TWO CAPSULE -PLACE WHOLE IN YOGURT/PUDDING BY MOUTH EVERY DAY (9AM) EVERY OTHER MONTH **ODD NUMBERED MONTHS ONLY** 62 capsule 0     No current facility-administered medications for this visit  Allergies   Allergen Reactions    Other      drugs that prolong the QT interval  drugs that prolong the QT interval  Seasonal allergies        Review of Systems   Constitutional: Negative for chills, fatigue and fever  HENT: Positive for congestion and rhinorrhea  Negative for sore throat  Respiratory: Positive for cough  Negative for chest tightness and shortness of breath  Gastrointestinal: Negative for abdominal pain, diarrhea, nausea and vomiting  Musculoskeletal: Positive for myalgias  Neurological: Negative for headaches  Objective: There were no vitals filed for this visit      Physical Exam  Constitutional:       General: He is not in acute distress  Appearance: He is well-developed  He is not diaphoretic  HENT:      Head: Normocephalic and atraumatic  Eyes:      General: No scleral icterus  Right eye: No discharge  Left eye: No discharge  Conjunctiva/sclera: Conjunctivae normal       Pupils: Pupils are equal, round, and reactive to light  Pulmonary:      Effort: Pulmonary effort is normal  No respiratory distress  Neurological:      Mental Status: He is alert and oriented to person, place, and time  Psychiatric:         Behavior: Behavior normal          Thought Content:  Thought content normal          Judgment: Judgment normal

## 2022-09-16 ENCOUNTER — TELEPHONE (OUTPATIENT)
Dept: NEUROLOGY | Facility: CLINIC | Age: 37
End: 2022-09-16

## 2022-09-16 NOTE — TELEPHONE ENCOUNTER
Minor Blizzard  to Orestes Pfeiffer MD          9/16/22 2:33 PM  Errol Barrera,   Here is the fax number that Naman's orthotics specialist gave me for : [phone]

## 2022-09-16 NOTE — TELEPHONE ENCOUNTER
eric left vm stating that she needs a script for an orthotic that is being recommended by PT   states that it is in process but just needs a script  asking for a call back to discuss  [phone]    Called eric, needs the same orthotic for his right leg that we prescribed for his left  States that they need an order for a R MAFO    This can be faxed to [facility] attn:Jacquie monte    She will have to call back with fax number or send [employer] message with fax number     [phone] to leave detailed or ok to sent [employer] message    Please entere order for R MAFO if agreeable

## 2022-09-16 NOTE — TELEPHONE ENCOUNTER
Erica Webber left a vm today at 2:45 and stated that PT Cinda Wong recommended R MARISABELO back in May or June of last year       Radha's fax # is [phone]

## 2022-09-19 NOTE — TELEPHONE ENCOUNTER
Riki James, orthotist left  regarding insurance issue and request for new braces  [phone]    Attempted to call kushal claros asking her to call office back   Asked that she leave a more detailed message

## 2022-09-20 ENCOUNTER — OFFICE VISIT (OUTPATIENT)
Dept: NEUROLOGY | Facility: CLINIC | Age: 37
End: 2022-09-20
Payer: MEDICARE

## 2022-09-20 VITALS
BODY MASS INDEX: 18.04 KG/M2 | TEMPERATURE: 97.8 F | WEIGHT: 126 LBS | HEIGHT: 70 IN | SYSTOLIC BLOOD PRESSURE: 118 MMHG | HEART RATE: 84 BPM | DIASTOLIC BLOOD PRESSURE: 78 MMHG

## 2022-09-20 DIAGNOSIS — G82.50 SPASTIC QUADRIPARESIS (HCC): Primary | ICD-10-CM

## 2022-09-20 PROCEDURE — 99214 OFFICE O/P EST MOD 30 MIN: CPT | Performed by: PHYSICAL MEDICINE & REHABILITATION

## 2022-09-20 NOTE — PROGRESS NOTES
Physical Medicine & Rehabilitation Follow-up Note  Naman Hagen 43 y o  male    ASSESSMENT/PLAN:     Diagnoses and all orders for this visit:    Spastic quadriparesis 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 who is here today for re-evaluation  I have been following him for botulinum toxin  His mother's main concern today is join stability and his lower extremity alignment when he ambulates with therapy  At this time, we are trying to see how he does using AFOs to correct  He recently got an off the shelf carbon fiber brace for his R leg, but it does not provide the support and assistance he needs  They feel his alignment and position of his joints through his gait cycle are better with the L custom AFO he received  I provided them with a script, which we will send to Priscilla Opitz at [facility]  His current AFO does provide him with support, cues that help control his dystonia, and enough resistance to provide medial/lateral support, inversion/eversion support  He has a tendency to invert and requires a custom AFO  He has not had any skin/fit issues with his L AFO  I did look at his R knee, it doesn't look like he is having any patellar tracking issues through range of motion, and there is no clear ligamentous laxity in his XR  We are hoping that correcting at the ankle will help generate more appropriate forces at the knee  We discussed his bruxism, and I reviewed that you can use botox to treat that  We reviewed risk/benefits  His mother Jean-Paul Calix and one his caregivers Diamond England from [facility] have some concerns about using botulinum toxin due to risk of dysphagia  They are going to try a different night and , as his dentist has noticed that his teeth have ground down a bit  They are always welcome to reach out if they decide they want to trial botulinum toxin for this  Would target masseters and temporalis      In terms of his LUE, he continues to have L lumbrical tone  He has less extension at the wrist, and it is possible to get his fingers all to almost neutral  It is dystonic tone  He is able to use that hand somewhat functionally (grasp), and this is well after any botulinum toxin has worn off, so we opted to hold off on further treatment  At our last visit they were going to look into getting an ulnar neurectomy with Dr Adrian Hutson  1  Continue medical marijuana  Has tried baclofen, valium, and dantrolene in the past  Would not initiate tizanidine due to low BP   2  Will hold off on any further botulinum toxin, and will focus on bracing to help with his gait in therapies  3  Will hold off on any botulinum toxin for his bruxism at his caregivers' request  Which is reasonable  4  Will send script to Ariana Maza at [facility]  for AFO  5  Can follow-up with me in 6 months  *I have spent 45 minutes with Family and caregivers today in which greater than 50% of this time was spent in counseling/coordination of care regarding Risks and benefits of tx options, Intructions for management, Patient and family education, Importance of tx compliance and Impressions  HPI/SUBJECTIVE:  Patient presents today in the office just for a repeat review of his function  He last got botulinum toxin on 04/26/2022 - at which point we were focusing on the position of his ankles, and his L wrist/fingers  He has noted continued improvement in his L wrist extension, ability to open his fingers and grasp, and with the L articulated tamerack joint MAFO I ordered in 2021, they have noticed improvement  They are concerned that his R foot needs a brace - which was what physical therapy told them  He was subsequently given an off the shelf carbon fiber noodle brace, but this did not provide enough resistance and support to correct his ankle positioning  For this reason, his caregivers are asking if he would benefit from a custom orthotic similar to his L brace   This would be sent to Ariana Maza at [facility]  He was seen by Dr Pavan Colorado since I saw him, who felt that his findings of scoliosis in his previous imaging ordered by Orthopedics were more rotational in nature  He reviewed offloading with them, and did not recommend additional bracing or imaging of his spine  His mom has also had concerns about his knee tracking due to something she has been told by previous physicians, as he does report pain on the lateral aspect of his leg just below his knee  He does have issues with his ITB that he has been working with with therapies  Finally he has an issue with bruxism and wearing through mouth guards  His dentist has noticed wearing down of his teeth in addition  They wanted to know about options to treat this, however, in the past [facility] had a patient who had botox of his salivary glands that resulted in dysphagia, so they are very hesitant to pursue this  Imaging: I have personally reviewed imaging with results as follows: No new imaging since last seen  ROS: A 10 point review of systems was negative except for what is noted in the HPI  Function:   Current Level of Function:   Unchanged since last seen  Requires assistance for ADLs  Able to use his power wheelchair  OBJECTIVE:   /78 (BP Location: Right arm, Patient Position: Sitting)   Pulse 84   Temp 97 8 °F (36 6 °C) (Temporal)   Ht 5' 10" (1 778 m)   Wt 57 2 kg (126 lb)   BMI 18 08 kg/m²       Gen: No acute distress, looks good today! HEENT: Moist mucus membranes, Normocephalic/Atraumatic  Cardiovascular: Regular rate, rhythm, S1/S2  Distal pulses palpable  Heme/Extr: No edema  Pulmonary: Non-labored breathing  : No levine  GI: Soft, non-tender, non-distended  MSK: L hand tends toward to intrinsic position due to dystonia, but able to get his MCP almost neutral  Also holds wrist in onlly slight extension, and able to easily range into a more neutral position  Able to access anterior of palm     R Knee: anterior/posterior drawer, lachman's negative  Valgus/varus without gapping, and Blanca's negative  He has pain right below his knee joint laterally  No issues with patellar tracking noted on my eval  L foot positioned well in custom MAFO (plastic, mod-max resistance, articulated with tamerack joints)  Diffuse sarcopenia/atrophy  Integumentary: Skin is warm, dry  Neuro: Awake, alert  Aphasic  Dysarthric  Dystonic throughout  Has good strength on command throughout  Psych: Normal mood and affect       The following portions of the patient's history were reviewed and updated as appropriate: allergies, current medications, past family history, past medical history, past social history, past surgical history and problem list       Current Outpatient Medications:     ascorbic acid (GNP VITAMIN C) 500 MG tablet, Take 500 mg daily at 4 PM, Disp: 30 tablet, Rfl: 5    b complex-vitamin C-folic acid (RENAL) 1 mg, Take 1 mg daily at 4 PM, Disp: 31 each, Rfl: 5    bisacodyl (DULCOLAX) 10 mg suppository, Use 1 supp  per rectum QOD PRN for constipation, max 3d/week, Disp: 12 suppository, Rfl: 0    Camphor-Eucalyptus-Menthol (VICKS VAPORUB) 4 73-1 2-2 6 % OINT, Apply topically as needed (congestion), Disp: 170 g, Rfl: 0    carbamide peroxide (DEBROX) 6 5 % otic solution, Administer 5 drops into both ears 2 (two) times a day for 7 days, Disp: 15 mL, Rfl: 5    Coenzyme Q10 (Co Q10) 100 MG CAPS, Take 2 capsules by mouth daily Whole in yougurt or pudding, Disp: 60 capsule, Rfl: 1    Diapers & Supplies MISC, Use 5 (five) times a day Huggies #3, Disp: 450 each, Rfl: 2    diclofenac sodium (VOLTAREN) 1 %, Apply 2 g topically 3 (three) times a day At 10am, 4pm, 9pm to thoracic paravertebral musculature, Disp: 100 g, Rfl: 2    Disposable Gloves (Vinyl Gloves Medium) MISC, Use 4 (four) times a day Dispense 3 boxes monthly; Diagnosis R32, Disp: 3 each, Rfl: 5    docusate sodium (COLACE) 100 mg capsule, 100 mg 9am and 9pm, Disp: 10 capsule, Rfl: 5    docusate sodium (Enemeez Mini) 283 MG enema, Insert 1 enema into the rectum daily, Disp: 30 each, Rfl: 3    dronabinol (MARINOL) 2 5 mg capsule, TAKE ONE CAPSULE -PLACE WHOLE IN YOGURT/PUDDING BY MOUTH 3 TIMES A DAY (9AM-4PM-9PM) *CALL PHARMACY 5 DAYS IN ADVANCE FOR REFILL*, Disp: 90 capsule, Rfl: 4    Elastic Bandages & Supports (Knee Brace) MISC, Use as directed according to PT recommendations, Disp: 1 each, Rfl: 0    Emollient (CETA-KLENZ EX), Apply topically, Disp: , Rfl:     Emollient (eucerin) lotion, Apply topically to face daily at 10a and 8p, Disp: 480 mL, Rfl: 3    fluticasone (FLONASE) 50 mcg/act nasal spray, 1 spray into each nostril daily, Disp: , Rfl:     glycerin adult 2 g suppository, Place 1 suppository QOD for 2 weeks, then QOD PRN for constipation, Disp: 50 suppository, Rfl: 6    HEMORRHOIDAL 0 25 % suppository, - UNWRAP AND INSERT 1 SUPPOSITORY PER RECTUM AS NEEDED FOR HEMORRHOID PAIN RELIEF, Disp: 12 suppository, Rfl: 11    ibuprofen (MOTRIN) 200 mg tablet, Take 2 tabs q6h PRN for pain not to exceed 2000mg per day, Disp: 200 tablet, Rfl: 2    Icosapent Ethyl (VASCEPA) 1 g CAPS, Take 1 capsule (1 g total) by mouth daily, Disp: 31 capsule, Rfl: 11    Incontinence Supply Disposable (Prevail Air Briefs) MISC, Use 1 each every 4 (four) hours Please provide 5 briefs per day, Disp: 150 each, Rfl: 11    Incontinence Supply Disposable (Select Disposable Underwear Sm) MISC, Please provide a 30 day supply 10 per day DX R32 incontinence, Disp: 22 each, Rfl: 6    Incontinence Supply Disposable (Simplicity Insert Pad 37"-48") MISC, Use 5 (five) times a day, Disp: 450 each, Rfl: 1    influenza vaccine, subunit, quadrivalent (Flucelvax Quadrivalent), Flucelvax Quad 8707-7030 60 mcg (15 mcg x 4)/0 5 mL intramuscular susp  TO BE ADMINISTERED BY PHARMACIST FOR IMMUNIZATION (PER CDC GUIDELINES), Disp: , Rfl:     levothyroxine 75 mcg tablet, Take 1 tablet (75 mcg total) by mouth daily, Disp: 90 tablet, Rfl: 2    loratadine (CLARITIN) 10 mg tablet, Take 1 tablet (10 mg total) by mouth daily as needed for allergies, Disp: 30 tablet, Rfl: 5    Magnesium 250 MG TABS, , Disp: , Rfl:     magnesium citrate (CITROMA) 1 745 g/30 mL oral solution, Take 296 mL by mouth once for 1 dose, Disp: 296 mL, Rfl: 0    Magnesium Oxide 500 MG TABS, Take 1 tablet (500 mg total) by mouth daily Take 1 tablet daily at 4 PM, Disp: 31 each, Rfl: 5    Melatonin 5 MG TABS, Take 1 tablet (5 mg total) by mouth daily at bedtime, Disp: 31 each, Rfl: 5    methocarbamol (ROBAXIN) 500 mg tablet, Take 0 5-1 tablet by mouth TID PRN for muscle spasms/pain  Can take prior to therapy  , Disp: 30 tablet, Rfl: 0    Misc  Devices [facility]) MISC, Please provide pt with a new cord for power wheelchair, Disp: 1 each, Rfl: 0    modafinil (PROVIGIL) 200 MG tablet, TAKE ONE TABLET -PLACE WHOLE IN YOGURT/PUDDING BY MOUTH EVERY DAY (9AM), Disp: 180 tablet, Rfl: 0    Multiple Vitamins-Minerals (CEROVITE SENIOR) TABS, Take 1 tablet by mouth daily, Disp: 30 tablet, Rfl: 5    Nutritional Supplements (Nutra Shake) (Frozen) LIQD, Give 1 Magic Cup frozen dessert (4 oz/118 mL) PO QD; may also give PRN for added nutritional/caloric/fat intake  Give frozen or defrost to eat as a pudding   Flavor choice per patient preference (vanilla, chocolate, butter pecan, wild berry, orange creme), Disp: 118 mL, Rfl: 3    Nutritional Supplements (NUTRITIONAL SHAKE) LIQD, Take 1 Can by mouth 2 (two) times a day Please provide Boost 90 minutes before lunch and 90 minutes before dinner, Disp: 60 Bottle, Rfl: 6    omeprazole (PriLOSEC) 20 mg delayed release capsule, Take 20mg at 9am every day, Disp: 30 capsule, Rfl: 5    onabotulinumtoxin A (BOTOX) 100 units, inject 500 units into left gastroc soleus and abductor pollicis ankit, Disp: , Rfl:     polyethylene glycol (MIRALAX) 17 g packet, Take 17 g by mouth 2 (two) times a day, Disp: 180 each, Rfl: 3    PREPARATION H 1-0 25-14 4-15 % rectal cream, - APPLY RECTALLY AS NEEDED FOR HEMORRHOID PAIN RELIEF, Disp: 51 g, Rfl: 11    sertraline (ZOLOFT) 100 mg tablet, Take 1 tablet (100 mg total) by mouth daily, Disp: 31 tablet, Rfl: 11    Skin Protectants, Misc  (Hydrocerin) LOTN, Apply topically 2 (two) times a day Apply to both feeland legs twice a day, Disp: , Rfl: 0    sodium chloride (DEEP SEA NASAL SPRAY) 0 65 % nasal spray, 2 sprays into each nostril as needed for congestion, Disp: 44 mL, Rfl: 11    SODIUM FLUORIDE, DENTAL GEL, (PreviDent 5000 Plus) 1 1 % CREA, Take by mouth daily at bedtime Apply orally to teeth once daily while brushing at night time do not rinse mouth after brushing, Disp: , Rfl: 0    Starch, Thickening, LIQD, Nectar thick liquids up to honey thick if coughing occurs as needed, please use Simply Thick liquid only   Discontinue Thick-It powder , Disp: 237 mL, Rfl: 5    Tea Tree 100 % OIL, Apply 1 g topically daily as needed (rash) Apply topically daily to skin folds, Disp: 60 mL, Rfl: 11    Vitamins A & D (VITAMIN A & D) ointment, - APPLY TO AFFECTED AREA (BUTTOCKS/ANAL) AS NEEDED, Disp: 454 g, Rfl: 11    zinc oxide (DESITIN) 40 % PSTE, Apply topically , Disp: , Rfl:     zinc sulfate (ZINCATE) 220 mg capsule, TAKE TWO CAPSULE -PLACE WHOLE IN YOGURT/PUDDING BY MOUTH EVERY DAY (9AM) EVERY OTHER MONTH **ODD NUMBERED MONTHS ONLY**, Disp: 62 capsule, Rfl: 0    Past Surgical History:   Procedure Laterality Date    APPENDECTOMY  1991    UPPER GASTROINTESTINAL ENDOSCOPY      WISDOM TOOTH EXTRACTION         Patient Active Problem List    Diagnosis Date Noted    Tonsillitis 06/01/2022    Abnormal MRI, pelvis 05/23/2022    Somatic dysfunction of head region 03/07/2022    Somatic dysfunction of spine, cervical 03/07/2022    Somatic dysfunction of spine, thoracic 03/07/2022    Somatic dysfunction of lower extremity 03/07/2022    Somatic dysfunction of left upper extremity 03/07/2022    Acute right hip pain 03/02/2022    Cavus deformity of foot, acquired 11/11/2021    Equinus deformity of both feet 11/11/2021    Hammertoe, bilateral 11/11/2021    Underweight 09/08/2021    Neurogenic bladder 07/20/2021    Acute otitis externa of right ear 04/23/2021    Exposure to COVID-19 virus 04/09/2021    Fatigue 03/16/2021    Mild protein-calorie malnutrition (HCC) 03/11/2021    Xerosis of skin 11/25/2020    Environmental allergies 11/24/2020    Chronic brain injury ([facility] [address] ) 11/23/2020    Spastic quadriparesis ([facility] [address] ) 04/23/2020    At risk for atelectasis 03/13/2020    Polyarthralgia 03/02/2020    Electrolyte abnormality 02/27/2020    Urinary incontinence 02/27/2020    Scoliosis of thoracic spine 02/27/2020    Elevated total protein 02/26/2020    Hypothyroid 82/30/6690    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    Contracture of muscle of both hands 10/12/2019    Cardiomegaly 10/09/2019    H/O impacted cerumen 09/10/2019    Goals of care, counseling/discussion 08/24/2019    Spasticity 08/16/2019    Hx of recurrent pneumonia 08/13/2019    Bilateral impacted cerumen 07/08/2019    Frequent UTI 05/24/2019    At risk for aspiration 04/08/2019    Torticollis 11/27/2018    Muscular rigidity and spasm, progressive 08/07/2018    Physical deconditioning 07/25/2018    Aspiration pneumonia ([facility] [address] ) 07/18/2018    Constipation due to neurogenic bowel 07/18/2018    Oropharyngeal dysphagia 04/26/2018    Poor weight gain in adult 02/15/2018    Cough 02/01/2018    Abnormal bone density screening 10/06/2017    Depression 08/03/2017    History of sleep disturbance 08/31/2016    Anoxic brain damage (HCC) 09/20/2013    Allergic rhinitis 06/20/2012    Long Q-T syndrome 05/21/1998

## 2022-09-20 NOTE — TELEPHONE ENCOUNTER
Reyes Ina, MD  You 7 minutes ago (9:54 AM)         The patient is coming for an appointment today  We can manage it then  I need to know what orthotist they are working with, and I probably need a more recent note in for it to be covered       Delfina Loza MD   Physical Medicine and Rehabilitation    Message text

## 2022-09-20 NOTE — TELEPHONE ENCOUNTER
Left vm blayne claros to call office back and leave a detailed message    Dr Katerina Arroyo -Please enter order for SHELLY BENITEZ if agreeable

## 2022-09-20 NOTE — TELEPHONE ENCOUNTER
Duke Dodson made aware of below  She is with Vidal Siegel  She is asking if you can make note that you were aware of the change in the prescription from the previous order  She needs it noted that you were ok with a Pre anand carbon AFO instead of a custom AFO

## 2022-09-21 ENCOUNTER — TELEPHONE (OUTPATIENT)
Dept: NEUROLOGY | Facility: CLINIC | Age: 37
End: 2022-09-21

## 2022-09-22 ENCOUNTER — PROCEDURE VISIT (OUTPATIENT)
Dept: FAMILY MEDICINE CLINIC | Facility: CLINIC | Age: 37
End: 2022-09-22
Payer: MEDICARE

## 2022-09-22 ENCOUNTER — TELEPHONE (OUTPATIENT)
Dept: FAMILY MEDICINE CLINIC | Facility: CLINIC | Age: 37
End: 2022-09-22

## 2022-09-22 DIAGNOSIS — M99.03 SOMATIC DYSFUNCTION OF LUMBAR REGION: ICD-10-CM

## 2022-09-22 DIAGNOSIS — M41.55 OTHER SECONDARY SCOLIOSIS, THORACOLUMBAR REGION: Primary | ICD-10-CM

## 2022-09-22 DIAGNOSIS — M99.02 SOMATIC DYSFUNCTION OF THORACIC REGION: ICD-10-CM

## 2022-09-22 DIAGNOSIS — G89.29 CHRONIC PAIN OF RIGHT KNEE: ICD-10-CM

## 2022-09-22 DIAGNOSIS — M25.561 CHRONIC PAIN OF RIGHT KNEE: ICD-10-CM

## 2022-09-22 DIAGNOSIS — M99.06 SOMATIC DYSFUNCTION OF LOWER EXTREMITY: ICD-10-CM

## 2022-09-22 PROCEDURE — 98926 OSTEOPATH MANJ 3-4 REGIONS: CPT | Performed by: FAMILY MEDICINE

## 2022-09-22 NOTE — TELEPHONE ENCOUNTER
Form in preceptor room regarding Individual care plan-- nutrition plan  Ramesh Fu requesting that if you agree with the plan to sign, print your name and date the form  Thank you

## 2022-09-22 NOTE — PROGRESS NOTES
Assessment/Plan     This is a 43 y o  male who presents for OMT follow-up for:  1  Other secondary scoliosis, thoracolumbar region     2  Somatic dysfunction of thoracic region     3  Somatic dysfunction of lower extremity     4  Chronic pain of right knee     5  Somatic dysfunction of lumbar region         Plan:   1  Patient tolerated OMT well for the above problems,  advised patient to drink fluids and can use NSAID for soreness after treatment     2  OMT Follow up in 2 weeks  Subjective     Thepastor Parnell is a 43 y o  male and is here for a OMT follow up  The patient mother reports doing PT for leaning to right, which is slowly improving  Mom reports PT working on quadratus lumborum muscle  Is the patient taking Pain medication? no  Has the patient completed physical therapy for this condition? yes  Did Patient symptoms improve from last OMT appointment? yes    The following portions of the patient's history were reviewed and updated as appropriate: allergies, current medications, past family history, past medical history, past social history, past surgical history and problem list     Review of Systems  Do you have pain that bothers you in your daily life? no  Review of Systems   Unable to perform ROS: Patient nonverbal       Objective     OMT Exam   OMT  Performed by: Sugey Sabillon DO  Authorized by: Sugey Sabillon DO   Universal Protocol:  Consent: Verbal consent obtained    Consent given by: parent  Patient identity confirmed: verbally with patient        Procedure Details:     Region evaluated and treated:  Lumbar, Right Extremities, Thoracic and Head    Extremity Information  Extremities: right lower extremity    Thoracic Information  Thoracic Region: T1 - T4  Head Details:     Examination Method:  Tissue Texture Change, Stability, Laxity, Effusions, Tone    Severity:  Moderate    Osteopathic Findings:  Bilateral hypertonicity of masseter     Treatment Method:  Muscle Energy Treatment and Soft Tissue Treatment    Response:  Improved - The somatic dysfunction is improved but not completely resolved  Thoracic T1 - T4 details:     Examination Method:  Tissue Texture Change, Stability, Laxity, Effusions, Tone    Severity:  Moderate    Osteopathic Findings:  Bilateral mid trapezius hypertonicity     Treatment Method:  Soft Tissue Treatment    Response:  Improved    Lumbar details:     Examination Method:  Tissue Texture Change, Stability, Laxity, Effusions, Tone    Severity:  Moderate    Osteopathic Findings:  Left paraspinal and quadratus lumborum hypertonicity and right psoas hypertonicity     Treatment Method:  Soft Tissue Treatment, Muscle Energy Treatment and Direct Treatment    Response:  Improved - The somatic dysfunction is improved but not completely resolved  Right Lower Extremity details:     Examination Method:  Tissue Texture Change, Stability, Laxity, Effusions, Tone    Severity:  Moderate    Osteopathic Findings:  Right quad hypertoncity     Treatment Method:  Facilitated Positional Release Treatment and Soft Tissue Treatment    Response:  Improved - The somatic dysfunction is improved but not completely resolved      Total Regions Treated:  4

## 2022-09-25 NOTE — PROGRESS NOTES
No chief complaint on file  Referring Provider  Aaron Flores Md  [address] [place],  [facility]     History of Present Illness:   Dago Rodriguez is a 43 y o  male with hypothyroidism seen in follow-up  He was last seen in 3/2022  His mother and support staff accompany him for support  Since the last visit, he had mild COVID  His mother and caretaker are concerned about added sugars  His mother is particualrly concerned about this, and has looked into journal such as [employer]  Nutrition is following and she would like Naman to have his calories by alternative means  He has a hx of anoxic brain injury and his mother and support staff provide all history  Since his last visit, he has finished speech therapy and is getting Botox with [facility]  At his initial visit, he started levothyroxine for elevated TSH  TG and TPO antibodies are positive  His dose of Levothyroxine is now 75mcg once daily  Levothyroxine is  appropriately from food or mother medications  He is on a high calorie diet, and they do see GI  He has had an evaluation for Celiac which is unremarkable  Caregivers have not noted any changes in stools or behavior suggesting dizziness on transfer  There was concern about his inability to gain weight  His weight is stable, but he is still below his goal weight of 140  There are no issues with his adrenal function       Patient Active Problem List   Diagnosis    Abnormal bone density screening    Anoxic brain damage (HCC)    Allergic rhinitis    Depression    Long Q-T syndrome    Cough    Poor weight gain in adult    Oropharyngeal dysphagia    History of sleep disturbance    [facility]iration pneumonia ([facility] )    Constipation due to neurogenic bowel    Physical deconditioning    Muscular rigidity and spasm, progressive    Torticollis    At risk for aspiration    Frequent UTI    Bilateral impacted cerumen    Hx of recurrent pneumonia    Spasticity    Goals of care, counseling/discussion    H/O impacted cerumen    Cardiomegaly    Contracture of muscle of both hands    Monoallelic mutation of RYR2 gene    Elevated total protein    Hypothyroid    Electrolyte abnormality    Urinary incontinence    Scoliosis of thoracic spine    Polyarthralgia    At risk for atelectasis    Spastic quadriparesis (HCC)    Chronic brain injury ([facility] )    Environmental allergies    Xerosis of skin    Mild protein-calorie malnutrition (HCC)    Fatigue    Exposure to COVID-19 virus    Acute otitis externa of right ear    Neurogenic bladder    Underweight    Cavus deformity of foot, acquired    Equinus deformity of both feet    Hammertoe, bilateral    Acute right hip pain    Somatic dysfunction of head region    Somatic dysfunction of spine, cervical    Somatic dysfunction of spine, thoracic    Somatic dysfunction of lower extremity    Somatic dysfunction of left upper extremity    Abnormal MRI, pelvis    Tonsillitis      Past Medical History:   Diagnosis Date    Abnormal ECG 1998 and beyond    post cardiac arrest    Allergic rhinitis     Brain anoxia ([facility] )     Cardiac arrest [facility])     age 13 s/p long Q-T syndrome    Community acquired pneumonia     last assessed/resolved:  10/9/2014    Depression     Disease of thyroid gland     GERD (gastroesophageal reflux disease)     Hypothyroid 2/26/2020    Long Q-T syndrome     Muscular rigidity and spasm, progressive     Osteopenia     Osteoporosis     [facility]     Scoliosis of thoracic spine 2/27/2020    Spastic neurogenic bladder     Syncope     beta blocker medication DC because of low blood pressure    Urinary incontinence     Urinary tract infection without hematuria 4/27/2019      Past Surgical History:   Procedure Laterality Date    APPENDECTOMY  1991    UPPER GASTROINTESTINAL ENDOSCOPY      WISDOM TOOTH EXTRACTION        Family History   Problem Relation Age of Onset    Other Father mitral valve replaced    Hypertension Father     Diabetes type II Maternal Grandfather     Heart failure Maternal Grandfather         Congestive heart failure -     Diabetes type II Maternal Uncle     Hypotension Mother      Social History     Tobacco Use    Smoking status: Never Smoker    Smokeless tobacco: Never Used   Substance Use Topics    Alcohol use: No     Allergies   Allergen Reactions    Other      drugs that prolong the QT interval  drugs that prolong the QT interval  Seasonal allergies          Current Outpatient Medications:     ascorbic acid (GNP VITAMIN C) 500 MG tablet, Take 500 mg daily at 4 PM, Disp: 30 tablet, Rfl: 5    b complex-vitamin C-folic acid (RENAL) 1 mg, Take 1 mg daily at 4 PM, Disp: 31 each, Rfl: 5    bisacodyl (DULCOLAX) 10 mg suppository, Use 1 supp  per rectum QOD PRN for constipation, max 3d/week, Disp: 12 suppository, Rfl: 0    Camphor-Eucalyptus-Menthol (VICKS VAPORUB) 4 73-1 2-2 6 % OINT, Apply topically as needed (congestion), Disp: 170 g, Rfl: 0    carbamide peroxide (DEBROX) 6 5 % otic solution, Administer 5 drops into both ears 2 (two) times a day for 7 days (Patient taking differently: Administer 5 drops into both ears as needed), Disp: 15 mL, Rfl: 5    Coenzyme Q10 (Co Q10) 100 MG CAPS, Take 2 capsules by mouth daily Whole in yougurt or pudding, Disp: 60 capsule, Rfl: 1    Diapers & Supplies MISC, Use 5 (five) times a day Huggies #3, Disp: 450 each, Rfl: 2    diclofenac sodium (VOLTAREN) 1 %, Apply 2 g topically 3 (three) times a day At 10am, 4pm, 9pm to thoracic paravertebral musculature (Patient taking differently: Apply 2 g topically as needed At 10am, 4pm, 9pm to thoracic paravertebral musculature), Disp: 100 g, Rfl: 2    Disposable Gloves (Vinyl Gloves Medium) MISC, Use 4 (four) times a day Dispense 3 boxes monthly; Diagnosis R32, Disp: 3 each, Rfl: 5    docusate sodium (COLACE) 100 mg capsule, 100 mg 9am and 9pm, Disp: 10 capsule, Rfl: 5   docusate sodium (Enemeez Mini) 283 MG enema, Insert 1 enema into the rectum daily, Disp: 30 each, Rfl: 3    dronabinol (MARINOL) 2 5 mg capsule, TAKE ONE CAPSULE -PLACE WHOLE IN YOGURT/PUDDING BY MOUTH 3 TIMES A DAY (9AM-4PM-9PM) *CALL PHARMACY 5 DAYS IN ADVANCE FOR REFILL*, Disp: 90 capsule, Rfl: 4    Elastic Bandages & Supports (Knee Brace) MISC, Use as directed according to PT recommendations, Disp: 1 each, Rfl: 0    Emollient (CETA-KLENZ EX), Apply topically, Disp: , Rfl:     Emollient (eucerin) lotion, Apply topically to face daily at 10a and 8p, Disp: 480 mL, Rfl: 3    fluticasone (FLONASE) 50 mcg/act nasal spray, 1 spray into each nostril daily, Disp: , Rfl:     glycerin adult 2 g suppository, Place 1 suppository QOD for 2 weeks, then QOD PRN for constipation, Disp: 50 suppository, Rfl: 6    HEMORRHOIDAL 0 25 % suppository, - UNWRAP AND INSERT 1 SUPPOSITORY PER RECTUM AS NEEDED FOR HEMORRHOID PAIN RELIEF, Disp: 12 suppository, Rfl: 11    ibuprofen (MOTRIN) 200 mg tablet, Take 2 tabs q6h PRN for pain not to exceed 2000mg per day, Disp: 200 tablet, Rfl: 2    Icosapent Ethyl (VASCEPA) 1 g CAPS, Take 1 capsule (1 g total) by mouth daily, Disp: 31 capsule, Rfl: 11    Incontinence Supply Disposable (Prevail Air Briefs) MISC, Use 1 each every 4 (four) hours Please provide 5 briefs per day, Disp: 150 each, Rfl: 11    Incontinence Supply Disposable (Select Disposable Underwear Sm) MISC, Please provide a 30 day supply 10 per day DX R32 incontinence, Disp: 22 each, Rfl: 6    Incontinence Supply Disposable (Simplicity Insert Pad 56"-41") MISC, Use 5 (five) times a day, Disp: 450 each, Rfl: 1    influenza vaccine, subunit, quadrivalent (Flucelvax Quadrivalent), Flucelvax Quad 9227-7811 60 mcg (15 mcg x 4)/0 5 mL intramuscular susp  TO BE ADMINISTERED BY PHARMACIST FOR IMMUNIZATION (PER CDC GUIDELINES), Disp: , Rfl:     levothyroxine 75 mcg tablet, Take 1 tablet (75 mcg total) by mouth daily, Disp: 90 tablet, Rfl: 2    loratadine (CLARITIN) 10 mg tablet, Take 1 tablet (10 mg total) by mouth daily as needed for allergies, Disp: 30 tablet, Rfl: 5    Magnesium 250 MG TABS, in the morning 2 tablets at 4 pm, Disp: , Rfl:     Melatonin 5 MG TABS, Take 1 tablet (5 mg total) by mouth daily at bedtime, Disp: 31 each, Rfl: 5    methocarbamol (ROBAXIN) 500 mg tablet, Take 0 5-1 tablet by mouth TID PRN for muscle spasms/pain  Can take prior to therapy  , Disp: 30 tablet, Rfl: 0    Misc  Devices [facility]) MISC, Please provide pt with a new cord for power wheelchair, Disp: 1 each, Rfl: 0    modafinil (PROVIGIL) 200 MG tablet, TAKE ONE TABLET -PLACE WHOLE IN YOGURT/PUDDING BY MOUTH EVERY DAY (9AM), Disp: 180 tablet, Rfl: 0    Multiple Vitamins-Minerals (CEROVITE SENIOR) TABS, Take 1 tablet by mouth daily, Disp: 30 tablet, Rfl: 5    omeprazole (PriLOSEC) 20 mg delayed release capsule, Take 20mg at 9am every day, Disp: 30 capsule, Rfl: 5    polyethylene glycol (MIRALAX) 17 g packet, Take 17 g by mouth 2 (two) times a day, Disp: 180 each, Rfl: 3    PREPARATION H 1-0 25-14 4-15 % rectal cream, - APPLY RECTALLY AS NEEDED FOR HEMORRHOID PAIN RELIEF, Disp: 51 g, Rfl: 11    sertraline (ZOLOFT) 100 mg tablet, Take 1 tablet (100 mg total) by mouth daily, Disp: 31 tablet, Rfl: 11    Skin Protectants, Misc   (Hydrocerin) LOTN, Apply topically 2 (two) times a day Apply to both feeland legs twice a day, Disp: , Rfl: 0    sodium chloride (DEEP SEA NASAL SPRAY) 0 65 % nasal spray, 2 sprays into each nostril as needed for congestion, Disp: 44 mL, Rfl: 11    SODIUM FLUORIDE, DENTAL GEL, (PreviDent 5000 Plus) 1 1 % CREA, Take by mouth daily at bedtime Apply orally to teeth once daily while brushing at night time do not rinse mouth after brushing, Disp: , Rfl: 0    Tea Tree 100 % OIL, Apply 1 g topically daily as needed (rash) Apply topically daily to skin folds, Disp: 60 mL, Rfl: 11    Vitamins A & D (VITAMIN A & D) ointment, - APPLY TO AFFECTED AREA (BUTTOCKS/ANAL) AS NEEDED, Disp: 454 g, Rfl: 11    zinc sulfate (ZINCATE) 220 mg capsule, TAKE TWO CAPSULE -PLACE WHOLE IN YOGURT/PUDDING BY MOUTH EVERY DAY (9AM) EVERY OTHER MONTH **ODD NUMBERED MONTHS ONLY**, Disp: 62 capsule, Rfl: 0    magnesium citrate (CITROMA) 1 745 g/30 mL oral solution, Take 296 mL by mouth once for 1 dose (Patient not taking: Reported on 9/26/2022), Disp: 296 mL, Rfl: 0    Magnesium Oxide 500 MG TABS, Take 1 tablet (500 mg total) by mouth daily Take 1 tablet daily at 4 PM (Patient not taking: Reported on 9/26/2022), Disp: 31 each, Rfl: 5    Nutritional Supplements (Nutra Shake) (Frozen) LIQD, Give 1 Magic Cup frozen dessert (4 oz/118 mL) PO QD; may also give PRN for added nutritional/caloric/fat intake  Give frozen or defrost to eat as a pudding  Flavor choice per patient preference (vanilla, chocolate, butter pecan, wild berry, orange creme), Disp: 118 mL, Rfl: 3    Nutritional Supplements (NUTRITIONAL SHAKE) LIQD, Take 1 Can by mouth 2 (two) times a day Please provide Boost 90 minutes before lunch and 90 minutes before dinner (Patient not taking: Reported on 9/26/2022), Disp: 60 Bottle, Rfl: 6    onabotulinumtoxin A (BOTOX) 100 units, inject 500 units into left gastroc soleus and abductor pollicis ankit, Disp: , Rfl:     Starch, Thickening, LIQD, Nectar thick liquids up to honey thick if coughing occurs as needed, please use Simply Thick liquid only  Discontinue Thick-It powder  (Patient not taking: Reported on 9/26/2022), Disp: 237 mL, Rfl: 5    zinc oxide (DESITIN) 40 % PSTE, Apply topically  (Patient not taking: Reported on 9/26/2022), Disp: , Rfl:   Review of Systems   Unable to perform ROS: Patient nonverbal   see HPI for comments from mother and staff    Physical Exam:  Body mass index is 17 94 kg/m²    /78   Pulse 86   Ht 5' 10" (1 778 m)   Wt 56 7 kg (125 lb)   SpO2 98%   BMI 17 94 kg/m²    Wt Readings from Last 3 Encounters:   09/26/22 56 7 kg (125 lb)   09/20/22 57 2 kg (126 lb)   08/30/22 57 2 kg (126 lb)         Physical Exam   Gen: appears well-developed and well-nourished  No apparent distress  Head: Normocephalic and atraumatic  Eyes: no stare or proptosis, no periorbital edema  E/N/M nl facies, hearing grossly intact  Neck: range of motion nl  Pulmonary/Chest: breathing  comfortably, no accessory muscle use, effort normal    Musculoskeletal: moves upper extremities, seated in wheelchair  Neurological: alert and oriented to person, place, and time  No upper ext tremor appreciated  Skin: does not appear diaphoretic, no facial plethora  Psychiatric: normal mood and affect; behavior is normal; no gross lapses in memory, answer questions appropriately      DATA:  Labs:   Lab Results   Component Value Date     09/06/2017    SODIUM 138 02/08/2022    K 4 8 02/08/2022     02/08/2022    CO2 29 02/08/2022    AGAP 4 02/08/2022    BUN 15 02/08/2022    CREATININE 0 81 02/08/2022    GLUC 86 02/08/2022    GLUF 84 07/03/2021    CALCIUM 10 0 02/08/2022    AST 18 02/08/2022    ALT 30 02/08/2022    ALKPHOS 87 02/08/2022    PROT 8 0 09/06/2017    TP 9 8 (H) 02/08/2022    BILITOT 0 5 09/06/2017    TBILI 0 72 02/08/2022    EGFR 112 02/08/2022         Lab Results   Component Value Date    TSH 3 52 03/20/2019    FREET4 0 84 03/25/2022     Lab Results   Component Value Date    ESB0JTORJRUN 2 290 03/25/2022    TSH 3 52 03/20/2019         Radiology  CT   No adrenal abnormalities    Impression:  1  Acquired hypothyroidism    2  Vitamin D deficiency    3  Hypothyroidism, unspecified type           Plan:    Diagnoses and all orders for this visit:    Acquired hypothyroidism  -     TSH, 3rd generation Lab Collect; Future  -     levothyroxine 75 mcg tablet; Take 1 tablet (75 mcg total) by mouth daily    Vitamin D deficiency  -     Vitamin D 25 hydroxy; Future    Hypothyroidism, unspecified type         1   Hypothyroidism, autoimmune: He is doing well and is on levothyroxine 75mcg daily  TSH is due and I will also check vit D  Suggested ADA Diabetes Spectrum and RIGOBERTO Clinical Thyroidology for Patients as good info sources  I do not object ot the added sugars  Screening for DM can be done through Family med  Anoxic brain injury, weight issues: 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

## 2022-09-26 ENCOUNTER — OFFICE VISIT (OUTPATIENT)
Dept: ENDOCRINOLOGY | Facility: CLINIC | Age: 37
End: 2022-09-26
Payer: MEDICARE

## 2022-09-26 VITALS
DIASTOLIC BLOOD PRESSURE: 78 MMHG | HEART RATE: 86 BPM | WEIGHT: 125 LBS | SYSTOLIC BLOOD PRESSURE: 120 MMHG | HEIGHT: 70 IN | OXYGEN SATURATION: 98 % | BODY MASS INDEX: 17.9 KG/M2

## 2022-09-26 DIAGNOSIS — E03.9 HYPOTHYROIDISM, UNSPECIFIED TYPE: ICD-10-CM

## 2022-09-26 DIAGNOSIS — E03.9 ACQUIRED HYPOTHYROIDISM: Primary | ICD-10-CM

## 2022-09-26 DIAGNOSIS — E55.9 VITAMIN D DEFICIENCY: ICD-10-CM

## 2022-09-26 PROCEDURE — 99214 OFFICE O/P EST MOD 30 MIN: CPT | Performed by: INTERNAL MEDICINE

## 2022-09-26 RX ORDER — LEVOTHYROXINE SODIUM 0.07 MG/1
75 TABLET ORAL DAILY
Qty: 90 TABLET | Refills: 2 | Status: SHIPPED | OUTPATIENT
Start: 2022-09-26

## 2022-09-27 ENCOUNTER — CLINICAL SUPPORT (OUTPATIENT)
Dept: FAMILY MEDICINE CLINIC | Facility: CLINIC | Age: 37
End: 2022-09-27
Payer: MEDICARE

## 2022-09-27 DIAGNOSIS — Z11.1 PPD SCREENING TEST: Primary | ICD-10-CM

## 2022-09-27 PROCEDURE — 86580 TB INTRADERMAL TEST: CPT

## 2022-09-29 ENCOUNTER — OFFICE VISIT (OUTPATIENT)
Dept: FAMILY MEDICINE CLINIC | Facility: CLINIC | Age: 37
End: 2022-09-29
Payer: MEDICARE

## 2022-09-29 VITALS
SYSTOLIC BLOOD PRESSURE: 116 MMHG | HEART RATE: 70 BPM | WEIGHT: 123 LBS | DIASTOLIC BLOOD PRESSURE: 70 MMHG | BODY MASS INDEX: 17.61 KG/M2 | OXYGEN SATURATION: 98 % | TEMPERATURE: 97.6 F | HEIGHT: 70 IN

## 2022-09-29 DIAGNOSIS — Z13.220 LIPID SCREENING: ICD-10-CM

## 2022-09-29 DIAGNOSIS — F32.A DEPRESSION, UNSPECIFIED DEPRESSION TYPE: ICD-10-CM

## 2022-09-29 DIAGNOSIS — Z00.00 ENCOUNTER FOR ANNUAL WELLNESS VISIT (AWV) IN MEDICARE PATIENT: Primary | ICD-10-CM

## 2022-09-29 DIAGNOSIS — Z23 ENCOUNTER FOR IMMUNIZATION: ICD-10-CM

## 2022-09-29 PROCEDURE — G0008 ADMIN INFLUENZA VIRUS VAC: HCPCS

## 2022-09-29 PROCEDURE — G0439 PPPS, SUBSEQ VISIT: HCPCS | Performed by: FAMILY MEDICINE

## 2022-09-29 PROCEDURE — 90686 IIV4 VACC NO PRSV 0.5 ML IM: CPT

## 2022-09-29 NOTE — ASSESSMENT & PLAN NOTE
Elliott Koroma is doing well  He'll receive his flu shot and PPD read today  We discussed the team's questions about nutritional supplementation - Tila's feeling that the caloric load of the Magic Cup could be accomplished without the sugar content was acknowledged, with reassurance that this single shake per day would likely not make a huge change to his glycemic status  We agree to stop the [employer] for one month, follow weight, and reassess  All physical forms completed

## 2022-09-29 NOTE — PROGRESS NOTES
Assessment and Plan:     Problem List Items Addressed This Visit        Other    Depression     We discussed Naman's sertraline, and after discussing the two options of continuing the medication because "if it isn't broken, don't try to fix it," vs a trial without medications Naman elected to continue sertraline for now  Encounter for annual wellness visit (AWV) in Medicare patient - Primary     Jr Appiah is doing well  He'll receive his flu shot and PPD read today  We discussed the team's questions about nutritional supplementation - Tila's feeling that the caloric load of the Magic Cup could be accomplished without the sugar content was acknowledged, with reassurance that this single shake per day would likely not make a huge change to his glycemic status  We agree to stop the [employer] for one month, follow weight, and reassess  All physical forms completed  Other Visit Diagnoses     Lipid screening        Relevant Orders    Lipid Panel with Direct LDL reflex    Encounter for immunization        Relevant Orders    influenza vaccine, quadrivalent, 0 5 mL, preservative-free, for adult and pediatric patients 6 mos+ (AFLURIA, FLUARIX, FLULAVAL, FLUZONE) (Completed)           Preventive health issues were discussed with patient, and age appropriate screening tests were ordered as noted in patient's After Visit Summary  Personalized health advice and appropriate referrals for health education or preventive services given if needed, as noted in patient's After Visit Summary  History of Present Illness:     Patient presents for a Medicare Wellness Visit    Naman and care team ask about Zoloft - has been on for many years, should a break be considered? Asks about stopping loratadine in November  Jr Appiah has been working toward goal wt of 135lb - has increased from 117 to 125lb since [facility] taking over care    Asks about nutritional supplement - Magic Cup was most recently recommended by   Sakshi Juarez is concerned about the sugar content and its potential effect on Naman's health/cognition/inflammation  She and the care team ask if whole foods could be considered instead  HPI   Patient Care Team:  Holden Mcneal MD as PCP - General (Family Medicine)  Boris Hercules MD (Endocrinology)     Review of Systems:     Review of Systems   Constitutional: Negative for activity change, chills, fatigue and fever  HENT: Negative for congestion, sinus pressure, sinus pain and sore throat  Respiratory: Negative for cough, shortness of breath and wheezing  Cardiovascular: Negative for chest pain, palpitations and leg swelling  Gastrointestinal: Negative for abdominal pain, diarrhea, nausea and vomiting  Genitourinary: Negative for decreased urine volume, dysuria, frequency and urgency  Musculoskeletal: Negative for arthralgias, myalgias, neck pain and neck stiffness  Skin: Negative for rash  Neurological: Negative for dizziness, light-headedness, numbness and headaches          Problem List:     Patient Active Problem List   Diagnosis    Abnormal bone density screening    Anoxic brain damage (HCC)    Allergic rhinitis    Depression    Long Q-T syndrome    Cough    Poor weight gain in adult    Oropharyngeal dysphagia    History of sleep disturbance    Aspiration pneumonia ([address] )    Constipation due to neurogenic bowel    Physical deconditioning    Muscular rigidity and spasm, progressive    Torticollis    At risk for aspiration    Frequent UTI    Bilateral impacted cerumen    Hx of recurrent pneumonia    Spasticity    Goals of care, counseling/discussion    H/O impacted cerumen    Cardiomegaly    Contracture of muscle of both hands    Monoallelic mutation of RYR2 gene    Elevated total protein    Hypothyroid    Electrolyte abnormality    Urinary incontinence    Scoliosis of thoracic spine    Polyarthralgia    At risk for atelectasis    Spastic quadriparesis ([address] )  Chronic brain injury ([facility] )    Environmental allergies    Xerosis of skin    Mild protein-calorie malnutrition (HCC)    Fatigue    Exposure to COVID-19 virus    Acute otitis externa of right ear    Neurogenic bladder    Underweight    Cavus deformity of foot, acquired    Equinus deformity of both feet    Hammertoe, bilateral    Acute right hip pain    Somatic dysfunction of head region    Somatic dysfunction of spine, cervical    Somatic dysfunction of spine, thoracic    Somatic dysfunction of lower extremity    Somatic dysfunction of left upper extremity    Abnormal MRI, pelvis    Tonsillitis    Encounter for annual wellness visit (AWV) in Medicare patient      Past Medical and Surgical History:     Past Medical History:   Diagnosis Date    Abnormal ECG  and beyond    post cardiac arrest    Allergic rhinitis     Brain anoxia ([facility] )     Cardiac arrest [facility])     age 13 s/p long Q-T syndrome    Community acquired pneumonia     last assessed/resolved:  10/9/2014    Depression     Disease of thyroid gland     GERD (gastroesophageal reflux disease)     Hypothyroid 2020    Long Q-T syndrome     Muscular rigidity and spasm, progressive     Osteopenia     Osteoporosis     Quadriplegia ([facility] )     Scoliosis of thoracic spine 2020    Spastic neurogenic bladder     Syncope     beta blocker medication DC because of low blood pressure    Urinary incontinence     Urinary tract infection without hematuria 2019     Past Surgical History:   Procedure Laterality Date    APPENDECTOMY      UPPER GASTROINTESTINAL ENDOSCOPY      WISDOM TOOTH EXTRACTION        Family History:     Family History   Problem Relation Age of Onset    Other Father         mitral valve replaced    Hypertension Father     Diabetes type II Maternal Grandfather     Heart failure Maternal Grandfather         Congestive heart failure -     Diabetes type II Maternal Uncle     Hypotension Mother       Social History:     Social History     Socioeconomic History    Marital status: Single     Spouse name: Not on file    Number of children: Not on file    Years of education: Not on file    Highest education level: Not on file   Occupational History    Not on file   Tobacco Use    Smoking status: Never Smoker    Smokeless tobacco: Never Used   Vaping Use    Vaping Use: Never used   Substance and Sexual Activity    Alcohol use: No    Drug use: No    Sexual activity: Not Currently   Other Topics Concern    Not on file   Social History Narrative    Lives in a group home     Social Determinants of Health     Financial Resource Strain: Low Risk     Difficulty of Paying Living Expenses: Not hard at all   Food Insecurity: Not on file   Transportation Needs: No Transportation Needs    Lack of Transportation (Medical): No    Lack of Transportation (Non-Medical):  No   Physical Activity: Not on file   Stress: Not on file   Social Connections: Not on file   Intimate Partner Violence: Not on file   Housing Stability: Not on file      Medications and Allergies:     Current Outpatient Medications   Medication Sig Dispense Refill    ascorbic acid (GNP VITAMIN C) 500 MG tablet Take 500 mg daily at 4 PM 30 tablet 5    b complex-vitamin C-folic acid (RENAL) 1 mg Take 1 mg daily at 4 PM 31 each 5    bisacodyl (DULCOLAX) 10 mg suppository Use 1 supp  per rectum QOD PRN for constipation, max 3d/week 12 suppository 0    Camphor-Eucalyptus-Menthol (VICKS VAPORUB) 4 73-1 2-2 6 % OINT Apply topically as needed (congestion) 170 g 0    carbamide peroxide (DEBROX) 6 5 % otic solution Administer 5 drops into both ears 2 (two) times a day for 7 days (Patient taking differently: Administer 5 drops into both ears as needed) 15 mL 5    Coenzyme Q10 (Co Q10) 100 MG CAPS Take 2 capsules by mouth daily Whole in yougurt or pudding 60 capsule 1    Diapers & Supplies MISC Use 5 (five) times a day Huggies #3 450 each 2    diclofenac sodium (VOLTAREN) 1 % Apply 2 g topically 3 (three) times a day At 10am, 4pm, 9pm to thoracic paravertebral musculature (Patient taking differently: Apply 2 g topically as needed At 10am, 4pm, 9pm to thoracic paravertebral musculature) 100 g 2    Disposable Gloves (Vinyl Gloves Medium) MISC Use 4 (four) times a day Dispense 3 boxes monthly; Diagnosis R32 3 each 5    docusate sodium (COLACE) 100 mg capsule 100 mg 9am and 9pm 10 capsule 5    docusate sodium (Enemeez Mini) 283 MG enema Insert 1 enema into the rectum daily 30 each 3    dronabinol (MARINOL) 2 5 mg capsule TAKE ONE CAPSULE -PLACE WHOLE IN YOGURT/PUDDING BY MOUTH 3 TIMES A DAY (9AM-4PM-9PM) *CALL PHARMACY 5 DAYS IN ADVANCE FOR REFILL* 90 capsule 4    Elastic Bandages & Supports (Knee Brace) MISC Use as directed according to PT recommendations 1 each 0    Emollient (CETA-KLENZ EX) Apply topically      Emollient (eucerin) lotion Apply topically to face daily at 10a and 8p 480 mL 3    fluticasone (FLONASE) 50 mcg/act nasal spray 1 spray into each nostril daily      glycerin adult 2 g suppository Place 1 suppository QOD for 2 weeks, then QOD PRN for constipation 50 suppository 6    HEMORRHOIDAL 0 25 % suppository - UNWRAP AND INSERT 1 SUPPOSITORY PER RECTUM AS NEEDED FOR HEMORRHOID PAIN RELIEF 12 suppository 11    ibuprofen (MOTRIN) 200 mg tablet Take 2 tabs q6h PRN for pain not to exceed 2000mg per day 200 tablet 2    Icosapent Ethyl (VASCEPA) 1 g CAPS Take 1 capsule (1 g total) by mouth daily 31 capsule 11    Incontinence Supply Disposable (Prevail Air Briefs) MISC Use 1 each every 4 (four) hours Please provide 5 briefs per day 150 each 11    Incontinence Supply Disposable (Select Disposable Underwear Sm) MISC Please provide a 30 day supply 10 per day DX R32 incontinence 22 each 6    Incontinence Supply Disposable (Simplicity Insert Pad 17"-28") MISC Use 5 (five) times a day 450 each 1    influenza vaccine, subunit, quadrivalent (Flucelvax Quadrivalent) Flucelvax Quad [id number] 60 mcg (15 mcg x 4)/0 5 mL intramuscular susp   TO BE ADMINISTERED BY PHARMACIST FOR IMMUNIZATION (PER CDC GUIDELINES)      levothyroxine 75 mcg tablet Take 1 tablet (75 mcg total) by mouth daily 90 tablet 2    loratadine (CLARITIN) 10 mg tablet Take 1 tablet (10 mg total) by mouth daily as needed for allergies 30 tablet 5    Magnesium 250 MG TABS in the morning 2 tablets at 4 pm      magnesium citrate (CITROMA) 1 745 g/30 mL oral solution Take 296 mL by mouth once for 1 dose (Patient not taking: Reported on 9/26/2022) 296 mL 0    Magnesium Oxide 500 MG TABS Take 1 tablet (500 mg total) by mouth daily Take 1 tablet daily at 4 PM (Patient not taking: Reported on 9/26/2022) 31 each 5    Melatonin 5 MG TABS Take 1 tablet (5 mg total) by mouth daily at bedtime 31 each 5    methocarbamol (ROBAXIN) 500 mg tablet Take 0 5-1 tablet by mouth TID PRN for muscle spasms/pain  Can take prior to therapy  30 tablet 0    Misc  Devices [facility]) MISC Please provide pt with a new cord for power wheelchair 1 each 0    modafinil (PROVIGIL) 200 MG tablet TAKE ONE TABLET -PLACE WHOLE IN YOGURT/PUDDING BY MOUTH EVERY DAY (9AM) 180 tablet 0    Multiple Vitamins-Minerals (CEROVITE SENIOR) TABS Take 1 tablet by mouth daily 30 tablet 5    Nutritional Supplements (Nutra Shake) (Frozen) LIQD Give 1 Magic Cup frozen dessert (4 oz/118 mL) PO QD; may also give PRN for added nutritional/caloric/fat intake  Give frozen or defrost to eat as a pudding   Flavor choice per patient preference (vanilla, chocolate, butter pecan, wild berry, orange creme) 118 mL 3    Nutritional Supplements (NUTRITIONAL SHAKE) LIQD Take 1 Can by mouth 2 (two) times a day Please provide Boost 90 minutes before lunch and 90 minutes before dinner (Patient not taking: Reported on 9/26/2022) 60 Bottle 6    omeprazole (PriLOSEC) 20 mg delayed release capsule Take 20mg at 9am every day 30 capsule 5    onabotulinumtoxin A (BOTOX) 100 units inject 500 units into left gastroc soleus and abductor pollicis ankit      polyethylene glycol (MIRALAX) 17 g packet Take 17 g by mouth 2 (two) times a day 180 each 3    PREPARATION H 1-0 25-14 4-15 % rectal cream - APPLY RECTALLY AS NEEDED FOR HEMORRHOID PAIN RELIEF 51 g 11    sertraline (ZOLOFT) 100 mg tablet Take 1 tablet (100 mg total) by mouth daily 31 tablet 11    Skin Protectants, Misc  (Hydrocerin) LOTN Apply topically 2 (two) times a day Apply to both feeland legs twice a day  0    sodium chloride (DEEP SEA NASAL SPRAY) 0 65 % nasal spray 2 sprays into each nostril as needed for congestion 44 mL 11    SODIUM FLUORIDE, DENTAL GEL, (PreviDent 5000 Plus) 1 1 % CREA Take by mouth daily at bedtime Apply orally to teeth once daily while brushing at night time do not rinse mouth after brushing  0    Starch, Thickening, LIQD Nectar thick liquids up to honey thick if coughing occurs as needed, please use Simply Thick liquid only  Discontinue Thick-It powder  (Patient not taking: Reported on 9/26/2022) 237 mL 5    Tea Tree 100 % OIL Apply 1 g topically daily as needed (rash) Apply topically daily to skin folds 60 mL 11    Vitamins A & D (VITAMIN A & D) ointment - APPLY TO AFFECTED AREA (BUTTOCKS/ANAL) AS NEEDED 454 g 11    zinc oxide (DESITIN) 40 % PSTE Apply topically  (Patient not taking: Reported on 9/26/2022)      zinc sulfate (ZINCATE) 220 mg capsule TAKE TWO CAPSULE -PLACE WHOLE IN YOGURT/PUDDING BY MOUTH EVERY DAY (9AM) EVERY OTHER MONTH **ODD NUMBERED MONTHS ONLY** 62 capsule 0     No current facility-administered medications for this visit       Allergies   Allergen Reactions    Other      drugs that prolong the QT interval  drugs that prolong the QT interval  Seasonal allergies       Immunizations:     Immunization History   Administered Date(s) Administered    COVID-19 MODERNA VACC 0 25 ML IM BOOSTER 05/31/2022    COVID-19 PFIZER VACCINE 0 3 ML IM 01/18/2021, 02/08/2021, 10/14/2021    H1N1, All Formulations 12/04/2009    INFLUENZA 11/02/2012, 11/22/2013, 10/09/2014, 11/01/2015, 11/12/2015, 10/06/2016, 11/01/2016, 10/16/2017, 10/09/2018, 10/01/2019    Influenza Injectable, MDCK, Preservative Free, Quadrivalent 09/28/2020    Influenza Quadrivalent 3 years and older 10/01/2019, 10/01/2020    Influenza Quadrivalent Preservative Free 3 years and older IM 10/09/2014, 10/06/2016    Influenza Quadrivalent, 6-35 Months IM 10/20/2017    Influenza, injectable, quadrivalent, preservative free 0 5 mL 10/09/2018, 10/08/2019, 09/29/2022    Influenza, seasonal, injectable 11/12/2015    Influenza, seasonal, injectable, preservative free 11/22/2013    TD (adult) Preservative Free 11/28/2021    Tdap 04/05/2011    Tuberculin Skin Test-PPD Intradermal 09/05/2017, 08/13/2019, 08/24/2020, 09/27/2022    influenza, injectable, quadrivalent 10/01/2019      Health Maintenance:         Topic Date Due    Hepatitis C Screening  Never done    HIV Screening  Completed     There are no preventive care reminders to display for this patient  Medicare Screening Tests and Risk Assessments:         Health Risk Assessment:   Patient rates overall health as excellent  Patient feels that their physical health rating is same  Patient is satisfied with their life  Eyesight was rated as slightly worse  Hearing was rated as same  Patient feels that their emotional and mental health rating is same  Patients states they are never, rarely angry  Patient states they are sometimes unusually tired/fatigued  Pain experienced in the last 7 days has been some  Patient states that he has experienced no weight loss or gain in last 6 months  Depression Screening:   PHQ-9 Score: 6      Fall Risk Screening: In the past year, patient has experienced: no history of falling in past year      Home Safety:  Patient does not have trouble with stairs inside or outside of their home   Patient has working smoke alarms and has working carbon monoxide detector  Home safety hazards include: none  Nutrition:   Current diet is Other (please comment)  High Fiber 4000 Calorie     Medications:   Patient is currently taking over-the-counter supplements  OTC medications include: see medication list  Patient is not able to manage medications  Activities of Daily Living (ADLs)/Instrumental Activities of Daily Living (IADLs):   Walk and transfer into and out of bed and chair?: No  Dress and groom yourself?: No    Bathe or shower yourself?: No    Feed yourself?  No  Do your laundry/housekeeping?: No  Manage your money, pay your bills and track your expenses?: No  Make your own meals?: No    Do your own shopping?: No    Previous Hospitalizations:   Any hospitalizations or ED visits within the last 12 months?: No      Advance Care Planning:     Durable POA for healthcare: Yes      Comments: Has POLST for reference    PREVENTIVE SCREENINGS      Cardiovascular Screening:    General: Screening Current      Diabetes Screening:     General: Screening Current      Colorectal Cancer Screening:     General: Screening Not Indicated      Prostate Cancer Screening:    General: Screening Not Indicated      Osteoporosis Screening:    General: Screening Not Indicated      Abdominal Aortic Aneurysm (AAA) Screening:        General: Screening Not Indicated      Lung Cancer Screening:     General: Screening Not Indicated      Hepatitis C Screening:    General: Screening Current    Screening, Brief Intervention, and Referral to Treatment (SBIRT)    Screening      AUDIT-C Screenin) How often did you have a drink containing alcohol in the past year? never  2) How many drinks did you have on a typical day when you were drinking in the past year? 0  3) How often did you have 6 or more drinks on one occasion in the past year? never    AUDIT-C Score: 0  Interpretation: Score 0-3 (male): Negative screen for alcohol misuse    Single Item Drug Screening:  How often have you used an illegal drug (including marijuana) or a prescription medication for non-medical reasons in the past year? never    Single Item Drug Screen Score: 0  Interpretation: Negative screen for possible drug use disorder    No exam data present     Physical Exam:     /70   Pulse 70   Temp 97 6 °F (36 4 °C)   Ht 5' 10" (1 778 m)   Wt 55 8 kg (123 lb)   SpO2 98%   BMI 17 65 kg/m²     Physical Exam  Constitutional:       General: He is not in acute distress  Appearance: Normal appearance  He is not ill-appearing  HENT:      Head: Normocephalic and atraumatic  Nose: Nose normal       Mouth/Throat:      Mouth: Mucous membranes are moist       Pharynx: Oropharynx is clear  Eyes:      Extraocular Movements: Extraocular movements intact  Conjunctiva/sclera: Conjunctivae normal       Pupils: Pupils are equal, round, and reactive to light  Cardiovascular:      Rate and Rhythm: Normal rate and regular rhythm  Pulses: Normal pulses  Heart sounds: Normal heart sounds  No murmur heard  Pulmonary:      Effort: Pulmonary effort is normal  No respiratory distress  Breath sounds: Normal breath sounds  No wheezing or rales  Abdominal:      General: Abdomen is flat  There is no distension  Palpations: Abdomen is soft  There is no mass  Musculoskeletal:      Cervical back: No tenderness  Lymphadenopathy:      Cervical: No cervical adenopathy  Skin:     General: Skin is warm and dry  Neurological:      Mental Status: He is alert  Mental status is at baseline            Rene Muro MD

## 2022-09-29 NOTE — ASSESSMENT & PLAN NOTE
We discussed Naman's sertraline, and after discussing the two options of continuing the medication because "if it isn't broken, don't try to fix it," vs a trial without medications Naman elected to continue sertraline for now

## 2022-09-29 NOTE — PATIENT INSTRUCTIONS
Medicare Preventive Visit Patient Instructions  Thank you for completing your Welcome to Medicare Visit or Medicare Annual Wellness Visit today  Your next wellness visit will be due in one year (9/30/2023)  The screening/preventive services that you may require over the next 5-10 years are detailed below  Some tests may not apply to you based off risk factors and/or age  Screening tests ordered at today's visit but not completed yet may show as past due  Also, please note that scanned in results may not display below  Preventive Screenings:  Service Recommendations Previous Testing/Comments   Colorectal Cancer Screening  · Colonoscopy    · Fecal Occult Blood Test (FOBT)/Fecal Immunochemical Test (FIT)  · Fecal DNA/Cologuard Test  · Flexible Sigmoidoscopy Age: 39-70 years old   Colonoscopy: every 10 years (May be performed more frequently if at higher risk)  OR  FOBT/FIT: every 1 year  OR  Cologuard: every 3 years  OR  Sigmoidoscopy: every 5 years  Screening may be recommended earlier than age 39 if at higher risk for colorectal cancer  Also, an individualized decision between you and your healthcare provider will decide whether screening between the ages of 74-80 would be appropriate   Colonoscopy: Not on file  FOBT/FIT: Not on file  Cologuard: Not on file  Sigmoidoscopy: Not on file          Prostate Cancer Screening Individualized decision between patient and health care provider in men between ages of 53-78   Medicare will cover every 12 months beginning on the day after your 50th birthday PSA: No results in last 5 years     Screening Not Indicated     Hepatitis C Screening Once for adults born between [facility]  More frequently in patients at high risk for Hepatitis C Hep C Antibody: Not on file        Diabetes Screening 1-2 times per year if you're at risk for diabetes or have pre-diabetes Fasting glucose: 84 mg/dL (7/3/2021)  A1C: No results in last 5 years (No results in last 5 years)  Screening Current   Cholesterol Screening Once every 5 years if you don't have a lipid disorder  May order more often based on risk factors  Lipid panel: 02/21/2020  Screening Current      Other Preventive Screenings Covered by Medicare:  1  Abdominal Aortic Aneurysm (AAA) Screening: covered once if your at risk  You're considered to be at risk if you have a family history of AAA or a male between the age of 73-68 who smoking at least 100 cigarettes in your lifetime  2  Lung Cancer Screening: covers low dose CT scan once per year if you meet all of the following conditions: (1) Age 50-69; (2) No signs or symptoms of lung cancer; (3) Current smoker or have quit smoking within the last 15 years; (4) You have a tobacco smoking history of at least 20 pack years (packs per day x number of years you smoked); (5) You get a written order from a healthcare provider  3  Glaucoma Screening: covered annually if you're considered high risk: (1) You have diabetes OR (2) Family history of glaucoma OR (3)  aged 48 and older OR (3)  American aged 72 and older  3  Osteoporosis Screening: covered every 2 years if you meet one of the following conditions: (1) Have a vertebral abnormality; (2) On glucocorticoid therapy for more than 3 months; (3) Have primary hyperparathyroidism; (4) On osteoporosis medications and need to assess response to drug therapy  5  HIV Screening: covered annually if you're between the age of 12-76  Also covered annually if you are younger than 13 and older than 72 with risk factors for HIV infection  For pregnant patients, it is covered up to 3 times per pregnancy      Immunizations:  Immunization Recommendations   Influenza Vaccine Annual influenza vaccination during flu season is recommended for all persons aged >= 6 months who do not have contraindications   Pneumococcal Vaccine   * Pneumococcal conjugate vaccine = PCV13 (Prevnar 13), PCV15 (Vaxneuvance), PCV20 (Prevnar 20)  * Pneumococcal polysaccharide vaccine = PPSV23 (Pneumovax) Adults 2364 years old: 1-3 doses may be recommended based on certain risk factors  Adults 72 years old: 1-2 doses may be recommended based off what pneumonia vaccine you previously received   Hepatitis B Vaccine 3 dose series if at intermediate or high risk (ex: diabetes, end stage renal disease, liver disease)   Tetanus (Td) Vaccine - COST NOT COVERED BY MEDICARE PART B Following completion of primary series, a booster dose should be given every 10 years to maintain immunity against tetanus  Td may also be given as tetanus wound prophylaxis  Tdap Vaccine - COST NOT COVERED BY MEDICARE PART B Recommended at least once for all adults  For pregnant patients, recommended with each pregnancy  Shingles Vaccine (Shingrix) - COST NOT COVERED BY MEDICARE PART B  2 shot series recommended in those aged 48 and above     Health Maintenance Due:      Topic Date Due    Hepatitis C Screening  Never done    HIV Screening  Completed     Immunizations Due:      Topic Date Due    Influenza Vaccine (1) 09/01/2022     Advance Directives   What are advance directives? Advance directives are legal documents that state your wishes and plans for medical care  These plans are made ahead of time in case you lose your ability to make decisions for yourself  Advance directives can apply to any medical decision, such as the treatments you want, and if you want to donate organs  What are the types of advance directives? There are many types of advance directives, and each state has rules about how to use them  You may choose a combination of any of the following:  · Living will: This is a written record of the treatment you want  You can also choose which treatments you do not want, which to limit, and which to stop at a certain time  This includes surgery, medicine, IV fluid, and tube feedings  · Durable power of  for healthcare [facility]):   This is a written record that states who you want to make healthcare choices for you when you are unable to make them for yourself  This person, called a proxy, is usually a family member or a friend  You may choose more than 1 proxy  · Do not resuscitate (DNR) order:  A DNR order is used in case your heart stops beating or you stop breathing  It is a request not to have certain forms of treatment, such as CPR  A DNR order may be included in other types of advance directives  · Medical directive: This covers the care that you want if you are in a coma, near death, or unable to make decisions for yourself  You can list the treatments you want for each condition  Treatment may include pain medicine, surgery, blood transfusions, dialysis, IV or tube feedings, and a ventilator (breathing machine)  · Values history: This document has questions about your views, beliefs, and how you feel and think about life  This information can help others choose the care that you would choose  Why are advance directives important? An advance directive helps you control your care  Although spoken wishes may be used, it is better to have your wishes written down  Spoken wishes can be misunderstood, or not followed  Treatments may be given even if you do not want them  An advance directive may make it easier for your family to make difficult choices about your care  Underweight  Underweight is defined as having a body mass index (BMI) of less than 18 5 kg/m2   Anorexia  is a loss of appetite, decreased food intake, or both  Your appetite naturally decreases as you get older  You also get full faster than you used to  This occurs because your body needs less energy  Other body changes can also lead to a decreased appetite  Even though some appetite loss is normal, you still need to get enough calories and nutrients to keep you healthy  You can start to lose too much weight if you do not eat as much food as your body needs   Unwanted weight loss can cause health problems, or worsen health problems you already have  You can also become dehydrated if you do not drink enough liquid  How to eat healthy and get enough nutrients:   · Choose healthy foods  Eat a variety of fruits, vegetables, whole grains, low-fat dairy foods, lean meats, and other protein foods  Limit foods high in fat, sugar, and salt  Limit or avoid alcohol as directed  Work with a dietitian to help you plan your meals if you need to follow a special diet  A dietitian can also teach you how to modify foods if you have trouble chewing or swallowing  · Snack on healthy foods between meals  if you only eat a small amount during meals  Snacks provide extra healthy nutrients and calories between meals  Examples include fruit, cheese, and whole grain crackers  · Drink liquids as directed  to avoid dehydration  Drink liquids between meals if they cause you to get full too quickly during meals  Ask how much liquid to drink each day and which liquids are best for you  · Use herbs, spices, and flavor enhancers to add flavor to foods  Avoid using herbs and spice blends that also contain sodium  Ask your healthcare provider or dietitian about flavor enhancers  Flavor enhancers with ham, natural traore, and roast beef flavors can also be sprinkled on food to add flavor  · Share meals with others as often as you can  Eating with others may help you to eat better during meal time  Ask family members, neighbors, or friends to join you for lunch  There are also senior centers where you can meet people, and share meals with them  · Ask family and friends for help  with shopping or preparing foods  Ask for a ride to the grocery store, if needed  © Copyright [address] 2018 Information is for End User's use only and may not be sold, redistributed or otherwise used for commercial purposes   All illustrations and images included in CareNotes® are the copyrighted property of A D [employer] M [employer]  or [employer]

## 2022-10-03 ENCOUNTER — PROCEDURE VISIT (OUTPATIENT)
Dept: FAMILY MEDICINE CLINIC | Facility: CLINIC | Age: 37
End: 2022-10-03
Payer: MEDICARE

## 2022-10-03 DIAGNOSIS — M99.03 SOMATIC DYSFUNCTION OF LUMBAR REGION: ICD-10-CM

## 2022-10-03 DIAGNOSIS — G24.3 TORTICOLLIS, SPASMODIC: Primary | ICD-10-CM

## 2022-10-03 DIAGNOSIS — G82.50 SPASTIC QUADRIPARESIS (HCC): ICD-10-CM

## 2022-10-03 DIAGNOSIS — M99.01 SOMATIC DYSFUNCTION OF SPINE, CERVICAL: ICD-10-CM

## 2022-10-03 PROCEDURE — 99213 OFFICE O/P EST LOW 20 MIN: CPT | Performed by: FAMILY MEDICINE

## 2022-10-03 PROCEDURE — 98925 OSTEOPATH MANJ 1-2 REGIONS: CPT | Performed by: FAMILY MEDICINE

## 2022-10-06 NOTE — PROGRESS NOTES
Assessment/Plan     This is a 43 y o  male who presents for OMT follow-up for:  No diagnosis found  Plan:   1  Patient tolerated OMT well for the above problems,  advised patient to drink fluids and can use NSAID for soreness after treatment     2  OMT Follow up in 2 weeks  Subjective     Stefania Crawford is a 43 y o  male and is here for a OMT follow up  The patient's mother reports he is doing well in PT and is not leaning as much to the right side in his chair  She is concerned as over the last week he has been having quite a pronounced anterior head position which is new for him  Is the patient taking Pain medication? no  Has the patient completed physical therapy for this condition? yes  Did Patient symptoms improve from last OMT appointment? yes    The following portions of the patient's history were reviewed and updated as appropriate: allergies, current medications, past family history, past medical history, past social history, past surgical history and problem list     Review of Systems  Do you have pain that bothers you in your daily life? not asked  Review of Systems   Unable to perform ROS: Patient nonverbal       Objective     OMT Exam   OMT  Performed by: Toña العلي DO  Authorized by: Toña العلي DO   Universal Protocol:  Consent: Verbal consent obtained    Risks and benefits: risks, benefits and alternatives were discussed  Consent given by: parent        Procedure Details:     Region evaluated and treated:  Cervical and Lumbar    Cervical Details:     Examination Method:  Tissue Texture Change, Stability, Laxity, Effusions, Tone and Range of Motion, Contracture    Severity:  Severe    Osteopathic Findings:  Significant hypertonicity and spasm of the right SCM muscle     Treatment Method:  Muscle Energy Treatment, Soft Tissue Treatment and Facilitated Positional Release Treatment    Response:  Improved - The somatic dysfunction is improved but not completely resolved      Lumbar details:     Examination Method:  Tissue Texture Change, Stability, Laxity, Effusions, Tone    Severity:  Moderate    Osteopathic Findings:  Left QL hypertonicity     Treatment Method:  Soft Tissue Treatment and Muscle Energy Treatment    Total Regions Treated:  2

## 2022-10-11 PROBLEM — J03.90 TONSILLITIS: Status: RESOLVED | Noted: 2022-06-01 | Resolved: 2022-10-11

## 2022-10-12 PROBLEM — H61.23 BILATERAL IMPACTED CERUMEN: Status: RESOLVED | Noted: 2019-07-08 | Resolved: 2022-10-12

## 2022-10-12 PROBLEM — H60.501 ACUTE OTITIS EXTERNA OF RIGHT EAR: Status: RESOLVED | Noted: 2021-04-23 | Resolved: 2022-10-12

## 2022-10-20 ENCOUNTER — PROCEDURE VISIT (OUTPATIENT)
Dept: FAMILY MEDICINE CLINIC | Facility: CLINIC | Age: 37
End: 2022-10-20
Payer: MEDICARE

## 2022-10-20 DIAGNOSIS — M99.03 SOMATIC DYSFUNCTION OF LUMBAR REGION: ICD-10-CM

## 2022-10-20 DIAGNOSIS — M99.05 SOMATIC DYSFUNCTION OF PELVIC REGION: ICD-10-CM

## 2022-10-20 DIAGNOSIS — M99.01 SOMATIC DYSFUNCTION OF SPINE, CERVICAL: ICD-10-CM

## 2022-10-20 DIAGNOSIS — M43.6 CONTRACTURE OF STERNOCLEIDOMASTOID MUSCLE: ICD-10-CM

## 2022-10-20 DIAGNOSIS — M41.9 SCOLIOSIS OF THORACIC SPINE, UNSPECIFIED SCOLIOSIS TYPE: ICD-10-CM

## 2022-10-20 DIAGNOSIS — G24.3 TORTICOLLIS, SPASMODIC: Primary | ICD-10-CM

## 2022-10-20 DIAGNOSIS — M99.02 SOMATIC DYSFUNCTION OF THORACIC REGION: ICD-10-CM

## 2022-10-20 PROCEDURE — 98927 OSTEOPATH MANJ 5-6 REGIONS: CPT

## 2022-10-20 PROCEDURE — 99214 OFFICE O/P EST MOD 30 MIN: CPT | Performed by: FAMILY MEDICINE

## 2022-10-20 NOTE — PROGRESS NOTES
The Assessment/Plan     This is a 43 y o  male who presents for OMT follow-up for:  No diagnosis found  1  Patient tolerated OMT well for the above problems,  advised patient to drink fluids and can use NSAID for soreness after treatment     2  OMT Follow up in 2 or 4 weeks weeks  Subjective     Stefania Crawford is a 43 y o  male and is here for a OMT follow up  His caregiver is present who is assisting with movement  She states that the patient has been doing PT and water therapy  He is responding well to PT and appears less contracted during this visit  However, he is still leaning to the right side in his chair  Is the patient taking Pain medication? no  Has the patient completed physical therapy for this condition? yes  Did Patient symptoms improve from last OMT appointment? yes    The following portions of the patient's history were reviewed and updated as appropriate: allergies, current medications, past family history, past medical history, past social history, past surgical history and problem list     Review of Systems  Review of Systems   Unable to perform ROS: Patient nonverbal         Objective     OMT Exam     OMT  Performed by: Ronald Cuevas DO  Authorized by: Ronald Cuevas DO   Universal Protocol:  Procedure performed by:  Consent: Verbal consent obtained  Consent given by: caregiver  Patient identity confirmed: verbally with patient        Procedure Details:     Region evaluated and treated:  Pelvis Innominate, Right Extremities, Left Extremities, Lumbar and Cervical    Extremity Information  Extremities: left upper extremity    Extremity Information  Extremities: right upper extremity      Cervical Details:     Examination Method:  Tissue Texture Change, Stability, Laxity, Effusions, Tone, Range of Motion, Contracture, Passive and Asymmetry, Misalignment, Crepitation, Defects, Masses    Severity:  Moderate    Osteopathic Findings:  Rotated right  Hypertonic SCM   Torticollis of R SCM     Treatment Method:  Myofascial Release Treatment, Muscle Energy Treatment and Soft Tissue Treatment    Response:  Improved - The somatic dysfunction is improved but not completely resolved  Lumbar details:     Examination Method:  Tissue Texture Change, Stability, Laxity, Effusions, Tone and Asymmetry, Misalignment, Crepitation, Defects, Masses    Severity:  Mild    Osteopathic Findings:  Hypertonicity of lumbar region with exaggerated sidebending to the right  Treatment Method:  High Velocity, Low Amplitude Treatment and Soft Tissue Treatment    Response:  Improved - The somatic dysfunction is improved but not completely resolved  Pelvis Innominate details:     Examination Method:  Tissue Texture Change, Stability, Laxity, Effusions, Tone    Osteopathic Findings:  Left posterior innominate  Treatment Method:  High Velocity, Low Amplitude Treatment    Right Upper Extremity details:     Examination Method:  Range of Motion, Contracture, Tissue Texture Change, Stability, Laxity, Effusions, Tone and Passive    Severity:  Moderate    Osteopathic Findings:  Bilateral contracture of hands  Treatment Method:  Soft Tissue Treatment and Myofascial Release Treatment    Response:  Improved - The somatic dysfunction is improved but not completely resolved  Left Upper Extremity details:     Examination Method:  Tissue Texture Change, Stability, Laxity, Effusions, Tone, Range of Motion, Contracture and Passive    Severity:  Moderate    Osteopathic Findings:  Bilateral hypertonicity of hands with contracture  Treatment Method:  Soft Tissue Treatment and Myofascial Release Treatment    Total Regions Treated:  5  Attending provider present in exam room for procedure:  Yes

## 2022-10-21 ENCOUNTER — TELEPHONE (OUTPATIENT)
Dept: FAMILY MEDICINE CLINIC | Facility: CLINIC | Age: 37
End: 2022-10-21

## 2022-10-21 NOTE — TELEPHONE ENCOUNTER
Colon & Rectal Surgery History and Physical  Pre-Endoscopy Procedure Note    History of Present Illness   I have been asked by Dr. Pal to evaluate this 70 year old female for colorectal polyp surveillance. She currently denies any abdominal pain, weight loss, bleeding per rectum, or recent change in bowel habits.    Past Medical History  Diagnosis Date     Chest pain 2019     Colonic polyp 03/2012     DCIS (ductal carcinoma in situ) of breast 2004     History of breast cancer 2007    left, bilat mast and reconstruction, Dr. Cuevas     Hyperlipidemia 12/12/2019     Hypertension        Past Surgical History  Procedure Laterality Date     APPENDECTOMY  2002     ARTHROPLASTY KNEE UNICOMPARTMENT  09/13/2012    Procedure: ARTHROPLASTY KNEE UNICOMPARTMENT;  RIGHT KNEE UNICOMPARTMENTAL ARTHROPLASTY ;  Surgeon: Dakota Haines MD;  Location:  OR     ARTHROPLASTY KNEE UNICOMPARTMENT Left 05/24/2016    Procedure: ARTHROPLASTY KNEE UNICOMPARTMENT;  Surgeon: Dakota Haines MD;  Location:  OR     BREAST RECONSTRUCTION WITH DEEP INFERIOR EPIGASTRIC  (ABRAHAM) FLAP,  2009     bunion surgery  1975 and 2007     BUNIONECTOMY IVETH  05/09/2012    Procedure:BUNIONECTOMY IVETH; RIGHT HALLUX VALGUS WITH SECOND CLAWTOE RECONSTRUCTION; Surgeon:PHONG HENDRICKSON; Location: OR     CARPAL TUNNEL RELEASE RT/LT       CV CORONARY ANGIOGRAM N/A 12/06/2019    Procedure: Coronary Angiogram;  Surgeon: Jose Luis Montero MD;  Location:  [facility]     EXCISE LESION TRUNK N/A 11/13/2015    Procedure: EXCISE LESION TRUNK;  Surgeon: Jose Luis Hawkins MD;  Location: [facility]     FOOT SURGERY  08/2017    at [facility]     fractured arm  2009     MASTECTOMY BILATERAL, INSERT TISSUE EXPANDER BILATERAL, COMBINED  2008    breast cancer     RECONSTRUCT BREAST Left 11/13/2015    Procedure: RECONSTRUCT BREAST;  Surgeon: Jose Luis Hawkins MD;  Location: [facility]     REVISE RECONSTRUCTED BREAST  01/27/2012     Returned for pickup (up front)  "Procedure:REVISE RECONSTRUCTED BREAST; LEFT BREAST CAPSULORRAPHY; Surgeon:KEVIN ESTRADA; Location:[facility]     SHOULDER SURGERY       TOE SURGERY  2014        Medications  Medication Sig     amLODIPine (NORVASC) 5 MG tablet Take 1 tablet (5 mg) by mouth daily     Multiple Vitamins-Minerals (MULTIVITAMIN GUMMIES ADULTS) CHEW Take 2 chew tab by mouth daily       Allergies  Allergen Reactions     No Known Drug Allergies      Latex Rash        Family History   fFamily history includes Hypertension in her father.     Social History    She reports that she quit smoking about 17 years ago. She quit after 20.00 years of use. She has never used smokeless tobacco. She reports current alcohol use. She reports that she does not use drugs.    Review of Systems   Constitutional:  No fever, weight change or fatigue.    Eyes:     No dry eyes or vision changes.   Ears/Nose/Throat/Neck:  No oral ulcers, sore throat or voice change.    Cardiovascular:   No palpitations, syncope, angina or edema.   Respiratory:    No chest pain, excessive sleepiness, shortness of breath or hemoptysis.    Gastrointestinal:   No abdominal pain, nausea, vomiting, diarrhea or heartburn.    Genitourinary:   No dysuria, hematuria, urinary retention or urinary frequency.   Musculoskeletal:  No joint swelling or arthralgias.    Dermatologic:  No skin rash or other skin changes.   Neurologic:    No focal weakness or numbness. No neuropathy.   Psychiatric:    No depression, anxiety, suicidal ideation, or paranoid ideation.   Endocrine:   No cold or heat intolerance, polydipsia, hirsutism, change in libido, or flushing.   Hematology/Lymphatic:  No bleeding or lymphadenopathy.    Allergy/Immunology:  No rhinitis or hives.     Physical Exam   Vitals:  Blood pressure 133/78, pulse 58, resp. rate 16, height 1.626 m (5' 4\"), weight 73.9 kg (163 lb), SpO2 97 %, not currently breastfeeding.    General:  Alert and oriented to person, place and " time   Airway: Normal oropharyngeal airway and neck mobility   Lungs:  Clear bilaterally   Heart:  Regular rate and rhythm   Abdomen: Soft, NT, ND, no masses   Extremities: Warm, good capillary refill    ASA Grade: II (mild systemic disease)    Impression: Cleared for use of conscious sedation for colorectal polyp surveillance    Plan: Proceed with colonoscopy     Gabrielle Mack MD  [facility]  [phone]

## 2022-10-25 ENCOUNTER — TELEPHONE (OUTPATIENT)
Dept: FAMILY MEDICINE CLINIC | Facility: CLINIC | Age: 37
End: 2022-10-25

## 2022-10-25 NOTE — TELEPHONE ENCOUNTER
Order form for medical supplies to be signed and faxed back in red folder in preceptor room, thank you

## 2022-10-27 ENCOUNTER — TELEPHONE (OUTPATIENT)
Dept: NEUROLOGY | Facility: CLINIC | Age: 37
End: 2022-10-27

## 2022-11-01 ENCOUNTER — CONSULT (OUTPATIENT)
Dept: DERMATOLOGY | Facility: CLINIC | Age: 37
End: 2022-11-01

## 2022-11-01 VITALS — BODY MASS INDEX: 17.32 KG/M2 | WEIGHT: 121 LBS | HEIGHT: 70 IN

## 2022-11-01 DIAGNOSIS — D22.9 ATYPICAL NEVI: ICD-10-CM

## 2022-11-01 DIAGNOSIS — D48.5 NEOPLASM OF UNCERTAIN BEHAVIOR OF SKIN: Primary | ICD-10-CM

## 2022-11-01 NOTE — PATIENT INSTRUCTIONS
History of Biting (Self)    Assessment and Plan:  Based on a thorough discussion of this condition and the management approach to it (including a comprehensive discussion of the known risks, side effects and potential benefits of treatment), the patient (family) agrees to implement the following specific plan:  Apply Vaseline to areas of biting on arm    NEOPLASM OF UNCERTAIN BEHAVIOR OF SKIN    Assessment and Plan:  I have discussed with the patient that a sample of skin via a "skin biopsy” would be potentially helpful to further make a specific diagnosis under the microscope  Based on a thorough discussion of this condition and the management approach to it (including a comprehensive discussion of the known risks, side effects and potential benefits of treatment), the patient (family) agrees to implement the following specific plan:    Procedure:  Skin Biopsy  After a thorough discussion of treatment options and risk/benefits/alternatives (including but not limited to local pain, scarring, dyspigmentation, blistering, possible superinfection, and inability to confirm a diagnosis via histopathology), verbal and written consent were obtained and portion of the rash was biopsied for tissue sample  See below for consent that was obtained from patient and subsequent Procedure Note  INFORMED CONSENT DISCUSSION AND POST-OPERATIVE INSTRUCTIONS FOR PATIENT    I   RATIONALE FOR PROCEDURE  I understand that a skin biopsy allows the Dermatologist to test a lesion or rash under the microscope to obtain a diagnosis  It usually involves numbing the area with numbing medication and removing a small piece of skin; sometimes the area will be closed with sutures  In this specific procedure, sutures are not usually needed  If any sutures are placed, then they are usually need to be removed in 2 weeks or less  I understand that my Dermatologist recommends that a skin "shave" biopsy be performed today    A local anesthetic, similar to the kind that a dentist uses when filling a cavity, will be injected with a very small needle into the skin area to be sampled  The injected skin and tissue underneath "will go to sleep” and become numb so no pain should be felt afterwards  An instrument shaped like a tiny "razor blade" (shave biopsy instrument) will be used to cut a small piece of tissue and skin from the area so that a sample of tissue can be taken and examined more closely under the microscope  A slight amount of bleeding will occur, but it will be stopped with direct pressure and a pressure bandage and any other appropriate methods  I understands that a scar will form where the wound was created  Surgical ointment will be applied to help protect the wound  Sutures are not usually needed  II   RISKS AND POTENTIAL COMPLICATIONS   I understand the risks and potential complications of a skin biopsy include but are not limited to the following:  Bleeding  Infection  Pain  Scar/keloid  Skin discoloration  Incomplete Removal  Recurrence  Nerve Damage/Numbness/Loss of Function  Allergic Reaction to Anesthesia  Biopsies are diagnostic procedures and based on findings additional treatment or evaluation may be required  Loss or destruction of specimen resulting in no additional findings    My Dermatologist has explained to me the nature of the condition, the nature of the procedure, and the benefits to be reasonably expected compared with alternative approaches  My Dermatologist has discussed the likelihood of major risks or complications of this procedure including the specific risks listed above, such as bleeding, infection, and scarring/keloid  I understand that a scar is expected after this procedure  I understand that my physician cannot predict if the scar will form a "keloid," which extends beyond the borders of the wound that is created  A keloid is a thick, painful, and bumpy scar    A keloid can be difficult to treat, as it does not always respond well to therapy, which includes injecting cortisone directly into the keloid every few weeks  While this usually reduces the pain and size of the scar, it does not eliminate it  I understand that photographs may be taken before and after the procedure  These will be maintained as part of the medical providers confidential records and may not be made available to me  I further authorize the medical provider to use the photographs for teaching purposes or to illustrate scientific papers, books, or lectures if in his/her judgment, medical research, education, or science may benefit from its use  I have had an opportunity to fully inquire about the risks and benefits of this procedure and its alternatives  I have been given ample time and opportunity to ask questions and to seek a second opinion if I wished to do so  I acknowledge that there have specifically been no guarantees as to the cosmetic results from the procedure  I am aware that with any procedure there is always the possibility of an unexpected complication  III  POST-PROCEDURAL CARE (WHAT YOU WILL NEED TO DO "AFTER THE BIOPSY" TO OPTIMIZE HEALING)    Keep the area clean and dry  Try NOT to remove the bandage or get it wet for the first 24 hours  Gently clean the area and apply surgical ointment (such as Vaseline petrolatum ointment, which is available "over the counter" and not a prescription) to the biopsy site for up to 2 weeks straight  This acts to protect the wound from the outside world  Sutures are not usually placed in this procedure  If any sutures were placed, return for suture removal as instructed (generally 1 week for the face, 2 weeks for the body)  Take Acetaminophen (Tylenol) for discomfort, if no contraindications  Ibuprofen or aspirin could make bleeding worse      Call our office immediately for signs of infection: fever, chills, increased redness, warmth, tenderness, discomfort/pain, or pus or foul smell coming from the wound  WHAT TO DO IF THERE IS ANY BLEEDING? If a small amount of bleeding is noticed, place a clean cloth over the area and apply firm pressure for ten minutes  Check the wound after 10 minutes of direct pressure  If bleeding persists, try one more time for an additional 10 minutes of direct pressure on the area  If the bleeding becomes heavier or does not stop after the second attempt, or if you have any other questions about this procedure, then please call your [facility] by calling [phone] (SKIN)  I hereby acknowledge that I have reviewed and verified the site with my Dermatologist and have requested and authorized my Dermatologist to proceed with the procedure

## 2022-11-01 NOTE — PROGRESS NOTES
[facility] Note     Patient Name: Joseph Ackerman  Encounter Date: 11/01/22    Have you been cared for by a [facility] [address] Dermatologist in the last 3 years and, if so, which description applies to you? NO    NO    I am considered a "new" patient and must complete all patient intake questions  I am MALE/not capable of bearing children  REVIEW OF SYSTEMS:  Have you recently had or currently have any of the following? · Recent fever or chills? No  · Any non-healing wound? No   PAST MEDICAL HISTORY:  Have you personally ever had or currently have any of the following? If "YES," then please provide more detail  · Skin cancer (such as Melanoma, Basal Cell Carcinoma, Squamous Cell Carcinoma? N/A  · Tuberculosis, HIV/AIDS, Hepatitis B or C: No  · Systemic Immunosuppression such as Diabetes, Biologic or Immunotherapy, Chemotherapy, Organ Transplantation, Bone Marrow Transplantation No  · Radiation Treatment No   FAMILY HISTORY:  Any "first degree relatives" (parent, brother, sister, or child) with the following? • Skin Cancer, Pancreatic or Other Cancer? N/A   PATIENT EXPERIENCE:    • Do you want the Dermatologist to perform a COMPLETE skin exam today including a clinical examination under the "bra and underwear" areas? NO  • If necessary, do we have your permission to call and leave a detailed message on your Preferred Phone number that includes your specific medical information?   Yes      Allergies   Allergen Reactions   • Other      drugs that prolong the QT interval  drugs that prolong the QT interval  Seasonal allergies       Current Outpatient Medications:   •  ascorbic acid (GNP VITAMIN C) 500 MG tablet, Take 500 mg daily at 4 PM, Disp: 30 tablet, Rfl: 5  •  b complex-vitamin C-folic acid (RENAL) 1 mg, Take 1 mg daily at 4 PM, Disp: 31 each, Rfl: 5  •  bisacodyl (DULCOLAX) 10 mg suppository, Use 1 supp  per rectum QOD PRN for constipation, max 3d/week, Disp: 12 suppository, Rfl: 0  • Camphor-Eucalyptus-Menthol (VICKS VAPORUB) 4 73-1 2-2 6 % OINT, Apply topically as needed (congestion), Disp: 170 g, Rfl: 0  •  carbamide peroxide (DEBROX) 6 5 % otic solution, Administer 5 drops into both ears 2 (two) times a day for 7 days (Patient taking differently: Administer 5 drops into both ears as needed), Disp: 15 mL, Rfl: 5  •  Coenzyme Q10 (Co Q10) 100 MG CAPS, Take 2 capsules by mouth daily Whole in yougurt or pudding, Disp: 60 capsule, Rfl: 1  •  Diapers & Supplies MISC, Use 5 (five) times a day Huggies #3, Disp: 450 each, Rfl: 2  •  diclofenac sodium (VOLTAREN) 1 %, Apply 2 g topically 3 (three) times a day At 10am, 4pm, 9pm to thoracic paravertebral musculature (Patient taking differently: Apply 2 g topically as needed At 10am, 4pm, 9pm to thoracic paravertebral musculature), Disp: 100 g, Rfl: 2  •  Disposable Gloves (Vinyl Gloves Medium) MISC, Use 4 (four) times a day Dispense 3 boxes monthly; Diagnosis R32, Disp: 3 each, Rfl: 5  •  docusate sodium (COLACE) 100 mg capsule, 100 mg 9am and 9pm, Disp: 10 capsule, Rfl: 5  •  docusate sodium (Enemeez Mini) 283 MG enema, Insert 1 enema into the rectum daily, Disp: 30 each, Rfl: 3  •  dronabinol (MARINOL) 2 5 mg capsule, TAKE ONE CAPSULE -PLACE WHOLE IN YOGURT/PUDDING BY MOUTH 3 TIMES A DAY (9AM-4PM-9PM) *CALL PHARMACY 5 DAYS IN ADVANCE FOR REFILL*, Disp: 90 capsule, Rfl: 4  •  Elastic Bandages & Supports (Knee Brace) MISC, Use as directed according to PT recommendations, Disp: 1 each, Rfl: 0  •  Emollient (CETA-KLENZ EX), Apply topically, Disp: , Rfl:   •  Emollient (eucerin) lotion, Apply topically to face daily at 10a and 8p, Disp: 480 mL, Rfl: 3  •  fluticasone (FLONASE) 50 mcg/act nasal spray, 1 spray into each nostril daily, Disp: , Rfl:   •  glycerin adult 2 g suppository, Place 1 suppository QOD for 2 weeks, then QOD PRN for constipation, Disp: 50 suppository, Rfl: 6  •  HEMORRHOIDAL 0 25 % suppository, - UNWRAP AND INSERT 1 SUPPOSITORY PER RECTUM AS NEEDED FOR HEMORRHOID PAIN RELIEF, Disp: 12 suppository, Rfl: 11  •  ibuprofen (MOTRIN) 200 mg tablet, Take 2 tabs q6h PRN for pain not to exceed 2000mg per day, Disp: 200 tablet, Rfl: 2  •  Icosapent Ethyl (VASCEPA) 1 g CAPS, Take 1 capsule (1 g total) by mouth daily, Disp: 31 capsule, Rfl: 11  •  Incontinence Supply Disposable (Prevail Air Briefs) MISC, Use 1 each every 4 (four) hours Please provide 5 briefs per day, Disp: 150 each, Rfl: 11  •  Incontinence Supply Disposable (Select Disposable Underwear Sm) MISC, Please provide a 30 day supply 10 per day DX R32 incontinence, Disp: 22 each, Rfl: 6  •  Incontinence Supply Disposable (Simplicity Insert Pad 73"-92") MISC, Use 5 (five) times a day, Disp: 450 each, Rfl: 1  •  influenza vaccine, subunit, quadrivalent (Flucelvax Quadrivalent), Flucelvax Quad [id number] 60 mcg (15 mcg x 4)/0 5 mL intramuscular susp  TO BE ADMINISTERED BY PHARMACIST FOR IMMUNIZATION (PER CDC GUIDELINES), Disp: , Rfl:   •  levothyroxine 75 mcg tablet, Take 1 tablet (75 mcg total) by mouth daily, Disp: 90 tablet, Rfl: 2  •  loratadine (CLARITIN) 10 mg tablet, Take 1 tablet (10 mg total) by mouth daily as needed for allergies, Disp: 30 tablet, Rfl: 5  •  Magnesium 250 MG TABS, in the morning 2 tablets at 4 pm, Disp: , Rfl:   •  Melatonin 5 MG TABS, Take 1 tablet (5 mg total) by mouth daily at bedtime, Disp: 31 each, Rfl: 5  •  methocarbamol (ROBAXIN) 500 mg tablet, Take 0 5-1 tablet by mouth TID PRN for muscle spasms/pain  Can take prior to therapy  , Disp: 30 tablet, Rfl: 0  •  Misc   Devices [facility]) MISC, Please provide pt with a new cord for power wheelchair, Disp: 1 each, Rfl: 0  •  modafinil (PROVIGIL) 200 MG tablet, TAKE ONE TABLET -PLACE WHOLE IN YOGURT/PUDDING BY MOUTH EVERY DAY (9AM), Disp: 180 tablet, Rfl: 0  •  Multiple Vitamins-Minerals (CEROVITE SENIOR) TABS, Take 1 tablet by mouth daily, Disp: 30 tablet, Rfl: 5  •  Nutritional Supplements (Nutra Shake) (Frozen) LIQD, Give 1 Magic Cup frozen dessert (4 oz/118 mL) PO QD; may also give PRN for added nutritional/caloric/fat intake  Give frozen or defrost to eat as a pudding  Flavor choice per patient preference (vanilla, chocolate, butter pecan, wild berry, orange creme), Disp: 118 mL, Rfl: 3  •  omeprazole (PriLOSEC) 20 mg delayed release capsule, Take 20mg at 9am every day, Disp: 30 capsule, Rfl: 5  •  PREPARATION H 1-0 25-14 4-15 % rectal cream, - APPLY RECTALLY AS NEEDED FOR HEMORRHOID PAIN RELIEF, Disp: 51 g, Rfl: 11  •  sertraline (ZOLOFT) 100 mg tablet, Take 1 tablet (100 mg total) by mouth daily, Disp: 31 tablet, Rfl: 11  •  Skin Protectants, Misc   (Hydrocerin) LOTN, Apply topically 2 (two) times a day Apply to both feeland legs twice a day, Disp: , Rfl: 0  •  sodium chloride (DEEP SEA NASAL SPRAY) 0 65 % nasal spray, 2 sprays into each nostril as needed for congestion, Disp: 44 mL, Rfl: 11  •  SODIUM FLUORIDE, DENTAL GEL, (PreviDent 5000 Plus) 1 1 % CREA, Take by mouth daily at bedtime Apply orally to teeth once daily while brushing at night time do not rinse mouth after brushing, Disp: , Rfl: 0  •  Tea Tree 100 % OIL, Apply 1 g topically daily as needed (rash) Apply topically daily to skin folds, Disp: 60 mL, Rfl: 11  •  Vitamins A & D (VITAMIN A & D) ointment, - APPLY TO AFFECTED AREA (BUTTOCKS/ANAL) AS NEEDED, Disp: 454 g, Rfl: 11  •  zinc sulfate (ZINCATE) 220 mg capsule, TAKE TWO CAPSULE -PLACE WHOLE IN YOGURT/PUDDING BY MOUTH EVERY DAY (9AM) EVERY OTHER MONTH **ODD NUMBERED MONTHS ONLY**, Disp: 62 capsule, Rfl: 0  •  magnesium citrate (CITROMA) 1 745 g/30 mL oral solution, Take 296 mL by mouth once for 1 dose (Patient not taking: Reported on 9/26/2022), Disp: 296 mL, Rfl: 0  •  Magnesium Oxide 500 MG TABS, Take 1 tablet (500 mg total) by mouth daily Take 1 tablet daily at 4 PM (Patient not taking: Reported on 9/26/2022), Disp: 31 each, Rfl: 5  •  Nutritional Supplements (NUTRITIONAL SHAKE) LIQD, Take 1 Can by mouth 2 (two) times a day Please provide Boost 90 minutes before lunch and 90 minutes before dinner (Patient not taking: Reported on 9/26/2022), Disp: 60 Bottle, Rfl: 6  •  onabotulinumtoxin A (BOTOX) 100 units, inject 500 units into left gastroc soleus and abductor pollicis ankit, Disp: , Rfl:   •  polyethylene glycol (MIRALAX) 17 g packet, Take 17 g by mouth 2 (two) times a day, Disp: 180 each, Rfl: 3  •  Starch, Thickening, LIQD, Nectar thick liquids up to honey thick if coughing occurs as needed, please use Simply Thick liquid only  Discontinue Thick-It powder  (Patient not taking: Reported on 9/26/2022), Disp: 237 mL, Rfl: 5  •  zinc oxide (DESITIN) 40 % PSTE, Apply topically  (Patient not taking: Reported on 9/26/2022), Disp: , Rfl:           • Whom besides the patient is providing clinical information about today's encounter?   o Patient was with Caretaker; patient lives in own home with [address] [place] [address]    Physical Exam and Assessment/Plan by Diagnosis:    History of Biting (self)  Physical Exam:  • Anatomic Location Affected:  Left upper arm  • Morphological Description:  Excoriations; no signs of infection  • Pertinent Positives:  • Pertinent Negatives: Additional History of Present Condition:  patient was assisted with Caretaker at  Visit today    Assessment and Plan:  Based on a thorough discussion of this condition and the management approach to it (including a comprehensive discussion of the known risks, side effects and potential benefits of treatment), the patient (family) agrees to implement the following specific plan:  • Apply Vaseline to areas of biting on arm    NEOPLASM OF UNCERTAIN BEHAVIOR OF SKIN    Physical Exam:  • (Anatomic Location); (Size and Morphological Description); (Differential Diagnosis):  o Left shoulder: 5 mm Brown papule  • Pertinent Positives:  • Pertinent Negatives:     Additional History of Present Condition:  Present for many years    Assessment and Plan:  • I have discussed with the patient that a sample of skin via a "skin biopsy” would be potentially helpful to further make a specific diagnosis under the microscope  • Based on a thorough discussion of this condition and the management approach to it (including a comprehensive discussion of the known risks, side effects and potential benefits of treatment), the patient (family) agrees to implement the following specific plan:    o Procedure:  Skin Biopsy  After a thorough discussion of treatment options and risk/benefits/alternatives (including but not limited to local pain, scarring, dyspigmentation, blistering, possible superinfection, and inability to confirm a diagnosis via histopathology), verbal and written consent were obtained and portion of the rash was biopsied for tissue sample  See below for consent that was obtained from patient and subsequent Procedure Note  PROCEDURE TANGENTIAL (SHAVE) BIOPSY NOTE:    • Performing Physician: Rita Bardales  • Anatomic Location; Clinical Description with size (cm); Pre-Op Diagnosis:   ATTENTION:  DERMPATH GROUP    SPECIMEN A; Skin; Anatomic Location: left shoulder; Procedure/Protocol: Skin Specimen (submit in [facility]):Tangential Biopsy (includes shave, scoop, saucerization, curette) (CPT 41975; each additional tangential biopsy is CPT 80218)   42y o  year old  Male with a Morphological Description: 5 mm Brown papule  Differential Diagnosis and/or Specific Clinical Question: Rule Out; atypical mole    • Post-op diagnosis: Same     • Local anesthesia: 2% Lidocaine  HCL     • Topical anesthesia: None    • Hemostasis: Aluminum chloride       After obtaining informed consent  at which time there was a discussion about the purpose of biopsy  and low risks of infection and bleeding  The area was prepped and draped in the usual fashion  Anesthesia was obtained with 1% lidocaine with epinephrine   A shave biopsy to an appropriate sampling depth was obtained by Shave (Dermablade or 15 blade) The resulting wound was covered with surgical ointment and bandaged appropriately  The patient tolerated the procedure well without complications and was without signs of functional compromise  Specimen has been sent for review by Dermatopathology  Standard post-procedure care has been explained and has been included in written form within the patient's copy of Informed Consent  INFORMED CONSENT DISCUSSION AND POST-OPERATIVE INSTRUCTIONS FOR PATIENT    I   RATIONALE FOR PROCEDURE  I understand that a skin biopsy allows the Dermatologist to test a lesion or rash under the microscope to obtain a diagnosis  It usually involves numbing the area with numbing medication and removing a small piece of skin; sometimes the area will be closed with sutures  In this specific procedure, sutures are not usually needed  If any sutures are placed, then they are usually need to be removed in 2 weeks or less  I understand that my Dermatologist recommends that a skin "shave" biopsy be performed today  A local anesthetic, similar to the kind that a dentist uses when filling a cavity, will be injected with a very small needle into the skin area to be sampled  The injected skin and tissue underneath "will go to sleep” and become numb so no pain should be felt afterwards  An instrument shaped like a tiny "razor blade" (shave biopsy instrument) will be used to cut a small piece of tissue and skin from the area so that a sample of tissue can be taken and examined more closely under the microscope  A slight amount of bleeding will occur, but it will be stopped with direct pressure and a pressure bandage and any other appropriate methods  I understands that a scar will form where the wound was created  Surgical ointment will be applied to help protect the wound  Sutures are not usually needed      II   RISKS AND POTENTIAL COMPLICATIONS   I understand the risks and potential complications of a skin biopsy include but are not limited to the following:  • Bleeding  • Infection  • Pain  • Scar/keloid  • Skin discoloration  • Incomplete Removal  • Recurrence  • Nerve Damage/Numbness/Loss of Function  • Allergic Reaction to Anesthesia  • Biopsies are diagnostic procedures and based on findings additional treatment or evaluation may be required  • Loss or destruction of specimen resulting in no additional findings    My Dermatologist has explained to me the nature of the condition, the nature of the procedure, and the benefits to be reasonably expected compared with alternative approaches  My Dermatologist has discussed the likelihood of major risks or complications of this procedure including the specific risks listed above, such as bleeding, infection, and scarring/keloid  I understand that a scar is expected after this procedure  I understand that my physician cannot predict if the scar will form a "keloid," which extends beyond the borders of the wound that is created  A keloid is a thick, painful, and bumpy scar  A keloid can be difficult to treat, as it does not always respond well to therapy, which includes injecting cortisone directly into the keloid every few weeks  While this usually reduces the pain and size of the scar, it does not eliminate it  I understand that photographs may be taken before and after the procedure  These will be maintained as part of the medical providers confidential records and may not be made available to me  I further authorize the medical provider to use the photographs for teaching purposes or to illustrate scientific papers, books, or lectures if in his/her judgment, medical research, education, or science may benefit from its use  I have had an opportunity to fully inquire about the risks and benefits of this procedure and its alternatives  I have been given ample time and opportunity to ask questions and to seek a second opinion if I wished to do so    I acknowledge that there have specifically been no guarantees as to the cosmetic results from the procedure  I am aware that with any procedure there is always the possibility of an unexpected complication  III  POST-PROCEDURAL CARE (WHAT YOU WILL NEED TO DO "AFTER THE BIOPSY" TO OPTIMIZE HEALING)    • Keep the area clean and dry  Try NOT to remove the bandage or get it wet for the first 24 hours  • Gently clean the area and apply surgical ointment (such as Vaseline petrolatum ointment, which is available "over the counter" and not a prescription) to the biopsy site for up to 2 weeks straight  This acts to protect the wound from the outside world  • Sutures are not usually placed in this procedure  If any sutures were placed, return for suture removal as instructed (generally 1 week for the face, 2 weeks for the body)  • Take Acetaminophen (Tylenol) for discomfort, if no contraindications  Ibuprofen or aspirin could make bleeding worse  • Call our office immediately for signs of infection: fever, chills, increased redness, warmth, tenderness, discomfort/pain, or pus or foul smell coming from the wound  WHAT TO DO IF THERE IS ANY BLEEDING? If a small amount of bleeding is noticed, place a clean cloth over the area and apply firm pressure for ten minutes  Check the wound after 10 minutes of direct pressure  If bleeding persists, try one more time for an additional 10 minutes of direct pressure on the area  If the bleeding becomes heavier or does not stop after the second attempt, or if you have any other questions about this procedure, then please call your [facility] Dermatologist by calling [phone] (SKIN)  I hereby acknowledge that I have reviewed and verified the site with my Dermatologist and have requested and authorized my Dermatologist to proceed with the procedure              Scribe Attestation    I,:  Angelina Pfeiffer am acting as a scribe while in the presence of the attending physician :       I,: Jake Jose MD personally performed the services described in this documentation    as scribed in my presence :

## 2022-11-02 ENCOUNTER — TRANSCRIBE ORDERS (OUTPATIENT)
Dept: GASTROENTEROLOGY | Facility: CLINIC | Age: 37
End: 2022-11-02

## 2022-11-02 ENCOUNTER — OFFICE VISIT (OUTPATIENT)
Dept: GASTROENTEROLOGY | Facility: CLINIC | Age: 37
End: 2022-11-02

## 2022-11-02 ENCOUNTER — TELEPHONE (OUTPATIENT)
Dept: NEUROLOGY | Facility: CLINIC | Age: 37
End: 2022-11-02

## 2022-11-02 VITALS
TEMPERATURE: 96.9 F | BODY MASS INDEX: 17.36 KG/M2 | SYSTOLIC BLOOD PRESSURE: 106 MMHG | DIASTOLIC BLOOD PRESSURE: 66 MMHG | HEIGHT: 70 IN

## 2022-11-02 DIAGNOSIS — K64.8 INTERNAL HEMORRHOID: Primary | ICD-10-CM

## 2022-11-02 DIAGNOSIS — K64.4 EXTERNAL HEMORRHOID: ICD-10-CM

## 2022-11-02 RX ORDER — HYDROCORTISONE 25 MG/G
CREAM TOPICAL 2 TIMES DAILY
Qty: 28 G | Refills: 3 | Status: SHIPPED | OUTPATIENT
Start: 2022-11-02

## 2022-11-02 RX ORDER — HYDROCORTISONE ACETATE 25 MG/1
25 SUPPOSITORY RECTAL 2 TIMES DAILY
Qty: 30 SUPPOSITORY | Refills: 2 | Status: SHIPPED | OUTPATIENT
Start: 2022-11-02

## 2022-11-02 NOTE — TELEPHONE ENCOUNTER
Caretaker Dipti Isidro left  re: getting some paperwork processed for a leg brace  She would like a CB at #[phone]  Thank you

## 2022-11-02 NOTE — PROGRESS NOTES
[facility] - Outpatient Follow-up Note  Corey Link 42 y o  male MRN: [id number]  Encounter: [id number]          ASSESSMENT AND PLAN:      Sharmila Kelly is a 41 y/o male with anoxic brain injury, constipation due to neurogenic bowel who presents for follow-up of hemorrhoids  1  Chronic constipation due to neurogenic bowel  2  Internal and external hemorrhoids  Pt is accompanied by mother and caregiver  They note that he is on miralax BID and high-fiber diet which allows him to move his bowels most days but does have a few days/week in that he needs to be given enemas  They started to notice external hemorrhoid and suspected he also had internal, so they started him on OTC hemorrhoid cream and suppository with SITZ baths TID  They note this regimen did alleviate much of his pain with BM but continues to have the hemorrhoids and some irritation  They deny bleeding or itching  On MILLICENT: Pt had non-thrombosed, medium-sized hemorrhoid ay 12 o'clock and on internal exam, I did palpate lesion suspicious for hemrrhoid and the pt did note he had some pain upon palpation    -I explained to pt's mother and caregiver that if he does have thrombosed internal hemorrhoids, this cold be removed however this is a very painful procedure and pt would likely need to undergo flex sig vs anoscopy prior   They say they do not want to take this route   -I suspect pt's internal hemorrhoid may be thrombosed however caregiver and mother both note that his pain has improved   -I explained to pt, mother and caregiver that his ongoing constipation would certainly worsen this so I would like to change his regimen; I explained linzess may be a good option as using enemas would only irritate his hemorrhoids further however they would like to await recommendations from Dr Larry Bernstein: they ask that the recs are called into Ottovianney Morales, his caregiver, who's number is in the chart  -stop OTC creams and suppositories  -start anusol cream and suppository  -continue SITZ baths TID  -I explained to pt that topical nifedipine cream is also an option if anusol therapy and bowel regimen fail however he has prolonged QT; Systemic side effects from topical CCB are rare but do occur so they also would like to discuss this with Dr Randal Jenkins, as well  -I explained to pt's mother and caregiver that I will send a message to Dr Randal Jenkins to review and our team will let them know what she reccomends    3  Mild protein-calorie malnutrition   PCP added protein shake to his regimen and is tolerating diet without issues  ______________________________________________________________________    SUBJECTIVE:  Gisselle Meier is a 43 y/o male with anoxic brain injury, constipation due to neurogenic bowel who presents for follow-up of hemorrhoids  Pt is accompanied by his mother and caretaker  They note that recently they started to notice his hemorrhoids returning  They suspect he has internal hemorrhoid as he was complaining of pain with BM however they did visualize an external hemorrhoid  They note that he is on high-fiber diet per his dietician and miralax BID, but still needs to be given enemas a few times/week as he is not moving his bowels daily  They do not suspect straining  They started him on OTC cream and suppository and SITZ baths TID, which they note has decreased his pain but still has some irritation with BMs  They are requesting a phone call from Dr Randal Jenkins as they note that they only took this appt as she is not available for several months  REVIEW OF SYSTEMS IS OTHERWISE NEGATIVE        Historical Information   Past Medical History:   Diagnosis Date   • Abnormal ECG 1998 and beyond    post cardiac arrest   • Allergic rhinitis    • Brain anoxia ([facility] [address] )    • Cardiac arrest [facility])     age 13 s/p long Q-T syndrome   • Community acquired pneumonia     last assessed/resolved:  10/9/2014   • Depression    • Disease of thyroid gland    • GERD (gastroesophageal reflux disease)    • Hypothyroid 2020   • Long Q-T syndrome    • Muscular rigidity and spasm, progressive    • Osteopenia    • Osteoporosis    • Quadriplegia (HCC)    • Scoliosis of thoracic spine 2020   • Spastic neurogenic bladder    • Syncope     beta blocker medication DC because of low blood pressure   • Urinary incontinence    • Urinary tract infection without hematuria 2019     Past Surgical History:   Procedure Laterality Date   • APPENDECTOMY     • UPPER GASTROINTESTINAL ENDOSCOPY     • WISDOM TOOTH EXTRACTION       Social History   Social History     Substance and Sexual Activity   Alcohol Use No     Social History     Substance and Sexual Activity   Drug Use No     Social History     Tobacco Use   Smoking Status Never Smoker   Smokeless Tobacco Never Used     Family History   Problem Relation Age of Onset   • Other Father         mitral valve replaced   • Hypertension Father    • Diabetes type II Maternal Grandfather    • Heart failure Maternal Grandfather         Congestive heart failure -    • Diabetes type II Maternal Uncle    • Hypotension Mother        Meds/Allergies       Current Outpatient Medications:   •  ascorbic acid (GNP VITAMIN C) 500 MG tablet  •  b complex-vitamin C-folic acid (RENAL) 1 mg  •  bisacodyl (DULCOLAX) 10 mg suppository  •  Camphor-Eucalyptus-Menthol (VICKS VAPORUB) 4 73-1 2-2 6 % OINT  •  carbamide peroxide (DEBROX) 6 5 % otic solution  •  Coenzyme Q10 (Co Q10) 100 MG CAPS  •  Diapers & Supplies MISC  •  diclofenac sodium (VOLTAREN) 1 %  •  Disposable Gloves (Vinyl Gloves Medium) MISC  •  docusate sodium (COLACE) 100 mg capsule  •  docusate sodium (Enemeez Mini) 283 MG enema  •  dronabinol (MARINOL) 2 5 mg capsule  •  Elastic Bandages & Supports (Knee Brace) MISC  •  Emollient (CETA-KLENZ EX)  •  Emollient (eucerin) lotion  •  fluticasone (FLONASE) 50 mcg/act nasal spray  •  glycerin adult 2 g suppository  •  HEMORRHOIDAL 0 25 % suppository  •  ibuprofen (MOTRIN) 200 mg tablet  •  Icosapent Ethyl (VASCEPA) 1 g CAPS  •  Incontinence Supply Disposable (Prevail Air Briefs) MISC  •  Incontinence Supply Disposable (Select Disposable Underwear Sm) MISC  •  Incontinence Supply Disposable (Simplicity Insert Pad 14"-47") MISC  •  influenza vaccine, subunit, quadrivalent (Flucelvax Quadrivalent)  •  levothyroxine 75 mcg tablet  •  loratadine (CLARITIN) 10 mg tablet  •  Magnesium 250 MG TABS  •  magnesium citrate (CITROMA) 1 745 g/30 mL oral solution  •  Magnesium Oxide 500 MG TABS  •  Melatonin 5 MG TABS  •  methocarbamol (ROBAXIN) 500 mg tablet  •  Misc  Devices [facility]) MISC  •  modafinil (PROVIGIL) 200 MG tablet  •  Multiple Vitamins-Minerals (CEROVITE SENIOR) TABS  •  Nutritional Supplements (Nutra Shake) (Frozen) LIQD  •  Nutritional Supplements (NUTRITIONAL SHAKE) LIQD  •  omeprazole (PriLOSEC) 20 mg delayed release capsule  •  onabotulinumtoxin A (BOTOX) 100 units  •  polyethylene glycol (MIRALAX) 17 g packet  •  PREPARATION H 1-0 25-14 4-15 % rectal cream  •  sertraline (ZOLOFT) 100 mg tablet  •  Skin Protectants, Misc  (Hydrocerin) LOTN  •  sodium chloride (DEEP SEA NASAL SPRAY) 0 65 % nasal spray  •  SODIUM FLUORIDE, DENTAL GEL, (PreviDent 5000 Plus) 1 1 % CREA  •  Starch, Thickening, LIQD  •  Tea Tree 100 % OIL  •  Vitamins A & D (VITAMIN A & D) ointment  •  zinc oxide (DESITIN) 40 % PSTE  •  zinc sulfate (ZINCATE) 220 mg capsule    Allergies   Allergen Reactions   • Other      drugs that prolong the QT interval  drugs that prolong the QT interval  Seasonal allergies            Objective     There were no vitals taken for this visit  There is no height or weight on file to calculate BMI        PHYSICAL EXAM:      General Appearance:   Alert, cooperative, no distress   HEENT:   Normocephalic, atraumatic, anicteric      Neck:  Supple, symmetrical, trachea midline   Lungs:   Clear to auscultation bilaterally; no rales, rhonchi or wheezing; respirations unlabored    Heart[de-identified]   Regular rate and rhythm; no murmur, rub, or gallop  Abdomen:   Soft, non-tender, non-distended; normal bowel sounds; no masses, no organomegaly    Genitalia:   Deferred    Rectal:   Deferred    Extremities:  No cyanosis, clubbing or edema    Pulses:  2+ and symmetric    Skin:  No jaundice, rashes, or lesions    Lymph nodes:  No palpable cervical lymphadenopathy        Lab Results:   No visits with results within 1 Day(s) from this visit  Latest known visit with results is:   Office Visit on 06/01/2022   Component Date Value   •  RAPID STREP A 06/01/2022 Negative    • SARS-CoV-2 06/01/2022 Negative    • INFLUENZA A PCR 06/01/2022 Negative    • INFLUENZA B PCR 06/01/2022 Negative    • Throat Culture 06/01/2022 Negative for beta-hemolytic Streptococcus          Radiology Results:   No results found

## 2022-11-02 NOTE — TELEPHONE ENCOUNTER
Kitty gupta:      Hi, this is Tila calling about naman  I spoke with someone earlier and I need to update information  Naman's orthopedic company is not , please remove that from his records  The company is [employer]  Their fax number is [phone]  His representative is Umeng  So any future correspondence regarding leg brace or orthotics should go to hyacinth Dhillon's telephone number is [phone]  And again, the fax number is [phone]  Thank you       [phone]

## 2022-11-02 NOTE — Clinical Note
Hi, Dr Gomez Benavides  Sending you this note to review if possible as your pt followed up with me today and his mother and caretaker are requesting a phone call from you  He has been having issues with hemorrhoids that they have under partial control with OTC creams and suppository and SITZ bath  On MILLICENT, he had non-thrombosed external hemorrhoid and he did have a lesion suspicious for internal hemorrhoid, which he told them was a bit painful upon palpation  I sent in anusol cream and suppository for them to start but I think the bigger issue is he is not moving his bowels daily on miralax BID and needs to be given enemas frequently  I wanted to get him off the enemas and potentially start linzess but they wanted to hear from you first  I also explained that topical nifedipine cream is an option if the anusol fails however he does have prolonged QT and I know there is small chance of systemic side-effects  I told them that if you are quite busy you may have me or the clinical team call them with your recs, thanks!

## 2022-11-02 NOTE — TELEPHONE ENCOUNTER
Tye reyes  Made her aware that we have not received any calls from Brittany Banuelos        She states that office not can be faxed to Brittany Banuelos at [address] [facility]    Office note faxed electronically via epic

## 2022-11-02 NOTE — TELEPHONE ENCOUNTER
Jie Streeter calling back regarding below  Orthotic company needs notes from dr Felisa Fisher  I believe Juana Law from the [address] [facility] had left several messages    #[phone]

## 2022-11-03 ENCOUNTER — TELEPHONE (OUTPATIENT)
Dept: NEUROLOGY | Facility: CLINIC | Age: 37
End: 2022-11-03

## 2022-11-11 ENCOUNTER — OFFICE VISIT (OUTPATIENT)
Dept: GASTROENTEROLOGY | Facility: CLINIC | Age: 37
End: 2022-11-11

## 2022-11-11 VITALS
SYSTOLIC BLOOD PRESSURE: 118 MMHG | TEMPERATURE: 98.6 F | BODY MASS INDEX: 17.36 KG/M2 | DIASTOLIC BLOOD PRESSURE: 70 MMHG | HEIGHT: 70 IN

## 2022-11-11 DIAGNOSIS — K59.00 CONSTIPATION DUE TO NEUROGENIC BOWEL: Primary | ICD-10-CM

## 2022-11-11 DIAGNOSIS — K59.2 CONSTIPATION DUE TO NEUROGENIC BOWEL: Primary | ICD-10-CM

## 2022-11-11 RX ORDER — POLYETHYLENE GLYCOL 3350 17 G/17G
POWDER ORAL
COMMUNITY
Start: 2022-10-26

## 2022-11-11 NOTE — PROGRESS NOTES
[facility] - Outpatient Follow-up Note  Katerine Martínez 42 y o  male MRN: [id number]  Encounter: [id number]          ASSESSMENT AND PLAN:  76-year-old male here for follow-up after recent acute visit with symptomatic hemorrhoids  He is accompanied by his mom and caregiver as well as nurse  1  Constipation due to neurogenic bowel  -continue current bowel regimen  -stopping his suppositories as hemorrhoids have healed, can use as needed    ______________________________________________________________________    SUBJECTIVE:  Patient here for follow-up after recently diagnosed with hemorrhoids I my colleague  He has been treated with suppositories twice a day for 1 week  He is having a bowel movement nearly every night  REVIEW OF SYSTEMS IS OTHERWISE NEGATIVE        Historical Information   Past Medical History:   Diagnosis Date   • Abnormal ECG 1998 and beyond    post cardiac arrest   • Allergic rhinitis    • Brain anoxia ([facility] [address] )    • Cardiac arrest [facility])     age 13 s/p long Q-T syndrome   • Community acquired pneumonia     last assessed/resolved:  10/9/2014   • Depression    • Disease of thyroid gland    • GERD (gastroesophageal reflux disease)    • Hypothyroid 2/26/2020   • Long Q-T syndrome    • Muscular rigidity and spasm, progressive    • Osteopenia    • Osteoporosis    • Quadriplegia (HCC)    • Scoliosis of thoracic spine 2/27/2020   • Spastic neurogenic bladder    • Syncope     beta blocker medication DC because of low blood pressure   • Urinary incontinence    • Urinary tract infection without hematuria 4/27/2019     Past Surgical History:   Procedure Laterality Date   • APPENDECTOMY  1991   • UPPER GASTROINTESTINAL ENDOSCOPY     • WISDOM TOOTH EXTRACTION       Social History   Social History     Substance and Sexual Activity   Alcohol Use No     Social History     Substance and Sexual Activity   Drug Use No     Social History     Tobacco Use   Smoking Status Never Smoker   Smokeless Tobacco Never Used     Family History   Problem Relation Age of Onset   • Other Father         mitral valve replaced   • Hypertension Father    • Diabetes type II Maternal Grandfather    • Heart failure Maternal Grandfather         Congestive heart failure -    • Diabetes type II Maternal Uncle    • Hypotension Mother        Meds/Allergies       Current Outpatient Medications:   •  ascorbic acid (GNP VITAMIN C) 500 MG tablet  •  b complex-vitamin C-folic acid (RENAL) 1 mg  •  bisacodyl (DULCOLAX) 10 mg suppository  •  Camphor-Eucalyptus-Menthol (VICKS VAPORUB) 4 73-1 2-2 6 % OINT  •  carbamide peroxide (DEBROX) 6 5 % otic solution  •  Coenzyme Q10 (Co Q10) 100 MG CAPS  •  Diapers & Supplies MISC  •  diclofenac sodium (VOLTAREN) 1 %  •  Disposable Gloves (Vinyl Gloves Medium) MISC  •  docusate sodium (COLACE) 100 mg capsule  •  docusate sodium (Enemeez Mini) 283 MG enema  •  dronabinol (MARINOL) 2 5 mg capsule  •  Elastic Bandages & Supports (Knee Brace) MISC  •  Emollient (CETA-KLENZ EX)  •  Emollient (eucerin) lotion  •  fluticasone (FLONASE) 50 mcg/act nasal spray  •  glycerin adult 2 g suppository  •  HEMORRHOIDAL 0 25 % suppository  •  hydrocortisone (ANUSOL-HC) 2 5 % rectal cream  •  hydrocortisone (ANUSOL-HC) 25 mg suppository  •  ibuprofen (MOTRIN) 200 mg tablet  •  Icosapent Ethyl (VASCEPA) 1 g CAPS  •  Incontinence Supply Disposable (Prevail Air Briefs) MISC  •  Incontinence Supply Disposable (Select Disposable Underwear Sm) MISC  •  Incontinence Supply Disposable (Simplicity Insert Pad 39"-08") MISC  •  influenza vaccine, subunit, quadrivalent (Flucelvax Quadrivalent)  •  levothyroxine 75 mcg tablet  •  loratadine (CLARITIN) 10 mg tablet  •  Magnesium 250 MG TABS  •  Melatonin 5 MG TABS  •  methocarbamol (ROBAXIN) 500 mg tablet  •  Misc   Devices [facility]) MISC  •  modafinil (PROVIGIL) 200 MG tablet  •  Multiple Vitamins-Minerals (CEROVITE SENIOR) TABS  •  Nutritional Supplements (NUTRITIONAL SHAKE) LIQD  •  omeprazole (PriLOSEC) 20 mg delayed release capsule  •  onabotulinumtoxin A (BOTOX) 100 units  •  polyethylene glycol (GLYCOLAX) 17 GM/SCOOP  •  PREPARATION H 1-0 25-14 4-15 % rectal cream  •  sertraline (ZOLOFT) 100 mg tablet  •  Skin Protectants, Misc  (Hydrocerin) LOTN  •  sodium chloride (DEEP SEA NASAL SPRAY) 0 65 % nasal spray  •  SODIUM FLUORIDE, DENTAL GEL, (PreviDent 5000 Plus) 1 1 % CREA  •  Starch, Thickening, LIQD  •  Tea Tree 100 % OIL  •  Vitamins A & D (VITAMIN A & D) ointment  •  zinc oxide (DESITIN) 40 % PSTE  •  zinc sulfate (ZINCATE) 220 mg capsule  •  magnesium citrate (CITROMA) 1 745 g/30 mL oral solution  •  Magnesium Oxide 500 MG TABS  •  Nutritional Supplements (Nutra Shake) (Frozen) LIQD  •  polyethylene glycol (MIRALAX) 17 g packet    Allergies   Allergen Reactions   • Other      drugs that prolong the QT interval  drugs that prolong the QT interval  Seasonal allergies            Objective     Blood pressure 118/70, temperature 98 6 °F (37 °C), temperature source Tympanic, height 5' 10" (1 778 m)  Body mass index is 17 36 kg/m²  PHYSICAL EXAM:      General Appearance:   Alert, cooperative, no distress   HEENT:   Normocephalic, atraumatic, anicteric      Neck:  Supple, symmetrical, trachea midline   Lungs:   Clear to auscultation bilaterally; no rales, rhonchi or wheezing; respirations unlabored    Heart[de-identified]   Regular rate and rhythm; no murmur, rub, or gallop     Abdomen:   Soft, non-tender, non-distended; normal bowel sounds; no masses, no organomegaly    Genitalia:   Deferred    Rectal:   Exam performed with mom and 2 aides at the bedside, no external hemorrhoids seen, no internal hemorrhoids palpated    Extremities:  No cyanosis, clubbing or edema    Pulses:  2+ and symmetric    Skin:  No jaundice, rashes, or lesions    Lymph nodes:  No palpable cervical lymphadenopathy        Lab Results:   No visits with results within 1 Day(s) from this visit  Latest known visit with results is:   Office Visit on 06/01/2022   Component Date Value   •  RAPID STREP A 06/01/2022 Negative    • SARS-CoV-2 06/01/2022 Negative    • INFLUENZA A PCR 06/01/2022 Negative    • INFLUENZA B PCR 06/01/2022 Negative    • Throat Culture 06/01/2022 Negative for beta-hemolytic Streptococcus          Radiology Results:   No results found